# Patient Record
Sex: MALE | Race: WHITE | NOT HISPANIC OR LATINO | Employment: OTHER | URBAN - METROPOLITAN AREA
[De-identification: names, ages, dates, MRNs, and addresses within clinical notes are randomized per-mention and may not be internally consistent; named-entity substitution may affect disease eponyms.]

---

## 2017-01-12 ENCOUNTER — HOSPITAL ENCOUNTER (OUTPATIENT)
Dept: ONCOLOGY | Facility: CLINIC | Age: 77
Discharge: HOME | End: 2017-01-12
Attending: INTERNAL MEDICINE | Admitting: INTERNAL MEDICINE

## 2017-01-26 ENCOUNTER — HOSPITAL ENCOUNTER (OUTPATIENT)
Dept: ONCOLOGY | Facility: CLINIC | Age: 77
Discharge: HOME | End: 2017-01-26
Attending: INTERNAL MEDICINE | Admitting: INTERNAL MEDICINE

## 2017-02-27 ENCOUNTER — HOSPITAL ENCOUNTER (OUTPATIENT)
Dept: ONCOLOGY | Facility: CLINIC | Age: 77
Discharge: HOME | End: 2017-02-27
Attending: INTERNAL MEDICINE | Admitting: INTERNAL MEDICINE

## 2017-03-10 ENCOUNTER — HOSPITAL ENCOUNTER (OUTPATIENT)
Dept: CARDIOLOGY | Facility: HOSPITAL | Age: 77
Discharge: HOME | End: 2017-03-10
Attending: INTERNAL MEDICINE | Admitting: INTERNAL MEDICINE

## 2017-03-20 ENCOUNTER — HOSPITAL ENCOUNTER (OUTPATIENT)
Dept: LAB | Facility: HOSPITAL | Age: 77
Setting detail: SPECIMEN
Discharge: HOME | End: 2017-03-20
Attending: INTERNAL MEDICINE | Admitting: INTERNAL MEDICINE

## 2017-03-20 LAB
ALBUMIN SERPL-MCNC: 3.6 G/DL (ref 3.5–4.8)
ALBUMIN/GLOB SERPL: 1.6 {RATIO} (ref 1–1.7)
ALP SERPL-CCNC: 33 IU/L (ref 32–91)
ALT SERPL-CCNC: 32 IU/L (ref 17–63)
ANION GAP SERPL CALC-SCNC: 15.2 MMOL/L (ref 10–20)
AST SERPL-CCNC: 32 IU/L (ref 15–41)
BILIRUB SERPL-MCNC: 0.4 MG/DL (ref 0.3–1.2)
BUN SERPL-MCNC: 17 MG/DL (ref 8–20)
BUN/CREAT SERPL: 13.1 (ref 6.2–20.3)
CALCIUM SERPL-MCNC: 9.6 MG/DL (ref 8.9–10.3)
CHLORIDE SERPL-SCNC: 103 MMOL/L (ref 101–111)
CHOLEST SERPL-MCNC: 135 MG/DL
CHOLEST/HDLC SERPL: 4.6 {RATIO}
CONV CO2: 26 MMOL/L (ref 22–32)
CONV LDL CHOLESTEROL DIRECT: 67 MG/DL (ref 0–100)
CONV MICROALBUM.,U,RANDOM: <2 MG/L
CONV TOTAL PROTEIN: 5.8 G/DL (ref 6.1–7.9)
CREAT 24H UR-MCNC: 36 MG/DL
CREAT UR-MCNC: 1.3 MG/DL (ref 0.7–1.2)
GLOBULIN UR ELPH-MCNC: 2.2 G/DL (ref 2.5–3.8)
GLUCOSE SERPL-MCNC: 247 MG/DL (ref 65–99)
HDLC SERPL-MCNC: 29 MG/DL
LDLC/HDLC SERPL: 2.3 {RATIO}
LIPID INTERPRETATION: ABNORMAL
MICROALBUMIN/CREAT UR: <5.6 UG/MG
POTASSIUM SERPL-SCNC: 4.2 MMOL/L (ref 3.6–5.1)
SODIUM SERPL-SCNC: 140 MMOL/L (ref 136–144)
T4 FREE SERPL-MCNC: 1 NG/DL (ref 0.58–1.64)
TRIGL SERPL-MCNC: 293 MG/DL
TSH SERPL-ACNC: 3.11 UIU/ML (ref 0.34–5.6)
VLDLC SERPL CALC-MCNC: 38.8 MG/DL

## 2017-03-27 ENCOUNTER — HOSPITAL ENCOUNTER (OUTPATIENT)
Dept: ONCOLOGY | Facility: CLINIC | Age: 77
Discharge: HOME | End: 2017-03-27
Attending: INTERNAL MEDICINE | Admitting: INTERNAL MEDICINE

## 2017-04-24 ENCOUNTER — HOSPITAL ENCOUNTER (OUTPATIENT)
Dept: ONCOLOGY | Facility: CLINIC | Age: 77
Discharge: HOME | End: 2017-04-24
Attending: INTERNAL MEDICINE | Admitting: INTERNAL MEDICINE

## 2017-05-24 ENCOUNTER — HOSPITAL ENCOUNTER (OUTPATIENT)
Dept: ONCOLOGY | Facility: CLINIC | Age: 77
Discharge: HOME | End: 2017-05-24
Attending: INTERNAL MEDICINE | Admitting: INTERNAL MEDICINE

## 2017-05-31 ENCOUNTER — HOSPITAL ENCOUNTER (OUTPATIENT)
Dept: ONCOLOGY | Facility: CLINIC | Age: 77
Discharge: HOME | End: 2017-05-31
Attending: INTERNAL MEDICINE | Admitting: INTERNAL MEDICINE

## 2017-06-07 ENCOUNTER — HOSPITAL ENCOUNTER (OUTPATIENT)
Dept: ONCOLOGY | Facility: CLINIC | Age: 77
Discharge: HOME | End: 2017-06-07
Attending: INTERNAL MEDICINE | Admitting: INTERNAL MEDICINE

## 2017-06-15 ENCOUNTER — HOSPITAL ENCOUNTER (OUTPATIENT)
Dept: ONCOLOGY | Facility: CLINIC | Age: 77
Discharge: HOME | End: 2017-06-15
Attending: INTERNAL MEDICINE | Admitting: INTERNAL MEDICINE

## 2017-06-15 LAB
FERRITIN SERPL-MCNC: 72 NG/ML (ref 24–336)
IRON SATN MFR SERPL: 23 % (ref 20–50)
IRON SERPL-MCNC: 90 UG/DL (ref 45–182)
TIBC SERPL-MCNC: 390 UG/DL (ref 228–428)

## 2017-07-13 ENCOUNTER — HOSPITAL ENCOUNTER (OUTPATIENT)
Dept: ONCOLOGY | Facility: CLINIC | Age: 77
Discharge: HOME | End: 2017-07-13
Attending: INTERNAL MEDICINE | Admitting: INTERNAL MEDICINE

## 2017-07-19 ENCOUNTER — HOSPITAL ENCOUNTER (OUTPATIENT)
Dept: FAMILY MEDICINE CLINIC | Facility: CLINIC | Age: 77
Setting detail: SPECIMEN
Discharge: HOME | End: 2017-07-19
Attending: FAMILY MEDICINE | Admitting: FAMILY MEDICINE

## 2017-07-19 LAB
ANION GAP SERPL CALC-SCNC: 11.6 MMOL/L (ref 10–20)
BUN SERPL-MCNC: 14 MG/DL (ref 8–20)
BUN/CREAT SERPL: 10.8 (ref 6.2–20.3)
CALCIUM SERPL-MCNC: 9.8 MG/DL (ref 8.9–10.3)
CHLORIDE SERPL-SCNC: 103 MMOL/L (ref 101–111)
CONV CO2: 28 MMOL/L (ref 22–32)
CREAT UR-MCNC: 1.3 MG/DL (ref 0.7–1.2)
GLUCOSE SERPL-MCNC: 210 MG/DL (ref 65–99)
POTASSIUM SERPL-SCNC: 4.6 MMOL/L (ref 3.6–5.1)
SODIUM SERPL-SCNC: 138 MMOL/L (ref 136–144)

## 2017-07-20 ENCOUNTER — HOSPITAL ENCOUNTER (OUTPATIENT)
Dept: ONCOLOGY | Facility: CLINIC | Age: 77
Discharge: HOME | End: 2017-07-20
Attending: INTERNAL MEDICINE | Admitting: INTERNAL MEDICINE

## 2017-07-28 ENCOUNTER — HOSPITAL ENCOUNTER (OUTPATIENT)
Dept: ONCOLOGY | Facility: CLINIC | Age: 77
Discharge: HOME | End: 2017-07-28
Attending: INTERNAL MEDICINE | Admitting: INTERNAL MEDICINE

## 2017-08-03 ENCOUNTER — HOSPITAL ENCOUNTER (OUTPATIENT)
Dept: ONCOLOGY | Facility: CLINIC | Age: 77
Discharge: HOME | End: 2017-08-03
Attending: INTERNAL MEDICINE | Admitting: INTERNAL MEDICINE

## 2017-08-10 ENCOUNTER — HOSPITAL ENCOUNTER (OUTPATIENT)
Dept: ONCOLOGY | Facility: CLINIC | Age: 77
Discharge: HOME | End: 2017-08-10
Attending: INTERNAL MEDICINE | Admitting: INTERNAL MEDICINE

## 2017-08-29 ENCOUNTER — CLINICAL SUPPORT (OUTPATIENT)
Dept: ONCOLOGY | Facility: HOSPITAL | Age: 77
End: 2017-08-29

## 2017-08-29 ENCOUNTER — HOSPITAL ENCOUNTER (OUTPATIENT)
Dept: ONCOLOGY | Facility: HOSPITAL | Age: 77
Discharge: HOME | End: 2017-08-29
Attending: INTERNAL MEDICINE | Admitting: INTERNAL MEDICINE

## 2017-08-29 ENCOUNTER — HOSPITAL ENCOUNTER (OUTPATIENT)
Dept: ONCOLOGY | Facility: CLINIC | Age: 77
Setting detail: INFUSION SERIES
Discharge: HOME | End: 2017-08-29
Attending: INTERNAL MEDICINE | Admitting: INTERNAL MEDICINE

## 2017-08-29 NOTE — PROGRESS NOTES
PATIENTS ONCOLOGY RECORD LOCATED IN [facility]      Subjective     Name:  DEBI SHAIKH     Date:  2017  Address:  [address]  [place] [address]  Home: [phone]  :  1940 AGE:  77 y.o.        RECORDS OBTAINED:  Patients Oncology Record is located in [facility]

## 2017-09-06 ENCOUNTER — HOSPITAL ENCOUNTER (OUTPATIENT)
Dept: ONCOLOGY | Facility: CLINIC | Age: 77
Setting detail: INFUSION SERIES
Discharge: HOME | End: 2017-09-06
Attending: INTERNAL MEDICINE | Admitting: INTERNAL MEDICINE

## 2017-09-06 ENCOUNTER — CLINICAL SUPPORT (OUTPATIENT)
Dept: ONCOLOGY | Facility: HOSPITAL | Age: 77
End: 2017-09-06

## 2017-09-06 ENCOUNTER — HOSPITAL ENCOUNTER (OUTPATIENT)
Dept: ONCOLOGY | Facility: HOSPITAL | Age: 77
Discharge: HOME | End: 2017-09-06
Attending: INTERNAL MEDICINE | Admitting: INTERNAL MEDICINE

## 2017-09-06 NOTE — PROGRESS NOTES
PATIENTS ONCOLOGY RECORD LOCATED IN [facility]      Subjective     Name:  DEBI SHAIKH     Date:  2017  Address:  [address]  [place] [address]  Home: [phone]  :  1940 AGE:  77 y.o.        RECORDS OBTAINED:  Patients Oncology Record is located in [facility]

## 2017-09-25 ENCOUNTER — HOSPITAL ENCOUNTER (OUTPATIENT)
Dept: LAB | Facility: HOSPITAL | Age: 77
Setting detail: SPECIMEN
Discharge: HOME | End: 2017-09-25
Attending: INTERNAL MEDICINE | Admitting: INTERNAL MEDICINE

## 2017-10-05 ENCOUNTER — CLINICAL SUPPORT (OUTPATIENT)
Dept: ONCOLOGY | Facility: HOSPITAL | Age: 77
End: 2017-10-05

## 2017-10-05 ENCOUNTER — HOSPITAL ENCOUNTER (OUTPATIENT)
Dept: ONCOLOGY | Facility: HOSPITAL | Age: 77
Discharge: HOME | End: 2017-10-05
Attending: INTERNAL MEDICINE | Admitting: INTERNAL MEDICINE

## 2017-10-05 ENCOUNTER — HOSPITAL ENCOUNTER (OUTPATIENT)
Dept: ONCOLOGY | Facility: CLINIC | Age: 77
Setting detail: INFUSION SERIES
Discharge: HOME | End: 2017-10-05
Attending: INTERNAL MEDICINE | Admitting: INTERNAL MEDICINE

## 2017-10-05 NOTE — PROGRESS NOTES
PATIENTS ONCOLOGY RECORD LOCATED IN [facility]      Subjective     Name:  DEBI SHAIKH     Date:  10/05/2017  Address:  [address] [place] [address]  Home: [phone]  :  1940 AGE:  77 y.o.        RECORDS OBTAINED:  Patients Oncology Record is located in [facility]

## 2017-11-02 ENCOUNTER — CLINICAL SUPPORT (OUTPATIENT)
Dept: ONCOLOGY | Facility: HOSPITAL | Age: 77
End: 2017-11-02

## 2017-11-02 ENCOUNTER — HOSPITAL ENCOUNTER (OUTPATIENT)
Dept: ONCOLOGY | Facility: HOSPITAL | Age: 77
Discharge: HOME | End: 2017-11-02
Attending: INTERNAL MEDICINE | Admitting: INTERNAL MEDICINE

## 2017-11-02 ENCOUNTER — HOSPITAL ENCOUNTER (OUTPATIENT)
Dept: ONCOLOGY | Facility: CLINIC | Age: 77
Setting detail: INFUSION SERIES
Discharge: HOME | End: 2017-11-02
Attending: INTERNAL MEDICINE | Admitting: INTERNAL MEDICINE

## 2017-11-02 NOTE — PROGRESS NOTES
PATIENTS ONCOLOGY RECORD LOCATED IN [facility]      Subjective     Name:  DEBI MONKOGAST     Date:  2017  Address:  [facility] [place] [address]  Home: [phone]  :  1940 AGE:  77 y.o.        RECORDS OBTAINED:  Patients Oncology Record is located in [facility]

## 2017-12-14 ENCOUNTER — HOSPITAL ENCOUNTER (OUTPATIENT)
Dept: ONCOLOGY | Facility: CLINIC | Age: 77
Setting detail: INFUSION SERIES
Discharge: HOME | End: 2017-12-14
Attending: INTERNAL MEDICINE | Admitting: INTERNAL MEDICINE

## 2017-12-14 ENCOUNTER — HOSPITAL ENCOUNTER (OUTPATIENT)
Dept: ONCOLOGY | Facility: HOSPITAL | Age: 77
Discharge: HOME | End: 2017-12-14
Attending: INTERNAL MEDICINE | Admitting: INTERNAL MEDICINE

## 2018-01-11 ENCOUNTER — HOSPITAL ENCOUNTER (OUTPATIENT)
Dept: ONCOLOGY | Facility: CLINIC | Age: 78
Setting detail: INFUSION SERIES
Discharge: HOME | End: 2018-01-11
Attending: INTERNAL MEDICINE | Admitting: INTERNAL MEDICINE

## 2018-01-11 ENCOUNTER — CLINICAL SUPPORT (OUTPATIENT)
Dept: ONCOLOGY | Facility: HOSPITAL | Age: 78
End: 2018-01-11

## 2018-01-11 NOTE — PROGRESS NOTES
PATIENTS ONCOLOGY RECORD LOCATED IN [facility]      Subjective     Name:  DEBI CHAU NEEL     Date:  2018  Address:  [facility] [address]  [place] [address]  Home: [phone]  :  1940 AGE:  77 y.o.        RECORDS OBTAINED:  Patients Oncology Record is located in [facility]

## 2018-01-18 ENCOUNTER — CLINICAL SUPPORT (OUTPATIENT)
Dept: ONCOLOGY | Facility: HOSPITAL | Age: 78
End: 2018-01-18

## 2018-01-18 ENCOUNTER — HOSPITAL ENCOUNTER (OUTPATIENT)
Dept: ONCOLOGY | Facility: CLINIC | Age: 78
Setting detail: INFUSION SERIES
Discharge: HOME | End: 2018-01-18
Attending: INTERNAL MEDICINE | Admitting: INTERNAL MEDICINE

## 2018-01-18 NOTE — PROGRESS NOTES
PATIENTS ONCOLOGY RECORD LOCATED IN [facility]      Subjective     Name:  DEBI CHAU NEEL     Date:  2018  Address:  [facility] [address]  [place] [address]  Home: [phone]  :  1940 AGE:  77 y.o.        RECORDS OBTAINED:  Patients Oncology Record is located in [facility]

## 2018-01-25 ENCOUNTER — CLINICAL SUPPORT (OUTPATIENT)
Dept: ONCOLOGY | Facility: HOSPITAL | Age: 78
End: 2018-01-25

## 2018-01-25 ENCOUNTER — HOSPITAL ENCOUNTER (OUTPATIENT)
Dept: ONCOLOGY | Facility: CLINIC | Age: 78
Setting detail: INFUSION SERIES
Discharge: HOME | End: 2018-01-25
Attending: INTERNAL MEDICINE | Admitting: INTERNAL MEDICINE

## 2018-01-25 NOTE — PROGRESS NOTES
PATIENTS ONCOLOGY RECORD LOCATED IN [facility]      Subjective     Name:  DEBI CHAU NEEL     Date:  2018  Address:  [facility] [place] [address]  Home: [phone]  :  1940 AGE:  77 y.o.        RECORDS OBTAINED:  Patients Oncology Record is located in [facility]

## 2018-02-23 ENCOUNTER — HOSPITAL ENCOUNTER (OUTPATIENT)
Dept: ONCOLOGY | Facility: CLINIC | Age: 78
Setting detail: INFUSION SERIES
Discharge: HOME | End: 2018-02-23
Attending: INTERNAL MEDICINE | Admitting: INTERNAL MEDICINE

## 2018-02-23 ENCOUNTER — CLINICAL SUPPORT (OUTPATIENT)
Dept: ONCOLOGY | Facility: HOSPITAL | Age: 78
End: 2018-02-23

## 2018-02-23 NOTE — PROGRESS NOTES
PATIENTS ONCOLOGY RECORD LOCATED IN [facility]      Subjective     Name:  DEBI CHAU NEEL     Date:  2018  Address:  [facility] [place] [address]  Home: [phone]  :  1940 AGE:  77 y.o.        RECORDS OBTAINED:  Patients Oncology Record is located in [facility]

## 2018-03-23 ENCOUNTER — CLINICAL SUPPORT (OUTPATIENT)
Dept: ONCOLOGY | Facility: HOSPITAL | Age: 78
End: 2018-03-23

## 2018-03-23 ENCOUNTER — HOSPITAL ENCOUNTER (OUTPATIENT)
Dept: ONCOLOGY | Facility: CLINIC | Age: 78
Setting detail: INFUSION SERIES
Discharge: HOME | End: 2018-03-23
Attending: INTERNAL MEDICINE | Admitting: INTERNAL MEDICINE

## 2018-03-23 NOTE — PROGRESS NOTES
PATIENTS ONCOLOGY RECORD LOCATED IN [facility]      Subjective     Name:  DEBI CHAU NEEL     Date:  2018  Address:  [facility] [place] [address]  Home: [phone]  :  1940 AGE:  77 y.o.        RECORDS OBTAINED:  Patients Oncology Record is located in [facility]

## 2018-03-26 ENCOUNTER — HOSPITAL ENCOUNTER (OUTPATIENT)
Dept: LAB | Facility: HOSPITAL | Age: 78
Setting detail: SPECIMEN
Discharge: HOME | End: 2018-03-26
Attending: INTERNAL MEDICINE | Admitting: INTERNAL MEDICINE

## 2018-04-04 ENCOUNTER — HOSPITAL ENCOUNTER (OUTPATIENT)
Dept: FAMILY MEDICINE CLINIC | Facility: CLINIC | Age: 78
Setting detail: SPECIMEN
Discharge: HOME | End: 2018-04-04
Attending: FAMILY MEDICINE | Admitting: FAMILY MEDICINE

## 2018-04-04 LAB
ALBUMIN SERPL-MCNC: 4.1 G/DL (ref 3.5–4.8)
ALBUMIN/GLOB SERPL: 1.8 {RATIO} (ref 1–1.7)
ALP SERPL-CCNC: 36 IU/L (ref 32–91)
ALT SERPL-CCNC: 28 IU/L (ref 17–63)
ANION GAP SERPL CALC-SCNC: 14.3 MMOL/L (ref 10–20)
AST SERPL-CCNC: 36 IU/L (ref 15–41)
BASOPHILS # BLD AUTO: 0 10*3/UL (ref 0–0.2)
BASOPHILS NFR BLD AUTO: 1 % (ref 0–2)
BILIRUB SERPL-MCNC: 0.6 MG/DL (ref 0.3–1.2)
BUN SERPL-MCNC: 26 MG/DL (ref 8–20)
BUN/CREAT SERPL: 20 (ref 6.2–20.3)
CALCIUM SERPL-MCNC: 9.6 MG/DL (ref 8.9–10.3)
CHLORIDE SERPL-SCNC: 101 MMOL/L (ref 101–111)
CONV CO2: 23 MMOL/L (ref 22–32)
CONV TOTAL PROTEIN: 6.4 G/DL (ref 6.1–7.9)
CREAT UR-MCNC: 1.3 MG/DL (ref 0.7–1.2)
DIFFERENTIAL METHOD BLD: (no result)
EOSINOPHIL # BLD AUTO: 0.3 10*3/UL (ref 0–0.3)
EOSINOPHIL # BLD AUTO: 5 % (ref 0–3)
ERYTHROCYTE [DISTWIDTH] IN BLOOD BY AUTOMATED COUNT: 14.1 % (ref 11.5–14.5)
GLOBULIN UR ELPH-MCNC: 2.3 G/DL (ref 2.5–3.8)
GLUCOSE SERPL-MCNC: 378 MG/DL (ref 65–99)
HCT VFR BLD AUTO: 38.2 % (ref 40–54)
HGB BLD-MCNC: 12.8 G/DL (ref 14–18)
LYMPHOCYTES # BLD AUTO: 2 10*3/UL (ref 0.8–4.8)
LYMPHOCYTES NFR BLD AUTO: 33 % (ref 18–42)
MCH RBC QN AUTO: 31.3 PG (ref 26–32)
MCHC RBC AUTO-ENTMCNC: 33.5 G/DL (ref 32–36)
MCV RBC AUTO: 93.5 FL (ref 80–94)
MONOCYTES # BLD AUTO: 0.5 10*3/UL (ref 0.1–1.3)
MONOCYTES NFR BLD AUTO: 8 % (ref 2–11)
NEUTROPHILS # BLD AUTO: 3.3 10*3/UL (ref 2.3–8.6)
NEUTROPHILS NFR BLD AUTO: 53 % (ref 50–75)
NRBC BLD AUTO-RTO: 0 /100{WBCS}
NRBC/RBC NFR BLD MANUAL: 0 10*3/UL
PLATELET # BLD AUTO: 197 10*3/UL (ref 150–450)
PMV BLD AUTO: 9.9 FL (ref 7.4–10.4)
POTASSIUM SERPL-SCNC: 4.3 MMOL/L (ref 3.6–5.1)
RBC # BLD AUTO: 4.09 10*6/UL (ref 4.6–6)
SODIUM SERPL-SCNC: 134 MMOL/L (ref 136–144)
WBC # BLD AUTO: 6.1 10*3/UL (ref 4.5–11.5)

## 2018-04-25 ENCOUNTER — HOSPITAL ENCOUNTER (OUTPATIENT)
Dept: ONCOLOGY | Facility: CLINIC | Age: 78
Setting detail: INFUSION SERIES
Discharge: HOME | End: 2018-04-25
Attending: INTERNAL MEDICINE | Admitting: INTERNAL MEDICINE

## 2018-04-25 ENCOUNTER — CLINICAL SUPPORT (OUTPATIENT)
Dept: ONCOLOGY | Facility: HOSPITAL | Age: 78
End: 2018-04-25

## 2018-04-25 NOTE — PROGRESS NOTES
PATIENTS ONCOLOGY RECORD LOCATED IN [facility]      Subjective     Name:  DEBI CHAU NEEL     Date:  2018  Address[facility]  [facility] [place] [address]  Home: [phone]  :  1940 AGE:  77 y.o.        RECORDS OBTAINED:  Patients Oncology Record is located in [facility]

## 2018-04-27 ENCOUNTER — HOSPITAL ENCOUNTER (OUTPATIENT)
Dept: CARDIOLOGY | Facility: HOSPITAL | Age: 78
Discharge: HOME | End: 2018-04-27
Attending: INTERNAL MEDICINE | Admitting: INTERNAL MEDICINE

## 2018-05-25 ENCOUNTER — CLINICAL SUPPORT (OUTPATIENT)
Dept: ONCOLOGY | Facility: HOSPITAL | Age: 78
End: 2018-05-25

## 2018-05-25 ENCOUNTER — HOSPITAL ENCOUNTER (OUTPATIENT)
Dept: ONCOLOGY | Facility: CLINIC | Age: 78
Setting detail: INFUSION SERIES
Discharge: HOME | End: 2018-05-25
Attending: INTERNAL MEDICINE | Admitting: INTERNAL MEDICINE

## 2018-05-25 NOTE — PROGRESS NOTES
PATIENTS ONCOLOGY RECORD LOCATED IN [facility]      Subjective     Name:  DEBI CHAU NEEL     Date:  2018  Address:  [facility] [place] [address]  Home: [phone]  :  1940 AGE:  77 y.o.        RECORDS OBTAINED:  Patients Oncology Record is located in [facility]

## 2018-06-14 ENCOUNTER — HOSPITAL ENCOUNTER (OUTPATIENT)
Dept: ONCOLOGY | Facility: CLINIC | Age: 78
Setting detail: INFUSION SERIES
Discharge: HOME | End: 2018-06-14
Attending: INTERNAL MEDICINE | Admitting: INTERNAL MEDICINE

## 2018-06-14 ENCOUNTER — CLINICAL SUPPORT (OUTPATIENT)
Dept: ONCOLOGY | Facility: HOSPITAL | Age: 78
End: 2018-06-14

## 2018-06-14 NOTE — PROGRESS NOTES
PATIENTS ONCOLOGY RECORD LOCATED IN [facility]      Subjective     Name:  DEBI CHAU NEEL     Date:  2018  Address[facility]  [facility] [place] [address]  Home: [phone]  :  1940 AGE:  77 y.o.        RECORDS OBTAINED:  Patients Oncology Record is located in [facility]

## 2018-07-12 ENCOUNTER — HOSPITAL ENCOUNTER (OUTPATIENT)
Dept: ONCOLOGY | Facility: CLINIC | Age: 78
Setting detail: INFUSION SERIES
Discharge: HOME | End: 2018-07-12
Attending: INTERNAL MEDICINE | Admitting: INTERNAL MEDICINE

## 2018-07-12 ENCOUNTER — CLINICAL SUPPORT (OUTPATIENT)
Dept: ONCOLOGY | Facility: HOSPITAL | Age: 78
End: 2018-07-12

## 2018-07-12 NOTE — PROGRESS NOTES
PATIENTS ONCOLOGY RECORD LOCATED IN [facility]      Subjective     Name:  DEBI CHAU NEEL     Date:  2018  Address:  [facility] [address]  [place] [address]  Home: [phone]  :  1940 AGE:  77 y.o.        RECORDS OBTAINED:  Patients Oncology Record is located in [facility]

## 2018-07-20 ENCOUNTER — HOSPITAL ENCOUNTER (OUTPATIENT)
Dept: ONCOLOGY | Facility: CLINIC | Age: 78
Setting detail: INFUSION SERIES
Discharge: HOME | End: 2018-07-20
Attending: INTERNAL MEDICINE | Admitting: INTERNAL MEDICINE

## 2018-07-20 ENCOUNTER — CLINICAL SUPPORT (OUTPATIENT)
Dept: ONCOLOGY | Facility: HOSPITAL | Age: 78
End: 2018-07-20

## 2018-07-20 NOTE — PROGRESS NOTES
PATIENTS ONCOLOGY RECORD LOCATED IN [facility]      Subjective     Name:  DEBI CHAU NEEL     Date:  2018  Address:  [facility] [address]  [place] [address]  Home: [phone]  :  1940 AGE:  77 y.o.        RECORDS OBTAINED:  Patients Oncology Record is located in [facility]

## 2018-08-21 ENCOUNTER — HOSPITAL ENCOUNTER (OUTPATIENT)
Dept: ONCOLOGY | Facility: CLINIC | Age: 78
Setting detail: INFUSION SERIES
Discharge: HOME | End: 2018-08-21
Attending: INTERNAL MEDICINE | Admitting: INTERNAL MEDICINE

## 2018-08-21 ENCOUNTER — CLINICAL SUPPORT (OUTPATIENT)
Dept: ONCOLOGY | Facility: HOSPITAL | Age: 78
End: 2018-08-21

## 2018-08-21 NOTE — PROGRESS NOTES
PATIENTS ONCOLOGY RECORD LOCATED IN [facility]      Subjective     Name:  DEBI CHAU NEEL     Date:  2018  Address:  [facility] [place] [address]  Home: [phone]  :  1940 AGE:  78 y.o.        RECORDS OBTAINED:  Patients Oncology Record is located in [facility]

## 2018-09-24 ENCOUNTER — CLINICAL SUPPORT (OUTPATIENT)
Dept: ONCOLOGY | Facility: HOSPITAL | Age: 78
End: 2018-09-24

## 2018-09-24 ENCOUNTER — HOSPITAL ENCOUNTER (OUTPATIENT)
Dept: ONCOLOGY | Facility: CLINIC | Age: 78
Setting detail: INFUSION SERIES
Discharge: HOME | End: 2018-09-24
Attending: INTERNAL MEDICINE | Admitting: INTERNAL MEDICINE

## 2018-09-24 NOTE — PROGRESS NOTES
PATIENTS ONCOLOGY RECORD LOCATED IN [facility]      Subjective     Name:  DEBI CHAU NEEL     Date:  2018  Address:  [facility] [place] [address]  Home: [phone]  :  1940 AGE:  78 y.o.        RECORDS OBTAINED:  Patients Oncology Record is located in [facility]

## 2018-09-25 ENCOUNTER — HOSPITAL ENCOUNTER (OUTPATIENT)
Dept: LAB | Facility: HOSPITAL | Age: 78
Setting detail: SPECIMEN
Discharge: HOME | End: 2018-09-25
Attending: INTERNAL MEDICINE | Admitting: INTERNAL MEDICINE

## 2018-10-24 ENCOUNTER — HOSPITAL ENCOUNTER (OUTPATIENT)
Dept: ONCOLOGY | Facility: CLINIC | Age: 78
Setting detail: INFUSION SERIES
Discharge: HOME | End: 2018-10-24
Attending: INTERNAL MEDICINE | Admitting: INTERNAL MEDICINE

## 2018-10-24 ENCOUNTER — CLINICAL SUPPORT (OUTPATIENT)
Dept: ONCOLOGY | Facility: HOSPITAL | Age: 78
End: 2018-10-24

## 2018-10-24 NOTE — PROGRESS NOTES
PATIENTS ONCOLOGY RECORD LOCATED IN [facility]      Subjective     Name:  DEBI CHAU NEEL     Date:  10/24/2018  Address:  [facility] [address] [place] [address]  Home: [phone]  :  1940 AGE:  78 y.o.        RECORDS OBTAINED:  Patients Oncology Record is located in [facility]

## 2018-11-26 ENCOUNTER — HOSPITAL ENCOUNTER (OUTPATIENT)
Dept: ONCOLOGY | Facility: CLINIC | Age: 78
Setting detail: INFUSION SERIES
Discharge: HOME | End: 2018-11-26
Attending: INTERNAL MEDICINE | Admitting: INTERNAL MEDICINE

## 2018-11-26 ENCOUNTER — CLINICAL SUPPORT (OUTPATIENT)
Dept: ONCOLOGY | Facility: HOSPITAL | Age: 78
End: 2018-11-26

## 2018-11-26 NOTE — PROGRESS NOTES
PATIENTS ONCOLOGY RECORD LOCATED IN [employer]      Subjective     Name:  DEBI MONKOGAST     Date:  2018  Address:  [facility] [place] [address]  Home: [unfilled]  :  1940 AGE:  78 y.o.        RECORDS OBTAINED:  Patients Oncology Record is located in [facility]

## 2018-12-13 ENCOUNTER — HOSPITAL ENCOUNTER (OUTPATIENT)
Dept: ONCOLOGY | Facility: CLINIC | Age: 78
Setting detail: INFUSION SERIES
Discharge: HOME | End: 2018-12-13
Attending: INTERNAL MEDICINE | Admitting: INTERNAL MEDICINE

## 2018-12-13 ENCOUNTER — HOSPITAL ENCOUNTER (OUTPATIENT)
Dept: ONCOLOGY | Facility: HOSPITAL | Age: 78
Discharge: HOME | End: 2018-12-13
Attending: INTERNAL MEDICINE | Admitting: INTERNAL MEDICINE

## 2018-12-13 ENCOUNTER — CLINICAL SUPPORT (OUTPATIENT)
Dept: ONCOLOGY | Facility: HOSPITAL | Age: 78
End: 2018-12-13

## 2018-12-13 LAB
FERRITIN SERPL-MCNC: 60 NG/ML (ref 24–336)
IRON SATN MFR SERPL: 27 % (ref 20–50)
IRON SERPL-MCNC: 114 UG/DL (ref 45–182)
TIBC SERPL-MCNC: 420 UG/DL (ref 228–428)

## 2018-12-13 NOTE — PROGRESS NOTES
PATIENTS ONCOLOGY RECORD LOCATED IN [employer]      Subjective     Name:  DEBI MONKOGAST     Date:  2018  Address:  [facility] [place] [address]  Home: [unfilled]  :  1940 AGE:  78 y.o.        RECORDS OBTAINED:  Patients Oncology Record is located in [facility]

## 2019-01-10 ENCOUNTER — HOSPITAL ENCOUNTER (OUTPATIENT)
Dept: ONCOLOGY | Facility: CLINIC | Age: 79
Setting detail: INFUSION SERIES
Discharge: HOME | End: 2019-01-10
Attending: INTERNAL MEDICINE | Admitting: INTERNAL MEDICINE

## 2019-01-10 ENCOUNTER — CLINICAL SUPPORT (OUTPATIENT)
Dept: ONCOLOGY | Facility: HOSPITAL | Age: 79
End: 2019-01-10

## 2019-01-10 NOTE — PROGRESS NOTES
PATIENTS ONCOLOGY RECORD LOCATED IN [employer]      Subjective     Name:  DEBI SHAIKH     Date:  01/10/2019  Address:  [facility] [place] [address]  Home: [unfilled]  :  1940 AGE:  78 y.o.        RECORDS OBTAINED:  Patients Oncology Record is located in [facility]

## 2019-02-07 ENCOUNTER — HOSPITAL ENCOUNTER (OUTPATIENT)
Dept: ONCOLOGY | Facility: CLINIC | Age: 79
Setting detail: INFUSION SERIES
Discharge: HOME | End: 2019-02-07
Attending: INTERNAL MEDICINE | Admitting: INTERNAL MEDICINE

## 2019-02-07 ENCOUNTER — CLINICAL SUPPORT (OUTPATIENT)
Dept: ONCOLOGY | Facility: HOSPITAL | Age: 79
End: 2019-02-07

## 2019-02-07 NOTE — PROGRESS NOTES
PATIENTS ONCOLOGY RECORD LOCATED IN [facility]      Subjective     Name:  DEBI SHAIKH     Date:  2019  Address:  [address] [place] [address] [place] [address]  Home: [unfilled]  :  1940 AGE:  78 y.o.        RECORDS OBTAINED:  Patients Oncology Record is located in [facility]

## 2019-03-07 ENCOUNTER — CLINICAL SUPPORT (OUTPATIENT)
Dept: ONCOLOGY | Facility: HOSPITAL | Age: 79
End: 2019-03-07

## 2019-03-07 ENCOUNTER — HOSPITAL ENCOUNTER (OUTPATIENT)
Dept: ONCOLOGY | Facility: CLINIC | Age: 79
Setting detail: INFUSION SERIES
Discharge: HOME | End: 2019-03-07
Attending: INTERNAL MEDICINE | Admitting: INTERNAL MEDICINE

## 2019-03-07 NOTE — PROGRESS NOTES
PATIENTS ONCOLOGY RECORD LOCATED IN [employer]      Subjective     Name:  DEBI SHAIKH     Date:  2019  Address:  [address] [place] [address] [place] [address]  Home: [unfilled]  :  1940 AGE:  78 y.o.        RECORDS OBTAINED:  Patients Oncology Record is located in [facility]

## 2019-03-26 ENCOUNTER — HOSPITAL ENCOUNTER (OUTPATIENT)
Dept: LAB | Facility: HOSPITAL | Age: 79
Setting detail: SPECIMEN
Discharge: HOME | End: 2019-03-26
Attending: INTERNAL MEDICINE | Admitting: INTERNAL MEDICINE

## 2019-03-28 ENCOUNTER — HOSPITAL ENCOUNTER (OUTPATIENT)
Dept: GENERAL RADIOLOGY | Facility: HOSPITAL | Age: 79
Discharge: HOME | End: 2019-03-28
Attending: FAMILY MEDICINE | Admitting: FAMILY MEDICINE

## 2019-03-28 ENCOUNTER — HOSPITAL ENCOUNTER (OUTPATIENT)
Dept: LAB | Facility: HOSPITAL | Age: 79
Setting detail: SPECIMEN
Discharge: HOME | End: 2019-03-28
Attending: INTERNAL MEDICINE | Admitting: INTERNAL MEDICINE

## 2019-03-28 LAB
ALBUMIN SERPL-MCNC: 4 G/DL (ref 3.5–4.8)
ALBUMIN/GLOB SERPL: 1.7 {RATIO} (ref 1–1.7)
ALP SERPL-CCNC: 41 IU/L (ref 32–91)
ALT SERPL-CCNC: 30 IU/L (ref 17–63)
ANION GAP SERPL CALC-SCNC: 15.2 MMOL/L (ref 10–20)
AST SERPL-CCNC: 32 IU/L (ref 15–41)
BILIRUB SERPL-MCNC: 0.7 MG/DL (ref 0.3–1.2)
BUN SERPL-MCNC: 20 MG/DL (ref 8–20)
BUN/CREAT SERPL: 16.7 (ref 6.2–20.3)
CALCIUM SERPL-MCNC: 9.2 MG/DL (ref 8.9–10.3)
CHLORIDE SERPL-SCNC: 101 MMOL/L (ref 101–111)
CHOLEST SERPL-MCNC: 136 MG/DL
CHOLEST/HDLC SERPL: 4.7 {RATIO}
CONV CO2: 22 MMOL/L (ref 22–32)
CONV LDL CHOLESTEROL DIRECT: 73 MG/DL (ref 0–100)
CONV MICROALBUM.,U,RANDOM: 22 MG/L
CONV TOTAL PROTEIN: 6.4 G/DL (ref 6.1–7.9)
CREAT 24H UR-MCNC: 67.8 MG/DL
CREAT UR-MCNC: 1.2 MG/DL (ref 0.7–1.2)
GLOBULIN UR ELPH-MCNC: 2.4 G/DL (ref 2.5–3.8)
GLUCOSE SERPL-MCNC: 206 MG/DL (ref 65–99)
HDLC SERPL-MCNC: 29 MG/DL
LDLC/HDLC SERPL: 2.5 {RATIO}
LIPID INTERPRETATION: ABNORMAL
MICROALBUMIN/CREAT UR: 32.4 UG/MG
POTASSIUM SERPL-SCNC: 4.2 MMOL/L (ref 3.6–5.1)
SODIUM SERPL-SCNC: 134 MMOL/L (ref 136–144)
TRIGL SERPL-MCNC: 249 MG/DL
VLDLC SERPL CALC-MCNC: 33.7 MG/DL

## 2019-04-04 ENCOUNTER — CLINICAL SUPPORT (OUTPATIENT)
Dept: ONCOLOGY | Facility: HOSPITAL | Age: 79
End: 2019-04-04

## 2019-04-04 ENCOUNTER — HOSPITAL ENCOUNTER (OUTPATIENT)
Dept: ONCOLOGY | Facility: CLINIC | Age: 79
Setting detail: INFUSION SERIES
Discharge: HOME | End: 2019-04-04
Attending: INTERNAL MEDICINE | Admitting: INTERNAL MEDICINE

## 2019-04-04 NOTE — PROGRESS NOTES
PATIENTS ONCOLOGY RECORD LOCATED IN [employer]      Subjective     Name:  DEBI SHAIKH     Date:  2019  Address:  [address] [place] [address] [facility] [place] [address]  Home: [unfilled]  :  1940 AGE:  78 y.o.        RECORDS OBTAINED:  Patients Oncology Record is located in [facility]

## 2019-05-06 ENCOUNTER — CLINICAL SUPPORT (OUTPATIENT)
Dept: ONCOLOGY | Facility: HOSPITAL | Age: 79
End: 2019-05-06

## 2019-05-06 ENCOUNTER — HOSPITAL ENCOUNTER (OUTPATIENT)
Dept: ONCOLOGY | Facility: CLINIC | Age: 79
Setting detail: INFUSION SERIES
Discharge: HOME | End: 2019-05-06
Attending: INTERNAL MEDICINE | Admitting: INTERNAL MEDICINE

## 2019-05-06 NOTE — PROGRESS NOTES
PATIENTS ONCOLOGY RECORD LOCATED IN [facility]      Subjective     Name:  DEBI MONKOGAST     Date:  2019  Address:  [address] [place] [place] [address] [place] [address]  Home: [unfilled]  :  1940 AGE:  78 y.o.        RECORDS OBTAINED:  Patients Oncology Record is located in [facility]

## 2019-06-05 ENCOUNTER — HOSPITAL ENCOUNTER (OUTPATIENT)
Dept: LAB | Facility: HOSPITAL | Age: 79
Discharge: HOME | End: 2019-06-05
Attending: INTERNAL MEDICINE | Admitting: INTERNAL MEDICINE

## 2019-06-05 LAB
ANION GAP SERPL CALC-SCNC: 16.3 MMOL/L (ref 10–20)
BASOPHILS # BLD AUTO: 0.1 10*3/UL (ref 0–0.2)
BASOPHILS NFR BLD AUTO: 1 % (ref 0–2)
BUN SERPL-MCNC: 31 MG/DL (ref 8–20)
BUN/CREAT SERPL: 22.1 (ref 6.2–20.3)
CALCIUM SERPL-MCNC: 9 MG/DL (ref 8.9–10.3)
CHLORIDE SERPL-SCNC: 101 MMOL/L (ref 101–111)
CONV CO2: 27 MMOL/L (ref 22–32)
CREAT UR-MCNC: 1.4 MG/DL (ref 0.7–1.2)
DIFFERENTIAL METHOD BLD: (no result)
EOSINOPHIL # BLD AUTO: 0.2 10*3/UL (ref 0–0.3)
EOSINOPHIL # BLD AUTO: 4 % (ref 0–3)
ERYTHROCYTE [DISTWIDTH] IN BLOOD BY AUTOMATED COUNT: 14 % (ref 11.5–14.5)
GLUCOSE SERPL-MCNC: 215 MG/DL (ref 65–99)
HCT VFR BLD AUTO: 39.9 % (ref 40–54)
HGB BLD-MCNC: 13.4 G/DL (ref 14–18)
LYMPHOCYTES # BLD AUTO: 2.4 10*3/UL (ref 0.8–4.8)
LYMPHOCYTES NFR BLD AUTO: 33 % (ref 18–42)
MCH RBC QN AUTO: 31.4 PG (ref 26–32)
MCHC RBC AUTO-ENTMCNC: 33.6 G/DL (ref 32–36)
MCV RBC AUTO: 93.4 FL (ref 80–94)
MONOCYTES # BLD AUTO: 0.6 10*3/UL (ref 0.1–1.3)
MONOCYTES NFR BLD AUTO: 8 % (ref 2–11)
NEUTROPHILS # BLD AUTO: 3.9 10*3/UL (ref 2.3–8.6)
NEUTROPHILS NFR BLD AUTO: 54 % (ref 50–75)
NRBC BLD AUTO-RTO: 0 /100{WBCS}
NRBC/RBC NFR BLD MANUAL: 0 10*3/UL
PLATELET # BLD AUTO: 189 10*3/UL (ref 150–450)
PMV BLD AUTO: 9.6 FL (ref 7.4–10.4)
POTASSIUM SERPL-SCNC: 4.3 MMOL/L (ref 3.6–5.1)
RBC # BLD AUTO: 4.28 10*6/UL (ref 4.6–6)
SODIUM SERPL-SCNC: 140 MMOL/L (ref 136–144)
WBC # BLD AUTO: 7.1 10*3/UL (ref 4.5–11.5)

## 2019-06-11 ENCOUNTER — HOSPITAL ENCOUNTER (OUTPATIENT)
Dept: FAMILY MEDICINE CLINIC | Facility: CLINIC | Age: 79
Setting detail: SPECIMEN
Discharge: HOME | End: 2019-06-11
Attending: FAMILY MEDICINE | Admitting: FAMILY MEDICINE

## 2019-06-13 ENCOUNTER — HOSPITAL ENCOUNTER (OUTPATIENT)
Dept: ONCOLOGY | Facility: CLINIC | Age: 79
Setting detail: INFUSION SERIES
Discharge: HOME | End: 2019-06-13
Attending: INTERNAL MEDICINE | Admitting: INTERNAL MEDICINE

## 2019-06-17 ENCOUNTER — TRANSCRIBE ORDERS (OUTPATIENT)
Dept: ADMINISTRATIVE | Facility: HOSPITAL | Age: 79
End: 2019-06-17

## 2019-06-17 DIAGNOSIS — I73.9 CLAUDICATION (HCC): Primary | ICD-10-CM

## 2019-06-20 ENCOUNTER — HOSPITAL ENCOUNTER (OUTPATIENT)
Dept: CARDIOLOGY | Facility: HOSPITAL | Age: 79
Discharge: HOME | End: 2019-06-20
Admitting: FAMILY MEDICINE

## 2019-06-20 DIAGNOSIS — I73.9 CLAUDICATION (HCC): ICD-10-CM

## 2019-06-20 LAB
BH CV LOWER ARTERIAL LEFT ABI RATIO: 1.36
BH CV LOWER ARTERIAL LEFT DORSALIS PEDIS SYS MAX: 155 MMHG
BH CV LOWER ARTERIAL LEFT GREAT TOE SYS MAX: 151 MMHG
BH CV LOWER ARTERIAL LEFT POST TIBIAL SYS MAX: 192 MMHG
BH CV LOWER ARTERIAL LEFT TBI RATIO: 1.07
BH CV LOWER ARTERIAL RIGHT ABI RATIO: 1.27
BH CV LOWER ARTERIAL RIGHT DORSALIS PEDIS SYS MAX: 161 MMHG
BH CV LOWER ARTERIAL RIGHT GREAT TOE SYS MAX: 254 MMHG
BH CV LOWER ARTERIAL RIGHT POST TIBIAL SYS MAX: 179 MMHG
BH CV LOWER ARTERIAL RIGHT TBI RATIO: 254
UPPER ARTERIAL LEFT ARM BRACHIAL SYS MAX: 134 MMHG
UPPER ARTERIAL RIGHT ARM BRACHIAL SYS MAX: 141 MMHG

## 2019-06-20 PROCEDURE — 93922 UPR/L XTREMITY ART 2 LEVELS: CPT

## 2019-06-24 RX ORDER — WARFARIN SODIUM 3 MG/1
3 TABLET ORAL DAILY
Qty: 90 TABLET | Refills: 2 | Status: SHIPPED | OUTPATIENT
Start: 2019-06-24 | End: 2019-11-23 | Stop reason: HOSPADM

## 2019-06-24 RX ORDER — WARFARIN SODIUM 3 MG/1
3 TABLET ORAL DAILY
Refills: 2 | COMMUNITY
Start: 2019-03-24 | End: 2019-06-24 | Stop reason: SDUPTHER

## 2019-06-24 NOTE — TELEPHONE ENCOUNTER
Patient requests refill on Warfarin 3 mg.  Medication reviewed in medication list and pend.  Routed to Skylar Melvin RN.  mann Perrya

## 2019-06-26 ENCOUNTER — TELEPHONE (OUTPATIENT)
Dept: FAMILY MEDICINE CLINIC | Facility: CLINIC | Age: 79
End: 2019-06-26

## 2019-06-26 ENCOUNTER — OFFICE VISIT (OUTPATIENT)
Dept: CARDIOLOGY | Facility: CLINIC | Age: 79
End: 2019-06-26

## 2019-06-26 VITALS
SYSTOLIC BLOOD PRESSURE: 118 MMHG | BODY MASS INDEX: 46.65 KG/M2 | HEIGHT: 69 IN | DIASTOLIC BLOOD PRESSURE: 76 MMHG | HEART RATE: 72 BPM | WEIGHT: 315 LBS

## 2019-06-26 DIAGNOSIS — I25.119 CORONARY ARTERY DISEASE WITH ANGINA PECTORIS, UNSPECIFIED VESSEL OR LESION TYPE, UNSPECIFIED WHETHER NATIVE OR TRANSPLANTED HEART (HCC): Primary | ICD-10-CM

## 2019-06-26 DIAGNOSIS — E78.2 MIXED HYPERLIPIDEMIA: ICD-10-CM

## 2019-06-26 DIAGNOSIS — I10 ESSENTIAL HYPERTENSION: ICD-10-CM

## 2019-06-26 DIAGNOSIS — R60.0 EDEMA OF BOTH LEGS: ICD-10-CM

## 2019-06-26 PROCEDURE — 99214 OFFICE O/P EST MOD 30 MIN: CPT | Performed by: INTERNAL MEDICINE

## 2019-06-26 PROCEDURE — 93000 ELECTROCARDIOGRAM COMPLETE: CPT | Performed by: INTERNAL MEDICINE

## 2019-06-26 RX ORDER — ERGOCALCIFEROL 1.25 MG/1
1 CAPSULE ORAL
COMMUNITY
Start: 2018-04-02 | End: 2019-07-01 | Stop reason: SDUPTHER

## 2019-06-26 RX ORDER — AMLODIPINE BESYLATE AND ATORVASTATIN CALCIUM 5; 20 MG/1; MG/1
1 TABLET, FILM COATED ORAL DAILY
COMMUNITY
Start: 2018-03-07 | End: 2019-12-17 | Stop reason: SDUPTHER

## 2019-06-26 RX ORDER — PEN NEEDLE, DIABETIC 29 G X1/2"
NEEDLE, DISPOSABLE MISCELLANEOUS
COMMUNITY
Start: 2019-05-18 | End: 2019-08-29 | Stop reason: SDUPTHER

## 2019-06-26 RX ORDER — CLOPIDOGREL BISULFATE 75 MG/1
TABLET ORAL DAILY
COMMUNITY
Start: 2017-07-14 | End: 2019-07-22 | Stop reason: SDUPTHER

## 2019-06-26 RX ORDER — IRBESARTAN 300 MG/1
300 TABLET ORAL DAILY
COMMUNITY
Start: 2019-01-15 | End: 2020-01-24

## 2019-06-26 RX ORDER — HYDROCHLOROTHIAZIDE 25 MG/1
25 TABLET ORAL DAILY
COMMUNITY
Start: 2019-05-06 | End: 2020-01-31

## 2019-06-26 RX ORDER — WARFARIN SODIUM 5 MG/1
TABLET ORAL DAILY
COMMUNITY
Start: 2019-06-25 | End: 2019-09-23 | Stop reason: SDUPTHER

## 2019-06-26 RX ORDER — BUMETANIDE 2 MG/1
2 TABLET ORAL DAILY
COMMUNITY
Start: 2019-05-15 | End: 2020-04-22

## 2019-06-26 RX ORDER — FENOFIBRATE 54 MG/1
TABLET ORAL EVERY 24 HOURS
COMMUNITY
Start: 2017-10-05 | End: 2019-06-29 | Stop reason: SDUPTHER

## 2019-06-26 RX ORDER — BLOOD SUGAR DIAGNOSTIC
STRIP MISCELLANEOUS
Refills: 5 | COMMUNITY
Start: 2019-04-17 | End: 2019-07-16 | Stop reason: SDUPTHER

## 2019-06-26 RX ORDER — PREGABALIN 100 MG/1
100 CAPSULE ORAL 2 TIMES DAILY
COMMUNITY
Start: 2012-02-22 | End: 2020-06-01

## 2019-06-26 RX ORDER — ATENOLOL 50 MG/1
50 TABLET ORAL DAILY
COMMUNITY
Start: 2017-08-21 | End: 2020-02-10

## 2019-06-26 RX ORDER — UBIDECARENONE 75 MG
1 CAPSULE ORAL DAILY
COMMUNITY
Start: 2015-04-15

## 2019-06-26 RX ORDER — BLOOD SUGAR DIAGNOSTIC
STRIP MISCELLANEOUS
COMMUNITY
Start: 2017-06-20 | End: 2019-11-22

## 2019-06-26 RX ORDER — LEVOTHYROXINE SODIUM 175 UG/1
TABLET ORAL EVERY 24 HOURS
COMMUNITY
Start: 2018-08-21 | End: 2019-08-18 | Stop reason: SDUPTHER

## 2019-06-26 RX ORDER — METFORMIN HYDROCHLORIDE 500 MG/1
2000 TABLET, EXTENDED RELEASE ORAL EVERY EVENING
COMMUNITY
Start: 2018-01-15 | End: 2020-01-06

## 2019-06-26 RX ORDER — POTASSIUM CHLORIDE 1500 MG/1
20 TABLET, FILM COATED, EXTENDED RELEASE ORAL DAILY
COMMUNITY
Start: 2019-05-15 | End: 2020-04-22

## 2019-06-26 NOTE — PROGRESS NOTES
Date of Office Visit: 2019  Encounter Provider: Edwar Ackerman MD  Place of Service: [facility] CARDIOLOGY [place]  Patient Name: Dalton Dallas  :1940  Gabino Sibley Jr., MD    Chief Complaint   Patient presents with   • Hypertension     6 week follow up     History of Present Illness    I'm pleased to see Mr. Dallas in my office today as a followup     As you know, patient is 78 years old white gentleman whose past medical history is significant for hypertension, hyperlipidemia, coronary artery disease, coronary artery stenting, diabetes mellitus, DVT who came for followup.     In , patient developed symptom of shortness of breath and subsequent stress test was abnormal.  Cardiac catheterization showed LAD was free of disease, RCA had 50% stenosis, and LCX has 80% stenosis.  Patient underwent LCX/om stenting.  In , repeat cardiac catheterization showed 80-90% stenosis of LCX/OM1 patient underwent successful stenting.  LAD had 25% stenosis.  LVEF was 60%.  In 2018, echocardiogram showed EF of 60-65%.    On previous visit, patient was noted to have bilateral leg edema and increased weight gain.  Patient was consuming significant amount of water and salt.  Patient denies any chest pain or tightness or heaviness.  Patient has stable baseline shortness of breath.  Patient still has leg edema but it has improved.  No orthopnea, PND, syncope or presyncope.    EKG showed sinus rhythm.  Nonspecific ST changes.    At this stage, I will continue Bumex 2 mg daily.  Patient is advised to monitor his weight daily.  Patient still have bilateral leg edema.  I would recommend to decrease his fluid as well as salt intake more.  I would like to see his weight in the range of 305 pounds.  I would recommend monitoring of electrolytes and creatinine periodically in every 3 to 4 months.  Patient is advised to follow-up with PCP for blood work.  I intend to see the patient in 6  "months        Past Medical History:   Diagnosis Date   • ACE-inhibitor cough 06/2005   • Coronary artery disease    • Diabetes mellitus, type 2 (CMS/HCC) 1995   • ED (erectile dysfunction)    • Hx of blood clots 2014    Blood clot left leg    • Hyperlipidemia 08/2000   • Hypertension    • Hypogonadism, male 1998   • Hypothyroidism 06/2001   • Obesity    • Peripheral neuropathy    • Pulmonary embolism (CMS/HCC) 02/2014   • Rectal fistula    • Vitamin D deficiency          Past Surgical History:   Procedure Laterality Date   • CARDIAC CATHETERIZATION  2008    PTCA: 2008; PTCA: 4/21/2015 LCX stent    • CATARACT EXTRACTION  01/11/2016 1-4-16 & 1-11-16    • INGUINAL HERNIA REPAIR Left    • UMBILICAL HERNIA REPAIR             Current Outpatient Medications:   •  ACCU-CHEK GONZALES PLUS test strip, TEST BLOOD SUGAR BEFORE MEALS AND AT BEDTIME FOUR TIMES A DAY, Disp: , Rfl: 5  •  amLODIPine-atorvastatin (CADUET) 5-20 MG per tablet, Daily., Disp: , Rfl:   •  aspirin (ASPIRIN LOW DOSE) 81 MG tablet, Daily., Disp: , Rfl:   •  atenolol (TENORMIN) 50 MG tablet, Daily., Disp: , Rfl:   •  BD INSULIN SYRINGE U/F 31G X 5/16\" 0.5 ML misc, , Disp: , Rfl:   •  bumetanide (BUMEX) 2 MG tablet, Daily., Disp: , Rfl:   •  clopidogrel (PLAVIX) 75 MG tablet, Daily., Disp: , Rfl:   •  ergocalciferol (ERGOCALCIFEROL) 35217 units capsule, 1 capsule Every 7 (Seven) Days., Disp: , Rfl:   •  fenofibrate (TRICOR) 54 MG tablet, Daily., Disp: , Rfl:   •  glucose blood (ACCU-CHEK GONZALES PLUS) test strip, ACCU-CHEK GONZALES PLUS STRP, Disp: , Rfl:   •  hydrochlorothiazide (HYDRODIURIL) 25 MG tablet, Daily., Disp: , Rfl:   •  Insulin Pen Needle (B-D ULTRAFINE III SHORT PEN) 31G X 8 MM misc, BD PEN NEEDLE SHORT U/F 31G X 8 MM, Disp: , Rfl:   •  insulin regular (HUMULIN R) 500 UNIT/ML CONCENTRATED injection, HUMULIN R U-500 (CONCENTRATED) 500 UNIT/ML SOLN, Disp: , Rfl:   •  Insulin Syringe-Needle U-100 (BD INSULIN SYRINGE ULTRAFINE) 31G X 5/16\" 0.5 ML misc, BD " "INSULIN SYRINGE ULTRAFINE 31G X 5/16\" 0.5 ML, Disp: , Rfl:   •  irbesartan (AVAPRO) 300 MG tablet, Daily., Disp: , Rfl:   •  IRON PO, IRON CR-TABS, Disp: , Rfl:   •  levothyroxine (SYNTHROID, LEVOTHROID) 175 MCG tablet, Daily., Disp: , Rfl:   •  metFORMIN ER (GLUCOPHAGE-XR) 500 MG 24 hr tablet, 4 (Four) Times a Day., Disp: , Rfl:   •  Omega 3 340 MG capsule delayed-release, OMEGA 3 340 MG CPDR, Disp: , Rfl:   •  potassium chloride ER (K-TAB) 20 MEQ tablet controlled-release ER tablet, Daily., Disp: , Rfl:   •  Pramlintide Acetate (SYMLINPEN 120) 2700 MCG/2.7ML solution pen-injector, SYMLINPEN 120 2700 MCG/2.7ML SOPN, Disp: , Rfl:   •  pregabalin (LYRICA) 100 MG capsule, 2 (Two) Times a Day., Disp: , Rfl:   •  vitamin B-12 (CYANOCOBALAMIN) 100 MCG tablet, Daily., Disp: , Rfl:   •  warfarin (COUMADIN) 3 MG tablet, Take 1 tablet by mouth Daily., Disp: 90 tablet, Rfl: 2  •  warfarin (COUMADIN) 5 MG tablet, Daily., Disp: , Rfl:   •  Warfarin Sodium (COUMADIN PO), 3 mg Daily., Disp: , Rfl:       Social History     Socioeconomic History   • Marital status:      Spouse name: Not on file   • Number of children: Not on file   • Years of education: Not on file   • Highest education level: Not on file   Tobacco Use   • Smoking status: Former Smoker   Substance and Sexual Activity   • Alcohol use: No     Frequency: Never   • Drug use: No         Review of Systems   Constitution: Negative for chills and fever.   HENT: Negative for ear discharge and nosebleeds.    Eyes: Negative for discharge and redness.   Cardiovascular: Negative for chest pain, orthopnea, palpitations, paroxysmal nocturnal dyspnea and syncope.   Respiratory: Positive for shortness of breath. Negative for cough and wheezing.    Endocrine: Negative for heat intolerance.   Skin: Negative for rash.   Musculoskeletal: Negative for arthritis and myalgias.   Gastrointestinal: Negative for abdominal pain, melena, nausea and vomiting.   Genitourinary: Negative " "for dysuria and hematuria.   Neurological: Negative for dizziness, light-headedness, numbness and tremors.   Psychiatric/Behavioral: Negative for depression. The patient is not nervous/anxious.        Procedures      ECG 12 Lead  Date/Time: 6/26/2019 12:00 PM  Performed by: Edwar Ackerman MD  Authorized by: Edwar Ackerman MD   Rhythm: sinus rhythm  Rate: normal  Conduction: conduction normal  QRS axis: normal  Other findings: non-specific ST-T wave changes    Clinical impression: normal ECG            ECG 12 Lead    (Results Pending)   ECG 12 Lead    (Results Pending)           Objective:    /76   Pulse 72   Ht 175.3 cm (69\")   Wt (!) 143 kg (315 lb)   BMI 46.52 kg/m²         Physical Exam   Constitutional: He is oriented to person, place, and time. He appears well-developed and well-nourished.   HENT:   Head: Normocephalic and atraumatic.   Eyes: No scleral icterus.   Neck: No thyromegaly present.   Cardiovascular: Normal rate, regular rhythm and normal heart sounds. Exam reveals no gallop and no friction rub.   No murmur heard.  Pulmonary/Chest: Effort normal and breath sounds normal. No respiratory distress. He has no wheezes. He has no rales.   Abdominal: There is no tenderness.   Musculoskeletal: He exhibits edema.   Lymphadenopathy:     He has no cervical adenopathy.   Neurological: He is alert and oriented to person, place, and time.   Skin: No rash noted. No erythema.   Psychiatric: He has a normal mood and affect.           Assessment:       Diagnosis Plan   1. Edema of both legs     2. Coronary artery disease with angina pectoris, unspecified vessel or lesion type, unspecified whether native or transplanted heart (CMS/[facility])     3. Essential hypertension     4. Mixed hyperlipidemia              Plan:       I would continue Bumex 2 mg daily.  Salt and water restrictions are discussed with the patient.  If patient continues to have bilateral leg edema.  I would add metolazone 2.5 mg Monday Wednesday " Friday.  Patient is advised to weigh himself daily.  I intend to see the patient in 6 months.  Electrolytes and creatinine monitoring is recommended every 3 to 4 months.

## 2019-07-01 RX ORDER — ERGOCALCIFEROL 1.25 MG/1
50000 CAPSULE ORAL WEEKLY
Qty: 12 CAPSULE | Refills: 3 | Status: SHIPPED | OUTPATIENT
Start: 2019-07-01 | End: 2020-06-01

## 2019-07-01 RX ORDER — FENOFIBRATE 54 MG/1
TABLET ORAL
Qty: 90 TABLET | Refills: 1 | Status: SHIPPED | OUTPATIENT
Start: 2019-07-01 | End: 2019-11-22

## 2019-07-15 ENCOUNTER — HOSPITAL ENCOUNTER (OUTPATIENT)
Dept: ONCOLOGY | Facility: HOSPITAL | Age: 79
Discharge: HOME | End: 2019-07-15
Admitting: INTERNAL MEDICINE

## 2019-07-15 ENCOUNTER — LAB (OUTPATIENT)
Dept: LAB | Facility: HOSPITAL | Age: 79
End: 2019-07-15

## 2019-07-15 VITALS
WEIGHT: 315 LBS | TEMPERATURE: 97.7 F | RESPIRATION RATE: 18 BRPM | HEART RATE: 78 BPM | BODY MASS INDEX: 46.65 KG/M2 | HEIGHT: 69 IN | DIASTOLIC BLOOD PRESSURE: 78 MMHG | SYSTOLIC BLOOD PRESSURE: 150 MMHG

## 2019-07-15 DIAGNOSIS — I26.99 OTHER PULMONARY EMBOLISM WITHOUT ACUTE COR PULMONALE, UNSPECIFIED CHRONICITY (HCC): Primary | ICD-10-CM

## 2019-07-15 DIAGNOSIS — E78.2 MIXED HYPERLIPIDEMIA: Primary | ICD-10-CM

## 2019-07-15 DIAGNOSIS — Z79.01 LONG TERM CURRENT USE OF ANTICOAGULANT: ICD-10-CM

## 2019-07-15 LAB
INR PPP: 2.2
PROTHROMBIN TIME: 27.5 SECONDS (ref 11–15)

## 2019-07-15 PROCEDURE — 85610 PROTHROMBIN TIME: CPT

## 2019-07-16 RX ORDER — BLOOD SUGAR DIAGNOSTIC
STRIP MISCELLANEOUS
Qty: 400 EACH | Refills: 5 | Status: SHIPPED | OUTPATIENT
Start: 2019-07-16 | End: 2019-11-22

## 2019-07-22 ENCOUNTER — TELEPHONE (OUTPATIENT)
Dept: CARDIOLOGY | Facility: CLINIC | Age: 79
End: 2019-07-22

## 2019-07-22 RX ORDER — CLOPIDOGREL BISULFATE 75 MG/1
75 TABLET ORAL DAILY
Qty: 90 TABLET | Refills: 3 | Status: SHIPPED | OUTPATIENT
Start: 2019-07-22 | End: 2019-11-23 | Stop reason: HOSPADM

## 2019-07-29 ENCOUNTER — LAB (OUTPATIENT)
Dept: LAB | Facility: HOSPITAL | Age: 79
End: 2019-07-29

## 2019-07-29 ENCOUNTER — OFFICE VISIT (OUTPATIENT)
Dept: FAMILY MEDICINE CLINIC | Facility: CLINIC | Age: 79
End: 2019-07-29

## 2019-07-29 VITALS
HEIGHT: 69 IN | RESPIRATION RATE: 14 BRPM | SYSTOLIC BLOOD PRESSURE: 154 MMHG | WEIGHT: 311.2 LBS | BODY MASS INDEX: 46.09 KG/M2 | OXYGEN SATURATION: 95 % | TEMPERATURE: 98.1 F | HEART RATE: 67 BPM | DIASTOLIC BLOOD PRESSURE: 73 MMHG

## 2019-07-29 DIAGNOSIS — I25.119 CORONARY ARTERY DISEASE WITH ANGINA PECTORIS, UNSPECIFIED VESSEL OR LESION TYPE, UNSPECIFIED WHETHER NATIVE OR TRANSPLANTED HEART (HCC): ICD-10-CM

## 2019-07-29 DIAGNOSIS — I10 ESSENTIAL HYPERTENSION: Primary | ICD-10-CM

## 2019-07-29 DIAGNOSIS — E66.01 MORBIDLY OBESE (HCC): ICD-10-CM

## 2019-07-29 DIAGNOSIS — E11.40 TYPE 2 DIABETES MELLITUS WITH DIABETIC NEUROPATHY, WITHOUT LONG-TERM CURRENT USE OF INSULIN (HCC): ICD-10-CM

## 2019-07-29 DIAGNOSIS — I82.513 CHRONIC DEEP VEIN THROMBOSIS (DVT) OF FEMORAL VEIN OF BOTH LOWER EXTREMITIES (HCC): ICD-10-CM

## 2019-07-29 DIAGNOSIS — I73.9 PERIPHERAL VASCULAR DISEASE (HCC): ICD-10-CM

## 2019-07-29 DIAGNOSIS — G47.30 SLEEP APNEA, UNSPECIFIED TYPE: ICD-10-CM

## 2019-07-29 DIAGNOSIS — E78.2 MIXED HYPERLIPIDEMIA: ICD-10-CM

## 2019-07-29 DIAGNOSIS — E03.9 ACQUIRED HYPOTHYROIDISM: ICD-10-CM

## 2019-07-29 PROBLEM — M54.9 BACK PAIN: Status: ACTIVE | Noted: 2018-04-04

## 2019-07-29 LAB
ANION GAP SERPL CALCULATED.3IONS-SCNC: 15.4 MMOL/L (ref 5–15)
BUN BLD-MCNC: 27 MG/DL (ref 8–20)
BUN/CREAT SERPL: 19.3 (ref 6.2–20.3)
CALCIUM SPEC-SCNC: 10 MG/DL (ref 8.9–10.3)
CHLORIDE SERPL-SCNC: 100 MMOL/L (ref 101–111)
CO2 SERPL-SCNC: 27 MMOL/L (ref 22–32)
CREAT BLD-MCNC: 1.4 MG/DL (ref 0.7–1.2)
GFR SERPL CREATININE-BSD FRML MDRD: 49 ML/MIN/1.73
GLUCOSE BLD-MCNC: 87 MG/DL (ref 65–99)
HBA1C MFR BLD: 6.7 % (ref 3.5–5.6)
POTASSIUM BLD-SCNC: 4.4 MMOL/L (ref 3.6–5.1)
SODIUM BLD-SCNC: 138 MMOL/L (ref 136–144)

## 2019-07-29 PROCEDURE — 99214 OFFICE O/P EST MOD 30 MIN: CPT | Performed by: FAMILY MEDICINE

## 2019-07-29 PROCEDURE — 83036 HEMOGLOBIN GLYCOSYLATED A1C: CPT

## 2019-07-29 PROCEDURE — 36415 COLL VENOUS BLD VENIPUNCTURE: CPT

## 2019-07-29 PROCEDURE — 80048 BASIC METABOLIC PNL TOTAL CA: CPT

## 2019-07-29 NOTE — PATIENT INSTRUCTIONS
Continuen your current medciations and treatment.    Have the follow up labs done and call for results.    Follow up in the office in 3 months.

## 2019-07-29 NOTE — PROGRESS NOTES
"Subjective   Dalton Dallas . is a 79 y.o. male.     Chief Complaint   Patient presents with   • Hyperlipidemia     6 MTH F/U   • Hypertension       HPI  Chief complaint: Hypertension hyperlipidemia coronary disease progress disease type 2 diabetes mellitus hyper thyroidism    The patient is a 79-year-old white male comes in for follow-up and maintenance of his current problems which include     #1 hypertension-stable-patient is on Avapro 3 mg daily atenolol 50 mg daily medical thiazide 25 mg daily Bumex 2 mg twice a day potassium and amlodipine 5 mg daily.  He denies any lightheadedness or chest pain.    #2 hyperlipidemia-stable-patient is on fenofibrate and Lipitor 40 mg daily.  No myalgias arthralgias    #3 type 2 diabetes mellitus-stable-patient on sliding scale insulin and metformin 2000 mg a day.  He denied polyphagia polyuria but denied low blood sugars.  Patient is also on Lyrica for diabetic neuropathy.    #4 hypothyroidism-stable-patient on thyroid replacement therapy.  He denies heat or cold intolerance.  No tremor weight gain weight loss.    #5 DVT-stable-patient on Coumadin.    #6 sleep apnea-stable-patient currently on CPAP.    #7 peripheral vascular disease-stable-patient is currently on Pletal inhibitors.  He states his  is improved.  Patient had ABIs which did not reveal vascular disease per      The following portions of the patient's history were reviewed and updated as appropriate: allergies, current medications, past family history, past medical history, past social history, past surgical history and problem list.    Review of Systems    Objective     /73 (BP Location: Right arm, Patient Position: Sitting, Cuff Size: Adult)   Pulse 67   Temp 98.1 °F (36.7 °C) (Oral)   Resp 14   Ht 175.3 cm (69.02\")   Wt (!) 141 kg (311 lb 3.2 oz)   SpO2 95%   BMI 45.93 kg/m²     Physical Exam   Constitutional: He is oriented to person, place, and time. He appears well-developed and " well-nourished.   HENT:   Head: Normocephalic and atraumatic.   Eyes: Conjunctivae and EOM are normal. Pupils are equal, round, and reactive to light.   Neck: Normal range of motion. Neck supple.   Cardiovascular: Normal rate, normal heart sounds and intact distal pulses. An irregularly irregular rhythm present.   Pulmonary/Chest: Effort normal and breath sounds normal.   Abdominal: Soft. Bowel sounds are normal.   Musculoskeletal: Normal range of motion.   Neurological: He is alert and oriented to person, place, and time.   Skin: Skin is warm and dry.   Psychiatric: He has a normal mood and affect. His behavior is normal.   Nursing note and vitals reviewed.        Assessment/Plan   Dalton was seen today for hyperlipidemia and hypertension.    Diagnoses and all orders for this visit:    Essential hypertension    Mixed hyperlipidemia    Coronary artery disease with angina pectoris, unspecified vessel or lesion type, unspecified whether native or transplanted heart (CMS/HCC)    Peripheral vascular disease (CMS/HCC)    Sleep apnea, unspecified type    Type 2 diabetes mellitus with diabetic neuropathy, without long-term current use of insulin (CMS/[facility])  -     Hemoglobin A1c; Future  -     Basic Metabolic Panel; Future    Chronic deep vein thrombosis (DVT) of femoral vein of both lower extremities (CMS/HCC)    Morbidly obese (CMS/HCC)      Patient Instructions   Continuen your current medciations and treatment.    Have the follow up labs done and call for results.    Follow up in the office in 3 months.      Gabino Sibley Jr., MD    07/29/19

## 2019-08-05 ENCOUNTER — TELEPHONE (OUTPATIENT)
Dept: FAMILY MEDICINE CLINIC | Facility: CLINIC | Age: 79
End: 2019-08-05

## 2019-08-14 DIAGNOSIS — I82.513 CHRONIC DEEP VEIN THROMBOSIS (DVT) OF FEMORAL VEIN OF BOTH LOWER EXTREMITIES (HCC): Primary | ICD-10-CM

## 2019-08-16 ENCOUNTER — TELEPHONE (OUTPATIENT)
Dept: ONCOLOGY | Facility: CLINIC | Age: 79
End: 2019-08-16

## 2019-08-16 ENCOUNTER — HOSPITAL ENCOUNTER (EMERGENCY)
Facility: HOSPITAL | Age: 79
Discharge: HOME | End: 2019-08-16
Admitting: EMERGENCY MEDICINE

## 2019-08-16 ENCOUNTER — APPOINTMENT (OUTPATIENT)
Dept: GENERAL RADIOLOGY | Facility: HOSPITAL | Age: 79
End: 2019-08-16

## 2019-08-16 VITALS
TEMPERATURE: 98.8 F | HEIGHT: 70 IN | SYSTOLIC BLOOD PRESSURE: 114 MMHG | BODY MASS INDEX: 45.1 KG/M2 | HEART RATE: 87 BPM | OXYGEN SATURATION: 96 % | DIASTOLIC BLOOD PRESSURE: 64 MMHG | WEIGHT: 315 LBS | RESPIRATION RATE: 16 BRPM

## 2019-08-16 DIAGNOSIS — S30.851A: Primary | ICD-10-CM

## 2019-08-16 PROCEDURE — 99282 EMERGENCY DEPT VISIT SF MDM: CPT

## 2019-08-16 PROCEDURE — 74018 RADEX ABDOMEN 1 VIEW: CPT

## 2019-08-16 RX ORDER — CEPHALEXIN 500 MG/1
500 CAPSULE ORAL 3 TIMES DAILY
Qty: 21 CAPSULE | Refills: 0 | Status: SHIPPED | OUTPATIENT
Start: 2019-08-16 | End: 2019-08-23

## 2019-08-16 NOTE — TELEPHONE ENCOUNTER
Received surgery clearance request from [facility] for an upcoming colonoscopy pt is due to have. Carli Miller NP filled out the clearance form and form was faxed back to Mindy at [facility].

## 2019-08-16 NOTE — ED NOTES
"Pt reports injecting self with insulin needle and needle \"didn't come back out\" from abdomen.     Lainey Stevenson RN  08/16/19 1901    "

## 2019-08-17 NOTE — ED PROVIDER NOTES
Subjective   Patient is a 79-year-old white male with history of hypertension and insulin-dependent diabetes presents today with complaints of possible insulin needle stuck in abdomen.  Patient states he gave himself an injection with his insulin pen tonight.  He states he uses disposable needles on the end of his pen.  He states when he remove the pin from his skin he noticed that the needle was not there.  States he cannot see or feel the needle in his abdomen.  He denies any pain.  Denies any bleeding or drainage.            Review of Systems   Constitutional: Negative for chills and fever.   Skin:        Possible insulin needle stuck in abdominal wall       Past Medical History:   Diagnosis Date   • ACE-inhibitor cough 06/2005   • Coronary artery disease    • Diabetes mellitus, type 2 (CMS/[facility]) 1995   • ED (erectile dysfunction)    • Hx of blood clots 2014    Blood clot left leg    • Hyperlipidemia 08/2000   • Hypertension    • Hypogonadism, male 1998   • Hypothyroidism 06/2001   • Obesity    • Peripheral neuropathy    • Pulmonary embolism (CMS/[facility]) 02/2014   • Rectal fistula    • Vitamin D deficiency        No Known Allergies    Past Surgical History:   Procedure Laterality Date   • CARDIAC CATHETERIZATION  2008    PTCA: 2008; PTCA: 4/21/2015 LCX stent    • CATARACT EXTRACTION  01/11/2016 1-4-16 & 1-11-16    • INGUINAL HERNIA REPAIR Left    • UMBILICAL HERNIA REPAIR         Family History   Problem Relation Age of Onset   • Heart disease Mother    • Leukemia Father        Social History     Socioeconomic History   • Marital status:      Spouse name: Not on file   • Number of children: Not on file   • Years of education: Not on file   • Highest education level: Not on file   Tobacco Use   • Smoking status: Former Smoker   • Smokeless tobacco: Never Used   Substance and Sexual Activity   • Alcohol use: No     Frequency: Never   • Drug use: No           Objective   Physical Exam   Constitutional: He  appears well-developed.   Vital signs and triage nurse note reviewed.  Constitutional: Awake, alert; well-developed and well-nourished. No acute distress is noted.  Cardiovascular: Regular rate and rhythm  Pulmonary: Respiratory effort regular nonlabored  Abdomen: Soft, nontender, nondistended with normoactive bowel sounds; no rebound or guarding.  Puncture wound noted to the left lower abdomen without active bleeding.  There is no visible foreign body.  No palpable foreign body.  There is no surrounding erythema.  No drainage.  No cellulitic change noted.  Musculoskeletal: Independent range of motion of all extremities with no palpable tenderness or edema.  Neuro: Alert oriented x3, speech is clear and appropriate, GCS 15.    Skin: Flesh tone, warm, dry, intact; no erythematous or petechial rash or lesion.        Procedures           ED Course      Labs Reviewed - No data to display  Xr Abdomen 1 View    Result Date: 8/16/2019  12 mm linear metallic foreign body in the anterior subcutaneous tissues of the lower abdomen consistent with needle.  Electronically Signed By-DR. William Holley MD On:8/16/2019 8:08 PM This report was finalized on [id number] by DR. William Holley MD.    Medications - No data to display              MDM  Number of Diagnoses or Management Options  Superficial foreign body of abdominal wall without major open wound and without infection, initial encounter:      Amount and/or Complexity of Data Reviewed  Tests in the radiology section of CPT®: reviewed and ordered  Independent visualization of images, tracings, or specimens: yes    Risk of Complications, Morbidity, and/or Mortality  General comments: Comorbidities: Insulin-dependent diabetes, hypertension  Differentials: Foreign body;this list is not all inclusive and does not constitute the entirety of considered causes  Discussion with provider: Oumar    Patient had lateral view of the abdomen obtained.  Needle was visible on  film.    Diagnosis and treatment plan discussed with patient.  Patient agreeable to plan.   I discussed findings with patient who voices understanding of discharge instructions, signs and symptoms requiring return to ED; discharged improved and in stable condition with follow up for re-evaluation.  Prescription for Keflex.      Patient Progress  Patient progress: stable        Final diagnoses:   Superficial foreign body of abdominal wall without major open wound and without infection, initial encounter            Michelle Cedeno APRN  08/16/19 2025

## 2019-08-17 NOTE — DISCHARGE INSTRUCTIONS
Take antibiotics as prescribed.  Follow-up with general surgery.  Call Monday for appointment.  Return for new or worsening symptoms.

## 2019-08-19 ENCOUNTER — PATIENT OUTREACH (OUTPATIENT)
Dept: CASE MANAGEMENT | Facility: OTHER | Age: 79
End: 2019-08-19

## 2019-08-19 ENCOUNTER — EPISODE CHANGES (OUTPATIENT)
Dept: CASE MANAGEMENT | Facility: OTHER | Age: 79
End: 2019-08-19

## 2019-08-19 RX ORDER — LEVOTHYROXINE SODIUM 175 UG/1
TABLET ORAL
Qty: 90 TABLET | Refills: 4 | Status: SHIPPED | OUTPATIENT
Start: 2019-08-19 | End: 2019-11-22

## 2019-08-19 NOTE — OUTREACH NOTE
Patient Outreach Note    Pt seen at [facility] ED on 8/16/19 for insulin needle stuck in the abdomen. RN-CC outreach call made to pt. Pt reports he is taking Keflex antibiotic that was prescribed in the ED. RN-CC advised pt to take the entire prescription. Reviewed signs and symptoms of infection with pt. Pt does not report any redness, swelling, drainage, or fever. RN-CC asked pt about scheduling follow up appointment with surgeon MD Breen. Pt states he is planning on calling their office after he eats lunch. Informed pt he is due for AWV. Pt advised to call PCP's office to change upcoming follow up appointment to AWV or schedule one for a different date. RN-CC informed pt about [facility] care coordination services and offered to provide their contact number, pt declined. No questions, concerns, or needs per pt at this time. No future outreach scheduled.     Royer Olivarez RN    8/19/2019, 11:53 AM

## 2019-08-20 ENCOUNTER — LAB (OUTPATIENT)
Dept: LAB | Facility: HOSPITAL | Age: 79
End: 2019-08-20

## 2019-08-20 ENCOUNTER — TELEPHONE (OUTPATIENT)
Dept: ONCOLOGY | Facility: HOSPITAL | Age: 79
End: 2019-08-20

## 2019-08-20 ENCOUNTER — HOSPITAL ENCOUNTER (OUTPATIENT)
Dept: ONCOLOGY | Facility: HOSPITAL | Age: 79
Discharge: HOME | End: 2019-08-20
Admitting: NURSE PRACTITIONER

## 2019-08-20 VITALS — WEIGHT: 315 LBS | RESPIRATION RATE: 18 BRPM | BODY MASS INDEX: 45.1 KG/M2 | TEMPERATURE: 98.1 F | HEIGHT: 70 IN

## 2019-08-20 DIAGNOSIS — I82.513 CHRONIC DEEP VEIN THROMBOSIS (DVT) OF FEMORAL VEIN OF BOTH LOWER EXTREMITIES (HCC): ICD-10-CM

## 2019-08-20 DIAGNOSIS — I82.513 CHRONIC DEEP VEIN THROMBOSIS (DVT) OF FEMORAL VEIN OF BOTH LOWER EXTREMITIES (HCC): Primary | ICD-10-CM

## 2019-08-20 LAB — INR PPP: 2.3

## 2019-08-20 PROCEDURE — 85610 PROTHROMBIN TIME: CPT

## 2019-08-26 RX ORDER — PRAMLINTIDE ACETATE 1000 UG/ML
INJECTION SUBCUTANEOUS
Qty: 32.4 ML | Refills: 4 | Status: SHIPPED | OUTPATIENT
Start: 2019-08-26 | End: 2019-11-22

## 2019-08-29 RX ORDER — PEN NEEDLE, DIABETIC 29 G X1/2"
NEEDLE, DISPOSABLE MISCELLANEOUS
Qty: 100 EACH | Refills: 1 | Status: SHIPPED | OUTPATIENT
Start: 2019-08-29 | End: 2019-11-22

## 2019-09-17 ENCOUNTER — LAB (OUTPATIENT)
Dept: LAB | Facility: HOSPITAL | Age: 79
End: 2019-09-17

## 2019-09-17 ENCOUNTER — HOSPITAL ENCOUNTER (OUTPATIENT)
Dept: ONCOLOGY | Facility: HOSPITAL | Age: 79
Discharge: HOME | End: 2019-09-17
Admitting: NURSE PRACTITIONER

## 2019-09-17 VITALS
HEIGHT: 70 IN | WEIGHT: 315 LBS | DIASTOLIC BLOOD PRESSURE: 71 MMHG | SYSTOLIC BLOOD PRESSURE: 158 MMHG | HEART RATE: 72 BPM | RESPIRATION RATE: 18 BRPM | BODY MASS INDEX: 45.1 KG/M2 | TEMPERATURE: 97.5 F

## 2019-09-17 DIAGNOSIS — I82.513 CHRONIC DEEP VEIN THROMBOSIS (DVT) OF FEMORAL VEIN OF BOTH LOWER EXTREMITIES (HCC): ICD-10-CM

## 2019-09-17 DIAGNOSIS — I82.513 CHRONIC DEEP VEIN THROMBOSIS (DVT) OF FEMORAL VEIN OF BOTH LOWER EXTREMITIES (HCC): Primary | ICD-10-CM

## 2019-09-17 LAB — INR PPP: 2.5

## 2019-09-17 PROCEDURE — 85610 PROTHROMBIN TIME: CPT

## 2019-09-19 DIAGNOSIS — I10 ESSENTIAL HYPERTENSION: Primary | ICD-10-CM

## 2019-09-19 DIAGNOSIS — E03.9 ACQUIRED HYPOTHYROIDISM: ICD-10-CM

## 2019-09-19 DIAGNOSIS — E11.40 TYPE 2 DIABETES MELLITUS WITH DIABETIC NEUROPATHY, WITHOUT LONG-TERM CURRENT USE OF INSULIN (HCC): ICD-10-CM

## 2019-09-21 RX ORDER — WARFARIN SODIUM 5 MG/1
TABLET ORAL
Qty: 90 TABLET | Refills: 4 | Status: CANCELLED | OUTPATIENT
Start: 2019-09-21

## 2019-09-23 DIAGNOSIS — I82.513 CHRONIC DEEP VEIN THROMBOSIS (DVT) OF FEMORAL VEIN OF BOTH LOWER EXTREMITIES (HCC): Primary | ICD-10-CM

## 2019-09-23 DIAGNOSIS — I26.99 OTHER PULMONARY EMBOLISM WITHOUT ACUTE COR PULMONALE, UNSPECIFIED CHRONICITY (HCC): ICD-10-CM

## 2019-09-23 RX ORDER — WARFARIN SODIUM 5 MG/1
5 TABLET ORAL DAILY
Qty: 90 TABLET | Refills: 1 | Status: SHIPPED | OUTPATIENT
Start: 2019-09-23 | End: 2019-11-23 | Stop reason: HOSPADM

## 2019-09-23 NOTE — TELEPHONE ENCOUNTER
Patient requesting refill on Warfarin 5 mg.  Patient with PE and DVT.  Saw Dr. Gaffney on 6-13-19 and is scheduled to see him on 12-12-19. molly Perry

## 2019-09-25 ENCOUNTER — LAB (OUTPATIENT)
Dept: LAB | Facility: HOSPITAL | Age: 79
End: 2019-09-25

## 2019-09-25 DIAGNOSIS — E03.9 ACQUIRED HYPOTHYROIDISM: ICD-10-CM

## 2019-09-25 DIAGNOSIS — E11.40 TYPE 2 DIABETES MELLITUS WITH DIABETIC NEUROPATHY, WITHOUT LONG-TERM CURRENT USE OF INSULIN (HCC): ICD-10-CM

## 2019-09-25 DIAGNOSIS — I10 ESSENTIAL HYPERTENSION: ICD-10-CM

## 2019-09-25 LAB
ALBUMIN SERPL-MCNC: 3.8 G/DL (ref 3.5–4.8)
ALBUMIN/GLOB SERPL: 1.5 G/DL (ref 1–1.7)
ALP SERPL-CCNC: 34 U/L (ref 32–91)
ALT SERPL W P-5'-P-CCNC: 27 U/L (ref 17–63)
ANION GAP SERPL CALCULATED.3IONS-SCNC: 15 MMOL/L (ref 5–15)
AST SERPL-CCNC: 31 U/L (ref 15–41)
BILIRUB SERPL-MCNC: 0.6 MG/DL (ref 0.3–1.2)
BUN BLD-MCNC: 23 MG/DL (ref 8–20)
BUN/CREAT SERPL: 16.4 (ref 6.2–20.3)
CALCIUM SPEC-SCNC: 9.2 MG/DL (ref 8.9–10.3)
CHLORIDE SERPL-SCNC: 103 MMOL/L (ref 101–111)
CO2 SERPL-SCNC: 26 MMOL/L (ref 22–32)
CREAT BLD-MCNC: 1.4 MG/DL (ref 0.7–1.2)
GFR SERPL CREATININE-BSD FRML MDRD: 49 ML/MIN/1.73
GLOBULIN UR ELPH-MCNC: 2.5 GM/DL (ref 2.5–3.8)
GLUCOSE BLD-MCNC: 190 MG/DL (ref 65–99)
HBA1C MFR BLD: 6.8 % (ref 3.5–5.6)
POTASSIUM BLD-SCNC: 4 MMOL/L (ref 3.6–5.1)
PROT SERPL-MCNC: 6.3 G/DL (ref 6.1–7.9)
SODIUM BLD-SCNC: 140 MMOL/L (ref 136–144)
T4 FREE SERPL-MCNC: 1.13 NG/DL (ref 0.58–1.64)
TSH SERPL DL<=0.05 MIU/L-ACNC: 2.74 UIU/ML (ref 0.34–5.6)

## 2019-09-25 PROCEDURE — 83036 HEMOGLOBIN GLYCOSYLATED A1C: CPT

## 2019-09-25 PROCEDURE — 84443 ASSAY THYROID STIM HORMONE: CPT

## 2019-09-25 PROCEDURE — 80053 COMPREHEN METABOLIC PANEL: CPT

## 2019-09-25 PROCEDURE — 84439 ASSAY OF FREE THYROXINE: CPT

## 2019-09-25 PROCEDURE — 36415 COLL VENOUS BLD VENIPUNCTURE: CPT

## 2019-10-02 ENCOUNTER — OFFICE VISIT (OUTPATIENT)
Dept: ENDOCRINOLOGY | Facility: CLINIC | Age: 79
End: 2019-10-02

## 2019-10-02 VITALS
HEIGHT: 70 IN | DIASTOLIC BLOOD PRESSURE: 68 MMHG | OXYGEN SATURATION: 97 % | HEART RATE: 67 BPM | BODY MASS INDEX: 44.95 KG/M2 | WEIGHT: 314 LBS | SYSTOLIC BLOOD PRESSURE: 124 MMHG

## 2019-10-02 DIAGNOSIS — E55.9 VITAMIN D DEFICIENCY: ICD-10-CM

## 2019-10-02 DIAGNOSIS — E78.2 MIXED HYPERLIPIDEMIA: ICD-10-CM

## 2019-10-02 DIAGNOSIS — E11.40 TYPE 2 DIABETES MELLITUS WITH DIABETIC NEUROPATHY, WITHOUT LONG-TERM CURRENT USE OF INSULIN (HCC): Primary | ICD-10-CM

## 2019-10-02 LAB — GLUCOSE BLDC GLUCOMTR-MCNC: 249 MG/DL (ref 70–130)

## 2019-10-02 PROCEDURE — 99213 OFFICE O/P EST LOW 20 MIN: CPT | Performed by: INTERNAL MEDICINE

## 2019-10-02 PROCEDURE — 82962 GLUCOSE BLOOD TEST: CPT | Performed by: INTERNAL MEDICINE

## 2019-10-02 NOTE — PATIENT INSTRUCTIONS
Keep up the good work!  Continue exercise.  Continue meds.  Call if blood sugars are running under 100 or over 200.  F/u in 6 months, with fasting labs prior.

## 2019-10-07 NOTE — PROGRESS NOTES
[facility]        Patient Care Team:  Gabino Sibley Jr., MD as PCP - General (Family Medicine)    Chief Complaint:    Chief Complaint   Patient presents with   • Diabetes     type2         Subjective   Here for diabetes f/u  Blood sugars in the mid 100's  Exercise program: not much    Interval History:     Patient Complaints: none  Patient Denies:  hypoglycemia  History taken from: patient    Review of Systems:   Review of Systems   Eyes: Negative for blurred vision.   Gastrointestinal: Negative for nausea.   Endocrine: Negative for polyuria.   Neurological: Negative for headache.     Gained 2 lb since last visit    Objective     Vital Signs      Vitals:    10/02/19 1102   BP: 124/68   Pulse: 67   SpO2: 97%         Physical Exam:     General Appearance:    Alert, cooperative, in no acute distress. Obese   Head:    Normocephalic, without obvious abnormality, atraumatic   Eyes:            Lids and lashes normal, conjunctivae and sclerae normal, no   icterus, no pallor, corneas clear, PERRLA   Throat:   No oral lesions,  oral mucosa moist   Neck:   Acanthosis nigricans,  no thyromegaly, no   carotid bruit   Lungs:     Decreased breath sounds    Heart:    Regular rhythm and normal rate   Chest Wall:    No abnormalities observed   Abdomen:     Normal bowel sounds, soft, obese                 Extremities:   Moves all extremities well, trace edema, plantar calluses               Pulses:   Pulses palpable and equal bilaterally   Skin:   Stasis dermatitis   Neurologic:  DTR absent, able to feel the 10g monofilament in heels only          Results Review:    I have reviewed the patient's new clinical results, labs & imaging.    Medication Review:   Prior to Admission medications    Medication Sig Start Date End Date Taking? Authorizing Provider   ACCU-CHEK GONZALES PLUS test strip Use to check blood sugar before meals and at bedtime, four times daily dx:e11.65 7/16/19  Yes Virginia Shaver MD  "  amLODIPine-atorvastatin (CADUET) 5-20 MG per tablet Daily. 3/7/18  Yes Lee Ruggiero MD   aspirin (ASPIRIN LOW DOSE) 81 MG tablet Daily. 2/22/12  Yes Lee Ruggiero MD   atenolol (TENORMIN) 50 MG tablet Daily. 8/21/17  Yes Lee Ruggiero MD   BD INSULIN SYRINGE U/F 31G X 5/16\" 0.5 ML misc USE 1 SYRINGE TWICE A DAY 8/29/19  Yes Virginia Shaver MD   bumetanide (BUMEX) 2 MG tablet Daily. 5/15/19  Yes Lee Ruggiero MD   clopidogrel (PLAVIX) 75 MG tablet Take 1 tablet by mouth Daily. 7/22/19  Yes Edwar Ackerman MD   ergocalciferol (ERGOCALCIFEROL) 73325 units capsule Take 1 capsule by mouth 1 (One) Time Per Week. 7/1/19  Yes Virginia Shaver MD   fenofibrate (TRICOR) 54 MG tablet TAKE 1 TABLET DAILY 7/1/19  Yes Edwar Ackerman MD   glucose blood (ACCU-CHEK GONZALES PLUS) test strip ACCU-CHEK GONZALES PLUS STRP 10/25/17  Yes Lee Ruggiero MD   hydrochlorothiazide (HYDRODIURIL) 25 MG tablet Daily. 5/6/19  Yes Lee Ruggiero MD   Insulin Pen Needle (B-D ULTRAFINE III SHORT PEN) 31G X 8 MM misc BD PEN NEEDLE SHORT U/F 31G X 8 MM 9/8/17  Yes Lee Ruggiero MD   insulin regular (HUMULIN R) 500 UNIT/ML CONCENTRATED injection inject 24 units at breakfast and 35 units at supper 4/5/18  Yes Lee Ruggiero MD   Insulin Syringe-Needle U-100 (BD INSULIN SYRINGE ULTRAFINE) 31G X 5/16\" 0.5 ML misc BD INSULIN SYRINGE ULTRAFINE 31G X 5/16\" 0.5 ML 6/20/17  Yes Lee Ruggiero MD   irbesartan (AVAPRO) 300 MG tablet Daily. 1/15/19  Yes Lee Ruggiero MD   IRON PO take one twice daily 4/15/15  Yes Lee Ruggiero MD   levothyroxine (SYNTHROID, LEVOTHROID) 175 MCG tablet TAKE 1 TABLET DAILY 8/19/19  Yes Virginia Shaver MD   metFORMIN ER (GLUCOPHAGE-XR) 500 MG 24 hr tablet 4 (Four) Times a Day. Take four pills at supper 1/15/18  Yes Provider, MD Lee   Omega 3 340 MG capsule delayed-release take one twice daily 2/22/12  Yes ProviderLee MD "   potassium chloride ER (K-TAB) 20 MEQ tablet controlled-release ER tablet Daily. 5/15/19  Yes Lee Ruggiero MD   pregabalin (LYRICA) 100 MG capsule 2 (Two) Times a Day. 2/22/12  Yes Lee Ruggiero MD   SYMLINPEN 120 2700 MCG/2.7ML solution pen-injector INJECT 120 MCG UNDER THE SKIN THREE TIMES A DAY BEFORE MEALS 8/26/19  Yes Virginia Shaver MD   vitamin B-12 (CYANOCOBALAMIN) 100 MCG tablet Daily. 4/15/15  Yes Lee Ruggiero MD   warfarin (COUMADIN) 3 MG tablet Take 1 tablet by mouth Daily. 6/24/19  Yes Hadley Gaffney MD   warfarin (COUMADIN) 5 MG tablet Take 1 tablet by mouth Daily. 9/23/19  Yes Carli Miller APRN   Warfarin Sodium (COUMADIN PO) 3 mg Daily. 5/15/19  Yes Lee Ruggiero MD       Lab Results (most recent)     Procedure Component Value Units Date/Time    POC Glucose Fingerstick [[id number]]  (Abnormal) Collected:  10/02/19 1101    Specimen:  Blood Updated:  10/02/19 1102     Glucose 249 mg/dL      Comment: ate@9am this morning, 2 hours ago, insulin@9am this morning               Lab Results   Component Value Date    HGBA1C 6.8 (H) 09/25/2019    HGBA1C 6.7 (H) 07/29/2019      Lab Results   Component Value Date    GLUCOSE 190 (H) 09/25/2019    BUN 23 (H) 09/25/2019    CREATININE 1.40 (H) 09/25/2019    EGFRIFNONA 49 (L) 09/25/2019    BCR 16.4 09/25/2019    K 4.0 09/25/2019    CO2 26.0 09/25/2019    CALCIUM 9.2 09/25/2019    ALBUMIN 3.80 09/25/2019    LABIL2 1.7 03/28/2019    AST 31 09/25/2019    ALT 27 09/25/2019    CHOL 136 03/28/2019    LDL 73 03/28/2019    HDL 29 (L) 03/28/2019    TRIG 249 (H) 03/28/2019     Lab Results   Component Value Date    TSH 2.740 09/25/2019    FREET4 1.13 09/25/2019       Assessment/Plan     Dalton was seen today for diabetes.    Diagnoses and all orders for this visit:    Type 2 diabetes mellitus with diabetic neuropathy, without long-term current use of insulin (CMS/[facility])  -     POC Glucose Fingerstick  -     Hemoglobin A1c; Future  -      Microalbumin / Creatinine Urine Ratio - Urine, Clean Catch; Future    Mixed hyperlipidemia  -     Comprehensive Metabolic Panel; Future  -     Lipid Panel; Future    Vitamin D deficiency  -     Vitamin D 25 Hydroxy; Future        Continue exercise.  Continue meds.  Call if blood sugars are running under 100 or over 200.        Virginia Shaver MD  10/06/19  9:25 PM

## 2019-10-17 ENCOUNTER — HOSPITAL ENCOUNTER (OUTPATIENT)
Dept: ONCOLOGY | Facility: HOSPITAL | Age: 79
Discharge: HOME | End: 2019-10-17
Admitting: NURSE PRACTITIONER

## 2019-10-17 ENCOUNTER — LAB (OUTPATIENT)
Dept: LAB | Facility: HOSPITAL | Age: 79
End: 2019-10-17

## 2019-10-17 VITALS
WEIGHT: 313 LBS | RESPIRATION RATE: 18 BRPM | HEIGHT: 70 IN | HEART RATE: 78 BPM | TEMPERATURE: 97.7 F | SYSTOLIC BLOOD PRESSURE: 154 MMHG | DIASTOLIC BLOOD PRESSURE: 60 MMHG | BODY MASS INDEX: 44.81 KG/M2

## 2019-10-17 DIAGNOSIS — I82.513 CHRONIC DEEP VEIN THROMBOSIS (DVT) OF FEMORAL VEIN OF BOTH LOWER EXTREMITIES (HCC): Primary | ICD-10-CM

## 2019-10-17 DIAGNOSIS — I82.513 CHRONIC DEEP VEIN THROMBOSIS (DVT) OF FEMORAL VEIN OF BOTH LOWER EXTREMITIES (HCC): ICD-10-CM

## 2019-10-17 LAB
INR PPP: 2.5
INR PPP: 2.5 (ref 2–3)

## 2019-10-17 PROCEDURE — 85610 PROTHROMBIN TIME: CPT

## 2019-10-17 PROCEDURE — 36416 COLLJ CAPILLARY BLOOD SPEC: CPT

## 2019-10-29 ENCOUNTER — OFFICE VISIT (OUTPATIENT)
Dept: FAMILY MEDICINE CLINIC | Facility: CLINIC | Age: 79
End: 2019-10-29

## 2019-10-29 VITALS
DIASTOLIC BLOOD PRESSURE: 60 MMHG | TEMPERATURE: 97.8 F | WEIGHT: 312.2 LBS | HEIGHT: 70 IN | BODY MASS INDEX: 44.69 KG/M2 | OXYGEN SATURATION: 97 % | SYSTOLIC BLOOD PRESSURE: 135 MMHG | HEART RATE: 64 BPM | RESPIRATION RATE: 18 BRPM

## 2019-10-29 DIAGNOSIS — Z00.00 ENCOUNTER FOR MEDICARE ANNUAL WELLNESS EXAM: Primary | ICD-10-CM

## 2019-10-29 PROCEDURE — 96160 PT-FOCUSED HLTH RISK ASSMT: CPT | Performed by: FAMILY MEDICINE

## 2019-10-29 PROCEDURE — G0439 PPPS, SUBSEQ VISIT: HCPCS | Performed by: FAMILY MEDICINE

## 2019-10-29 NOTE — PATIENT INSTRUCTIONS
Continue your current medications and treatment.    Follow up in the office in 3 months.    Laboratory testing at that time.

## 2019-10-29 NOTE — PROGRESS NOTES
The ABCs of the Annual Wellness Visit  Subsequent Medicare Wellness Visit    Chief Complaint   Patient presents with   • Medicare Wellness-subsequent       Subjective   History of Present Illness:  Dalton Dallas Sr. is a 79 y.o. male who presents for a Subsequent Medicare Wellness Visit.    HEALTH RISK ASSESSMENT    Recent Hospitalizations:  No hospitalization(s) within the last year.    Current Medical Providers:  Patient Care Team:  Gabino Sibley Jr., MD as PCP - General (Family Medicine)    Smoking Status:  Social History     Tobacco Use   Smoking Status Former Smoker   Smokeless Tobacco Never Used       Alcohol Consumption:  Social History     Substance and Sexual Activity   Alcohol Use No   • Frequency: Never       Depression Screen:   PHQ-2/PHQ-9 Depression Screening 10/29/2019   Little interest or pleasure in doing things 0   Feeling down, depressed, or hopeless 0   Trouble falling or staying asleep, or sleeping too much 0   Feeling tired or having little energy 0   Poor appetite or overeating 0   Feeling bad about yourself - or that you are a failure or have let yourself or your family down 0   Trouble concentrating on things, such as reading the newspaper or watching television 0   Moving or speaking so slowly that other people could have noticed. Or the opposite - being so fidgety or restless that you have been moving around a lot more than usual 0   Thoughts that you would be better off dead, or of hurting yourself in some way 0   Total Score 0       Fall Risk Screen:  STEADI Fall Risk Assessment has not been completed.    Health Habits and Functional and Cognitive Screening:  Functional & Cognitive Status 10/29/2019   Do you have difficulty preparing food and eating? No   Do you have difficulty bathing yourself, getting dressed or grooming yourself? No   Do you have difficulty using the toilet? No   Do you have difficulty moving around from place to place? No   Do you have trouble with steps or  getting out of a bed or a chair? No   Current Diet Well Balanced Diet   Dental Exam Up to date   Eye Exam Up to date   Exercise (times per week) 3 times per week   Current Exercise Activities Include Walking   Do you need help using the phone?  No   Are you deaf or do you have serious difficulty hearing?  No   Do you need help with transportation? No   Do you need help shopping? No   Do you need help preparing meals?  No   Do you need help with housework?  No   Do you need help with laundry? No   Do you need help taking your medications? No   Do you need help managing money? No   Do you ever drive or ride in a car without wearing a seat belt? No   Have you felt unusual stress, anger or loneliness in the last month? No   Who do you live with? Spouse   If you need help, do you have trouble finding someone available to you? No   Have you been bothered in the last four weeks by sexual problems? No   Do you have difficulty concentrating, remembering or making decisions? No         Does the patient have evidence of cognitive impairment? No    Asprin use counseling:Taking ASA appropriately as indicated    Age-appropriate Screening Schedule:  Refer to the list below for future screening recommendations based on patient's age, sex and/or medical conditions. Orders for these recommended tests are listed in the plan section. The patient has been provided with a written plan.    Health Maintenance   Topic Date Due   • TDAP/TD VACCINES (1 - Tdap) 07/26/1961   • ZOSTER VACCINE (1 of 2) 07/26/1992   • PNEUMOCOCCAL VACCINES (65+ LOW/MEDIUM RISK) (2 of 2 - PCV13) 01/01/2017   • INFLUENZA VACCINE  08/01/2019   • DIABETIC EYE EXAM  01/01/2020   • HEMOGLOBIN A1C  03/25/2020   • LIPID PANEL  03/28/2020   • URINE MICROALBUMIN  03/28/2020   • COLONOSCOPY  10/27/2021          The following portions of the patient's history were reviewed and updated as appropriate: allergies, current medications, past family history, past medical history,  "past social history, past surgical history and problem list.    Outpatient Medications Prior to Visit   Medication Sig Dispense Refill   • ACCU-CHEK GONZALES PLUS test strip Use to check blood sugar before meals and at bedtime, four times daily dx:e11.65 400 each 5   • amLODIPine-atorvastatin (CADUET) 5-20 MG per tablet Daily.     • aspirin (ASPIRIN LOW DOSE) 81 MG tablet Daily.     • atenolol (TENORMIN) 50 MG tablet Daily.     • BD INSULIN SYRINGE U/F 31G X 5/16\" 0.5 ML misc USE 1 SYRINGE TWICE A  each 1   • bumetanide (BUMEX) 2 MG tablet Daily.     • clopidogrel (PLAVIX) 75 MG tablet Take 1 tablet by mouth Daily. 90 tablet 3   • ergocalciferol (ERGOCALCIFEROL) 46264 units capsule Take 1 capsule by mouth 1 (One) Time Per Week. 12 capsule 3   • fenofibrate (TRICOR) 54 MG tablet TAKE 1 TABLET DAILY 90 tablet 1   • glucose blood (ACCU-CHEK GONZALES PLUS) test strip ACCU-CHEK GONZALES PLUS STRP     • hydrochlorothiazide (HYDRODIURIL) 25 MG tablet Daily.     • Insulin Pen Needle (B-D ULTRAFINE III SHORT PEN) 31G X 8 MM misc BD PEN NEEDLE SHORT U/F 31G X 8 MM     • insulin regular (HUMULIN R) 500 UNIT/ML CONCENTRATED injection inject 24 units at breakfast and 35 units at supper     • Insulin Syringe-Needle U-100 (BD INSULIN SYRINGE ULTRAFINE) 31G X 5/16\" 0.5 ML misc BD INSULIN SYRINGE ULTRAFINE 31G X 5/16\" 0.5 ML     • irbesartan (AVAPRO) 300 MG tablet Daily.     • IRON PO take one twice daily     • levothyroxine (SYNTHROID, LEVOTHROID) 175 MCG tablet TAKE 1 TABLET DAILY 90 tablet 4   • metFORMIN ER (GLUCOPHAGE-XR) 500 MG 24 hr tablet 4 (Four) Times a Day. Take four pills at supper     • Omega 3 340 MG capsule delayed-release take one twice daily     • potassium chloride ER (K-TAB) 20 MEQ tablet controlled-release ER tablet Daily.     • pregabalin (LYRICA) 100 MG capsule 2 (Two) Times a Day.     • SYMLINPEN 120 2700 MCG/2.7ML solution pen-injector INJECT 120 MCG UNDER THE SKIN THREE TIMES A DAY BEFORE MEALS 32.4 mL 4   • " "vitamin B-12 (CYANOCOBALAMIN) 100 MCG tablet Daily.     • warfarin (COUMADIN) 3 MG tablet Take 1 tablet by mouth Daily. 90 tablet 2   • warfarin (COUMADIN) 5 MG tablet Take 1 tablet by mouth Daily. 90 tablet 1   • Warfarin Sodium (COUMADIN PO) 3 mg Daily.       No facility-administered medications prior to visit.        Patient Active Problem List   Diagnosis   • Edema of both legs   • Coronary artery disease with angina pectoris (CMS/[facility])   • Essential hypertension   • Mixed hyperlipidemia   • Back pain   • Deep vein thrombosis (DVT) (CMS/[facility])   • Eczema   • Hypothyroidism   • Body mass index (BMI) of 45.0-49.9 in adult (CMS/[facility])   • Obesity   • Sleep apnea   • Type 2 diabetes mellitus with diabetic neuropathy (CMS/[facility])   • Vitamin D deficiency   • Peripheral vascular disease (CMS/[facility])       Advanced Care Planning:  Patient has an advance directive - a copy has not been provided. Have asked the patient to send this to us to add to record    Review of Systems    Compared to one year ago, the patient feels his physical health is the same.  Compared to one year ago, the patient feels his mental health is the same.    Reviewed chart for potential of high risk medication in the elderly: yes  Reviewed chart for potential of harmful drug interactions in the elderly:yes    Objective         Vitals:    10/29/19 1028   BP: 135/60   BP Location: Right arm   Patient Position: Sitting   Cuff Size: Large Adult   Pulse: 64   Resp: 18   Temp: 97.8 °F (36.6 °C)   TempSrc: Oral   SpO2: 97%   Weight: (!) 142 kg (312 lb 3.2 oz)   Height: 177.8 cm (70\")       Body mass index is 44.8 kg/m².  Discussed the patient's BMI with him. The BMI is above average; BMI management plan is completed.    Physical Exam   Constitutional: He is oriented to person, place, and time.   Obese   HENT:   Head: Normocephalic and atraumatic.   Eyes: EOM are normal. Pupils are equal, round, and reactive to light.   Neck: Neck supple.   Cardiovascular: Normal " rate, regular rhythm, normal heart sounds and intact distal pulses.   Pulmonary/Chest: Effort normal and breath sounds normal.   Abdominal: Soft. Bowel sounds are normal.   Musculoskeletal: Normal range of motion.   Neurological: He is oriented to person, place, and time.   Skin: Skin is warm and dry.   Psychiatric: He has a normal mood and affect. His behavior is normal. Judgment and thought content normal.   Nursing note and vitals reviewed.      Lab Results   Component Value Date    HGBA1C 6.8 (H) 09/25/2019        Assessment/Plan   Medicare Risks and Personalized Health Plan  CMS Preventative Services Quick Reference  Immunizations Discussed/Encouraged (specific immunizations; Td, Influenza, Pneumococcal 23, Prevnar and Shingrix )  Obesity/Overweight     The above risks/problems have been discussed with the patient.  Pertinent information has been shared with the patient in the After Visit Summary.  Follow up plans and orders are seen below in the Assessment/Plan Section.    There are no diagnoses linked to this encounter.  Follow Up:  Return in about 3 months (around 1/29/2020) for Recheck.     An After Visit Summary and PPPS were given to the patient.

## 2019-11-11 ENCOUNTER — OFFICE (AMBULATORY)
Dept: URBAN - METROPOLITAN AREA PATHOLOGY 4 | Facility: PATHOLOGY | Age: 79
End: 2019-11-11
Payer: COMMERCIAL

## 2019-11-11 ENCOUNTER — ON CAMPUS - OUTPATIENT (AMBULATORY)
Dept: URBAN - METROPOLITAN AREA HOSPITAL 2 | Facility: HOSPITAL | Age: 79
End: 2019-11-11
Payer: MEDICARE

## 2019-11-11 VITALS
SYSTOLIC BLOOD PRESSURE: 141 MMHG | SYSTOLIC BLOOD PRESSURE: 157 MMHG | OXYGEN SATURATION: 95 % | HEART RATE: 78 BPM | DIASTOLIC BLOOD PRESSURE: 65 MMHG | HEART RATE: 79 BPM | OXYGEN SATURATION: 94 % | SYSTOLIC BLOOD PRESSURE: 98 MMHG | SYSTOLIC BLOOD PRESSURE: 83 MMHG | SYSTOLIC BLOOD PRESSURE: 118 MMHG | RESPIRATION RATE: 18 BRPM | RESPIRATION RATE: 16 BRPM | TEMPERATURE: 97 F | HEART RATE: 81 BPM | HEIGHT: 69 IN | DIASTOLIC BLOOD PRESSURE: 68 MMHG | HEART RATE: 75 BPM | HEART RATE: 84 BPM | DIASTOLIC BLOOD PRESSURE: 67 MMHG | DIASTOLIC BLOOD PRESSURE: 50 MMHG | OXYGEN SATURATION: 96 % | SYSTOLIC BLOOD PRESSURE: 111 MMHG | HEART RATE: 67 BPM | DIASTOLIC BLOOD PRESSURE: 87 MMHG | SYSTOLIC BLOOD PRESSURE: 87 MMHG | DIASTOLIC BLOOD PRESSURE: 34 MMHG | DIASTOLIC BLOOD PRESSURE: 42 MMHG | DIASTOLIC BLOOD PRESSURE: 89 MMHG | WEIGHT: 306 LBS | SYSTOLIC BLOOD PRESSURE: 107 MMHG | HEART RATE: 71 BPM | OXYGEN SATURATION: 97 % | OXYGEN SATURATION: 98 %

## 2019-11-11 DIAGNOSIS — Z86.010 PERSONAL HISTORY OF COLONIC POLYPS: ICD-10-CM

## 2019-11-11 DIAGNOSIS — D12.0 BENIGN NEOPLASM OF CECUM: ICD-10-CM

## 2019-11-11 DIAGNOSIS — D12.3 BENIGN NEOPLASM OF TRANSVERSE COLON: ICD-10-CM

## 2019-11-11 DIAGNOSIS — K57.30 DIVERTICULOSIS OF LARGE INTESTINE WITHOUT PERFORATION OR ABS: ICD-10-CM

## 2019-11-11 PROCEDURE — 88305 TISSUE EXAM BY PATHOLOGIST: CPT | Mod: 33 | Performed by: INTERNAL MEDICINE

## 2019-11-11 PROCEDURE — 45385 COLONOSCOPY W/LESION REMOVAL: CPT | Mod: PT | Performed by: INTERNAL MEDICINE

## 2019-11-18 ENCOUNTER — HOSPITAL ENCOUNTER (OUTPATIENT)
Dept: ONCOLOGY | Facility: HOSPITAL | Age: 79
Discharge: HOME | End: 2019-11-18
Admitting: NURSE PRACTITIONER

## 2019-11-18 ENCOUNTER — LAB (OUTPATIENT)
Dept: LAB | Facility: HOSPITAL | Age: 79
End: 2019-11-18

## 2019-11-18 VITALS
WEIGHT: 310 LBS | TEMPERATURE: 97.7 F | HEIGHT: 70 IN | RESPIRATION RATE: 18 BRPM | DIASTOLIC BLOOD PRESSURE: 68 MMHG | HEART RATE: 91 BPM | SYSTOLIC BLOOD PRESSURE: 160 MMHG | BODY MASS INDEX: 44.38 KG/M2

## 2019-11-18 DIAGNOSIS — I82.513 CHRONIC DEEP VEIN THROMBOSIS (DVT) OF FEMORAL VEIN OF BOTH LOWER EXTREMITIES (HCC): Primary | ICD-10-CM

## 2019-11-18 DIAGNOSIS — I82.513 CHRONIC DEEP VEIN THROMBOSIS (DVT) OF FEMORAL VEIN OF BOTH LOWER EXTREMITIES (HCC): ICD-10-CM

## 2019-11-18 LAB
INR PPP: 2.1
INR PPP: 2.1 (ref 2–3)

## 2019-11-18 PROCEDURE — 85610 PROTHROMBIN TIME: CPT

## 2019-11-18 PROCEDURE — 36416 COLLJ CAPILLARY BLOOD SPEC: CPT

## 2019-11-22 ENCOUNTER — HOSPITAL ENCOUNTER (OUTPATIENT)
Facility: HOSPITAL | Age: 79
Discharge: HOME | End: 2019-11-23
Attending: EMERGENCY MEDICINE | Admitting: FAMILY MEDICINE

## 2019-11-22 ENCOUNTER — ON CAMPUS - OUTPATIENT (AMBULATORY)
Dept: URBAN - METROPOLITAN AREA HOSPITAL 85 | Facility: HOSPITAL | Age: 79
End: 2019-11-22

## 2019-11-22 DIAGNOSIS — R55 SYNCOPE AND COLLAPSE: ICD-10-CM

## 2019-11-22 DIAGNOSIS — K62.5 HEMORRHAGE OF ANUS AND RECTUM: ICD-10-CM

## 2019-11-22 DIAGNOSIS — K92.2 GASTROINTESTINAL HEMORRHAGE, UNSPECIFIED GASTROINTESTINAL HEMORRHAGE TYPE: ICD-10-CM

## 2019-11-22 DIAGNOSIS — K92.2 GASTROINTESTINAL HEMORRHAGE, UNSPECIFIED: ICD-10-CM

## 2019-11-22 DIAGNOSIS — E11.40 TYPE 2 DIABETES MELLITUS WITH DIABETIC NEUROPATHY, UNSPECIFI: ICD-10-CM

## 2019-11-22 DIAGNOSIS — K62.5 RECTAL BLEEDING: Primary | ICD-10-CM

## 2019-11-22 DIAGNOSIS — I82.513 CHRONIC DEEP VEIN THROMBOSIS (DVT) OF FEMORAL VEIN OF BOTH LOWER EXTREMITIES (HCC): ICD-10-CM

## 2019-11-22 DIAGNOSIS — I50.32 DIASTOLIC DYSFUNCTION WITH CHRONIC HEART FAILURE (HCC): ICD-10-CM

## 2019-11-22 PROBLEM — I27.20 PULMONARY HYPERTENSION: Status: ACTIVE | Noted: 2019-11-04

## 2019-11-22 PROBLEM — I27.82 CHRONIC PULMONARY EMBOLISM WITHOUT ACUTE COR PULMONALE: Status: ACTIVE | Noted: 2019-11-04

## 2019-11-22 LAB
ABO GROUP BLD: NORMAL
ALBUMIN SERPL-MCNC: 3.6 G/DL (ref 3.5–5.2)
ALBUMIN/GLOB SERPL: 1.7 G/DL
ALP SERPL-CCNC: 34 U/L (ref 39–117)
ALT SERPL W P-5'-P-CCNC: 20 U/L (ref 1–41)
ANION GAP SERPL CALCULATED.3IONS-SCNC: 16 MMOL/L (ref 5–15)
APTT PPP: 31.5 SECONDS (ref 24–31)
AST SERPL-CCNC: 18 U/L (ref 1–40)
BASOPHILS # BLD AUTO: 0.1 10*3/MM3 (ref 0–0.2)
BASOPHILS NFR BLD AUTO: 0.8 % (ref 0–1.5)
BILIRUB SERPL-MCNC: 0.3 MG/DL (ref 0.2–1.2)
BLD GP AB SCN SERPL QL: NEGATIVE
BUN BLD-MCNC: 37 MG/DL (ref 8–23)
BUN/CREAT SERPL: 23.3 (ref 7–25)
CALCIUM SPEC-SCNC: 8.4 MG/DL (ref 8.6–10.5)
CHLORIDE SERPL-SCNC: 98 MMOL/L (ref 98–107)
CO2 SERPL-SCNC: 22 MMOL/L (ref 22–29)
CREAT BLD-MCNC: 1.59 MG/DL (ref 0.76–1.27)
DEPRECATED RDW RBC AUTO: 44.6 FL (ref 37–54)
EOSINOPHIL # BLD AUTO: 0.1 10*3/MM3 (ref 0–0.4)
EOSINOPHIL NFR BLD AUTO: 1.9 % (ref 0.3–6.2)
ERYTHROCYTE [DISTWIDTH] IN BLOOD BY AUTOMATED COUNT: 13.6 % (ref 12.3–15.4)
GFR SERPL CREATININE-BSD FRML MDRD: 42 ML/MIN/1.73
GLOBULIN UR ELPH-MCNC: 2.1 GM/DL
GLUCOSE BLD-MCNC: 356 MG/DL (ref 65–99)
GLUCOSE BLDC GLUCOMTR-MCNC: 104 MG/DL (ref 70–105)
GLUCOSE BLDC GLUCOMTR-MCNC: 167 MG/DL (ref 70–105)
HCT VFR BLD AUTO: 27.4 % (ref 37.5–51)
HGB BLD-MCNC: 9.3 G/DL (ref 13–17.7)
INR PPP: 2.86 (ref 2–3)
LYMPHOCYTES # BLD AUTO: 2 10*3/MM3 (ref 0.7–3.1)
LYMPHOCYTES NFR BLD AUTO: 28.6 % (ref 19.6–45.3)
MCH RBC QN AUTO: 31.5 PG (ref 26.6–33)
MCHC RBC AUTO-ENTMCNC: 34 G/DL (ref 31.5–35.7)
MCV RBC AUTO: 92.8 FL (ref 79–97)
MONOCYTES # BLD AUTO: 0.4 10*3/MM3 (ref 0.1–0.9)
MONOCYTES NFR BLD AUTO: 6.3 % (ref 5–12)
NEUTROPHILS # BLD AUTO: 4.3 10*3/MM3 (ref 1.7–7)
NEUTROPHILS NFR BLD AUTO: 62.4 % (ref 42.7–76)
NRBC BLD AUTO-RTO: 0.1 /100 WBC (ref 0–0.2)
PLATELET # BLD AUTO: 195 10*3/MM3 (ref 140–450)
PMV BLD AUTO: 9.7 FL (ref 6–12)
POTASSIUM BLD-SCNC: 4.3 MMOL/L (ref 3.5–5.2)
PROT SERPL-MCNC: 5.7 G/DL (ref 6–8.5)
PROTHROMBIN TIME: 26.9 SECONDS (ref 19.4–28.5)
RBC # BLD AUTO: 2.95 10*6/MM3 (ref 4.14–5.8)
RH BLD: POSITIVE
SODIUM BLD-SCNC: 136 MMOL/L (ref 136–145)
T&S EXPIRATION DATE: NORMAL
TROPONIN T SERPL-MCNC: <0.01 NG/ML (ref 0–0.03)
WBC NRBC COR # BLD: 7 10*3/MM3 (ref 3.4–10.8)

## 2019-11-22 PROCEDURE — 86900 BLOOD TYPING SEROLOGIC ABO: CPT | Performed by: EMERGENCY MEDICINE

## 2019-11-22 PROCEDURE — 85730 THROMBOPLASTIN TIME PARTIAL: CPT | Performed by: EMERGENCY MEDICINE

## 2019-11-22 PROCEDURE — G0378 HOSPITAL OBSERVATION PER HR: HCPCS

## 2019-11-22 PROCEDURE — 86901 BLOOD TYPING SEROLOGIC RH(D): CPT

## 2019-11-22 PROCEDURE — 93005 ELECTROCARDIOGRAM TRACING: CPT | Performed by: EMERGENCY MEDICINE

## 2019-11-22 PROCEDURE — 99219 PR INITIAL OBSERVATION CARE/DAY 50 MINUTES: CPT | Performed by: FAMILY MEDICINE

## 2019-11-22 PROCEDURE — 86850 RBC ANTIBODY SCREEN: CPT | Performed by: EMERGENCY MEDICINE

## 2019-11-22 PROCEDURE — 86901 BLOOD TYPING SEROLOGIC RH(D): CPT | Performed by: EMERGENCY MEDICINE

## 2019-11-22 PROCEDURE — 99284 EMERGENCY DEPT VISIT MOD MDM: CPT

## 2019-11-22 PROCEDURE — 93005 ELECTROCARDIOGRAM TRACING: CPT

## 2019-11-22 PROCEDURE — 96374 THER/PROPH/DIAG INJ IV PUSH: CPT

## 2019-11-22 PROCEDURE — 85025 COMPLETE CBC W/AUTO DIFF WBC: CPT | Performed by: EMERGENCY MEDICINE

## 2019-11-22 PROCEDURE — 85610 PROTHROMBIN TIME: CPT | Performed by: EMERGENCY MEDICINE

## 2019-11-22 PROCEDURE — 99214 OFFICE O/P EST MOD 30 MIN: CPT | Performed by: NURSE PRACTITIONER

## 2019-11-22 PROCEDURE — 84484 ASSAY OF TROPONIN QUANT: CPT | Performed by: EMERGENCY MEDICINE

## 2019-11-22 PROCEDURE — 86900 BLOOD TYPING SEROLOGIC ABO: CPT

## 2019-11-22 PROCEDURE — 80053 COMPREHEN METABOLIC PANEL: CPT | Performed by: EMERGENCY MEDICINE

## 2019-11-22 PROCEDURE — 82962 GLUCOSE BLOOD TEST: CPT

## 2019-11-22 RX ORDER — CHLORAL HYDRATE 500 MG
1000 CAPSULE ORAL 2 TIMES DAILY
COMMUNITY

## 2019-11-22 RX ORDER — LEVOTHYROXINE SODIUM 175 UG/1
175 TABLET ORAL DAILY
COMMUNITY
End: 2020-11-11

## 2019-11-22 RX ORDER — PREGABALIN 100 MG/1
100 CAPSULE ORAL 2 TIMES DAILY
Status: DISCONTINUED | OUTPATIENT
Start: 2019-11-22 | End: 2019-11-23 | Stop reason: HOSPADM

## 2019-11-22 RX ORDER — PHYTONADIONE 10 MG/ML
5 INJECTION, EMULSION INTRAMUSCULAR; INTRAVENOUS; SUBCUTANEOUS ONCE
Status: DISCONTINUED | OUTPATIENT
Start: 2019-11-22 | End: 2019-11-22

## 2019-11-22 RX ORDER — PHYTONADIONE 2 MG/ML
5 INJECTION, EMULSION INTRAMUSCULAR; INTRAVENOUS; SUBCUTANEOUS ONCE
Status: COMPLETED | OUTPATIENT
Start: 2019-11-22 | End: 2019-11-22

## 2019-11-22 RX ORDER — SODIUM CHLORIDE 0.9 % (FLUSH) 0.9 %
3 SYRINGE (ML) INJECTION EVERY 12 HOURS SCHEDULED
Status: DISCONTINUED | OUTPATIENT
Start: 2019-11-22 | End: 2019-11-23

## 2019-11-22 RX ORDER — ACETAMINOPHEN 160 MG/5ML
650 SOLUTION ORAL EVERY 4 HOURS PRN
Status: DISCONTINUED | OUTPATIENT
Start: 2019-11-22 | End: 2019-11-23 | Stop reason: HOSPADM

## 2019-11-22 RX ORDER — ACETAMINOPHEN 325 MG/1
650 TABLET ORAL EVERY 4 HOURS PRN
Status: DISCONTINUED | OUTPATIENT
Start: 2019-11-22 | End: 2019-11-23 | Stop reason: HOSPADM

## 2019-11-22 RX ORDER — CALCIUM CARBONATE 200(500)MG
2 TABLET,CHEWABLE ORAL 2 TIMES DAILY PRN
Status: DISCONTINUED | OUTPATIENT
Start: 2019-11-22 | End: 2019-11-23 | Stop reason: HOSPADM

## 2019-11-22 RX ORDER — ATENOLOL 50 MG/1
50 TABLET ORAL DAILY
Status: DISCONTINUED | OUTPATIENT
Start: 2019-11-23 | End: 2019-11-23 | Stop reason: HOSPADM

## 2019-11-22 RX ORDER — FAMOTIDINE 20 MG/1
40 TABLET, FILM COATED ORAL DAILY
Status: DISCONTINUED | OUTPATIENT
Start: 2019-11-23 | End: 2019-11-23 | Stop reason: HOSPADM

## 2019-11-22 RX ORDER — PEG-3350, SODIUM SULFATE, SODIUM CHLORIDE, POTASSIUM CHLORIDE, SODIUM ASCORBATE AND ASCORBIC ACID 7.5-2.691G
1000 KIT ORAL EVERY 12 HOURS
Status: COMPLETED | OUTPATIENT
Start: 2019-11-22 | End: 2019-11-23

## 2019-11-22 RX ORDER — SODIUM CHLORIDE 0.9 % (FLUSH) 0.9 %
10 SYRINGE (ML) INJECTION AS NEEDED
Status: DISCONTINUED | OUTPATIENT
Start: 2019-11-22 | End: 2019-11-23 | Stop reason: HOSPADM

## 2019-11-22 RX ORDER — SODIUM CHLORIDE 0.9 % (FLUSH) 0.9 %
10 SYRINGE (ML) INJECTION EVERY 12 HOURS SCHEDULED
Status: DISCONTINUED | OUTPATIENT
Start: 2019-11-22 | End: 2019-11-23 | Stop reason: HOSPADM

## 2019-11-22 RX ORDER — BUMETANIDE 1 MG/1
2 TABLET ORAL DAILY
Status: DISCONTINUED | OUTPATIENT
Start: 2019-11-23 | End: 2019-11-23 | Stop reason: HOSPADM

## 2019-11-22 RX ORDER — ACETAMINOPHEN 650 MG/1
650 SUPPOSITORY RECTAL EVERY 4 HOURS PRN
Status: DISCONTINUED | OUTPATIENT
Start: 2019-11-22 | End: 2019-11-23 | Stop reason: HOSPADM

## 2019-11-22 RX ORDER — ONDANSETRON 2 MG/ML
4 INJECTION INTRAMUSCULAR; INTRAVENOUS EVERY 6 HOURS PRN
Status: DISCONTINUED | OUTPATIENT
Start: 2019-11-22 | End: 2019-11-23

## 2019-11-22 RX ORDER — FENOFIBRATE 54 MG/1
54 TABLET ORAL DAILY
COMMUNITY
End: 2019-12-30

## 2019-11-22 RX ORDER — FERROUS SULFATE 325(65) MG
325 TABLET ORAL 2 TIMES DAILY
COMMUNITY

## 2019-11-22 RX ORDER — PANTOPRAZOLE SODIUM 40 MG/10ML
40 INJECTION, POWDER, LYOPHILIZED, FOR SOLUTION INTRAVENOUS ONCE
Status: COMPLETED | OUTPATIENT
Start: 2019-11-22 | End: 2019-11-22

## 2019-11-22 RX ORDER — HYDROCHLOROTHIAZIDE 25 MG/1
25 TABLET ORAL DAILY
Status: DISCONTINUED | OUTPATIENT
Start: 2019-11-23 | End: 2019-11-23 | Stop reason: HOSPADM

## 2019-11-22 RX ORDER — LOSARTAN POTASSIUM 50 MG/1
100 TABLET ORAL
Status: DISCONTINUED | OUTPATIENT
Start: 2019-11-23 | End: 2019-11-23 | Stop reason: HOSPADM

## 2019-11-22 RX ORDER — SODIUM CHLORIDE 0.9 % (FLUSH) 0.9 %
10 SYRINGE (ML) INJECTION AS NEEDED
Status: DISCONTINUED | OUTPATIENT
Start: 2019-11-22 | End: 2019-11-23

## 2019-11-22 RX ORDER — CHOLECALCIFEROL (VITAMIN D3) 125 MCG
5 CAPSULE ORAL NIGHTLY PRN
Status: DISCONTINUED | OUTPATIENT
Start: 2019-11-22 | End: 2019-11-23 | Stop reason: HOSPADM

## 2019-11-22 RX ORDER — LEVOTHYROXINE SODIUM 175 UG/1
175 TABLET ORAL DAILY
Status: DISCONTINUED | OUTPATIENT
Start: 2019-11-23 | End: 2019-11-23 | Stop reason: HOSPADM

## 2019-11-22 RX ORDER — POTASSIUM CHLORIDE 20 MEQ/1
20 TABLET, EXTENDED RELEASE ORAL DAILY
Status: DISCONTINUED | OUTPATIENT
Start: 2019-11-23 | End: 2019-11-23 | Stop reason: HOSPADM

## 2019-11-22 RX ADMIN — POLYETHYLENE GLYCOL 3350, SODIUM SULFATE, SODIUM CHLORIDE, POTASSIUM CHLORIDE, ASCORBIC ACID, SODIUM ASCORBATE 1000 ML: KIT at 20:26

## 2019-11-22 RX ADMIN — PREGABALIN 100 MG: 100 CAPSULE ORAL at 22:03

## 2019-11-22 RX ADMIN — Medication 10 ML: at 22:03

## 2019-11-22 RX ADMIN — INSULIN HUMAN 35 UNITS: 500 INJECTION, SOLUTION SUBCUTANEOUS at 22:03

## 2019-11-22 RX ADMIN — SODIUM CHLORIDE 1000 ML: 900 INJECTION, SOLUTION INTRAVENOUS at 16:01

## 2019-11-22 RX ADMIN — PHYTONADIONE 5 MG: 10 INJECTION, EMULSION INTRAMUSCULAR; INTRAVENOUS; SUBCUTANEOUS at 17:42

## 2019-11-22 RX ADMIN — Medication 3 ML: at 22:05

## 2019-11-22 RX ADMIN — PANTOPRAZOLE SODIUM 40 MG: 40 INJECTION, POWDER, FOR SOLUTION INTRAVENOUS at 16:52

## 2019-11-23 ENCOUNTER — ANESTHESIA EVENT (OUTPATIENT)
Dept: GASTROENTEROLOGY | Facility: HOSPITAL | Age: 79
End: 2019-11-23

## 2019-11-23 ENCOUNTER — ANESTHESIA (OUTPATIENT)
Dept: GASTROENTEROLOGY | Facility: HOSPITAL | Age: 79
End: 2019-11-23

## 2019-11-23 ENCOUNTER — ON CAMPUS - OUTPATIENT (AMBULATORY)
Dept: URBAN - METROPOLITAN AREA HOSPITAL 85 | Facility: HOSPITAL | Age: 79
End: 2019-11-23
Payer: MEDICARE

## 2019-11-23 VITALS
HEART RATE: 70 BPM | WEIGHT: 303.79 LBS | OXYGEN SATURATION: 98 % | HEIGHT: 70 IN | DIASTOLIC BLOOD PRESSURE: 55 MMHG | TEMPERATURE: 98.6 F | BODY MASS INDEX: 43.49 KG/M2 | RESPIRATION RATE: 15 BRPM | SYSTOLIC BLOOD PRESSURE: 105 MMHG

## 2019-11-23 DIAGNOSIS — K57.30 DIVERTICULOSIS OF LARGE INTESTINE WITHOUT PERFORATION OR ABS: ICD-10-CM

## 2019-11-23 DIAGNOSIS — K63.3 ULCER OF INTESTINE: ICD-10-CM

## 2019-11-23 DIAGNOSIS — K62.5 HEMORRHAGE OF ANUS AND RECTUM: ICD-10-CM

## 2019-11-23 LAB
ALBUMIN SERPL-MCNC: 3.7 G/DL (ref 3.5–5.2)
ALBUMIN/GLOB SERPL: 1.9 G/DL
ALP SERPL-CCNC: 34 U/L (ref 39–117)
ALT SERPL W P-5'-P-CCNC: 19 U/L (ref 1–41)
ANION GAP SERPL CALCULATED.3IONS-SCNC: 12 MMOL/L (ref 5–15)
AST SERPL-CCNC: 19 U/L (ref 1–40)
BASOPHILS # BLD AUTO: 0.1 10*3/MM3 (ref 0–0.2)
BASOPHILS NFR BLD AUTO: 0.7 % (ref 0–1.5)
BILIRUB SERPL-MCNC: 0.2 MG/DL (ref 0.2–1.2)
BUN BLD-MCNC: 30 MG/DL (ref 8–23)
BUN/CREAT SERPL: 23.3 (ref 7–25)
CALCIUM SPEC-SCNC: 8.6 MG/DL (ref 8.6–10.5)
CHLORIDE SERPL-SCNC: 103 MMOL/L (ref 98–107)
CO2 SERPL-SCNC: 27 MMOL/L (ref 22–29)
CREAT BLD-MCNC: 1.29 MG/DL (ref 0.76–1.27)
DEPRECATED RDW RBC AUTO: 45.1 FL (ref 37–54)
EOSINOPHIL # BLD AUTO: 0.2 10*3/MM3 (ref 0–0.4)
EOSINOPHIL NFR BLD AUTO: 2.6 % (ref 0.3–6.2)
ERYTHROCYTE [DISTWIDTH] IN BLOOD BY AUTOMATED COUNT: 13.8 % (ref 12.3–15.4)
GFR SERPL CREATININE-BSD FRML MDRD: 54 ML/MIN/1.73
GLOBULIN UR ELPH-MCNC: 2 GM/DL
GLUCOSE BLD-MCNC: 108 MG/DL (ref 65–99)
GLUCOSE BLDC GLUCOMTR-MCNC: 190 MG/DL (ref 70–105)
GLUCOSE BLDC GLUCOMTR-MCNC: 207 MG/DL (ref 70–105)
GLUCOSE BLDC GLUCOMTR-MCNC: 303 MG/DL (ref 70–105)
HCT VFR BLD AUTO: 25.7 % (ref 37.5–51)
HGB BLD-MCNC: 8.9 G/DL (ref 13–17.7)
INR PPP: 2.55 (ref 2–3)
LYMPHOCYTES # BLD AUTO: 3 10*3/MM3 (ref 0.7–3.1)
LYMPHOCYTES NFR BLD AUTO: 38.2 % (ref 19.6–45.3)
MCH RBC QN AUTO: 32.1 PG (ref 26.6–33)
MCHC RBC AUTO-ENTMCNC: 34.6 G/DL (ref 31.5–35.7)
MCV RBC AUTO: 92.5 FL (ref 79–97)
MONOCYTES # BLD AUTO: 0.5 10*3/MM3 (ref 0.1–0.9)
MONOCYTES NFR BLD AUTO: 6.3 % (ref 5–12)
NEUTROPHILS # BLD AUTO: 4.1 10*3/MM3 (ref 1.7–7)
NEUTROPHILS NFR BLD AUTO: 52.2 % (ref 42.7–76)
NRBC BLD AUTO-RTO: 0.1 /100 WBC (ref 0–0.2)
PLATELET # BLD AUTO: 175 10*3/MM3 (ref 140–450)
PMV BLD AUTO: 9.4 FL (ref 6–12)
POTASSIUM BLD-SCNC: 3.9 MMOL/L (ref 3.5–5.2)
PROT SERPL-MCNC: 5.7 G/DL (ref 6–8.5)
PROTHROMBIN TIME: 24.1 SECONDS (ref 19.4–28.5)
RBC # BLD AUTO: 2.78 10*6/MM3 (ref 4.14–5.8)
SODIUM BLD-SCNC: 142 MMOL/L (ref 136–145)
WBC NRBC COR # BLD: 7.8 10*3/MM3 (ref 3.4–10.8)

## 2019-11-23 PROCEDURE — 99214 OFFICE O/P EST MOD 30 MIN: CPT | Performed by: INTERNAL MEDICINE

## 2019-11-23 PROCEDURE — 63710000001 INSULIN REGULAR HUMAN (CONC) 500 UNIT/ML SOLUTION PEN-INJECTOR 3 ML PEN: Performed by: FAMILY MEDICINE

## 2019-11-23 PROCEDURE — 80053 COMPREHEN METABOLIC PANEL: CPT | Performed by: NURSE PRACTITIONER

## 2019-11-23 PROCEDURE — A9270 NON-COVERED ITEM OR SERVICE: HCPCS | Performed by: FAMILY MEDICINE

## 2019-11-23 PROCEDURE — A9270 NON-COVERED ITEM OR SERVICE: HCPCS | Performed by: INTERNAL MEDICINE

## 2019-11-23 PROCEDURE — 63710000001 BUMETANIDE 1 MG TABLET: Performed by: FAMILY MEDICINE

## 2019-11-23 PROCEDURE — 63710000001 ATENOLOL 50 MG TABLET: Performed by: FAMILY MEDICINE

## 2019-11-23 PROCEDURE — 99217 PR OBSERVATION CARE DISCHARGE MANAGEMENT: CPT | Performed by: FAMILY MEDICINE

## 2019-11-23 PROCEDURE — 85025 COMPLETE CBC W/AUTO DIFF WBC: CPT | Performed by: NURSE PRACTITIONER

## 2019-11-23 PROCEDURE — 25010000002 PROPOFOL 200 MG/20ML EMULSION: Performed by: ANESTHESIOLOGY

## 2019-11-23 PROCEDURE — 63710000001 HYDROCHLOROTHIAZIDE 25 MG TABLET: Performed by: FAMILY MEDICINE

## 2019-11-23 PROCEDURE — 85610 PROTHROMBIN TIME: CPT | Performed by: NURSE PRACTITIONER

## 2019-11-23 PROCEDURE — G0378 HOSPITAL OBSERVATION PER HR: HCPCS

## 2019-11-23 PROCEDURE — 45378 DIAGNOSTIC COLONOSCOPY: CPT | Performed by: INTERNAL MEDICINE

## 2019-11-23 PROCEDURE — 82962 GLUCOSE BLOOD TEST: CPT

## 2019-11-23 PROCEDURE — 63710000001 SIMETHICONE 40 MG/0.6ML SUSPENSION 30 ML BOTTLE: Performed by: INTERNAL MEDICINE

## 2019-11-23 PROCEDURE — 63710000001 LOSARTAN 50 MG TABLET: Performed by: FAMILY MEDICINE

## 2019-11-23 PROCEDURE — 63710000001 POTASSIUM CHLORIDE 20 MEQ TABLET CONTROLLED-RELEASE: Performed by: FAMILY MEDICINE

## 2019-11-23 PROCEDURE — 63710000001 PREGABALIN 100 MG CAPSULE: Performed by: FAMILY MEDICINE

## 2019-11-23 DEVICE — DEV CLIP HEMO DURACLIP REPOSTNG COLONOSCOPE 16MM 235CM: Type: IMPLANTABLE DEVICE | Status: FUNCTIONAL

## 2019-11-23 RX ORDER — ONDANSETRON 4 MG/1
4 TABLET, FILM COATED ORAL EVERY 6 HOURS PRN
Status: DISCONTINUED | OUTPATIENT
Start: 2019-11-23 | End: 2019-11-23 | Stop reason: HOSPADM

## 2019-11-23 RX ORDER — ONDANSETRON 2 MG/ML
4 INJECTION INTRAMUSCULAR; INTRAVENOUS EVERY 6 HOURS PRN
Status: DISCONTINUED | OUTPATIENT
Start: 2019-11-23 | End: 2019-11-23 | Stop reason: HOSPADM

## 2019-11-23 RX ORDER — PROPOFOL 10 MG/ML
INJECTION, EMULSION INTRAVENOUS AS NEEDED
Status: DISCONTINUED | OUTPATIENT
Start: 2019-11-23 | End: 2019-11-23 | Stop reason: SURG

## 2019-11-23 RX ORDER — SODIUM CHLORIDE 9 MG/ML
INJECTION, SOLUTION INTRAVENOUS CONTINUOUS PRN
Status: DISCONTINUED | OUTPATIENT
Start: 2019-11-23 | End: 2019-11-23 | Stop reason: SURG

## 2019-11-23 RX ADMIN — PROPOFOL 20 MG: 10 INJECTION, EMULSION INTRAVENOUS at 12:02

## 2019-11-23 RX ADMIN — POTASSIUM CHLORIDE 20 MEQ: 1500 TABLET, EXTENDED RELEASE ORAL at 13:58

## 2019-11-23 RX ADMIN — INSULIN HUMAN 25 UNITS: 500 INJECTION, SOLUTION SUBCUTANEOUS at 14:06

## 2019-11-23 RX ADMIN — Medication 10 ML: at 09:52

## 2019-11-23 RX ADMIN — PROPOFOL 50 MG: 10 INJECTION, EMULSION INTRAVENOUS at 12:15

## 2019-11-23 RX ADMIN — BUMETANIDE 2 MG: 1 TABLET ORAL at 13:59

## 2019-11-23 RX ADMIN — SODIUM CHLORIDE: 0.9 INJECTION, SOLUTION INTRAVENOUS at 11:58

## 2019-11-23 RX ADMIN — ATENOLOL 50 MG: 50 TABLET ORAL at 14:03

## 2019-11-23 RX ADMIN — PROPOFOL 50 MG: 10 INJECTION, EMULSION INTRAVENOUS at 12:05

## 2019-11-23 RX ADMIN — HYDROCHLOROTHIAZIDE 25 MG: 25 TABLET ORAL at 14:17

## 2019-11-23 RX ADMIN — PREGABALIN 100 MG: 100 CAPSULE ORAL at 13:58

## 2019-11-23 RX ADMIN — PROPOFOL 50 MG: 10 INJECTION, EMULSION INTRAVENOUS at 12:09

## 2019-11-23 RX ADMIN — LOSARTAN POTASSIUM 100 MG: 50 TABLET, FILM COATED ORAL at 14:18

## 2019-11-23 RX ADMIN — PROPOFOL 50 MG: 10 INJECTION, EMULSION INTRAVENOUS at 11:59

## 2019-11-23 RX ADMIN — INSULIN HUMAN 35 UNITS: 500 INJECTION, SOLUTION SUBCUTANEOUS at 17:47

## 2019-11-23 RX ADMIN — POLYETHYLENE GLYCOL 3350, SODIUM SULFATE, SODIUM CHLORIDE, POTASSIUM CHLORIDE, ASCORBIC ACID, SODIUM ASCORBATE 1000 ML: KIT at 02:43

## 2019-11-23 NOTE — ANESTHESIA PREPROCEDURE EVALUATION
Anesthesia Evaluation     NPO Solid Status: > 8 hours  NPO Liquid Status: > 8 hours           Airway   Mallampati: II  TM distance: >3 FB  Possible difficult intubation  Dental - normal exam     Pulmonary    (+) pulmonary embolism, home oxygen, sleep apnea on CPAP,   Cardiovascular   Exercise tolerance: good (4-7 METS)    (+) hypertension, CAD, cardiac stents PVD, DVT, hyperlipidemia,       Neuro/Psych  (+) numbness,     GI/Hepatic/Renal/Endo    (+) morbid obesity, GI bleeding active bleeding, diabetes mellitus type 2,     Musculoskeletal     (+) back pain,   Abdominal    Substance History      OB/GYN          Other                      Anesthesia Plan    ASA 3 - emergent     MAC     intravenous induction     Anesthetic plan, all risks, benefits, and alternatives have been provided, discussed and informed consent has been obtained with: patient.

## 2019-11-23 NOTE — ANESTHESIA POSTPROCEDURE EVALUATION
Patient: Dalton Dallas Sr.    Procedure Summary     Date:  11/23/19 Room / Location:  [facility] ENDOSCOPY 1 / [facility] ENDOSCOPY    Anesthesia Start:  1158 Anesthesia Stop:      Procedure:  COLONOSCOPY WITH ENDOSCOPIC CLIPPING X1 OF POST POLYPECTOMY BLEED SITE (N/A ) Diagnosis:       Rectal bleeding      (Rectal bleeding [K62.5])    Surgeon:  Jhonny Orellana MD Provider:  Brad Aguirre MD    Anesthesia Type:  MAC ASA Status:  3 - Emergent          Anesthesia Type: MAC  Last vitals  BP   161/63 (11/23/19 1510)   Temp   98.2 °F (36.8 °C) (11/23/19 1510)   Pulse   77 (11/23/19 1510)   Resp   16 (11/23/19 1510)     SpO2   96 % (11/23/19 1510)     Post Anesthesia Care and Evaluation    Patient location during evaluation: PACU  Patient participation: complete - patient participated  Level of consciousness: awake  Pain scale: See nurse's notes for pain score.  Pain management: adequate  Airway patency: patent  Anesthetic complications: No anesthetic complications  PONV Status: none  Cardiovascular status: acceptable  Respiratory status: acceptable  Hydration status: acceptable    Comments: Patient seen and examined postoperatively; vital signs stable; SpO2 greater than or equal to 90%; cardiopulmonary status stable; nausea/vomiting adequately controlled; pain adequately controlled; no apparent anesthesia complications; patient discharged from anesthesia care when discharge criteria were met

## 2019-11-24 ENCOUNTER — READMISSION MANAGEMENT (OUTPATIENT)
Dept: CALL CENTER | Facility: HOSPITAL | Age: 79
End: 2019-11-24

## 2019-11-24 NOTE — OUTREACH NOTE
Prep Survey      Responses   Facility patient discharged from?  [facility]   Is patient eligible?  Yes   Discharge diagnosis  Rectal bleeding  COLONOSCOPY WITH ENDOSCOPIC CLIPPING X1 OF POST POLYPECTOMY BLEED SITE   Does the patient have one of the following disease processes/diagnoses(primary or secondary)?  Other   Does the patient have Home health ordered?  No   Is there a DME ordered?  No   Prep survey completed?  Yes          Denise Scott RN

## 2019-11-25 ENCOUNTER — READMISSION MANAGEMENT (OUTPATIENT)
Dept: CALL CENTER | Facility: HOSPITAL | Age: 79
End: 2019-11-25

## 2019-11-26 NOTE — OUTREACH NOTE
Medical Week 1 Survey      Responses   Facility patient discharged from?  [facility]   Does the patient have one of the following disease processes/diagnoses(primary or secondary)?  Other   Is there a successful TCM telephone encounter documented?  No   Week 1 attempt successful?  Yes   Call start time  1910   Call end time  1912   Discharge diagnosis  Rectal bleeding  COLONOSCOPY WITH ENDOSCOPIC CLIPPING X1 OF POST POLYPECTOMY BLEED SITE   Does the patient have all medications ordered at discharge?  N/A   Is the patient taking all medications as directed (includes completed medication regime)?  Yes   Does the patient have a primary care provider?   Yes   Does the patient have an appointment with their PCP within 7 days of discharge?  Greater than 7 days   What is preventing the patient from scheduling follow up appointments within 7 days of discharge?  Earlier appointment not available   Has the patient kept scheduled appointments due by today?  N/A   Comments  Patient needing f/u with Dr. Orellana with [facility]  Will call in am to make an appt   Has home health visited the patient within 72 hours of discharge?  N/A   Did the patient receive a copy of their discharge instructions?  Yes   Nursing interventions  Reviewed instructions with patient   What is the patient's perception of their health status since discharge?  Improving   Is the patient/caregiver able to teach back signs and symptoms related to disease process for when to call PCP?  Yes   Is the patient/caregiver able to teach back signs and symptoms related to disease process for when to call 911?  Yes   Is the patient/caregiver able to teach back the hierarchy of who to call/visit for symptoms/problems? PCP, Specialist, Home health nurse, Urgent Care, ED, 911  Yes   Additional teach back comments  Patient states he is doing fine.   Week 1 call completed?  Yes   Graduated  Yes   Did the patient feel the follow up calls were helpful during their recovery period?   Yes   Was the number of calls appropriate?  Yes   Graduated/Revoked comments  Will call [facility] for an appt with Dr. Reyna Dahl LPN     Appt made at [facility] for Jan 6 @ 9:45.  Pt notified of appt.

## 2019-12-10 ENCOUNTER — OFFICE VISIT (OUTPATIENT)
Dept: FAMILY MEDICINE CLINIC | Facility: CLINIC | Age: 79
End: 2019-12-10

## 2019-12-10 VITALS
RESPIRATION RATE: 18 BRPM | BODY MASS INDEX: 43.29 KG/M2 | OXYGEN SATURATION: 96 % | SYSTOLIC BLOOD PRESSURE: 149 MMHG | HEART RATE: 84 BPM | TEMPERATURE: 98.1 F | HEIGHT: 70 IN | WEIGHT: 302.4 LBS | DIASTOLIC BLOOD PRESSURE: 74 MMHG

## 2019-12-10 DIAGNOSIS — K92.2 GASTROINTESTINAL HEMORRHAGE, UNSPECIFIED GASTROINTESTINAL HEMORRHAGE TYPE: Primary | ICD-10-CM

## 2019-12-10 PROBLEM — J01.00 ACUTE NON-RECURRENT MAXILLARY SINUSITIS: Status: ACTIVE | Noted: 2019-12-10

## 2019-12-10 PROBLEM — R55 SYNCOPE: Status: RESOLVED | Noted: 2019-11-22 | Resolved: 2019-12-10

## 2019-12-10 PROBLEM — R60.0 EDEMA OF BOTH LEGS: Status: RESOLVED | Noted: 2019-06-26 | Resolved: 2019-12-10

## 2019-12-10 PROCEDURE — 99213 OFFICE O/P EST LOW 20 MIN: CPT | Performed by: FAMILY MEDICINE

## 2019-12-10 RX ORDER — AMOXICILLIN 875 MG/1
875 TABLET, COATED ORAL 2 TIMES DAILY
Qty: 20 TABLET | Refills: 0 | Status: SHIPPED | OUTPATIENT
Start: 2019-12-10 | End: 2019-12-10

## 2019-12-10 RX ORDER — CLOPIDOGREL BISULFATE 75 MG/1
75 TABLET ORAL DAILY
COMMUNITY
End: 2020-09-11

## 2019-12-10 RX ORDER — AMOXICILLIN 875 MG/1
875 TABLET, COATED ORAL 2 TIMES DAILY
Qty: 20 TABLET | Refills: 0 | Status: SHIPPED | OUTPATIENT
Start: 2019-12-10 | End: 2020-01-08

## 2019-12-10 NOTE — PATIENT INSTRUCTIONS
Continue your current medications and treatment.    Take the anti-biotics as prescribed.    Follow up in the office in 3 months.    Laboratory testing at that time.

## 2019-12-10 NOTE — PROGRESS NOTES
Subjective   Dalton Dallas Sr. is a 79 y.o. male.     Chief Complaint   Patient presents with   • Rectal Bleeding     HOSPITAL F/U       HPI  Plan: Gastrointestinal bleeding, sinusitis    The patient is a 79-year-old white male comes in for follow-up and maintenance of his current problems which includes    1.  Gastrointestinal bleeding-stable-patient was hospitalized recently because of gastrointestinal bleeding.  Patient had a colonoscopy with polypectomy.  Patient started bleeding several days after the colonoscopy.  Patient required hospitalizations and blood transfusion.  The patient had been on Plavix aspirin and Coumadin.  All of these anticoagulants were stopped.  The patient states he has had no further bleeding.  He states that he is back on an anticoagulant but is not sure which one.  He thinks it may be Plavix.  He has a follow-up with his hematologist.  The patient has been placed on Coumadin preventatively by previous cardiologist that he no longer sees.    2.  Sinuisitis-new-patient states that he has had upper respiratory infection with sinus congestion and drainage and pressure in the maxillary sinus area for the past 1 week.  He states is not getting better.  Denied fever chills.  He does complain of some purulent sinus secretions.    His other problems include stable coronary artery disease hypertension hyperlipidemia peripheral vascular disease past history of a DVT and pulmonary embolism diastolic dysfunction of the heart pulmonary hypertension sleep apnea obesity hypothyroidism and type 2 diabetes mellitus.        The following portions of the patient's history were reviewed and updated as appropriate: allergies, current medications, past family history, past medical history, past social history, past surgical history and problem list.    Review of Systems    Objective     /74 (BP Location: Left arm, Patient Position: Sitting, Cuff Size: Large Adult)   Pulse 84   Temp 98.1 °F (36.7  "°C) (Oral)   Resp 18   Ht 177.8 cm (70\")   Wt (!) 137 kg (302 lb 6.4 oz)   SpO2 96%   BMI 43.39 kg/m²     Physical Exam   Constitutional: He is oriented to person, place, and time.   HENT:   Head: Normocephalic and atraumatic.   Eyes: Pupils are equal, round, and reactive to light. Conjunctivae and EOM are normal.   Neck: Normal range of motion. Neck supple.   Cardiovascular: Normal rate, regular rhythm, normal heart sounds and intact distal pulses.   Pulmonary/Chest: Effort normal and breath sounds normal.   Abdominal: Soft. Bowel sounds are normal.   Musculoskeletal: Normal range of motion.   Neurological: He is alert and oriented to person, place, and time.   Skin: Skin is warm and dry.   Psychiatric: He has a normal mood and affect. His behavior is normal.   Nursing note and vitals reviewed.        Assessment/Plan   Dalton was seen today for rectal bleeding.    Diagnoses and all orders for this visit:    Gastrointestinal hemorrhage, unspecified gastrointestinal hemorrhage type    Other orders  -     Discontinue: amoxicillin (AMOXIL) 875 MG tablet; Take 1 tablet by mouth 2 (Two) Times a Day.  -     amoxicillin (AMOXIL) 875 MG tablet; Take 1 tablet by mouth 2 (Two) Times a Day.      Patient Instructions   Continue your current medications and treatment.    Take the anti-biotics as prescribed.    Follow up in the office in 3 months.    Laboratory testing at that time.        Gabino Sibley Jr., MD    12/10/19  "

## 2019-12-12 ENCOUNTER — LAB (OUTPATIENT)
Dept: LAB | Facility: HOSPITAL | Age: 79
End: 2019-12-12

## 2019-12-12 ENCOUNTER — OFFICE VISIT (OUTPATIENT)
Dept: ONCOLOGY | Facility: CLINIC | Age: 79
End: 2019-12-12

## 2019-12-12 ENCOUNTER — HOSPITAL ENCOUNTER (OUTPATIENT)
Dept: ONCOLOGY | Facility: HOSPITAL | Age: 79
Discharge: HOME | End: 2019-12-12
Admitting: NURSE PRACTITIONER

## 2019-12-12 VITALS
TEMPERATURE: 98.1 F | SYSTOLIC BLOOD PRESSURE: 165 MMHG | RESPIRATION RATE: 20 BRPM | HEIGHT: 70 IN | BODY MASS INDEX: 43.12 KG/M2 | DIASTOLIC BLOOD PRESSURE: 81 MMHG | HEART RATE: 78 BPM | WEIGHT: 301.2 LBS

## 2019-12-12 VITALS
HEART RATE: 78 BPM | DIASTOLIC BLOOD PRESSURE: 81 MMHG | RESPIRATION RATE: 20 BRPM | TEMPERATURE: 98.1 F | BODY MASS INDEX: 43.14 KG/M2 | SYSTOLIC BLOOD PRESSURE: 165 MMHG | WEIGHT: 301.3 LBS | HEIGHT: 70 IN

## 2019-12-12 DIAGNOSIS — K62.5 RECTAL BLEEDING: ICD-10-CM

## 2019-12-12 DIAGNOSIS — D64.9 ANEMIA, UNSPECIFIED TYPE: ICD-10-CM

## 2019-12-12 DIAGNOSIS — I82.513 CHRONIC DEEP VEIN THROMBOSIS (DVT) OF FEMORAL VEIN OF BOTH LOWER EXTREMITIES (HCC): ICD-10-CM

## 2019-12-12 DIAGNOSIS — I27.82 OTHER CHRONIC PULMONARY EMBOLISM WITHOUT ACUTE COR PULMONALE (HCC): Primary | ICD-10-CM

## 2019-12-12 DIAGNOSIS — K92.2 GASTROINTESTINAL HEMORRHAGE, UNSPECIFIED GASTROINTESTINAL HEMORRHAGE TYPE: ICD-10-CM

## 2019-12-12 LAB
BASOPHILS # BLD AUTO: 0.02 10*3/MM3 (ref 0–0.2)
BASOPHILS NFR BLD AUTO: 0.4 % (ref 0–1.5)
DEPRECATED RDW RBC AUTO: 56 FL (ref 37–54)
EOSINOPHIL # BLD AUTO: 0.28 10*3/MM3 (ref 0–0.4)
EOSINOPHIL NFR BLD AUTO: 5.7 % (ref 0.3–6.2)
ERYTHROCYTE [DISTWIDTH] IN BLOOD BY AUTOMATED COUNT: 16 % (ref 12.3–15.4)
FERRITIN SERPL-MCNC: 85.29 NG/ML (ref 30–400)
FOLATE SERPL-MCNC: 13.8 NG/ML (ref 4.78–24.2)
HCT VFR BLD AUTO: 34.4 % (ref 37.5–51)
HGB BLD-MCNC: 11.2 G/DL (ref 13–17.7)
INR PPP: 1.9
IRON 24H UR-MRATE: 115 MCG/DL (ref 59–158)
IRON SATN MFR SERPL: 24 % (ref 20–50)
LYMPHOCYTES # BLD AUTO: 1.79 10*3/MM3 (ref 0.7–3.1)
LYMPHOCYTES NFR BLD AUTO: 36.2 % (ref 19.6–45.3)
MCH RBC QN AUTO: 31.9 PG (ref 26.6–33)
MCHC RBC AUTO-ENTMCNC: 32.6 G/DL (ref 31.5–35.7)
MCV RBC AUTO: 98 FL (ref 79–97)
MONOCYTES # BLD AUTO: 0.35 10*3/MM3 (ref 0.1–0.9)
MONOCYTES NFR BLD AUTO: 7.1 % (ref 5–12)
NEUTROPHILS # BLD AUTO: 2.5 10*3/MM3 (ref 1.7–7)
NEUTROPHILS NFR BLD AUTO: 50.6 % (ref 42.7–76)
PLATELET # BLD AUTO: 182 10*3/MM3 (ref 140–450)
PMV BLD AUTO: 10.2 FL (ref 6–12)
RBC # BLD AUTO: 3.51 10*6/MM3 (ref 4.14–5.8)
TIBC SERPL-MCNC: 471 MCG/DL (ref 298–536)
TRANSFERRIN SERPL-MCNC: 316 MG/DL (ref 200–360)
VIT B12 BLD-MCNC: >2000 PG/ML (ref 211–946)
WBC NRBC COR # BLD: 4.94 10*3/MM3 (ref 3.4–10.8)

## 2019-12-12 PROCEDURE — 82607 VITAMIN B-12: CPT | Performed by: NURSE PRACTITIONER

## 2019-12-12 PROCEDURE — 85025 COMPLETE CBC W/AUTO DIFF WBC: CPT | Performed by: INTERNAL MEDICINE

## 2019-12-12 PROCEDURE — 36415 COLL VENOUS BLD VENIPUNCTURE: CPT | Performed by: INTERNAL MEDICINE

## 2019-12-12 PROCEDURE — 83540 ASSAY OF IRON: CPT | Performed by: NURSE PRACTITIONER

## 2019-12-12 PROCEDURE — 99215 OFFICE O/P EST HI 40 MIN: CPT | Performed by: INTERNAL MEDICINE

## 2019-12-12 PROCEDURE — 85610 PROTHROMBIN TIME: CPT

## 2019-12-12 PROCEDURE — 82746 ASSAY OF FOLIC ACID SERUM: CPT | Performed by: NURSE PRACTITIONER

## 2019-12-12 PROCEDURE — 82728 ASSAY OF FERRITIN: CPT | Performed by: NURSE PRACTITIONER

## 2019-12-12 PROCEDURE — 84466 ASSAY OF TRANSFERRIN: CPT | Performed by: NURSE PRACTITIONER

## 2019-12-12 NOTE — PROGRESS NOTES
HEMATOLOGY ONCOLOGY FOLLOW UP        Patient name: Dalton Dallas Sr.  : 1940  MRN: [id number]  Primary Care Physician: Gabino Sibley Jr., MD  Referring Physician: Gabino Sibley Jr., *  Reason For Consult:     No chief complaint on file.  History of PE and DVT  Anticoagulation management  Anemia    History of Present Illness:  Mr. Dallas is a 78-year-old gentleman, remote smoker, with a   history of diabetes, coronary artery disease and hypertension who presented to [facility] on 14 with symptoms of shortness of breath and chest discomfort.  CT scan of the   chest on 14 showed bilateral pulmonary emboli with large clot burden.  There were filling   defects predominantly in the right upper lobe, and lower and middle lobe pulmonary arterial trees   along with emboli in the left upper lobe and left lower lobe arterial tree as well.  Mediastinal,   hilar and subcarinal lymphadenopathy was also noted.  The largest lymph node in the subcarinal   location measured 3.9 x 1.9 cm.  The patient was started on anticoagulation.  Bilateral lower   extremity venous Doppler on 14 showed a filling defect within the left gastroc veins.  The   patient was put on anticoagulation.  Thrombolysis was also under consideration considering the   extensive clot burden, but the patient did not require it.       Patient denied any prior history of thromboembolism before this episode.  His brother may have had   a leg DVT.    · 14 - Creatinine 1.3, BUN 20.  · 14 - CBC:  WBC 14.4, hemoglobin 14.9, platelet count 241,000.   · 14 - Factor V Leiden negative.  Factor VIII level 323 (H).  Protein C activity 112.6% (N).    Protein S activity 11.3 (L).  Activated protein C resistance 2.1 (L).  Antithrombin III 83% (N).    D-dimer 2.15.    · 14 - Serum homocysteine 8 (N).  Anticardiolipin antibody negative.  Phosphatidylserine   antibody negative.  Beta-2  glycoprotein antibody negative.   · 2/19/14 - Prothrombin gene mutation negative.    · 2/20/14 - FLAKO-2 mutation test negative.    · 3/10/14 - Protein S activity 93% (N).  Homocysteine level 11 (N).    · 4/11/14 - Factor VIII level 260 (H).   · 4/11/14 - CT scan of the chest without contrast:  Complete resolution of air space opacities in   the lower lobes.  Mediastinal lymphadenopathy is overall very similar to the prior study.  It is   nonspecific.  Three vessel coronary artery atherosclerotic calcification.  Questionable lobular   contour of the liver, question cirrhosis.  AP window lymph node measures about 1.1 cm,   paratracheal lymph node measures about 1.7 cm and subcarinal lymph node measures about 1.2 cm.  · 5/2/14 - INR 4.1.   · 7/30/14 - WBC 5.7, hemoglobin 12.1, platelet count 228,000, MCV 94.0.  INR 3.0.   · 10/17/14 - CT scan of the chest:  Chronic lung changes are stable.  Several borderline to mildly   enlarged mediastinal lymph nodes are also unchanged from six months ago.  They are likely benign   reactive lymph nodes.    · 10/29/14 - Vitamin B12 185, ferritin 57, iron saturation 19%, TIBC 473 (H), serum iron 88,   creatinine 1.3, BUN 18, folate 17.7.  · 11/4/14 - Patient underwent upper GI endoscopy and colonoscopy:  Several nonbleeding diverticula   were seen in the sigmoid colon.  Diverticulosis appeared moderate severity.  Four sessile polyps   of benign appearance were found in the cecum and descending colon.  Polypectomies were performed.    Surgical pathology:  Sessile serrated polyps.  A tubular adenoma is noted in the descending   colon.    · 11/25/14 - INR 4.2.   · 11/25/14 - WBC 5.7, hemoglobin 11.4, platelet count 210,000.    · 2/24/15 - WBC 7.7, hemoglobin 12.9, platelet count 245,000, MCV 93,000.  · 2/24/15 - INR 2.6.  Patient’s Coumadin dose is 9 mg.     · 3/12/15 - Creatinine 1.3.    · 4/21/15 - Patient admitted with chest pain and had cardiac catheterization.  He had a stent    placed.    · 5/8/15 - CT scan of the chest without contrast:  Stable appearance of mediastinal   lymphadenopathy over the past two studies going back to April 2014.  Stable pulmonary fibrotic   changes and scarring.    · 6/25/15 - WBC 5.3, hemoglobin 12.6, platelet count 187,000.     · 12/17/15 - WBC 5.1, hemoglobin 13.6, platelet count 208,000, MCV 93.1.  Iron saturation 21%,   TIBC 415, serum iron 85.    · 12/17/15 - Chest x-ray:  No acute process.     · 4/19/16 - INR 2.1.    · 5/17/16 - INR 2.2.  · 6/16/16 - WBC 5.5, hemoglobin 12.7, MCV 90.5, platelet count 205,000.  INR 2.3 (patient on   Coumadin 7.5 mg).    · 12/15/16 - WBC 5.0, hemoglobin 13.0, platelet count 174,000.  INR 1.9 (Coumadin dose 7.5).   · 6/15/17 - WBC 5.5, hemoglobin 12.5, platelet count 168,000, MCV 94.4.  INR 2.6 (Coumadin dose 8   mg).   · 11/2/17 - INR 2.    · 12/14/17 - WBC 5.04, hemoglobin 12.5, platelet count 167,000, MCV 94.    · 6/14/18 - WBC 4.9, hemoglobin 12.5, platelet count 172,000, MCV 93.5.    · 12/13/18 - INR 2.4.  WBC 4.9, hemoglobin 12.8, platelet count 180,000.    11/23/2019 INR is 2.55  11/11/2019: Colonoscopy: Cecum polypectomy shows tubular adenoma, transverse colon polypectomy shows tubular adenoma.   Patient readmitted after colonoscopy to hospital due to GI bleeding from polyp biopsy..  11/22/2019 hemoglobin 9.3 due to GI bleeding  11/23/2019 cecal ulcer with visible vessel and stigmata of recent bleeding which was clipped  Moderate left-sided diverticulosis  12/12/2019 INR 1.9  12/12/2019 WBC 4.9, hemoglobin 11.2, MCV 98.0, platelets 182        Subjective:12/12/19  Here for a follow up. He had a colonoscopy and a polyp removed with Dr. Bruner. He continues taking coumadin, his INR was 1.9 today, he was on 8 mg and it was increased to 8.5. He is also taking ASA and plavix. He has had cardiac stents and follows with Dr. Ackerman.  Patient had a recent episode of gastrointestinal bleeding due to colonoscopy and biopsy.  He  is taken off aspirin.  He continues on Plavix and Coumadin.      The following portions of the patient's history were reviewed and updated as appropriate: allergies, current medications, past family history, past medical history, past social history, past surgical history and problem list.         Past Medical History:   Diagnosis Date   • ACE-inhibitor cough 06/2005   • Coronary artery disease    • Diabetes mellitus, type 2 (CMS/HCC) 1995   • ED (erectile dysfunction)    • Hx of blood clots 2014    Blood clot left leg    • Hyperlipidemia 08/2000   • Hypertension    • Hypogonadism, male 1998   • Hypothyroidism 06/2001   • Obesity    • Peripheral neuropathy    • Pulmonary embolism (CMS/HCC) 02/2014   • Rectal fistula    • Vitamin D deficiency        Past Surgical History:   Procedure Laterality Date   • CARDIAC CATHETERIZATION  2008    PTCA: 2008; PTCA: 4/21/2015 LCX stent    • CATARACT EXTRACTION  01/11/2016 1-4-16 & 1-11-16    • COLONOSCOPY N/A 11/23/2019    Procedure: COLONOSCOPY WITH ENDOSCOPIC CLIPPING X1 OF POST POLYPECTOMY BLEED SITE;  Surgeon: Jhonny Orellana MD;  Location: [facility] ENDOSCOPY;  Service: Gastroenterology   • INGUINAL HERNIA REPAIR Left    • UMBILICAL HERNIA REPAIR           Current Outpatient Medications:   •  amLODIPine-atorvastatin (CADUET) 5-20 MG per tablet, Take 1 tablet by mouth Daily., Disp: , Rfl:   •  amoxicillin (AMOXIL) 875 MG tablet, Take 1 tablet by mouth 2 (Two) Times a Day., Disp: 20 tablet, Rfl: 0  •  atenolol (TENORMIN) 50 MG tablet, Take 50 mg by mouth Daily., Disp: , Rfl:   •  bumetanide (BUMEX) 2 MG tablet, Take 2 mg by mouth Daily., Disp: , Rfl:   •  clopidogrel (PLAVIX) 75 MG tablet, Take 75 mg by mouth Daily., Disp: , Rfl:   •  ergocalciferol (ERGOCALCIFEROL) 91346 units capsule, Take 1 capsule by mouth 1 (One) Time Per Week., Disp: 12 capsule, Rfl: 3  •  fenofibrate (TRICOR) 54 MG tablet, Take 54 mg by mouth Daily., Disp: , Rfl:   •  ferrous sulfate 325 (65 FE) MG  tablet, Take 325 mg by mouth 2 (Two) Times a Day., Disp: , Rfl:   •  hydrochlorothiazide (HYDRODIURIL) 25 MG tablet, Take 25 mg by mouth Daily., Disp: , Rfl:   •  Insulin Regular Human, Conc, (HumuLIN R) 500 UNIT/ML solution pen-injector CONCENTRATED injection, Inject 24 Units under the skin into the appropriate area as directed Every Morning., Disp: , Rfl:   •  Insulin Regular Human, Conc, (HumuLIN R) 500 UNIT/ML solution pen-injector CONCENTRATED injection, Inject 35 Units under the skin into the appropriate area as directed Every Evening., Disp: , Rfl:   •  irbesartan (AVAPRO) 300 MG tablet, Take 300 mg by mouth Daily., Disp: , Rfl:   •  levothyroxine (SYNTHROID, LEVOTHROID) 175 MCG tablet, Take 175 mcg by mouth Daily., Disp: , Rfl:   •  metFORMIN ER (GLUCOPHAGE-XR) 500 MG 24 hr tablet, Take 2,000 mg by mouth Every Evening., Disp: , Rfl:   •  Omega-3 Fatty Acids (FISH OIL) 1000 MG capsule capsule, Take 1,000 mg by mouth 2 (Two) Times a Day., Disp: , Rfl:   •  potassium chloride ER (K-TAB) 20 MEQ tablet controlled-release ER tablet, Take 20 mEq by mouth Daily., Disp: , Rfl:   •  Pramlintide Acetate 2700 MCG/2.7ML solution pen-injector, Inject 120 mcg under the skin into the appropriate area as directed 3 (Three) Times a Day., Disp: , Rfl:   •  pregabalin (LYRICA) 100 MG capsule, Take 100 mg by mouth 2 (Two) Times a Day., Disp: , Rfl:   •  vitamin B-12 (CYANOCOBALAMIN) 100 MCG tablet, Take 100 mcg by mouth Daily., Disp: , Rfl:     No Known Allergies    Family History   Problem Relation Age of Onset   • Heart disease Mother    • Leukemia Father        Cancer-related family history is not on file.    Social History     Tobacco Use   • Smoking status: Former Smoker   • Smokeless tobacco: Never Used   Substance Use Topics   • Alcohol use: No     Frequency: Never   • Drug use: No         I have reviewed the history of present illness, past medical history, family history, social history, lab results, all notes and other  "records since the patient was last seen on  9/23/2019.    ROS:   Review of Systems   Constitutional: Negative for chills and fever.   HENT: Negative for ear pain, mouth sores, nosebleeds and sore throat.    Eyes: Negative for photophobia and visual disturbance.   Respiratory: Negative for wheezing and stridor.    Cardiovascular: Negative for chest pain and palpitations.   Gastrointestinal: Negative for abdominal pain, diarrhea, nausea and vomiting.   Endocrine: Negative for cold intolerance and heat intolerance.   Genitourinary: Negative for dysuria and hematuria.   Musculoskeletal: Negative for joint swelling and neck stiffness.   Skin: Negative for color change and rash.   Neurological: Negative for seizures and syncope.   Hematological: Negative for adenopathy.        No obvious bleeding   Psychiatric/Behavioral: Negative for agitation, confusion and hallucinations.       Objective:  Vital signs:  Vitals:    12/12/19 1052   BP: 165/81   Pulse: 78   Resp: 20   Temp: 98.1 °F (36.7 °C)   Weight: (!) 137 kg (301 lb 4.8 oz)   Height: 177.8 cm (70\")   PainSc: 0-No pain     ECOG  (1) Restricted in physically strenuous activity, ambulatory and able to do work of light nature    Physical Exam:   Physical Exam   Constitutional: He is oriented to person, place, and time. He appears well-developed and well-nourished. No distress.   Obese male   HENT:   Head: Normocephalic and atraumatic.   Eyes: Conjunctivae and EOM are normal. Right eye exhibits no discharge. Left eye exhibits no discharge. No scleral icterus.   Neck: Normal range of motion. Neck supple. No thyromegaly present.   Cardiovascular: Normal rate, regular rhythm and normal heart sounds. Exam reveals no gallop and no friction rub.   Pulmonary/Chest: Effort normal. No stridor. No respiratory distress. He has no wheezes.   Abdominal: Soft. Bowel sounds are normal. He exhibits no mass. There is no tenderness. There is no rebound and no guarding.   Musculoskeletal: " Normal range of motion. He exhibits edema. He exhibits no tenderness.   Lymphadenopathy:     He has no cervical adenopathy.   Neurological: He is alert and oriented to person, place, and time. He exhibits normal muscle tone.   Skin: Skin is warm. No rash noted. He is not diaphoretic. No erythema.   Psychiatric: He has a normal mood and affect. His behavior is normal.   Nursing note and vitals reviewed.            Lab Results - Last 18 Months   Lab Units 12/12/19  1057 11/23/19  0247 11/22/19  1237   WBC 10*3/mm3 4.94 7.80 7.00   HEMOGLOBIN g/dL 11.2* 8.9* 9.3*   HEMATOCRIT % 34.4* 25.7* 27.4*   PLATELETS 10*3/mm3 182 175 195   MCV fL 98.0* 92.5 92.8     Lab Results - Last 18 Months   Lab Units 11/23/19  0247 11/22/19  1237 09/25/19  0850   SODIUM mmol/L 142 136 140   POTASSIUM mmol/L 3.9 4.3 4.0   CHLORIDE mmol/L 103 98 103   CO2 mmol/L 27.0 22.0 26.0   BUN mg/dL 30* 37* 23*   CREATININE mg/dL 1.29* 1.59* 1.40*   CALCIUM mg/dL 8.6 8.4* 9.2   BILIRUBIN mg/dL 0.2 0.3 0.6   ALK PHOS U/L 34* 34* 34   ALT (SGPT) U/L 19 20 27   AST (SGOT) U/L 19 18 31   GLUCOSE mg/dL 108* 356* 190*       Lab Results   Component Value Date    GLUCOSE 108 (H) 11/23/2019    BUN 30 (H) 11/23/2019    CREATININE 1.29 (H) 11/23/2019    EGFRIFNONA 54 (L) 11/23/2019    BCR 23.3 11/23/2019    K 3.9 11/23/2019    CO2 27.0 11/23/2019    CALCIUM 8.6 11/23/2019    ALBUMIN 3.70 11/23/2019    LABIL2 1.7 03/28/2019    AST 19 11/23/2019    ALT 19 11/23/2019       Lab Results - Last 18 Months   Lab Units 12/12/19  1008 11/23/19  0247 11/22/19  1237   INR  1.90 2.55 2.86   APTT seconds  --   --  31.5*       Lab Results   Component Value Date    IRON 114 12/13/2018    TIBC 420 12/13/2018    FERRITIN 60 12/13/2018       No results found for: FOLATE    No results found for: OCCULTBLD    No results found for: RETICCTPCT    No results found for: LUFBCJXJ87  No results found for: SPEP, UPEP  No results found for: LDH, URICACID  No results found for: WATSON, RF,  SEDRATE  No results found for: FIBRINOGEN, HAPTOGLOBIN  Lab Results   Component Value Date    PTT 31.5 (H) 11/22/2019    INR 1.90 12/12/2019     No results found for:   No results found for: CEA  No components found for: CA-19-9  No results found for: PSA      Assessment/Plan     Assessment:  1. History of bilateral pulmonary emboli with high clot burden and DVT:  His thrombosis was   unprovoked.  Hypercoagulability testing revealed elevated factor VIII level on two occasions.  He   is on anticoagulation with Coumadin.  He also has a questionable positive family history of DVT.    He has been recommended indefinite lifetime anticoagulation.  His INRs have been very stable.  2. History of mediastinal lymphadenopathy:  His recent CT scan shows stable lymphadenopathy.    These are very likely benign lymph nodes.  His last chest x-ray was unremarkable.    3. Mild CKD:  He has slight anemia of chronic kidney disease.   4. History of iron deficiency:  He had a colonoscopy and that showed polyps and moderately severe   diverticulosis.  Pathology showed a tubular adenoma in the descending colon.  He is on iron   supplements p.o. BID. History of iron deficiency.  5. Vitamin B12 deficiency:  He is on p.o. B12 supplements.  6. Dizziness/light-headedness:  Possibly could be from orthostatic hypertension.  I asked the   patient to check his blood pressures in the morning at the time he wakes up and when he sits up.    He will see Dr. Sibley for this problem.    7.  Recent GI bleeding.  Due to polypectomy from colonoscopy.     PLAN:         1.  Continue same dose.  He is on 8 mg.    Repeat INR in 1 week.  Will need to continue anticoagulation indefinitely since patient has history of large unprovoked PE and DVT.  2. Check ferritin and iron panel today.    Will also check B12 and folate    Advised patient to discuss with cardiologist if he can come off Plavix as it has been more than a year since he had his stents placed.  3.  Follow-up in six months.           Other chronic pulmonary embolism without acute cor pulmonale (CMS/HCC)  - CBC Auto Differential    Anemia, unspecified type  - Iron Profile  - Ferritin  - Iron Profile  - Ferritin  - Vitamin B12  - Folate  - CBC Auto Differential    Orders Placed This Encounter   Procedures   • CBC Auto Differential     collect LABS TODAY     Scheduling Instructions:      collect LABS TODAY   • Iron Profile     Labs today  today     Scheduling Instructions:      Labs today   • Ferritin     today     Scheduling Instructions:      Please draw these labs prior to the next visit.   • Iron Profile     today     Scheduling Instructions:      Please draw these labs prior to the next visit.   • Ferritin     today     Scheduling Instructions:      Please draw these labs prior to the next visit.   • Vitamin B12     today   • Folate     today   • CBC Auto Differential     AT THE NEXT VISIT (ON ARRIVAL)     Standing Status:   Future     Scheduling Instructions:      AT THE NEXT VISIT (ON ARRIVAL)                   Thank you very much for providing the opportunity to participate in this patient’s care. Please do not hesitate to call if there are any other questions.    I have reviewed all relevant labs results, outside reports, imaging, notes, vital signs, medications, and plan with the patient today.    Portions of the note have been scribed by medical assistant/Nurse.  I have reviewed and made changes accordingly.

## 2019-12-16 ENCOUNTER — TELEPHONE (OUTPATIENT)
Dept: CARDIOLOGY | Facility: CLINIC | Age: 79
End: 2019-12-16

## 2019-12-17 RX ORDER — AMLODIPINE BESYLATE AND ATORVASTATIN CALCIUM 5; 20 MG/1; MG/1
1 TABLET, FILM COATED ORAL DAILY
Qty: 90 TABLET | Refills: 2 | Status: SHIPPED | OUTPATIENT
Start: 2019-12-17 | End: 2020-09-14 | Stop reason: SDUPTHER

## 2019-12-19 ENCOUNTER — LAB (OUTPATIENT)
Dept: LAB | Facility: HOSPITAL | Age: 79
End: 2019-12-19

## 2019-12-19 ENCOUNTER — HOSPITAL ENCOUNTER (OUTPATIENT)
Dept: ONCOLOGY | Facility: HOSPITAL | Age: 79
Discharge: HOME | End: 2019-12-19
Admitting: NURSE PRACTITIONER

## 2019-12-19 VITALS
RESPIRATION RATE: 18 BRPM | TEMPERATURE: 97.8 F | HEART RATE: 73 BPM | SYSTOLIC BLOOD PRESSURE: 161 MMHG | WEIGHT: 304 LBS | DIASTOLIC BLOOD PRESSURE: 61 MMHG | HEIGHT: 70 IN | BODY MASS INDEX: 43.52 KG/M2

## 2019-12-19 DIAGNOSIS — I82.513 CHRONIC DEEP VEIN THROMBOSIS (DVT) OF FEMORAL VEIN OF BOTH LOWER EXTREMITIES (HCC): ICD-10-CM

## 2019-12-19 DIAGNOSIS — I82.513 CHRONIC DEEP VEIN THROMBOSIS (DVT) OF FEMORAL VEIN OF BOTH LOWER EXTREMITIES (HCC): Primary | ICD-10-CM

## 2019-12-19 LAB — INR PPP: 2.3

## 2019-12-19 PROCEDURE — 85610 PROTHROMBIN TIME: CPT

## 2019-12-27 ENCOUNTER — LAB (OUTPATIENT)
Dept: LAB | Facility: HOSPITAL | Age: 79
End: 2019-12-27

## 2019-12-27 ENCOUNTER — TELEPHONE (OUTPATIENT)
Dept: ENDOCRINOLOGY | Facility: CLINIC | Age: 79
End: 2019-12-27

## 2019-12-27 ENCOUNTER — HOSPITAL ENCOUNTER (OUTPATIENT)
Dept: ONCOLOGY | Facility: HOSPITAL | Age: 79
Discharge: HOME | End: 2019-12-27
Admitting: NURSE PRACTITIONER

## 2019-12-27 VITALS
SYSTOLIC BLOOD PRESSURE: 157 MMHG | BODY MASS INDEX: 43.52 KG/M2 | TEMPERATURE: 98.1 F | HEART RATE: 75 BPM | DIASTOLIC BLOOD PRESSURE: 87 MMHG | RESPIRATION RATE: 18 BRPM | WEIGHT: 304 LBS | HEIGHT: 70 IN

## 2019-12-27 DIAGNOSIS — I82.513 CHRONIC DEEP VEIN THROMBOSIS (DVT) OF FEMORAL VEIN OF BOTH LOWER EXTREMITIES (HCC): ICD-10-CM

## 2019-12-27 DIAGNOSIS — I82.513 CHRONIC DEEP VEIN THROMBOSIS (DVT) OF FEMORAL VEIN OF BOTH LOWER EXTREMITIES (HCC): Primary | ICD-10-CM

## 2019-12-27 LAB — INR PPP: 2

## 2019-12-27 PROCEDURE — 85610 PROTHROMBIN TIME: CPT

## 2019-12-27 NOTE — TELEPHONE ENCOUNTER
TTC patient to r/s April appointment as dr springer will not be in the office that week, male answered the phone who said they will relay the message to have patient contact office to r/s

## 2019-12-30 RX ORDER — FENOFIBRATE 54 MG/1
TABLET ORAL
Qty: 90 TABLET | Refills: 4 | Status: SHIPPED | OUTPATIENT
Start: 2019-12-30 | End: 2021-03-25

## 2020-01-02 RX ORDER — WARFARIN SODIUM 5 MG/1
TABLET ORAL TAKE AS DIRECTED
COMMUNITY
Start: 2019-12-24 | End: 2020-03-26 | Stop reason: SDUPTHER

## 2020-01-02 RX ORDER — WARFARIN SODIUM 3 MG/1
TABLET ORAL TAKE AS DIRECTED
COMMUNITY
Start: 2019-12-24 | End: 2020-03-23

## 2020-01-06 ENCOUNTER — OFFICE (AMBULATORY)
Dept: URBAN - METROPOLITAN AREA CLINIC 64 | Facility: CLINIC | Age: 80
End: 2020-01-06

## 2020-01-06 VITALS
SYSTOLIC BLOOD PRESSURE: 116 MMHG | WEIGHT: 306 LBS | HEIGHT: 69 IN | HEART RATE: 74 BPM | DIASTOLIC BLOOD PRESSURE: 40 MMHG

## 2020-01-06 DIAGNOSIS — I10 ESSENTIAL (PRIMARY) HYPERTENSION: ICD-10-CM

## 2020-01-06 DIAGNOSIS — K62.5 HEMORRHAGE OF ANUS AND RECTUM: ICD-10-CM

## 2020-01-06 DIAGNOSIS — Z79.01 LONG TERM (CURRENT) USE OF ANTICOAGULANTS: ICD-10-CM

## 2020-01-06 DIAGNOSIS — E10.9 TYPE 1 DIABETES MELLITUS WITHOUT COMPLICATIONS: ICD-10-CM

## 2020-01-06 PROCEDURE — 99214 OFFICE O/P EST MOD 30 MIN: CPT | Performed by: NURSE PRACTITIONER

## 2020-01-06 RX ORDER — METFORMIN HYDROCHLORIDE 500 MG/1
TABLET, EXTENDED RELEASE ORAL
Qty: 360 TABLET | Refills: 4 | Status: SHIPPED | OUTPATIENT
Start: 2020-01-06 | End: 2021-03-31

## 2020-01-08 ENCOUNTER — OFFICE VISIT (OUTPATIENT)
Dept: CARDIOLOGY | Facility: CLINIC | Age: 80
End: 2020-01-08

## 2020-01-08 VITALS
BODY MASS INDEX: 45.32 KG/M2 | HEART RATE: 72 BPM | DIASTOLIC BLOOD PRESSURE: 74 MMHG | WEIGHT: 306 LBS | SYSTOLIC BLOOD PRESSURE: 152 MMHG | HEIGHT: 69 IN

## 2020-01-08 DIAGNOSIS — I50.32 DIASTOLIC DYSFUNCTION WITH CHRONIC HEART FAILURE (HCC): ICD-10-CM

## 2020-01-08 DIAGNOSIS — I27.20 PULMONARY HYPERTENSION (HCC): ICD-10-CM

## 2020-01-08 DIAGNOSIS — I25.119 CORONARY ARTERY DISEASE INVOLVING NATIVE HEART WITH ANGINA PECTORIS, UNSPECIFIED VESSEL OR LESION TYPE (HCC): Primary | ICD-10-CM

## 2020-01-08 DIAGNOSIS — E78.2 MIXED HYPERLIPIDEMIA: ICD-10-CM

## 2020-01-08 DIAGNOSIS — I10 ESSENTIAL HYPERTENSION: ICD-10-CM

## 2020-01-08 PROCEDURE — 99214 OFFICE O/P EST MOD 30 MIN: CPT | Performed by: INTERNAL MEDICINE

## 2020-01-08 NOTE — PROGRESS NOTES
Date of Office Visit: 2020  Encounter Provider: Dr. Edwar Ackerman  Place of Service: [facility] CARDIOLOGY [place]  Patient Name: Dalton Dallas Sr.  :1940  Gabino Sibley Jr., MD    Chief Complaint   Patient presents with   • Coronary Artery Disease     6 month follow up   • Congestive Heart Failure   • Hypertension   • Hyperlipidemia     History of Present Illness    I'm pleased to see Mr. Dallas in my office today as a followup     As you know, patient is 79 years old white gentleman whose past medical history is significant for hypertension, hyperlipidemia, coronary artery disease, coronary artery stenting, diabetes mellitus, DVT who came for followup.     In , patient developed symptom of shortness of breath and subsequent stress test was abnormal.  Cardiac catheterization showed LAD was free of disease, RCA had 50% stenosis, and LCX has 80% stenosis.  Patient underwent LCX/om stenting.  In , repeat cardiac catheterization showed 80-90% stenosis of LCX/OM1 patient underwent successful stenting.  LAD had 25% stenosis.  LVEF was 60%.  In 2018, echocardiogram showed EF of 60-65%.    Since the previous visit, patient is doing fairly well from cardiovascular standpoint.  On previous visit patient was found to be in volume overload and I increase Bumex to 2 mg daily.  Patient also try to decrease his fluid intake.  Since then, his leg edema has improved.  Shortness of breath is much better.  Patient denies any chest pain or tightness or heaviness.  Patient is not very active.  Patient denies orthopnea or PND.  No palpitation.    I am pleased with the patient progress.  His volume status is improved.  Continue current treatment.  Patient is advised to join gym and do aerobic exercises.  Weight loss is emphasized.    Past Medical History:   Diagnosis Date   • ACE-inhibitor cough 2005   • Coronary artery disease    • Diabetes mellitus, type 2 (CMS/[facility])    • ED (erectile  dysfunction)    • Hx of blood clots 2014    Blood clot left leg    • Hyperlipidemia 08/2000   • Hypertension    • Hypogonadism, male 1998   • Hypothyroidism 06/2001   • Obesity    • Peripheral neuropathy    • Pulmonary embolism (CMS/HCC) 02/2014   • Rectal fistula    • Vitamin D deficiency          Past Surgical History:   Procedure Laterality Date   • CARDIAC CATHETERIZATION  2008    PTCA: 2008; PTCA: 4/21/2015 LCX stent    • CATARACT EXTRACTION  01/11/2016 1-4-16 & 1-11-16    • COLONOSCOPY N/A 11/23/2019    Procedure: COLONOSCOPY WITH ENDOSCOPIC CLIPPING X1 OF POST POLYPECTOMY BLEED SITE;  Surgeon: Jhonny Orellana MD;  Location: [facility] ENDOSCOPY;  Service: Gastroenterology   • CORONARY STENT PLACEMENT     • INGUINAL HERNIA REPAIR Left    • UMBILICAL HERNIA REPAIR             Current Outpatient Medications:   •  amLODIPine-atorvastatin (CADUET) 5-20 MG per tablet, Take 1 tablet by mouth Daily., Disp: 90 tablet, Rfl: 2  •  atenolol (TENORMIN) 50 MG tablet, Take 50 mg by mouth Daily., Disp: , Rfl:   •  bumetanide (BUMEX) 2 MG tablet, Take 2 mg by mouth Daily., Disp: , Rfl:   •  clopidogrel (PLAVIX) 75 MG tablet, Take 75 mg by mouth Daily., Disp: , Rfl:   •  ergocalciferol (ERGOCALCIFEROL) 52159 units capsule, Take 1 capsule by mouth 1 (One) Time Per Week., Disp: 12 capsule, Rfl: 3  •  fenofibrate (TRICOR) 54 MG tablet, TAKE 1 TABLET DAILY, Disp: 90 tablet, Rfl: 4  •  ferrous sulfate 325 (65 FE) MG tablet, Take 325 mg by mouth 2 (Two) Times a Day., Disp: , Rfl:   •  hydrochlorothiazide (HYDRODIURIL) 25 MG tablet, Take 25 mg by mouth Daily., Disp: , Rfl:   •  Insulin Regular Human, Conc, (HumuLIN R) 500 UNIT/ML solution pen-injector CONCENTRATED injection, Inject 35 Units under the skin into the appropriate area as directed Every Evening., Disp: , Rfl:   •  irbesartan (AVAPRO) 300 MG tablet, Take 300 mg by mouth Daily., Disp: , Rfl:   •  levothyroxine (SYNTHROID, LEVOTHROID) 175 MCG tablet, Take 175 mcg by mouth  Daily., Disp: , Rfl:   •  metFORMIN ER (GLUCOPHAGE-XR) 500 MG 24 hr tablet, TAKE 4 TABLETS AT SUPPER, Disp: 360 tablet, Rfl: 4  •  Omega-3 Fatty Acids (FISH OIL) 1000 MG capsule capsule, Take 1,000 mg by mouth 2 (Two) Times a Day., Disp: , Rfl:   •  potassium chloride ER (K-TAB) 20 MEQ tablet controlled-release ER tablet, Take 20 mEq by mouth Daily., Disp: , Rfl:   •  Pramlintide Acetate 2700 MCG/2.7ML solution pen-injector, Inject 120 mcg under the skin into the appropriate area as directed 3 (Three) Times a Day., Disp: , Rfl:   •  pregabalin (LYRICA) 100 MG capsule, Take 100 mg by mouth 2 (Two) Times a Day., Disp: , Rfl:   •  vitamin B-12 (CYANOCOBALAMIN) 100 MCG tablet, Take 100 mcg by mouth Daily., Disp: , Rfl:   •  warfarin (COUMADIN) 3 MG tablet, Take As Directed., Disp: , Rfl:   •  warfarin (COUMADIN) 5 MG tablet, Take As Directed., Disp: , Rfl:       Social History     Socioeconomic History   • Marital status:      Spouse name: Not on file   • Number of children: Not on file   • Years of education: Not on file   • Highest education level: Not on file   Tobacco Use   • Smoking status: Former Smoker   • Smokeless tobacco: Never Used   Substance and Sexual Activity   • Alcohol use: No     Frequency: Never   • Drug use: No   • Sexual activity: Defer         Review of Systems   Constitution: Negative for chills and fever.   HENT: Negative for ear discharge and nosebleeds.    Eyes: Negative for discharge and redness.   Cardiovascular: Positive for leg swelling. Negative for chest pain, orthopnea, palpitations, paroxysmal nocturnal dyspnea and syncope.   Respiratory: Positive for shortness of breath. Negative for cough and wheezing.    Endocrine: Negative for heat intolerance.   Skin: Negative for rash.   Musculoskeletal: Positive for arthritis and joint pain. Negative for myalgias.   Gastrointestinal: Negative for abdominal pain, melena, nausea and vomiting.   Genitourinary: Negative for dysuria and  "hematuria.   Neurological: Negative for dizziness, light-headedness, numbness and tremors.   Psychiatric/Behavioral: Negative for depression. The patient is not nervous/anxious.        Procedures    Procedures    No orders to display           Objective:    /74   Pulse 72   Ht 175.3 cm (69\")   Wt (!) 139 kg (306 lb)   BMI 45.19 kg/m²         Physical Exam   Constitutional: He is oriented to person, place, and time. He appears well-developed and well-nourished.   HENT:   Head: Normocephalic and atraumatic.   Eyes: No scleral icterus.   Neck: No thyromegaly present.   Cardiovascular: Normal rate, regular rhythm and normal heart sounds. Exam reveals no gallop and no friction rub.   No murmur heard.  Pulmonary/Chest: Effort normal and breath sounds normal. No respiratory distress. He has no wheezes. He has no rales.   Abdominal: There is no tenderness.   Musculoskeletal: He exhibits no edema.   Trace leg edema noted   Lymphadenopathy:     He has no cervical adenopathy.   Neurological: He is alert and oriented to person, place, and time.   Skin: No rash noted. No erythema.   Psychiatric: He has a normal mood and affect.           Assessment:       Diagnosis Plan   1. Coronary artery disease involving native heart with angina pectoris, unspecified vessel or lesion type (CMS/HCC)     2. Essential hypertension     3. Mixed hyperlipidemia     4. Diastolic dysfunction with chronic heart failure (CMS/HCC)     5. Pulmonary hypertension (CMS/HCC)              Plan:       At this stage, I would recommend to proceed with current treatment.  Patient is advised to have BMP and CBC through PCP office.  Patient follows with different providers including endocrinologist and hematologist which regularly does blood work.  Creatinine should be monitored.  His last creatinine was 1.2 in November 2019.  "

## 2020-01-24 RX ORDER — IRBESARTAN 300 MG/1
TABLET ORAL
Qty: 90 TABLET | Refills: 2 | Status: SHIPPED | OUTPATIENT
Start: 2020-01-24 | End: 2020-10-20

## 2020-01-27 ENCOUNTER — TELEPHONE (OUTPATIENT)
Dept: ONCOLOGY | Facility: CLINIC | Age: 80
End: 2020-01-27

## 2020-01-28 ENCOUNTER — HOSPITAL ENCOUNTER (OUTPATIENT)
Dept: ONCOLOGY | Facility: HOSPITAL | Age: 80
Discharge: HOME | End: 2020-01-28
Admitting: NURSE PRACTITIONER

## 2020-01-28 ENCOUNTER — LAB (OUTPATIENT)
Dept: LAB | Facility: HOSPITAL | Age: 80
End: 2020-01-28

## 2020-01-28 VITALS
DIASTOLIC BLOOD PRESSURE: 69 MMHG | RESPIRATION RATE: 18 BRPM | HEIGHT: 69 IN | HEART RATE: 78 BPM | TEMPERATURE: 97.7 F | BODY MASS INDEX: 44.58 KG/M2 | SYSTOLIC BLOOD PRESSURE: 167 MMHG | WEIGHT: 301 LBS

## 2020-01-28 DIAGNOSIS — Z79.01 LONG TERM (CURRENT) USE OF ANTICOAGULANTS: Primary | ICD-10-CM

## 2020-01-28 DIAGNOSIS — I82.513 CHRONIC DEEP VEIN THROMBOSIS (DVT) OF FEMORAL VEIN OF BOTH LOWER EXTREMITIES (HCC): ICD-10-CM

## 2020-01-28 LAB — INR PPP: 2

## 2020-01-28 PROCEDURE — 85610 PROTHROMBIN TIME: CPT

## 2020-01-31 RX ORDER — HYDROCHLOROTHIAZIDE 25 MG/1
TABLET ORAL
Qty: 90 TABLET | Refills: 4 | Status: SHIPPED | OUTPATIENT
Start: 2020-01-31 | End: 2021-04-26

## 2020-02-10 RX ORDER — ATENOLOL 50 MG/1
TABLET ORAL
Qty: 90 TABLET | Refills: 4 | Status: SHIPPED | OUTPATIENT
Start: 2020-02-10 | End: 2021-05-05

## 2020-02-25 ENCOUNTER — LAB (OUTPATIENT)
Dept: LAB | Facility: HOSPITAL | Age: 80
End: 2020-02-25

## 2020-02-25 ENCOUNTER — APPOINTMENT (OUTPATIENT)
Dept: ONCOLOGY | Facility: HOSPITAL | Age: 80
End: 2020-02-25

## 2020-02-25 ENCOUNTER — HOSPITAL ENCOUNTER (OUTPATIENT)
Dept: ONCOLOGY | Facility: HOSPITAL | Age: 80
Discharge: HOME | End: 2020-02-25
Admitting: NURSE PRACTITIONER

## 2020-02-25 ENCOUNTER — APPOINTMENT (OUTPATIENT)
Dept: LAB | Facility: HOSPITAL | Age: 80
End: 2020-02-25

## 2020-02-25 VITALS
WEIGHT: 303 LBS | HEIGHT: 69 IN | SYSTOLIC BLOOD PRESSURE: 168 MMHG | RESPIRATION RATE: 18 BRPM | HEART RATE: 74 BPM | BODY MASS INDEX: 44.88 KG/M2 | DIASTOLIC BLOOD PRESSURE: 70 MMHG | TEMPERATURE: 97.6 F

## 2020-02-25 DIAGNOSIS — Z79.01 LONG TERM (CURRENT) USE OF ANTICOAGULANTS: Primary | ICD-10-CM

## 2020-02-25 DIAGNOSIS — I82.513 CHRONIC DEEP VEIN THROMBOSIS (DVT) OF FEMORAL VEIN OF BOTH LOWER EXTREMITIES (HCC): ICD-10-CM

## 2020-02-25 LAB — INR PPP: 2.4

## 2020-02-25 PROCEDURE — 85610 PROTHROMBIN TIME: CPT

## 2020-02-25 PROCEDURE — 36416 COLLJ CAPILLARY BLOOD SPEC: CPT

## 2020-03-11 DIAGNOSIS — E11.40 TYPE 2 DIABETES MELLITUS WITH DIABETIC NEUROPATHY, WITHOUT LONG-TERM CURRENT USE OF INSULIN (HCC): Primary | ICD-10-CM

## 2020-03-12 DIAGNOSIS — E11.40 TYPE 2 DIABETES MELLITUS WITH DIABETIC NEUROPATHY, WITHOUT LONG-TERM CURRENT USE OF INSULIN (HCC): Primary | ICD-10-CM

## 2020-03-12 RX ORDER — PEN NEEDLE, DIABETIC 31 GX5/16"
NEEDLE, DISPOSABLE MISCELLANEOUS
Qty: 300 EACH | Refills: 3 | Status: SHIPPED | OUTPATIENT
Start: 2020-03-12 | End: 2020-04-03

## 2020-03-14 DIAGNOSIS — E11.40 TYPE 2 DIABETES MELLITUS WITH DIABETIC NEUROPATHY, WITHOUT LONG-TERM CURRENT USE OF INSULIN (HCC): Primary | ICD-10-CM

## 2020-03-16 RX ORDER — INSULIN HUMAN 500 [IU]/ML
INJECTION, SOLUTION SUBCUTANEOUS
Qty: 120 ML | Refills: 1 | Status: SHIPPED | OUTPATIENT
Start: 2020-03-16 | End: 2020-05-06 | Stop reason: HOSPADM

## 2020-03-23 RX ORDER — WARFARIN SODIUM 3 MG/1
TABLET ORAL
Qty: 90 TABLET | Refills: 3 | Status: SHIPPED | OUTPATIENT
Start: 2020-03-23 | End: 2021-03-29 | Stop reason: SDUPTHER

## 2020-03-24 ENCOUNTER — HOSPITAL ENCOUNTER (OUTPATIENT)
Dept: ONCOLOGY | Facility: HOSPITAL | Age: 80
Discharge: HOME | End: 2020-03-24
Admitting: NURSE PRACTITIONER

## 2020-03-24 ENCOUNTER — LAB (OUTPATIENT)
Dept: LAB | Facility: HOSPITAL | Age: 80
End: 2020-03-24

## 2020-03-24 VITALS
WEIGHT: 305 LBS | HEART RATE: 72 BPM | DIASTOLIC BLOOD PRESSURE: 66 MMHG | TEMPERATURE: 97.5 F | RESPIRATION RATE: 18 BRPM | HEIGHT: 69 IN | SYSTOLIC BLOOD PRESSURE: 167 MMHG | BODY MASS INDEX: 45.18 KG/M2

## 2020-03-24 DIAGNOSIS — I82.513 CHRONIC DEEP VEIN THROMBOSIS (DVT) OF FEMORAL VEIN OF BOTH LOWER EXTREMITIES (HCC): ICD-10-CM

## 2020-03-24 DIAGNOSIS — Z79.01 LONG TERM (CURRENT) USE OF ANTICOAGULANTS: Primary | ICD-10-CM

## 2020-03-24 LAB — INR PPP: 2.3

## 2020-03-24 PROCEDURE — 85610 PROTHROMBIN TIME: CPT

## 2020-03-24 PROCEDURE — 36416 COLLJ CAPILLARY BLOOD SPEC: CPT

## 2020-03-26 RX ORDER — WARFARIN SODIUM 5 MG/1
5 TABLET ORAL TAKE AS DIRECTED
Qty: 90 TABLET | Refills: 3 | Status: SHIPPED | OUTPATIENT
Start: 2020-03-26 | End: 2020-03-30 | Stop reason: SDUPTHER

## 2020-03-30 ENCOUNTER — TELEPHONE (OUTPATIENT)
Dept: ONCOLOGY | Facility: CLINIC | Age: 80
End: 2020-03-30

## 2020-03-30 DIAGNOSIS — Z79.01 LONG TERM (CURRENT) USE OF ANTICOAGULANTS: Primary | ICD-10-CM

## 2020-03-30 RX ORDER — WARFARIN SODIUM 5 MG/1
5 TABLET ORAL TAKE AS DIRECTED
Qty: 90 TABLET | Refills: 3 | Status: SHIPPED | OUTPATIENT
Start: 2020-03-30 | End: 2021-06-28

## 2020-03-30 NOTE — TELEPHONE ENCOUNTER
Patient needs refill on Warfarin 5 mg.  He got the 3 mg, but not the 5 mg.    He has enough for this week.    Pharmacy in [facility] is correct. (mail order)    Patient call back- [phone]

## 2020-04-03 RX ORDER — BLOOD SUGAR DIAGNOSTIC
STRIP MISCELLANEOUS
COMMUNITY
Start: 2020-01-13 | End: 2020-10-05

## 2020-04-03 RX ORDER — BLOOD SUGAR DIAGNOSTIC
STRIP MISCELLANEOUS
Qty: 200 EACH | Refills: 3 | Status: SHIPPED | OUTPATIENT
Start: 2020-04-03 | End: 2021-05-03

## 2020-04-21 ENCOUNTER — HOSPITAL ENCOUNTER (OUTPATIENT)
Dept: ONCOLOGY | Facility: HOSPITAL | Age: 80
Discharge: HOME | End: 2020-04-21
Admitting: NURSE PRACTITIONER

## 2020-04-21 ENCOUNTER — LAB (OUTPATIENT)
Dept: LAB | Facility: HOSPITAL | Age: 80
End: 2020-04-21

## 2020-04-21 VITALS
HEIGHT: 69 IN | BODY MASS INDEX: 45.47 KG/M2 | TEMPERATURE: 98 F | DIASTOLIC BLOOD PRESSURE: 61 MMHG | WEIGHT: 307 LBS | HEART RATE: 82 BPM | SYSTOLIC BLOOD PRESSURE: 177 MMHG | RESPIRATION RATE: 18 BRPM

## 2020-04-21 DIAGNOSIS — Z79.01 LONG TERM (CURRENT) USE OF ANTICOAGULANTS: Primary | ICD-10-CM

## 2020-04-21 DIAGNOSIS — I82.513 CHRONIC DEEP VEIN THROMBOSIS (DVT) OF FEMORAL VEIN OF BOTH LOWER EXTREMITIES (HCC): ICD-10-CM

## 2020-04-21 LAB — INR PPP: 2.4

## 2020-04-21 PROCEDURE — 36416 COLLJ CAPILLARY BLOOD SPEC: CPT

## 2020-04-21 PROCEDURE — 85610 PROTHROMBIN TIME: CPT

## 2020-04-22 ENCOUNTER — OFFICE VISIT (OUTPATIENT)
Dept: FAMILY MEDICINE CLINIC | Facility: CLINIC | Age: 80
End: 2020-04-22

## 2020-04-22 VITALS — HEART RATE: 74 BPM | SYSTOLIC BLOOD PRESSURE: 147 MMHG | DIASTOLIC BLOOD PRESSURE: 67 MMHG

## 2020-04-22 DIAGNOSIS — M54.16 LUMBAR RADICULOPATHY: Primary | ICD-10-CM

## 2020-04-22 PROBLEM — M54.9 BACK PAIN: Status: RESOLVED | Noted: 2018-04-04 | Resolved: 2020-04-22

## 2020-04-22 PROBLEM — K62.5 RECTAL BLEEDING: Status: RESOLVED | Noted: 2019-11-22 | Resolved: 2020-04-22

## 2020-04-22 PROBLEM — J01.00 ACUTE NON-RECURRENT MAXILLARY SINUSITIS: Status: RESOLVED | Noted: 2019-12-10 | Resolved: 2020-04-22

## 2020-04-22 PROBLEM — K92.2 GASTROINTESTINAL HEMORRHAGE: Status: RESOLVED | Noted: 2019-11-22 | Resolved: 2020-04-22

## 2020-04-22 PROCEDURE — 99442 PR PHYS/QHP TELEPHONE EVALUATION 11-20 MIN: CPT | Performed by: FAMILY MEDICINE

## 2020-04-22 RX ORDER — GABAPENTIN 100 MG/1
100 CAPSULE ORAL 3 TIMES DAILY
Qty: 42 CAPSULE | Refills: 1 | Status: SHIPPED | OUTPATIENT
Start: 2020-04-22 | End: 2020-05-06 | Stop reason: SDUPTHER

## 2020-04-22 NOTE — PROGRESS NOTES
Subjective   Dalton Dallas Sr. is a 79 y.o. male.     Chief Complaint   Patient presents with   • Back Pain     right side at waist for four weeks   • Leg Pain     right leg       HPI  Chief complaint: Right flank and right lower extremity pain    The patient is a 79-year-old white male who was seen by telephone visit today.  Patient gave permission to be seen by telephone visit.    Patient states that the past 3 or 4 weeks he has had pain in the right back  at the level of his belt line.  He states this radiates down into the right lower extremity.  Patient states that he has numbness and tingling in the right lower extremity.  Patient states that he has intermittent weakness in the right lower extremity but not all the time.  He denied fever chills.  He denied injury.  He denied bowel or bladder problems.  Patient states that the pain can come and go at random.  He can develop more while he is at rest sitting in his chair.      The following portions of the patient's history were reviewed and updated as appropriate: allergies, current medications, past family history, past medical history, past social history, past surgical history and problem list.    Review of Systems   Constitutional: Negative for chills and fever.   HENT: Negative for congestion, sinus pressure and swollen glands.    Eyes: Negative for blurred vision and pain.   Respiratory: Negative for cough and shortness of breath.    Cardiovascular: Negative for chest pain and leg swelling.   Gastrointestinal: Negative for abdominal pain, nausea and indigestion.   Endocrine: Negative for cold intolerance, heat intolerance, polydipsia, polyphagia and polyuria.   Musculoskeletal: Positive for arthralgias and back pain.   Skin: Negative for dry skin, rash and bruise.   Neurological: Negative for dizziness, syncope and headache.   Psychiatric/Behavioral: Negative for dysphoric mood and stress.       Objective     /67   Pulse 74     Physical  Exam      Assessment/Plan   Dalton was seen today for back pain and leg pain.    Diagnoses and all orders for this visit:    Lumbar radiculopathy  -     XR Spine Lumbar 4+ View; Future  -     CBC & Differential; Future  -     Comprehensive Metabolic Panel; Future  -     Sedimentation Rate; Future  -     Urinalysis With / Culture If Indicated -; Future    Other orders  -     gabapentin (NEURONTIN) 100 MG capsule; Take 1 capsule by mouth 3 (Three) Times a Day.      Patient Instructions   Continue your current medications and treatment.    Have the xrays and the labs done and call for results.    Take the Gabapentin as prescribed.    Follow up in the office in 2 weeks.      Gabino Sibley Jr., MD    04/22/20

## 2020-04-22 NOTE — PATIENT INSTRUCTIONS
Continue your current medications and treatment.    Have the xrays and the labs done and call for results.    Take the Gabapentin as prescribed.    Follow up in the office in 2 weeks.

## 2020-04-23 ENCOUNTER — HOSPITAL ENCOUNTER (OUTPATIENT)
Dept: GENERAL RADIOLOGY | Facility: HOSPITAL | Age: 80
Discharge: HOME | End: 2020-04-23
Admitting: FAMILY MEDICINE

## 2020-04-23 ENCOUNTER — LAB (OUTPATIENT)
Dept: LAB | Facility: HOSPITAL | Age: 80
End: 2020-04-23

## 2020-04-23 DIAGNOSIS — M54.16 LUMBAR RADICULOPATHY: ICD-10-CM

## 2020-04-23 LAB
ALBUMIN SERPL-MCNC: 4.2 G/DL (ref 3.5–5.2)
ALBUMIN/GLOB SERPL: 1.6 G/DL
ALP SERPL-CCNC: 41 U/L (ref 39–117)
ALT SERPL W P-5'-P-CCNC: 22 U/L (ref 1–41)
ANION GAP SERPL CALCULATED.3IONS-SCNC: 15.2 MMOL/L (ref 5–15)
AST SERPL-CCNC: 19 U/L (ref 1–40)
BASOPHILS # BLD AUTO: 0.06 10*3/MM3 (ref 0–0.2)
BASOPHILS NFR BLD AUTO: 0.8 % (ref 0–1.5)
BILIRUB SERPL-MCNC: 0.4 MG/DL (ref 0.2–1.2)
BILIRUB UR QL STRIP: NEGATIVE
BUN BLD-MCNC: 19 MG/DL (ref 8–23)
BUN/CREAT SERPL: 16.4 (ref 7–25)
CALCIUM SPEC-SCNC: 9.7 MG/DL (ref 8.6–10.5)
CHLORIDE SERPL-SCNC: 101 MMOL/L (ref 98–107)
CLARITY UR: CLEAR
CO2 SERPL-SCNC: 23.8 MMOL/L (ref 22–29)
COLOR UR: YELLOW
CREAT BLD-MCNC: 1.16 MG/DL (ref 0.76–1.27)
DEPRECATED RDW RBC AUTO: 47.2 FL (ref 37–54)
EOSINOPHIL # BLD AUTO: 0.23 10*3/MM3 (ref 0–0.4)
EOSINOPHIL NFR BLD AUTO: 3.2 % (ref 0.3–6.2)
ERYTHROCYTE [DISTWIDTH] IN BLOOD BY AUTOMATED COUNT: 13.9 % (ref 12.3–15.4)
ERYTHROCYTE [SEDIMENTATION RATE] IN BLOOD: 12 MM/HR (ref 0–20)
GFR SERPL CREATININE-BSD FRML MDRD: 61 ML/MIN/1.73
GLOBULIN UR ELPH-MCNC: 2.6 GM/DL
GLUCOSE BLD-MCNC: 175 MG/DL (ref 65–99)
GLUCOSE UR STRIP-MCNC: ABNORMAL MG/DL
HCT VFR BLD AUTO: 39.5 % (ref 37.5–51)
HGB BLD-MCNC: 13.2 G/DL (ref 13–17.7)
HGB UR QL STRIP.AUTO: NEGATIVE
IMM GRANULOCYTES # BLD AUTO: 0.02 10*3/MM3 (ref 0–0.05)
IMM GRANULOCYTES NFR BLD AUTO: 0.3 % (ref 0–0.5)
KETONES UR QL STRIP: NEGATIVE
LEUKOCYTE ESTERASE UR QL STRIP.AUTO: NEGATIVE
LYMPHOCYTES # BLD AUTO: 2.26 10*3/MM3 (ref 0.7–3.1)
LYMPHOCYTES NFR BLD AUTO: 31.2 % (ref 19.6–45.3)
MCH RBC QN AUTO: 30.8 PG (ref 26.6–33)
MCHC RBC AUTO-ENTMCNC: 33.4 G/DL (ref 31.5–35.7)
MCV RBC AUTO: 92.1 FL (ref 79–97)
MONOCYTES # BLD AUTO: 0.52 10*3/MM3 (ref 0.1–0.9)
MONOCYTES NFR BLD AUTO: 7.2 % (ref 5–12)
NEUTROPHILS # BLD AUTO: 4.16 10*3/MM3 (ref 1.7–7)
NEUTROPHILS NFR BLD AUTO: 57.3 % (ref 42.7–76)
NITRITE UR QL STRIP: NEGATIVE
NRBC BLD AUTO-RTO: 0 /100 WBC (ref 0–0.2)
PH UR STRIP.AUTO: 7 [PH] (ref 5–8)
PLATELET # BLD AUTO: 206 10*3/MM3 (ref 140–450)
PMV BLD AUTO: 11.5 FL (ref 6–12)
POTASSIUM BLD-SCNC: 4.1 MMOL/L (ref 3.5–5.2)
PROT SERPL-MCNC: 6.8 G/DL (ref 6–8.5)
PROT UR QL STRIP: ABNORMAL
RBC # BLD AUTO: 4.29 10*6/MM3 (ref 4.14–5.8)
SODIUM BLD-SCNC: 140 MMOL/L (ref 136–145)
SP GR UR STRIP: 1.02 (ref 1–1.03)
UROBILINOGEN UR QL STRIP: ABNORMAL
WBC NRBC COR # BLD: 7.25 10*3/MM3 (ref 3.4–10.8)

## 2020-04-23 PROCEDURE — 36415 COLL VENOUS BLD VENIPUNCTURE: CPT

## 2020-04-23 PROCEDURE — 85025 COMPLETE CBC W/AUTO DIFF WBC: CPT

## 2020-04-23 PROCEDURE — 85652 RBC SED RATE AUTOMATED: CPT

## 2020-04-23 PROCEDURE — 72110 X-RAY EXAM L-2 SPINE 4/>VWS: CPT

## 2020-04-23 PROCEDURE — 81003 URINALYSIS AUTO W/O SCOPE: CPT

## 2020-04-23 PROCEDURE — 80053 COMPREHEN METABOLIC PANEL: CPT

## 2020-04-27 ENCOUNTER — TELEPHONE (OUTPATIENT)
Dept: FAMILY MEDICINE CLINIC | Facility: CLINIC | Age: 80
End: 2020-04-27

## 2020-04-28 ENCOUNTER — TELEPHONE (OUTPATIENT)
Dept: FAMILY MEDICINE CLINIC | Facility: CLINIC | Age: 80
End: 2020-04-28

## 2020-04-28 NOTE — TELEPHONE ENCOUNTER
I called and left a message that his tests show arthritis and the labs are okay aside from an elevated blood sugar.  The next test is an MRI.  He is to call back to let me know how he is doing.

## 2020-04-29 NOTE — TELEPHONE ENCOUNTER
I spoke with the patient's son - I thought I was speaking to mr Dallas - regarding the xrays and labs...  I recommended PT or an MRI as a next step...  He will call back...

## 2020-05-04 RX ORDER — SYRINGE-NEEDLE,INSULIN,0.5 ML 28GX1/2"
SYRINGE, EMPTY DISPOSABLE MISCELLANEOUS
Qty: 200 EACH | Refills: 3 | Status: SHIPPED | OUTPATIENT
Start: 2020-05-04 | End: 2021-07-02

## 2020-05-06 ENCOUNTER — OFFICE VISIT (OUTPATIENT)
Dept: FAMILY MEDICINE CLINIC | Facility: CLINIC | Age: 80
End: 2020-05-06

## 2020-05-06 ENCOUNTER — LAB (OUTPATIENT)
Dept: LAB | Facility: HOSPITAL | Age: 80
End: 2020-05-06

## 2020-05-06 DIAGNOSIS — E78.2 MIXED HYPERLIPIDEMIA: ICD-10-CM

## 2020-05-06 DIAGNOSIS — E11.40 TYPE 2 DIABETES MELLITUS WITH DIABETIC NEUROPATHY, WITHOUT LONG-TERM CURRENT USE OF INSULIN (HCC): ICD-10-CM

## 2020-05-06 DIAGNOSIS — M54.16 LUMBAR RADICULOPATHY: Primary | ICD-10-CM

## 2020-05-06 DIAGNOSIS — E55.9 VITAMIN D DEFICIENCY: ICD-10-CM

## 2020-05-06 LAB
25(OH)D3 SERPL-MCNC: 31.9 NG/ML (ref 30–100)
ALBUMIN SERPL-MCNC: 4.3 G/DL (ref 3.5–5.2)
ALBUMIN UR-MCNC: <1.2 MG/DL
ALBUMIN/GLOB SERPL: 1.8 G/DL
ALP SERPL-CCNC: 39 U/L (ref 39–117)
ALT SERPL W P-5'-P-CCNC: 21 U/L (ref 1–41)
ANION GAP SERPL CALCULATED.3IONS-SCNC: 16.1 MMOL/L (ref 5–15)
AST SERPL-CCNC: 21 U/L (ref 1–40)
BILIRUB SERPL-MCNC: 0.5 MG/DL (ref 0.2–1.2)
BUN BLD-MCNC: 20 MG/DL (ref 8–23)
BUN/CREAT SERPL: 16.1 (ref 7–25)
CALCIUM SPEC-SCNC: 9.7 MG/DL (ref 8.6–10.5)
CHLORIDE SERPL-SCNC: 97 MMOL/L (ref 98–107)
CHOLEST SERPL-MCNC: 143 MG/DL (ref 0–200)
CO2 SERPL-SCNC: 22.9 MMOL/L (ref 22–29)
CREAT BLD-MCNC: 1.24 MG/DL (ref 0.76–1.27)
CREAT UR-MCNC: 29 MG/DL
GFR SERPL CREATININE-BSD FRML MDRD: 57 ML/MIN/1.73
GLOBULIN UR ELPH-MCNC: 2.4 GM/DL
GLUCOSE BLD-MCNC: 330 MG/DL (ref 65–99)
HBA1C MFR BLD: 8.9 % (ref 3.5–5.6)
HDLC SERPL-MCNC: 34 MG/DL (ref 40–60)
LDLC SERPL CALC-MCNC: 59 MG/DL (ref 0–100)
LDLC/HDLC SERPL: 1.72 {RATIO}
MICROALBUMIN/CREAT UR: NORMAL MG/G{CREAT}
POTASSIUM BLD-SCNC: 4.4 MMOL/L (ref 3.5–5.2)
PROT SERPL-MCNC: 6.7 G/DL (ref 6–8.5)
SODIUM BLD-SCNC: 136 MMOL/L (ref 136–145)
TRIGL SERPL-MCNC: 252 MG/DL (ref 0–150)
VLDLC SERPL-MCNC: 50.4 MG/DL (ref 5–40)

## 2020-05-06 PROCEDURE — 83036 HEMOGLOBIN GLYCOSYLATED A1C: CPT

## 2020-05-06 PROCEDURE — 80053 COMPREHEN METABOLIC PANEL: CPT

## 2020-05-06 PROCEDURE — 36415 COLL VENOUS BLD VENIPUNCTURE: CPT

## 2020-05-06 PROCEDURE — 82306 VITAMIN D 25 HYDROXY: CPT

## 2020-05-06 PROCEDURE — 82043 UR ALBUMIN QUANTITATIVE: CPT

## 2020-05-06 PROCEDURE — 99442 PR PHYS/QHP TELEPHONE EVALUATION 11-20 MIN: CPT | Performed by: FAMILY MEDICINE

## 2020-05-06 PROCEDURE — 82570 ASSAY OF URINE CREATININE: CPT

## 2020-05-06 PROCEDURE — 80061 LIPID PANEL: CPT

## 2020-05-06 RX ORDER — GABAPENTIN 100 MG/1
100 CAPSULE ORAL 3 TIMES DAILY
Qty: 270 CAPSULE | Refills: 1 | Status: SHIPPED | OUTPATIENT
Start: 2020-05-06 | End: 2020-05-26 | Stop reason: SDUPTHER

## 2020-05-06 NOTE — PATIENT INSTRUCTIONS
Continue your current medications.    Try weaning off of the Lyrica.    Follow up in the office at your next appointment.

## 2020-05-06 NOTE — PROGRESS NOTES
Subjective   Dalton Dallas Zoie is a 79 y.o. male.     Chief Complaint   Patient presents with   • Leg Pain   • Knee Pain       HPI chief complaint: Lumbar radiculopathy    The patient is a 79-year-old white male was seen by telephone visit for follow-up and maintenance of his current problems.  The patient gave permission to be seen by telephone visit.  His current problems include    1.  Lumbar radiculopathy-improved-patient was seen by me by telephone visit a week or 2 weeks ago because of lumbar radiculopathy.  The patient was given gabapentin.  Patient states that the back pain in the lower extremity pain is significantly improved.  He also takes Lyrica.  He wonders whether he should take both.  X-rays revealed arthritis in his lower back.  There was possible spinal stenosis and possible neural foraminal stenosis.  Okay so as long as things are going okay I think we can sit tight and        The following portions of the patient's history were reviewed and updated as appropriate: allergies, current medications, past family history, past medical history, past social history, past surgical history and problem list.    Review of Systems   Constitutional: Negative for chills and fever.   HENT: Negative for congestion, sinus pressure and swollen glands.    Eyes: Negative for blurred vision and pain.   Respiratory: Negative for cough and shortness of breath.    Cardiovascular: Negative for chest pain and leg swelling.   Gastrointestinal: Negative for abdominal pain, nausea and indigestion.   Endocrine: Negative for cold intolerance, heat intolerance, polydipsia, polyphagia and polyuria.   Musculoskeletal: Positive for arthralgias and back pain.   Skin: Negative for dry skin, rash and bruise.   Neurological: Negative for dizziness, syncope and headache.   Psychiatric/Behavioral: Negative for dysphoric mood and stress.       Objective     There were no vitals taken for this visit.    Physical  Exam      Assessment/Plan   Dalton was seen today for leg pain and knee pain.    Diagnoses and all orders for this visit:    Lumbar radiculopathy    Other orders  -     gabapentin (NEURONTIN) 100 MG capsule; Take 1 capsule by mouth 3 (Three) Times a Day.      Patient Instructions   Continue your current medications.    Try weaning off of the Lyrica.    Follow up in the office at your next appointment.      Gabino Sibley Jr., MD    05/06/20

## 2020-05-12 ENCOUNTER — OFFICE VISIT (OUTPATIENT)
Dept: ENDOCRINOLOGY | Facility: CLINIC | Age: 80
End: 2020-05-12

## 2020-05-12 VITALS
HEIGHT: 69 IN | SYSTOLIC BLOOD PRESSURE: 140 MMHG | DIASTOLIC BLOOD PRESSURE: 70 MMHG | WEIGHT: 293 LBS | BODY MASS INDEX: 43.4 KG/M2 | HEART RATE: 66 BPM | OXYGEN SATURATION: 98 %

## 2020-05-12 DIAGNOSIS — E55.9 VITAMIN D DEFICIENCY: ICD-10-CM

## 2020-05-12 DIAGNOSIS — E03.9 HYPOTHYROIDISM, UNSPECIFIED TYPE: ICD-10-CM

## 2020-05-12 DIAGNOSIS — E11.40 TYPE 2 DIABETES MELLITUS WITH DIABETIC NEUROPATHY, WITHOUT LONG-TERM CURRENT USE OF INSULIN (HCC): Primary | ICD-10-CM

## 2020-05-12 DIAGNOSIS — E78.2 MIXED HYPERLIPIDEMIA: ICD-10-CM

## 2020-05-12 LAB — GLUCOSE BLDC GLUCOMTR-MCNC: 283 MG/DL (ref 70–130)

## 2020-05-12 PROCEDURE — 82962 GLUCOSE BLOOD TEST: CPT | Performed by: INTERNAL MEDICINE

## 2020-05-12 PROCEDURE — 99214 OFFICE O/P EST MOD 30 MIN: CPT | Performed by: INTERNAL MEDICINE

## 2020-05-12 NOTE — PROGRESS NOTES
[facility]        Patient Care Team:  Gabino Sibley Jr., MD as PCP - General (Family Medicine)    Chief Complaint:    Chief Complaint   Patient presents with   • Diabetes     Type 2          Subjective   Here for diabetes f/u  Blood sugars: higher in am  Exercise program: not much    Interval History:     Patient Complaints: was out of insulin x few days recently due to problems with refill  Patient Denies:  hypoglycemia  History taken from: patient    Review of Systems:   Review of Systems   Eyes: Negative for blurred vision.   Gastrointestinal: Negative for nausea.   Endocrine: Negative for polyuria.   Neurological: Negative for headache.     Lost 21 lb since last visit    Objective     Vital Signs  Heart Rate:  [66] 66  BP: (140)/(70) 140/70    Physical Exam:     General Appearance:    Alert, cooperative, in no acute distress. Obese   Head:    Normocephalic, without obvious abnormality, atraumatic   Eyes:            Lids and lashes normal, conjunctivae and sclerae normal, no   icterus, no pallor, corneas clear, PERRLA   Throat:   No oral lesions,  oral mucosa moist   Neck:   No adenopathy, supple,  no thyromegaly, no   carotid bruit   Lungs:     Decreased breath sounds    Heart:    Regular rhythm and normal rate   Chest Wall:    No abnormalities observed   Abdomen:     Normal bowel sounds, soft                 Extremities:   Plantar calluses, trace edema               Pulses:   Pulses palpable and equal bilaterally   Skin:   Dry. Stasis dermatitis   Neurologic:  DTR absent in ankles, absent vibratory sense in toes, unable to feel the 10g monofilament ( same as 1y ago )            Results Review:    I have reviewed the patient's new clinical results, labs & imaging.    Medication Review:   Prior to Admission medications    Medication Sig Start Date End Date Taking? Authorizing Provider   ACCU-CHEK GONZALES PLUS test strip USE TO CHECK BLOOD SUGAR BEFORE MEALS AND AT BEDTIME 1/13/20  Yes  "Lee Ruggiero MD   amLODIPine-atorvastatin (CADUET) 5-20 MG per tablet Take 1 tablet by mouth Daily. 12/17/19  Yes Edwar Ackerman MD   atenolol (TENORMIN) 50 MG tablet TAKE 1 TABLET DAILY 2/10/20  Yes Virginia Shaver MD   clopidogrel (PLAVIX) 75 MG tablet Take 75 mg by mouth Daily.   Yes Lee Ruggiero MD   ergocalciferol (ERGOCALCIFEROL) 10583 units capsule Take 1 capsule by mouth 1 (One) Time Per Week. 7/1/19  Yes Virginia Shaver MD   fenofibrate (TRICOR) 54 MG tablet TAKE 1 TABLET DAILY 12/30/19  Yes Edwar Ackerman MD   ferrous sulfate 325 (65 FE) MG tablet Take 325 mg by mouth 2 (Two) Times a Day.   Yes Lee Ruggiero MD   gabapentin (NEURONTIN) 100 MG capsule Take 1 capsule by mouth 3 (Three) Times a Day. 5/6/20  Yes Gabino Sibley Jr., MD   hydroCHLOROthiazide (HYDRODIURIL) 25 MG tablet TAKE 1 TABLET DAILY 1/31/20  Yes Edwar Ackerman MD   Insulin Pen Needle (BD ULTRA-FINE PEN NEEDLES) 29G X 12.7MM misc Use with insulin injections three times daily 3/12/20  Yes Virginia Shaver MD   insulin regular (HUMULIN R) 500 UNIT/ML CONCENTRATED injection Inject 24 units in the AM and 35 units in the PM 5/6/20  Yes Virginia Shaver MD   Insulin Syringe 30G X 1/2\" 0.5 ML misc Use to inject insulin 2 times daily DX E11.65 5/4/20  Yes Virginia Shaver MD   Insulin Syringe-Needle U-100 (BD Insulin Syringe Ultrafine) 31G X 5/16\" 0.5 ML misc Use 1 new syringe with each dose of insulin twice a day. 200 4/3/20  Yes Virginia Shaver MD   irbesartan (AVAPRO) 300 MG tablet TAKE 1 TABLET DAILY 1/24/20  Yes Virginia Shaver MD   levothyroxine (SYNTHROID, LEVOTHROID) 175 MCG tablet Take 175 mcg by mouth Daily.   Yes Lee Ruggiero MD   metFORMIN ER (GLUCOPHAGE-XR) 500 MG 24 hr tablet TAKE 4 TABLETS AT SUPPER 1/6/20  Yes Virginia Shaver MD   Omega-3 Fatty Acids (FISH OIL) 1000 MG capsule capsule Take 1,000 mg by mouth 2 (Two) Times a Day.   Yes Provider, MD Lee   Pramlintide " Acetate 2700 MCG/2.7ML solution pen-injector Inject 120 mcg under the skin into the appropriate area as directed 3 (Three) Times a Day.   Yes Lee Ruggiero MD   pregabalin (LYRICA) 100 MG capsule Take 100 mg by mouth 2 (Two) Times a Day. 2/22/12  Yes Lee Ruggiero MD   vitamin B-12 (CYANOCOBALAMIN) 100 MCG tablet Take 100 mcg by mouth Daily. 4/15/15  Yes Lee Ruggiero MD   warfarin (COUMADIN) 3 MG tablet TAKE 1 TABLET DAILY 3/23/20  Yes Saskia Roberts APRN   warfarin (COUMADIN) 5 MG tablet Take 1 tablet by mouth Take As Directed. 3/30/20  Yes Saskia Roberts APRN   Empagliflozin (Jardiance) 10 MG tablet Take 10 mg by mouth Daily. 5/12/20   Virginia Shaver MD       Lab Results (most recent)     Procedure Component Value Units Date/Time    POC Glucose Fingerstick [[id number]]  (Abnormal) Collected:  05/12/20 1442    Specimen:  Blood Updated:  05/12/20 1442     Glucose 283 mg/dL             Lab Results   Component Value Date    HGBA1C 8.9 (H) 05/06/2020    HGBA1C 6.8 (H) 09/25/2019    HGBA1C 6.7 (H) 07/29/2019      Lab Results   Component Value Date    GLUCOSE 330 (H) 05/06/2020    BUN 20 05/06/2020    CREATININE 1.24 05/06/2020    EGFRIFNONA 57 (L) 05/06/2020    BCR 16.1 05/06/2020    K 4.4 05/06/2020    CO2 22.9 05/06/2020    CALCIUM 9.7 05/06/2020    ALBUMIN 4.30 05/06/2020    LABIL2 1.7 03/28/2019    AST 21 05/06/2020    ALT 21 05/06/2020    CHOL 143 05/06/2020    LDL 59 05/06/2020    HDL 34 (L) 05/06/2020    TRIG 252 (H) 05/06/2020     Lab Results   Component Value Date    TSH 2.740 09/25/2019    FREET4 1.13 09/25/2019    YETZ48OS 31.9 05/06/2020       Assessment/Plan     Dalton was seen today for diabetes.    Diagnoses and all orders for this visit:    Type 2 diabetes mellitus with diabetic neuropathy, without long-term current use of insulin (CMS/[facility])  -     POC Glucose Fingerstick  -     Empagliflozin (Jardiance) 10 MG tablet; Take 10 mg by mouth Daily.  -     Hemoglobin A1c;  Future    Hypothyroidism, unspecified type  -     T4, Free; Future  -     TSH; Future    Vitamin D deficiency    Mixed hyperlipidemia  -     Comprehensive Metabolic Panel; Future    Worse    See podiatrist as scheduled.  Start Jardiance 10 mg one tab before breakfast. 3 weeks of samples given.  Drink plenty of water.  Increase U 500 insulin to 38 units before supper.  Call if blood sugars are running under 100 or over 200.  Continue other meds.          Virginia Shaver MD  05/12/20  15:58

## 2020-05-12 NOTE — PATIENT INSTRUCTIONS
See podiatrist as scheduled.  Start Jardiance 10 mg one tab before breakfast.  Drink plenty of water.  Increase U 500 insulin to 38 units before supper.  Call if blood sugars are running under 100 or over 200.  Continue other meds.  F/u in 6 months, with labs prior.

## 2020-05-20 ENCOUNTER — HOSPITAL ENCOUNTER (OUTPATIENT)
Dept: ONCOLOGY | Facility: HOSPITAL | Age: 80
Discharge: HOME | End: 2020-05-20
Admitting: NURSE PRACTITIONER

## 2020-05-20 ENCOUNTER — LAB (OUTPATIENT)
Dept: LAB | Facility: HOSPITAL | Age: 80
End: 2020-05-20

## 2020-05-20 VITALS
DIASTOLIC BLOOD PRESSURE: 61 MMHG | SYSTOLIC BLOOD PRESSURE: 151 MMHG | HEIGHT: 69 IN | WEIGHT: 290 LBS | RESPIRATION RATE: 18 BRPM | BODY MASS INDEX: 42.95 KG/M2 | HEART RATE: 70 BPM | TEMPERATURE: 97.1 F

## 2020-05-20 DIAGNOSIS — Z79.01 LONG TERM (CURRENT) USE OF ANTICOAGULANTS: Primary | ICD-10-CM

## 2020-05-20 DIAGNOSIS — I82.513 CHRONIC DEEP VEIN THROMBOSIS (DVT) OF FEMORAL VEIN OF BOTH LOWER EXTREMITIES (HCC): ICD-10-CM

## 2020-05-20 LAB — INR PPP: 2

## 2020-05-20 PROCEDURE — 85610 PROTHROMBIN TIME: CPT

## 2020-05-20 PROCEDURE — 36416 COLLJ CAPILLARY BLOOD SPEC: CPT

## 2020-05-22 DIAGNOSIS — E11.40 TYPE 2 DIABETES MELLITUS WITH DIABETIC NEUROPATHY, WITHOUT LONG-TERM CURRENT USE OF INSULIN (HCC): ICD-10-CM

## 2020-05-26 RX ORDER — GABAPENTIN 100 MG/1
100 CAPSULE ORAL 3 TIMES DAILY
Qty: 42 CAPSULE | Refills: 0 | Status: SHIPPED | OUTPATIENT
Start: 2020-05-26 | End: 2020-09-01 | Stop reason: SDUPTHER

## 2020-05-26 RX ORDER — GABAPENTIN 100 MG/1
CAPSULE ORAL
Qty: 270 CAPSULE | Refills: 1 | Status: SHIPPED | OUTPATIENT
Start: 2020-05-26 | End: 2020-06-01

## 2020-06-01 ENCOUNTER — OFFICE VISIT (OUTPATIENT)
Dept: FAMILY MEDICINE CLINIC | Facility: CLINIC | Age: 80
End: 2020-06-01

## 2020-06-01 VITALS
HEART RATE: 68 BPM | OXYGEN SATURATION: 96 % | DIASTOLIC BLOOD PRESSURE: 73 MMHG | HEIGHT: 69 IN | RESPIRATION RATE: 18 BRPM | BODY MASS INDEX: 43.22 KG/M2 | WEIGHT: 291.8 LBS | SYSTOLIC BLOOD PRESSURE: 151 MMHG | TEMPERATURE: 97.6 F

## 2020-06-01 DIAGNOSIS — E11.40 TYPE 2 DIABETES MELLITUS WITH DIABETIC NEUROPATHY, WITHOUT LONG-TERM CURRENT USE OF INSULIN (HCC): ICD-10-CM

## 2020-06-01 DIAGNOSIS — D68.59 THROMBOPHILIA (HCC): ICD-10-CM

## 2020-06-01 DIAGNOSIS — I10 ESSENTIAL HYPERTENSION: Primary | ICD-10-CM

## 2020-06-01 DIAGNOSIS — E03.9 HYPOTHYROIDISM, UNSPECIFIED TYPE: ICD-10-CM

## 2020-06-01 DIAGNOSIS — I25.119 CORONARY ARTERY DISEASE INVOLVING NATIVE HEART WITH ANGINA PECTORIS, UNSPECIFIED VESSEL OR LESION TYPE (HCC): ICD-10-CM

## 2020-06-01 DIAGNOSIS — I27.20 PULMONARY HYPERTENSION (HCC): ICD-10-CM

## 2020-06-01 DIAGNOSIS — E78.2 MIXED HYPERLIPIDEMIA: ICD-10-CM

## 2020-06-01 DIAGNOSIS — I73.9 PERIPHERAL VASCULAR DISEASE (HCC): ICD-10-CM

## 2020-06-01 PROCEDURE — 99214 OFFICE O/P EST MOD 30 MIN: CPT | Performed by: FAMILY MEDICINE

## 2020-06-01 RX ORDER — ERGOCALCIFEROL 1.25 MG/1
CAPSULE ORAL
Qty: 12 CAPSULE | Refills: 3 | Status: SHIPPED | OUTPATIENT
Start: 2020-06-01 | End: 2021-05-04

## 2020-06-01 NOTE — PROGRESS NOTES
Subjective   Dalton Dallas Sr. is a 79 y.o. male.     Chief Complaint   Patient presents with   • Diabetes     3 month followup   • Hyperlipidemia   • Hypertension       HPI chief complaint: Hypertension hyperlipidemia type 2 diabetes mellitus thrombophilia coronary artery disease    Patient is a 79-year-old white male comes in for follow-up and maintenance of his current problems which include    1.  Hypertension-stable-patient on hydrochlorothiazide 25 mg daily amlodipine 5 mg daily atenolol 50 mg daily and Avapro 300 mg daily.  He denied headache lightheadedness dizziness or chest pain.    2.  Hyperlipidemia-stable-patient on TriCor 54 mg daily and Lipitor 20 mg daily.  He denies myalgias and arthralgias.  Denied nausea or anorexia.    3.  Type 2 diabetes mellitus-deteriorated-the patient is on Jardiance 10 mg once a day long-acting insulin short acting insulin and metformin.  He denied polydipsia polyphagia or polyuria.  Denied low blood sugars.  He was a started on Jardiance recently when his A1c was found to be elevated at 8.9.    4.  Coronary artery disease with heart failure with normal ejection fraction-stable-patient is on beta-blocker ARB and Plavix.  He denies chest pain shortness breath orthopnea or PND.    5.  Low back pain with radiculopathy-improved-patient is on gabapentin.  He states that this is helped with his radicular pain.    6.  Hypothyroidism-stable-patient is on Synthroid 0.175 mg daily.  He denied heat or cold intolerance tremor or weight gain or weight loss.    7.  Thrombophilia/DVT/PE-stable-patient on Coumadin.  He is asymptomatic.          The following portions of the patient's history were reviewed and updated as appropriate: allergies, current medications, past family history, past medical history, past social history, past surgical history and problem list.    Review of Systems    Objective     /73 (BP Location: Left arm, Patient Position: Sitting, Cuff Size: Large  "Adult)   Pulse 68   Temp 97.6 °F (36.4 °C) (Temporal)   Resp 18   Ht 175.3 cm (69.02\")   Wt 132 kg (291 lb 12.8 oz)   SpO2 96%   BMI 43.07 kg/m²     Physical Exam   Constitutional: He is oriented to person, place, and time.   obese   HENT:   Head: Normocephalic and atraumatic.   Eyes: Pupils are equal, round, and reactive to light. Conjunctivae and EOM are normal.   Neck: Normal range of motion. Neck supple.   Cardiovascular: Normal rate, regular rhythm, normal heart sounds and intact distal pulses.   Pulmonary/Chest: Effort normal and breath sounds normal.   Abdominal: Soft. Bowel sounds are normal.   Musculoskeletal: Normal range of motion.   Neurological: He is alert and oriented to person, place, and time.   Skin: Skin is warm and dry.   Psychiatric: He has a normal mood and affect. His behavior is normal.   Nursing note and vitals reviewed.        Assessment/Plan   Dalton was seen today for diabetes, hyperlipidemia and hypertension.    Diagnoses and all orders for this visit:    Essential hypertension    Mixed hyperlipidemia    Coronary artery disease involving native heart with angina pectoris, unspecified vessel or lesion type (CMS/HCC)    Peripheral vascular disease (CMS/HCC)    Pulmonary hypertension (CMS/HCC)    Type 2 diabetes mellitus with diabetic neuropathy, without long-term current use of insulin (CMS/HCC)    Hypothyroidism, unspecified type    Thrombophilia (CMS/HCC)      Patient Instructions   Continue your current medications and treatment.    Follow up in the office in 3 months.    Laboratory testing at that time.      Gabino Sibley Jr., MD    06/01/20  "

## 2020-06-11 ENCOUNTER — LAB (OUTPATIENT)
Dept: LAB | Facility: HOSPITAL | Age: 80
End: 2020-06-11

## 2020-06-11 ENCOUNTER — OFFICE VISIT (OUTPATIENT)
Dept: ONCOLOGY | Facility: CLINIC | Age: 80
End: 2020-06-11

## 2020-06-11 ENCOUNTER — HOSPITAL ENCOUNTER (OUTPATIENT)
Dept: ONCOLOGY | Facility: HOSPITAL | Age: 80
Discharge: HOME | End: 2020-06-11
Admitting: NURSE PRACTITIONER

## 2020-06-11 VITALS
HEART RATE: 72 BPM | TEMPERATURE: 97.8 F | RESPIRATION RATE: 18 BRPM | DIASTOLIC BLOOD PRESSURE: 65 MMHG | WEIGHT: 289.6 LBS | HEIGHT: 69 IN | BODY MASS INDEX: 42.89 KG/M2 | SYSTOLIC BLOOD PRESSURE: 122 MMHG

## 2020-06-11 DIAGNOSIS — D64.9 ANEMIA, UNSPECIFIED TYPE: ICD-10-CM

## 2020-06-11 DIAGNOSIS — Z79.01 LONG TERM (CURRENT) USE OF ANTICOAGULANTS: Primary | ICD-10-CM

## 2020-06-11 DIAGNOSIS — I82.513 CHRONIC DEEP VEIN THROMBOSIS (DVT) OF FEMORAL VEIN OF BOTH LOWER EXTREMITIES (HCC): ICD-10-CM

## 2020-06-11 LAB
BASOPHILS # BLD AUTO: 0.03 10*3/MM3 (ref 0–0.2)
BASOPHILS NFR BLD AUTO: 0.5 % (ref 0–1.5)
DEPRECATED RDW RBC AUTO: 46.1 FL (ref 37–54)
EOSINOPHIL # BLD AUTO: 0.2 10*3/MM3 (ref 0–0.4)
EOSINOPHIL NFR BLD AUTO: 3.1 % (ref 0.3–6.2)
ERYTHROCYTE [DISTWIDTH] IN BLOOD BY AUTOMATED COUNT: 14.1 % (ref 12.3–15.4)
HCT VFR BLD AUTO: 39.4 % (ref 37.5–51)
HGB BLD-MCNC: 13.5 G/DL (ref 13–17.7)
INR PPP: 2.1
LYMPHOCYTES # BLD AUTO: 2 10*3/MM3 (ref 0.7–3.1)
LYMPHOCYTES NFR BLD AUTO: 31.4 % (ref 19.6–45.3)
MCH RBC QN AUTO: 31.5 PG (ref 26.6–33)
MCHC RBC AUTO-ENTMCNC: 34.3 G/DL (ref 31.5–35.7)
MCV RBC AUTO: 91.8 FL (ref 79–97)
MONOCYTES # BLD AUTO: 0.4 10*3/MM3 (ref 0.1–0.9)
MONOCYTES NFR BLD AUTO: 6.3 % (ref 5–12)
NEUTROPHILS # BLD AUTO: 3.74 10*3/MM3 (ref 1.7–7)
NEUTROPHILS NFR BLD AUTO: 58.7 % (ref 42.7–76)
PLATELET # BLD AUTO: 204 10*3/MM3 (ref 140–450)
PMV BLD AUTO: 11.1 FL (ref 6–12)
RBC # BLD AUTO: 4.29 10*6/MM3 (ref 4.14–5.8)
WBC NRBC COR # BLD: 6.37 10*3/MM3 (ref 3.4–10.8)

## 2020-06-11 PROCEDURE — 85025 COMPLETE CBC W/AUTO DIFF WBC: CPT

## 2020-06-11 PROCEDURE — 85610 PROTHROMBIN TIME: CPT

## 2020-06-11 PROCEDURE — 99214 OFFICE O/P EST MOD 30 MIN: CPT | Performed by: INTERNAL MEDICINE

## 2020-06-11 NOTE — PROGRESS NOTES
HEMATOLOGY ONCOLOGY FOLLOW UP        Patient name: Dalton Dallas Sr.  : 1940  MRN: [id number]  Primary Care Physician: Gabino Sibley Jr., MD  Referring Physician: Gabino Sibley Jr., *  Reason For Consult:     Chief Complaint   Patient presents with   • Follow-up     Other chronic pulmonary embolism without acute cor pulmonale    History of PE and DVT  Anticoagulation management  Anemia    History of Present Illness:  Mr. Dallas is a 78-year-old gentleman, remote smoker, with a   history of diabetes, coronary artery disease and hypertension who presented to [facility] on 14 with symptoms of shortness of breath and chest discomfort.  CT scan of the   chest on 14 showed bilateral pulmonary emboli with large clot burden.  There were filling   defects predominantly in the right upper lobe, and lower and middle lobe pulmonary arterial trees   along with emboli in the left upper lobe and left lower lobe arterial tree as well.  Mediastinal,   hilar and subcarinal lymphadenopathy was also noted.  The largest lymph node in the subcarinal   location measured 3.9 x 1.9 cm.  The patient was started on anticoagulation.  Bilateral lower   extremity venous Doppler on 14 showed a filling defect within the left gastroc veins.  The   patient was put on anticoagulation.  Thrombolysis was also under consideration considering the   extensive clot burden, but the patient did not require it.       Patient denied any prior history of thromboembolism before this episode.  His brother may have had   a leg DVT.    · 14 - Creatinine 1.3, BUN 20.  · 14 - CBC:  WBC 14.4, hemoglobin 14.9, platelet count 241,000.   · 14 - Factor V Leiden negative.  Factor VIII level 323 (H).  Protein C activity 112.6% (N).    Protein S activity 11.3 (L).  Activated protein C resistance 2.1 (L).  Antithrombin III 83% (N).    D-dimer 2.15.    · 14 - Serum homocysteine 8 (N).   Anticardiolipin antibody negative.  Phosphatidylserine   antibody negative.  Beta-2 glycoprotein antibody negative.   · 2/19/14 - Prothrombin gene mutation negative.    · 2/20/14 - FLAKO-2 mutation test negative.    · 3/10/14 - Protein S activity 93% (N).  Homocysteine level 11 (N).    · 4/11/14 - Factor VIII level 260 (H).   · 4/11/14 - CT scan of the chest without contrast:  Complete resolution of air space opacities in   the lower lobes.  Mediastinal lymphadenopathy is overall very similar to the prior study.  It is   nonspecific.  Three vessel coronary artery atherosclerotic calcification.  Questionable lobular   contour of the liver, question cirrhosis.  AP window lymph node measures about 1.1 cm,   paratracheal lymph node measures about 1.7 cm and subcarinal lymph node measures about 1.2 cm.  · 5/2/14 - INR 4.1.   · 7/30/14 - WBC 5.7, hemoglobin 12.1, platelet count 228,000, MCV 94.0.  INR 3.0.   · 10/17/14 - CT scan of the chest:  Chronic lung changes are stable.  Several borderline to mildly   enlarged mediastinal lymph nodes are also unchanged from six months ago.  They are likely benign   reactive lymph nodes.    · 10/29/14 - Vitamin B12 185, ferritin 57, iron saturation 19%, TIBC 473 (H), serum iron 88,   creatinine 1.3, BUN 18, folate 17.7.  · 11/4/14 - Patient underwent upper GI endoscopy and colonoscopy:  Several nonbleeding diverticula   were seen in the sigmoid colon.  Diverticulosis appeared moderate severity.  Four sessile polyps   of benign appearance were found in the cecum and descending colon.  Polypectomies were performed.    Surgical pathology:  Sessile serrated polyps.  A tubular adenoma is noted in the descending   colon.    · 11/25/14 - INR 4.2.   · 11/25/14 - WBC 5.7, hemoglobin 11.4, platelet count 210,000.    · 2/24/15 - WBC 7.7, hemoglobin 12.9, platelet count 245,000, MCV 93,000.  · 2/24/15 - INR 2.6.  Patient’s Coumadin dose is 9 mg.     · 3/12/15 - Creatinine 1.3.    · 4/21/15 -  Patient admitted with chest pain and had cardiac catheterization.  He had a stent   placed.    · 5/8/15 - CT scan of the chest without contrast:  Stable appearance of mediastinal   lymphadenopathy over the past two studies going back to April 2014.  Stable pulmonary fibrotic   changes and scarring.    · 6/25/15 - WBC 5.3, hemoglobin 12.6, platelet count 187,000.     · 12/17/15 - WBC 5.1, hemoglobin 13.6, platelet count 208,000, MCV 93.1.  Iron saturation 21%,   TIBC 415, serum iron 85.    · 12/17/15 - Chest x-ray:  No acute process.     · 4/19/16 - INR 2.1.    · 5/17/16 - INR 2.2.  · 6/16/16 - WBC 5.5, hemoglobin 12.7, MCV 90.5, platelet count 205,000.  INR 2.3 (patient on   Coumadin 7.5 mg).    · 12/15/16 - WBC 5.0, hemoglobin 13.0, platelet count 174,000.  INR 1.9 (Coumadin dose 7.5).   · 6/15/17 - WBC 5.5, hemoglobin 12.5, platelet count 168,000, MCV 94.4.  INR 2.6 (Coumadin dose 8   mg).   · 11/2/17 - INR 2.    · 12/14/17 - WBC 5.04, hemoglobin 12.5, platelet count 167,000, MCV 94.    · 6/14/18 - WBC 4.9, hemoglobin 12.5, platelet count 172,000, MCV 93.5.    · 12/13/18 - INR 2.4.  WBC 4.9, hemoglobin 12.8, platelet count 180,000.    11/23/2019 INR is 2.55  11/11/2019: Colonoscopy: Cecum polypectomy shows tubular adenoma, transverse colon polypectomy shows tubular adenoma.   Patient readmitted after colonoscopy to hospital due to GI bleeding from polyp biopsy..  11/22/2019 hemoglobin 9.3 due to GI bleeding  11/23/2019 cecal ulcer with visible vessel and stigmata of recent bleeding which was clipped  Moderate left-sided diverticulosis  12/12/2019 INR 1.9  12/12/2019 WBC 4.9, hemoglobin 11.2, MCV 98.0, platelets 182, ferritin 85.2, iron 115, iron saturation 24%, TIBC 471, B12 greater than 2000, folate 13.8,  4/23/2020: ESR 12  5/6/2020: 25 hydroxy vitamin D 31.9  6/11/2020: INR 2.1, WBC 6.3, hemoglobin 13.5, platelets 204,          Subjective:  Here for a follow up of DVT. He continues taking coumadin and gets his  INR's checked regularly.  Also takes iron supplement but does not report any constipation issues.  Also takes Plavix.  Does not report any bleeding per rectum.  He follows up with Dr. Ackerman for cardiology.  He follows up with Dr. Shaver for diabetic management. His hemoglobin A1c has been increased in the past few months.  He does not have any appointment with her anytime soon.  He continues to wear compression stockings. He has some BL LE swelling, but states the swelling has reduced since last visit.    The following portions of the patient's history were reviewed and updated as appropriate: allergies, current medications, past family history, past medical history, past social history, past surgical history and problem list.         Past Medical History:   Diagnosis Date   • ACE-inhibitor cough 06/2005   • Coronary artery disease    • Diabetes mellitus, type 2 (CMS/Formerly [facility]) 1995   • ED (erectile dysfunction)    • Hx of blood clots 2014    Blood clot left leg    • Hyperlipidemia 08/2000   • Hypertension    • Hypogonadism, male 1998   • Hypothyroidism 06/2001   • Obesity    • Peripheral neuropathy    • Pulmonary embolism (CMS/Formerly [facility]) 02/2014   • Rectal fistula    • Vitamin D deficiency        Past Surgical History:   Procedure Laterality Date   • CARDIAC CATHETERIZATION  2008    PTCA: 2008; PTCA: 4/21/2015 LCX stent    • CATARACT EXTRACTION  01/11/2016 1-4-16 & 1-11-16    • COLONOSCOPY N/A 11/23/2019    Procedure: COLONOSCOPY WITH ENDOSCOPIC CLIPPING X1 OF POST POLYPECTOMY BLEED SITE;  Surgeon: Jhonny Orellana MD;  Location: [facility] ENDOSCOPY;  Service: Gastroenterology   • CORONARY STENT PLACEMENT     • INGUINAL HERNIA REPAIR Left    • UMBILICAL HERNIA REPAIR           Current Outpatient Medications:   •  ACCU-CHEK GONZALES PLUS test strip, USE TO CHECK BLOOD SUGAR BEFORE MEALS AND AT BEDTIME, Disp: , Rfl:   •  amLODIPine-atorvastatin (CADUET) 5-20 MG per tablet, Take 1 tablet by mouth Daily., Disp: 90 tablet, Rfl:  "2  •  atenolol (TENORMIN) 50 MG tablet, TAKE 1 TABLET DAILY, Disp: 90 tablet, Rfl: 4  •  clopidogrel (PLAVIX) 75 MG tablet, Take 75 mg by mouth Daily., Disp: , Rfl:   •  Empagliflozin (Jardiance) 10 MG tablet, Take 10 mg by mouth Daily., Disp: 90 tablet, Rfl: 3  •  fenofibrate (TRICOR) 54 MG tablet, TAKE 1 TABLET DAILY, Disp: 90 tablet, Rfl: 4  •  ferrous sulfate 325 (65 FE) MG tablet, Take 325 mg by mouth 2 (Two) Times a Day., Disp: , Rfl:   •  gabapentin (NEURONTIN) 100 MG capsule, Take 1 capsule by mouth 3 (Three) Times a Day., Disp: 42 capsule, Rfl: 0  •  hydroCHLOROthiazide (HYDRODIURIL) 25 MG tablet, TAKE 1 TABLET DAILY, Disp: 90 tablet, Rfl: 4  •  Insulin Pen Needle (BD ULTRA-FINE PEN NEEDLES) 29G X 12.7MM misc, Use with insulin injections three times daily, Disp: 300 each, Rfl: 3  •  insulin regular (HUMULIN R) 500 UNIT/ML CONCENTRATED injection, Inject 24 units in the AM and 38 units in the PM, Disp: 10 mL, Rfl: 0  •  Insulin Syringe 30G X 1/2\" 0.5 ML misc, Use to inject insulin 2 times daily DX E11.65, Disp: 200 each, Rfl: 3  •  Insulin Syringe-Needle U-100 (BD Insulin Syringe Ultrafine) 31G X 5/16\" 0.5 ML misc, Use 1 new syringe with each dose of insulin twice a day. 200, Disp: 200 each, Rfl: 3  •  irbesartan (AVAPRO) 300 MG tablet, TAKE 1 TABLET DAILY, Disp: 90 tablet, Rfl: 2  •  levothyroxine (SYNTHROID, LEVOTHROID) 175 MCG tablet, Take 175 mcg by mouth Daily., Disp: , Rfl:   •  metFORMIN ER (GLUCOPHAGE-XR) 500 MG 24 hr tablet, TAKE 4 TABLETS AT SUPPER, Disp: 360 tablet, Rfl: 4  •  Omega-3 Fatty Acids (FISH OIL) 1000 MG capsule capsule, Take 1,000 mg by mouth 2 (Two) Times a Day., Disp: , Rfl:   •  Pramlintide Acetate 2700 MCG/2.7ML solution pen-injector, Inject 120 mcg under the skin into the appropriate area as directed 3 (Three) Times a Day., Disp: , Rfl:   •  vitamin B-12 (CYANOCOBALAMIN) 100 MCG tablet, Take 100 mcg by mouth Daily., Disp: , Rfl:   •  vitamin D (ERGOCALCIFEROL) 1.25 MG (43230 UT) " "capsule capsule, TAKE 1 CAPSULE ONCE A WEEK, Disp: 12 capsule, Rfl: 3  •  warfarin (COUMADIN) 3 MG tablet, TAKE 1 TABLET DAILY, Disp: 90 tablet, Rfl: 3  •  warfarin (COUMADIN) 5 MG tablet, Take 1 tablet by mouth Take As Directed., Disp: 90 tablet, Rfl: 3    No Known Allergies    Family History   Problem Relation Age of Onset   • Heart disease Mother    • Leukemia Father        Cancer-related family history is not on file.    Social History     Tobacco Use   • Smoking status: Former Smoker   • Smokeless tobacco: Never Used   Substance Use Topics   • Alcohol use: No     Frequency: Never   • Drug use: No         I have reviewed the history of present illness, past medical history, family history, social history, lab results, all notes and other records since the patient was last seen on  9/23/2019.    ROS:   Review of Systems   Constitutional: Negative for chills and fever.   HENT: Negative for ear pain, mouth sores, nosebleeds and sore throat.    Eyes: Negative for photophobia and visual disturbance.   Respiratory: Negative for wheezing and stridor.    Cardiovascular: Negative for chest pain and palpitations.   Gastrointestinal: Negative for abdominal pain, diarrhea, nausea and vomiting.   Endocrine: Negative for cold intolerance and heat intolerance.   Genitourinary: Negative for dysuria and hematuria.   Musculoskeletal: Positive for arthralgias. Negative for joint swelling and neck stiffness.   Skin: Negative for color change and rash.   Neurological: Negative for seizures and syncope.   Hematological: Negative for adenopathy.        No obvious bleeding   Psychiatric/Behavioral: Negative for agitation, confusion and hallucinations.       Objective:  Vital signs:  Vitals:    06/11/20 1120   BP: 122/65   Pulse: 72   Resp: 18   Temp: 97.8 °F (36.6 °C)   Weight: 131 kg (289 lb 9.6 oz)   Height: 175.3 cm (69\")   PainSc: 0-No pain     ECOG  (1) Restricted in physically strenuous activity, ambulatory and able to do work of " light nature    Physical Exam:   Physical Exam   Constitutional: He is oriented to person, place, and time. He appears well-developed and well-nourished. No distress.   Obese male   HENT:   Head: Normocephalic and atraumatic.   Eyes: Conjunctivae and EOM are normal. Right eye exhibits no discharge. Left eye exhibits no discharge. No scleral icterus.   Neck: Normal range of motion. Neck supple. No thyromegaly present.   Cardiovascular: Normal rate, regular rhythm and normal heart sounds. Exam reveals no gallop and no friction rub.   Pulmonary/Chest: Effort normal. No stridor. No respiratory distress. He has no wheezes.   Abdominal: Soft. Bowel sounds are normal. He exhibits no mass. There is no tenderness. There is no rebound and no guarding.   Musculoskeletal: Normal range of motion. He exhibits edema. He exhibits no tenderness.   Lymphadenopathy:     He has no cervical adenopathy.   Neurological: He is alert and oriented to person, place, and time. He exhibits normal muscle tone.   Skin: Skin is warm. No rash noted. He is not diaphoretic. No erythema.   Psychiatric: He has a normal mood and affect. His behavior is normal.   Nursing note and vitals reviewed.            Lab Results - Last 18 Months   Lab Units 06/11/20  1057 04/23/20  1446 12/12/19  1057   WBC 10*3/mm3 6.37 7.25 4.94   HEMOGLOBIN g/dL 13.5 13.2 11.2*   HEMATOCRIT % 39.4 39.5 34.4*   PLATELETS 10*3/mm3 204 206 182   MCV fL 91.8 92.1 98.0*     Lab Results - Last 18 Months   Lab Units 05/06/20  0905 04/23/20  1446 11/23/19  0247   SODIUM mmol/L 136 140 142   POTASSIUM mmol/L 4.4 4.1 3.9   CHLORIDE mmol/L 97* 101 103   CO2 mmol/L 22.9 23.8 27.0   BUN mg/dL 20 19 30*   CREATININE mg/dL 1.24 1.16 1.29*   CALCIUM mg/dL 9.7 9.7 8.6   BILIRUBIN mg/dL 0.5 0.4 0.2   ALK PHOS U/L 39 41 34*   ALT (SGPT) U/L 21 22 19   AST (SGOT) U/L 21 19 19   GLUCOSE mg/dL 330* 175* 108*       Lab Results   Component Value Date    GLUCOSE 330 (H) 05/06/2020    BUN 20 05/06/2020     CREATININE 1.24 05/06/2020    EGFRIFNONA 57 (L) 05/06/2020    BCR 16.1 05/06/2020    K 4.4 05/06/2020    CO2 22.9 05/06/2020    CALCIUM 9.7 05/06/2020    ALBUMIN 4.30 05/06/2020    LABIL2 1.7 03/28/2019    AST 21 05/06/2020    ALT 21 05/06/2020       Lab Results - Last 18 Months   Lab Units 06/11/20  1103 05/20/20  1056 04/21/20  1047  11/22/19  1237   INR  2.10 2.00 2.40   < > 2.86   APTT seconds  --   --   --   --  31.5*    < > = values in this interval not displayed.       Lab Results   Component Value Date    IRON 115 12/12/2019    TIBC 471 12/12/2019    FERRITIN 85.29 12/12/2019       Lab Results   Component Value Date    FOLATE 13.80 12/12/2019       No results found for: OCCULTBLD    No results found for: RETICCTPCT    Lab Results   Component Value Date    [id number] >2,000 (H) 12/12/2019     No results found for: SPEP, UPEP  No results found for: LDH, URICACID  Lab Results   Component Value Date    SEDRATE 12 04/23/2020     No results found for: FIBRINOGEN, HAPTOGLOBIN  Lab Results   Component Value Date    PTT 31.5 (H) 11/22/2019    INR 2.10 06/11/2020     No results found for:   No results found for: CEA  No components found for: CA-19-9  No results found for: PSA      Assessment/Plan     Assessment:  1. History of bilateral pulmonary emboli with high clot burden and DVT:  His thrombosis was   unprovoked.  Hypercoagulability testing revealed elevated factor VIII level on two occasions.  He   is on anticoagulation with Coumadin.  He also has a questionable positive family history of DVT.    He has been recommended indefinite lifetime anticoagulation.  His INRs have been very stable.  2. History of mediastinal lymphadenopathy:  His recent CT scan shows stable lymphadenopathy.    These are very likely benign lymph nodes.  His last chest x-ray was unremarkable.    3. Mild CKD:  He has slight anemia of chronic kidney disease.   4. History of iron deficiency:  He had a colonoscopy and that showed polyps  and moderately severe   diverticulosis.  Pathology showed a tubular adenoma in the descending colon.  He is on iron   supplements p.o. BID. History of iron deficiency.  Monitoring iron levels.  5. Vitamin B12 deficiency:  He is on p.o. B12 supplements.  6. Dizziness/light-headedness:  Possibly could be from orthostatic hypertension. .    He will see Dr. Sibley for this problem.    7.  History of  GI bleeding.  Due to polypectomy from colonoscopy.     PLAN:         1.  Continue same dose Coumadin.  INR stable.   Repeat INR next month.  Will need to continue anticoagulation indefinitely since patient has history of large unprovoked PE and DVT.  2. Check ferritin and iron panel 6 months.   Continue oral iron supplement.    3. Advised patient to discuss with cardiologist if he can come off Plavix as it has been more than a year since he had his stents placed.  4..  Hemoglobin A1c is high.  Advised patient to make an appointment with the endocrinology ASAP.  .  5.  Follow-up in 6 months          There are no diagnoses linked to this encounter.  No orders of the defined types were placed in this encounter.                  Thank you very much for providing the opportunity to participate in this patient’s care. Please do not hesitate to call if there are any other questions.    I have reviewed all relevant labs results, outside reports, imaging, notes, vital signs, medications, and plan with the patient today.    Portions of the note have been scribed by medical assistant/Nurse.  I have reviewed and made changes accordingly.        Electronically signed by Hadley Gaffney MD, 06/11/20, 11:22 AM.

## 2020-07-07 PROBLEM — I25.10 CORONARY ARTERY DISEASE INVOLVING NATIVE CORONARY ARTERY OF NATIVE HEART WITHOUT ANGINA PECTORIS: Status: ACTIVE | Noted: 2019-06-26

## 2020-07-08 ENCOUNTER — OFFICE VISIT (OUTPATIENT)
Dept: CARDIOLOGY | Facility: CLINIC | Age: 80
End: 2020-07-08

## 2020-07-08 VITALS
WEIGHT: 297 LBS | SYSTOLIC BLOOD PRESSURE: 122 MMHG | HEART RATE: 71 BPM | HEIGHT: 69 IN | DIASTOLIC BLOOD PRESSURE: 68 MMHG | BODY MASS INDEX: 43.99 KG/M2

## 2020-07-08 DIAGNOSIS — I10 ESSENTIAL HYPERTENSION: ICD-10-CM

## 2020-07-08 DIAGNOSIS — I25.10 CORONARY ARTERY DISEASE INVOLVING NATIVE CORONARY ARTERY OF NATIVE HEART WITHOUT ANGINA PECTORIS: Primary | ICD-10-CM

## 2020-07-08 DIAGNOSIS — E78.2 MIXED HYPERLIPIDEMIA: ICD-10-CM

## 2020-07-08 DIAGNOSIS — I50.32 DIASTOLIC DYSFUNCTION WITH CHRONIC HEART FAILURE (HCC): ICD-10-CM

## 2020-07-08 DIAGNOSIS — R06.02 SHORTNESS OF BREATH: ICD-10-CM

## 2020-07-08 DIAGNOSIS — I27.20 PULMONARY HYPERTENSION (HCC): ICD-10-CM

## 2020-07-08 PROCEDURE — 93000 ELECTROCARDIOGRAM COMPLETE: CPT | Performed by: INTERNAL MEDICINE

## 2020-07-08 PROCEDURE — 99214 OFFICE O/P EST MOD 30 MIN: CPT | Performed by: INTERNAL MEDICINE

## 2020-07-08 NOTE — PROGRESS NOTES
Date of Office Visit: 2020  Encounter Provider: Dr. Edwar Ackerman  Place of Service: [facility] CARDIOLOGY [place]  Patient Name: Dalton Dallas Sr.  :1940  Gabino Sibley Jr., MD    Chief Complaint   Patient presents with   • Coronary Artery Disease     6 month follow up   • Congestive Heart Failure   • Hypertension   • Hyperlipidemia     History of Present Illness    I'm pleased to see Mr. Dallas in my office today as a followup     As you know, patient is 79 years old white gentleman whose past medical history is significant for hypertension, hyperlipidemia, coronary artery disease, coronary artery stenting, diabetes mellitus, DVT who came for followup.     In , patient developed symptom of shortness of breath and subsequent stress test was abnormal.  Cardiac catheterization showed LAD was free of disease, RCA had 50% stenosis, and LCX has 80% stenosis.  Patient underwent LCX/om stenting.  In , repeat cardiac catheterization showed 80-90% stenosis of LCX/OM1 patient underwent successful stenting.  LAD had 25% stenosis.  LVEF was 60%.  In 2018, echocardiogram showed EF of 60-65%.    Since the previous visit, patient overall doing fairly well.  He does complain of shortness of breath on exertion.  Patient denies any chest pain.  No orthopnea or PND no syncope or presyncope.  Patient has trace leg edema.  No palpitation or recurrence of arrhythmia.    EKG showed normal sinus rhythm.  No ST changes suggestive of ischemia.    At this stage, I would recommend that patient can proceed with stress test.  Patient has not had any ischemic evaluation from last 5 years.  Patient is not very active.  At this stage, I would recommend to proceed with stress test with Lexiscan Cardiolite protocol.  I am pleased with his blood pressure control.  Monitor the blood pressure at home.      Past Medical History:   Diagnosis Date   • ACE-inhibitor cough 2005   • Coronary artery disease    •  Diabetes mellitus, type 2 (CMS/Formerly [facility]) 1995   • ED (erectile dysfunction)    • Hx of blood clots 2014    Blood clot left leg    • Hyperlipidemia 08/2000   • Hypertension    • Hypogonadism, male 1998   • Hypothyroidism 06/2001   • Obesity    • Peripheral neuropathy    • Pulmonary embolism (CMS/Formerly [facility]) 02/2014   • Rectal fistula    • Vitamin D deficiency          Past Surgical History:   Procedure Laterality Date   • CARDIAC CATHETERIZATION  2008    PTCA: 2008; PTCA: 4/21/2015 LCX stent    • CATARACT EXTRACTION  01/11/2016 1-4-16 & 1-11-16    • COLONOSCOPY N/A 11/23/2019    Procedure: COLONOSCOPY WITH ENDOSCOPIC CLIPPING X1 OF POST POLYPECTOMY BLEED SITE;  Surgeon: Jhonny Orellana MD;  Location: [facility] ENDOSCOPY;  Service: Gastroenterology   • CORONARY STENT PLACEMENT     • INGUINAL HERNIA REPAIR Left    • UMBILICAL HERNIA REPAIR             Current Outpatient Medications:   •  ACCU-CHEK GONZALES PLUS test strip, USE TO CHECK BLOOD SUGAR BEFORE MEALS AND AT BEDTIME, Disp: , Rfl:   •  amLODIPine-atorvastatin (CADUET) 5-20 MG per tablet, Take 1 tablet by mouth Daily., Disp: 90 tablet, Rfl: 2  •  atenolol (TENORMIN) 50 MG tablet, TAKE 1 TABLET DAILY, Disp: 90 tablet, Rfl: 4  •  clopidogrel (PLAVIX) 75 MG tablet, Take 75 mg by mouth Daily., Disp: , Rfl:   •  Empagliflozin (Jardiance) 10 MG tablet, Take 10 mg by mouth Daily., Disp: 90 tablet, Rfl: 3  •  fenofibrate (TRICOR) 54 MG tablet, TAKE 1 TABLET DAILY, Disp: 90 tablet, Rfl: 4  •  ferrous sulfate 325 (65 FE) MG tablet, Take 325 mg by mouth 2 (Two) Times a Day., Disp: , Rfl:   •  gabapentin (NEURONTIN) 100 MG capsule, Take 1 capsule by mouth 3 (Three) Times a Day., Disp: 42 capsule, Rfl: 0  •  hydroCHLOROthiazide (HYDRODIURIL) 25 MG tablet, TAKE 1 TABLET DAILY, Disp: 90 tablet, Rfl: 4  •  Insulin Pen Needle (BD ULTRA-FINE PEN NEEDLES) 29G X 12.7MM misc, Use with insulin injections three times daily, Disp: 300 each, Rfl: 3  •  insulin regular (HUMULIN R) 500 UNIT/ML  "CONCENTRATED injection, Inject 24 units in the AM and 38 units in the PM, Disp: 10 mL, Rfl: 0  •  Insulin Syringe 30G X 1/2\" 0.5 ML misc, Use to inject insulin 2 times daily DX E11.65, Disp: 200 each, Rfl: 3  •  Insulin Syringe-Needle U-100 (BD Insulin Syringe Ultrafine) 31G X 5/16\" 0.5 ML misc, Use 1 new syringe with each dose of insulin twice a day. 200, Disp: 200 each, Rfl: 3  •  irbesartan (AVAPRO) 300 MG tablet, TAKE 1 TABLET DAILY, Disp: 90 tablet, Rfl: 2  •  levothyroxine (SYNTHROID, LEVOTHROID) 175 MCG tablet, Take 175 mcg by mouth Daily., Disp: , Rfl:   •  metFORMIN ER (GLUCOPHAGE-XR) 500 MG 24 hr tablet, TAKE 4 TABLETS AT SUPPER, Disp: 360 tablet, Rfl: 4  •  Omega-3 Fatty Acids (FISH OIL) 1000 MG capsule capsule, Take 1,000 mg by mouth 2 (Two) Times a Day., Disp: , Rfl:   •  Pramlintide Acetate 2700 MCG/2.7ML solution pen-injector, Inject 120 mcg under the skin into the appropriate area as directed 3 (Three) Times a Day., Disp: , Rfl:   •  vitamin B-12 (CYANOCOBALAMIN) 100 MCG tablet, Take 100 mcg by mouth Daily., Disp: , Rfl:   •  vitamin D (ERGOCALCIFEROL) 1.25 MG (79519 UT) capsule capsule, TAKE 1 CAPSULE ONCE A WEEK, Disp: 12 capsule, Rfl: 3  •  warfarin (COUMADIN) 3 MG tablet, TAKE 1 TABLET DAILY, Disp: 90 tablet, Rfl: 3  •  warfarin (COUMADIN) 5 MG tablet, Take 1 tablet by mouth Take As Directed., Disp: 90 tablet, Rfl: 3      Social History     Socioeconomic History   • Marital status:      Spouse name: Not on file   • Number of children: Not on file   • Years of education: Not on file   • Highest education level: Not on file   Tobacco Use   • Smoking status: Former Smoker   • Smokeless tobacco: Never Used   Substance and Sexual Activity   • Alcohol use: No     Frequency: Never   • Drug use: No   • Sexual activity: Defer         Review of Systems   Constitution: Negative for chills and fever.   HENT: Negative for ear discharge and nosebleeds.    Eyes: Negative for discharge and redness. " "  Cardiovascular: Negative for chest pain, orthopnea, palpitations, paroxysmal nocturnal dyspnea and syncope.   Respiratory: Positive for shortness of breath. Negative for cough and wheezing.    Endocrine: Negative for heat intolerance.   Skin: Negative for rash.   Musculoskeletal: Positive for arthritis and joint swelling. Negative for myalgias.   Gastrointestinal: Negative for abdominal pain, melena, nausea and vomiting.   Genitourinary: Negative for dysuria and hematuria.   Neurological: Negative for dizziness, light-headedness, numbness and tremors.   Psychiatric/Behavioral: Negative for depression. The patient is not nervous/anxious.        Procedures      ECG 12 Lead  Date/Time: 7/8/2020 10:19 AM  Performed by: Edwar Ackerman MD  Authorized by: Edwar Ackerman MD   Comparison: not compared with previous ECG   Rhythm: sinus rhythm    Clinical impression: normal ECG            ECG 12 Lead    (Results Pending)           Objective:    /68   Pulse 71   Ht 175.3 cm (69.02\")   Wt 135 kg (297 lb)   BMI 43.84 kg/m²         Physical Exam   Constitutional: He is oriented to person, place, and time. He appears well-developed and well-nourished.   HENT:   Head: Normocephalic and atraumatic.   Eyes: No scleral icterus.   Neck: No thyromegaly present.   Cardiovascular: Normal rate, regular rhythm and normal heart sounds. Exam reveals no gallop and no friction rub.   No murmur heard.  Pulmonary/Chest: Effort normal and breath sounds normal. No respiratory distress. He has no wheezes. He has no rales.   Abdominal: There is no tenderness.   Musculoskeletal: He exhibits no edema.   Lymphadenopathy:     He has no cervical adenopathy.   Neurological: He is alert and oriented to person, place, and time.   Skin: No rash noted. No erythema.   Psychiatric: He has a normal mood and affect.           Assessment:       Diagnosis Plan   1. Coronary artery disease involving native coronary artery of native heart without angina " pectoris  ECG 12 Lead    Stress Test With Myocardial Perfusion One Day   2. Essential hypertension  ECG 12 Lead    Stress Test With Myocardial Perfusion One Day   3. Mixed hyperlipidemia  ECG 12 Lead    Stress Test With Myocardial Perfusion One Day   4. Diastolic dysfunction with chronic heart failure (CMS/HCC)  ECG 12 Lead    Stress Test With Myocardial Perfusion One Day   5. Pulmonary hypertension (CMS/HCC)  ECG 12 Lead    Stress Test With Myocardial Perfusion One Day   6. Shortness of breath  Stress Test With Myocardial Perfusion One Day            Plan:       I would proceed with stress test with Cardiolite imaging with Lexiscan protocol to assess the patency of previous stent.  Blood pressure monitoring is recommended.  Salt and water restriction recommended.  I will see the patient in 6 months

## 2020-07-09 ENCOUNTER — LAB (OUTPATIENT)
Dept: LAB | Facility: HOSPITAL | Age: 80
End: 2020-07-09

## 2020-07-09 ENCOUNTER — HOSPITAL ENCOUNTER (OUTPATIENT)
Dept: ONCOLOGY | Facility: HOSPITAL | Age: 80
Setting detail: INFUSION SERIES
Discharge: HOME | End: 2020-07-09

## 2020-07-09 VITALS — WEIGHT: 300 LBS | BODY MASS INDEX: 44.28 KG/M2

## 2020-07-09 DIAGNOSIS — Z79.01 LONG TERM (CURRENT) USE OF ANTICOAGULANTS: Primary | ICD-10-CM

## 2020-07-09 DIAGNOSIS — I82.513 CHRONIC DEEP VEIN THROMBOSIS (DVT) OF FEMORAL VEIN OF BOTH LOWER EXTREMITIES (HCC): ICD-10-CM

## 2020-07-09 LAB — INR PPP: 2

## 2020-07-09 PROCEDURE — 85610 PROTHROMBIN TIME: CPT

## 2020-07-09 PROCEDURE — 36416 COLLJ CAPILLARY BLOOD SPEC: CPT

## 2020-07-27 ENCOUNTER — HOSPITAL ENCOUNTER (OUTPATIENT)
Dept: NUCLEAR MEDICINE | Facility: HOSPITAL | Age: 80
Discharge: HOME | End: 2020-07-27

## 2020-07-27 DIAGNOSIS — I10 ESSENTIAL HYPERTENSION: ICD-10-CM

## 2020-07-27 DIAGNOSIS — I50.32 DIASTOLIC DYSFUNCTION WITH CHRONIC HEART FAILURE (HCC): ICD-10-CM

## 2020-07-27 DIAGNOSIS — I25.10 CORONARY ARTERY DISEASE INVOLVING NATIVE CORONARY ARTERY OF NATIVE HEART WITHOUT ANGINA PECTORIS: ICD-10-CM

## 2020-07-27 DIAGNOSIS — I27.20 PULMONARY HYPERTENSION (HCC): ICD-10-CM

## 2020-07-27 DIAGNOSIS — E78.2 MIXED HYPERLIPIDEMIA: ICD-10-CM

## 2020-07-27 DIAGNOSIS — R06.02 SHORTNESS OF BREATH: ICD-10-CM

## 2020-07-27 PROCEDURE — 0 TECHNETIUM SESTAMIBI: Performed by: INTERNAL MEDICINE

## 2020-07-27 PROCEDURE — 93017 CV STRESS TEST TRACING ONLY: CPT

## 2020-07-27 PROCEDURE — A9500 TC99M SESTAMIBI: HCPCS | Performed by: INTERNAL MEDICINE

## 2020-07-27 PROCEDURE — 25010000002 REGADENOSON 0.4 MG/5ML SOLUTION: Performed by: INTERNAL MEDICINE

## 2020-07-27 PROCEDURE — 78452 HT MUSCLE IMAGE SPECT MULT: CPT

## 2020-07-27 RX ADMIN — TECHNETIUM TC 99M SESTAMIBI 1 DOSE: 1 INJECTION INTRAVENOUS at 08:50

## 2020-07-27 RX ADMIN — TECHNETIUM TC 99M SESTAMIBI 1 DOSE: 1 INJECTION INTRAVENOUS at 10:00

## 2020-07-27 RX ADMIN — REGADENOSON 0.4 MG: 0.08 INJECTION, SOLUTION INTRAVENOUS at 10:00

## 2020-07-28 LAB
BH CV NUCLEAR PRIOR STUDY: 3
BH CV STRESS BP STAGE 1: NORMAL
BH CV STRESS BP STAGE 2: NORMAL
BH CV STRESS COMMENTS STAGE 1: NORMAL
BH CV STRESS COMMENTS STAGE 2: NORMAL
BH CV STRESS DOSE REGADENOSON STAGE 1: 0.4
BH CV STRESS DURATION MIN STAGE 1: 0
BH CV STRESS DURATION MIN STAGE 2: 4
BH CV STRESS DURATION SEC STAGE 1: 10
BH CV STRESS DURATION SEC STAGE 2: 0
BH CV STRESS HR STAGE 1: 70
BH CV STRESS HR STAGE 2: 75
BH CV STRESS PROTOCOL 1: NORMAL
BH CV STRESS RECOVERY BP: NORMAL MMHG
BH CV STRESS RECOVERY HR: 70 BPM
BH CV STRESS STAGE 1: 1
BH CV STRESS STAGE 2: 2
LV EF NUC BP: 63 %
MAXIMAL PREDICTED HEART RATE: 142 BPM
PERCENT MAX PREDICTED HR: 52.82 %
STRESS BASELINE BP: NORMAL MMHG
STRESS BASELINE HR: 70 BPM
STRESS PERCENT HR: 62 %
STRESS POST PEAK BP: NORMAL MMHG
STRESS POST PEAK HR: 75 BPM
STRESS TARGET HR: 121 BPM

## 2020-07-28 PROCEDURE — 93018 CV STRESS TEST I&R ONLY: CPT | Performed by: NURSE PRACTITIONER

## 2020-07-28 PROCEDURE — 78452 HT MUSCLE IMAGE SPECT MULT: CPT | Performed by: INTERNAL MEDICINE

## 2020-08-06 ENCOUNTER — LAB (OUTPATIENT)
Dept: LAB | Facility: HOSPITAL | Age: 80
End: 2020-08-06

## 2020-08-06 ENCOUNTER — HOSPITAL ENCOUNTER (OUTPATIENT)
Dept: ONCOLOGY | Facility: HOSPITAL | Age: 80
Setting detail: INFUSION SERIES
Discharge: HOME | End: 2020-08-06

## 2020-08-06 ENCOUNTER — OFFICE VISIT (OUTPATIENT)
Dept: CARDIOLOGY | Facility: CLINIC | Age: 80
End: 2020-08-06

## 2020-08-06 VITALS
BODY MASS INDEX: 45.77 KG/M2 | WEIGHT: 302 LBS | HEART RATE: 87 BPM | DIASTOLIC BLOOD PRESSURE: 64 MMHG | OXYGEN SATURATION: 98 % | SYSTOLIC BLOOD PRESSURE: 132 MMHG | HEIGHT: 68 IN

## 2020-08-06 DIAGNOSIS — I82.513 CHRONIC DEEP VEIN THROMBOSIS (DVT) OF FEMORAL VEIN OF BOTH LOWER EXTREMITIES (HCC): ICD-10-CM

## 2020-08-06 DIAGNOSIS — E78.2 MIXED HYPERLIPIDEMIA: ICD-10-CM

## 2020-08-06 DIAGNOSIS — Z79.01 LONG TERM (CURRENT) USE OF ANTICOAGULANTS: Primary | ICD-10-CM

## 2020-08-06 DIAGNOSIS — I10 ESSENTIAL HYPERTENSION: ICD-10-CM

## 2020-08-06 DIAGNOSIS — I25.10 CORONARY ARTERY DISEASE INVOLVING NATIVE CORONARY ARTERY OF NATIVE HEART WITHOUT ANGINA PECTORIS: Primary | ICD-10-CM

## 2020-08-06 DIAGNOSIS — I50.32 DIASTOLIC DYSFUNCTION WITH CHRONIC HEART FAILURE (HCC): ICD-10-CM

## 2020-08-06 LAB — INR PPP: 2.5

## 2020-08-06 PROCEDURE — 36416 COLLJ CAPILLARY BLOOD SPEC: CPT

## 2020-08-06 PROCEDURE — 99214 OFFICE O/P EST MOD 30 MIN: CPT | Performed by: INTERNAL MEDICINE

## 2020-08-06 PROCEDURE — 85610 PROTHROMBIN TIME: CPT

## 2020-08-06 NOTE — PROGRESS NOTES
Date of Office Visit: 2020  Encounter Provider: Dr. Edwar Ackerman  Place of Service: [facility] CARDIOLOGY [place]  Patient Name: Dalton Dallas Sr.  :1940  Gabino Sibley Jr., MD    Chief Complaint   Patient presents with   • Coronary Artery Disease     follow up stress test      History of Present Illness    I'm pleased to see Mr. Dallas in my office today as a followup     As you know, patient is 80 years old white gentleman whose past medical history is significant for hypertension, hyperlipidemia, coronary artery disease, coronary artery stenting, diabetes mellitus, DVT who came for followup.     In , patient developed symptom of shortness of breath and subsequent stress test was abnormal.  Cardiac catheterization showed LAD was free of disease, RCA had 50% stenosis, and LCX has 80% stenosis.  Patient underwent LCX/om stenting.  In , repeat cardiac catheterization showed 80-90% stenosis of LCX/OM1 patient underwent successful stenting.  LAD had 25% stenosis.  LVEF was 60%.  In 2018, echocardiogram showed EF of 60-65%.    In 2020, patient underwent stress test which showed no myocardial ischemia.  Small fixed inferior defect was noted.    Since the previous visit, patient came today to discuss stress test.  Patient denies any symptom of chest pain.  Patient complain of shortness of breath.  Patient has trace leg edema.  No orthopnea PND no syncope or presyncope.    At this stage, I will continue current treatment.  His blood pressure is very well controlled.  Risk factor modification discussed.  Weight loss and exercise emphasized.    Past Medical History:   Diagnosis Date   • ACE-inhibitor cough 2005   • Coronary artery disease    • Diabetes mellitus, type 2 (CMS/[facility])    • ED (erectile dysfunction)    • Hx of blood clots     Blood clot left leg    • Hyperlipidemia 2000   • Hypertension    • Hypogonadism, male    • Hypothyroidism 2001   • Obesity   "  • Peripheral neuropathy    • Pulmonary embolism (CMS/HCC) 02/2014   • Rectal fistula    • Vitamin D deficiency          Past Surgical History:   Procedure Laterality Date   • CARDIAC CATHETERIZATION  2008    PTCA: 2008; PTCA: 4/21/2015 LCX stent    • CARDIOVASCULAR STRESS TEST  2020   • CATARACT EXTRACTION  01/11/2016 1-4-16 & 1-11-16    • COLONOSCOPY N/A 11/23/2019    Procedure: COLONOSCOPY WITH ENDOSCOPIC CLIPPING X1 OF POST POLYPECTOMY BLEED SITE;  Surgeon: Jhonny Orellana MD;  Location: [facility] ENDOSCOPY;  Service: Gastroenterology   • CORONARY STENT PLACEMENT     • INGUINAL HERNIA REPAIR Left    • UMBILICAL HERNIA REPAIR             Current Outpatient Medications:   •  ACCU-CHEK GONZALES PLUS test strip, USE TO CHECK BLOOD SUGAR BEFORE MEALS AND AT BEDTIME, Disp: , Rfl:   •  amLODIPine-atorvastatin (CADUET) 5-20 MG per tablet, Take 1 tablet by mouth Daily., Disp: 90 tablet, Rfl: 2  •  atenolol (TENORMIN) 50 MG tablet, TAKE 1 TABLET DAILY, Disp: 90 tablet, Rfl: 4  •  clopidogrel (PLAVIX) 75 MG tablet, Take 75 mg by mouth Daily., Disp: , Rfl:   •  Empagliflozin (Jardiance) 10 MG tablet, Take 10 mg by mouth Daily., Disp: 90 tablet, Rfl: 3  •  fenofibrate (TRICOR) 54 MG tablet, TAKE 1 TABLET DAILY, Disp: 90 tablet, Rfl: 4  •  ferrous sulfate 325 (65 FE) MG tablet, Take 325 mg by mouth 2 (Two) Times a Day., Disp: , Rfl:   •  gabapentin (NEURONTIN) 100 MG capsule, Take 1 capsule by mouth 3 (Three) Times a Day., Disp: 42 capsule, Rfl: 0  •  hydroCHLOROthiazide (HYDRODIURIL) 25 MG tablet, TAKE 1 TABLET DAILY, Disp: 90 tablet, Rfl: 4  •  Insulin Pen Needle (BD ULTRA-FINE PEN NEEDLES) 29G X 12.7MM misc, Use with insulin injections three times daily, Disp: 300 each, Rfl: 3  •  insulin regular (HUMULIN R) 500 UNIT/ML CONCENTRATED injection, Inject 24 units in the AM and 38 units in the PM, Disp: 10 mL, Rfl: 0  •  Insulin Syringe 30G X 1/2\" 0.5 ML misc, Use to inject insulin 2 times daily DX E11.65, Disp: 200 each, Rfl: " "3  •  Insulin Syringe-Needle U-100 (BD Insulin Syringe Ultrafine) 31G X 5/16\" 0.5 ML misc, Use 1 new syringe with each dose of insulin twice a day. 200, Disp: 200 each, Rfl: 3  •  irbesartan (AVAPRO) 300 MG tablet, TAKE 1 TABLET DAILY, Disp: 90 tablet, Rfl: 2  •  levothyroxine (SYNTHROID, LEVOTHROID) 175 MCG tablet, Take 175 mcg by mouth Daily., Disp: , Rfl:   •  metFORMIN ER (GLUCOPHAGE-XR) 500 MG 24 hr tablet, TAKE 4 TABLETS AT SUPPER, Disp: 360 tablet, Rfl: 4  •  Omega-3 Fatty Acids (FISH OIL) 1000 MG capsule capsule, Take 1,000 mg by mouth 2 (Two) Times a Day., Disp: , Rfl:   •  Pramlintide Acetate 2700 MCG/2.7ML solution pen-injector, Inject 120 mcg under the skin into the appropriate area as directed 3 (Three) Times a Day., Disp: , Rfl:   •  vitamin B-12 (CYANOCOBALAMIN) 100 MCG tablet, Take 100 mcg by mouth Daily., Disp: , Rfl:   •  vitamin D (ERGOCALCIFEROL) 1.25 MG (25962 UT) capsule capsule, TAKE 1 CAPSULE ONCE A WEEK, Disp: 12 capsule, Rfl: 3  •  warfarin (COUMADIN) 3 MG tablet, TAKE 1 TABLET DAILY, Disp: 90 tablet, Rfl: 3  •  warfarin (COUMADIN) 5 MG tablet, Take 1 tablet by mouth Take As Directed., Disp: 90 tablet, Rfl: 3      Social History     Socioeconomic History   • Marital status:      Spouse name: Not on file   • Number of children: Not on file   • Years of education: Not on file   • Highest education level: Not on file   Tobacco Use   • Smoking status: Former Smoker   • Smokeless tobacco: Never Used   Substance and Sexual Activity   • Alcohol use: No     Frequency: Never   • Drug use: No   • Sexual activity: Defer         Review of Systems   Constitution: Negative for chills and fever.   HENT: Negative for ear discharge and nosebleeds.    Eyes: Negative for discharge and redness.   Cardiovascular: Negative for chest pain, orthopnea, palpitations, paroxysmal nocturnal dyspnea and syncope.   Respiratory: Positive for shortness of breath. Negative for cough and wheezing.    Endocrine: " "Negative for heat intolerance.   Skin: Negative for rash.   Musculoskeletal: Positive for arthritis and joint pain. Negative for myalgias.   Gastrointestinal: Negative for abdominal pain, melena, nausea and vomiting.   Genitourinary: Negative for dysuria and hematuria.   Neurological: Negative for dizziness, light-headedness, numbness and tremors.   Psychiatric/Behavioral: Negative for depression. The patient is not nervous/anxious.        Procedures    Procedures    No orders to display           Objective:    /64   Pulse 87   Ht 173.5 cm (68.31\")   Wt (!) 137 kg (302 lb)   SpO2 98%   BMI 45.51 kg/m²         Physical Exam   Constitutional: He is oriented to person, place, and time. He appears well-developed and well-nourished.   HENT:   Head: Normocephalic and atraumatic.   Eyes: Right eye exhibits no discharge. No scleral icterus.   Neck: No thyromegaly present.   Cardiovascular: Normal rate, regular rhythm and normal heart sounds. Exam reveals no gallop and no friction rub.   No murmur heard.  Pulmonary/Chest: Effort normal and breath sounds normal. No respiratory distress. He has no wheezes. He has no rales.   Abdominal: There is no tenderness.   Musculoskeletal: He exhibits no edema.   Lymphadenopathy:     He has no cervical adenopathy.   Neurological: He is alert and oriented to person, place, and time.   Skin: No rash noted. No erythema.   Psychiatric: He has a normal mood and affect.           Assessment:       Diagnosis Plan   1. Coronary artery disease involving native coronary artery of native heart without angina pectoris     2. Essential hypertension     3. Mixed hyperlipidemia     4. Diastolic dysfunction with chronic heart failure (CMS/HCC)              Plan:       At this stage, patient is doing very well.  He had stress test and it was negative for ischemia.  I will see the patient in 9 months.  Weight loss and exercise are emphasized.  "

## 2020-09-01 ENCOUNTER — LAB (OUTPATIENT)
Dept: LAB | Facility: HOSPITAL | Age: 80
End: 2020-09-01

## 2020-09-01 ENCOUNTER — OFFICE VISIT (OUTPATIENT)
Dept: FAMILY MEDICINE CLINIC | Facility: CLINIC | Age: 80
End: 2020-09-01

## 2020-09-01 ENCOUNTER — HOSPITAL ENCOUNTER (OUTPATIENT)
Dept: ONCOLOGY | Facility: HOSPITAL | Age: 80
Setting detail: INFUSION SERIES
Discharge: HOME | End: 2020-09-01

## 2020-09-01 VITALS
DIASTOLIC BLOOD PRESSURE: 73 MMHG | TEMPERATURE: 97.1 F | OXYGEN SATURATION: 97 % | HEIGHT: 68 IN | WEIGHT: 302.6 LBS | HEART RATE: 68 BPM | RESPIRATION RATE: 20 BRPM | SYSTOLIC BLOOD PRESSURE: 135 MMHG | BODY MASS INDEX: 45.86 KG/M2

## 2020-09-01 DIAGNOSIS — E78.2 MIXED HYPERLIPIDEMIA: ICD-10-CM

## 2020-09-01 DIAGNOSIS — I73.9 PERIPHERAL VASCULAR DISEASE (HCC): ICD-10-CM

## 2020-09-01 DIAGNOSIS — Z79.01 LONG TERM (CURRENT) USE OF ANTICOAGULANTS: Primary | ICD-10-CM

## 2020-09-01 DIAGNOSIS — E03.9 HYPOTHYROIDISM, UNSPECIFIED TYPE: ICD-10-CM

## 2020-09-01 DIAGNOSIS — I50.32 DIASTOLIC DYSFUNCTION WITH CHRONIC HEART FAILURE (HCC): ICD-10-CM

## 2020-09-01 DIAGNOSIS — I27.20 PULMONARY HYPERTENSION (HCC): ICD-10-CM

## 2020-09-01 DIAGNOSIS — I27.82 OTHER CHRONIC PULMONARY EMBOLISM WITHOUT ACUTE COR PULMONALE (HCC): ICD-10-CM

## 2020-09-01 DIAGNOSIS — E66.01 MORBIDLY OBESE (HCC): ICD-10-CM

## 2020-09-01 DIAGNOSIS — I82.513 CHRONIC DEEP VEIN THROMBOSIS (DVT) OF FEMORAL VEIN OF BOTH LOWER EXTREMITIES (HCC): Primary | ICD-10-CM

## 2020-09-01 DIAGNOSIS — G47.30 SLEEP APNEA, UNSPECIFIED TYPE: ICD-10-CM

## 2020-09-01 DIAGNOSIS — E11.40 TYPE 2 DIABETES MELLITUS WITH DIABETIC NEUROPATHY, WITHOUT LONG-TERM CURRENT USE OF INSULIN (HCC): ICD-10-CM

## 2020-09-01 DIAGNOSIS — I10 ESSENTIAL HYPERTENSION: Primary | ICD-10-CM

## 2020-09-01 LAB — INR PPP: 2.2

## 2020-09-01 PROCEDURE — 99214 OFFICE O/P EST MOD 30 MIN: CPT | Performed by: FAMILY MEDICINE

## 2020-09-01 PROCEDURE — 36416 COLLJ CAPILLARY BLOOD SPEC: CPT

## 2020-09-01 PROCEDURE — 85610 PROTHROMBIN TIME: CPT

## 2020-09-01 RX ORDER — GABAPENTIN 100 MG/1
100 CAPSULE ORAL 3 TIMES DAILY
Qty: 270 CAPSULE | Refills: 1 | Status: SHIPPED | OUTPATIENT
Start: 2020-09-01 | End: 2021-04-06

## 2020-09-01 NOTE — PROGRESS NOTES
Subjective   Dalton Dallas Sr. is a 80 y.o. male.     Chief Complaint   Patient presents with   • Hypertension     3 MONTH FOLLOW UP   • Hyperlipidemia   • Diabetes       HPI  Chief complaint: Type 2 diabetes mellitus thrombophilia arthritis hyperlipidemia hypertension    The patient is a 80-year-old white male comes in for follow-up and maintenance of his current problems which includes    1.  Hypertension-stable-patient current on hydrochlorothiazide 25 mg daily Avapro 300 mg daily atenolol 50 mg daily potassium amlodipine 5 mg daily.  He denied headache lightheadedness dizziness or chest pain.    2.  Hyperlipidemia-stable-patient on Lipitor 20 mg daily and fenofibrate 54 mg daily.  Denies myalgias and arthralgias.  Denies nausea or anorexia.    3.  Arthritis-deteriorated-patient has history of prior arthritis involving his lower back and his knees.  He states that his knee arthritis progressed to the point that he is having a lot of pain in his thinking about having a knee replacement.  He does take gabapentin for radicular type pain.    4.  Thrombophilia-stable-patient is currently on Coumadin.  He denied hemoptysis or chest pain.  He does have chronic swelling in the lower extremities.    5.  Type 2 diabetes mellitus-stable-the patient is currently on metformin 2000 mg a day Jardiance 10 mg daily and sliding scale insulin and Pramlin 120 mg 3 times daily.  Denies polydipsia polyphagia or polyuria.  Denied low blood sugars.    6.  Heart failure with normal ejection fraction-stable-patient on a beta-blocker and diuretic.  Denies chest pain shortness breath orthopnea or PND.    7.  Pulmonary hypertension-stable        The following portions of the patient's history were reviewed and updated as appropriate: allergies, current medications, past family history, past medical history, past social history, past surgical history and problem list.    Review of Systems    Objective     /73 (BP Location: Left  "arm, Patient Position: Sitting, Cuff Size: Large Adult)   Pulse 68   Temp 97.1 °F (36.2 °C) (Infrared)   Resp 20   Ht 172.7 cm (68\")   Wt (!) 137 kg (302 lb 9.6 oz)   SpO2 97%   BMI 46.01 kg/m²     Physical Exam   Constitutional: He is oriented to person, place, and time.   obese   HENT:   Head: Normocephalic and atraumatic.   Eyes: Pupils are equal, round, and reactive to light. Conjunctivae and EOM are normal.   Neck: Normal range of motion. Neck supple.   Cardiovascular: Normal rate, regular rhythm, normal heart sounds and intact distal pulses.   Pulmonary/Chest: Effort normal and breath sounds normal.   Abdominal: Soft. Bowel sounds are normal.   Musculoskeletal: Normal range of motion.   Crepitance with flexion and extension of the knee with medical laxity   Neurological: He is alert and oriented to person, place, and time.   Skin: Skin is warm and dry.   Psychiatric: He has a normal mood and affect. His behavior is normal.   Nursing note and vitals reviewed.        Assessment/Plan   Dalton was seen today for hypertension, hyperlipidemia and diabetes.    Diagnoses and all orders for this visit:    Essential hypertension    Morbidly obese (CMS/HCC)    Other chronic pulmonary embolism without acute cor pulmonale (CMS/HCC)    Mixed hyperlipidemia    Peripheral vascular disease (CMS/HCC)    Diastolic dysfunction with chronic heart failure (CMS/HCC)    Pulmonary hypertension (CMS/HCC)    Sleep apnea, unspecified type    Hypothyroidism, unspecified type    Type 2 diabetes mellitus with diabetic neuropathy, without long-term current use of insulin (CMS/HCC)    Other orders  -     gabapentin (NEURONTIN) 100 MG capsule; Take 1 capsule by mouth 3 (Three) Times a Day.      Patient Instructions   Continue your current medications and treatment.    Follow up with the orthopedic surgeons regarding the knee replacement.    Follow up in the office in 3 months.    Laboratory testing at that time.      Gabino Sibley, " MD [facility]    09/01/20

## 2020-09-01 NOTE — PATIENT INSTRUCTIONS
Continue your current medications and treatment.    Follow up with the orthopedic surgeons regarding the knee replacement.    Follow up in the office in 3 months.    Laboratory testing at that time.

## 2020-09-11 RX ORDER — CLOPIDOGREL BISULFATE 75 MG/1
TABLET ORAL
Qty: 90 TABLET | Refills: 3 | Status: SHIPPED | OUTPATIENT
Start: 2020-09-11 | End: 2021-09-07

## 2020-09-14 ENCOUNTER — TELEPHONE (OUTPATIENT)
Dept: CARDIOLOGY | Facility: CLINIC | Age: 80
End: 2020-09-14

## 2020-09-14 RX ORDER — AMLODIPINE BESYLATE AND ATORVASTATIN CALCIUM 5; 20 MG/1; MG/1
1 TABLET, FILM COATED ORAL DAILY
Qty: 90 TABLET | Refills: 2 | Status: SHIPPED | OUTPATIENT
Start: 2020-09-14 | End: 2021-06-09

## 2020-09-28 ENCOUNTER — HOSPITAL ENCOUNTER (EMERGENCY)
Facility: HOSPITAL | Age: 80
Discharge: HOME | End: 2020-09-28
Attending: EMERGENCY MEDICINE | Admitting: EMERGENCY MEDICINE

## 2020-09-28 ENCOUNTER — APPOINTMENT (OUTPATIENT)
Dept: CARDIOLOGY | Facility: HOSPITAL | Age: 80
End: 2020-09-28

## 2020-09-28 VITALS
RESPIRATION RATE: 16 BRPM | OXYGEN SATURATION: 97 % | HEIGHT: 70 IN | BODY MASS INDEX: 43.37 KG/M2 | WEIGHT: 302.91 LBS | DIASTOLIC BLOOD PRESSURE: 58 MMHG | SYSTOLIC BLOOD PRESSURE: 142 MMHG | TEMPERATURE: 98.1 F | HEART RATE: 67 BPM

## 2020-09-28 DIAGNOSIS — M79.605 LEFT LEG PAIN: Primary | ICD-10-CM

## 2020-09-28 LAB
BH CV LOWER VASCULAR LEFT COMMON FEMORAL AUGMENT: NORMAL
BH CV LOWER VASCULAR LEFT COMMON FEMORAL COMPETENT: NORMAL
BH CV LOWER VASCULAR LEFT COMMON FEMORAL COMPRESS: NORMAL
BH CV LOWER VASCULAR LEFT COMMON FEMORAL PHASIC: NORMAL
BH CV LOWER VASCULAR LEFT COMMON FEMORAL SPONT: NORMAL
BH CV LOWER VASCULAR LEFT DISTAL FEMORAL COMPRESS: NORMAL
BH CV LOWER VASCULAR LEFT GASTRONEMIUS COMPRESS: NORMAL
BH CV LOWER VASCULAR LEFT GREATER SAPH AK COMPRESS: NORMAL
BH CV LOWER VASCULAR LEFT GREATER SAPH BK COMPRESS: NORMAL
BH CV LOWER VASCULAR LEFT LESSER SAPH COMPRESS: NORMAL
BH CV LOWER VASCULAR LEFT MID FEMORAL AUGMENT: NORMAL
BH CV LOWER VASCULAR LEFT MID FEMORAL COMPETENT: NORMAL
BH CV LOWER VASCULAR LEFT MID FEMORAL COMPRESS: NORMAL
BH CV LOWER VASCULAR LEFT MID FEMORAL PHASIC: NORMAL
BH CV LOWER VASCULAR LEFT MID FEMORAL SPONT: NORMAL
BH CV LOWER VASCULAR LEFT PERONEAL COMPRESS: NORMAL
BH CV LOWER VASCULAR LEFT POPLITEAL AUGMENT: NORMAL
BH CV LOWER VASCULAR LEFT POPLITEAL COMPETENT: NORMAL
BH CV LOWER VASCULAR LEFT POPLITEAL COMPRESS: NORMAL
BH CV LOWER VASCULAR LEFT POPLITEAL PHASIC: NORMAL
BH CV LOWER VASCULAR LEFT POPLITEAL SPONT: NORMAL
BH CV LOWER VASCULAR LEFT POSTERIOR TIBIAL COMPRESS: NORMAL
BH CV LOWER VASCULAR LEFT PROXIMAL FEMORAL COMPRESS: NORMAL
BH CV LOWER VASCULAR LEFT SAPHENOFEMORAL JUNCTION COMPRESS: NORMAL
BH CV LOWER VASCULAR RIGHT COMMON FEMORAL AUGMENT: NORMAL
BH CV LOWER VASCULAR RIGHT COMMON FEMORAL COMPETENT: NORMAL
BH CV LOWER VASCULAR RIGHT COMMON FEMORAL COMPRESS: NORMAL
BH CV LOWER VASCULAR RIGHT COMMON FEMORAL PHASIC: NORMAL
BH CV LOWER VASCULAR RIGHT COMMON FEMORAL SPONT: NORMAL
INR PPP: 2.16 (ref 2–3)
PROTHROMBIN TIME: 22.9 SECONDS (ref 19.4–28.5)

## 2020-09-28 PROCEDURE — 99283 EMERGENCY DEPT VISIT LOW MDM: CPT

## 2020-09-28 PROCEDURE — 93971 EXTREMITY STUDY: CPT

## 2020-09-28 PROCEDURE — 85610 PROTHROMBIN TIME: CPT | Performed by: EMERGENCY MEDICINE

## 2020-09-29 ENCOUNTER — HOSPITAL ENCOUNTER (OUTPATIENT)
Dept: ONCOLOGY | Facility: HOSPITAL | Age: 80
Setting detail: INFUSION SERIES
Discharge: HOME | End: 2020-09-29

## 2020-09-29 ENCOUNTER — LAB (OUTPATIENT)
Dept: LAB | Facility: HOSPITAL | Age: 80
End: 2020-09-29

## 2020-09-29 DIAGNOSIS — I82.513 CHRONIC DEEP VEIN THROMBOSIS (DVT) OF FEMORAL VEIN OF BOTH LOWER EXTREMITIES (HCC): Primary | ICD-10-CM

## 2020-09-29 DIAGNOSIS — Z79.01 LONG TERM (CURRENT) USE OF ANTICOAGULANTS: Primary | ICD-10-CM

## 2020-09-29 LAB — INR PPP: 2.3

## 2020-09-29 PROCEDURE — 85610 PROTHROMBIN TIME: CPT

## 2020-10-03 DIAGNOSIS — E11.40 TYPE 2 DIABETES MELLITUS WITH DIABETIC NEUROPATHY, WITHOUT LONG-TERM CURRENT USE OF INSULIN (HCC): Primary | ICD-10-CM

## 2020-10-05 RX ORDER — BLOOD SUGAR DIAGNOSTIC
STRIP MISCELLANEOUS
Qty: 400 EACH | Refills: 2 | Status: SHIPPED | OUTPATIENT
Start: 2020-10-05 | End: 2021-07-01

## 2020-10-20 RX ORDER — IRBESARTAN 300 MG/1
TABLET ORAL
Qty: 90 TABLET | Refills: 3 | Status: SHIPPED | OUTPATIENT
Start: 2020-10-20 | End: 2021-10-15

## 2020-10-22 RX ORDER — INFLUENZA A VIRUS A/MICHIGAN/45/2015 X-275 (H1N1) ANTIGEN (FORMALDEHYDE INACTIVATED), INFLUENZA A VIRUS A/SINGAPORE/INFIMH-16-0019/2016 IVR-186 (H3N2) ANTIGEN (FORMALDEHYDE INACTIVATED), INFLUENZA B VIRUS B/PHUKET/3073/2013 ANTIGEN (FORMALDEHYDE INACTIVATED), AND INFLUENZA B VIRUS B/MARYLAND/15/2016 BX-69A ANTIGEN (FORMALDEHYDE INACTIVATED) 60; 60; 60; 60 UG/.7ML; UG/.7ML; UG/.7ML; UG/.7ML
INJECTION, SUSPENSION INTRAMUSCULAR
COMMUNITY
Start: 2020-10-02 | End: 2021-05-10

## 2020-10-28 ENCOUNTER — HOSPITAL ENCOUNTER (OUTPATIENT)
Dept: ONCOLOGY | Facility: HOSPITAL | Age: 80
Setting detail: INFUSION SERIES
Discharge: HOME | End: 2020-10-28

## 2020-10-28 ENCOUNTER — LAB (OUTPATIENT)
Dept: LAB | Facility: HOSPITAL | Age: 80
End: 2020-10-28

## 2020-10-28 DIAGNOSIS — Z79.01 LONG TERM (CURRENT) USE OF ANTICOAGULANTS: Primary | ICD-10-CM

## 2020-10-28 DIAGNOSIS — E03.9 HYPOTHYROIDISM, UNSPECIFIED TYPE: ICD-10-CM

## 2020-10-28 DIAGNOSIS — E11.40 TYPE 2 DIABETES MELLITUS WITH DIABETIC NEUROPATHY, WITHOUT LONG-TERM CURRENT USE OF INSULIN (HCC): ICD-10-CM

## 2020-10-28 DIAGNOSIS — I82.513 CHRONIC DEEP VEIN THROMBOSIS (DVT) OF FEMORAL VEIN OF BOTH LOWER EXTREMITIES (HCC): Primary | ICD-10-CM

## 2020-10-28 DIAGNOSIS — E78.2 MIXED HYPERLIPIDEMIA: ICD-10-CM

## 2020-10-28 LAB
ALBUMIN SERPL-MCNC: 4.5 G/DL (ref 3.5–5.2)
ALBUMIN/GLOB SERPL: 2 G/DL
ALP SERPL-CCNC: 36 U/L (ref 39–117)
ALT SERPL W P-5'-P-CCNC: 26 U/L (ref 1–41)
ANION GAP SERPL CALCULATED.3IONS-SCNC: 10.5 MMOL/L (ref 5–15)
AST SERPL-CCNC: 24 U/L (ref 1–40)
BILIRUB SERPL-MCNC: 0.3 MG/DL (ref 0–1.2)
BUN SERPL-MCNC: 17 MG/DL (ref 8–23)
BUN/CREAT SERPL: 16.7 (ref 7–25)
CALCIUM SPEC-SCNC: 9.7 MG/DL (ref 8.6–10.5)
CHLORIDE SERPL-SCNC: 104 MMOL/L (ref 98–107)
CO2 SERPL-SCNC: 24.5 MMOL/L (ref 22–29)
CREAT SERPL-MCNC: 1.02 MG/DL (ref 0.76–1.27)
GFR SERPL CREATININE-BSD FRML MDRD: 71 ML/MIN/1.73
GLOBULIN UR ELPH-MCNC: 2.3 GM/DL
GLUCOSE SERPL-MCNC: 158 MG/DL (ref 65–99)
HBA1C MFR BLD: 7 % (ref 3.5–5.6)
INR PPP: 2.5
POTASSIUM SERPL-SCNC: 5.1 MMOL/L (ref 3.5–5.2)
PROT SERPL-MCNC: 6.8 G/DL (ref 6–8.5)
SODIUM SERPL-SCNC: 139 MMOL/L (ref 136–145)
T4 FREE SERPL-MCNC: 1.56 NG/DL (ref 0.93–1.7)
TSH SERPL DL<=0.05 MIU/L-ACNC: 4.05 UIU/ML (ref 0.27–4.2)

## 2020-10-28 PROCEDURE — 85610 PROTHROMBIN TIME: CPT

## 2020-10-28 PROCEDURE — 84439 ASSAY OF FREE THYROXINE: CPT

## 2020-10-28 PROCEDURE — 36415 COLL VENOUS BLD VENIPUNCTURE: CPT

## 2020-10-28 PROCEDURE — 83036 HEMOGLOBIN GLYCOSYLATED A1C: CPT

## 2020-10-28 PROCEDURE — 84443 ASSAY THYROID STIM HORMONE: CPT

## 2020-10-28 PROCEDURE — 36416 COLLJ CAPILLARY BLOOD SPEC: CPT

## 2020-10-28 PROCEDURE — 80053 COMPREHEN METABOLIC PANEL: CPT

## 2020-11-04 ENCOUNTER — OFFICE VISIT (OUTPATIENT)
Dept: ENDOCRINOLOGY | Facility: CLINIC | Age: 80
End: 2020-11-04

## 2020-11-04 VITALS
HEART RATE: 67 BPM | TEMPERATURE: 97.3 F | DIASTOLIC BLOOD PRESSURE: 62 MMHG | HEIGHT: 70 IN | OXYGEN SATURATION: 99 % | SYSTOLIC BLOOD PRESSURE: 128 MMHG | WEIGHT: 298.8 LBS | BODY MASS INDEX: 42.78 KG/M2

## 2020-11-04 DIAGNOSIS — Z79.4 TYPE 2 DIABETES MELLITUS WITH DIABETIC NEUROPATHY, WITH LONG-TERM CURRENT USE OF INSULIN (HCC): Primary | ICD-10-CM

## 2020-11-04 DIAGNOSIS — E55.9 VITAMIN D DEFICIENCY: ICD-10-CM

## 2020-11-04 DIAGNOSIS — E11.40 TYPE 2 DIABETES MELLITUS WITH DIABETIC NEUROPATHY, WITH LONG-TERM CURRENT USE OF INSULIN (HCC): Primary | ICD-10-CM

## 2020-11-04 DIAGNOSIS — E03.9 HYPOTHYROIDISM, UNSPECIFIED TYPE: ICD-10-CM

## 2020-11-04 DIAGNOSIS — E78.2 MIXED HYPERLIPIDEMIA: ICD-10-CM

## 2020-11-04 LAB — GLUCOSE BLDC GLUCOMTR-MCNC: 118 MG/DL (ref 70–105)

## 2020-11-04 PROCEDURE — 82962 GLUCOSE BLOOD TEST: CPT | Performed by: INTERNAL MEDICINE

## 2020-11-04 PROCEDURE — 99214 OFFICE O/P EST MOD 30 MIN: CPT | Performed by: INTERNAL MEDICINE

## 2020-11-04 NOTE — PROGRESS NOTES
[facility]        Patient Care Team:  Gabino Sibley Jr., MD as PCP - General (Family Medicine)    Chief Complaint:    Chief Complaint   Patient presents with   • Diabetes     type 2, bs 118, ate@ 8:30a 2.5 hrs ago, insulin @ 8:30a 2.5hrs ago         Subjective   Here for diabetes f/u  Blood sugars: in the mid 100's  Exercise program: yard work  Taking vit D & fish oil    Interval History:     Patient Complaints: none  Patient Denies:  hypoglycemia  History taken from: patient    Review of Systems:   Review of Systems   Eyes: Negative for blurred vision.   Gastrointestinal: Negative for nausea.   Endocrine: Negative for polyuria.   Neurological: Negative for headache.     Gained 5 lb since last visit    Objective     Vital Signs  Temp:  [97.3 °F (36.3 °C)] 97.3 °F (36.3 °C)  Heart Rate:  [67] 67  BP: (128)/(62) 128/62    Physical Exam:     General Appearance:    Alert, cooperative, in no acute distress. Obese   Head:    Normocephalic, without obvious abnormality, atraumatic   Eyes:            Lids and lashes normal, conjunctivae and sclerae normal, no   icterus, no pallor, corneas clear, PERRLA   Throat:   No oral lesions,  oral mucosa moist   Neck:   No adenopathy, supple,  no thyromegaly, no   carotid bruit   Lungs:     Decreased breath sounds    Heart:    Regular rhythm and normal rate   Chest Wall:    No abnormalities observed   Abdomen:     Normal bowel sounds, soft                 Extremities:   Plantar calluses, hammer toes, trace edema               Pulses:   Pulses palpable and equal bilaterally   Skin:   Dry. Stasis dermatitis   Neurologic:  DTR absent in ankles, absent vibratory sense in dorie, unable to feel the 10g monofilament ( same as 1y ago )            Results Review:    I have reviewed the patient's new clinical results, labs & imaging.    Medication Review:   Prior to Admission medications    Medication Sig Start Date End Date Taking? Authorizing Provider   Kyle Bonilla  "Plus test strip USE TO CHECK BLOOD SUGAR BEFORE MEALS AND AT BEDTIME 10/5/20  Yes Virginia Shaver MD   amLODIPine-atorvastatin (CADUET) 5-20 MG per tablet Take 1 tablet by mouth Daily. 9/14/20  Yes Edwar Ackerman MD   atenolol (TENORMIN) 50 MG tablet TAKE 1 TABLET DAILY 2/10/20  Yes Virginia Shaver MD   clopidogrel (PLAVIX) 75 MG tablet TAKE 1 TABLET DAILY 9/11/20  Yes Edwar Ackerman MD   Empagliflozin (Jardiance) 10 MG tablet Take 10 mg by mouth Daily. 5/22/20  Yes Virginia Shaver MD   fenofibrate (TRICOR) 54 MG tablet TAKE 1 TABLET DAILY 12/30/19  Yes Edwar Ackerman MD   ferrous sulfate 325 (65 FE) MG tablet Take 325 mg by mouth 2 (Two) Times a Day.   Yes Lee Ruggiero MD   gabapentin (NEURONTIN) 100 MG capsule Take 1 capsule by mouth 3 (Three) Times a Day. 9/1/20  Yes Gabino Sibley Jr., MD   hydroCHLOROthiazide (HYDRODIURIL) 25 MG tablet TAKE 1 TABLET DAILY 1/31/20  Yes Edwar Ackerman MD   Insulin Pen Needle (BD ULTRA-FINE PEN NEEDLES) 29G X 12.7MM misc Use with insulin injections three times daily 3/12/20  Yes Virginia Shaver MD   insulin regular (HUMULIN R) 500 UNIT/ML CONCENTRATED injection Inject 24 units in the AM and 38 units in the PM 5/12/20  Yes Virginia Shaver MD   Insulin Syringe 30G X 1/2\" 0.5 ML misc Use to inject insulin 2 times daily DX E11.65 5/4/20  Yes Virginia Shaver MD   Insulin Syringe-Needle U-100 (BD Insulin Syringe Ultrafine) 31G X 5/16\" 0.5 ML misc Use 1 new syringe with each dose of insulin twice a day. 200 4/3/20  Yes Virginia Shaver MD   irbesartan (AVAPRO) 300 MG tablet TAKE 1 TABLET DAILY 10/20/20  Yes Virginia Shaver MD   levothyroxine (SYNTHROID, LEVOTHROID) 175 MCG tablet Take 175 mcg by mouth Daily.   Yes ProviderLee MD   metFORMIN ER (GLUCOPHAGE-XR) 500 MG 24 hr tablet TAKE 4 TABLETS AT SUPPER 1/6/20  Yes Virginia Shaver MD   Omega-3 Fatty Acids (FISH OIL) 1000 MG capsule capsule Take 1,000 mg by mouth 2 (Two) Times a Day.   Yes " Lee Ruggiero MD   Pramlintide Acetate 2700 MCG/2.7ML solution pen-injector Inject 120 mcg under the skin into the appropriate area as directed 3 (Three) Times a Day. 8/27/20  Yes Virginia Shaver MD   vitamin B-12 (CYANOCOBALAMIN) 100 MCG tablet Take 100 mcg by mouth Daily. 4/15/15  Yes Lee Ruggiero MD   vitamin D (ERGOCALCIFEROL) 1.25 MG (03194 UT) capsule capsule TAKE 1 CAPSULE ONCE A WEEK 6/1/20  Yes Virginia Shaver MD   warfarin (COUMADIN) 3 MG tablet TAKE 1 TABLET DAILY 3/23/20  Yes Saskia Roberts APRN   warfarin (COUMADIN) 5 MG tablet Take 1 tablet by mouth Take As Directed. 3/30/20  Yes Saskia Roberts APRN   Fluzone High-Dose Quadrivalent 0.7 ML suspension prefilled syringe ADM 0.7ML IM UTD 10/2/20   Lee Ruggiero MD       Lab Results (most recent)     Procedure Component Value Units Date/Time    POC Glucose [[id number]]  (Abnormal) Collected: 11/04/20 1052    Specimen: Blood Updated: 11/04/20 1103     Glucose 118 mg/dL      Comment: Serial Number: [id number]:  [id number]           Lab Results   Component Value Date    HGBA1C 7.0 (H) 10/28/2020    HGBA1C 8.9 (H) 05/06/2020    HGBA1C 6.8 (H) 09/25/2019      Lab Results   Component Value Date    GLUCOSE 158 (H) 10/28/2020    BUN 17 10/28/2020    CREATININE 1.02 10/28/2020    EGFRIFNONA 71 10/28/2020    BCR 16.7 10/28/2020    K 5.1 10/28/2020    CO2 24.5 10/28/2020    CALCIUM 9.7 10/28/2020    ALBUMIN 4.50 10/28/2020    LABIL2 1.7 03/28/2019    AST 24 10/28/2020    ALT 26 10/28/2020    CHOL 143 05/06/2020    LDL 59 05/06/2020    HDL 34 (L) 05/06/2020    TRIG 252 (H) 05/06/2020     Lab Results   Component Value Date    TSH 4.050 10/28/2020    FREET4 1.56 10/28/2020    DRGF74IG 31.9 05/06/2020       Assessment/Plan     Diagnoses and all orders for this visit:    1. Type 2 diabetes mellitus with diabetic neuropathy, with long-term current use of insulin (CMS/[facility]) (Primary)  -     Hemoglobin A1c; Future  -     Microalbumin  / Creatinine Urine Ratio - Urine, Clean Catch; Future    2. Vitamin D deficiency  -     Vitamin D 25 Hydroxy; Future    3. Mixed hyperlipidemia  -     Comprehensive Metabolic Panel; Future  -     Lipid Panel; Future    4. Hypothyroidism, unspecified type  -     TSH; Future  -     T4, Free; Future    Other orders  -     POC Glucose    Glucose control improved. Thyroid stable. Will check lipid & vit D status next visit.    Increase exercise as able.  Continue diabetes & chol meds & vit D supplements.  Call if blood sugars are running under 100 or over 200.  Diabetes shoes form completed & signed.      Virginia Shaver MD  11/04/20  17:26 EST

## 2020-11-04 NOTE — PATIENT INSTRUCTIONS
Keep up the good work!  Increase exercise as able.  Continue diabetes & chol meds & vit D supplements.  Call if blood sugars are running under 100 or over 200.  F/u in 6 months, with fasting labs prior.

## 2020-11-11 RX ORDER — LEVOTHYROXINE SODIUM 175 UG/1
TABLET ORAL
Qty: 90 TABLET | Refills: 3 | Status: SHIPPED | OUTPATIENT
Start: 2020-11-11 | End: 2021-11-09

## 2020-11-23 ENCOUNTER — OFFICE VISIT (OUTPATIENT)
Dept: FAMILY MEDICINE CLINIC | Facility: CLINIC | Age: 80
End: 2020-11-23

## 2020-11-23 ENCOUNTER — TELEPHONE (OUTPATIENT)
Dept: FAMILY MEDICINE CLINIC | Facility: CLINIC | Age: 80
End: 2020-11-23

## 2020-11-23 VITALS
BODY MASS INDEX: 43.26 KG/M2 | SYSTOLIC BLOOD PRESSURE: 136 MMHG | HEART RATE: 80 BPM | RESPIRATION RATE: 20 BRPM | OXYGEN SATURATION: 96 % | WEIGHT: 302.2 LBS | DIASTOLIC BLOOD PRESSURE: 72 MMHG | HEIGHT: 70 IN | TEMPERATURE: 97.1 F

## 2020-11-23 DIAGNOSIS — L72.3 INFECTED SEBACEOUS CYST: Primary | ICD-10-CM

## 2020-11-23 DIAGNOSIS — L08.9 INFECTED SEBACEOUS CYST: Primary | ICD-10-CM

## 2020-11-23 PROCEDURE — 99213 OFFICE O/P EST LOW 20 MIN: CPT | Performed by: FAMILY MEDICINE

## 2020-11-23 NOTE — PROGRESS NOTES
"Subjective   Dalton Dallas Sr. is a 80 y.o. male.     Chief Complaint   Patient presents with   • Cyst     on back has been bleeding       HPI  Chief complaint: Infected cyst    The patient is a 80-year-old white male states that he has had a cyst on his left upper back for the past several years.  He states in the past several days has become sore and is draining material.  He denied fever or chills.        The following portions of the patient's history were reviewed and updated as appropriate: allergies, current medications, past family history, past medical history, past social history, past surgical history and problem list.    Review of Systems    Objective     /72 (BP Location: Left arm, Patient Position: Sitting, Cuff Size: Large Adult)   Pulse 80   Temp 97.1 °F (36.2 °C) (Infrared)   Resp 20   Ht 177.8 cm (70\")   Wt (!) 137 kg (302 lb 3.2 oz)   SpO2 96%   BMI 43.36 kg/m²     Physical Exam  Constitutional:       Appearance: He is obese.   Cardiovascular:      Pulses: Normal pulses.   Pulmonary:      Effort: Pulmonary effort is normal.      Breath sounds: Normal breath sounds.   Abdominal:      General: Abdomen is flat. Bowel sounds are normal.      Palpations: Abdomen is soft.   Skin:     Comments: The patient has a moderate sized infected sebaceous cyst in the left upper back.   Neurological:      Mental Status: He is alert.           Assessment/Plan   Diagnoses and all orders for this visit:    1. Infected sebaceous cyst (Primary)  -     Incision & Drainage      There are no Patient Instructions on file for this visit.    Gabino Sibley Jr., MD    11/23/20  "

## 2020-11-23 NOTE — PROGRESS NOTES
Procedure   Incision & Drainage    Date/Time: 11/23/2020 4:13 PM  Performed by: Gabino Sibley Jr., MD  Authorized by: Gabino Sibley Jr., MD   Type: abscess  Body area: trunk  Anesthesia: local infiltration    Anesthesia:  Local anesthetic: 1% lidocaine.  Anesthetic total: 1 mL    Sedation:  Patient sedated: no    Scalpel size: 11  Needle gauge: 25.  Incision type: single straight  Complexity: simple  Drainage: purulent  Drainage amount: moderate  Wound treatment: drain placed  Packing material: 1/4 in iodoform gauze  Patient tolerance: patient tolerated the procedure well with no immediate complications  Comments: Local anesthesia patient underwent incision and drainage of infected sebaceous cyst on his left upper back.  He tolerated this well.  Moderate amount of purulent sebaceous material was removed from the cyst.  The wound was then packed with quarter inch iodoform gauze.  The wound was then dressed with this 4 x 4.  Patient was discharged home he was advised to have family members remove one inch of the packing a day and then keep the wound covered.    He is to follow-up in the office in 2 weeks.

## 2020-11-23 NOTE — PATIENT INSTRUCTIONS
Continue current medications.    Keep the wound covered.    Remove 1 inch of the iodoform gauze a day and cut it off.    Follow-up in the office in 2 weeks or sooner if needed.

## 2020-11-23 NOTE — TELEPHONE ENCOUNTER
Dorothea Francisco,  I spoke with the patient.  He is to be seen today to have the cyst drained.  He was told by me to come at 3:30 pm.  I am letting you know so that the appropriate screening questions can be completed.  Thanks, [facility]

## 2020-11-25 ENCOUNTER — LAB (OUTPATIENT)
Dept: LAB | Facility: HOSPITAL | Age: 80
End: 2020-11-25

## 2020-11-25 ENCOUNTER — HOSPITAL ENCOUNTER (OUTPATIENT)
Dept: ONCOLOGY | Facility: HOSPITAL | Age: 80
Setting detail: INFUSION SERIES
Discharge: HOME | End: 2020-11-25

## 2020-11-25 DIAGNOSIS — Z79.01 LONG TERM (CURRENT) USE OF ANTICOAGULANTS: Primary | ICD-10-CM

## 2020-11-25 DIAGNOSIS — I82.513 CHRONIC DEEP VEIN THROMBOSIS (DVT) OF FEMORAL VEIN OF BOTH LOWER EXTREMITIES (HCC): Primary | ICD-10-CM

## 2020-11-25 LAB — INR PPP: 2.8

## 2020-11-25 PROCEDURE — 85610 PROTHROMBIN TIME: CPT

## 2020-11-25 PROCEDURE — 36416 COLLJ CAPILLARY BLOOD SPEC: CPT

## 2020-12-10 DIAGNOSIS — D64.9 ANEMIA, UNSPECIFIED TYPE: Primary | ICD-10-CM

## 2020-12-11 ENCOUNTER — OFFICE VISIT (OUTPATIENT)
Dept: FAMILY MEDICINE CLINIC | Facility: CLINIC | Age: 80
End: 2020-12-11

## 2020-12-11 VITALS
SYSTOLIC BLOOD PRESSURE: 135 MMHG | TEMPERATURE: 96.9 F | DIASTOLIC BLOOD PRESSURE: 74 MMHG | RESPIRATION RATE: 16 BRPM | HEIGHT: 70 IN | OXYGEN SATURATION: 95 % | WEIGHT: 296 LBS | HEART RATE: 75 BPM | BODY MASS INDEX: 42.37 KG/M2

## 2020-12-11 DIAGNOSIS — L08.9 INFECTED SEBACEOUS CYST: Primary | ICD-10-CM

## 2020-12-11 DIAGNOSIS — L72.3 INFECTED SEBACEOUS CYST: Primary | ICD-10-CM

## 2020-12-11 PROCEDURE — 99212 OFFICE O/P EST SF 10 MIN: CPT | Performed by: NURSE PRACTITIONER

## 2020-12-11 NOTE — PROGRESS NOTES
"Subjective   Dalton Dallas . is a 80 y.o. male.     Chief Complaint   Patient presents with   • Cyst     Follow up        HPI  Follow up today for evalaution of a sebacous cyst that was I and D' last visit.   He has been removing the dressing daily and cleaning this. He has had no fever no other concerns.      The following portions of the patient's history were reviewed and updated as appropriate: allergies, current medications, past family history, past medical history, past social history, past surgical history and problem list.      Current Outpatient Medications:   •  Accu-Chek Irene Plus test strip, USE TO CHECK BLOOD SUGAR BEFORE MEALS AND AT BEDTIME, Disp: 400 each, Rfl: 2  •  amLODIPine-atorvastatin (CADUET) 5-20 MG per tablet, Take 1 tablet by mouth Daily., Disp: 90 tablet, Rfl: 2  •  atenolol (TENORMIN) 50 MG tablet, TAKE 1 TABLET DAILY, Disp: 90 tablet, Rfl: 4  •  clopidogrel (PLAVIX) 75 MG tablet, TAKE 1 TABLET DAILY, Disp: 90 tablet, Rfl: 3  •  Empagliflozin (Jardiance) 10 MG tablet, Take 10 mg by mouth Daily., Disp: 90 tablet, Rfl: 3  •  fenofibrate (TRICOR) 54 MG tablet, TAKE 1 TABLET DAILY, Disp: 90 tablet, Rfl: 4  •  ferrous sulfate 325 (65 FE) MG tablet, Take 325 mg by mouth 2 (Two) Times a Day., Disp: , Rfl:   •  gabapentin (NEURONTIN) 100 MG capsule, Take 1 capsule by mouth 3 (Three) Times a Day., Disp: 270 capsule, Rfl: 1  •  hydroCHLOROthiazide (HYDRODIURIL) 25 MG tablet, TAKE 1 TABLET DAILY, Disp: 90 tablet, Rfl: 4  •  Insulin Pen Needle (BD ULTRA-FINE PEN NEEDLES) 29G X 12.7MM misc, Use with insulin injections three times daily, Disp: 300 each, Rfl: 3  •  insulin regular (HUMULIN R) 500 UNIT/ML CONCENTRATED injection, Inject 24 units in the AM and 38 units in the PM, Disp: 10 mL, Rfl: 0  •  Insulin Syringe 30G X 1/2\" 0.5 ML misc, Use to inject insulin 2 times daily DX E11.65, Disp: 200 each, Rfl: 3  •  Insulin Syringe-Needle U-100 (BD Insulin Syringe Ultrafine) 31G X 5/16\" 0.5 ML " misc, Use 1 new syringe with each dose of insulin twice a day. 200, Disp: 200 each, Rfl: 3  •  irbesartan (AVAPRO) 300 MG tablet, TAKE 1 TABLET DAILY, Disp: 90 tablet, Rfl: 3  •  levothyroxine (SYNTHROID, LEVOTHROID) 175 MCG tablet, TAKE 1 TABLET DAILY, Disp: 90 tablet, Rfl: 3  •  metFORMIN ER (GLUCOPHAGE-XR) 500 MG 24 hr tablet, TAKE 4 TABLETS AT SUPPER, Disp: 360 tablet, Rfl: 4  •  Omega-3 Fatty Acids (FISH OIL) 1000 MG capsule capsule, Take 1,000 mg by mouth 2 (Two) Times a Day., Disp: , Rfl:   •  Pramlintide Acetate 2700 MCG/2.7ML solution pen-injector, Inject 120 mcg under the skin into the appropriate area as directed 3 (Three) Times a Day., Disp: 32.4 mL, Rfl: 4  •  vitamin B-12 (CYANOCOBALAMIN) 100 MCG tablet, Take 100 mcg by mouth Daily., Disp: , Rfl:   •  vitamin D (ERGOCALCIFEROL) 1.25 MG (62280 UT) capsule capsule, TAKE 1 CAPSULE ONCE A WEEK, Disp: 12 capsule, Rfl: 3  •  warfarin (COUMADIN) 3 MG tablet, TAKE 1 TABLET DAILY, Disp: 90 tablet, Rfl: 3  •  warfarin (COUMADIN) 5 MG tablet, Take 1 tablet by mouth Take As Directed., Disp: 90 tablet, Rfl: 3  •  Fluzone High-Dose Quadrivalent 0.7 ML suspension prefilled syringe, ADM 0.7ML IM UTD, Disp: , Rfl:     Recent Results (from the past 4032 hour(s))   Protime-INR, Fingerstick    Collection Time: 07/09/20 10:30 AM    Specimen: Capillary Blood   Result Value Ref Range    INR 2.00    Stress Test With Myocardial Perfusion One Day    Collection Time: 07/27/20 10:35 AM   Result Value Ref Range    BH CV STRESS PROTOCOL 1 Pharmacologic     Stage 1 1     HR Stage 1 70     BP Stage 1 118/60     Duration Min Stage 1 0     Duration Sec Stage 1 10     Stress Dose Regadenoson Stage 1 0.4     Stress Comments Stage 1 10 sec bolus injection     Stage 2 2     HR Stage 2 75     BP Stage 2 110/60     Duration Min Stage 2 4     Duration Sec Stage 2 0     Stress Comments Stage 2 recovery     Baseline HR 70 bpm    Baseline /60 mmHg    Peak HR 75 bpm    Percent Max Pred HR  52.82 %    Percent Target HR 62 %    Peak /60 mmHg    Recovery HR 70 bpm    Recovery /60 mmHg    Target HR (85%) 121 bpm    Max. Pred. HR (100%) 142 bpm    Nuclear Prior Study 3     Nuc Stress EF 63 %   Protime-INR, Fingerstick    Collection Time: 08/06/20 10:24 AM    Specimen: Capillary Blood   Result Value Ref Range    INR 2.50    Protime-INR, Fingerstick    Collection Time: 09/01/20  1:12 PM    Specimen: Capillary Blood   Result Value Ref Range    INR 2.20    Protime-INR    Collection Time: 09/28/20 12:29 PM    Specimen: Blood   Result Value Ref Range    Protime 22.9 19.4 - 28.5 Seconds    INR 2.16 2.00 - 3.00   Duplex Venous Lower Extremity - Left    Collection Time: 09/28/20  1:28 PM   Result Value Ref Range    Right Common Femoral Spont Y     Right Common Femoral Phasic Y     Right Common Femoral Augment Y     Right Common Femoral Competent Y     Right Common Femoral Compress C     Left Common Femoral Spont Y     Left Common Femoral Phasic Y     Left Common Femoral Augment Y     Left Common Femoral Competent Y     Left Common Femoral Compress C     Left Saphenofemoral Junction Compress C     Left Proximal Femoral Compress C     Left Mid Femoral Spont Y     Left Mid Femoral Phasic Y     Left Mid Femoral Augment Y     Left Mid Femoral Competent Y     Left Mid Femoral Compress C     Left Distal Femoral Compress C     Left Popliteal Spont Y     Left Popliteal Phasic Y     Left Popliteal Augment Y     Left Popliteal Competent Y     Left Popliteal Compress C     Left Posterior Tibial Compress C     Left Peroneal Compress C     Left GastronemiusSoleal Compress C     Left Greater Saph AK Compress C     Left Greater Saph BK Compress C     Left Lesser Saph Compress C    Protime-INR, Fingerstick    Collection Time: 09/29/20 10:54 AM    Specimen: Capillary Blood   Result Value Ref Range    INR 2.30    Hemoglobin A1c    Collection Time: 10/28/20  9:07 AM    Specimen: Blood   Result Value Ref Range    Hemoglobin  "A1C 7.0 (H) 3.5 - 5.6 %   Comprehensive Metabolic Panel    Collection Time: 10/28/20  9:07 AM    Specimen: Blood   Result Value Ref Range    Glucose 158 (H) 65 - 99 mg/dL    BUN 17 8 - 23 mg/dL    Creatinine 1.02 0.76 - 1.27 mg/dL    Sodium 139 136 - 145 mmol/L    Potassium 5.1 3.5 - 5.2 mmol/L    Chloride 104 98 - 107 mmol/L    CO2 24.5 22.0 - 29.0 mmol/L    Calcium 9.7 8.6 - 10.5 mg/dL    Total Protein 6.8 6.0 - 8.5 g/dL    Albumin 4.50 3.50 - 5.20 g/dL    ALT (SGPT) 26 1 - 41 U/L    AST (SGOT) 24 1 - 40 U/L    Alkaline Phosphatase 36 (L) 39 - 117 U/L    Total Bilirubin 0.3 0.0 - 1.2 mg/dL    eGFR Non African Amer 71 >60 mL/min/1.73    Globulin 2.3 gm/dL    A/G Ratio 2.0 g/dL    BUN/Creatinine Ratio 16.7 7.0 - 25.0    Anion Gap 10.5 5.0 - 15.0 mmol/L   T4, Free    Collection Time: 10/28/20  9:07 AM    Specimen: Blood   Result Value Ref Range    Free T4 1.56 0.93 - 1.70 ng/dL   TSH    Collection Time: 10/28/20  9:07 AM    Specimen: Blood   Result Value Ref Range    TSH 4.050 0.270 - 4.200 uIU/mL   Protime-INR, Fingerstick    Collection Time: 10/28/20  9:32 AM    Specimen: Capillary Blood   Result Value Ref Range    INR 2.50    POC Glucose    Collection Time: 11/04/20 10:52 AM    Specimen: Blood   Result Value Ref Range    Glucose 118 (H) 70 - 105 mg/dL   Protime-INR, Fingerstick    Collection Time: 11/25/20 10:11 AM    Specimen: Capillary Blood   Result Value Ref Range    INR 2.80          Review of Systems   Skin:        Healing lesion to back.        Objective     /74 (BP Location: Left arm, Patient Position: Sitting, Cuff Size: Adult)   Pulse 75   Temp 96.9 °F (36.1 °C) (Temporal)   Resp 16   Ht 177.8 cm (70\")   Wt 134 kg (296 lb)   SpO2 95%   BMI 42.47 kg/m²     Physical Exam  HENT:      Head: Normocephalic.   Skin:         Neurological:      General: No focal deficit present.      Mental Status: He is alert.   Psychiatric:         Mood and Affect: Mood normal.         Thought Content: Thought " content normal.           Assessment/Plan   Diagnoses and all orders for this visit:    1. Infected sebaceous cyst (Primary)  Comments:  healing well.       Patient Instructions   Keep clean and dry look at it daily in couple days wont need dressing. Get seen if symptoms worsen      Joan Johns, APRN    12/11/20

## 2020-12-11 NOTE — PATIENT INSTRUCTIONS
Keep clean and dry look at it daily in couple days wont need dressing. Get seen if symptoms worsen

## 2020-12-16 ENCOUNTER — TELEPHONE (OUTPATIENT)
Dept: ONCOLOGY | Facility: CLINIC | Age: 80
End: 2020-12-16

## 2020-12-16 NOTE — TELEPHONE ENCOUNTER
LM ASKING PT TO CALL BACK TO R/S MD APPT. ALSO TOLD HIM THAT WE CAN STILL CHECK HIS LABS IF HE PREFERS AND MOVE ONLY THE MD

## 2020-12-17 ENCOUNTER — APPOINTMENT (OUTPATIENT)
Dept: LAB | Facility: HOSPITAL | Age: 80
End: 2020-12-17

## 2020-12-17 ENCOUNTER — TELEPHONE (OUTPATIENT)
Dept: ONCOLOGY | Facility: CLINIC | Age: 80
End: 2020-12-17

## 2020-12-17 NOTE — TELEPHONE ENCOUNTER
PT RETURNED MY CALL AND CONFIRMED A NEW APPT FOR LAB AND MD DAWSON HE DECLINED TO COME IN SOONER TO HAVE HIS LABS CHECKED

## 2020-12-28 NOTE — PROGRESS NOTES
HEMATOLOGY ONCOLOGY FOLLOW UP        Patient name: Dalton Dallas Sr.  : 1940  MRN: [id number]  Primary Care Physician: Gabino Sibley Jr., MD  Referring Physician: Gabino Sibley Jr., *  Reason For Consult:     Chief Complaint   Patient presents with   • Follow-up     Other chronic pulmonary embolism without acute cor pulmonale (CMS/HCC)   History of PE and DVT  Anticoagulation management  Anemia    History of Present Illness:  Mr. Dallas is a 80-year-old gentleman, remote smoker, with a   history of diabetes, coronary artery disease and hypertension who presented to [facility] on 14 with symptoms of shortness of breath and chest discomfort.  CT scan of the   chest on 14 showed bilateral pulmonary emboli with large clot burden.  There were filling   defects predominantly in the right upper lobe, and lower and middle lobe pulmonary arterial trees   along with emboli in the left upper lobe and left lower lobe arterial tree as well.  Mediastinal,   hilar and subcarinal lymphadenopathy was also noted.  The largest lymph node in the subcarinal   location measured 3.9 x 1.9 cm.  The patient was started on anticoagulation.  Bilateral lower   extremity venous Doppler on 14 showed a filling defect within the left gastroc veins.  The   patient was put on anticoagulation.  Thrombolysis was also under consideration considering the   extensive clot burden, but the patient did not require it.       Patient denied any prior history of thromboembolism before this episode.  His brother may have had   a leg DVT.    · 14 - Creatinine 1.3, BUN 20.  · 14 - CBC:  WBC 14.4, hemoglobin 14.9, platelet count 241,000.   · 14 - Factor V Leiden negative.  Factor VIII level 323 (H).  Protein C activity 112.6% (N).    Protein S activity 11.3 (L).  Activated protein C resistance 2.1 (L).  Antithrombin III 83% (N).    D-dimer 2.15.    · 14 - Serum homocysteine  8 (N).  Anticardiolipin antibody negative.  Phosphatidylserine   antibody negative.  Beta-2 glycoprotein antibody negative.   · 2/19/14 - Prothrombin gene mutation negative.    · 2/20/14 - FLAKO-2 mutation test negative.    · 3/10/14 - Protein S activity 93% (N).  Homocysteine level 11 (N).    · 4/11/14 - Factor VIII level 260 (H).   · 4/11/14 - CT scan of the chest without contrast:  Complete resolution of air space opacities in   the lower lobes.  Mediastinal lymphadenopathy is overall very similar to the prior study.  It is   nonspecific.  Three vessel coronary artery atherosclerotic calcification.  Questionable lobular   contour of the liver, question cirrhosis.  AP window lymph node measures about 1.1 cm,   paratracheal lymph node measures about 1.7 cm and subcarinal lymph node measures about 1.2 cm.  · 5/2/14 - INR 4.1.   · 7/30/14 - WBC 5.7, hemoglobin 12.1, platelet count 228,000, MCV 94.0.  INR 3.0.   · 10/17/14 - CT scan of the chest:  Chronic lung changes are stable.  Several borderline to mildly   enlarged mediastinal lymph nodes are also unchanged from six months ago.  They are likely benign   reactive lymph nodes.    · 10/29/14 - Vitamin B12 185, ferritin 57, iron saturation 19%, TIBC 473 (H), serum iron 88,   creatinine 1.3, BUN 18, folate 17.7.  · 11/4/14 - Patient underwent upper GI endoscopy and colonoscopy:  Several nonbleeding diverticula   were seen in the sigmoid colon.  Diverticulosis appeared moderate severity.  Four sessile polyps   of benign appearance were found in the cecum and descending colon.  Polypectomies were performed.    Surgical pathology:  Sessile serrated polyps.  A tubular adenoma is noted in the descending   colon.    · 11/25/14 - INR 4.2.   · 11/25/14 - WBC 5.7, hemoglobin 11.4, platelet count 210,000.    · 2/24/15 - WBC 7.7, hemoglobin 12.9, platelet count 245,000, MCV 93,000.  · 2/24/15 - INR 2.6.  Patient’s Coumadin dose is 9 mg.     · 3/12/15 - Creatinine 1.3.    · 4/21/15  - Patient admitted with chest pain and had cardiac catheterization.  He had a stent   placed.    · 5/8/15 - CT scan of the chest without contrast:  Stable appearance of mediastinal   lymphadenopathy over the past two studies going back to April 2014.  Stable pulmonary fibrotic   changes and scarring.    · 6/25/15 - WBC 5.3, hemoglobin 12.6, platelet count 187,000.     · 12/17/15 - WBC 5.1, hemoglobin 13.6, platelet count 208,000, MCV 93.1.  Iron saturation 21%,   TIBC 415, serum iron 85.    · 12/17/15 - Chest x-ray:  No acute process.     · 4/19/16 - INR 2.1.    · 5/17/16 - INR 2.2.  · 6/16/16 - WBC 5.5, hemoglobin 12.7, MCV 90.5, platelet count 205,000.  INR 2.3 (patient on   Coumadin 7.5 mg).    · 12/15/16 - WBC 5.0, hemoglobin 13.0, platelet count 174,000.  INR 1.9 (Coumadin dose 7.5).   · 6/15/17 - WBC 5.5, hemoglobin 12.5, platelet count 168,000, MCV 94.4.  INR 2.6 (Coumadin dose 8   mg).   · 11/2/17 - INR 2.    · 12/14/17 - WBC 5.04, hemoglobin 12.5, platelet count 167,000, MCV 94.    · 6/14/18 - WBC 4.9, hemoglobin 12.5, platelet count 172,000, MCV 93.5.    · 12/13/18 - INR 2.4.  WBC 4.9, hemoglobin 12.8, platelet count 180,000.    11/23/2019 INR is 2.55  11/11/2019: Colonoscopy: Cecum polypectomy shows tubular adenoma, transverse colon polypectomy shows tubular adenoma.   Patient readmitted after colonoscopy to hospital due to GI bleeding from polyp biopsy..  11/22/2019 hemoglobin 9.3 due to GI bleeding  11/23/2019 cecal ulcer with visible vessel and stigmata of recent bleeding which was clipped  Moderate left-sided diverticulosis  12/12/2019 INR 1.9  12/12/2019 WBC 4.9, hemoglobin 11.2, MCV 98.0, platelets 182, ferritin 85.2, iron 115, iron saturation 24%, TIBC 471, B12 greater than 2000, folate 13.8,  4/23/2020: ESR 12  5/6/2020: 25 hydroxy vitamin D 31.9  6/11/2020: INR 2.1, WBC 6.3, hemoglobin 13.5, platelets 204,   9/28/2020: Lower extremity venous Doppler:: Normal  11/25/2020: INR 2.8  12/31/2020: WBC  6.4, hemoglobin 13.7, platelets 212, INR 1.2,          Subjective:  Here for a follow up of DVT. He continues taking coumadin and gets his INR's checked regularly.  For some unclear reason INR had dropped today.  Patient states he ate liver last night.  Also takes iron supplement but does not report any constipation issues. He follows up with Dr. Shaver for diabetic management.   He continues to wear compression stockings. He has some BL LE swelling, but states the swelling has reduced since last visit.    The following portions of the patient's history were reviewed and updated as appropriate: allergies, current medications, past family history, past medical history, past social history, past surgical history and problem list.         Past Medical History:   Diagnosis Date   • ACE-inhibitor cough 06/2005   • Coronary artery disease    • Diabetes mellitus, type 2 (CMS/HCC) 1995   • ED (erectile dysfunction)    • Hx of blood clots 2014    Blood clot left leg    • Hyperlipidemia 08/2000   • Hypertension    • Hypogonadism, male 1998   • Hypothyroidism 06/2001   • Obesity    • Peripheral neuropathy    • Pulmonary embolism (CMS/HCC) 02/2014   • Rectal fistula    • Vitamin D deficiency        Past Surgical History:   Procedure Laterality Date   • CARDIAC CATHETERIZATION  2008    PTCA: 2008; PTCA: 4/21/2015 LCX stent    • CARDIOVASCULAR STRESS TEST  2020   • CATARACT EXTRACTION  01/11/2016 1-4-16 & 1-11-16    • COLONOSCOPY N/A 11/23/2019    Procedure: COLONOSCOPY WITH ENDOSCOPIC CLIPPING X1 OF POST POLYPECTOMY BLEED SITE;  Surgeon: Jhonny Orellana MD;  Location: [facility] END[facility];  Service: Gastroenterology   • CORONARY STENT PLACEMENT     • INGUINAL HERNIA REPAIR Left    • UMBILICAL HERNIA REPAIR           Current Outpatient Medications:   •  Accu-Chek Irene Plus test strip, USE TO CHECK BLOOD SUGAR BEFORE MEALS AND AT BEDTIME, Disp: 400 each, Rfl: 2  •  amLODIPine-atorvastatin (CADUET) 5-20 MG per tablet, Take 1  "tablet by mouth Daily., Disp: 90 tablet, Rfl: 2  •  atenolol (TENORMIN) 50 MG tablet, TAKE 1 TABLET DAILY, Disp: 90 tablet, Rfl: 4  •  clopidogrel (PLAVIX) 75 MG tablet, TAKE 1 TABLET DAILY, Disp: 90 tablet, Rfl: 3  •  Empagliflozin (Jardiance) 10 MG tablet, Take 10 mg by mouth Daily., Disp: 90 tablet, Rfl: 3  •  fenofibrate (TRICOR) 54 MG tablet, TAKE 1 TABLET DAILY, Disp: 90 tablet, Rfl: 4  •  ferrous sulfate 325 (65 FE) MG tablet, Take 325 mg by mouth 2 (Two) Times a Day., Disp: , Rfl:   •  Fluzone High-Dose Quadrivalent 0.7 ML suspension prefilled syringe, ADM 0.7ML IM UTD, Disp: , Rfl:   •  gabapentin (NEURONTIN) 100 MG capsule, Take 1 capsule by mouth 3 (Three) Times a Day., Disp: 270 capsule, Rfl: 1  •  hydroCHLOROthiazide (HYDRODIURIL) 25 MG tablet, TAKE 1 TABLET DAILY, Disp: 90 tablet, Rfl: 4  •  Insulin Pen Needle (BD ULTRA-FINE PEN NEEDLES) 29G X 12.7MM misc, Use with insulin injections three times daily, Disp: 300 each, Rfl: 3  •  insulin regular (HUMULIN R) 500 UNIT/ML CONCENTRATED injection, Inject 24 units in the AM and 38 units in the PM, Disp: 10 mL, Rfl: 0  •  Insulin Syringe 30G X 1/2\" 0.5 ML misc, Use to inject insulin 2 times daily DX E11.65, Disp: 200 each, Rfl: 3  •  Insulin Syringe-Needle U-100 (BD Insulin Syringe Ultrafine) 31G X 5/16\" 0.5 ML misc, Use 1 new syringe with each dose of insulin twice a day. 200, Disp: 200 each, Rfl: 3  •  irbesartan (AVAPRO) 300 MG tablet, TAKE 1 TABLET DAILY, Disp: 90 tablet, Rfl: 3  •  levothyroxine (SYNTHROID, LEVOTHROID) 175 MCG tablet, TAKE 1 TABLET DAILY, Disp: 90 tablet, Rfl: 3  •  metFORMIN ER (GLUCOPHAGE-XR) 500 MG 24 hr tablet, TAKE 4 TABLETS AT SUPPER, Disp: 360 tablet, Rfl: 4  •  Omega-3 Fatty Acids (FISH OIL) 1000 MG capsule capsule, Take 1,000 mg by mouth 2 (Two) Times a Day., Disp: , Rfl:   •  Pramlintide Acetate 2700 MCG/2.7ML solution pen-injector, Inject 120 mcg under the skin into the appropriate area as directed 3 (Three) Times a Day., Disp: " 32.4 mL, Rfl: 4  •  vitamin B-12 (CYANOCOBALAMIN) 100 MCG tablet, Take 100 mcg by mouth Daily., Disp: , Rfl:   •  vitamin D (ERGOCALCIFEROL) 1.25 MG (50784 UT) capsule capsule, TAKE 1 CAPSULE ONCE A WEEK, Disp: 12 capsule, Rfl: 3  •  warfarin (COUMADIN) 3 MG tablet, TAKE 1 TABLET DAILY, Disp: 90 tablet, Rfl: 3  •  warfarin (COUMADIN) 5 MG tablet, Take 1 tablet by mouth Take As Directed., Disp: 90 tablet, Rfl: 3    No Known Allergies    Family History   Problem Relation Age of Onset   • Heart disease Mother    • Leukemia Father        Cancer-related family history is not on file.    Social History     Tobacco Use   • Smoking status: Former Smoker   • Smokeless tobacco: Never Used   • Tobacco comment: quit 1977   Substance Use Topics   • Alcohol use: No     Frequency: Never   • Drug use: No         I have reviewed the history of present illness, past medical history, family history, social history, lab results, all notes and other records since the patient was last seen on  9/23/2019.    ROS:   Review of Systems   Constitutional: Negative for chills and fever.   HENT: Negative for ear pain, mouth sores, nosebleeds and sore throat.    Eyes: Negative for photophobia and visual disturbance.   Respiratory: Positive for stridor. Negative for wheezing.    Cardiovascular: Negative for chest pain and palpitations.   Gastrointestinal: Negative for abdominal pain, diarrhea, nausea and vomiting.   Endocrine: Negative for cold intolerance and heat intolerance.   Genitourinary: Negative for dysuria and hematuria.   Musculoskeletal: Positive for arthralgias. Negative for joint swelling and neck stiffness.   Skin: Negative for color change and rash.   Neurological: Negative for seizures and syncope.   Hematological: Negative for adenopathy.        No obvious bleeding   Psychiatric/Behavioral: Negative for agitation, confusion and hallucinations.       Objective:  Vital signs:  Vitals:    12/31/20 1212   BP: 137/62   Pulse: 70  "  Resp: 18   Temp: 97.9 °F (36.6 °C)   Weight: 136 kg (299 lb 3.2 oz)   Height: 177.8 cm (70\")   PainSc:   2     ECOG  (1) Restricted in physically strenuous activity, ambulatory and able to do work of light nature    Physical Exam:   Physical Exam   Constitutional: He is oriented to person, place, and time. He appears well-developed. No distress.   Obese male   HENT:   Head: Normocephalic and atraumatic.   Eyes: Conjunctivae are normal. Right eye exhibits no discharge. Left eye exhibits no discharge. No scleral icterus.   Neck: Normal range of motion. Neck supple. No thyromegaly present.   Cardiovascular: Normal rate, regular rhythm and normal heart sounds. Exam reveals no gallop and no friction rub.   Pulmonary/Chest: Effort normal. No stridor. No respiratory distress. He has no wheezes.   Abdominal: Soft. Normal appearance and bowel sounds are normal. He exhibits no mass. There is no abdominal tenderness. There is no rebound and no guarding.   Musculoskeletal: Normal range of motion. No swelling, tenderness or signs of injury.   Lymphadenopathy:     He has no cervical adenopathy.   Neurological: He is alert and oriented to person, place, and time. He exhibits normal muscle tone.   Skin: Skin is warm. No rash noted. He is not diaphoretic. No erythema.   Psychiatric: His behavior is normal. Mood, judgment and thought content normal.   Nursing note and vitals reviewed.            Lab Results - Last 18 Months   Lab Units 12/31/20  1118 06/11/20  1057 04/23/20  1446   WBC 10*3/mm3 6.49 6.37 7.25   HEMOGLOBIN g/dL 13.7 13.5 13.2   HEMATOCRIT % 41.4 39.4 39.5   PLATELETS 10*3/mm3 212 204 206   MCV fL 93.7 91.8 92.1     Lab Results - Last 18 Months   Lab Units 10/28/20  0907 05/06/20  0905 04/23/20  1446   SODIUM mmol/L 139 136 140   POTASSIUM mmol/L 5.1 4.4 4.1   CHLORIDE mmol/L 104 97* 101   CO2 mmol/L 24.5 22.9 23.8   BUN mg/dL 17 20 19   CREATININE mg/dL 1.02 1.24 1.16   CALCIUM mg/dL 9.7 9.7 9.7   BILIRUBIN mg/dL " 0.3 0.5 0.4   ALK PHOS U/L 36* 39 41   ALT (SGPT) U/L 26 21 22   AST (SGOT) U/L 24 21 19   GLUCOSE mg/dL 158* 330* 175*       Lab Results   Component Value Date    GLUCOSE 158 (H) 10/28/2020    BUN 17 10/28/2020    CREATININE 1.02 10/28/2020    EGFRIFNONA 71 10/28/2020    BCR 16.7 10/28/2020    K 5.1 10/28/2020    CO2 24.5 10/28/2020    CALCIUM 9.7 10/28/2020    ALBUMIN 4.50 10/28/2020    LABIL2 1.7 03/28/2019    AST 24 10/28/2020    ALT 26 10/28/2020       Lab Results - Last 18 Months   Lab Units 12/31/20  1118 11/25/20  1011 10/28/20  0932  11/22/19  1237   INR  1.20 2.80 2.50   < > 2.86   APTT seconds  --   --   --   --  31.5*    < > = values in this interval not displayed.       Lab Results   Component Value Date    IRON 115 12/12/2019    TIBC 471 12/12/2019    FERRITIN 85.29 12/12/2019       Lab Results   Component Value Date    FOLATE 13.80 12/12/2019       No results found for: OCCULTBLD    No results found for: RETICCTPCT    Lab Results   Component Value Date    VRVHFLCJ85 >2,000 (H) 12/12/2019     No results found for: SPEP, UPEP  No results found for: LDH, URICACID  Lab Results   Component Value Date    SEDRATE 12 04/23/2020     No results found for: FIBRINOGEN, HAPTOGLOBIN  Lab Results   Component Value Date    PTT 31.5 (H) 11/22/2019    INR 1.20 12/31/2020     No results found for:   No results found for: CEA  No components found for: CA-19-9  No results found for: PSA      Assessment/Plan     Assessment:  1. History of bilateral pulmonary emboli with high clot burden and DVT:  His thrombosis was   unprovoked.  Hypercoagulability testing revealed elevated factor VIII level on two occasions.  He   is on anticoagulation with Coumadin.  He also has a questionable positive family history of DVT.    He has been recommended indefinite lifetime anticoagulation.  His INRs have been very stable.  2. History of mediastinal lymphadenopathy:  His recent CT scan shows stable lymphadenopathy.    These are very  likely benign lymph nodes.  His last chest x-ray was unremarkable.    3. Mild CKD:  He has slight anemia of chronic kidney disease.   4. History of iron deficiency:  He had a colonoscopy and that showed polyps and moderately severe   diverticulosis.  Pathology showed a tubular adenoma in the descending colon.  He is on iron   supplements p.o. BID. History of iron deficiency.  Monitoring iron levels.  5. Vitamin B12 deficiency:  He is on p.o. B12 supplements.  6. Dizziness/light-headedness:  Possibly could be from orthostatic hypertension. .    He will see Dr. Sibley for this problem.    7.  History of  GI bleeding.  Due to polypectomy from colonoscopy.     PLAN:         1.  Continue same dose Coumadin.  But advised to take extra 3 mg tonight.  Repeat INR in about 2 to 3 weeks.  Will need to continue anticoagulation indefinitely since patient has history of large unprovoked PE and DVT.  2.   Continue oral iron supplement.    3. Advised patient to discuss with cardiologist if he can come off Plavix as it has been more than a year since he had his stents placed.   .  4.  Follow-up in 6 months          There are no diagnoses linked to this encounter.  No orders of the defined types were placed in this encounter.                  Thank you very much for providing the opportunity to participate in this patient’s care. Please do not hesitate to call if there are any other questions.    I have reviewed and confirmed the accuracy of the patient's history: Chief complaint, HPI, ROS, Subjective and Past Family Social History as entered by the MA/LPN/RN. Hadley Gaffney MD 12/31/20         Electronically signed by Hadley Gaffney MD, 12/31/20, 12:29 PM EST.

## 2020-12-31 ENCOUNTER — TELEPHONE (OUTPATIENT)
Dept: ONCOLOGY | Facility: HOSPITAL | Age: 80
End: 2020-12-31

## 2020-12-31 ENCOUNTER — ANTICOAGULATION VISIT (OUTPATIENT)
Dept: ONCOLOGY | Facility: HOSPITAL | Age: 80
End: 2020-12-31

## 2020-12-31 ENCOUNTER — OFFICE VISIT (OUTPATIENT)
Dept: ONCOLOGY | Facility: CLINIC | Age: 80
End: 2020-12-31

## 2020-12-31 ENCOUNTER — LAB (OUTPATIENT)
Dept: LAB | Facility: HOSPITAL | Age: 80
End: 2020-12-31

## 2020-12-31 VITALS
HEIGHT: 70 IN | SYSTOLIC BLOOD PRESSURE: 137 MMHG | WEIGHT: 299.2 LBS | DIASTOLIC BLOOD PRESSURE: 62 MMHG | HEART RATE: 70 BPM | TEMPERATURE: 97.9 F | BODY MASS INDEX: 42.83 KG/M2 | RESPIRATION RATE: 18 BRPM

## 2020-12-31 DIAGNOSIS — D64.9 ANEMIA, UNSPECIFIED TYPE: ICD-10-CM

## 2020-12-31 DIAGNOSIS — I82.513 CHRONIC DEEP VEIN THROMBOSIS (DVT) OF FEMORAL VEIN OF BOTH LOWER EXTREMITIES (HCC): ICD-10-CM

## 2020-12-31 DIAGNOSIS — Z79.01 LONG TERM (CURRENT) USE OF ANTICOAGULANTS: ICD-10-CM

## 2020-12-31 DIAGNOSIS — Z79.01 LONG TERM (CURRENT) USE OF ANTICOAGULANTS: Primary | ICD-10-CM

## 2020-12-31 LAB
BASOPHILS # BLD AUTO: 0.05 10*3/MM3 (ref 0–0.2)
BASOPHILS NFR BLD AUTO: 0.8 % (ref 0–1.5)
DEPRECATED RDW RBC AUTO: 44.6 FL (ref 37–54)
EOSINOPHIL # BLD AUTO: 0.22 10*3/MM3 (ref 0–0.4)
EOSINOPHIL NFR BLD AUTO: 3.4 % (ref 0.3–6.2)
ERYTHROCYTE [DISTWIDTH] IN BLOOD BY AUTOMATED COUNT: 13.5 % (ref 12.3–15.4)
FERRITIN SERPL-MCNC: 100.8 NG/ML (ref 30–400)
HCT VFR BLD AUTO: 41.4 % (ref 37.5–51)
HGB BLD-MCNC: 13.7 G/DL (ref 13–17.7)
INR PPP: 1.2
IRON 24H UR-MRATE: 113 MCG/DL (ref 59–158)
IRON SATN MFR SERPL: 28 % (ref 20–50)
LYMPHOCYTES # BLD AUTO: 2.19 10*3/MM3 (ref 0.7–3.1)
LYMPHOCYTES NFR BLD AUTO: 33.7 % (ref 19.6–45.3)
MCH RBC QN AUTO: 31 PG (ref 26.6–33)
MCHC RBC AUTO-ENTMCNC: 33.1 G/DL (ref 31.5–35.7)
MCV RBC AUTO: 93.7 FL (ref 79–97)
MONOCYTES # BLD AUTO: 0.47 10*3/MM3 (ref 0.1–0.9)
MONOCYTES NFR BLD AUTO: 7.2 % (ref 5–12)
NEUTROPHILS NFR BLD AUTO: 3.56 10*3/MM3 (ref 1.7–7)
NEUTROPHILS NFR BLD AUTO: 54.9 % (ref 42.7–76)
PLATELET # BLD AUTO: 212 10*3/MM3 (ref 140–450)
PMV BLD AUTO: 10.5 FL (ref 6–12)
RBC # BLD AUTO: 4.42 10*6/MM3 (ref 4.14–5.8)
TIBC SERPL-MCNC: 407 MCG/DL (ref 298–536)
TRANSFERRIN SERPL-MCNC: 273 MG/DL (ref 200–360)
WBC # BLD AUTO: 6.49 10*3/MM3 (ref 3.4–10.8)

## 2020-12-31 PROCEDURE — 82728 ASSAY OF FERRITIN: CPT

## 2020-12-31 PROCEDURE — 83540 ASSAY OF IRON: CPT

## 2020-12-31 PROCEDURE — 85610 PROTHROMBIN TIME: CPT

## 2020-12-31 PROCEDURE — 99214 OFFICE O/P EST MOD 30 MIN: CPT | Performed by: INTERNAL MEDICINE

## 2020-12-31 PROCEDURE — 36415 COLL VENOUS BLD VENIPUNCTURE: CPT

## 2020-12-31 PROCEDURE — 84466 ASSAY OF TRANSFERRIN: CPT

## 2020-12-31 PROCEDURE — 85025 COMPLETE CBC W/AUTO DIFF WBC: CPT

## 2020-12-31 NOTE — TELEPHONE ENCOUNTER
I attempted to reach pt to get INR appointment with no answer, voicemail was left for pt to return call to office.

## 2021-01-13 ENCOUNTER — OFFICE VISIT (OUTPATIENT)
Dept: CARDIOLOGY | Facility: CLINIC | Age: 81
End: 2021-01-13

## 2021-01-13 VITALS
HEIGHT: 70 IN | SYSTOLIC BLOOD PRESSURE: 130 MMHG | WEIGHT: 304 LBS | BODY MASS INDEX: 43.52 KG/M2 | DIASTOLIC BLOOD PRESSURE: 72 MMHG | HEART RATE: 72 BPM

## 2021-01-13 DIAGNOSIS — I25.10 CORONARY ARTERY DISEASE INVOLVING NATIVE CORONARY ARTERY OF NATIVE HEART WITHOUT ANGINA PECTORIS: Primary | ICD-10-CM

## 2021-01-13 DIAGNOSIS — I10 ESSENTIAL HYPERTENSION: ICD-10-CM

## 2021-01-13 DIAGNOSIS — I50.32 DIASTOLIC DYSFUNCTION WITH CHRONIC HEART FAILURE (HCC): ICD-10-CM

## 2021-01-13 DIAGNOSIS — E11.40 TYPE 2 DIABETES MELLITUS WITH DIABETIC NEUROPATHY, WITH LONG-TERM CURRENT USE OF INSULIN (HCC): ICD-10-CM

## 2021-01-13 DIAGNOSIS — Z79.4 TYPE 2 DIABETES MELLITUS WITH DIABETIC NEUROPATHY, WITH LONG-TERM CURRENT USE OF INSULIN (HCC): ICD-10-CM

## 2021-01-13 DIAGNOSIS — E78.2 MIXED HYPERLIPIDEMIA: ICD-10-CM

## 2021-01-13 PROCEDURE — 99214 OFFICE O/P EST MOD 30 MIN: CPT | Performed by: INTERNAL MEDICINE

## 2021-01-13 PROCEDURE — 93000 ELECTROCARDIOGRAM COMPLETE: CPT | Performed by: INTERNAL MEDICINE

## 2021-01-13 NOTE — PROGRESS NOTES
Date of Office Visit: 2021  Encounter Provider: Dr. Edwar Ackerman  Place of Service: [facility] CARDIOLOGY [place]  Patient Name: Dalton Dallas Sr.  :1940  Gabino Sibley Jr., MD    Chief Complaint   Patient presents with   • Coronary Artery Disease     6 month follow up   • Hypertension   • Hyperlipidemia   • Diabetes   • Congestive Heart Failure     History of Present Illness    I'm pleased to see Mr. Dallas in my office today as a followup     As you know, patient is 80 years old white gentleman whose past medical history is significant for hypertension, hyperlipidemia, coronary artery disease, coronary artery stenting, diabetes mellitus, DVT who came for followup.     In , patient developed symptom of shortness of breath and subsequent stress test was abnormal.  Cardiac catheterization showed LAD was free of disease, RCA had 50% stenosis, and LCX has 80% stenosis.  Patient underwent LCX/om stenting.  In , repeat cardiac catheterization showed 80-90% stenosis of LCX/OM1 patient underwent successful stenting.  LAD had 25% stenosis.  LVEF was 60%.  In 2018, echocardiogram showed EF of 60-65%. In 2020, patient underwent stress test which showed no myocardial ischemia.  Small fixed inferior defect was noted.    Since the previous visit, patient is overall doing fairly well.  Patient did not get any COVID-19 infection and maintain social distancing.  Patient denies any symptom of recurrence of atrial fibrillation or palpitation.  Patient denies any chest pain or tightness or heaviness.  No orthopnea or PND trace leg edema noted.  No syncope or presyncope.    EKG showed sinus rhythm.  Poor progression of R wave noted.  No change from previous EKG.    Overall I am very pleased with the patient condition and progress.  His blood pressure is very well controlled.  Patient does not report any sign of angina or coronary insufficiency.  Weight loss and exercise  recommended.          Past Medical History:   Diagnosis Date   • ACE-inhibitor cough 06/2005   • Coronary artery disease    • Diabetes mellitus, type 2 (CMS/HCC) 1995   • ED (erectile dysfunction)    • Hx of blood clots 2014    Blood clot left leg    • Hyperlipidemia 08/2000   • Hypertension    • Hypogonadism, male 1998   • Hypothyroidism 06/2001   • Obesity    • Peripheral neuropathy    • Pulmonary embolism (CMS/HCC) 02/2014   • Rectal fistula    • Vitamin D deficiency          Past Surgical History:   Procedure Laterality Date   • CARDIAC CATHETERIZATION  2008    PTCA: 2008; PTCA: 4/21/2015 LCX stent    • CARDIOVASCULAR STRESS TEST  2020   • CATARACT EXTRACTION  01/11/2016 1-4-16 & 1-11-16    • COLONOSCOPY N/A 11/23/2019    Procedure: COLONOSCOPY WITH ENDOSCOPIC CLIPPING X1 OF POST POLYPECTOMY BLEED SITE;  Surgeon: Jhonny Orellana MD;  Location: [facility] ENDOSCOPY;  Service: Gastroenterology   • CORONARY STENT PLACEMENT     • INGUINAL HERNIA REPAIR Left    • UMBILICAL HERNIA REPAIR             Current Outpatient Medications:   •  Accu-Chek Irene Plus test strip, USE TO CHECK BLOOD SUGAR BEFORE MEALS AND AT BEDTIME, Disp: 400 each, Rfl: 2  •  amLODIPine-atorvastatin (CADUET) 5-20 MG per tablet, Take 1 tablet by mouth Daily., Disp: 90 tablet, Rfl: 2  •  atenolol (TENORMIN) 50 MG tablet, TAKE 1 TABLET DAILY, Disp: 90 tablet, Rfl: 4  •  clopidogrel (PLAVIX) 75 MG tablet, TAKE 1 TABLET DAILY, Disp: 90 tablet, Rfl: 3  •  Empagliflozin (Jardiance) 10 MG tablet, Take 10 mg by mouth Daily., Disp: 90 tablet, Rfl: 3  •  fenofibrate (TRICOR) 54 MG tablet, TAKE 1 TABLET DAILY, Disp: 90 tablet, Rfl: 4  •  ferrous sulfate 325 (65 FE) MG tablet, Take 325 mg by mouth 2 (Two) Times a Day., Disp: , Rfl:   •  Fluzone High-Dose Quadrivalent 0.7 ML suspension prefilled syringe, ADM 0.7ML IM UTD, Disp: , Rfl:   •  gabapentin (NEURONTIN) 100 MG capsule, Take 1 capsule by mouth 3 (Three) Times a Day., Disp: 270 capsule, Rfl: 1  •   "hydroCHLOROthiazide (HYDRODIURIL) 25 MG tablet, TAKE 1 TABLET DAILY, Disp: 90 tablet, Rfl: 4  •  Insulin Pen Needle (BD ULTRA-FINE PEN NEEDLES) 29G X 12.7MM misc, Use with insulin injections three times daily, Disp: 300 each, Rfl: 3  •  insulin regular (HUMULIN R) 500 UNIT/ML CONCENTRATED injection, Inject 24 units in the AM and 38 units in the PM, Disp: 10 mL, Rfl: 0  •  Insulin Syringe 30G X 1/2\" 0.5 ML misc, Use to inject insulin 2 times daily DX E11.65, Disp: 200 each, Rfl: 3  •  Insulin Syringe-Needle U-100 (BD Insulin Syringe Ultrafine) 31G X 5/16\" 0.5 ML misc, Use 1 new syringe with each dose of insulin twice a day. 200, Disp: 200 each, Rfl: 3  •  irbesartan (AVAPRO) 300 MG tablet, TAKE 1 TABLET DAILY, Disp: 90 tablet, Rfl: 3  •  levothyroxine (SYNTHROID, LEVOTHROID) 175 MCG tablet, TAKE 1 TABLET DAILY, Disp: 90 tablet, Rfl: 3  •  metFORMIN ER (GLUCOPHAGE-XR) 500 MG 24 hr tablet, TAKE 4 TABLETS AT SUPPER, Disp: 360 tablet, Rfl: 4  •  Omega-3 Fatty Acids (FISH OIL) 1000 MG capsule capsule, Take 1,000 mg by mouth 2 (Two) Times a Day., Disp: , Rfl:   •  Pramlintide Acetate 2700 MCG/2.7ML solution pen-injector, Inject 120 mcg under the skin into the appropriate area as directed 3 (Three) Times a Day., Disp: 32.4 mL, Rfl: 4  •  vitamin B-12 (CYANOCOBALAMIN) 100 MCG tablet, Take 100 mcg by mouth Daily., Disp: , Rfl:   •  vitamin D (ERGOCALCIFEROL) 1.25 MG (08400 UT) capsule capsule, TAKE 1 CAPSULE ONCE A WEEK, Disp: 12 capsule, Rfl: 3  •  warfarin (COUMADIN) 3 MG tablet, TAKE 1 TABLET DAILY, Disp: 90 tablet, Rfl: 3  •  warfarin (COUMADIN) 5 MG tablet, Take 1 tablet by mouth Take As Directed., Disp: 90 tablet, Rfl: 3      Social History     Socioeconomic History   • Marital status:      Spouse name: Not on file   • Number of children: Not on file   • Years of education: Not on file   • Highest education level: Not on file   Tobacco Use   • Smoking status: Former Smoker   • Smokeless tobacco: Never Used   • " "Tobacco comment: quit 1977   Substance and Sexual Activity   • Alcohol use: No     Frequency: Never   • Drug use: No   • Sexual activity: Defer         Review of Systems   Constitution: Negative for chills and fever.   HENT: Negative for ear discharge and nosebleeds.    Eyes: Negative for discharge and redness.   Cardiovascular: Positive for leg swelling. Negative for chest pain, orthopnea, palpitations, paroxysmal nocturnal dyspnea and syncope.   Respiratory: Positive for shortness of breath. Negative for cough and wheezing.    Endocrine: Negative for heat intolerance.   Skin: Negative for rash.   Musculoskeletal: Positive for arthritis and joint pain. Negative for myalgias.   Gastrointestinal: Negative for abdominal pain, melena, nausea and vomiting.   Genitourinary: Negative for dysuria and hematuria.   Neurological: Negative for dizziness, light-headedness, numbness and tremors.   Psychiatric/Behavioral: Negative for depression. The patient is not nervous/anxious.        Procedures      ECG 12 Lead    Date/Time: 1/13/2021 10:33 AM  Performed by: Edwar Ackerman MD  Authorized by: Edwar Ackerman MD   Comparison: compared with previous ECG   Similar to previous ECG  Rhythm: sinus rhythm  Q waves: V1 and V2      Clinical impression: normal ECG            ECG 12 Lead    (Results Pending)           Objective:    /72   Pulse 72   Ht 177.8 cm (70\")   Wt (!) 138 kg (304 lb)   BMI 43.62 kg/m²         Constitutional:       Appearance: Well-developed.   Eyes:      General: No scleral icterus.        Right eye: No discharge.   HENT:      Head: Normocephalic and atraumatic.   Neck:      Thyroid: No thyromegaly.      Lymphadenopathy: No cervical adenopathy.   Pulmonary:      Effort: Pulmonary effort is normal. No respiratory distress.      Breath sounds: Normal breath sounds. No wheezing. No rales.   Cardiovascular:      Normal rate. Regular rhythm.      No gallop.   Edema:     Peripheral edema present.  Abdominal:     "  Tenderness: There is no abdominal tenderness.   Skin:     Findings: No erythema or rash.   Neurological:      Mental Status: Alert and oriented to person, place, and time.             Assessment:       Diagnosis Plan   1. Coronary artery disease involving native coronary artery of native heart without angina pectoris  ECG 12 Lead   2. Essential hypertension  ECG 12 Lead   3. Mixed hyperlipidemia  ECG 12 Lead   4. Type 2 diabetes mellitus with diabetic neuropathy, with long-term current use of insulin (CMS/[facility])  ECG 12 Lead   5. Diastolic dysfunction with chronic heart failure (CMS/[facility])  ECG 12 Lead            Plan:       Assessment and plan/MDM:    1.  CAD:    Patient has history of CAD and coronary artery stenting but clinically he is doing well.  He does not report any symptom of angina pectoris.  Recent stress test showed no ischemia myocardial infarction in July 2020    2.  Shortness of breath:    Patient complain of shortness of breath but it appears to be due to lack of stamina and deconditioning.  I will continue current treatment.  No further work-up needed.    3.  Hypertension:    His blood pressure is very well controlled.  Continue current therapy.    4.  Leg edema:    His leg edema has improved.  He still have trace leg edema.  Salt and water restriction discussed.  Continue diuretics.    5.  Hyperlipidemia:    His recent blood work showed total cholesterol of 143 with LDL of 59.  I would continue current therapy.    6.  Diabetes mellitus:    Patient is on Metformin and insulin.  Further recommendation as per PCP

## 2021-01-14 ENCOUNTER — HOSPITAL ENCOUNTER (OUTPATIENT)
Dept: ONCOLOGY | Facility: HOSPITAL | Age: 81
Setting detail: INFUSION SERIES
Discharge: HOME | End: 2021-01-14

## 2021-01-14 ENCOUNTER — LAB (OUTPATIENT)
Dept: LAB | Facility: HOSPITAL | Age: 81
End: 2021-01-14

## 2021-01-14 DIAGNOSIS — Z79.01 LONG TERM (CURRENT) USE OF ANTICOAGULANTS: ICD-10-CM

## 2021-01-14 DIAGNOSIS — Z79.01 LONG TERM (CURRENT) USE OF ANTICOAGULANTS: Primary | ICD-10-CM

## 2021-01-14 LAB — INR PPP: 1.7

## 2021-01-14 PROCEDURE — 85610 PROTHROMBIN TIME: CPT

## 2021-01-14 PROCEDURE — 36416 COLLJ CAPILLARY BLOOD SPEC: CPT

## 2021-01-14 RX ORDER — WARFARIN SODIUM 1 MG/1
TABLET ORAL
Qty: 30 TABLET | Refills: 1 | Status: SHIPPED | OUTPATIENT
Start: 2021-01-14 | End: 2021-03-03 | Stop reason: SDUPTHER

## 2021-01-26 ENCOUNTER — TELEPHONE (OUTPATIENT)
Dept: ONCOLOGY | Facility: HOSPITAL | Age: 81
End: 2021-01-26

## 2021-01-26 ENCOUNTER — TELEPHONE (OUTPATIENT)
Dept: ONCOLOGY | Facility: CLINIC | Age: 81
End: 2021-01-26

## 2021-01-26 NOTE — TELEPHONE ENCOUNTER
I called pt to get him scheduled for an INR visit. Appointment scheduled an pt verbalized date/time.

## 2021-01-26 NOTE — TELEPHONE ENCOUNTER
Caller: Dalton    Relationship to patient: Pt    Best call back number: [phone]     Type of visit: INR    Requested date: Next available appt, prefers 10am-12pm     If rescheduling, when is the original appointment: 01/21

## 2021-01-26 NOTE — TELEPHONE ENCOUNTER
Caller: kahlil    Relationship to patient: self    Best call back number: [phone]    Pt would like to resched his missed lab and rn review on 1/21/21. Pt prefers asap. Unable to resched hospital appts.

## 2021-01-27 ENCOUNTER — HOSPITAL ENCOUNTER (OUTPATIENT)
Dept: ONCOLOGY | Facility: HOSPITAL | Age: 81
Setting detail: INFUSION SERIES
Discharge: HOME | End: 2021-01-27

## 2021-01-27 ENCOUNTER — LAB (OUTPATIENT)
Dept: LAB | Facility: HOSPITAL | Age: 81
End: 2021-01-27

## 2021-01-27 ENCOUNTER — OFFICE VISIT (OUTPATIENT)
Dept: FAMILY MEDICINE CLINIC | Facility: CLINIC | Age: 81
End: 2021-01-27

## 2021-01-27 VITALS
RESPIRATION RATE: 18 BRPM | SYSTOLIC BLOOD PRESSURE: 144 MMHG | BODY MASS INDEX: 40.52 KG/M2 | TEMPERATURE: 96.9 F | OXYGEN SATURATION: 97 % | HEART RATE: 70 BPM | WEIGHT: 283 LBS | DIASTOLIC BLOOD PRESSURE: 77 MMHG | HEIGHT: 70 IN

## 2021-01-27 DIAGNOSIS — I73.9 PERIPHERAL VASCULAR DISEASE (HCC): ICD-10-CM

## 2021-01-27 DIAGNOSIS — D68.59 THROMBOPHILIA (HCC): Chronic | ICD-10-CM

## 2021-01-27 DIAGNOSIS — E11.40 TYPE 2 DIABETES MELLITUS WITH DIABETIC NEUROPATHY, WITH LONG-TERM CURRENT USE OF INSULIN (HCC): Chronic | ICD-10-CM

## 2021-01-27 DIAGNOSIS — Z79.01 LONG TERM (CURRENT) USE OF ANTICOAGULANTS: ICD-10-CM

## 2021-01-27 DIAGNOSIS — Z00.00 ENCOUNTER FOR ANNUAL WELLNESS EXAM IN MEDICARE PATIENT: ICD-10-CM

## 2021-01-27 DIAGNOSIS — E03.9 HYPOTHYROIDISM, UNSPECIFIED TYPE: Chronic | ICD-10-CM

## 2021-01-27 DIAGNOSIS — Z79.4 TYPE 2 DIABETES MELLITUS WITH DIABETIC NEUROPATHY, WITH LONG-TERM CURRENT USE OF INSULIN (HCC): Chronic | ICD-10-CM

## 2021-01-27 DIAGNOSIS — I50.32 DIASTOLIC DYSFUNCTION WITH CHRONIC HEART FAILURE (HCC): Chronic | ICD-10-CM

## 2021-01-27 DIAGNOSIS — Z79.01 LONG TERM (CURRENT) USE OF ANTICOAGULANTS: Primary | ICD-10-CM

## 2021-01-27 DIAGNOSIS — I10 ESSENTIAL HYPERTENSION: Primary | Chronic | ICD-10-CM

## 2021-01-27 DIAGNOSIS — I25.10 CORONARY ARTERY DISEASE INVOLVING NATIVE CORONARY ARTERY OF NATIVE HEART WITHOUT ANGINA PECTORIS: Chronic | ICD-10-CM

## 2021-01-27 DIAGNOSIS — E78.2 MIXED HYPERLIPIDEMIA: Chronic | ICD-10-CM

## 2021-01-27 PROBLEM — L72.3 INFECTED SEBACEOUS CYST: Status: RESOLVED | Noted: 2020-11-23 | Resolved: 2021-01-27

## 2021-01-27 PROBLEM — I27.20 PULMONARY HYPERTENSION: Chronic | Status: ACTIVE | Noted: 2019-11-04

## 2021-01-27 PROBLEM — I27.82 CHRONIC PULMONARY EMBOLISM WITHOUT ACUTE COR PULMONALE: Chronic | Status: ACTIVE | Noted: 2019-11-04

## 2021-01-27 PROBLEM — L08.9 INFECTED SEBACEOUS CYST: Status: RESOLVED | Noted: 2020-11-23 | Resolved: 2021-01-27

## 2021-01-27 LAB — INR PPP: 2.9

## 2021-01-27 PROCEDURE — 1170F FXNL STATUS ASSESSED: CPT | Performed by: FAMILY MEDICINE

## 2021-01-27 PROCEDURE — 36416 COLLJ CAPILLARY BLOOD SPEC: CPT

## 2021-01-27 PROCEDURE — G0439 PPPS, SUBSEQ VISIT: HCPCS | Performed by: FAMILY MEDICINE

## 2021-01-27 PROCEDURE — 85610 PROTHROMBIN TIME: CPT

## 2021-01-27 PROCEDURE — 96160 PT-FOCUSED HLTH RISK ASSMT: CPT | Performed by: FAMILY MEDICINE

## 2021-01-27 PROCEDURE — 99214 OFFICE O/P EST MOD 30 MIN: CPT | Performed by: FAMILY MEDICINE

## 2021-01-27 PROCEDURE — 1160F RVW MEDS BY RX/DR IN RCRD: CPT | Performed by: FAMILY MEDICINE

## 2021-01-27 NOTE — PROGRESS NOTES
Subjective   Dalton Dallas Sr. is a 80 y.o. male.     Chief Complaint   Patient presents with   • Medicare Wellness-subsequent   • Diabetes     3 month followup   • Hypothyroidism       HPI chief complaint: Hypertension hyperlipidemia coronary artery disease peripheral vascular disease thrombophilia type 2 diabetes mellitus hypothyroidism    The patient is a 80-year-old white male comes in for follow-up and maintenance of his current problems which include    1.  Hypertension-stable-patient is currently on amlodipine 5 mg daily atenolol 50 mg daily irbesartan 300 mg daily and hydrochlorothiazide 25 mg daily.  He denied headache lightheadedness dizziness or chest pain.    2.  Hyperlipidemia-stable-patient on omega-3 fatty acids 2000 mg twice a day Lipitor 20 mg daily fenofibrate 54 mg daily.  He denies myalgias and arthralgias.  Denies nausea or anorexia.    3.  Coronary artery disease/heart failure with normal ejection fraction-stable-patient on Plavix 75 mg once a day and aspirin 81 mg daily.  He also takes atenolol 50 mg daily.  Denies chest pain shortness of breath orthopnea or PND.    4.  Peripheral vascular disease-stable-patient on aspirin and Plavix.  He denied weakness or numbness on one side of the body or the other.  9 problems processing information or speech disorder.    5. type 2 diabetes mellitus-stable-patient currently takes Jardiance 10 mg daily Metformin 2000 mg a day pramlintide 120 mcg 3 times a day Humulin R 24 units in the morning and 38 units in the evening.  He denied polydipsia polyphagia or polyuria.  Denied low blood sugars.  His recent A1c was at goal.    6.  Thrombophilia/history of PE-stable-patient on Coumadin.  He gets regular INRs.    7.  Hypothyroidism-stable-patient on thyroid replacement therapy.  Patient is currently on Synthroid 0.175 mg daily.  He denied heat or cold intolerance tremor weight gain or weight loss.        The following portions of the patient's history were  "reviewed and updated as appropriate: allergies, current medications, past family history, past medical history, past social history, past surgical history and problem list.    Review of Systems    Objective     /77 (BP Location: Left arm, Patient Position: Sitting, Cuff Size: Large Adult)   Pulse 70   Temp 96.9 °F (36.1 °C) (Infrared)   Resp 18   Ht 177.8 cm (70\")   Wt 128 kg (283 lb)   SpO2 97%   BMI 40.61 kg/m²     Physical Exam  Vitals signs and nursing note reviewed.   Constitutional:       Appearance: He is well-developed. He is obese.   HENT:      Head: Normocephalic and atraumatic.      Nose: Nose normal.      Mouth/Throat:      Mouth: Mucous membranes are moist.      Pharynx: Oropharynx is clear.   Eyes:      Extraocular Movements: Extraocular movements intact.      Conjunctiva/sclera: Conjunctivae normal.      Pupils: Pupils are equal, round, and reactive to light.   Neck:      Musculoskeletal: Normal range of motion and neck supple.   Cardiovascular:      Rate and Rhythm: Normal rate and regular rhythm.      Pulses: Normal pulses.      Heart sounds: Normal heart sounds.   Pulmonary:      Effort: Pulmonary effort is normal.      Breath sounds: Normal breath sounds.   Abdominal:      General: Bowel sounds are normal.      Palpations: Abdomen is soft.   Musculoskeletal: Normal range of motion.   Skin:     General: Skin is warm and dry.   Neurological:      Mental Status: He is alert and oriented to person, place, and time.   Psychiatric:         Behavior: Behavior normal.         Thought Content: Thought content normal.         Judgment: Judgment normal.       TSH (10/28/2020 09:07)  T4, Free (10/28/2020 09:07)  Comprehensive Metabolic Panel (10/28/2020 09:07)  Hemoglobin A1c (10/28/2020 09:07)  Protime-INR, Fingerstick (09/29/2020 10:54)  Lipid Panel (05/06/2020 09:05)  Comprehensive Metabolic Panel (05/06/2020 09:05)      Assessment/Plan   Diagnoses and all orders for this visit:    1. " Essential hypertension (Primary)    2. Encounter for annual wellness exam in Medicare patient    3. Mixed hyperlipidemia    4. Coronary artery disease involving native coronary artery of native heart without angina pectoris    5. Diastolic dysfunction with chronic heart failure (CMS/[facility])    6. Peripheral vascular disease (CMS/[facility])    7. Thrombophilia (CMS/[facility])    8. Type 2 diabetes mellitus with diabetic neuropathy, with long-term current use of insulin (CMS/[facility])    9. Hypothyroidism, unspecified type      Patient Instructions   Continue your current mediciaons and treatment.    Follow up in the offcie in 6 months.    Laboratory testing at that time.          Gabino Sibley Jr., MD    01/27/21

## 2021-01-27 NOTE — PATIENT INSTRUCTIONS
Continue your current mediciaons and treatment.    Follow up in the offcie in 6 months.    Laboratory testing at that time.

## 2021-01-29 DIAGNOSIS — E11.40 TYPE 2 DIABETES MELLITUS WITH DIABETIC NEUROPATHY, WITHOUT LONG-TERM CURRENT USE OF INSULIN (HCC): ICD-10-CM

## 2021-01-29 RX ORDER — INSULIN HUMAN 500 [IU]/ML
INJECTION, SOLUTION SUBCUTANEOUS
Qty: 60 ML | Refills: 3 | Status: SHIPPED | OUTPATIENT
Start: 2021-01-29 | End: 2021-07-02

## 2021-02-03 ENCOUNTER — HOSPITAL ENCOUNTER (OUTPATIENT)
Dept: ONCOLOGY | Facility: HOSPITAL | Age: 81
Setting detail: INFUSION SERIES
Discharge: HOME | End: 2021-02-03

## 2021-02-03 ENCOUNTER — LAB (OUTPATIENT)
Dept: LAB | Facility: HOSPITAL | Age: 81
End: 2021-02-03

## 2021-02-03 DIAGNOSIS — Z79.01 LONG TERM (CURRENT) USE OF ANTICOAGULANTS: ICD-10-CM

## 2021-02-03 DIAGNOSIS — Z79.01 LONG TERM (CURRENT) USE OF ANTICOAGULANTS: Primary | ICD-10-CM

## 2021-02-03 LAB — INR PPP: 3

## 2021-02-03 PROCEDURE — 85610 PROTHROMBIN TIME: CPT

## 2021-02-03 PROCEDURE — 36416 COLLJ CAPILLARY BLOOD SPEC: CPT

## 2021-03-03 ENCOUNTER — LAB (OUTPATIENT)
Dept: LAB | Facility: HOSPITAL | Age: 81
End: 2021-03-03

## 2021-03-03 ENCOUNTER — HOSPITAL ENCOUNTER (OUTPATIENT)
Dept: ONCOLOGY | Facility: HOSPITAL | Age: 81
Setting detail: INFUSION SERIES
Discharge: HOME | End: 2021-03-03

## 2021-03-03 DIAGNOSIS — Z79.01 LONG TERM (CURRENT) USE OF ANTICOAGULANTS: ICD-10-CM

## 2021-03-03 DIAGNOSIS — Z79.01 LONG TERM (CURRENT) USE OF ANTICOAGULANTS: Primary | ICD-10-CM

## 2021-03-03 LAB — INR PPP: 3

## 2021-03-03 PROCEDURE — 85610 PROTHROMBIN TIME: CPT

## 2021-03-03 PROCEDURE — 36416 COLLJ CAPILLARY BLOOD SPEC: CPT

## 2021-03-03 RX ORDER — WARFARIN SODIUM 1 MG/1
TABLET ORAL
Qty: 90 TABLET | Refills: 3 | Status: SHIPPED | OUTPATIENT
Start: 2021-03-03 | End: 2021-07-02

## 2021-03-03 NOTE — PROGRESS NOTES
Pt here today for INR check, 3.0. Pt to continue taking warfarin 9mg daily. Pt requests refill [facility] He'll return for INR check on 3/31/21.

## 2021-03-25 RX ORDER — FENOFIBRATE 54 MG/1
TABLET ORAL
Qty: 90 TABLET | Refills: 3 | Status: SHIPPED | OUTPATIENT
Start: 2021-03-25 | End: 2022-03-02

## 2021-03-29 RX ORDER — WARFARIN SODIUM 3 MG/1
3 TABLET ORAL DAILY
Qty: 90 TABLET | Refills: 3 | Status: ON HOLD | OUTPATIENT
Start: 2021-03-29 | End: 2021-07-02

## 2021-03-29 NOTE — TELEPHONE ENCOUNTER
Caller:DEBI    Relationship: PATIENT    Best call back number:[phone]     Medication needed:   Requested Prescriptions     Pending Prescriptions Disp Refills   • warfarin (COUMADIN) 3 MG tablet 90 tablet 3     Sig: Take 1 tablet by mouth Daily.       When do you need the refill by: NEXT WEEK Thursday 04/08    Does the patient have less than a 3 day supply:  [] Yes  [x] No    What is the patient's preferred pharmacy:    [facility] - [address] [phone] [facility] [phone]

## 2021-03-31 ENCOUNTER — HOSPITAL ENCOUNTER (OUTPATIENT)
Dept: ONCOLOGY | Facility: HOSPITAL | Age: 81
Setting detail: INFUSION SERIES
Discharge: HOME | End: 2021-03-31

## 2021-03-31 ENCOUNTER — LAB (OUTPATIENT)
Dept: LAB | Facility: HOSPITAL | Age: 81
End: 2021-03-31

## 2021-03-31 DIAGNOSIS — Z79.01 LONG TERM (CURRENT) USE OF ANTICOAGULANTS: ICD-10-CM

## 2021-03-31 DIAGNOSIS — Z79.01 LONG TERM (CURRENT) USE OF ANTICOAGULANTS: Primary | ICD-10-CM

## 2021-03-31 LAB — INR PPP: 3.2

## 2021-03-31 PROCEDURE — 36416 COLLJ CAPILLARY BLOOD SPEC: CPT

## 2021-03-31 PROCEDURE — 85610 PROTHROMBIN TIME: CPT

## 2021-03-31 RX ORDER — METFORMIN HYDROCHLORIDE 500 MG/1
TABLET, EXTENDED RELEASE ORAL
Qty: 360 TABLET | Refills: 3 | Status: SHIPPED | OUTPATIENT
Start: 2021-03-31 | End: 2021-07-02

## 2021-03-31 NOTE — PROGRESS NOTES
Pt here today for INR check, 3.20. Pt to hold warfarin this evening and take 8 mg daily starting tomorrow, 4/1/21. Pt to return to center in 1 week for INR check. Pt verbalized understanding.

## 2021-04-06 RX ORDER — GABAPENTIN 100 MG/1
CAPSULE ORAL
Qty: 270 CAPSULE | Refills: 0 | Status: ON HOLD | OUTPATIENT
Start: 2021-04-06 | End: 2021-07-02

## 2021-04-07 ENCOUNTER — LAB (OUTPATIENT)
Dept: LAB | Facility: HOSPITAL | Age: 81
End: 2021-04-07

## 2021-04-07 ENCOUNTER — HOSPITAL ENCOUNTER (OUTPATIENT)
Dept: ONCOLOGY | Facility: HOSPITAL | Age: 81
Setting detail: INFUSION SERIES
Discharge: HOME | End: 2021-04-07

## 2021-04-07 DIAGNOSIS — Z79.01 LONG TERM (CURRENT) USE OF ANTICOAGULANTS: Primary | ICD-10-CM

## 2021-04-07 DIAGNOSIS — Z79.01 LONG TERM (CURRENT) USE OF ANTICOAGULANTS: ICD-10-CM

## 2021-04-07 LAB — INR PPP: 2.9

## 2021-04-07 PROCEDURE — 36416 COLLJ CAPILLARY BLOOD SPEC: CPT

## 2021-04-07 PROCEDURE — 85610 PROTHROMBIN TIME: CPT

## 2021-04-14 ENCOUNTER — LAB (OUTPATIENT)
Dept: LAB | Facility: HOSPITAL | Age: 81
End: 2021-04-14

## 2021-04-14 ENCOUNTER — HOSPITAL ENCOUNTER (OUTPATIENT)
Dept: ONCOLOGY | Facility: HOSPITAL | Age: 81
Setting detail: INFUSION SERIES
Discharge: HOME | End: 2021-04-14

## 2021-04-14 DIAGNOSIS — Z79.01 LONG TERM (CURRENT) USE OF ANTICOAGULANTS: ICD-10-CM

## 2021-04-14 DIAGNOSIS — Z79.01 LONG TERM (CURRENT) USE OF ANTICOAGULANTS: Primary | ICD-10-CM

## 2021-04-14 LAB — INR PPP: 2.4

## 2021-04-14 PROCEDURE — 36416 COLLJ CAPILLARY BLOOD SPEC: CPT

## 2021-04-14 PROCEDURE — 85610 PROTHROMBIN TIME: CPT

## 2021-04-14 NOTE — PROGRESS NOTES
INR 2.4 today. Pt to continue Coumadin 8mg daily and recheck in 1 month. Pt scheduled for 5/12/21 at 11:30

## 2021-04-24 DIAGNOSIS — E11.40 TYPE 2 DIABETES MELLITUS WITH DIABETIC NEUROPATHY, WITHOUT LONG-TERM CURRENT USE OF INSULIN (HCC): ICD-10-CM

## 2021-04-26 RX ORDER — HYDROCHLOROTHIAZIDE 25 MG/1
TABLET ORAL
Qty: 90 TABLET | Refills: 3 | Status: SHIPPED | OUTPATIENT
Start: 2021-04-26 | End: 2022-04-25

## 2021-04-26 RX ORDER — EMPAGLIFLOZIN 10 MG/1
TABLET, FILM COATED ORAL
Qty: 90 TABLET | Refills: 3 | Status: SHIPPED | OUTPATIENT
Start: 2021-04-26 | End: 2021-05-10

## 2021-05-02 DIAGNOSIS — Z79.4 TYPE 2 DIABETES MELLITUS WITH DIABETIC NEUROPATHY, WITH LONG-TERM CURRENT USE OF INSULIN (HCC): Primary | ICD-10-CM

## 2021-05-02 DIAGNOSIS — E11.40 TYPE 2 DIABETES MELLITUS WITH DIABETIC NEUROPATHY, WITH LONG-TERM CURRENT USE OF INSULIN (HCC): Primary | ICD-10-CM

## 2021-05-03 ENCOUNTER — LAB (OUTPATIENT)
Dept: LAB | Facility: HOSPITAL | Age: 81
End: 2021-05-03

## 2021-05-03 DIAGNOSIS — E78.2 MIXED HYPERLIPIDEMIA: ICD-10-CM

## 2021-05-03 DIAGNOSIS — E11.40 TYPE 2 DIABETES MELLITUS WITH DIABETIC NEUROPATHY, WITH LONG-TERM CURRENT USE OF INSULIN (HCC): ICD-10-CM

## 2021-05-03 DIAGNOSIS — E03.9 HYPOTHYROIDISM, UNSPECIFIED TYPE: ICD-10-CM

## 2021-05-03 DIAGNOSIS — Z79.4 TYPE 2 DIABETES MELLITUS WITH DIABETIC NEUROPATHY, WITH LONG-TERM CURRENT USE OF INSULIN (HCC): ICD-10-CM

## 2021-05-03 DIAGNOSIS — E55.9 VITAMIN D DEFICIENCY: ICD-10-CM

## 2021-05-03 LAB
25(OH)D3 SERPL-MCNC: 66.5 NG/ML
ALBUMIN SERPL-MCNC: 4.2 G/DL (ref 3.5–5.2)
ALBUMIN UR-MCNC: <1.2 MG/DL
ALBUMIN/GLOB SERPL: 2 G/DL
ALP SERPL-CCNC: 38 U/L (ref 39–117)
ALT SERPL W P-5'-P-CCNC: 23 U/L (ref 1–41)
ANION GAP SERPL CALCULATED.3IONS-SCNC: 13.8 MMOL/L (ref 5–15)
AST SERPL-CCNC: 18 U/L (ref 1–40)
BILIRUB SERPL-MCNC: 0.3 MG/DL (ref 0–1.2)
BUN SERPL-MCNC: 26 MG/DL (ref 8–23)
BUN/CREAT SERPL: 21 (ref 7–25)
CALCIUM SPEC-SCNC: 9.7 MG/DL (ref 8.6–10.5)
CHLORIDE SERPL-SCNC: 99 MMOL/L (ref 98–107)
CHOLEST SERPL-MCNC: 150 MG/DL (ref 0–200)
CO2 SERPL-SCNC: 25.2 MMOL/L (ref 22–29)
CREAT SERPL-MCNC: 1.24 MG/DL (ref 0.76–1.27)
CREAT UR-MCNC: 38.9 MG/DL
GFR SERPL CREATININE-BSD FRML MDRD: 56 ML/MIN/1.73
GLOBULIN UR ELPH-MCNC: 2.1 GM/DL
GLUCOSE SERPL-MCNC: 259 MG/DL (ref 65–99)
HBA1C MFR BLD: 7.4 % (ref 3.5–5.6)
HDLC SERPL-MCNC: 35 MG/DL (ref 40–60)
LDLC SERPL CALC-MCNC: 81 MG/DL (ref 0–100)
LDLC/HDLC SERPL: 2.14 {RATIO}
MICROALBUMIN/CREAT UR: NORMAL MG/G{CREAT}
POTASSIUM SERPL-SCNC: 4.8 MMOL/L (ref 3.5–5.2)
PROT SERPL-MCNC: 6.3 G/DL (ref 6–8.5)
SODIUM SERPL-SCNC: 138 MMOL/L (ref 136–145)
T4 FREE SERPL-MCNC: 1.31 NG/DL (ref 0.93–1.7)
TRIGL SERPL-MCNC: 201 MG/DL (ref 0–150)
TSH SERPL DL<=0.05 MIU/L-ACNC: 2.8 UIU/ML (ref 0.27–4.2)
VLDLC SERPL-MCNC: 34 MG/DL (ref 5–40)

## 2021-05-03 PROCEDURE — 84443 ASSAY THYROID STIM HORMONE: CPT

## 2021-05-03 PROCEDURE — 36415 COLL VENOUS BLD VENIPUNCTURE: CPT

## 2021-05-03 PROCEDURE — 82306 VITAMIN D 25 HYDROXY: CPT

## 2021-05-03 PROCEDURE — 83036 HEMOGLOBIN GLYCOSYLATED A1C: CPT

## 2021-05-03 PROCEDURE — 84439 ASSAY OF FREE THYROXINE: CPT

## 2021-05-03 PROCEDURE — 80053 COMPREHEN METABOLIC PANEL: CPT

## 2021-05-03 PROCEDURE — 80061 LIPID PANEL: CPT

## 2021-05-03 PROCEDURE — 82570 ASSAY OF URINE CREATININE: CPT

## 2021-05-03 PROCEDURE — 82043 UR ALBUMIN QUANTITATIVE: CPT

## 2021-05-03 RX ORDER — PEN NEEDLE, DIABETIC 29 G X1/2"
NEEDLE, DISPOSABLE MISCELLANEOUS
Qty: 200 EACH | Refills: 3 | Status: SHIPPED | OUTPATIENT
Start: 2021-05-03 | End: 2021-07-01

## 2021-05-04 RX ORDER — ERGOCALCIFEROL 1.25 MG/1
CAPSULE ORAL
Qty: 12 CAPSULE | Refills: 3 | Status: SHIPPED | OUTPATIENT
Start: 2021-05-04 | End: 2022-04-05

## 2021-05-05 RX ORDER — ATENOLOL 50 MG/1
TABLET ORAL
Qty: 90 TABLET | Refills: 3 | Status: SHIPPED | OUTPATIENT
Start: 2021-05-05 | End: 2022-05-02

## 2021-05-10 ENCOUNTER — OFFICE VISIT (OUTPATIENT)
Dept: ENDOCRINOLOGY | Facility: CLINIC | Age: 81
End: 2021-05-10

## 2021-05-10 VITALS
OXYGEN SATURATION: 96 % | WEIGHT: 301 LBS | BODY MASS INDEX: 43.09 KG/M2 | HEART RATE: 76 BPM | HEIGHT: 70 IN | DIASTOLIC BLOOD PRESSURE: 60 MMHG | SYSTOLIC BLOOD PRESSURE: 118 MMHG | TEMPERATURE: 96.9 F

## 2021-05-10 DIAGNOSIS — E03.9 HYPOTHYROIDISM, UNSPECIFIED TYPE: Chronic | ICD-10-CM

## 2021-05-10 DIAGNOSIS — E78.2 MIXED HYPERLIPIDEMIA: Chronic | ICD-10-CM

## 2021-05-10 DIAGNOSIS — E55.9 VITAMIN D DEFICIENCY: ICD-10-CM

## 2021-05-10 DIAGNOSIS — Z79.4 TYPE 2 DIABETES MELLITUS WITH DIABETIC NEUROPATHY, WITH LONG-TERM CURRENT USE OF INSULIN (HCC): Primary | Chronic | ICD-10-CM

## 2021-05-10 DIAGNOSIS — E11.40 TYPE 2 DIABETES MELLITUS WITH DIABETIC NEUROPATHY, WITH LONG-TERM CURRENT USE OF INSULIN (HCC): Primary | Chronic | ICD-10-CM

## 2021-05-10 LAB — GLUCOSE BLDC GLUCOMTR-MCNC: 294 MG/DL (ref 70–105)

## 2021-05-10 PROCEDURE — 82962 GLUCOSE BLOOD TEST: CPT | Performed by: INTERNAL MEDICINE

## 2021-05-10 PROCEDURE — 99214 OFFICE O/P EST MOD 30 MIN: CPT | Performed by: INTERNAL MEDICINE

## 2021-05-10 RX ORDER — EMPAGLIFLOZIN 25 MG/1
25 TABLET, FILM COATED ORAL DAILY
Qty: 90 TABLET | Refills: 3 | Status: SHIPPED | OUTPATIENT
Start: 2021-05-10 | End: 2021-05-19 | Stop reason: SDUPTHER

## 2021-05-12 ENCOUNTER — HOSPITAL ENCOUNTER (OUTPATIENT)
Dept: ONCOLOGY | Facility: HOSPITAL | Age: 81
Setting detail: INFUSION SERIES
Discharge: HOME | End: 2021-05-12

## 2021-05-12 ENCOUNTER — LAB (OUTPATIENT)
Dept: LAB | Facility: HOSPITAL | Age: 81
End: 2021-05-12

## 2021-05-12 DIAGNOSIS — Z79.01 LONG TERM (CURRENT) USE OF ANTICOAGULANTS: ICD-10-CM

## 2021-05-12 DIAGNOSIS — Z79.01 LONG TERM (CURRENT) USE OF ANTICOAGULANTS: Primary | ICD-10-CM

## 2021-05-12 LAB — INR PPP: 2.7

## 2021-05-12 PROCEDURE — 36416 COLLJ CAPILLARY BLOOD SPEC: CPT

## 2021-05-12 PROCEDURE — 85610 PROTHROMBIN TIME: CPT

## 2021-05-19 DIAGNOSIS — E11.40 TYPE 2 DIABETES MELLITUS WITH DIABETIC NEUROPATHY, WITH LONG-TERM CURRENT USE OF INSULIN (HCC): Chronic | ICD-10-CM

## 2021-05-19 DIAGNOSIS — Z79.4 TYPE 2 DIABETES MELLITUS WITH DIABETIC NEUROPATHY, WITH LONG-TERM CURRENT USE OF INSULIN (HCC): Chronic | ICD-10-CM

## 2021-05-19 RX ORDER — EMPAGLIFLOZIN 25 MG/1
25 TABLET, FILM COATED ORAL DAILY
Qty: 90 TABLET | Refills: 3 | Status: SHIPPED | OUTPATIENT
Start: 2021-05-19 | End: 2022-04-28

## 2021-05-21 NOTE — PROGRESS NOTES
[facility]        Patient Care Team:  Gabino Sibley Jr., MD as PCP - General (Family Medicine)    Chief Complaint:    Chief Complaint   Patient presents with   • Diabetes     Type 2, , ketones negative         Subjective   Here for diabetes f/u  Blood sugars: did not bring records  Exercise program: yard work  Taking vit D    Interval History:     Patient Complaints: none  Patient Denies:  hypoglycemia  History taken from: patient    Review of Systems:   Review of Systems   Eyes: Negative for blurred vision.   Gastrointestinal: Negative for nausea.   Endocrine: Negative for polyuria.   Neurological: Negative for headache.     Gained 3 lb since last visit    Objective     Vital Signs      Vitals:    05/10/21 1134   BP: 118/60   Pulse: 76   Temp: 96.9 °F (36.1 °C)   SpO2: 96%         Physical Exam:     General Appearance:    Alert, cooperative, in no acute distress. Obese   Head:    Normocephalic, without obvious abnormality, atraumatic   Eyes:            Lids and lashes normal, conjunctivae and sclerae normal, no   icterus, no pallor, corneas clear, PERRLA   Throat:   No oral lesions,  oral mucosa moist   Neck:   No adenopathy, supple,  no thyromegaly, no   carotid bruit   Lungs:     Decreased breath sounds    Heart:    Regular rhythm and normal rate   Chest Wall:    No abnormalities observed   Abdomen:     Normal bowel sounds, soft                 Extremities:   Hammer toes, plantar calluses, no edema               Pulses:   Pulses palpable and equal bilaterally   Skin:   Stasis dermatitis. 3rd R toe nail ecchymosis   Neurologic:  DTR absent, able to feel the 10g monofilament          Results Review:    I have reviewed the patient's new clinical results, labs & imaging.    Medication Review:   Prior to Admission medications    Medication Sig Start Date End Date Taking? Authorizing Provider   Accu-Chek Irene Plus test strip USE TO CHECK BLOOD SUGAR BEFORE MEALS AND AT BEDTIME 10/5/20   "Yes Virginia Shaver MD   amLODIPine-atorvastatin (CADUET) 5-20 MG per tablet Take 1 tablet by mouth Daily. 9/14/20  Yes Edwar Ackerman MD   atenolol (TENORMIN) 50 MG tablet TAKE 1 TABLET DAILY 5/5/21  Yes Virginia Shaver MD   BD Insulin Syringe U/F 31G X 5/16\" 0.5 ML misc USE 1 NEW SYRINGE WITH EACH DOSE OF INSULIN TWICE A DAY 5/3/21  Yes Virginia Shaver MD   clopidogrel (PLAVIX) 75 MG tablet TAKE 1 TABLET DAILY 9/11/20  Yes Edwar Ackerman MD   fenofibrate (TRICOR) 54 MG tablet TAKE 1 TABLET DAILY 3/25/21  Yes Edwar Ackerman MD   ferrous sulfate 325 (65 FE) MG tablet Take 325 mg by mouth 2 (Two) Times a Day.   Yes Lee Ruggiero MD   gabapentin (NEURONTIN) 100 MG capsule TAKE 1 CAPSULE THREE TIMES A DAY 4/6/21  Yes Gabino Sibley Jr., MD   hydroCHLOROthiazide (HYDRODIURIL) 25 MG tablet TAKE 1 TABLET DAILY 4/26/21  Yes Edwar Ackerman MD   Insulin Pen Needle (BD ULTRA-FINE PEN NEEDLES) 29G X 12.7MM misc Use with insulin injections three times daily 3/12/20  Yes Virginia Shaver MD   insulin regular (HUMULIN R) 500 UNIT/ML CONCENTRATED injection DRAW TO 24 UNIT REI ON U-100 SYRINGE AND INJECT IN THE MORNING AND 35 UNIT REI IN THE EVENING 1/29/21  Yes Virginia Shaver MD   Insulin Syringe 30G X 1/2\" 0.5 ML misc Use to inject insulin 2 times daily DX E11.65 5/4/20  Yes Virginia Shaver MD   irbesartan (AVAPRO) 300 MG tablet TAKE 1 TABLET DAILY 10/20/20  Yes Virginia Shaver MD   levothyroxine (SYNTHROID, LEVOTHROID) 175 MCG tablet TAKE 1 TABLET DAILY 11/11/20  Yes Virginia Shaver MD   metFORMIN ER (GLUCOPHAGE-XR) 500 MG 24 hr tablet TAKE 4 TABLETS AT SUPPER 3/31/21  Yes Virginia Shaver MD   Omega-3 Fatty Acids (FISH OIL) 1000 MG capsule capsule Take 1,000 mg by mouth 2 (Two) Times a Day.   Yes Provider, MD Lee   Pramlintide Acetate 2700 MCG/2.7ML solution pen-injector Inject 120 mcg under the skin into the appropriate area as directed 3 (Three) Times a Day. 8/27/20  Yes Chhaya, " MD Virignia   vitamin B-12 (CYANOCOBALAMIN) 100 MCG tablet Take 100 mcg by mouth Daily. 4/15/15  Yes Lee Ruggiero MD   vitamin D (ERGOCALCIFEROL) 1.25 MG (99240 UT) capsule capsule TAKE 1 CAPSULE ONCE A WEEK 5/4/21  Yes Virginia Shaver MD   warfarin (COUMADIN) 1 MG tablet Take 1 tablet by mouth at 1700 or as directed 3/3/21  Yes Hadley Gaffney MD   warfarin (COUMADIN) 3 MG tablet Take 1 tablet by mouth Daily. 3/29/21  Yes Hadley Gaffney MD   warfarin (COUMADIN) 5 MG tablet Take 1 tablet by mouth Take As Directed. 3/30/20  Yes Saskia Roberts APRN   Empagliflozin (Jardiance) 25 MG tablet Take 25 mg by mouth Daily. 5/19/21   Virginia Shaver MD       Lab Results (most recent)     Procedure Component Value Units Date/Time    POC Glucose [[id number]]  (Abnormal) Collected: 05/10/21 1139    Specimen: Blood Updated: 05/10/21 1236     Glucose 294 mg/dL      Comment: Serial Number: [id number]:  [id number]           Lab Results   Component Value Date    HGBA1C 7.4 (H) 05/03/2021    HGBA1C 7.0 (H) 10/28/2020    HGBA1C 8.9 (H) 05/06/2020      Lab Results   Component Value Date    GLUCOSE 259 (H) 05/03/2021    BUN 26 (H) 05/03/2021    CREATININE 1.24 05/03/2021    EGFRIFNONA 56 (L) 05/03/2021    BCR 21.0 05/03/2021    K 4.8 05/03/2021    CO2 25.2 05/03/2021    CALCIUM 9.7 05/03/2021    ALBUMIN 4.20 05/03/2021    LABIL2 1.7 03/28/2019    AST 18 05/03/2021    ALT 23 05/03/2021    CHOL 150 05/03/2021    LDL 81 05/03/2021    HDL 35 (L) 05/03/2021    TRIG 201 (H) 05/03/2021     Lab Results   Component Value Date    TSH 2.800 05/03/2021    FREET4 1.31 05/03/2021    ZAVE71UD 66.5 05/03/2021     Microalb/cr ratio <1    Assessment/Plan     Diagnoses and all orders for this visit:    1. Type 2 diabetes mellitus with diabetic neuropathy, with long-term current use of insulin (CMS/[facility]) (Primary)  -     Discontinue: Empagliflozin (Jardiance) 25 MG tablet; Take 25 mg by mouth Daily.  Dispense: 90 tablet; Refill: 3  -      Hemoglobin A1c; Future    2. Hypothyroidism, unspecified type    3. Mixed hyperlipidemia  -     Comprehensive Metabolic Panel; Future    4. Vitamin D deficiency    Other orders  -     POC Glucose    Glucose control worse. Lipids, thyroid & vit D stable.    Continue exercise.  Continue other diabetes, chol & thyroid meds & vit D supplements.  Increase Jardiance to 2 tabs daily until finished.  Then take 25 mg one daily.  Call if blood sugars are running under 100 or over 200.        Virginia Shaver MD  05/20/21  22:29 EDT

## 2021-06-09 ENCOUNTER — HOSPITAL ENCOUNTER (OUTPATIENT)
Dept: ONCOLOGY | Facility: HOSPITAL | Age: 81
Setting detail: INFUSION SERIES
Discharge: HOME | End: 2021-06-09

## 2021-06-09 ENCOUNTER — LAB (OUTPATIENT)
Dept: LAB | Facility: HOSPITAL | Age: 81
End: 2021-06-09

## 2021-06-09 DIAGNOSIS — Z79.01 LONG TERM (CURRENT) USE OF ANTICOAGULANTS: ICD-10-CM

## 2021-06-09 DIAGNOSIS — Z79.01 LONG TERM (CURRENT) USE OF ANTICOAGULANTS: Primary | ICD-10-CM

## 2021-06-09 LAB — INR PPP: 2.5 (ref 0.8–1.2)

## 2021-06-09 PROCEDURE — 85610 PROTHROMBIN TIME: CPT

## 2021-06-09 PROCEDURE — 36416 COLLJ CAPILLARY BLOOD SPEC: CPT

## 2021-06-09 RX ORDER — AMLODIPINE BESYLATE AND ATORVASTATIN CALCIUM 5; 20 MG/1; MG/1
TABLET, FILM COATED ORAL
Qty: 90 TABLET | Refills: 3 | Status: SHIPPED | OUTPATIENT
Start: 2021-06-09 | End: 2021-07-04 | Stop reason: HOSPADM

## 2021-06-09 NOTE — PROGRESS NOTES
Continue 8 mg of Coumadin daily and recheck in one month.  Pt is scheduled to see Dr Gaffney on 6/24.  Will check INR at that appt.  PT v/u

## 2021-06-22 NOTE — PROGRESS NOTES
HEMATOLOGY ONCOLOGY FOLLOW UP        Patient name: Dalton Dallas Sr.  : 1940  MRN: [id number]  Primary Care Physician: Gabino Sibley Jr., MD  Referring Physician: Gabino Sibley Jr., *  Reason For Consult:     Chief Complaint   Patient presents with   • Appointment     Long term use of anticoagulants   • Follow-up   History of PE and DVT  Anticoagulation management  Anemia    History of Present Illness:  Mr. Dallas is a 80-year-old gentleman, remote smoker, with a   history of diabetes, coronary artery disease and hypertension who presented to [facility] on 14 with symptoms of shortness of breath and chest discomfort.  CT scan of the   chest on 14 showed bilateral pulmonary emboli with large clot burden.  There were filling   defects predominantly in the right upper lobe, and lower and middle lobe pulmonary arterial trees   along with emboli in the left upper lobe and left lower lobe arterial tree as well.  Mediastinal,   hilar and subcarinal lymphadenopathy was also noted.  The largest lymph node in the subcarinal   location measured 3.9 x 1.9 cm.  The patient was started on anticoagulation.  Bilateral lower   extremity venous Doppler on 14 showed a filling defect within the left gastroc veins.  The   patient was put on anticoagulation.  Thrombolysis was also under consideration considering the   extensive clot burden, but the patient did not require it.       Patient denied any prior history of thromboembolism before this episode.  His brother may have had   a leg DVT.    · 14 - Creatinine 1.3, BUN 20.  · 14 - CBC:  WBC 14.4, hemoglobin 14.9, platelet count 241,000.   · 14 - Factor V Leiden negative.  Factor VIII level 323 (H).  Protein C activity 112.6% (N).    Protein S activity 11.3 (L).  Activated protein C resistance 2.1 (L).  Antithrombin III 83% (N).    D-dimer 2.15.    · 14 - Serum homocysteine 8 (N).  Anticardiolipin  antibody negative.  Phosphatidylserine   antibody negative.  Beta-2 glycoprotein antibody negative.   · 2/19/14 - Prothrombin gene mutation negative.    · 2/20/14 - FLAKO-2 mutation test negative.    · 3/10/14 - Protein S activity 93% (N).  Homocysteine level 11 (N).    · 4/11/14 - Factor VIII level 260 (H).   · 4/11/14 - CT scan of the chest without contrast:  Complete resolution of air space opacities in   the lower lobes.  Mediastinal lymphadenopathy is overall very similar to the prior study.  It is   nonspecific.  Three vessel coronary artery atherosclerotic calcification.  Questionable lobular   contour of the liver, question cirrhosis.  AP window lymph node measures about 1.1 cm,   paratracheal lymph node measures about 1.7 cm and subcarinal lymph node measures about 1.2 cm.  · 5/2/14 - INR 4.1.   · 7/30/14 - WBC 5.7, hemoglobin 12.1, platelet count 228,000, MCV 94.0.  INR 3.0.   · 10/17/14 - CT scan of the chest:  Chronic lung changes are stable.  Several borderline to mildly   enlarged mediastinal lymph nodes are also unchanged from six months ago.  They are likely benign   reactive lymph nodes.    · 10/29/14 - Vitamin B12 185, ferritin 57, iron saturation 19%, TIBC 473 (H), serum iron 88,   creatinine 1.3, BUN 18, folate 17.7.  · 11/4/14 - Patient underwent upper GI endoscopy and colonoscopy:  Several nonbleeding diverticula   were seen in the sigmoid colon.  Diverticulosis appeared moderate severity.  Four sessile polyps   of benign appearance were found in the cecum and descending colon.  Polypectomies were performed.    Surgical pathology:  Sessile serrated polyps.  A tubular adenoma is noted in the descending   colon.    · 11/25/14 - INR 4.2.   · 11/25/14 - WBC 5.7, hemoglobin 11.4, platelet count 210,000.    · 2/24/15 - WBC 7.7, hemoglobin 12.9, platelet count 245,000, MCV 93,000.  · 2/24/15 - INR 2.6.  Patient’s Coumadin dose is 9 mg.     · 3/12/15 - Creatinine 1.3.    · 4/21/15 - Patient admitted with  chest pain and had cardiac catheterization.  He had a stent   placed.    · 5/8/15 - CT scan of the chest without contrast:  Stable appearance of mediastinal   lymphadenopathy over the past two studies going back to April 2014.  Stable pulmonary fibrotic   changes and scarring.    · 6/25/15 - WBC 5.3, hemoglobin 12.6, platelet count 187,000.     · 12/17/15 - WBC 5.1, hemoglobin 13.6, platelet count 208,000, MCV 93.1.  Iron saturation 21%,   TIBC 415, serum iron 85.    · 12/17/15 - Chest x-ray:  No acute process.     · 4/19/16 - INR 2.1.    · 5/17/16 - INR 2.2.  · 6/16/16 - WBC 5.5, hemoglobin 12.7, MCV 90.5, platelet count 205,000.  INR 2.3 (patient on   Coumadin 7.5 mg).    · 12/15/16 - WBC 5.0, hemoglobin 13.0, platelet count 174,000.  INR 1.9 (Coumadin dose 7.5).   · 6/15/17 - WBC 5.5, hemoglobin 12.5, platelet count 168,000, MCV 94.4.  INR 2.6 (Coumadin dose 8   mg).   · 11/2/17 - INR 2.    · 12/14/17 - WBC 5.04, hemoglobin 12.5, platelet count 167,000, MCV 94.    · 6/14/18 - WBC 4.9, hemoglobin 12.5, platelet count 172,000, MCV 93.5.    · 12/13/18 - INR 2.4.  WBC 4.9, hemoglobin 12.8, platelet count 180,000.    11/23/2019 INR is 2.55  11/11/2019: Colonoscopy: Cecum polypectomy shows tubular adenoma, transverse colon polypectomy shows tubular adenoma.   Patient readmitted after colonoscopy to hospital due to GI bleeding from polyp biopsy..  11/22/2019 hemoglobin 9.3 due to GI bleeding  11/23/2019 cecal ulcer with visible vessel and stigmata of recent bleeding which was clipped  Moderate left-sided diverticulosis  12/12/2019 INR 1.9  12/12/2019 WBC 4.9, hemoglobin 11.2, MCV 98.0, platelets 182, ferritin 85.2, iron 115, iron saturation 24%, TIBC 471, B12 greater than 2000, folate 13.8,  4/23/2020: ESR 12  5/6/2020: 25 hydroxy vitamin D 31.9  6/11/2020: INR 2.1, WBC 6.3, hemoglobin 13.5, platelets 204,  ·  9/28/2020: Lower extremity venous Doppler:: Normal  · 11/25/2020: INR 2.8  · 12/31/2020: WBC 6.4, hemoglobin  13.7, platelets 212, INR 1.2,  · 5/3/2021: 25 hydroxy vitamin D 66.5, TSH 2.8, creatinine 1.24,  · 5/12/2021: INR 2.7,  · 6/9/2021: INR 2.5,          Subjective:      Here for a follow up of DVT.  Continues to take iron.  Continues to take clopidogrel.   He follows up with Dr. Shaver for diabetic management.   He continues to wear compression stockings. He has some BL LE swelling, but states the swelling has reduced since last visit.    The following portions of the patient's history were reviewed and updated as appropriate: allergies, current medications, past family history, past medical history, past social history, past surgical history and problem list.         Past Medical History:   Diagnosis Date   • ACE-inhibitor cough 06/2005   • Coronary artery disease    • Diabetes mellitus, type 2 (CMS/HCC) 1995   • ED (erectile dysfunction)    • Hx of blood clots 2014    Blood clot left leg    • Hyperlipidemia 08/2000   • Hypertension    • Hypogonadism, male 1998   • Hypothyroidism 06/2001   • Obesity    • Peripheral neuropathy    • Pulmonary embolism (CMS/HCC) 02/2014   • Rectal fistula    • Vitamin D deficiency        Past Surgical History:   Procedure Laterality Date   • CARDIAC CATHETERIZATION  2008    PTCA: 2008; PTCA: 4/21/2015 LCX stent    • CARDIOVASCULAR STRESS TEST  2020   • CATARACT EXTRACTION  01/11/2016 1-4-16 & 1-11-16    • COLONOSCOPY N/A 11/23/2019    Procedure: COLONOSCOPY WITH ENDOSCOPIC CLIPPING X1 OF POST POLYPECTOMY BLEED SITE;  Surgeon: Jhonny Orellana MD;  Location: [facility] ENDOSCOPY;  Service: Gastroenterology   • CORONARY STENT PLACEMENT     • INGUINAL HERNIA REPAIR Left    • UMBILICAL HERNIA REPAIR           Current Outpatient Medications:   •  Accu-Chek Irene Plus test strip, USE TO CHECK BLOOD SUGAR BEFORE MEALS AND AT BEDTIME, Disp: 400 each, Rfl: 2  •  amLODIPine-atorvastatin (CADUET) 5-20 MG per tablet, TAKE 1 TABLET DAILY, Disp: 90 tablet, Rfl: 3  •  atenolol (TENORMIN) 50 MG  "tablet, TAKE 1 TABLET DAILY, Disp: 90 tablet, Rfl: 3  •  BD Insulin Syringe U/F 31G X 5/16\" 0.5 ML misc, USE 1 NEW SYRINGE WITH EACH DOSE OF INSULIN TWICE A DAY, Disp: 200 each, Rfl: 3  •  clopidogrel (PLAVIX) 75 MG tablet, TAKE 1 TABLET DAILY, Disp: 90 tablet, Rfl: 3  •  Empagliflozin (Jardiance) 25 MG tablet, Take 25 mg by mouth Daily., Disp: 90 tablet, Rfl: 3  •  fenofibrate (TRICOR) 54 MG tablet, TAKE 1 TABLET DAILY, Disp: 90 tablet, Rfl: 3  •  ferrous sulfate 325 (65 FE) MG tablet, Take 325 mg by mouth 2 (Two) Times a Day., Disp: , Rfl:   •  gabapentin (NEURONTIN) 100 MG capsule, TAKE 1 CAPSULE THREE TIMES A DAY, Disp: 270 capsule, Rfl: 0  •  hydroCHLOROthiazide (HYDRODIURIL) 25 MG tablet, TAKE 1 TABLET DAILY, Disp: 90 tablet, Rfl: 3  •  Insulin Pen Needle (BD ULTRA-FINE PEN NEEDLES) 29G X 12.7MM misc, Use with insulin injections three times daily, Disp: 300 each, Rfl: 3  •  insulin regular (HUMULIN R) 500 UNIT/ML CONCENTRATED injection, DRAW TO 24 UNIT REI ON U-100 SYRINGE AND INJECT IN THE MORNING AND 35 UNIT REI IN THE EVENING, Disp: 60 mL, Rfl: 3  •  Insulin Syringe 30G X 1/2\" 0.5 ML misc, Use to inject insulin 2 times daily DX E11.65, Disp: 200 each, Rfl: 3  •  irbesartan (AVAPRO) 300 MG tablet, TAKE 1 TABLET DAILY, Disp: 90 tablet, Rfl: 3  •  levothyroxine (SYNTHROID, LEVOTHROID) 175 MCG tablet, TAKE 1 TABLET DAILY, Disp: 90 tablet, Rfl: 3  •  metFORMIN ER (GLUCOPHAGE-XR) 500 MG 24 hr tablet, TAKE 4 TABLETS AT SUPPER, Disp: 360 tablet, Rfl: 3  •  Omega-3 Fatty Acids (FISH OIL) 1000 MG capsule capsule, Take 1,000 mg by mouth 2 (Two) Times a Day., Disp: , Rfl:   •  Pramlintide Acetate 2700 MCG/2.7ML solution pen-injector, Inject 120 mcg under the skin into the appropriate area as directed 3 (Three) Times a Day., Disp: 32.4 mL, Rfl: 4  •  vitamin B-12 (CYANOCOBALAMIN) 100 MCG tablet, Take 100 mcg by mouth Daily., Disp: , Rfl:   •  vitamin D (ERGOCALCIFEROL) 1.25 MG (36482 UT) capsule capsule, TAKE 1 " "CAPSULE ONCE A WEEK, Disp: 12 capsule, Rfl: 3  •  warfarin (COUMADIN) 1 MG tablet, Take 1 tablet by mouth at 1700 or as directed, Disp: 90 tablet, Rfl: 3  •  warfarin (COUMADIN) 3 MG tablet, Take 1 tablet by mouth Daily., Disp: 90 tablet, Rfl: 3  •  warfarin (COUMADIN) 5 MG tablet, Take 1 tablet by mouth Take As Directed., Disp: 90 tablet, Rfl: 3    No Known Allergies    Family History   Problem Relation Age of Onset   • Heart disease Mother    • Leukemia Father        Cancer-related family history is not on file.    Social History     Tobacco Use   • Smoking status: Former Smoker   • Smokeless tobacco: Never Used   • Tobacco comment: quit 1977   Vaping Use   • Vaping Use: Never used   Substance Use Topics   • Alcohol use: No   • Drug use: No         I have reviewed the history of present illness, past medical history, family history, social history, lab results, all notes and other records since the patient was last seen on  9/23/2019.    ROS:       Objective:  Vital signs:  Vitals:    06/24/21 1117   BP: 134/68   Pulse: 68   Resp: 20   Temp: 97.5 °F (36.4 °C)   Weight: (!) 139 kg (306 lb)   Height: 177.8 cm (70\")   PainSc: 0-No pain     ECOG  (1) Restricted in physically strenuous activity, ambulatory and able to do work of light nature    Physical Exam:   Physical Exam   Constitutional: He is oriented to person, place, and time. He appears well-developed. No distress.   Obese male   HENT:   Head: Normocephalic and atraumatic.   Eyes: Conjunctivae are normal. Right eye exhibits no discharge. Left eye exhibits no discharge. No scleral icterus.   Neck: No thyromegaly present.   Cardiovascular: Normal rate, regular rhythm and normal heart sounds. Exam reveals no gallop and no friction rub.   Pulmonary/Chest: Effort normal. No stridor. No respiratory distress. He has no wheezes.   Abdominal: Soft. Normal appearance and bowel sounds are normal. He exhibits no mass. There is no abdominal tenderness. There is no rebound " and no guarding.   Musculoskeletal: Normal range of motion. No swelling, tenderness or signs of injury.   Lymphadenopathy:     He has no cervical adenopathy.   Neurological: He is alert and oriented to person, place, and time. He exhibits normal muscle tone.   Skin: Skin is warm. No rash noted. He is not diaphoretic. No erythema.   Psychiatric: His behavior is normal. Mood, judgment and thought content normal.   Nursing note and vitals reviewed.       I have reexamined the patient and the results are consistent with the previously documented exam. Hadley Gaffney MD         Lab Results - Last 18 Months   Lab Units 12/31/20  1118 06/11/20  1057 04/23/20  1446   WBC 10*3/mm3 6.49 6.37 7.25   HEMOGLOBIN g/dL 13.7 13.5 13.2   HEMATOCRIT % 41.4 39.4 39.5   PLATELETS 10*3/mm3 212 204 206   MCV fL 93.7 91.8 92.1     Lab Results - Last 18 Months   Lab Units 05/03/21  0928 10/28/20  0907 05/06/20  0905   SODIUM mmol/L 138 139 136   POTASSIUM mmol/L 4.8 5.1 4.4   CHLORIDE mmol/L 99 104 97*   CO2 mmol/L 25.2 24.5 22.9   BUN mg/dL 26* 17 20   CREATININE mg/dL 1.24 1.02 1.24   CALCIUM mg/dL 9.7 9.7 9.7   BILIRUBIN mg/dL 0.3 0.3 0.5   ALK PHOS U/L 38* 36* 39   ALT (SGPT) U/L 23 26 21   AST (SGOT) U/L 18 24 21   GLUCOSE mg/dL 259* 158* 330*       Lab Results   Component Value Date    GLUCOSE 259 (H) 05/03/2021    BUN 26 (H) 05/03/2021    CREATININE 1.24 05/03/2021    EGFRIFNONA 56 (L) 05/03/2021    BCR 21.0 05/03/2021    K 4.8 05/03/2021    CO2 25.2 05/03/2021    CALCIUM 9.7 05/03/2021    ALBUMIN 4.20 05/03/2021    LABIL2 1.7 03/28/2019    AST 18 05/03/2021    ALT 23 05/03/2021       Lab Results - Last 18 Months   Lab Units 06/09/21  1019 05/12/21  1109 04/14/21  0942   INR  2.50* 2.70 2.40       Lab Results   Component Value Date    IRON 113 12/31/2020    TIBC 407 12/31/2020    FERRITIN 100.80 12/31/2020       Lab Results   Component Value Date    FOLATE 13.80 12/12/2019       No results found for: OCCULTBLD    No results found for:  RETICCTPCT    Lab Results   Component Value Date    [id number] >2,000 (H) 12/12/2019     No results found for: SPEP, UPEP  No results found for: LDH, URICACID  Lab Results   Component Value Date    SEDRATE 12 04/23/2020     No results found for: FIBRINOGEN, HAPTOGLOBIN  Lab Results   Component Value Date    PTT 31.5 (H) 11/22/2019    INR 2.50 (H) 06/09/2021     No results found for:   No results found for: CEA  No components found for: CA-19-9  No results found for: PSA      Assessment/Plan     Assessment:  1. History of bilateral pulmonary emboli with high clot burden and DVT:  His thrombosis was   unprovoked.  Hypercoagulability testing revealed elevated factor VIII level on two occasions.  He   is on anticoagulation with Coumadin.  He also has a questionable positive family history of DVT.    He has been recommended indefinite lifetime anticoagulation.  His INRs have been very stable.  2. History of mediastinal lymphadenopathy:  His recent CT scan shows stable lymphadenopathy.    These are very likely benign lymph nodes.  His last chest x-ray was unremarkable.    3. Mild CKD:  He has slight anemia of chronic kidney disease.   4. History of iron deficiency:  He had a colonoscopy and that showed polyps and moderately severe   diverticulosis.  Pathology showed a tubular adenoma in the descending colon.  He is on iron   supplements p.o. BID. History of iron deficiency.  Monitoring iron levels.  5. Vitamin B12 deficiency:  He is on p.o. B12 supplements.  6. Dizziness/light-headedness:  Possibly could be from orthostatic hypertension. .    He will see Dr. Sibley for this problem.    7.  History of  GI bleeding.  Due to polypectomy from colonoscopy.     PLAN:         1.  Continue same dose Coumadin.  INR in 1 month will need to continue anticoagulation indefinitely since patient has history of large unprovoked PE and DVT.  2.   Continue oral iron supplement.   Check iron levels today  3.  We will check with his  cardiologist if he can stop Plavix and use baby aspirin  .  4.  Follow-up in 6 months          Mixed hyperlipidemia  - CBC & Differential    Orders Placed This Encounter   Procedures   • CBC & Differential     Standing Status:   Future                   Thank you very much for providing the opportunity to participate in this patient’s care. Please do not hesitate to call if there are any other questions.    I have reviewed and confirmed the accuracy of the patient's history: Chief complaint, HPI, ROS, Subjective and Past Family Social History as entered by the MA/LPN/RN. Hadley Gaffney MD 06/24/21         Electronically signed by Hadley Gaffney MD, 06/24/21, 11:55 AM EDT.

## 2021-06-24 ENCOUNTER — LAB (OUTPATIENT)
Dept: LAB | Facility: HOSPITAL | Age: 81
End: 2021-06-24

## 2021-06-24 ENCOUNTER — OFFICE VISIT (OUTPATIENT)
Dept: ONCOLOGY | Facility: CLINIC | Age: 81
End: 2021-06-24

## 2021-06-24 VITALS
HEART RATE: 68 BPM | BODY MASS INDEX: 43.81 KG/M2 | WEIGHT: 306 LBS | RESPIRATION RATE: 20 BRPM | HEIGHT: 70 IN | DIASTOLIC BLOOD PRESSURE: 68 MMHG | TEMPERATURE: 97.5 F | SYSTOLIC BLOOD PRESSURE: 134 MMHG

## 2021-06-24 DIAGNOSIS — E78.2 MIXED HYPERLIPIDEMIA: ICD-10-CM

## 2021-06-24 DIAGNOSIS — I82.409 DEEP VEIN THROMBOSIS (DVT) OF LOWER EXTREMITY, UNSPECIFIED CHRONICITY, UNSPECIFIED LATERALITY, UNSPECIFIED VEIN (HCC): ICD-10-CM

## 2021-06-24 DIAGNOSIS — R79.89 OTHER SPECIFIED ABNORMAL FINDINGS OF BLOOD CHEMISTRY: ICD-10-CM

## 2021-06-24 DIAGNOSIS — E78.2 MIXED HYPERLIPIDEMIA: Primary | ICD-10-CM

## 2021-06-24 LAB
BASOPHILS # BLD AUTO: 0.04 10*3/MM3 (ref 0–0.2)
BASOPHILS NFR BLD AUTO: 0.6 % (ref 0–1.5)
DEPRECATED RDW RBC AUTO: 45.9 FL (ref 37–54)
EOSINOPHIL # BLD AUTO: 0.29 10*3/MM3 (ref 0–0.4)
EOSINOPHIL NFR BLD AUTO: 4.1 % (ref 0.3–6.2)
ERYTHROCYTE [DISTWIDTH] IN BLOOD BY AUTOMATED COUNT: 13.6 % (ref 12.3–15.4)
FERRITIN SERPL-MCNC: 117 NG/ML (ref 30–400)
HCT VFR BLD AUTO: 41.1 % (ref 37.5–51)
HGB BLD-MCNC: 13.3 G/DL (ref 13–17.7)
INR PPP: 2.4 (ref 0.8–1.2)
IRON 24H UR-MRATE: 108 MCG/DL (ref 59–158)
IRON SATN MFR SERPL: 27 % (ref 20–50)
LYMPHOCYTES # BLD AUTO: 2.07 10*3/MM3 (ref 0.7–3.1)
LYMPHOCYTES NFR BLD AUTO: 29.5 % (ref 19.6–45.3)
MCH RBC QN AUTO: 31.1 PG (ref 26.6–33)
MCHC RBC AUTO-ENTMCNC: 32.4 G/DL (ref 31.5–35.7)
MCV RBC AUTO: 96 FL (ref 79–97)
MONOCYTES # BLD AUTO: 0.61 10*3/MM3 (ref 0.1–0.9)
MONOCYTES NFR BLD AUTO: 8.7 % (ref 5–12)
NEUTROPHILS NFR BLD AUTO: 4.01 10*3/MM3 (ref 1.7–7)
NEUTROPHILS NFR BLD AUTO: 57.1 % (ref 42.7–76)
PLATELET # BLD AUTO: 189 10*3/MM3 (ref 140–450)
PMV BLD AUTO: 10.7 FL (ref 6–12)
RBC # BLD AUTO: 4.28 10*6/MM3 (ref 4.14–5.8)
TIBC SERPL-MCNC: 401 MCG/DL (ref 298–536)
TRANSFERRIN SERPL-MCNC: 269 MG/DL (ref 200–360)
WBC # BLD AUTO: 7.02 10*3/MM3 (ref 3.4–10.8)

## 2021-06-24 PROCEDURE — 99214 OFFICE O/P EST MOD 30 MIN: CPT | Performed by: INTERNAL MEDICINE

## 2021-06-24 PROCEDURE — 85610 PROTHROMBIN TIME: CPT

## 2021-06-24 PROCEDURE — 85025 COMPLETE CBC W/AUTO DIFF WBC: CPT

## 2021-06-24 PROCEDURE — 82728 ASSAY OF FERRITIN: CPT

## 2021-06-24 PROCEDURE — 84466 ASSAY OF TRANSFERRIN: CPT

## 2021-06-24 PROCEDURE — 83540 ASSAY OF IRON: CPT

## 2021-06-24 PROCEDURE — 36415 COLL VENOUS BLD VENIPUNCTURE: CPT

## 2021-06-27 DIAGNOSIS — Z79.01 LONG TERM (CURRENT) USE OF ANTICOAGULANTS: ICD-10-CM

## 2021-06-28 RX ORDER — WARFARIN SODIUM 5 MG/1
TABLET ORAL
Qty: 90 TABLET | Refills: 3 | Status: SHIPPED | OUTPATIENT
Start: 2021-06-28 | End: 2022-03-28 | Stop reason: SDUPTHER

## 2021-06-29 ENCOUNTER — TELEPHONE (OUTPATIENT)
Dept: ONCOLOGY | Facility: CLINIC | Age: 81
End: 2021-06-29

## 2021-06-29 DIAGNOSIS — Z79.01 LONG TERM (CURRENT) USE OF ANTICOAGULANTS: ICD-10-CM

## 2021-06-29 RX ORDER — WARFARIN SODIUM 5 MG/1
5 TABLET ORAL TAKE AS DIRECTED
Qty: 90 TABLET | Refills: 3 | Status: CANCELLED | OUTPATIENT
Start: 2021-06-29

## 2021-06-29 NOTE — TELEPHONE ENCOUNTER
"Caller: LOKI    Relationship: Self    Best call back number: [phone]    What medications are you currently taking:   Current Outpatient Medications on File Prior to Visit   Medication Sig Dispense Refill   • Accu-Chek Irene Plus test strip USE TO CHECK BLOOD SUGAR BEFORE MEALS AND AT BEDTIME 400 each 2   • amLODIPine-atorvastatin (CADUET) 5-20 MG per tablet TAKE 1 TABLET DAILY 90 tablet 3   • atenolol (TENORMIN) 50 MG tablet TAKE 1 TABLET DAILY 90 tablet 3   • BD Insulin Syringe U/F 31G X 5/16\" 0.5 ML misc USE 1 NEW SYRINGE WITH EACH DOSE OF INSULIN TWICE A  each 3   • clopidogrel (PLAVIX) 75 MG tablet TAKE 1 TABLET DAILY 90 tablet 3   • Empagliflozin (Jardiance) 25 MG tablet Take 25 mg by mouth Daily. 90 tablet 3   • fenofibrate (TRICOR) 54 MG tablet TAKE 1 TABLET DAILY 90 tablet 3   • ferrous sulfate 325 (65 FE) MG tablet Take 325 mg by mouth 2 (Two) Times a Day.     • gabapentin (NEURONTIN) 100 MG capsule TAKE 1 CAPSULE THREE TIMES A  capsule 0   • hydroCHLOROthiazide (HYDRODIURIL) 25 MG tablet TAKE 1 TABLET DAILY 90 tablet 3   • Insulin Pen Needle (BD ULTRA-FINE PEN NEEDLES) 29G X 12.7MM misc Use with insulin injections three times daily 300 each 3   • insulin regular (HUMULIN R) 500 UNIT/ML CONCENTRATED injection DRAW TO 24 UNIT REI ON U-100 SYRINGE AND INJECT IN THE MORNING AND 35 UNIT REI IN THE EVENING 60 mL 3   • Insulin Syringe 30G X 1/2\" 0.5 ML misc Use to inject insulin 2 times daily DX E11.65 200 each 3   • irbesartan (AVAPRO) 300 MG tablet TAKE 1 TABLET DAILY 90 tablet 3   • levothyroxine (SYNTHROID, LEVOTHROID) 175 MCG tablet TAKE 1 TABLET DAILY 90 tablet 3   • metFORMIN ER (GLUCOPHAGE-XR) 500 MG 24 hr tablet TAKE 4 TABLETS AT SUPPER 360 tablet 3   • Omega-3 Fatty Acids (FISH OIL) 1000 MG capsule capsule Take 1,000 mg by mouth 2 (Two) Times a Day.     • Pramlintide Acetate 2700 MCG/2.7ML solution pen-injector Inject 120 mcg under the skin into the appropriate area as directed 3 " (Three) Times a Day. 32.4 mL 4   • vitamin B-12 (CYANOCOBALAMIN) 100 MCG tablet Take 100 mcg by mouth Daily.     • vitamin D (ERGOCALCIFEROL) 1.25 MG (14697 UT) capsule capsule TAKE 1 CAPSULE ONCE A WEEK 12 capsule 3   • warfarin (COUMADIN) 1 MG tablet Take 1 tablet by mouth at 1700 or as directed 90 tablet 3   • warfarin (COUMADIN) 3 MG tablet Take 1 tablet by mouth Daily. 90 tablet 3   • warfarin (COUMADIN) 5 MG tablet TAKE 1 TABLET AS DIRECTED 90 tablet 3     No current facility-administered medications on file prior to visit.      Which medication are you concerned about: WARFARIN 5 MG    Who prescribed you this medication: DR. SWEENEY    What are your concerns:  PATIENT WANTED TO VERIFY THAT THIS PRESP. WAS ORDERED YESTERDAY BY [facility] AS HE HAS HAD PROBLEMS IN THE PAST.

## 2021-06-29 NOTE — TELEPHONE ENCOUNTER
Pt request a refill on the warfarin 5mg [facility].  Refill routed to the provider for signature. Attempted to notify the patient however the line was busy.

## 2021-07-01 ENCOUNTER — APPOINTMENT (OUTPATIENT)
Dept: GENERAL RADIOLOGY | Facility: HOSPITAL | Age: 81
End: 2021-07-01

## 2021-07-01 ENCOUNTER — HOSPITAL ENCOUNTER (INPATIENT)
Facility: HOSPITAL | Age: 81
LOS: 3 days | Discharge: HOME | End: 2021-07-04
Attending: EMERGENCY MEDICINE | Admitting: INTERNAL MEDICINE

## 2021-07-01 DIAGNOSIS — I20.0 UNSTABLE ANGINA (HCC): Primary | ICD-10-CM

## 2021-07-01 LAB
ANION GAP SERPL CALCULATED.3IONS-SCNC: 13 MMOL/L (ref 5–15)
BASOPHILS # BLD AUTO: 0.1 10*3/MM3 (ref 0–0.2)
BASOPHILS NFR BLD AUTO: 0.8 % (ref 0–1.5)
BUN SERPL-MCNC: 20 MG/DL (ref 8–23)
BUN/CREAT SERPL: 15.5 (ref 7–25)
CALCIUM SPEC-SCNC: 9.9 MG/DL (ref 8.6–10.5)
CHLORIDE SERPL-SCNC: 102 MMOL/L (ref 98–107)
CO2 SERPL-SCNC: 25 MMOL/L (ref 22–29)
CREAT SERPL-MCNC: 1.29 MG/DL (ref 0.76–1.27)
DEPRECATED RDW RBC AUTO: 45.9 FL (ref 37–54)
EOSINOPHIL # BLD AUTO: 0.3 10*3/MM3 (ref 0–0.4)
EOSINOPHIL NFR BLD AUTO: 4.6 % (ref 0.3–6.2)
ERYTHROCYTE [DISTWIDTH] IN BLOOD BY AUTOMATED COUNT: 14.1 % (ref 12.3–15.4)
GFR SERPL CREATININE-BSD FRML MDRD: 54 ML/MIN/1.73
GLUCOSE SERPL-MCNC: 194 MG/DL (ref 65–99)
HCT VFR BLD AUTO: 43.8 % (ref 37.5–51)
HGB BLD-MCNC: 14.8 G/DL (ref 13–17.7)
HOLD SPECIMEN: NORMAL
INR PPP: 2.18 (ref 2–3)
LYMPHOCYTES # BLD AUTO: 2 10*3/MM3 (ref 0.7–3.1)
LYMPHOCYTES NFR BLD AUTO: 31.4 % (ref 19.6–45.3)
MCH RBC QN AUTO: 31.3 PG (ref 26.6–33)
MCHC RBC AUTO-ENTMCNC: 33.9 G/DL (ref 31.5–35.7)
MCV RBC AUTO: 92.3 FL (ref 79–97)
MONOCYTES # BLD AUTO: 0.5 10*3/MM3 (ref 0.1–0.9)
MONOCYTES NFR BLD AUTO: 8.2 % (ref 5–12)
NEUTROPHILS NFR BLD AUTO: 3.5 10*3/MM3 (ref 1.7–7)
NEUTROPHILS NFR BLD AUTO: 55 % (ref 42.7–76)
NRBC BLD AUTO-RTO: 0.2 /100 WBC (ref 0–0.2)
PLATELET # BLD AUTO: 201 10*3/MM3 (ref 140–450)
PMV BLD AUTO: 8.6 FL (ref 6–12)
POTASSIUM SERPL-SCNC: 4.4 MMOL/L (ref 3.5–5.2)
PROTHROMBIN TIME: 22.8 SECONDS (ref 19.4–28.5)
RBC # BLD AUTO: 4.74 10*6/MM3 (ref 4.14–5.8)
SODIUM SERPL-SCNC: 140 MMOL/L (ref 136–145)
TROPONIN T SERPL-MCNC: <0.01 NG/ML (ref 0–0.03)
WBC # BLD AUTO: 6.4 10*3/MM3 (ref 3.4–10.8)

## 2021-07-01 PROCEDURE — 85025 COMPLETE CBC W/AUTO DIFF WBC: CPT | Performed by: EMERGENCY MEDICINE

## 2021-07-01 PROCEDURE — 93005 ELECTROCARDIOGRAM TRACING: CPT | Performed by: EMERGENCY MEDICINE

## 2021-07-01 PROCEDURE — 80048 BASIC METABOLIC PNL TOTAL CA: CPT | Performed by: EMERGENCY MEDICINE

## 2021-07-01 PROCEDURE — 87635 SARS-COV-2 COVID-19 AMP PRB: CPT | Performed by: EMERGENCY MEDICINE

## 2021-07-01 PROCEDURE — 84484 ASSAY OF TROPONIN QUANT: CPT | Performed by: EMERGENCY MEDICINE

## 2021-07-01 PROCEDURE — 71045 X-RAY EXAM CHEST 1 VIEW: CPT

## 2021-07-01 PROCEDURE — 85610 PROTHROMBIN TIME: CPT | Performed by: EMERGENCY MEDICINE

## 2021-07-01 PROCEDURE — 96365 THER/PROPH/DIAG IV INF INIT: CPT

## 2021-07-01 PROCEDURE — 96375 TX/PRO/DX INJ NEW DRUG ADDON: CPT

## 2021-07-01 PROCEDURE — 99284 EMERGENCY DEPT VISIT MOD MDM: CPT

## 2021-07-01 PROCEDURE — 96366 THER/PROPH/DIAG IV INF ADDON: CPT

## 2021-07-01 PROCEDURE — 25010000002 MORPHINE PER 10 MG: Performed by: EMERGENCY MEDICINE

## 2021-07-01 RX ORDER — MORPHINE SULFATE 4 MG/ML
2 INJECTION, SOLUTION INTRAMUSCULAR; INTRAVENOUS ONCE
Status: COMPLETED | OUTPATIENT
Start: 2021-07-01 | End: 2021-07-01

## 2021-07-01 RX ORDER — SODIUM CHLORIDE 0.9 % (FLUSH) 0.9 %
10 SYRINGE (ML) INJECTION AS NEEDED
Status: DISCONTINUED | OUTPATIENT
Start: 2021-07-01 | End: 2021-07-04 | Stop reason: HOSPADM

## 2021-07-01 RX ORDER — NITROGLYCERIN 20 MG/100ML
10-50 INJECTION INTRAVENOUS
Status: DISCONTINUED | OUTPATIENT
Start: 2021-07-01 | End: 2021-07-04 | Stop reason: HOSPADM

## 2021-07-01 RX ADMIN — MORPHINE SULFATE 2 MG: 4 INJECTION INTRAVENOUS at 22:13

## 2021-07-01 RX ADMIN — NITROGLYCERIN 10 MCG/MIN: 20 INJECTION INTRAVENOUS at 22:13

## 2021-07-02 ENCOUNTER — TELEPHONE (OUTPATIENT)
Dept: ONCOLOGY | Facility: CLINIC | Age: 81
End: 2021-07-02

## 2021-07-02 DIAGNOSIS — E11.40 TYPE 2 DIABETES MELLITUS WITH DIABETIC NEUROPATHY, WITHOUT LONG-TERM CURRENT USE OF INSULIN (HCC): ICD-10-CM

## 2021-07-02 LAB
B PARAPERT DNA SPEC QL NAA+PROBE: NOT DETECTED
B PERT DNA SPEC QL NAA+PROBE: NOT DETECTED
C PNEUM DNA NPH QL NAA+NON-PROBE: NOT DETECTED
CK SERPL-CCNC: 173 U/L (ref 20–200)
FLUAV SUBTYP SPEC NAA+PROBE: NOT DETECTED
FLUBV RNA ISLT QL NAA+PROBE: NOT DETECTED
GLUCOSE BLDC GLUCOMTR-MCNC: 138 MG/DL (ref 70–105)
GLUCOSE BLDC GLUCOMTR-MCNC: 202 MG/DL (ref 70–105)
GLUCOSE BLDC GLUCOMTR-MCNC: 229 MG/DL (ref 70–105)
GLUCOSE BLDC GLUCOMTR-MCNC: 256 MG/DL (ref 70–105)
HADV DNA SPEC NAA+PROBE: NOT DETECTED
HCOV 229E RNA SPEC QL NAA+PROBE: NOT DETECTED
HCOV HKU1 RNA SPEC QL NAA+PROBE: NOT DETECTED
HCOV NL63 RNA SPEC QL NAA+PROBE: NOT DETECTED
HCOV OC43 RNA SPEC QL NAA+PROBE: NOT DETECTED
HMPV RNA NPH QL NAA+NON-PROBE: NOT DETECTED
HPIV1 RNA SPEC QL NAA+PROBE: NOT DETECTED
HPIV2 RNA SPEC QL NAA+PROBE: NOT DETECTED
HPIV3 RNA NPH QL NAA+PROBE: NOT DETECTED
HPIV4 P GENE NPH QL NAA+PROBE: NOT DETECTED
INR PPP: 2.24 (ref 2–3)
M PNEUMO IGG SER IA-ACNC: NOT DETECTED
MAGNESIUM SERPL-MCNC: 1.7 MG/DL (ref 1.6–2.4)
MRSA DNA SPEC QL NAA+PROBE: NORMAL
PROTHROMBIN TIME: 23.4 SECONDS (ref 19.4–28.5)
QT INTERVAL: 353 MS
RHINOVIRUS RNA SPEC NAA+PROBE: NOT DETECTED
RSV RNA NPH QL NAA+NON-PROBE: NOT DETECTED
SARS-COV-2 RNA PNL SPEC NAA+PROBE: NOT DETECTED
TROPONIN T SERPL-MCNC: 0.17 NG/ML (ref 0–0.03)
TROPONIN T SERPL-MCNC: 0.19 NG/ML (ref 0–0.03)

## 2021-07-02 PROCEDURE — 87633 RESP VIRUS 12-25 TARGETS: CPT | Performed by: INTERNAL MEDICINE

## 2021-07-02 PROCEDURE — 63710000001 INSULIN REGULAR HUMAN (CONC) 500 UNIT/ML SOLUTION PEN-INJECTOR 3 ML PEN: Performed by: INTERNAL MEDICINE

## 2021-07-02 PROCEDURE — 25010000003 PHYTONADIONE 10 MG/ML SOLUTION: Performed by: NURSE PRACTITIONER

## 2021-07-02 PROCEDURE — 84484 ASSAY OF TROPONIN QUANT: CPT | Performed by: INTERNAL MEDICINE

## 2021-07-02 PROCEDURE — 99220 PR INITIAL OBSERVATION CARE/DAY 70 MINUTES: CPT | Performed by: INTERNAL MEDICINE

## 2021-07-02 PROCEDURE — 63710000001 INSULIN LISPRO (HUMAN) PER 5 UNITS: Performed by: INTERNAL MEDICINE

## 2021-07-02 PROCEDURE — 82962 GLUCOSE BLOOD TEST: CPT

## 2021-07-02 PROCEDURE — 99214 OFFICE O/P EST MOD 30 MIN: CPT | Performed by: INTERNAL MEDICINE

## 2021-07-02 PROCEDURE — 87641 MR-STAPH DNA AMP PROBE: CPT | Performed by: INTERNAL MEDICINE

## 2021-07-02 PROCEDURE — 82550 ASSAY OF CK (CPK): CPT | Performed by: INTERNAL MEDICINE

## 2021-07-02 PROCEDURE — G0378 HOSPITAL OBSERVATION PER HR: HCPCS

## 2021-07-02 PROCEDURE — 85610 PROTHROMBIN TIME: CPT | Performed by: INTERNAL MEDICINE

## 2021-07-02 PROCEDURE — 83735 ASSAY OF MAGNESIUM: CPT | Performed by: INTERNAL MEDICINE

## 2021-07-02 RX ORDER — DEXTROSE MONOHYDRATE 25 G/50ML
25 INJECTION, SOLUTION INTRAVENOUS
Status: DISCONTINUED | OUTPATIENT
Start: 2021-07-02 | End: 2021-07-04 | Stop reason: HOSPADM

## 2021-07-02 RX ORDER — HYDRALAZINE HYDROCHLORIDE 10 MG/1
10 TABLET, FILM COATED ORAL EVERY 6 HOURS SCHEDULED
Status: DISCONTINUED | OUTPATIENT
Start: 2021-07-02 | End: 2021-07-04

## 2021-07-02 RX ORDER — WARFARIN SODIUM 3 MG/1
3 TABLET ORAL DAILY
Status: DISCONTINUED | OUTPATIENT
Start: 2021-07-02 | End: 2021-07-02

## 2021-07-02 RX ORDER — INSULIN GLARGINE 100 [IU]/ML
20 INJECTION, SOLUTION SUBCUTANEOUS NIGHTLY
Status: DISCONTINUED | OUTPATIENT
Start: 2021-07-02 | End: 2021-07-02

## 2021-07-02 RX ORDER — ATENOLOL 50 MG/1
50 TABLET ORAL DAILY
Status: DISCONTINUED | OUTPATIENT
Start: 2021-07-02 | End: 2021-07-04 | Stop reason: HOSPADM

## 2021-07-02 RX ORDER — FENOFIBRATE 48 MG/1
48 TABLET, COATED ORAL DAILY
Refills: 3 | Status: DISCONTINUED | OUTPATIENT
Start: 2021-07-02 | End: 2021-07-04 | Stop reason: HOSPADM

## 2021-07-02 RX ORDER — CLOPIDOGREL BISULFATE 75 MG/1
75 TABLET ORAL DAILY
Status: DISCONTINUED | OUTPATIENT
Start: 2021-07-02 | End: 2021-07-04 | Stop reason: HOSPADM

## 2021-07-02 RX ORDER — PHYTONADIONE 2 MG/ML
5 INJECTION, EMULSION INTRAMUSCULAR; INTRAVENOUS; SUBCUTANEOUS ONCE
Status: COMPLETED | OUTPATIENT
Start: 2021-07-02 | End: 2021-07-02

## 2021-07-02 RX ORDER — BLOOD SUGAR DIAGNOSTIC
STRIP MISCELLANEOUS
Qty: 400 EACH | Refills: 3 | Status: SHIPPED | OUTPATIENT
Start: 2021-07-02 | End: 2021-07-15 | Stop reason: CLARIF

## 2021-07-02 RX ORDER — WARFARIN SODIUM 5 MG/1
5 TABLET ORAL DAILY
Status: DISCONTINUED | OUTPATIENT
Start: 2021-07-02 | End: 2021-07-02

## 2021-07-02 RX ORDER — SODIUM CHLORIDE 0.9 % (FLUSH) 0.9 %
10 SYRINGE (ML) INJECTION EVERY 12 HOURS SCHEDULED
Status: DISCONTINUED | OUTPATIENT
Start: 2021-07-02 | End: 2021-07-04 | Stop reason: HOSPADM

## 2021-07-02 RX ORDER — LABETALOL HYDROCHLORIDE 5 MG/ML
10 INJECTION, SOLUTION INTRAVENOUS EVERY 4 HOURS PRN
Status: DISCONTINUED | OUTPATIENT
Start: 2021-07-02 | End: 2021-07-04 | Stop reason: HOSPADM

## 2021-07-02 RX ORDER — FERROUS SULFATE TAB EC 324 MG (65 MG FE EQUIVALENT) 324 (65 FE) MG
324 TABLET DELAYED RESPONSE ORAL 2 TIMES DAILY WITH MEALS
Status: DISCONTINUED | OUTPATIENT
Start: 2021-07-02 | End: 2021-07-04 | Stop reason: HOSPADM

## 2021-07-02 RX ORDER — ACETAMINOPHEN 325 MG/1
650 TABLET ORAL EVERY 4 HOURS PRN
Status: DISCONTINUED | OUTPATIENT
Start: 2021-07-02 | End: 2021-07-04 | Stop reason: HOSPADM

## 2021-07-02 RX ORDER — NICOTINE POLACRILEX 4 MG
15 LOZENGE BUCCAL
Status: DISCONTINUED | OUTPATIENT
Start: 2021-07-02 | End: 2021-07-04 | Stop reason: HOSPADM

## 2021-07-02 RX ORDER — GABAPENTIN 100 MG/1
100 CAPSULE ORAL 3 TIMES DAILY
Status: DISCONTINUED | OUTPATIENT
Start: 2021-07-02 | End: 2021-07-02 | Stop reason: SDUPTHER

## 2021-07-02 RX ORDER — ATORVASTATIN CALCIUM 40 MG/1
80 TABLET, FILM COATED ORAL NIGHTLY
Status: DISCONTINUED | OUTPATIENT
Start: 2021-07-02 | End: 2021-07-04 | Stop reason: HOSPADM

## 2021-07-02 RX ORDER — SODIUM CHLORIDE 0.9 % (FLUSH) 0.9 %
10 SYRINGE (ML) INJECTION AS NEEDED
Status: DISCONTINUED | OUTPATIENT
Start: 2021-07-02 | End: 2021-07-04 | Stop reason: HOSPADM

## 2021-07-02 RX ORDER — METFORMIN HYDROCHLORIDE 500 MG/1
2000 TABLET, EXTENDED RELEASE ORAL
Status: ON HOLD | COMMUNITY
End: 2021-07-04 | Stop reason: SDUPTHER

## 2021-07-02 RX ORDER — WARFARIN SODIUM 3 MG/1
TABLET ORAL
Qty: 90 TABLET | Refills: 3 | Status: SHIPPED | OUTPATIENT
Start: 2021-07-02 | End: 2022-05-31 | Stop reason: SDUPTHER

## 2021-07-02 RX ORDER — LOSARTAN POTASSIUM 50 MG/1
100 TABLET ORAL
Status: DISCONTINUED | OUTPATIENT
Start: 2021-07-02 | End: 2021-07-02

## 2021-07-02 RX ORDER — ONDANSETRON 2 MG/ML
4 INJECTION INTRAMUSCULAR; INTRAVENOUS EVERY 6 HOURS PRN
Status: DISCONTINUED | OUTPATIENT
Start: 2021-07-02 | End: 2021-07-04 | Stop reason: HOSPADM

## 2021-07-02 RX ORDER — ATORVASTATIN CALCIUM 20 MG/1
20 TABLET, FILM COATED ORAL NIGHTLY
Status: DISCONTINUED | OUTPATIENT
Start: 2021-07-02 | End: 2021-07-02

## 2021-07-02 RX ORDER — AMLODIPINE BESYLATE 5 MG/1
5 TABLET ORAL
Status: DISCONTINUED | OUTPATIENT
Start: 2021-07-02 | End: 2021-07-04 | Stop reason: HOSPADM

## 2021-07-02 RX ORDER — INSULIN LISPRO 100 [IU]/ML
0-24 INJECTION, SOLUTION INTRAVENOUS; SUBCUTANEOUS
Status: DISCONTINUED | OUTPATIENT
Start: 2021-07-02 | End: 2021-07-04 | Stop reason: HOSPADM

## 2021-07-02 RX ORDER — GABAPENTIN 100 MG/1
CAPSULE ORAL
Qty: 270 CAPSULE | Refills: 3 | Status: SHIPPED | OUTPATIENT
Start: 2021-07-02 | End: 2022-08-05

## 2021-07-02 RX ORDER — INSULIN LISPRO 100 [IU]/ML
0-24 INJECTION, SOLUTION INTRAVENOUS; SUBCUTANEOUS AS NEEDED
Status: DISCONTINUED | OUTPATIENT
Start: 2021-07-02 | End: 2021-07-04 | Stop reason: HOSPADM

## 2021-07-02 RX ORDER — GABAPENTIN 100 MG/1
100 CAPSULE ORAL 3 TIMES DAILY
Status: DISCONTINUED | OUTPATIENT
Start: 2021-07-02 | End: 2021-07-04 | Stop reason: HOSPADM

## 2021-07-02 RX ADMIN — LEVOTHYROXINE SODIUM 175 MCG: 0.03 TABLET ORAL at 06:30

## 2021-07-02 RX ADMIN — AMLODIPINE BESYLATE 5 MG: 5 TABLET ORAL at 08:06

## 2021-07-02 RX ADMIN — Medication 10 ML: at 08:06

## 2021-07-02 RX ADMIN — HYDRALAZINE HYDROCHLORIDE 10 MG: 10 TABLET, FILM COATED ORAL at 18:05

## 2021-07-02 RX ADMIN — ATORVASTATIN CALCIUM 80 MG: 40 TABLET, FILM COATED ORAL at 20:22

## 2021-07-02 RX ADMIN — LOSARTAN POTASSIUM 100 MG: 50 TABLET, FILM COATED ORAL at 08:06

## 2021-07-02 RX ADMIN — GABAPENTIN 100 MG: 100 CAPSULE ORAL at 18:05

## 2021-07-02 RX ADMIN — INSULIN HUMAN 35 UNITS: 500 INJECTION, SOLUTION SUBCUTANEOUS at 18:06

## 2021-07-02 RX ADMIN — ATENOLOL 50 MG: 50 TABLET ORAL at 08:06

## 2021-07-02 RX ADMIN — ATORVASTATIN CALCIUM 20 MG: 20 TABLET, FILM COATED ORAL at 03:15

## 2021-07-02 RX ADMIN — FENOFIBRATE 48 MG: 48 TABLET ORAL at 08:06

## 2021-07-02 RX ADMIN — HYDRALAZINE HYDROCHLORIDE 10 MG: 10 TABLET, FILM COATED ORAL at 23:05

## 2021-07-02 RX ADMIN — INSULIN LISPRO 8 UNITS: 100 INJECTION, SOLUTION INTRAVENOUS; SUBCUTANEOUS at 18:06

## 2021-07-02 RX ADMIN — HYDRALAZINE HYDROCHLORIDE 10 MG: 10 TABLET, FILM COATED ORAL at 11:46

## 2021-07-02 RX ADMIN — INSULIN LISPRO 8 UNITS: 100 INJECTION, SOLUTION INTRAVENOUS; SUBCUTANEOUS at 11:46

## 2021-07-02 RX ADMIN — CLOPIDOGREL BISULFATE 75 MG: 75 TABLET ORAL at 08:06

## 2021-07-02 RX ADMIN — FERROUS SULFATE TAB EC 324 MG (65 MG FE EQUIVALENT) 324 MG: 324 (65 FE) TABLET DELAYED RESPONSE at 18:06

## 2021-07-02 RX ADMIN — FERROUS SULFATE TAB EC 324 MG (65 MG FE EQUIVALENT) 324 MG: 324 (65 FE) TABLET DELAYED RESPONSE at 08:06

## 2021-07-02 RX ADMIN — GABAPENTIN 100 MG: 100 CAPSULE ORAL at 08:06

## 2021-07-02 RX ADMIN — GABAPENTIN 100 MG: 100 CAPSULE ORAL at 20:22

## 2021-07-02 RX ADMIN — PHYTONADIONE 5 MG: 10 INJECTION, EMULSION INTRAMUSCULAR; INTRAVENOUS; SUBCUTANEOUS at 11:46

## 2021-07-02 RX ADMIN — CYANOCOBALAMIN TAB 250 MCG 125 MCG: 250 TAB at 08:06

## 2021-07-02 RX ADMIN — EMPAGLIFLOZIN 25 MG: 25 TABLET, FILM COATED ORAL at 11:46

## 2021-07-02 NOTE — ED PROVIDER NOTES
Subjective   Patient is a 80-year-old male complaint chest pain for the past several hours.  He describes it as a heaviness without radiation.  Pain is moderate and constant.  Nuys cough fever shortness of breath vomiting or other associated complaints          Review of Systems  Negative for headache ears no cough fever shortness of breath abdominal pain vomit diarrhea dysuria case weight loss or other complaint.  Complete a systems was obtained and is otherwise negative  Past Medical History:   Diagnosis Date   • ACE-inhibitor cough 06/2005   • Coronary artery disease    • Diabetes mellitus, type 2 (CMS/HCC) 1995   • ED (erectile dysfunction)    • Hx of blood clots 2014    Blood clot left leg    • Hyperlipidemia 08/2000   • Hypertension    • Hypogonadism, male 1998   • Hypothyroidism 06/2001   • Obesity    • Peripheral neuropathy    • Pulmonary embolism (CMS/HCC) 02/2014   • Rectal fistula    • Vitamin D deficiency        No Known Allergies    Past Surgical History:   Procedure Laterality Date   • CARDIAC CATHETERIZATION  2008    PTCA: 2008; PTCA: 4/21/2015 LCX stent    • CARDIOVASCULAR STRESS TEST  2020   • CATARACT EXTRACTION  01/11/2016 1-4-16 & 1-11-16    • COLONOSCOPY N/A 11/23/2019    Procedure: COLONOSCOPY WITH ENDOSCOPIC CLIPPING X1 OF POST POLYPECTOMY BLEED SITE;  Surgeon: Jhonny Orellana MD;  Location: [facility] ENDOSCOPY;  Service: Gastroenterology   • CORONARY STENT PLACEMENT     • INGUINAL HERNIA REPAIR Left    • UMBILICAL HERNIA REPAIR         Family History   Problem Relation Age of Onset   • Heart disease Mother    • Leukemia Father        Social History     Socioeconomic History   • Marital status:      Spouse name: Not on file   • Number of children: Not on file   • Years of education: Not on file   • Highest education level: Not on file   Tobacco Use   • Smoking status: Former Smoker   • Smokeless tobacco: Never Used   • Tobacco comment: quit 1977   Vaping Use   • Vaping Use: Never used    Substance and Sexual Activity   • Alcohol use: No   • Drug use: No   • Sexual activity: Defer           Objective   Physical Exam  HEENT exam shows TMs to be clear.  Oropharynx comers but sclerae nonicteric.  Neck has no adenopathy JVD or bruits.  Lungs are clear.  Heart is regular rhythm without murmur gallop.  Chest is nontender.  Abdomen soft nontender.  Extremity exam shows no cyanosis or edema.  Procedures  My EKG interpretation shows normal sinus rhythm with no acute ST change         ED Course            Results for orders placed or performed during the hospital encounter of 07/01/21   Basic Metabolic Panel    Specimen: Blood   Result Value Ref Range    Glucose 194 (H) 65 - 99 mg/dL    BUN 20 8 - 23 mg/dL    Creatinine 1.29 (H) 0.76 - 1.27 mg/dL    Sodium 140 136 - 145 mmol/L    Potassium 4.4 3.5 - 5.2 mmol/L    Chloride 102 98 - 107 mmol/L    CO2 25.0 22.0 - 29.0 mmol/L    Calcium 9.9 8.6 - 10.5 mg/dL    eGFR Non African Amer 54 (L) >60 mL/min/1.73    BUN/Creatinine Ratio 15.5 7.0 - 25.0    Anion Gap 13.0 5.0 - 15.0 mmol/L   Troponin    Specimen: Blood   Result Value Ref Range    Troponin T <0.010 0.000 - 0.030 ng/mL   Protime-INR    Specimen: Blood   Result Value Ref Range    Protime 22.8 19.4 - 28.5 Seconds    INR 2.18 2.00 - 3.00   CBC Auto Differential    Specimen: Blood   Result Value Ref Range    WBC 6.40 3.40 - 10.80 10*3/mm3    RBC 4.74 4.14 - 5.80 10*6/mm3    Hemoglobin 14.8 13.0 - 17.7 g/dL    Hematocrit 43.8 37.5 - 51.0 %    MCV 92.3 79.0 - 97.0 fL    MCH 31.3 26.6 - 33.0 pg    MCHC 33.9 31.5 - 35.7 g/dL    RDW 14.1 12.3 - 15.4 %    RDW-SD 45.9 37.0 - 54.0 fl    MPV 8.6 6.0 - 12.0 fL    Platelets 201 140 - 450 10*3/mm3    Neutrophil % 55.0 42.7 - 76.0 %    Lymphocyte % 31.4 19.6 - 45.3 %    Monocyte % 8.2 5.0 - 12.0 %    Eosinophil % 4.6 0.3 - 6.2 %    Basophil % 0.8 0.0 - 1.5 %    Neutrophils, Absolute 3.50 1.70 - 7.00 10*3/mm3    Lymphocytes, Absolute 2.00 0.70 - 3.10 10*3/mm3    Monocytes,  Absolute 0.50 0.10 - 0.90 10*3/mm3    Eosinophils, Absolute 0.30 0.00 - 0.40 10*3/mm3    Basophils, Absolute 0.10 0.00 - 0.20 10*3/mm3    nRBC 0.2 0.0 - 0.2 /100 WBC   ECG 12 Lead   Result Value Ref Range    QT Interval 353 ms   Gold Top - SST   Result Value Ref Range    Extra Tube Hold for add-ons.      XR Chest 1 View    Result Date: 7/1/2021  Indistinct pulmonary vasculature with basilar predominant groundglass opacities and interstitial thickening in both lungs, suggesting mild pulmonary vascular congestion/pulmonary edema. Atypical pneumonia or chronic lung disease could have a similar appearance.  Electronically Signed By-Cortez Hunt MD On:7/1/2021 10:30 PM This report was finalized on [id number] by  Cortez Hunt MD.                                      MDM  Number of Diagnoses or Management Options  Diagnosis management comments: Patient is finds consistent with unstable angina.  There is no evidence of acute coronary syndrome based on EKG and troponin.  Metabolic panel is at baseline.  There is no evidence of infectious process.  Patient will be admitted for further pain control and cardiac monitoring.  I did speak the on-call hospitalist       Amount and/or Complexity of Data Reviewed  Clinical lab tests: reviewed  Tests in the radiology section of CPT®: reviewed  Tests in the medicine section of CPT®: reviewed    Risk of Complications, Morbidity, and/or Mortality  Presenting problems: high  Diagnostic procedures: high  Management options: high    Patient Progress  Patient progress: stable      Final diagnoses:   Unstable angina (CMS/HCC)       ED Disposition  ED Disposition     ED Disposition Condition Comment    Decision to Admit            No follow-up provider specified.       Medication List      No changes were made to your prescriptions during this visit.          Orlando Clement MD  07/01/21 1347

## 2021-07-02 NOTE — TELEPHONE ENCOUNTER
Caller: PT    Relationship: SELF    Best call back number: [phone] BUT PT IS IN HOSP BUT IS OK TO LEAVE A VM MSG     Medication needed:   warfarin (COUMADIN) 3 MG tablet [05011] (Order [id number])        When do you need the refill by: ASAP    What additional details did the patient provide when requesting the medication: STATES OUT OF MEDICATION     Does the patient have less than a 3 day supply:  [x] Yes  [] No    What is the patient's preferred pharmacy:      Pharmacy    [facility] - [place], [place] - [address] [place] [address] - [phone]  - [phone]   [address] [place] [address]   Phone:  [phone]  Fax:  [phone]     PT WOULD ALSO LIKE A 2 WEEK SUPPLY SENT TO [facility] WHILE HE WAITS FOR [facility] TO DELIVER NORMAL AMT/DOSE\    Pharmacy    [id number] - [facility] [place] - [address] [facility] AT [facility] - [phone]  - [phone]   [address] [facility] [place] [address]   Phone:  [phone]  Fax:  [phone]

## 2021-07-02 NOTE — PLAN OF CARE
Goal Outcome Evaluation:  Plan of Care Reviewed With: patient, daughter        Progress: improving  Outcome Summary: Patient is currently on 2L of oxygen and no continuous drips at this time (nitro weaned off). Plans for Dr. Ackerman to see patient this evening and plans for a heart cath at some point. Daughter updated at bedside. Will continue to monitor progress.

## 2021-07-02 NOTE — PROGRESS NOTES
Patient seen earlier this morning by '  Follow-up on notes and labs reviewed with Don.  INR is 2.2 today  We will hold warfarin for possible intervention  Cardiology consult has already been placed  Hold losartan for possible cardiac catheterization and need for contrast study.  As needed hydralazine/labetalol added  Monitor INR and renal function daily  Chest x-ray suggestive of pulmonary congestion  May need intermittent diuresis

## 2021-07-02 NOTE — NURSING NOTE
Patient left floor with transport to go to [facility]. All patient chart taken to [place] with pathology.

## 2021-07-02 NOTE — ED NOTES
Patient reports midsternal CP since 1830 tonight. Was at dinner table eating when the pain started. Describes it as sharp and nonradiating. Denies N/V or SOA, some dyspnea when it first started but has subsided since ED arrival.     Chuyita Rodriguez, RN  07/01/21 3984

## 2021-07-02 NOTE — SIGNIFICANT NOTE
07/02/21 1152   Transfer of Nursing Care   Nurse Report Given To DENVER Castaneda   Transfer of Care Comment No new skin issues at this time- safety reviewed.

## 2021-07-02 NOTE — H&P
[facility] Medicine Services      Patient Name: Dalton Dallas Sr.  : 1940  MRN: [id number]  Primary Care Physician: Gabino Sibley Jr., MD  Date of admission: 2021    Patient Care Team:  Gabino Sibley Jr., MD as PCP - General (Family Medicine)          Subjective   History Present Illness     Chief Complaint:   Chief Complaint   Patient presents with   • Chest Pain     CP since  while sitting at dinner table and not letting up. [facility]       History of Present Illness  80-year-old  male with history of CAD-s/p 2 stents on Plavix, DVT/PE on warfarin, HTN, HLP, IDDM with neuropathy, hypothyroidism, and morbid obesity, presented to the ED complaining of chest pain for the past several hours.  He describes it as a heaviness without radiation, substernal, nonradiating, moderate and constant.  Denies having shortness of breath, palpitation, sweating, nausea, or any other complaint.    Emergency department course:  Afebrile, hemodynamically stable, no acute distress.  Physical examination.  ED physician was unremarkable.  First troponin level was normal.  Labs were significant for glucose 194, creatinine 1.29, GFR 54.  INR was therapeutic at 2.6.  EKG showed sinus rhythm with no ischemic changes.  Chest x-ray was suspicious for mild pulmonary vascular congestion/pulmonary edema.  Patient was given 2 mg IV morphine sulfate and his chest pain was relieved after starting nitroglycerin drip.    ROS  Please refer to HPI. All other systems were reviewed and were negative.     Personal History     Past Medical History:   Past Medical History:   Diagnosis Date   • ACE-inhibitor cough 2005   • Coronary artery disease    • Diabetes mellitus, type 2 (CMS/HCC)    • ED (erectile dysfunction)    • Hx of blood clots     Blood clot left leg    • Hyperlipidemia 2000   • Hypertension    • Hypogonadism, male    • Hypothyroidism 2001   • Obesity    •  Peripheral neuropathy    • Pulmonary embolism (CMS/HCC) 02/2014   • Rectal fistula    • Vitamin D deficiency        Surgical History:      Past Surgical History:   Procedure Laterality Date   • CARDIAC CATHETERIZATION  2008    PTCA: 2008; PTCA: 4/21/2015 LCX stent    • CARDIOVASCULAR STRESS TEST  2020   • CATARACT EXTRACTION  01/11/2016 1-4-16 & 1-11-16    • COLONOSCOPY N/A 11/23/2019    Procedure: COLONOSCOPY WITH ENDOSCOPIC CLIPPING X1 OF POST POLYPECTOMY BLEED SITE;  Surgeon: Jhonny Orellana MD;  Location: [facility] ENDOSCOPY;  Service: Gastroenterology   • CORONARY STENT PLACEMENT     • INGUINAL HERNIA REPAIR Left    • UMBILICAL HERNIA REPAIR             Family History: family history includes Heart disease in his mother; Leukemia in his father. Otherwise pertinent FHx was reviewed and unremarkable.     Social History:  reports that he has quit smoking. He has never used smokeless tobacco. He reports that he does not drink alcohol and does not use drugs.      Medications:  Prior to Admission medications    Medication Sig Start Date End Date Taking? Authorizing Provider   amLODIPine-atorvastatin (CADUET) 5-20 MG per tablet TAKE 1 TABLET DAILY 6/9/21  Yes Edwar Ackerman MD   atenolol (TENORMIN) 50 MG tablet TAKE 1 TABLET DAILY 5/5/21  Yes Virginia Shaver MD   clopidogrel (PLAVIX) 75 MG tablet TAKE 1 TABLET DAILY 9/11/20  Yes Edwar Ackerman MD   Empagliflozin (Jardiance) 25 MG tablet Take 25 mg by mouth Daily. 5/19/21  Yes Virginia Shaver MD   gabapentin (NEURONTIN) 100 MG capsule TAKE 1 CAPSULE THREE TIMES A DAY 4/6/21  Yes Gabino Sibley Jr., MD   hydroCHLOROthiazide (HYDRODIURIL) 25 MG tablet TAKE 1 TABLET DAILY 4/26/21  Yes Edwar Ackerman MD   Insulin Regular Human, Conc, (HumuLIN R) 500 UNIT/ML solution pen-injector CONCENTRATED injection Inject 24 Units under the skin into the appropriate area as directed Every Morning.   Yes Provider, MD Lee   Insulin Regular Human, Conc, (HumuLIN R) 500  "UNIT/ML solution pen-injector CONCENTRATED injection Inject 35 Units under the skin into the appropriate area as directed Daily With Dinner.   Yes Lee Ruggiero MD   irbesartan (AVAPRO) 300 MG tablet TAKE 1 TABLET DAILY 10/20/20  Yes Virginia Shaver MD   levothyroxine (SYNTHROID, LEVOTHROID) 175 MCG tablet TAKE 1 TABLET DAILY 11/11/20  Yes Virginia Shaver MD   metFORMIN ER (GLUCOPHAGE-XR) 500 MG 24 hr tablet Take 2,000 mg by mouth Daily With Dinner.   Yes Lee Ruggiero MD   Omega-3 Fatty Acids (FISH OIL) 1000 MG capsule capsule Take 1,000 mg by mouth 2 (Two) Times a Day.   Yes Lee Ruggiero MD   Pramlintide Acetate 2700 MCG/2.7ML solution pen-injector Inject 120 mcg under the skin into the appropriate area as directed 3 (Three) Times a Day. 8/27/20  Yes Virginia Shaver MD   vitamin B-12 (CYANOCOBALAMIN) 100 MCG tablet Take 100 mcg by mouth Daily. 4/15/15  Yes Lee Ruggiero MD   vitamin D (ERGOCALCIFEROL) 1.25 MG (14117 UT) capsule capsule TAKE 1 CAPSULE ONCE A WEEK 5/4/21  Yes Virginia Shaver MD   warfarin (COUMADIN) 3 MG tablet Take 1 tablet by mouth Daily. 3/29/21  Yes Hadley Gaffney MD   warfarin (COUMADIN) 5 MG tablet TAKE 1 TABLET AS DIRECTED 6/28/21  Yes Hadley Gaffney MD   fenofibrate (TRICOR) 54 MG tablet TAKE 1 TABLET DAILY 3/25/21   Edwar Ackerman MD   ferrous sulfate 325 (65 FE) MG tablet Take 325 mg by mouth 2 (Two) Times a Day.    Lee Ruggiero MD   Insulin Pen Needle (BD ULTRA-FINE PEN NEEDLES) 29G X 12.7MM misc Use with insulin injections three times daily 3/12/20 7/2/21  Virginia Shaver MD   insulin regular (HUMULIN R) 500 UNIT/ML CONCENTRATED injection DRAW TO 24 UNIT REI ON U-100 SYRINGE AND INJECT IN THE MORNING AND 35 UNIT REI IN THE EVENING 1/29/21 7/2/21  Virginia Shaver MD   Insulin Syringe 30G X 1/2\" 0.5 ML misc Use to inject insulin 2 times daily DX E11.65 5/4/20 7/2/21  Virginia Shaver MD   metFORMIN ER (GLUCOPHAGE-XR) 500 MG 24 hr " tablet TAKE 4 TABLETS AT SUPPER  Patient taking differently: 2,000 mg. Do not give at time of or within 48 hours of iodinated intravenous contrast. Confirm renal function is normal before continuing. 3/31/21 7/2/21  Virginia Shaver MD   warfarin (COUMADIN) 1 MG tablet Take 1 tablet by mouth at 1700 or as directed 3/3/21 7/2/21  Hadley Gaffney MD       Allergies:  No Known Allergies    Objective   Objective     Vital Signs  Temp:  [97.9 °F (36.6 °C)-98.8 °F (37.1 °C)] 97.9 °F (36.6 °C)  Heart Rate:  [] 86  Resp:  [18-19] 18  BP: (137-186)/(56-84) 140/56  SpO2:  [91 %-98 %] 93 %  on  Flow (L/min):  [1] 1;   Device (Oxygen Therapy): nasal cannula  Body mass index is 43.91 kg/m².    Physical Exam  General: Morbidly obese, alert and oriented x3, no acute distress.   Eyes:  Show anicteric sclerae, moist conjunctivae with no lig lag; PERRLA.  HENT:  Normocephalic, atraumatic, moist oral mucosa.  Neck: Short and thick, no bruit, no JVP, no thyroid or lymph node enlargement, trachea central,   Lungs:  Good air entry. Clear to auscultation.   Heart: RRR, no murmur or rub.   Abdomen:  Soft, not tender, not distended, no organomegaly, bowel sounds positive.   Extremities: 1+ bilateral pitting leg edema, no calf tenderness, normal range of movement, pedal pulses intact.   Skin: No rash, lesions, or ulcers.  Normal texture and turgor.  Neurology:  Grossly intact.   Psychiatric exam: Pleasant, cooperative, appropriate mood and affect, intact judgment and insight.    Results Review:  I have personally reviewed most recent cardiac tracings, lab results and radiology images and interpretations and agree with findings, most notably: As below.    Results from last 7 days   Lab Units 07/01/21  2218   WBC 10*3/mm3 6.40   HEMOGLOBIN g/dL 14.8   HEMATOCRIT % 43.8   PLATELETS 10*3/mm3 201   INR  2.18     Results from last 7 days   Lab Units 07/01/21  2218   SODIUM mmol/L 140   POTASSIUM mmol/L 4.4   CHLORIDE mmol/L 102   CO2 mmol/L  25.0   BUN mg/dL 20   CREATININE mg/dL 1.29*   GLUCOSE mg/dL 194*   CALCIUM mg/dL 9.9   TROPONIN T ng/mL <0.010     Estimated Creatinine Clearance: 66.4 mL/min (A) (by C-G formula based on SCr of 1.29 mg/dL (H)).  Brief Urine Lab Results  (Last result in the past 365 days)      Color   Clarity   Blood   Leuk Est   Nitrite   Protein   CREAT   Urine HCG        05/03/21 0928             38.9             Microbiology Results (last 10 days)     Procedure Component Value - Date/Time    COVID PRE-OP / PRE-PROCEDURE SCREENING ORDER (NO ISOLATION) - Swab, Nasopharynx [[id number]]  (Normal) Collected: 07/01/21 2353    Lab Status: Final result Specimen: Swab from Nasopharynx Updated: 07/02/21 0033    Narrative:      The following orders were created for panel order COVID PRE-OP / PRE-PROCEDURE SCREENING ORDER (NO ISOLATION) - Swab, Nasopharynx.  Procedure                               Abnormality         Status                     ---------                               -----------         ------                     COVID-19,CEPHEID,COR/CLARY...[[id number]]  Normal              Final result                 Please view results for these tests on the individual orders.    COVID-19,CEPHEID,COR/CLARY/PAD/NAMRATA IN-HOUSE(OR EMERGENT/ADD-ON),NP SWAB IN TRANSPORT MEDIA 3-4 HR TAT, RT-PCR - Swab, Nasopharynx [[id number]]  (Normal) Collected: 07/01/21 2353    Lab Status: Final result Specimen: Swab from Nasopharynx Updated: 07/02/21 0033     COVID19 Not Detected    Narrative:      Fact sheet for providers: https://www.fda.gov/media/693848/download     Fact sheet for patients: https://www.fda.gov/media/184316/download  Fact sheet for providers: https://www.fda.gov/media/931568/download    Fact sheet for patients: https://www.fda.gov/media/404045/download    Test performed by PCR.          ECG/EMG Results (most recent)     Procedure Component Value Units Date/Time    ECG 12 Lead [[id number]] Collected: 07/01/21 2158     Updated: 07/01/21 2200      QT Interval 353 ms     Narrative:      HEART RATE= 96  bpm  RR Interval= 628  ms  AR Interval= 231  ms  P Horizontal Axis= 41  deg  P Front Axis= 46  deg  QRSD Interval= 100  ms  QT Interval= 353  ms  QRS Axis= 25  deg  T Wave Axis= 40  deg  - ABNORMAL ECG -  Sinus rhythm  Prolonged AR interval  Low voltage, precordial leads  When compared with ECG of 22-Nov-2019 12:25:21,  No significant change  Electronically Signed By:   Date and Time of Study: 2021-07-01 21:58:55          Results for orders placed during the hospital encounter of 09/28/20    Duplex Venous Lower Extremity - Left    Interpretation Summary  · Normal left lower extremity venous duplex scan.          XR Chest 1 View    Result Date: 7/1/2021  Indistinct pulmonary vasculature with basilar predominant groundglass opacities and interstitial thickening in both lungs, suggesting mild pulmonary vascular congestion/pulmonary edema. Atypical pneumonia or chronic lung disease could have a similar appearance.  Electronically Signed By-Cortez Hunt MD On:7/1/2021 10:30 PM This report was finalized on [id number] by  Cortez Hunt MD.        Estimated Creatinine Clearance: 66.4 mL/min (A) (by C-G formula based on SCr of 1.29 mg/dL (H)).    Assessment/Plan   Assessment/Plan       Active Hospital Problems    Diagnosis  POA   • Unstable angina (CMS/HCC) [I20.0]  Yes      Resolved Hospital Problems   No resolved problems to display.     Assessment:  1.  Chest pain-likely unstable angina.    2.  CAD-s/p 2 coronary stents.  -On Plavix.    3.  Hx of DVT/PE.  -On warfarin.    4.  Slightly elevated creatinine level-likely 2/2 diuretic use.    5.  Hypertension-controlled on several antihypertensive medications.    6.  Hyperlipidemia-on statin.    7.  IDDM with peripheral neuropathy.    8.  Hypothyroidism-on levothyroxine.    9.  Morbid obesity.    Plan:  -Observation with telemetry.  -Trend troponin level.  Lexiscan stress test.  -Continue nitroglycerin drip.  -Hold  diuretic.  Continue patient's other home medication.  -Monitor renal function.    Addendum:  Second troponin just came back elevated at 0.170.  Current diagnosis would be non-STEMI.  We will cancel stress test and consult cardiology.        VTE Prophylaxis -   Mechanical Order History:     None      Pharmalogical Order History:      Ordered     Dose Route Frequency Stop    07/02/21 0203  warfarin (COUMADIN) tablet 3 mg     Question:  Target INR  Answer:  2 - 3    3 mg PO Daily --    07/02/21 0203  warfarin (COUMADIN) tablet 5 mg     Question:  Target INR  Answer:  2 - 3    5 mg PO Take As Directed --                CODE STATUS:    Code Status and Medical Interventions:   Ordered at: 07/02/21 0204     Code Status:    CPR     Medical Interventions (Level of Support Prior to Arrest):    Full       This patient has been examined wearing appropriate Personal Protective Equipment and discussed with hospital infection control department. 07/02/21      I discussed the patient's findings and my recommendations with patient.      Signature:Electronically signed by Kavitha Fountain MD, 07/02/21, 2:10 AM EDT.      [facility] Hospitalist Team

## 2021-07-02 NOTE — TELEPHONE ENCOUNTER
Returned call. Spoke with pt's family member as he is currently hospitalized. Upon pt's chart review, [facility] received refill request for warfarin 3 mg earlier today. Asked him to have pt call back with any other issues and he v/u.

## 2021-07-02 NOTE — CONSULTS
CARDIOLOGY CONSULT NOTE      Referring Provider: Dr. Herman    Reason for Consultation: Chest pain    Attending: Royce Herman *    Chief complaint    Chest pain    Subjective .     History of present illness:  Dalton Dallas Sr. is a 80 y.o. male who presents with complaints of chest pain.    Patient has a past medical history significant for coronary artery disease, hypertension, dyslipidemia, diabetes and previous DVT.    In 2008 the patient developed symptoms of shortness of breath and had a stress test that was abnormal.  He went on to have a cardiac catheterization which showed 50% stenosis in the RCA and 80% stenosis in the left circumflex.  He underwent PCI to the left circumflex.    In 2015 repeat cardiac catheterization showed 80 to 90% stenosis of the left circumflex and OM1 he underwent successful stenting.  LAD at that time had 25% stenosis.  EF was 60%.    Patient's last noninvasive ischemic evaluation was in July 2020 he had a Lexiscan Myoview that showed no reversible ischemia.  There was a small fixed inferior lateral defect.    Patient reports yesterday evening he was sitting at his kitchen table and had onset of a heavy pain in his anterior chest wall.  It did not radiate.  It was not associated with shortness of breath, diaphoresis, near syncope or had any radiation.    The patient said the pain persisted until he came to the hospital and was started on IV nitroglycerin and it ceased.  He has had no recurrent chest pain overnight.    His initial troponin was less than 0.01.  Subsequent troponin was 0.17    Patient is on warfarin for history of DVT and had his warfarin last night.  His INR on admission was 2.24.      .     Review of Systems   Constitutional: Negative for decreased appetite and diaphoresis.   HENT: Negative for congestion, hearing loss and nosebleeds.    Cardiovascular: Positive for chest pain. Negative for claudication, dyspnea on exertion, irregular heartbeat, leg  swelling, near-syncope, orthopnea, palpitations, paroxysmal nocturnal dyspnea and syncope.   Respiratory: Negative for cough, shortness of breath and sleep disturbances due to breathing.    Endocrine: Negative for polyuria.   Hematologic/Lymphatic: Does not bruise/bleed easily.   Skin: Negative for itching and rash.   Musculoskeletal: Negative for back pain, muscle weakness and myalgias.   Gastrointestinal: Negative for abdominal pain, change in bowel habit and nausea.   Genitourinary: Negative for dysuria, flank pain, frequency and hesitancy.   Neurological: Negative for dizziness, tremors and weakness.   Psychiatric/Behavioral: Negative for altered mental status. The patient does not have insomnia.        History  Past Medical History:   Diagnosis Date   • ACE-inhibitor cough 06/2005   • Coronary artery disease    • Diabetes mellitus, type 2 (CMS/HCC) 1995   • ED (erectile dysfunction)    • Hx of blood clots 2014    Blood clot left leg    • Hyperlipidemia 08/2000   • Hypertension    • Hypogonadism, male 1998   • Hypothyroidism 06/2001   • Obesity    • Peripheral neuropathy    • Pulmonary embolism (CMS/HCC) 02/2014   • Rectal fistula    • Vitamin D deficiency        Past Surgical History:   Procedure Laterality Date   • CARDIAC CATHETERIZATION  2008    PTCA: 2008; PTCA: 4/21/2015 LCX stent    • CARDIOVASCULAR STRESS TEST  2020   • CATARACT EXTRACTION  01/11/2016 1-4-16 & 1-11-16    • COLONOSCOPY N/A 11/23/2019    Procedure: COLONOSCOPY WITH ENDOSCOPIC CLIPPING X1 OF POST POLYPECTOMY BLEED SITE;  Surgeon: Jhonny Orellana MD;  Location: [facility] ENDOSCOPY;  Service: Gastroenterology   • CORONARY STENT PLACEMENT     • INGUINAL HERNIA REPAIR Left    • UMBILICAL HERNIA REPAIR         Family History   Problem Relation Age of Onset   • Heart disease Mother    • Leukemia Father        Social History     Tobacco Use   • Smoking status: Former Smoker   • Smokeless tobacco: Never Used   • Tobacco comment: quit 1977   Vaping  Use   • Vaping Use: Never used   Substance Use Topics   • Alcohol use: No   • Drug use: No        Medications Prior to Admission   Medication Sig Dispense Refill Last Dose   • amLODIPine-atorvastatin (CADUET) 5-20 MG per tablet TAKE 1 TABLET DAILY 90 tablet 3 7/1/2021 at Unknown time   • atenolol (TENORMIN) 50 MG tablet TAKE 1 TABLET DAILY 90 tablet 3 7/1/2021 at Unknown time   • clopidogrel (PLAVIX) 75 MG tablet TAKE 1 TABLET DAILY 90 tablet 3 7/1/2021 at Unknown time   • Empagliflozin (Jardiance) 25 MG tablet Take 25 mg by mouth Daily. 90 tablet 3 7/1/2021 at Unknown time   • hydroCHLOROthiazide (HYDRODIURIL) 25 MG tablet TAKE 1 TABLET DAILY 90 tablet 3 7/1/2021 at Unknown time   • Insulin Regular Human, Conc, (HumuLIN R) 500 UNIT/ML solution pen-injector CONCENTRATED injection Inject 24 Units under the skin into the appropriate area as directed Every Morning.      • Insulin Regular Human, Conc, (HumuLIN R) 500 UNIT/ML solution pen-injector CONCENTRATED injection Inject 35 Units under the skin into the appropriate area as directed Daily With Dinner.      • irbesartan (AVAPRO) 300 MG tablet TAKE 1 TABLET DAILY 90 tablet 3 7/1/2021 at Unknown time   • levothyroxine (SYNTHROID, LEVOTHROID) 175 MCG tablet TAKE 1 TABLET DAILY 90 tablet 3 7/1/2021 at Unknown time   • metFORMIN ER (GLUCOPHAGE-XR) 500 MG 24 hr tablet Take 2,000 mg by mouth Daily With Dinner.      • Omega-3 Fatty Acids (FISH OIL) 1000 MG capsule capsule Take 1,000 mg by mouth 2 (Two) Times a Day.   7/1/2021 at Unknown time   • Pramlintide Acetate 2700 MCG/2.7ML solution pen-injector Inject 120 mcg under the skin into the appropriate area as directed 3 (Three) Times a Day. 32.4 mL 4 7/1/2021 at Unknown time   • vitamin B-12 (CYANOCOBALAMIN) 100 MCG tablet Take 100 mcg by mouth Daily.   7/1/2021 at Unknown time   • vitamin D (ERGOCALCIFEROL) 1.25 MG (96027 UT) capsule capsule TAKE 1 CAPSULE ONCE A WEEK 12 capsule 3 Past Week at Unknown time   • warfarin  "(COUMADIN) 5 MG tablet TAKE 1 TABLET AS DIRECTED 90 tablet 3 7/1/2021 at Unknown time   • fenofibrate (TRICOR) 54 MG tablet TAKE 1 TABLET DAILY 90 tablet 3    • ferrous sulfate 325 (65 FE) MG tablet Take 325 mg by mouth 2 (Two) Times a Day.            Patient has no known allergies.    Scheduled Meds:amLODIPine, 5 mg, Oral, Q24H  atenolol, 50 mg, Oral, Daily  atorvastatin, 80 mg, Oral, Nightly  clopidogrel, 75 mg, Oral, Daily  empagliflozin, 25 mg, Oral, Daily  fenofibrate, 48 mg, Oral, Daily  ferrous sulfate, 324 mg, Oral, BID With Meals  gabapentin, 100 mg, Oral, TID  hydrALAZINE, 10 mg, Oral, Q6H  insulin lispro, 0-24 Units, Subcutaneous, TID AC  Insulin Regular Human (Conc), 35 Units, Subcutaneous, Daily With Dinner  Insulin Regular Human (Conc), 25 Units, Subcutaneous, Daily  levothyroxine, 175 mcg, Oral, Q AM  sodium chloride, 10 mL, Intravenous, Q12H  vitamin B-12, 125 mcg, Oral, Daily      Continuous Infusions:nitroglycerin, 10-50 mcg/min, Last Rate: Stopped (07/02/21 1025)      PRN Meds:.•  acetaminophen  •  dextrose  •  dextrose  •  glucagon (human recombinant)  •  insulin lispro **AND** insulin lispro  •  labetalol  •  ondansetron  •  [COMPLETED] Insert peripheral IV **AND** sodium chloride  •  sodium chloride    Objective     VITAL SIGNS  Vitals:    07/02/21 1000 07/02/21 1003 07/02/21 1100 07/02/21 1600   BP:  126/58     Pulse: 57 55     Resp:       Temp:   97.5 °F (36.4 °C) 97.7 °F (36.5 °C)   TempSrc:   Oral Oral   SpO2: 93%      Weight:       Height:           Flowsheet Rows      First Filed Value   Admission Height  177.8 cm (70\") Documented at 07/01/2021 2151   Admission Weight  (!) 139 kg (306 lb 10.6 oz) Documented at 07/01/2021 2151          Body mass index is 43.91 kg/m².     TELEMETRY: Sinus rhythm    Physical Exam:  Vitals reviewed.   Constitutional:       General: Not in acute distress.     Appearance: Normal appearance. Well-developed and not in distress.   Eyes:      Pupils: Pupils are " equal, round, and reactive to light.   HENT:      Head: Normocephalic and atraumatic.   Neck:      Vascular: No JVD.   Pulmonary:      Effort: Pulmonary effort is normal.      Breath sounds: Normal breath sounds.   Cardiovascular:      Normal rate. Regular rhythm.   Pulses:     Intact distal pulses.   Edema:     Peripheral edema absent.   Abdominal:      General: There is no distension.      Palpations: Abdomen is soft.      Tenderness: There is no abdominal tenderness.   Musculoskeletal: Normal range of motion.      Cervical back: Normal range of motion and neck supple. Skin:     General: Skin is warm and dry.   Neurological:      Mental Status: Alert, oriented to person, place, and time and oriented to person, place and time.          Results Review:   I reviewed the patient's new clinical results.    CBC    Results from last 7 days   Lab Units 07/01/21  2218   WBC 10*3/mm3 6.40   HEMOGLOBIN g/dL 14.8   PLATELETS 10*3/mm3 201     BMP   Results from last 7 days   Lab Units 07/02/21  0928 07/01/21  2218   SODIUM mmol/L  --  140   POTASSIUM mmol/L  --  4.4   CHLORIDE mmol/L  --  102   CO2 mmol/L  --  25.0   BUN mg/dL  --  20   CREATININE mg/dL  --  1.29*   GLUCOSE mg/dL  --  194*   MAGNESIUM mg/dL 1.7  --      Cr Clearance Estimated Creatinine Clearance: 66.4 mL/min (A) (by C-G formula based on SCr of 1.29 mg/dL (H)).  Coag   Results from last 7 days   Lab Units 07/02/21  0351 07/01/21 2218   INR  2.24 2.18     HbA1C   Lab Results   Component Value Date    HGBA1C 7.4 (H) 05/03/2021    HGBA1C 7.0 (H) 10/28/2020    HGBA1C 8.9 (H) 05/06/2020     Blood Glucose   Glucose   Date/Time Value Ref Range Status   07/02/2021 1623 202 (H) 70 - 105 mg/dL Final     Comment:     Serial Number: [id number]:  [id number]   07/02/2021 1136 229 (H) 70 - 105 mg/dL Final     Comment:     Serial Number: [id number]:  [id number]   07/02/2021 0742 138 (H) 70 - 105 mg/dL Final     Comment:     Serial Number: [id number]:   [id number]     Infection     CMP   Results from last 7 days   Lab Units 07/01/21  2218   SODIUM mmol/L 140   POTASSIUM mmol/L 4.4   CHLORIDE mmol/L 102   CO2 mmol/L 25.0   BUN mg/dL 20   CREATININE mg/dL 1.29*   GLUCOSE mg/dL 194*     ABG      UA      DIANA  No results found for: POCMETH, POCAMPHET, POCBARBITUR, POCBENZO, POCCOCAINE, POCOPIATES, POCOXYCODO, POCPHENCYC, POCPROPOXY, POCTHC, POCTRICYC  Lysis Labs   Results from last 7 days   Lab Units 07/02/21  0351 07/01/21  2218   INR  2.24 2.18   HEMOGLOBIN g/dL  --  14.8   PLATELETS 10*3/mm3  --  201   CREATININE mg/dL  --  1.29*     Radiology(recent) XR Chest 1 View    Result Date: 7/1/2021  Indistinct pulmonary vasculature with basilar predominant groundglass opacities and interstitial thickening in both lungs, suggesting mild pulmonary vascular congestion/pulmonary edema. Atypical pneumonia or chronic lung disease could have a similar appearance.  Electronically Signed By-Cortez Hunt MD On:7/1/2021 10:30 PM This report was finalized on [id number] by  Cortez Hunt MD.      Results from last 7 days   Lab Units 07/02/21  0928   CK TOTAL U/L 173   TROPONIN T ng/mL 0.188*       Imaging Results (Last 24 Hours)     ** No results found for the last 24 hours. **          EKG          I personally viewed and interpreted the patient's EKG/Telemetry data:    ECHOCARDIOGRAM:      STRESS MYOVIEW:    CARDIAC CATHETERIZATION:    OTHER:         Assessment/Plan       Unstable angina (CMS/[facility])      Assessment:    Acute coronary syndrome/non-ST segment elevation MI  Coronary artery disease with previous PCI to the left circumflex/OM1  Hypertension  Diabetes  Dyslipidemia  Obesity  History of DVT for which she is on anticoagulation with warfarin.  INR 2.4    Plan:  Hold anticoagulation  Continue IV nitrates  Recommend cardiac catheterization for definitive assessment of coronary anatomy and follow-up on previous PCI  We will give 1 dose of vitamin K and recheck INR in the  morning  Anticipate cardiac catheterization when INR is corrected  Continue with dual antiplatelet therapy  Check lipids  Additional recommendations per Dr. Ackerman    Patient is seen and examined and findings are verified.  Patient had unstable angina.    Hemodynamics are stable    Normal S1 and S2.  Chest is clear to auscultation.  Abdomen is soft.  No significant leg edema    Initial troponins are negative.  I would recommend to proceed with cardiac catheterization.  However his INR is 2.2.  I would recommend to proceed with vitamin K.  Check PT/INR in the morning.  Once INR is 1.5 or less I would proceed with cardiac catheterization.        I discussed the patients findings and my recommendations with patient and RN    Edwar Ackerman MD  07/02/21  19:03 EDT

## 2021-07-02 NOTE — CASE MANAGEMENT/SOCIAL WORK
Discharge Planning Assessment   Shawn     Patient Name: Dalton Dallas Sr.  MRN: [id number]  Today's Date: 7/2/2021    Admit Date: 7/1/2021    Discharge Needs Assessment     Row Name 07/02/21 1306       Living Environment    Lives With  alone    Current Living Arrangements  home/apartment/condo    Quality of Family Relationships  supportive    Able to Return to Prior Arrangements  yes       Resource/Environmental Concerns    Resource/Environmental Concerns  none    Transportation Concerns  car, none       Transition Planning    Patient/Family Anticipates Transition to  home    Transportation Anticipated  car, drives self       Discharge Needs Assessment    Readmission Within the Last 30 Days  no previous admission in last 30 days    Equipment Currently Used at Home  cane, straight;glucometer;bipap        Discharge Plan     Row Name 07/02/21 1307       Plan    Plan  D/C Plan : Anticipate home .    Plan Comments  Pt admitted for C/P with aTrop. of  .170 and is on a [place] gtt        Continued Care and Services - Admitted Since 7/1/2021    Coordination has not been started for this encounter.         Demographic Summary     Row Name 07/02/21 1306       General Information    Admission Type  observation    Arrived From  emergency department    Preferred Language  English     Used During This Interaction  no        Functional Status     Row Name 07/02/21 1306       Functional Status    Usual Activity Tolerance  good    Current Activity Tolerance  moderate       Mental Status    General Appearance WDL  WDL       Mental Status Summary    Recent Changes in Mental Status/Cognitive Functioning  no changes            Ellyn Davis RN

## 2021-07-03 LAB
ALBUMIN SERPL-MCNC: 4 G/DL (ref 3.5–5.2)
ALBUMIN/GLOB SERPL: 1.7 G/DL
ALP SERPL-CCNC: 40 U/L (ref 39–117)
ALT SERPL W P-5'-P-CCNC: 25 U/L (ref 1–41)
ANION GAP SERPL CALCULATED.3IONS-SCNC: 14 MMOL/L (ref 5–15)
AST SERPL-CCNC: 30 U/L (ref 1–40)
BASOPHILS # BLD AUTO: 0.1 10*3/MM3 (ref 0–0.2)
BASOPHILS NFR BLD AUTO: 1 % (ref 0–1.5)
BILIRUB SERPL-MCNC: 0.4 MG/DL (ref 0–1.2)
BUN SERPL-MCNC: 19 MG/DL (ref 8–23)
BUN/CREAT SERPL: 15.8 (ref 7–25)
CALCIUM SPEC-SCNC: 9.3 MG/DL (ref 8.6–10.5)
CHLORIDE SERPL-SCNC: 100 MMOL/L (ref 98–107)
CO2 SERPL-SCNC: 25 MMOL/L (ref 22–29)
CREAT SERPL-MCNC: 1.2 MG/DL (ref 0.76–1.27)
DEPRECATED RDW RBC AUTO: 44.6 FL (ref 37–54)
EOSINOPHIL # BLD AUTO: 0.3 10*3/MM3 (ref 0–0.4)
EOSINOPHIL NFR BLD AUTO: 5.5 % (ref 0.3–6.2)
ERYTHROCYTE [DISTWIDTH] IN BLOOD BY AUTOMATED COUNT: 13.8 % (ref 12.3–15.4)
GFR SERPL CREATININE-BSD FRML MDRD: 59 ML/MIN/1.73
GLOBULIN UR ELPH-MCNC: 2.3 GM/DL
GLUCOSE BLDC GLUCOMTR-MCNC: 137 MG/DL (ref 70–105)
GLUCOSE BLDC GLUCOMTR-MCNC: 189 MG/DL (ref 70–105)
GLUCOSE BLDC GLUCOMTR-MCNC: 192 MG/DL (ref 70–105)
GLUCOSE BLDC GLUCOMTR-MCNC: 262 MG/DL (ref 70–105)
GLUCOSE SERPL-MCNC: 187 MG/DL (ref 65–99)
HCT VFR BLD AUTO: 40 % (ref 37.5–51)
HGB BLD-MCNC: 13.8 G/DL (ref 13–17.7)
INR PPP: 1.41 (ref 2–3)
INR PPP: 1.53 (ref 2–3)
LYMPHOCYTES # BLD AUTO: 1.8 10*3/MM3 (ref 0.7–3.1)
LYMPHOCYTES NFR BLD AUTO: 31 % (ref 19.6–45.3)
MAGNESIUM SERPL-MCNC: 1.8 MG/DL (ref 1.6–2.4)
MCH RBC QN AUTO: 31.7 PG (ref 26.6–33)
MCHC RBC AUTO-ENTMCNC: 34.5 G/DL (ref 31.5–35.7)
MCV RBC AUTO: 91.8 FL (ref 79–97)
MONOCYTES # BLD AUTO: 0.4 10*3/MM3 (ref 0.1–0.9)
MONOCYTES NFR BLD AUTO: 7.8 % (ref 5–12)
NEUTROPHILS NFR BLD AUTO: 3.1 10*3/MM3 (ref 1.7–7)
NEUTROPHILS NFR BLD AUTO: 54.7 % (ref 42.7–76)
NRBC BLD AUTO-RTO: 0.2 /100 WBC (ref 0–0.2)
PLATELET # BLD AUTO: 172 10*3/MM3 (ref 140–450)
PMV BLD AUTO: 9 FL (ref 6–12)
POTASSIUM SERPL-SCNC: 4.5 MMOL/L (ref 3.5–5.2)
PROT SERPL-MCNC: 6.3 G/DL (ref 6–8.5)
PROTHROMBIN TIME: 15.3 SECONDS (ref 19.4–28.5)
PROTHROMBIN TIME: 16.5 SECONDS (ref 19.4–28.5)
RBC # BLD AUTO: 4.36 10*6/MM3 (ref 4.14–5.8)
SODIUM SERPL-SCNC: 139 MMOL/L (ref 136–145)
WBC # BLD AUTO: 5.7 10*3/MM3 (ref 3.4–10.8)

## 2021-07-03 PROCEDURE — 63710000001 INSULIN LISPRO (HUMAN) PER 5 UNITS: Performed by: INTERNAL MEDICINE

## 2021-07-03 PROCEDURE — C1760 CLOSURE DEV, VASC: HCPCS | Performed by: INTERNAL MEDICINE

## 2021-07-03 PROCEDURE — C1769 GUIDE WIRE: HCPCS | Performed by: INTERNAL MEDICINE

## 2021-07-03 PROCEDURE — 82962 GLUCOSE BLOOD TEST: CPT

## 2021-07-03 PROCEDURE — 85610 PROTHROMBIN TIME: CPT | Performed by: INTERNAL MEDICINE

## 2021-07-03 PROCEDURE — 93458 L HRT ARTERY/VENTRICLE ANGIO: CPT | Performed by: INTERNAL MEDICINE

## 2021-07-03 PROCEDURE — G0378 HOSPITAL OBSERVATION PER HR: HCPCS

## 2021-07-03 PROCEDURE — 25010000003 PHYTONADIONE 10 MG/ML SOLUTION: Performed by: INTERNAL MEDICINE

## 2021-07-03 PROCEDURE — B2151ZZ FLUOROSCOPY OF LEFT HEART USING LOW OSMOLAR CONTRAST: ICD-10-PCS | Performed by: INTERNAL MEDICINE

## 2021-07-03 PROCEDURE — 85025 COMPLETE CBC W/AUTO DIFF WBC: CPT | Performed by: INTERNAL MEDICINE

## 2021-07-03 PROCEDURE — B2111ZZ FLUOROSCOPY OF MULTIPLE CORONARY ARTERIES USING LOW OSMOLAR CONTRAST: ICD-10-PCS | Performed by: INTERNAL MEDICINE

## 2021-07-03 PROCEDURE — 80053 COMPREHEN METABOLIC PANEL: CPT | Performed by: INTERNAL MEDICINE

## 2021-07-03 PROCEDURE — 4A023N7 MEASUREMENT OF CARDIAC SAMPLING AND PRESSURE, LEFT HEART, PERCUTANEOUS APPROACH: ICD-10-PCS | Performed by: INTERNAL MEDICINE

## 2021-07-03 PROCEDURE — 99225 PR SBSQ OBSERVATION CARE/DAY 25 MINUTES: CPT | Performed by: INTERNAL MEDICINE

## 2021-07-03 PROCEDURE — 99152 MOD SED SAME PHYS/QHP 5/>YRS: CPT | Performed by: INTERNAL MEDICINE

## 2021-07-03 PROCEDURE — 0 IOPAMIDOL PER 1 ML: Performed by: INTERNAL MEDICINE

## 2021-07-03 PROCEDURE — 99213 OFFICE O/P EST LOW 20 MIN: CPT | Performed by: INTERNAL MEDICINE

## 2021-07-03 PROCEDURE — 83735 ASSAY OF MAGNESIUM: CPT | Performed by: INTERNAL MEDICINE

## 2021-07-03 PROCEDURE — 25010000002 FENTANYL CITRATE (PF) 100 MCG/2ML SOLUTION: Performed by: INTERNAL MEDICINE

## 2021-07-03 PROCEDURE — 25010000002 MIDAZOLAM PER 1 MG: Performed by: INTERNAL MEDICINE

## 2021-07-03 PROCEDURE — C1894 INTRO/SHEATH, NON-LASER: HCPCS | Performed by: INTERNAL MEDICINE

## 2021-07-03 RX ORDER — PHYTONADIONE 2 MG/ML
2.5 INJECTION, EMULSION INTRAMUSCULAR; INTRAVENOUS; SUBCUTANEOUS ONCE
Status: COMPLETED | OUTPATIENT
Start: 2021-07-03 | End: 2021-07-03

## 2021-07-03 RX ORDER — ISOSORBIDE MONONITRATE 30 MG/1
30 TABLET, EXTENDED RELEASE ORAL
Status: DISCONTINUED | OUTPATIENT
Start: 2021-07-03 | End: 2021-07-04 | Stop reason: HOSPADM

## 2021-07-03 RX ORDER — LIDOCAINE HYDROCHLORIDE 20 MG/ML
INJECTION, SOLUTION INFILTRATION; PERINEURAL AS NEEDED
Status: DISCONTINUED | OUTPATIENT
Start: 2021-07-03 | End: 2021-07-03 | Stop reason: HOSPADM

## 2021-07-03 RX ORDER — MIDAZOLAM HYDROCHLORIDE 1 MG/ML
INJECTION INTRAMUSCULAR; INTRAVENOUS AS NEEDED
Status: DISCONTINUED | OUTPATIENT
Start: 2021-07-03 | End: 2021-07-03 | Stop reason: HOSPADM

## 2021-07-03 RX ORDER — FENTANYL CITRATE 50 UG/ML
INJECTION, SOLUTION INTRAMUSCULAR; INTRAVENOUS AS NEEDED
Status: DISCONTINUED | OUTPATIENT
Start: 2021-07-03 | End: 2021-07-03 | Stop reason: HOSPADM

## 2021-07-03 RX ORDER — SODIUM CHLORIDE 9 MG/ML
250 INJECTION, SOLUTION INTRAVENOUS ONCE AS NEEDED
Status: DISCONTINUED | OUTPATIENT
Start: 2021-07-03 | End: 2021-07-04 | Stop reason: HOSPADM

## 2021-07-03 RX ADMIN — INSULIN LISPRO 4 UNITS: 100 INJECTION, SOLUTION INTRAVENOUS; SUBCUTANEOUS at 08:53

## 2021-07-03 RX ADMIN — HYDRALAZINE HYDROCHLORIDE 10 MG: 10 TABLET, FILM COATED ORAL at 06:03

## 2021-07-03 RX ADMIN — FERROUS SULFATE TAB EC 324 MG (65 MG FE EQUIVALENT) 324 MG: 324 (65 FE) TABLET DELAYED RESPONSE at 08:58

## 2021-07-03 RX ADMIN — GABAPENTIN 100 MG: 100 CAPSULE ORAL at 08:58

## 2021-07-03 RX ADMIN — FERROUS SULFATE TAB EC 324 MG (65 MG FE EQUIVALENT) 324 MG: 324 (65 FE) TABLET DELAYED RESPONSE at 18:01

## 2021-07-03 RX ADMIN — CYANOCOBALAMIN TAB 250 MCG 125 MCG: 250 TAB at 11:45

## 2021-07-03 RX ADMIN — GABAPENTIN 100 MG: 100 CAPSULE ORAL at 18:00

## 2021-07-03 RX ADMIN — Medication 10 ML: at 08:58

## 2021-07-03 RX ADMIN — AMLODIPINE BESYLATE 5 MG: 5 TABLET ORAL at 08:58

## 2021-07-03 RX ADMIN — LEVOTHYROXINE SODIUM 175 MCG: 0.03 TABLET ORAL at 06:03

## 2021-07-03 RX ADMIN — PHYTONADIONE 2.5 MG: 10 INJECTION, EMULSION INTRAMUSCULAR; INTRAVENOUS; SUBCUTANEOUS at 08:52

## 2021-07-03 RX ADMIN — EMPAGLIFLOZIN 25 MG: 25 TABLET, FILM COATED ORAL at 08:58

## 2021-07-03 RX ADMIN — CLOPIDOGREL BISULFATE 75 MG: 75 TABLET ORAL at 08:58

## 2021-07-03 RX ADMIN — INSULIN LISPRO 4 UNITS: 100 INJECTION, SOLUTION INTRAVENOUS; SUBCUTANEOUS at 11:45

## 2021-07-03 RX ADMIN — GABAPENTIN 100 MG: 100 CAPSULE ORAL at 22:00

## 2021-07-03 RX ADMIN — HYDRALAZINE HYDROCHLORIDE 10 MG: 10 TABLET, FILM COATED ORAL at 18:01

## 2021-07-03 RX ADMIN — ISOSORBIDE MONONITRATE 30 MG: 30 TABLET, EXTENDED RELEASE ORAL at 18:01

## 2021-07-03 RX ADMIN — ATORVASTATIN CALCIUM 80 MG: 40 TABLET, FILM COATED ORAL at 22:00

## 2021-07-03 RX ADMIN — ATENOLOL 50 MG: 50 TABLET ORAL at 08:58

## 2021-07-03 RX ADMIN — WARFARIN 8 MG: 5 TABLET ORAL at 18:01

## 2021-07-03 RX ADMIN — INSULIN HUMAN 35 UNITS: 500 INJECTION, SOLUTION SUBCUTANEOUS at 18:01

## 2021-07-03 RX ADMIN — FENOFIBRATE 48 MG: 48 TABLET ORAL at 08:58

## 2021-07-03 RX ADMIN — HYDRALAZINE HYDROCHLORIDE 10 MG: 10 TABLET, FILM COATED ORAL at 11:45

## 2021-07-03 RX ADMIN — HYDRALAZINE HYDROCHLORIDE 10 MG: 10 TABLET, FILM COATED ORAL at 22:00

## 2021-07-03 NOTE — PLAN OF CARE
Goal Outcome Evaluation:              Outcome Summary: Patient remains a HNU over flow patient. He had a heart cath with no interventions and tolerated well. VSS. Will continue to monitor.

## 2021-07-03 NOTE — PROGRESS NOTES
"Pharmacy dosing service  Anticoagulant  Warfarin     Subjective:    Dalton Dallas Sr. is a 80 y.o.male being continued on warfarin for history of PE and DVT.    INR Goal: 2 - 3  Home medication?: Yes, warfarin 8 mg PO every day at 1800  Bridge Therapy Present?:  No  Interacting Medications Evaluation (New/Present/Discontinued): n/a  Additional Contributing Factors: s/p cath lab 7/3      Assessment/Plan:    Ok to resume warfarin today per cardiology. INR was therapeutic on admission, so will resume 8 mg daily. May take a little longer than normal to get back to therapeutic range as pt received 5 mg vitamin K PO on 7/2 and 2.5 mg PO today so he could go to cath lab.     Continue to monitor and adjust based on INR.         Date 7/2 7/3          INR 2.24 1.41          Dose Vit K 5mg 8 mg              Objective:  [Ht: 177.8 cm (70\"); Wt: (!) 139 kg (306 lb); BMI: Body mass index is 43.91 kg/m².]    Lab Results   Component Value Date    ALBUMIN 4.00 07/03/2021     Lab Results   Component Value Date    INR 1.41 (L) 07/03/2021    INR 1.53 (L) 07/03/2021    INR 2.24 07/02/2021    PROTIME 15.3 (L) 07/03/2021    PROTIME 16.5 (L) 07/03/2021    PROTIME 23.4 07/02/2021     Lab Results   Component Value Date    HGB 13.8 07/03/2021    HGB 14.8 07/01/2021    HGB 13.3 06/24/2021     Lab Results   Component Value Date    HCT 40.0 07/03/2021    HCT 43.8 07/01/2021    HCT 41.1 06/24/2021       Tracey Harrison, PharmD  07/03/21 16:34 EDT     "

## 2021-07-03 NOTE — PROGRESS NOTES
[facility] Medicine Services Daily Progress Note      Hospitalist Team  LOS 1 days      Patient Care Team:  Gabino Sibley Jr., MD as PCP - General (Family Medicine)    Patient Location: 2301/1      Subjective   Subjective     Chief Complaint / Subjective  Chief Complaint   Patient presents with   • Chest Pain     CP since 2045 while sitting at dinner table and not letting up. [facility]         Brief Synopsis of Hospital Course/HPI    80-year-old  male with history of CAD-s/p 2 stents on Plavix, DVT/PE on warfarin, HTN, HLP, IDDM with neuropathy, hypothyroidism, and morbid obesity, presented to the ED complaining of chest pain for the past several hours.  He describes it as a heaviness without radiation, substernal, nonradiating, moderate and constant.  Denies having shortness of breath, palpitation, sweating, nausea, or any other complaint.     Emergency department course:  Afebrile, hemodynamically stable, no acute distress.  Physical examination.  ED physician was unremarkable.  First troponin level was normal.  Labs were significant for glucose 194, creatinine 1.29, GFR 54.  INR was therapeutic at 2.6.  EKG showed sinus rhythm with no ischemic changes.  Chest x-ray was suspicious for mild pulmonary vascular congestion/pulmonary edema.  Patient was given 2 mg IV morphine sulfate and his chest pain was relieved after starting nitroglycerin drip.         Date::      7/3  Patient denies any chest pain or shortness of breath.  Nitro drip has been discontinued and patient going for heart cath today since his INR has improved      Review of Systems   All other systems reviewed and are negative.        Objective   Objective      Vital Signs  Temp:  [97.7 °F (36.5 °C)-97.9 °F (36.6 °C)] 97.9 °F (36.6 °C)  Heart Rate:  [53-82] 68  BP: (108-173)/(46-77) 140/63  Oxygen Therapy  SpO2: 96 %  Pulse Oximetry Type: Continuous  Device (Oxygen Therapy): nasal cannula  Flow (L/min):  "2  Flowsheet Rows      First Filed Value   Admission Height  177.8 cm (70\") Documented at 07/01/2021 2151   Admission Weight  (!) 139 kg (306 lb 10.6 oz) Documented at 07/01/2021 2151        Intake & Output (last 3 days)       06/30 0701 - 07/01 0700 07/01 0701 - 07/02 0700 07/02 0701 - 07/03 0700 07/03 0701 - 07/04 0700    P.O.   240     Total Intake(mL/kg)   240 (1.7)     Net   +240             Urine Unmeasured Occurrence   5 x         Lines, Drains & Airways    Active LDAs     Name:   Placement date:   Placement time:   Site:   Days:    Peripheral IV 07/01/21 2210 Posterior;Right Forearm   07/01/21 2210    Forearm   1                  Physical Exam:    Physical Exam  Vitals and nursing note reviewed.   Constitutional:       General: He is not in acute distress.     Appearance: Normal appearance. He is well-developed. He is not ill-appearing, toxic-appearing or diaphoretic.   HENT:      Head: Normocephalic and atraumatic.      Right Ear: Ear canal and external ear normal.      Left Ear: Ear canal and external ear normal.      Nose: Nose normal. No congestion or rhinorrhea.      Mouth/Throat:      Mouth: Mucous membranes are moist.      Pharynx: No oropharyngeal exudate.   Eyes:      General: No scleral icterus.        Right eye: No discharge.         Left eye: No discharge.      Extraocular Movements: Extraocular movements intact.      Conjunctiva/sclera: Conjunctivae normal.      Pupils: Pupils are equal, round, and reactive to light.   Neck:      Thyroid: No thyromegaly.      Vascular: No carotid bruit or JVD.      Trachea: No tracheal deviation.   Cardiovascular:      Rate and Rhythm: Normal rate and regular rhythm.      Pulses: Normal pulses.      Heart sounds: Normal heart sounds. No murmur heard.   No friction rub. No gallop.    Pulmonary:      Effort: Pulmonary effort is normal. No respiratory distress.      Breath sounds: Normal breath sounds. No stridor. No wheezing, rhonchi or rales.   Chest:      " Chest wall: No tenderness.   Abdominal:      General: Bowel sounds are normal. There is no distension.      Palpations: Abdomen is soft. There is no mass.      Tenderness: There is no abdominal tenderness. There is no guarding or rebound.      Hernia: No hernia is present.   Musculoskeletal:         General: No swelling, tenderness, deformity or signs of injury. Normal range of motion.      Cervical back: Normal range of motion and neck supple. No rigidity. No muscular tenderness.      Right lower leg: No edema.      Left lower leg: No edema.   Lymphadenopathy:      Cervical: No cervical adenopathy.   Skin:     General: Skin is warm and dry.      Coloration: Skin is not jaundiced or pale.      Findings: No bruising, erythema or rash.   Neurological:      General: No focal deficit present.      Mental Status: He is alert and oriented to person, place, and time. Mental status is at baseline.      Cranial Nerves: No cranial nerve deficit.      Sensory: No sensory deficit.      Motor: No weakness or abnormal muscle tone.      Coordination: Coordination normal.   Psychiatric:         Mood and Affect: Mood normal.         Behavior: Behavior normal.         Thought Content: Thought content normal.         Judgment: Judgment normal.               Procedures:    Procedure(s):  Cardiac Catheterization/Vascular Study          Results Review:     I reviewed the patient's new clinical results.      Lab Results (last 24 hours)     Procedure Component Value Units Date/Time    Protime-INR [[id number]]  (Abnormal) Collected: 07/03/21 1052    Specimen: Blood Updated: 07/03/21 1108     Protime 15.3 Seconds      INR 1.41    POC Glucose Once [[id number]]  (Abnormal) Collected: 07/03/21 1105    Specimen: Blood Updated: 07/03/21 1106     Glucose 189 mg/dL      Comment: Serial Number: [id number]:  [id number]       POC Glucose Once [[id number]]  (Abnormal) Collected: 07/03/21 0758    Specimen: Blood Updated: 07/03/21 0759     Glucose 192  mg/dL      Comment: Serial Number: [id number]:  [id number]       Comprehensive Metabolic Panel [[id number]]  (Abnormal) Collected: 07/03/21 0417    Specimen: Blood Updated: 07/03/21 0623     Glucose 187 mg/dL      BUN 19 mg/dL      Creatinine 1.20 mg/dL      Sodium 139 mmol/L      Potassium 4.5 mmol/L      Comment: Slight hemolysis detected by analyzer. Results may be affected.        Chloride 100 mmol/L      CO2 25.0 mmol/L      Calcium 9.3 mg/dL      Total Protein 6.3 g/dL      Albumin 4.00 g/dL      ALT (SGPT) 25 U/L      AST (SGOT) 30 U/L      Comment: Slight hemolysis detected by analyzer. Results may be affected.        Alkaline Phosphatase 40 U/L      Total Bilirubin 0.4 mg/dL      eGFR Non African Amer 59 mL/min/1.73      Globulin 2.3 gm/dL      A/G Ratio 1.7 g/dL      BUN/Creatinine Ratio 15.8     Anion Gap 14.0 mmol/L     Narrative:      GFR Normal >60  Chronic Kidney Disease <60  Kidney Failure <15      Magnesium [[id number]]  (Normal) Collected: 07/03/21 0417    Specimen: Blood Updated: 07/03/21 0610     Magnesium 1.8 mg/dL     Protime-INR [[id number]]  (Abnormal) Collected: 07/03/21 0417    Specimen: Blood Updated: 07/03/21 0558     Protime 16.5 Seconds      INR 1.53    CBC & Differential [[id number]]  (Normal) Collected: 07/03/21 0417    Specimen: Blood Updated: 07/03/21 0549    Narrative:      The following orders were created for panel order CBC & Differential.  Procedure                               Abnormality         Status                     ---------                               -----------         ------                     CBC Auto Differential[[id number]]        Normal              Final result                 Please view results for these tests on the individual orders.    CBC Auto Differential [[id number]]  (Normal) Collected: 07/03/21 0417    Specimen: Blood Updated: 07/03/21 0549     WBC 5.70 10*3/mm3      RBC 4.36 10*6/mm3      Hemoglobin 13.8 g/dL      Hematocrit 40.0 %      MCV 91.8  fL      MCH 31.7 pg      MCHC 34.5 g/dL      RDW 13.8 %      RDW-SD 44.6 fl      MPV 9.0 fL      Platelets 172 10*3/mm3      Neutrophil % 54.7 %      Lymphocyte % 31.0 %      Monocyte % 7.8 %      Eosinophil % 5.5 %      Basophil % 1.0 %      Neutrophils, Absolute 3.10 10*3/mm3      Lymphocytes, Absolute 1.80 10*3/mm3      Monocytes, Absolute 0.40 10*3/mm3      Eosinophils, Absolute 0.30 10*3/mm3      Basophils, Absolute 0.10 10*3/mm3      nRBC 0.2 /100 WBC     POC Glucose Once [[id number]]  (Abnormal) Collected: 07/02/21 1926    Specimen: Blood Updated: 07/02/21 1926     Glucose 256 mg/dL      Comment: Serial Number: [id number]:  [id number]           No results found for: HGBA1C  Results from last 7 days   Lab Units 07/03/21  1052 07/03/21  0417 07/02/21  0351   INR  1.41* 1.53* 2.24           No results found for: LIPASE  Lab Results   Component Value Date    CHOL 150 05/03/2021    TRIG 201 (H) 05/03/2021    HDL 35 (L) 05/03/2021    LDL 81 05/03/2021       No results found for: INTRAOP, PREDX, FINALDX, COMDX    Microbiology Results (last 10 days)     Procedure Component Value - Date/Time    Respiratory Panel, PCR (WITHOUT COVID) - Swab, Nasopharynx [[id number]]  (Normal) Collected: 07/02/21 1146    Lab Status: Final result Specimen: Swab from Nasopharynx Updated: 07/02/21 1251     ADENOVIRUS, PCR Not Detected     Coronavirus 229E Not Detected     Coronavirus HKU1 Not Detected     Coronavirus NL63 Not Detected     Coronavirus OC43 Not Detected     Human Metapneumovirus Not Detected     Human Rhinovirus/Enterovirus Not Detected     Influenza B PCR Not Detected     Parainfluenza Virus 1 Not Detected     Parainfluenza Virus 2 Not Detected     Parainfluenza Virus 3 Not Detected     Parainfluenza Virus 4 Not Detected     Bordetella pertussis pcr Not Detected     Chlamydophila pneumoniae PCR Not Detected     Mycoplasma pneumo by PCR Not Detected     Influenza A PCR Not Detected     RSV, PCR Not Detected      Bordetella parapertussis PCR Not Detected    Narrative:      The coronavirus on the RVP is NOT COVID-19 and is NOT indicative of infection with COVID-19.    In the setting of a positive respiratory panel with a viral infection PLUS a negative procalcitonin without other underlying concern for bacterial infection, consider observing off antibiotics or discontinuation of antibiotics and continue supportive care. If the respiratory panel is positive for atypical bacterial infection (Bordetella pertussis, Chlamydophila pneumoniae, or Mycoplasma pneumoniae), consider antibiotic de-escalation to target atypical bacterial infection.    MRSA Screen, PCR (Inpatient) - Swab, Nares [[id number]]  (Normal) Collected: 07/02/21 0315    Lab Status: Final result Specimen: Swab from Nares Updated: 07/02/21 0430     MRSA PCR No MRSA Detected    COVID PRE-OP / PRE-PROCEDURE SCREENING ORDER (NO ISOLATION) - Swab, Nasopharynx [[id number]]  (Normal) Collected: 07/01/21 2353    Lab Status: Final result Specimen: Swab from Nasopharynx Updated: 07/02/21 0033    Narrative:      The following orders were created for panel order COVID PRE-OP / PRE-PROCEDURE SCREENING ORDER (NO ISOLATION) - Swab, Nasopharynx.  Procedure                               Abnormality         Status                     ---------                               -----------         ------                     COVID-19,CEPHEID,COR/CLARY...[[id number]]  Normal              Final result                 Please view results for these tests on the individual orders.    COVID-19,CEPHEID,COR/CLARY/PAD/NAMRATA IN-HOUSE(OR EMERGENT/ADD-ON),NP SWAB IN TRANSPORT MEDIA 3-4 HR TAT, RT-PCR - Swab, Nasopharynx [[id number]]  (Normal) Collected: 07/01/21 2353    Lab Status: Final result Specimen: Swab from Nasopharynx Updated: 07/02/21 0033     COVID19 Not Detected    Narrative:      Fact sheet for providers: https://www.fda.gov/media/473579/download     Fact sheet for patients:  https://www.fda.gov/media/863472/download  Fact sheet for providers: https://www.fda.gov/media/492828/download    Fact sheet for patients: https://www.fda.gov/media/951964/download    Test performed by PCR.          ECG/EMG Results (most recent)     Procedure Component Value Units Date/Time    ECG 12 Lead [[id number]] Collected: 07/01/21 2158     Updated: 07/02/21 1202     QT Interval 353 ms     Narrative:      HEART RATE= 96  bpm  RR Interval= 628  ms  WV Interval= 231  ms  P Horizontal Axis= 41  deg  P Front Axis= 46  deg  QRSD Interval= 100  ms  QT Interval= 353  ms  QRS Axis= 25  deg  T Wave Axis= 40  deg  - ABNORMAL ECG -  Sinus rhythm  Prolonged WV interval  Low voltage, precordial leads  When compared with ECG of 22-Nov-2019 12:25:21,  No significant change  Electronically Signed By: Orlando Clement (CLARY) 02-Jul-2021 12:02:05  Date and Time of Study: 2021-07-01 21:58:55          Results for orders placed during the hospital encounter of 09/28/20    Duplex Venous Lower Extremity - Left    Interpretation Summary  · Normal left lower extremity venous duplex scan.          XR Chest 1 View    Result Date: 7/1/2021  Indistinct pulmonary vasculature with basilar predominant groundglass opacities and interstitial thickening in both lungs, suggesting mild pulmonary vascular congestion/pulmonary edema. Atypical pneumonia or chronic lung disease could have a similar appearance.  Electronically Signed By-Cortez Hunt MD On:7/1/2021 10:30 PM This report was finalized on [id number] by  Cortez Hunt MD.          Xrays, labs reviewed personally by physician.    Medication Review:   I have reviewed the patient's current medication list      Scheduled Meds  amLODIPine, 5 mg, Oral, Q24H  atenolol, 50 mg, Oral, Daily  atorvastatin, 80 mg, Oral, Nightly  clopidogrel, 75 mg, Oral, Daily  empagliflozin, 25 mg, Oral, Daily  fenofibrate, 48 mg, Oral, Daily  ferrous sulfate, 324 mg, Oral, BID With Meals  gabapentin, 100 mg, Oral,  TID  hydrALAZINE, 10 mg, Oral, Q6H  insulin lispro, 0-24 Units, Subcutaneous, TID AC  Insulin Regular Human (Conc), 35 Units, Subcutaneous, Daily With Dinner  Insulin Regular Human (Conc), 25 Units, Subcutaneous, Daily  levothyroxine, 175 mcg, Oral, Q AM  sodium chloride, 10 mL, Intravenous, Q12H  vitamin B-12, 125 mcg, Oral, Daily        Meds Infusions  nitroglycerin, 10-50 mcg/min, Last Rate: Stopped (07/02/21 1025)        Meds PRN  •  acetaminophen  •  dextrose  •  dextrose  •  glucagon (human recombinant)  •  insulin lispro **AND** insulin lispro  •  labetalol  •  ondansetron  •  [COMPLETED] Insert peripheral IV **AND** sodium chloride  •  sodium chloride        Assessment/Plan   Assessment/Plan     Active Hospital Problems    Diagnosis  POA   • Unstable angina (CMS/HCC) [I20.0]  Yes      Resolved Hospital Problems   No resolved problems to display.       MEDICAL DECISION MAKING COMPLEXITY BY PROBLEM:       NSTEMI   On beta-blocker  Anticoagulation held for heart cath  On Plavix   Nitro drip discontinued  CAD with history of coronary stents x2  Plavix and beta-blocker  On Lipitor s  Heart cath today        History of DVT/PE on warfarin  -Coumadin on hold for heart cath  Resume when okay with cardiology  Therapeutic INR reversed for heart cath    Chronic kidney disease stage II  Monitor renal function closely    Hypertension on atenolol    Dyslipidemia on statin    DM2  Continue correction insulin    On Neurontin for diabetic neuropathy    Hypothyroidism on Synthroid    Class III obesity and BMI 43.9        VTE Prophylaxis -   Mechanical Order History:     None      Pharmalogical Order History:      Ordered     Dose Route Frequency Stop    07/02/21 0203  warfarin (COUMADIN) tablet 3 mg  Status:  Discontinued     Question:  Target INR  Answer:  2 - 3    3 mg PO Daily 07/02/21 0905 07/02/21 0203  warfarin (COUMADIN) tablet 5 mg  Status:  Discontinued     Question:  Target INR  Answer:  2 - 3    5 mg PO Daily  07/02/21 0905                  Code Status -   Code Status and Medical Interventions:   Ordered at: 07/02/21 0204     Code Status:    CPR     Medical Interventions (Level of Support Prior to Arrest):    Full       This patient has been examined wearing appropriate Personal Protective Equipment and discussed with hospital infection control department. 07/03/21        Discharge Planning  Home        Electronically signed by Royce Herman MD, 07/03/21, 12:52 EDT.  [facility] Hospitalist Team

## 2021-07-03 NOTE — PROGRESS NOTES
CARDIOLOGY FOLLOW-UP PROGRESS NOTE      Reason for follow-up:    Chest Pain  CAD     Attending: Royce Herman *      Subjective .     Denies recurrent CP overnight.   NTG has been discontinued     Review of Systems   Constitutional: Positive for malaise/fatigue. Negative for decreased appetite and diaphoresis.   HENT: Negative for congestion, hearing loss and nosebleeds.    Cardiovascular: Positive for chest pain and dyspnea on exertion. Negative for claudication, irregular heartbeat, leg swelling, near-syncope, orthopnea, palpitations, paroxysmal nocturnal dyspnea and syncope.   Respiratory: Negative for cough, shortness of breath and sleep disturbances due to breathing.    Endocrine: Negative for polyuria.   Hematologic/Lymphatic: Does not bruise/bleed easily.   Skin: Negative for itching and rash.   Musculoskeletal: Positive for arthritis and back pain. Negative for muscle weakness and myalgias.   Gastrointestinal: Negative for abdominal pain, change in bowel habit and nausea.   Genitourinary: Negative for dysuria, flank pain, frequency and hesitancy.   Neurological: Negative for dizziness, tremors and weakness.   Psychiatric/Behavioral: Negative for altered mental status. The patient does not have insomnia.        Allergies: Patient has no known allergies.    Scheduled Meds:amLODIPine, 5 mg, Oral, Q24H  atenolol, 50 mg, Oral, Daily  atorvastatin, 80 mg, Oral, Nightly  clopidogrel, 75 mg, Oral, Daily  empagliflozin, 25 mg, Oral, Daily  fenofibrate, 48 mg, Oral, Daily  ferrous sulfate, 324 mg, Oral, BID With Meals  gabapentin, 100 mg, Oral, TID  hydrALAZINE, 10 mg, Oral, Q6H  insulin lispro, 0-24 Units, Subcutaneous, TID AC  Insulin Regular Human (Conc), 35 Units, Subcutaneous, Daily With Dinner  Insulin Regular Human (Conc), 25 Units, Subcutaneous, Daily  levothyroxine, 175 mcg, Oral, Q AM  sodium chloride, 10 mL, Intravenous, Q12H  vitamin B-12, 125 mcg, Oral, Daily        Continuous  Infusions:nitroglycerin, 10-50 mcg/min, Last Rate: Stopped (07/02/21 1025)        PRN Meds:.•  acetaminophen  •  dextrose  •  dextrose  •  glucagon (human recombinant)  •  insulin lispro **AND** insulin lispro  •  labetalol  •  ondansetron  •  [COMPLETED] Insert peripheral IV **AND** sodium chloride  •  sodium chloride    Objective     VITAL SIGNS  Patient Vitals for the past 24 hrs:   BP Temp Temp src Pulse SpO2   07/03/21 0800 -- 97.9 °F (36.6 °C) Oral -- --   07/03/21 0455 -- -- -- 68 --   07/03/21 0450 -- -- -- 65 --   07/03/21 0445 -- -- -- 61 --   07/03/21 0440 -- -- -- 61 --   07/03/21 0435 -- -- -- 60 --   07/03/21 0430 -- -- -- 59 --   07/03/21 0425 -- -- -- 61 --   07/03/21 0420 -- -- -- 61 --   07/03/21 0415 -- -- -- 60 --   07/03/21 0410 -- -- -- 59 --   07/03/21 0405 140/63 -- -- 59 --   07/03/21 0400 -- -- -- 57 --   07/03/21 0355 -- -- -- 58 --   07/03/21 0350 -- -- -- 58 --   07/03/21 0345 -- -- -- 58 --   07/03/21 0340 -- -- -- 58 --   07/03/21 0335 -- -- -- 61 --   07/03/21 0330 -- -- -- 58 --   07/03/21 0325 -- -- -- 61 --   07/03/21 0320 -- -- -- 60 --   07/03/21 0315 -- -- -- 61 --   07/03/21 0310 -- -- -- 62 --   07/03/21 0305 173/72 -- -- 62 --   07/03/21 0300 -- -- -- 59 --   07/03/21 0255 -- -- -- 59 --   07/03/21 0250 -- -- -- 60 --   07/03/21 0245 -- -- -- 60 --   07/03/21 0240 -- -- -- 67 --   07/03/21 0235 -- -- -- 80 --   07/03/21 0230 -- -- -- 73 --   07/03/21 0225 -- -- -- 82 --   07/03/21 0220 -- -- -- 60 96 %   07/03/21 0215 -- -- -- 59 95 %   07/03/21 0210 -- -- -- 60 94 %   07/03/21 0205 144/55 -- -- 61 97 %   07/03/21 0200 -- -- -- 57 95 %   07/03/21 0155 -- -- -- 55 94 %   07/03/21 0150 -- -- -- 55 94 %   07/03/21 0145 -- -- -- 53 94 %   07/03/21 0140 -- -- -- 53 94 %   07/03/21 0135 -- -- -- 54 94 %   07/03/21 0130 -- -- -- 54 95 %   07/03/21 0125 -- -- -- 54 94 %   07/03/21 0120 -- -- -- 56 93 %   07/03/21 0115 -- -- -- 57 93 %   07/03/21 0110 -- -- -- 55 93 %   07/03/21 0105  136/55 -- -- 55 92 %   07/03/21 0100 -- -- -- 55 94 %   07/03/21 0055 -- -- -- 55 95 %   07/03/21 0050 -- -- -- 53 94 %   07/03/21 0045 -- -- -- 54 95 %   07/03/21 0040 -- -- -- 53 95 %   07/03/21 0035 -- -- -- 54 95 %   07/03/21 0030 -- -- -- 54 95 %   07/03/21 0025 -- -- -- 54 95 %   07/03/21 0020 -- -- -- 56 98 %   07/03/21 0015 -- -- -- 57 99 %   07/03/21 0010 -- -- -- 56 94 %   07/03/21 0005 128/54 -- -- 55 94 %   07/03/21 0000 -- -- -- 55 94 %   07/02/21 2355 -- -- -- 56 93 %   07/02/21 2350 -- -- -- 55 94 %   07/02/21 2345 -- -- -- 56 95 %   07/02/21 2340 -- -- -- 55 96 %   07/02/21 2335 -- -- -- 58 95 %   07/02/21 2330 -- -- -- 57 94 %   07/02/21 2325 -- -- -- 57 96 %   07/02/21 2320 -- -- -- 58 95 %   07/02/21 2315 -- -- -- 60 95 %   07/02/21 2310 -- -- -- 58 93 %   07/02/21 2305 136/55 -- -- 58 95 %   07/02/21 2300 -- -- -- 61 95 %   07/02/21 2255 -- -- -- 62 92 %   07/02/21 2250 -- -- -- 61 92 %   07/02/21 2245 138/57 -- -- 61 92 %   07/02/21 2240 -- -- -- 62 92 %   07/02/21 2235 -- -- -- 64 92 %   07/02/21 2230 -- -- -- 62 92 %   07/02/21 2225 -- -- -- 62 94 %   07/02/21 2220 -- -- -- 64 94 %   07/02/21 2215 -- -- -- 61 97 %   07/02/21 2210 -- -- -- 66 96 %   07/02/21 2205 -- -- -- 71 --   07/02/21 2200 -- -- -- 77 --   07/02/21 2155 -- -- -- 70 91 %   07/02/21 2150 -- -- -- 62 91 %   07/02/21 2145 -- -- -- 62 91 %   07/02/21 2140 -- -- -- 63 93 %   07/02/21 2135 -- -- -- 60 95 %   07/02/21 2130 -- -- -- 63 91 %   07/02/21 2125 -- -- -- 61 91 %   07/02/21 2120 -- -- -- 62 91 %   07/02/21 2115 -- -- -- 60 91 %   07/02/21 2110 -- -- -- 63 91 %   07/02/21 2105 144/77 -- -- 61 91 %   07/02/21 2100 -- -- -- 63 92 %   07/02/21 2055 -- -- -- 64 92 %   07/02/21 2050 -- -- -- 63 94 %   07/02/21 2045 -- -- -- 65 93 %   07/02/21 2040 -- -- -- 75 (!) 88 %   07/02/21 2035 -- -- -- 63 92 %   07/02/21 2030 -- -- -- 64 92 %   07/02/21 2025 -- -- -- 63 93 %   07/02/21 2020 -- -- -- 63 91 %   07/02/21 2015 -- -- -- 64 93  "%   07/02/21 2010 -- -- -- 64 94 %   07/02/21 2005 133/57 -- -- 67 91 %   07/02/21 2000 -- -- -- 64 91 %   07/02/21 1955 -- -- -- 65 93 %   07/02/21 1950 -- -- -- 71 91 %   07/02/21 1945 -- -- -- 68 90 %   07/1942 -- 97.7 °F (36.5 °C) Oral -- --   07/02/21 1940 -- -- -- 67 91 %   07/02/21 1935 -- -- -- 66 90 %   07/02/21 1930 -- -- -- 68 90 %   07/02/21 1925 -- -- -- 66 92 %   07/02/21 1920 -- -- -- 66 (!) 89 %   07/02/21 1915 -- -- -- 67 (!) 89 %   07/02/21 1910 -- -- -- 66 91 %   07/02/21 1905 155/57 -- -- 67 92 %   07/02/21 1900 -- -- -- 63 92 %   07/02/21 1805 157/59 -- -- 62 94 %   07/02/21 1705 154/65 -- -- 62 --   07/02/21 1605 143/63 -- -- 60 --   07/02/21 1600 -- 97.7 °F (36.5 °C) Oral -- --   07/02/21 1522 -- -- -- 60 --   07/02/21 1520 108/48 -- -- 62 92 %   07/02/21 1500 -- -- -- 59 (!) 76 %   07/02/21 1405 151/65 -- -- 59 94 %   07/02/21 1305 127/46 -- -- 61 93 %        Flowsheet Rows      First Filed Value   Admission Height  177.8 cm (70\") Documented at 07/01/2021 2151   Admission Weight  (!) 139 kg (306 lb 10.6 oz) Documented at 07/01/2021 2151          Body mass index is 43.91 kg/m².    No intake or output data in the 24 hours ending 07/03/21 1233     TELEMETRY:     SR    Physical Exam:  Vitals reviewed.   Constitutional:       General: Not in acute distress.     Appearance: Normal and healthy appearance. Well-developed and not in distress.   Eyes:      Pupils: Pupils are equal, round, and reactive to light.   HENT:      Head: Normocephalic and atraumatic.   Neck:      Vascular: No JVD.   Pulmonary:      Effort: Pulmonary effort is normal.      Breath sounds: Normal breath sounds.   Cardiovascular:      Normal rate. Regular rhythm.   Pulses:     Intact distal pulses.   Edema:     Peripheral edema absent.   Abdominal:      General: There is no distension.      Palpations: Abdomen is soft.      Tenderness: There is no abdominal tenderness.   Musculoskeletal: Normal range of motion.      Cervical " back: Normal range of motion and neck supple. Skin:     General: Skin is warm and dry.   Neurological:      Mental Status: Alert, oriented to person, place, and time and oriented to person, place and time.            Results Review:   I reviewed the patient's new clinical results.    CBC    Results from last 7 days   Lab Units 07/03/21  0417 07/01/21 2218   WBC 10*3/mm3 5.70 6.40   HEMOGLOBIN g/dL 13.8 14.8   PLATELETS 10*3/mm3 172 201     BMP   Results from last 7 days   Lab Units 07/03/21  0417 07/02/21  0928 07/01/21  2218   SODIUM mmol/L 139  --  140   POTASSIUM mmol/L 4.5  --  4.4   CHLORIDE mmol/L 100  --  102   CO2 mmol/L 25.0  --  25.0   BUN mg/dL 19  --  20   CREATININE mg/dL 1.20  --  1.29*   GLUCOSE mg/dL 187*  --  194*   MAGNESIUM mg/dL 1.8 1.7  --      Cr Clearance Estimated Creatinine Clearance: 71.3 mL/min (by C-G formula based on SCr of 1.2 mg/dL).  Coag   Results from last 7 days   Lab Units 07/03/21  1052 07/03/21  0417 07/02/21  0351 07/01/21  2218   INR  1.41* 1.53* 2.24 2.18     HbA1C   Lab Results   Component Value Date    HGBA1C 7.4 (H) 05/03/2021    HGBA1C 7.0 (H) 10/28/2020    HGBA1C 8.9 (H) 05/06/2020     Blood Glucose   Glucose   Date/Time Value Ref Range Status   07/03/2021 1105 189 (H) 70 - 105 mg/dL Final     Comment:     Serial Number: [id number]:  [id number]   07/03/2021 0758 192 (H) 70 - 105 mg/dL Final     Comment:     Serial Number: [id number]:  [id number]   07/02/2021 1926 256 (H) 70 - 105 mg/dL Final     Comment:     Serial Number: [id number]:  [id number]   07/02/2021 1623 202 (H) 70 - 105 mg/dL Final     Comment:     Serial Number: [id number]:  [id number]   07/02/2021 1136 229 (H) 70 - 105 mg/dL Final     Comment:     Serial Number: [id number]:  [id number]   07/02/2021 0742 138 (H) 70 - 105 mg/dL Final     Comment:     Serial Number: [id number]:  [id number]     Infection     CMP   Results from last 7 days   Lab Units 07/03/21  0412  07/01/21  2218   SODIUM mmol/L 139 140   POTASSIUM mmol/L 4.5 4.4   CHLORIDE mmol/L 100 102   CO2 mmol/L 25.0 25.0   BUN mg/dL 19 20   CREATININE mg/dL 1.20 1.29*   GLUCOSE mg/dL 187* 194*   ALBUMIN g/dL 4.00  --    BILIRUBIN mg/dL 0.4  --    ALK PHOS U/L 40  --    AST (SGOT) U/L 30  --    ALT (SGPT) U/L 25  --      ABG      UA      DIANA  No results found for: POCMETH, POCAMPHET, POCBARBITUR, POCBENZO, POCCOCAINE, POCOPIATES, POCOXYCODO, POCPHENCYC, POCPROPOXY, POCTHC, POCTRICYC  Lysis Labs   Results from last 7 days   Lab Units 07/03/21  1052 07/03/21  0417 07/02/21  0351 07/01/21  2218   INR  1.41* 1.53* 2.24 2.18   HEMOGLOBIN g/dL  --  13.8  --  14.8   PLATELETS 10*3/mm3  --  172  --  201   CREATININE mg/dL  --  1.20  --  1.29*     Radiology(recent) XR Chest 1 View    Result Date: 7/1/2021  Indistinct pulmonary vasculature with basilar predominant groundglass opacities and interstitial thickening in both lungs, suggesting mild pulmonary vascular congestion/pulmonary edema. Atypical pneumonia or chronic lung disease could have a similar appearance.  Electronically Signed By-Cortez Hunt MD On:7/1/2021 10:30 PM This report was finalized on [id number] by  Cortez Hunt MD.      Imaging Results (Last 24 Hours)     ** No results found for the last 24 hours. 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  INR 2.4    PLAN:    INR 1.53 this am  No recurrent chest  pain  Repeat dose of Vit K this am and will proceed with cardiac catheterization later today  Continue DAPT          Additional recommendations per Dr. Ackerman    Patient is seen and examined and findings are verified.  Patient is chest pain-free.    Hemodynamics are stable    Normal S1 and S2.  No pericardial rub or murmur.    INR is 1.53.  I would recommend to repeat INR in 2 to 3 hours.  Vitamin K would be given.  I will proceed with cardiac catheterization today.    I discussed the patients findings and my recommendations with patient and RN      Edwar Ackerman MD  07/03/21  12:33 EDT

## 2021-07-04 ENCOUNTER — READMISSION MANAGEMENT (OUTPATIENT)
Dept: CALL CENTER | Facility: HOSPITAL | Age: 81
End: 2021-07-04

## 2021-07-04 VITALS
SYSTOLIC BLOOD PRESSURE: 130 MMHG | WEIGHT: 306 LBS | DIASTOLIC BLOOD PRESSURE: 56 MMHG | OXYGEN SATURATION: 94 % | TEMPERATURE: 97.8 F | RESPIRATION RATE: 18 BRPM | HEIGHT: 70 IN | HEART RATE: 68 BPM | BODY MASS INDEX: 43.81 KG/M2

## 2021-07-04 LAB
ANION GAP SERPL CALCULATED.3IONS-SCNC: 11 MMOL/L (ref 5–15)
BUN SERPL-MCNC: 22 MG/DL (ref 8–23)
BUN/CREAT SERPL: 16.4 (ref 7–25)
CALCIUM SPEC-SCNC: 8.9 MG/DL (ref 8.6–10.5)
CHLORIDE SERPL-SCNC: 99 MMOL/L (ref 98–107)
CHOLEST SERPL-MCNC: 145 MG/DL (ref 0–200)
CO2 SERPL-SCNC: 28 MMOL/L (ref 22–29)
CREAT SERPL-MCNC: 1.34 MG/DL (ref 0.76–1.27)
DEPRECATED RDW RBC AUTO: 44.6 FL (ref 37–54)
ERYTHROCYTE [DISTWIDTH] IN BLOOD BY AUTOMATED COUNT: 13.8 % (ref 12.3–15.4)
GFR SERPL CREATININE-BSD FRML MDRD: 52 ML/MIN/1.73
GLUCOSE BLDC GLUCOMTR-MCNC: 264 MG/DL (ref 70–105)
GLUCOSE SERPL-MCNC: 198 MG/DL (ref 65–99)
HCT VFR BLD AUTO: 40.1 % (ref 37.5–51)
HDLC SERPL-MCNC: 29 MG/DL (ref 40–60)
HGB BLD-MCNC: 13.8 G/DL (ref 13–17.7)
INR PPP: 1.19 (ref 2–3)
LDLC SERPL CALC-MCNC: 72 MG/DL (ref 0–100)
LDLC/HDLC SERPL: 2.14 {RATIO}
MAGNESIUM SERPL-MCNC: 1.9 MG/DL (ref 1.6–2.4)
MCH RBC QN AUTO: 31.7 PG (ref 26.6–33)
MCHC RBC AUTO-ENTMCNC: 34.4 G/DL (ref 31.5–35.7)
MCV RBC AUTO: 92.3 FL (ref 79–97)
PLATELET # BLD AUTO: 185 10*3/MM3 (ref 140–450)
PMV BLD AUTO: 9 FL (ref 6–12)
POTASSIUM SERPL-SCNC: 4.6 MMOL/L (ref 3.5–5.2)
PROTHROMBIN TIME: 13 SECONDS (ref 19.4–28.5)
RBC # BLD AUTO: 4.34 10*6/MM3 (ref 4.14–5.8)
SODIUM SERPL-SCNC: 138 MMOL/L (ref 136–145)
TRIGL SERPL-MCNC: 270 MG/DL (ref 0–150)
VLDLC SERPL-MCNC: 44 MG/DL (ref 5–40)
WBC # BLD AUTO: 5.5 10*3/MM3 (ref 3.4–10.8)

## 2021-07-04 PROCEDURE — 80061 LIPID PANEL: CPT | Performed by: INTERNAL MEDICINE

## 2021-07-04 PROCEDURE — G0378 HOSPITAL OBSERVATION PER HR: HCPCS

## 2021-07-04 PROCEDURE — 63710000001 INSULIN LISPRO (HUMAN) PER 5 UNITS: Performed by: INTERNAL MEDICINE

## 2021-07-04 PROCEDURE — 85027 COMPLETE CBC AUTOMATED: CPT | Performed by: INTERNAL MEDICINE

## 2021-07-04 PROCEDURE — 82962 GLUCOSE BLOOD TEST: CPT

## 2021-07-04 PROCEDURE — 80048 BASIC METABOLIC PNL TOTAL CA: CPT | Performed by: INTERNAL MEDICINE

## 2021-07-04 PROCEDURE — 99213 OFFICE O/P EST LOW 20 MIN: CPT | Performed by: NURSE PRACTITIONER

## 2021-07-04 PROCEDURE — 83735 ASSAY OF MAGNESIUM: CPT | Performed by: INTERNAL MEDICINE

## 2021-07-04 PROCEDURE — 85610 PROTHROMBIN TIME: CPT | Performed by: INTERNAL MEDICINE

## 2021-07-04 PROCEDURE — 99217 PR OBSERVATION CARE DISCHARGE MANAGEMENT: CPT | Performed by: INTERNAL MEDICINE

## 2021-07-04 RX ORDER — ATORVASTATIN CALCIUM 80 MG/1
80 TABLET, FILM COATED ORAL NIGHTLY
Qty: 30 TABLET | Refills: 0 | Status: SHIPPED | OUTPATIENT
Start: 2021-07-04 | End: 2021-07-20 | Stop reason: SDUPTHER

## 2021-07-04 RX ORDER — AMLODIPINE BESYLATE 5 MG/1
5 TABLET ORAL
Qty: 30 TABLET | Refills: 0 | Status: SHIPPED | OUTPATIENT
Start: 2021-07-04 | End: 2021-07-20 | Stop reason: SDUPTHER

## 2021-07-04 RX ORDER — METFORMIN HYDROCHLORIDE 500 MG/1
2000 TABLET, EXTENDED RELEASE ORAL
Start: 2021-07-06 | End: 2022-03-07

## 2021-07-04 RX ORDER — ISOSORBIDE MONONITRATE 30 MG/1
30 TABLET, EXTENDED RELEASE ORAL
Qty: 30 TABLET | Refills: 0 | Status: SHIPPED | OUTPATIENT
Start: 2021-07-04 | End: 2021-07-20 | Stop reason: SDUPTHER

## 2021-07-04 RX ORDER — LOSARTAN POTASSIUM 50 MG/1
100 TABLET ORAL
Status: DISCONTINUED | OUTPATIENT
Start: 2021-07-04 | End: 2021-07-04 | Stop reason: HOSPADM

## 2021-07-04 RX ADMIN — HYDRALAZINE HYDROCHLORIDE 10 MG: 10 TABLET, FILM COATED ORAL at 05:27

## 2021-07-04 RX ADMIN — LEVOTHYROXINE SODIUM 175 MCG: 0.03 TABLET ORAL at 05:27

## 2021-07-04 RX ADMIN — INSULIN LISPRO 12 UNITS: 100 INJECTION, SOLUTION INTRAVENOUS; SUBCUTANEOUS at 08:37

## 2021-07-04 RX ADMIN — ATENOLOL 50 MG: 50 TABLET ORAL at 08:36

## 2021-07-04 RX ADMIN — CLOPIDOGREL BISULFATE 75 MG: 75 TABLET ORAL at 08:36

## 2021-07-04 RX ADMIN — GABAPENTIN 100 MG: 100 CAPSULE ORAL at 08:36

## 2021-07-04 RX ADMIN — EMPAGLIFLOZIN 25 MG: 25 TABLET, FILM COATED ORAL at 08:36

## 2021-07-04 RX ADMIN — FERROUS SULFATE TAB EC 324 MG (65 MG FE EQUIVALENT) 324 MG: 324 (65 FE) TABLET DELAYED RESPONSE at 08:36

## 2021-07-04 RX ADMIN — CYANOCOBALAMIN TAB 250 MCG 125 MCG: 250 TAB at 08:36

## 2021-07-04 RX ADMIN — ISOSORBIDE MONONITRATE 30 MG: 30 TABLET, EXTENDED RELEASE ORAL at 08:36

## 2021-07-04 RX ADMIN — AMLODIPINE BESYLATE 5 MG: 5 TABLET ORAL at 08:36

## 2021-07-04 NOTE — NURSING NOTE
"Spoke with   regarding \"Evauation LVS Complete\" on the DC paperwork. He stated,\" Patients EF is fine no other test are needed. He's okay to send home.\"  "

## 2021-07-04 NOTE — PLAN OF CARE
Goal Outcome Evaluation:  Plan of Care Reviewed With: patient, daughter        Goals met, patient has DC orders.

## 2021-07-04 NOTE — PROGRESS NOTES
[facility] Medicine Services Daily Progress Note      Hospitalist Team  LOS 1 days      Patient Care Team:  Gabino Sibley Jr., MD as PCP - General (Family Medicine)    Patient Location: 2301/1      Subjective   Subjective     Chief Complaint / Subjective  Chief Complaint   Patient presents with   • Chest Pain     CP since 2045 while sitting at dinner table and not letting up. [facility]         Brief Synopsis of Hospital Course/HPI    80-year-old  male with history of CAD-s/p 2 stents on Plavix, DVT/PE on warfarin, HTN, HLP, IDDM with neuropathy, hypothyroidism, and morbid obesity, presented to the ED complaining of chest pain for the past several hours.  He describes it as a heaviness without radiation, substernal, nonradiating, moderate and constant.  Denies having shortness of breath, palpitation, sweating, nausea, or any other complaint.     Emergency department course:  Afebrile, hemodynamically stable, no acute distress.  Physical examination.  ED physician was unremarkable.  First troponin level was normal.  Labs were significant for glucose 194, creatinine 1.29, GFR 54.  INR was therapeutic at 2.6.  EKG showed sinus rhythm with no ischemic changes.  Chest x-ray was suspicious for mild pulmonary vascular congestion/pulmonary edema.  Patient was given 2 mg IV morphine sulfate and his chest pain was relieved after starting nitroglycerin drip.         Date::      7/3  Patient denies any chest pain or shortness of breath.  Nitro drip has been discontinued and patient going for heart cath today since his INR has improved    7/4  Patient had heart cath.  Report reviewed.  No intervention was done at that time.  Imdur was added    Review of Systems   All other systems reviewed and are negative.        Objective   Objective      Vital Signs  Temp:  [97.5 °F (36.4 °C)-98 °F (36.7 °C)] 98 °F (36.7 °C)  Heart Rate:  [54-74] 57  BP: (112-175)/(40-77) 129/41  Oxygen  "Therapy  SpO2: 96 %  Pulse Oximetry Type: Continuous  Device (Oxygen Therapy): nasal cannula  Flow (L/min): 2  Flowsheet Rows      First Filed Value   Admission Height  177.8 cm (70\") Documented at 07/01/2021 2151   Admission Weight  (!) 139 kg (306 lb 10.6 oz) Documented at 07/01/2021 2151        Intake & Output (last 3 days)       07/01 0701 - 07/02 0700 07/02 0701 - 07/03 0700 07/03 0701 - 07/04 0700 07/04 0701 - 07/05 0700    P.O.  240 240     Total Intake(mL/kg)  240 (1.7) 240 (1.7)     Net  +240 +240             Urine Unmeasured Occurrence  5 x          Lines, Drains & Airways    Active LDAs     Name:   Placement date:   Placement time:   Site:   Days:    Peripheral IV 07/01/21 2210 Posterior;Right Forearm   07/01/21 2210    Forearm   1                  Physical Exam:    Physical Exam  Vitals and nursing note reviewed.   Constitutional:       General: He is not in acute distress.     Appearance: Normal appearance. He is well-developed. He is not ill-appearing, toxic-appearing or diaphoretic.   HENT:      Head: Normocephalic and atraumatic.      Right Ear: Ear canal and external ear normal.      Left Ear: Ear canal and external ear normal.      Nose: Nose normal. No congestion or rhinorrhea.      Mouth/Throat:      Mouth: Mucous membranes are moist.      Pharynx: No oropharyngeal exudate.   Eyes:      General: No scleral icterus.        Right eye: No discharge.         Left eye: No discharge.      Extraocular Movements: Extraocular movements intact.      Conjunctiva/sclera: Conjunctivae normal.      Pupils: Pupils are equal, round, and reactive to light.   Neck:      Thyroid: No thyromegaly.      Vascular: No carotid bruit or JVD.      Trachea: No tracheal deviation.   Cardiovascular:      Rate and Rhythm: Normal rate and regular rhythm.      Pulses: Normal pulses.      Heart sounds: Normal heart sounds. No murmur heard.   No friction rub. No gallop.    Pulmonary:      Effort: Pulmonary effort is normal. No " respiratory distress.      Breath sounds: Normal breath sounds. No stridor. No wheezing, rhonchi or rales.   Chest:      Chest wall: No tenderness.   Abdominal:      General: Bowel sounds are normal. There is no distension.      Palpations: Abdomen is soft. There is no mass.      Tenderness: There is no abdominal tenderness. There is no guarding or rebound.      Hernia: No hernia is present.   Musculoskeletal:         General: No swelling, tenderness, deformity or signs of injury. Normal range of motion.      Cervical back: Normal range of motion and neck supple. No rigidity. No muscular tenderness.      Right lower leg: No edema.      Left lower leg: No edema.   Lymphadenopathy:      Cervical: No cervical adenopathy.   Skin:     General: Skin is warm and dry.      Coloration: Skin is not jaundiced or pale.      Findings: No bruising, erythema or rash.   Neurological:      General: No focal deficit present.      Mental Status: He is alert and oriented to person, place, and time. Mental status is at baseline.      Cranial Nerves: No cranial nerve deficit.      Sensory: No sensory deficit.      Motor: No weakness or abnormal muscle tone.      Coordination: Coordination normal.   Psychiatric:         Mood and Affect: Mood normal.         Behavior: Behavior normal.         Thought Content: Thought content normal.         Judgment: Judgment normal.              Wounds (last 24 hours)      LDA Wound     Row Name 07/04/21 0430 07/04/21 0030 07/03/21 2045       Wound 07/03/21 1500 Right anterior greater trochanter    Wound - Properties Group Placement Date: 07/03/21  -AL Placement Time: 1500  -AL Present on Hospital Admission: N  -AL Side: Right  -AL Orientation: anterior  -AL Location: greater trochanter  -AL Stage, Pressure Injury : other (see comments)  -AL, Heart cath puncture site  Additional Comments: Mynx used to close  -AL    Closure  -- Mynx  -KG  -- Mynx  -KG  -- Mynx  -KG    Periwound  dry;intact  -KG  dry;intact   -KG  dry;intact  -KG    Periwound Temperature  warm  -KG  warm  -KG  warm  -KG    Periwound Skin Turgor  soft  -KG  soft  -KG  soft  -KG    Retired Wound - Properties Group Date first assessed: 07/03/21  -AL Time first assessed: 1500  -AL Present on Hospital Admission: N  -AL Side: Right  -AL Location: greater trochanter  -AL Additional Comments: Mynx used to close  -AL    Row Name 07/03/21 1945 07/03/21 1745 07/03/21 1715       Wound 07/03/21 1500 Right anterior greater trochanter    Wound - Properties Group Placement Date: 07/03/21  -AL Placement Time: 1500  -AL Present on Hospital Admission: N  -AL Side: Right  -AL Orientation: anterior  -AL Location: greater trochanter  -AL Stage, Pressure Injury : other (see comments)  -AL, Heart cath puncture site  Additional Comments: Mynx used to close  -AL    Dressing Appearance  dry;intact;no drainage  -KG  --  --    Closure  -- Mynx  -KG  --  --    Periwound  dry;intact  -KG  dry;intact;warm  -AL  dry;intact;warm  -AL    Periwound Temperature  warm  -KG  warm  -AL  warm  -AL    Periwound Skin Turgor  soft  -KG  soft  -AL  soft  -AL    Retired Wound - Properties Group Date first assessed: 07/03/21  -AL Time first assessed: 1500  -AL Present on Hospital Admission: N  -AL Side: Right  -AL Location: greater trochanter  -AL Additional Comments: Mynx used to close  -AL    Row Name 07/03/21 1645 07/03/21 1635 07/03/21 1615       Wound 07/03/21 1500 Right anterior greater trochanter    Wound - Properties Group Placement Date: 07/03/21  -AL Placement Time: 1500  -AL Present on Hospital Admission: N  -AL Side: Right  -AL Orientation: anterior  -AL Location: greater trochanter  -AL Stage, Pressure Injury : other (see comments)  -AL, Heart cath puncture site  Additional Comments: Mynx used to close  -AL    Dressing Appearance  --  dry;intact;no drainage  -AL  --    Closure  --  -- Mynx  -AL  --    Periwound  dry;intact;warm  -AL  dry;intact  -AL  dry;intact;warm  -AL    Periwound  Temperature  warm  -AL  warm  -AL  warm  -AL    Periwound Skin Turgor  soft  -AL  soft  -AL  soft  -AL    Retired Wound - Properties Group Date first assessed: 07/03/21  -AL Time first assessed: 1500  -AL Present on Hospital Admission: N  -AL Side: Right  -AL Location: greater trochanter  -AL Additional Comments: Mynx used to close  -AL    Row Name 07/03/21 1545 07/03/21 1530 07/03/21 1515       Wound 07/03/21 1500 Right anterior greater trochanter    Wound - Properties Group Placement Date: 07/03/21  -AL Placement Time: 1500  -AL Present on Hospital Admission: N  -AL Side: Right  -AL Orientation: anterior  -AL Location: greater trochanter  -AL Stage, Pressure Injury : other (see comments)  -AL, Heart cath puncture site  Additional Comments: Mynx used to close  -AL    Periwound  dry;intact;warm  -AL  dry;intact;warm  -AL  dry;intact;warm  -AL    Periwound Temperature  warm  -AL  warm  -AL  warm  -AL    Periwound Skin Turgor  soft  -AL  soft  -AL  soft  -AL    Retired Wound - Properties Group Date first assessed: 07/03/21  -AL Time first assessed: 1500  -AL Present on Hospital Admission: N  -AL Side: Right  -AL Location: greater trochanter  -AL Additional Comments: Mynx used to close  -AL    Row Name 07/03/21 1500             Wound 07/03/21 1500 Right anterior greater trochanter    Wound - Properties Group Placement Date: 07/03/21  -AL Placement Time: 1500  -AL Present on Hospital Admission: N  -AL Side: Right  -AL Orientation: anterior  -AL Location: greater trochanter  -AL Stage, Pressure Injury : other (see comments)  -AL, Heart cath puncture site  Additional Comments: Mynx used to close  -AL    Dressing Appearance  dry;intact  -AL      Periwound  dry;intact;warm  -AL      Periwound Temperature  warm  -AL      Periwound Skin Turgor  soft  -AL      Retired Wound - Properties Group Date first assessed: 07/03/21  -AL Time first assessed: 1500  -AL Present on Hospital Admission: N  -AL Side: Right  -AL Location:  greater trochanter  -AL Additional Comments: Mynx used to close  -AL      User Key  (r) = Recorded By, (t) = Taken By, (c) = Cosigned By    Initials Name Provider Type    Chaya Glover, RN Registered Nurse    Julio Cesar Fishman RN Registered Nurse          Procedures:    Procedure(s):  Cardiac Catheterization/Vascular Study          Results Review:     I reviewed the patient's new clinical results.      Lab Results (last 24 hours)     Procedure Component Value Units Date/Time    Basic Metabolic Panel [[id number]]  (Abnormal) Collected: 07/04/21 0458    Specimen: Blood Updated: 07/04/21 0609     Glucose 198 mg/dL      BUN 22 mg/dL      Creatinine 1.34 mg/dL      Sodium 138 mmol/L      Potassium 4.6 mmol/L      Comment: Slight hemolysis detected by analyzer. Results may be affected.        Chloride 99 mmol/L      CO2 28.0 mmol/L      Calcium 8.9 mg/dL      eGFR Non African Amer 52 mL/min/1.73      BUN/Creatinine Ratio 16.4     Anion Gap 11.0 mmol/L     Narrative:      GFR Normal >60  Chronic Kidney Disease <60  Kidney Failure <15      Lipid Panel [[id number]]  (Abnormal) Collected: 07/04/21 0458    Specimen: Blood Updated: 07/04/21 0609     Total Cholesterol 145 mg/dL      Triglycerides 270 mg/dL      HDL Cholesterol 29 mg/dL      LDL Cholesterol  72 mg/dL      VLDL Cholesterol 44 mg/dL      LDL/HDL Ratio 2.14    Narrative:      Cholesterol Reference Ranges  (U.S. Department of Health and Human Services ATP III Classifications)    Desirable          <200 mg/dL  Borderline High    200-239 mg/dL  High Risk          >240 mg/dL      Triglyceride Reference Ranges  (U.S. Department of Health and Human Services ATP III Classifications)    Normal           <150 mg/dL  Borderline High  150-199 mg/dL  High             200-499 mg/dL  Very High        >500 mg/dL    HDL Reference Ranges  (U.S. Department of Health and Human Services ATP III Classifcations)    Low     <40 mg/dl (major risk factor for CHD)  High    >60 mg/dl  ('negative' risk factor for CHD)        LDL Reference Ranges  (U.S. Department of Health and Human Services ATP III Classifcations)    Optimal          <100 mg/dL  Near Optimal     100-129 mg/dL  Borderline High  130-159 mg/dL  High             160-189 mg/dL  Very High        >189 mg/dL    Magnesium [[id number]]  (Normal) Collected: 07/04/21 0458    Specimen: Blood Updated: 07/04/21 0609     Magnesium 1.9 mg/dL     Protime-INR [[id number]]  (Abnormal) Collected: 07/04/21 0458    Specimen: Blood Updated: 07/04/21 0556     Protime 13.0 Seconds      INR 1.19    CBC (No Diff) [[id number]]  (Normal) Collected: 07/04/21 0458    Specimen: Blood Updated: 07/04/21 0547     WBC 5.50 10*3/mm3      RBC 4.34 10*6/mm3      Hemoglobin 13.8 g/dL      Hematocrit 40.1 %      MCV 92.3 fL      MCH 31.7 pg      MCHC 34.4 g/dL      RDW 13.8 %      RDW-SD 44.6 fl      MPV 9.0 fL      Platelets 185 10*3/mm3     POC Glucose Once [[id number]]  (Abnormal) Collected: 07/03/21 2057    Specimen: Blood Updated: 07/03/21 2058     Glucose 262 mg/dL      Comment: Serial Number: [id number]:  [id number]       POC Glucose Once [[id number]]  (Abnormal) Collected: 07/03/21 1602    Specimen: Blood Updated: 07/03/21 1603     Glucose 137 mg/dL      Comment: Serial Number: [id number]:  [id number]           No results found for: HGBA1C  Results from last 7 days   Lab Units 07/04/21  0458 07/03/21  1052 07/03/21  0417   INR  1.19* 1.41* 1.53*           No results found for: LIPASE  Lab Results   Component Value Date    CHOL 145 07/04/2021    TRIG 270 (H) 07/04/2021    HDL 29 (L) 07/04/2021    LDL 72 07/04/2021       No results found for: INTRAOP, PREDX, FINALDX, COMDX    Microbiology Results (last 10 days)     Procedure Component Value - Date/Time    Respiratory Panel, PCR (WITHOUT COVID) - Swab, Nasopharynx [[id number]]  (Normal) Collected: 07/02/21 1146    Lab Status: Final result Specimen: Swab from Nasopharynx Updated: 07/02/21 1251     ADENOVIRUS,  PCR Not Detected     Coronavirus 229E Not Detected     Coronavirus HKU1 Not Detected     Coronavirus NL63 Not Detected     Coronavirus OC43 Not Detected     Human Metapneumovirus Not Detected     Human Rhinovirus/Enterovirus Not Detected     Influenza B PCR Not Detected     Parainfluenza Virus 1 Not Detected     Parainfluenza Virus 2 Not Detected     Parainfluenza Virus 3 Not Detected     Parainfluenza Virus 4 Not Detected     Bordetella pertussis pcr Not Detected     Chlamydophila pneumoniae PCR Not Detected     Mycoplasma pneumo by PCR Not Detected     Influenza A PCR Not Detected     RSV, PCR Not Detected     Bordetella parapertussis PCR Not Detected    Narrative:      The coronavirus on the RVP is NOT COVID-19 and is NOT indicative of infection with COVID-19.    In the setting of a positive respiratory panel with a viral infection PLUS a negative procalcitonin without other underlying concern for bacterial infection, consider observing off antibiotics or discontinuation of antibiotics and continue supportive care. If the respiratory panel is positive for atypical bacterial infection (Bordetella pertussis, Chlamydophila pneumoniae, or Mycoplasma pneumoniae), consider antibiotic de-escalation to target atypical bacterial infection.    MRSA Screen, PCR (Inpatient) - Swab, Nares [[id number]]  (Normal) Collected: 07/02/21 0315    Lab Status: Final result Specimen: Swab from Nares Updated: 07/02/21 0430     MRSA PCR No MRSA Detected    COVID PRE-OP / PRE-PROCEDURE SCREENING ORDER (NO ISOLATION) - Swab, Nasopharynx [[id number]]  (Normal) Collected: 07/01/21 2353    Lab Status: Final result Specimen: Swab from Nasopharynx Updated: 07/02/21 0033    Narrative:      The following orders were created for panel order COVID PRE-OP / PRE-PROCEDURE SCREENING ORDER (NO ISOLATION) - Swab, Nasopharynx.  Procedure                               Abnormality         Status                     ---------                                -----------         ------                     COVID-19,CEPHEID,COR/CLARY...[[id number]]  Normal              Final result                 Please view results for these tests on the individual orders.    COVID-19,CEPHEID,COR/CLARY/PAD/NAMRATA IN-HOUSE(OR EMERGENT/ADD-ON),NP SWAB IN TRANSPORT MEDIA 3-4 HR TAT, RT-PCR - Swab, Nasopharynx [[id number]]  (Normal) Collected: 07/01/21 2353    Lab Status: Final result Specimen: Swab from Nasopharynx Updated: 07/02/21 0033     COVID19 Not Detected    Narrative:      Fact sheet for providers: https://www.fda.gov/media/836172/download     Fact sheet for patients: https://www.fda.gov/media/712138/download  Fact sheet for providers: https://www.fda.gov/media/903323/download    Fact sheet for patients: https://www.fda.gov/media/837674/download    Test performed by PCR.          ECG/EMG Results (most recent)     Procedure Component Value Units Date/Time    ECG 12 Lead [[id number]] Collected: 07/01/21 2158     Updated: 07/02/21 1202     QT Interval 353 ms     Narrative:      HEART RATE= 96  bpm  RR Interval= 628  ms  AZ Interval= 231  ms  P Horizontal Axis= 41  deg  P Front Axis= 46  deg  QRSD Interval= 100  ms  QT Interval= 353  ms  QRS Axis= 25  deg  T Wave Axis= 40  deg  - ABNORMAL ECG -  Sinus rhythm  Prolonged AZ interval  Low voltage, precordial leads  When compared with ECG of 22-Nov-2019 12:25:21,  No significant change  Electronically Signed By: Orlando Clement (CLARY) 02-Jul-2021 12:02:05  Date and Time of Study: 2021-07-01 21:58:55          Results for orders placed during the hospital encounter of 09/28/20    Duplex Venous Lower Extremity - Left    Interpretation Summary  · Normal left lower extremity venous duplex scan.          XR Chest 1 View    Result Date: 7/1/2021  Indistinct pulmonary vasculature with basilar predominant groundglass opacities and interstitial thickening in both lungs, suggesting mild pulmonary vascular congestion/pulmonary edema. Atypical pneumonia or chronic  lung disease could have a similar appearance.  Electronically Signed By-Cortez Hunt MD On:7/1/2021 10:30 PM This report was finalized on [id number] by  Cortez Hunt MD.          Xrays, labs reviewed personally by physician.    Medication Review:   I have reviewed the patient's current medication list      Scheduled Meds  amLODIPine, 5 mg, Oral, Q24H  atenolol, 50 mg, Oral, Daily  atorvastatin, 80 mg, Oral, Nightly  clopidogrel, 75 mg, Oral, Daily  empagliflozin, 25 mg, Oral, Daily  fenofibrate, 48 mg, Oral, Daily  ferrous sulfate, 324 mg, Oral, BID With Meals  gabapentin, 100 mg, Oral, TID  hydrALAZINE, 10 mg, Oral, Q6H  insulin lispro, 0-24 Units, Subcutaneous, TID AC  Insulin Regular Human (Conc), 35 Units, Subcutaneous, Daily With Dinner  Insulin Regular Human (Conc), 25 Units, Subcutaneous, Daily  isosorbide mononitrate, 30 mg, Oral, Q24H  levothyroxine, 175 mcg, Oral, Q AM  sodium chloride, 10 mL, Intravenous, Q12H  vitamin B-12, 125 mcg, Oral, Daily  warfarin, 8 mg, Oral, Daily        Meds Infusions  nitroglycerin, 10-50 mcg/min, Last Rate: Stopped (07/02/21 1025)  Pharmacy to dose warfarin,         Meds PRN  •  acetaminophen  •  atropine  •  dextrose  •  dextrose  •  glucagon (human recombinant)  •  insulin lispro **AND** insulin lispro  •  labetalol  •  ondansetron  •  Pharmacy to dose warfarin  •  [COMPLETED] Insert peripheral IV **AND** sodium chloride  •  sodium chloride  •  sodium chloride        Assessment/Plan   Assessment/Plan     Active Hospital Problems    Diagnosis  POA   • Unstable angina (CMS/[facility]) [I20.0]  Yes      Resolved Hospital Problems   No resolved problems to display.       MEDICAL DECISION MAKING COMPLEXITY BY PROBLEM:       NSTEMI   On atenolol  Anticoagulation held for heart cath  On Plavix   Nitro drip discontinued  CAD with history of coronary stents x2  Plavix and beta-blocker and On Lipitor 80 mg  Heart cath 7/3:  SUMMARY:    Three-vessel coronary artery disease  Patent stent  in OM1 branch of LCx  70% stenosis of distal LCx  Preserved left ventricle function   RECOMMENDATIONS:    Most likely cause of elevated troponin is due to PLV branch of RCA which is subtotally occluded.  Patient has 70% stenosis of distal LCx which is a small caliber vessel.  I recommend medical treatment and if there is worsening of symptoms I would consider intervention Imdur would be started  LDL 72 total cholesterol 145 triglycerides 270    History of DVT/PE on warfarin  -Coumadin on hold for heart cath  Resume when okay with cardiology  Therapeutic INR reversed for heart cath    Chronic kidney disease stage II  Monitor renal function closely    Hypertension on atenolol  On irbesartan and HCTZ at home  On atenolol  On Caduet 5/20    Dyslipidemia on statin and fenofibrate    DM2  Insulin U5 100  25 with breakfast and 35 with dinner  On pramlintide at home.  Not available on formulary.  Continue Jardiance  Continue correction insulin  A1c 7.4 5/3/2021  On Metformin at home may start after 2 days of cardiac cath.      On Neurontin for diabetic neuropathy    Hypothyroidism on Synthroid  TSH appropriate in May 2021      Class III obesity and BMI 43.9        VTE Prophylaxis -   Mechanical Order History:     None      Pharmalogical Order History:      Ordered     Dose Route Frequency Stop    07/02/21 0203  warfarin (COUMADIN) tablet 3 mg  Status:  Discontinued     Question:  Target INR  Answer:  2 - 3    3 mg PO Daily 07/02/21 0905 07/02/21 0203  warfarin (COUMADIN) tablet 5 mg  Status:  Discontinued     Question:  Target INR  Answer:  2 - 3    5 mg PO Daily 07/02/21 0905                  Code Status -   Code Status and Medical Interventions:   Ordered at: 07/02/21 0204     Code Status:    CPR     Medical Interventions (Level of Support Prior to Arrest):    Full       This patient has been examined wearing appropriate Personal Protective Equipment and discussed with hospital infection control department.  07/04/21        Discharge Planning  Home        Electronically signed by Royce Herman MD, 07/04/21, 07:50 EDT.  [facility] Hospitalist Team

## 2021-07-04 NOTE — PROGRESS NOTES
"Pharmacy dosing service  Anticoagulant  Warfarin     Subjective:    Dalton Dallas Sr. is a 80 y.o.male being continued on warfarin for history of PE and DVT.    INR Goal: 2 - 3  Home medication?: Yes, warfarin 8 mg PO every day at 1800  Bridge Therapy Present?:  No  Interacting Medications Evaluation (New/Present/Discontinued): n/a  Additional Contributing Factors: s/p cath lab 7/3      Assessment/Plan:    Ok to resume warfarin 7/3 per cardiology. INR was therapeutic on admission, so resumed at 8 mg daily. Will continue home dose today. May take a little longer than normal to get back to therapeutic range as pt received 5 mg vitamin K PO on 7/2 and 2.5 mg PO today so he could go to cath lab.     Continue to monitor and adjust based on INR.         Date 7/2 7/3 7/4         INR 2.24 1.41 1.19         Dose Vit K 5mg 8 mg 8 mg             Objective:  [Ht: 177.8 cm (70\"); Wt: (!) 139 kg (306 lb); BMI: Body mass index is 43.91 kg/m².]    Lab Results   Component Value Date    ALBUMIN 4.00 07/03/2021     Lab Results   Component Value Date    INR 1.19 (L) 07/04/2021    INR 1.41 (L) 07/03/2021    INR 1.53 (L) 07/03/2021    PROTIME 13.0 (L) 07/04/2021    PROTIME 15.3 (L) 07/03/2021    PROTIME 16.5 (L) 07/03/2021     Lab Results   Component Value Date    HGB 13.8 07/04/2021    HGB 13.8 07/03/2021    HGB 14.8 07/01/2021     Lab Results   Component Value Date    HCT 40.1 07/04/2021    HCT 40.0 07/03/2021    HCT 43.8 07/01/2021       Karen Blanchard PharmD  07/04/21 08:26 EDT       "

## 2021-07-04 NOTE — OUTREACH NOTE
Prep Survey      Responses   [facility] patient discharged from?  [facility]   Is LACE score < 7 ?  No   Emergency Room discharge w/ pulse ox?  No   Eligibility  TCM   Hospital  [facility]   Date of Admission  07/01/21   Date of Discharge  07/04/21   Discharge Disposition  Home or Self Care   Discharge diagnosis  NSTEMI, heart cath, CAD - medical treatment recommended, Hx stents, CKD   Does the patient have one of the following disease processes/diagnoses(primary or secondary)?  Acute MI (STEMI,NSTEMI)   Does the patient have Home health ordered?  No   Is there a DME ordered?  No   Prep survey completed?  Yes          Yuko Benavides RN

## 2021-07-04 NOTE — PROGRESS NOTES
CARDIOLOGY FOLLOW-UP PROGRESS NOTE      Reason for follow-up:    Chest Pain  CAD     Attending: Royce Herman *      Subjective .     Denies recurrent CP overnight.   NTG has been discontinued     Review of Systems   Constitutional: Positive for malaise/fatigue. Negative for decreased appetite and diaphoresis.   HENT: Negative for congestion, hearing loss and nosebleeds.    Cardiovascular: Positive for chest pain and dyspnea on exertion. Negative for claudication, irregular heartbeat, leg swelling, near-syncope, orthopnea, palpitations, paroxysmal nocturnal dyspnea and syncope.   Respiratory: Negative for cough, shortness of breath and sleep disturbances due to breathing.    Endocrine: Negative for polyuria.   Hematologic/Lymphatic: Does not bruise/bleed easily.   Skin: Negative for itching and rash.   Musculoskeletal: Positive for arthritis and back pain. Negative for muscle weakness and myalgias.   Gastrointestinal: Negative for abdominal pain, change in bowel habit and nausea.   Genitourinary: Negative for dysuria, flank pain, frequency and hesitancy.   Neurological: Negative for dizziness, tremors and weakness.   Psychiatric/Behavioral: Negative for altered mental status. The patient does not have insomnia.        Allergies: Patient has no known allergies.    Scheduled Meds:amLODIPine, 5 mg, Oral, Q24H  atenolol, 50 mg, Oral, Daily  atorvastatin, 80 mg, Oral, Nightly  clopidogrel, 75 mg, Oral, Daily  empagliflozin, 25 mg, Oral, Daily  fenofibrate, 48 mg, Oral, Daily  ferrous sulfate, 324 mg, Oral, BID With Meals  gabapentin, 100 mg, Oral, TID  insulin lispro, 0-24 Units, Subcutaneous, TID AC  Insulin Regular Human (Conc), 35 Units, Subcutaneous, Daily With Dinner  Insulin Regular Human (Conc), 25 Units, Subcutaneous, Daily  isosorbide mononitrate, 30 mg, Oral, Q24H  levothyroxine, 175 mcg, Oral, Q AM  losartan, 100 mg, Oral, Q24H  sodium chloride, 10 mL, Intravenous, Q12H  vitamin B-12, 125 mcg, Oral,  Daily  warfarin, 8 mg, Oral, Daily        Continuous Infusions:nitroglycerin, 10-50 mcg/min, Last Rate: Stopped (07/02/21 1025)  Pharmacy to dose warfarin,         PRN Meds:.•  acetaminophen  •  atropine  •  dextrose  •  dextrose  •  glucagon (human recombinant)  •  insulin lispro **AND** insulin lispro  •  labetalol  •  ondansetron  •  Pharmacy to dose warfarin  •  [COMPLETED] Insert peripheral IV **AND** sodium chloride  •  sodium chloride  •  sodium chloride    Objective     VITAL SIGNS  Patient Vitals for the past 24 hrs:   BP Temp Temp src Pulse SpO2   07/04/21 0800 -- 97.8 °F (36.6 °C) -- -- --   07/04/21 0515 -- -- -- 57 96 %   07/04/21 0510 -- -- -- 59 97 %   07/04/21 0505 -- -- -- 55 96 %   07/04/21 0500 -- -- -- 57 94 %   07/04/21 0455 -- -- -- 58 94 %   07/04/21 0450 -- -- -- 64 94 %   07/04/21 0445 -- -- -- 61 --   07/04/21 0440 -- -- -- 59 95 %   07/04/21 0435 -- -- -- 59 92 %   07/04/21 0430 -- -- -- 61 94 %   07/04/21 0425 -- -- -- 58 91 %   07/04/21 0420 -- -- -- 59 92 %   07/04/21 0415 -- -- -- 58 91 %   07/04/21 0410 -- -- -- 59 --   07/04/21 0405 129/41 -- -- 56 91 %   07/04/21 0400 -- 98 °F (36.7 °C) Oral 59 91 %   07/04/21 0355 -- -- -- 57 92 %   07/04/21 0350 -- -- -- 58 92 %   07/04/21 0345 -- -- -- 58 93 %   07/04/21 0340 -- -- -- 58 94 %   07/04/21 0335 -- -- -- 60 --   07/04/21 0330 -- -- -- 57 97 %   07/04/21 0325 -- -- -- 56 94 %   07/04/21 0320 -- -- -- 59 96 %   07/04/21 0315 -- -- -- 57 94 %   07/04/21 0310 -- -- -- 57 95 %   07/04/21 0305 -- -- -- 59 95 %   07/04/21 0300 -- -- -- 57 96 %   07/04/21 0055 -- -- -- 57 95 %   07/04/21 0050 -- -- -- 54 95 %   07/04/21 0045 -- -- -- 56 96 %   07/04/21 0040 -- -- -- 55 96 %   07/04/21 0035 -- -- -- 58 95 %   07/04/21 0030 -- -- -- 60 93 %   07/04/21 0025 -- -- -- 59 94 %   07/04/21 0020 -- -- -- 60 96 %   07/04/21 0015 -- -- -- 61 95 %   07/04/21 0010 -- -- -- 60 94 %   07/04/21 0005 135/51 -- -- 60 94 %   07/04/21 0000 -- -- -- 60 93 %    07/03/21 2355 -- -- -- 61 95 %   07/03/21 2352 -- 97.7 °F (36.5 °C) Oral -- --   07/03/21 2350 -- -- -- 61 94 %   07/03/21 2345 -- -- -- 62 94 %   07/03/21 2340 -- -- -- 60 93 %   07/03/21 2335 -- -- -- 60 93 %   07/03/21 2330 -- -- -- 60 95 %   07/03/21 2325 -- -- -- 69 94 %   07/03/21 2320 -- -- -- 72 --   07/03/21 2315 -- -- -- 60 93 %   07/03/21 2310 -- -- -- 61 93 %   07/03/21 2305 -- -- -- 58 95 %   07/03/21 2300 -- -- -- 59 93 %   07/03/21 2255 -- -- -- 62 97 %   07/03/21 2250 -- -- -- 62 93 %   07/03/21 2245 -- -- -- 63 94 %   07/03/21 2240 -- -- -- 61 94 %   07/03/21 2235 -- -- -- 62 93 %   07/03/21 2230 -- -- -- 63 94 %   07/03/21 2225 -- -- -- 63 93 %   07/03/21 2220 -- -- -- 65 95 %   07/03/21 2215 -- -- -- 70 93 %   07/03/21 2210 -- -- -- 71 (!) 0 %   07/03/21 2205 150/62 -- -- 67 --   07/03/21 2200 139/64 -- -- 66 --   07/03/21 2155 -- -- -- 68 --   07/03/21 2150 -- -- -- 65 --   07/03/21 2145 -- -- -- 63 --   07/03/21 2140 -- -- -- 64 --   07/03/21 2135 -- -- -- 67 --   07/03/21 2130 -- -- -- 74 --   07/03/21 2125 -- -- -- 63 93 %   07/03/21 2120 -- -- -- 60 93 %   07/03/21 2115 -- -- -- 61 93 %   07/03/21 2110 -- -- -- 62 94 %   07/03/21 2105 -- -- -- 64 95 %   07/03/21 2100 -- -- -- 63 94 %   07/03/21 2055 -- -- -- 61 94 %   07/03/21 2050 -- -- -- 61 94 %   07/03/21 2045 -- -- -- 60 95 %   07/03/21 2040 -- -- -- 61 97 %   07/03/21 2035 -- -- -- 64 96 %   07/03/21 2030 -- -- -- 64 95 %   07/03/21 2025 -- -- -- 67 93 %   07/03/21 2020 -- -- -- 62 95 %   07/03/21 2015 -- -- -- 66 94 %   07/03/21 2010 -- -- -- 62 92 %   07/03/21 2005 112/40 97.9 °F (36.6 °C) Oral 60 93 %   07/03/21 2000 -- -- -- 61 92 %   07/03/21 1955 -- -- -- 62 94 %   07/03/21 1950 -- -- -- 66 93 %   07/03/21 1945 -- -- -- 68 94 %   07/03/21 1940 -- -- -- 68 95 %   07/03/21 1935 -- -- -- 58 93 %   07/03/21 1930 -- -- -- 64 95 %   07/03/21 1925 -- -- -- 58 94 %   07/03/21 1920 -- -- -- 60 --   07/03/21 1915 -- -- -- 61 --   07/03/21  "1910 -- -- -- 60 91 %   07/03/21 1905 -- -- -- 58 91 %   07/03/21 1900 -- -- -- 60 92 %   07/03/21 1805 150/62 -- -- 59 95 %   07/03/21 1605 146/69 -- -- 55 (!) 87 %   07/03/21 1600 -- 97.5 °F (36.4 °C) Oral -- --   07/03/21 1426 -- -- -- 56 94 %   07/03/21 1409 175/77 -- -- -- --   07/03/21 1205 148/63 -- -- 56 --   07/03/21 1200 -- 97.8 °F (36.6 °C) Oral -- --   07/03/21 1130 146/60 -- -- 58 95 %   07/03/21 1005 130/53 -- -- 62 93 %        Flowsheet Rows      First Filed Value   Admission Height  177.8 cm (70\") Documented at 07/01/2021 2151   Admission Weight  (!) 139 kg (306 lb 10.6 oz) Documented at 07/01/2021 2151          Body mass index is 43.91 kg/m².      Intake/Output Summary (Last 24 hours) at 7/4/2021 0944  Last data filed at 7/3/2021 1635  Gross per 24 hour   Intake 240 ml   Output --   Net 240 ml        TELEMETRY:     SR    Physical Exam:  Vitals reviewed.   Constitutional:       General: Not in acute distress.     Appearance: Normal and healthy appearance. Well-developed and not in distress.   Eyes:      Pupils: Pupils are equal, round, and reactive to light.   HENT:      Head: Normocephalic and atraumatic.   Neck:      Vascular: No JVD.   Pulmonary:      Effort: Pulmonary effort is normal.      Breath sounds: Normal breath sounds.   Cardiovascular:      Normal rate. Regular rhythm.      Comments: Right groin soft with no hematoma  Pulses:     Intact distal pulses.   Edema:     Peripheral edema absent.   Abdominal:      General: There is no distension.      Palpations: Abdomen is soft.      Tenderness: There is no abdominal tenderness.   Musculoskeletal: Normal range of motion.      Cervical back: Normal range of motion and neck supple. Skin:     General: Skin is warm and dry.   Neurological:      Mental Status: Alert, oriented to person, place, and time and oriented to person, place and time.            Results Review:   I reviewed the patient's new clinical results.    CBC    Results from last 7 " days   Lab Units 07/04/21  0458 07/03/21  0417 07/01/21  2218   WBC 10*3/mm3 5.50 5.70 6.40   HEMOGLOBIN g/dL 13.8 13.8 14.8   PLATELETS 10*3/mm3 185 172 201     BMP   Results from last 7 days   Lab Units 07/04/21  0458 07/03/21  0417 07/02/21  0928 07/01/21  2218   SODIUM mmol/L 138 139  --  140   POTASSIUM mmol/L 4.6 4.5  --  4.4   CHLORIDE mmol/L 99 100  --  102   CO2 mmol/L 28.0 25.0  --  25.0   BUN mg/dL 22 19  --  20   CREATININE mg/dL 1.34* 1.20  --  1.29*   GLUCOSE mg/dL 198* 187*  --  194*   MAGNESIUM mg/dL 1.9 1.8 1.7  --      Cr Clearance Estimated Creatinine Clearance: 63.9 mL/min (A) (by C-G formula based on SCr of 1.34 mg/dL (H)).  Coag   Results from last 7 days   Lab Units 07/04/21  0458 07/03/21  1052 07/03/21  0417 07/02/21  0351 07/01/21  2218   INR  1.19* 1.41* 1.53* 2.24 2.18     HbA1C   Lab Results   Component Value Date    HGBA1C 7.4 (H) 05/03/2021    HGBA1C 7.0 (H) 10/28/2020    HGBA1C 8.9 (H) 05/06/2020     Blood Glucose   Glucose   Date/Time Value Ref Range Status   07/04/2021 0819 264 (H) 70 - 105 mg/dL Final     Comment:     Serial Number: [id number]:  [id number]   07/03/2021 2057 262 (H) 70 - 105 mg/dL Final     Comment:     Serial Number: [id number]:  [id number]   07/03/2021 1602 137 (H) 70 - 105 mg/dL Final     Comment:     Serial Number: [id number]:  [id number]   07/03/2021 1105 189 (H) 70 - 105 mg/dL Final     Comment:     Serial Number: [id number]:  [id number]   07/03/2021 0758 192 (H) 70 - 105 mg/dL Final     Comment:     Serial Number: [id number]:  [id number]   07/02/2021 1926 256 (H) 70 - 105 mg/dL Final     Comment:     Serial Number: [id number]:  [id number]   07/02/2021 1623 202 (H) 70 - 105 mg/dL Final     Comment:     Serial Number: [id number]:  [id number]   07/02/2021 1136 229 (H) 70 - 105 mg/dL Final     Comment:     Serial Number: [id number]:  [id number]     Infection     CMP   Results from last 7 days   Lab Units  07/04/21  0458 07/03/21  0417 07/01/21  2218   SODIUM mmol/L 138 139 140   POTASSIUM mmol/L 4.6 4.5 4.4   CHLORIDE mmol/L 99 100 102   CO2 mmol/L 28.0 25.0 25.0   BUN mg/dL 22 19 20   CREATININE mg/dL 1.34* 1.20 1.29*   GLUCOSE mg/dL 198* 187* 194*   ALBUMIN g/dL  --  4.00  --    BILIRUBIN mg/dL  --  0.4  --    ALK PHOS U/L  --  40  --    AST (SGOT) U/L  --  30  --    ALT (SGPT) U/L  --  25  --      ABG      UA      DIANA  No results found for: POCMETH, POCAMPHET, POCBARBITUR, POCBENZO, POCCOCAINE, POCOPIATES, POCOXYCODO, POCPHENCYC, POCPROPOXY, POCTHC, POCTRICYC  Lysis Labs   Results from last 7 days   Lab Units 07/04/21  0458 07/03/21  1052 07/03/21  0417 07/02/21  0351 07/01/21  2218   INR  1.19* 1.41* 1.53* 2.24 2.18   HEMOGLOBIN g/dL 13.8  --  13.8  --  14.8   PLATELETS 10*3/mm3 185  --  172  --  201   CREATININE mg/dL 1.34*  --  1.20  --  1.29*     Radiology(recent) No radiology results for the last day    Imaging Results (Last 24 Hours)     ** No results found for the last 24 hours. **          Results from last 7 days   Lab Units 07/02/21  0928   CK TOTAL U/L 173   TROPONIN T ng/mL 0.188*       EKG              I personally viewed and interpreted the patient's EKG/Telemetry data:        ECHOCARDIOGRAM:           STRESS MYOVIEW:      CARDIAC CATHETERIZATION:    FINDINGS:     1. HEMODYNAMICS:       Aortic pressure: 128/52 mmHg     LVEDP: 10 mmHg     Gradient across aortic valve on pullback: No gradient across aortic valve        2. LEFT VENTRICULOGRAPHY: 55 to 60%        3. CORONARY ANGIOGRAPHY:            A: Left main coronary artery: Left main is normal but calcified no high-grade stenosis.            B: Left anterior descending artery: Diffusely disease in proximal and mid segment up to 25%.  In the distal segment it has 35% plaque.            C: Left circumflex coronary artery: OM1 branch of LCx has stent which is patent and distal LCx has 70% stenosis.  Mid segment has 30 to 40% stenosis.  Distal segment is  appears to be less than 2 mm caliber vessel.            D: Right coronary artery: RCA is dominant vessel.  It has 40% plaque in the proximal segment and PDA is free of's disease.  PLV branch is small caliber vessel less than 1 mm and it is diffusely diseased     SUMMARY:      Three-vessel coronary artery disease  Patent stent in OM1 branch of LCx  70% stenosis of distal LCx  Preserved left ventricle function     RECOMMENDATIONS:      Most likely cause of elevated troponin is due to PLV branch of RCA which is subtotally occluded.  Patient has 70% stenosis of distal LCx which is a small caliber vessel.  I recommend medical treatment and if there is worsening of symptoms I would consider intervention Imdur would be started    OTHER:         Assessment/Plan            Unstable angina (CMS/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  INR 2.4    PLAN:    INR 1.19 this am  Will resume warfarin  No recurrent chest pain  See cath report  Medical Rx recommended  OK for d/c home from cards standpoint.                  I discussed the patients findings and my recommendations with patient and RN      Corine Mitchell, LUCAS  07/04/21  09:44 EDT

## 2021-07-04 NOTE — DISCHARGE SUMMARY
[facility] Medicine Services  DISCHARGE SUMMARY        Prepared For PCP:  Gabino Sibley Jr., MD    Patient Name: Dalton Dallas Sr.  : 1940  MRN: [id number]      Date of Admission:   2021    Date of Discharge:  2021    Length of stay:  LOS: 1 day     Hospital Course     Presenting Problem:   Unstable angina (CMS/HCC) [I20.0]      Active Hospital Problems    Diagnosis  POA   • Unstable angina (CMS/HCC) [I20.0]  Yes      Resolved Hospital Problems   No resolved problems to display.       NSTEMI   On atenolol  Anticoagulation held for heart cath  On Plavix   Nitro drip discontinued  CAD with history of coronary stents x2  Plavix and beta-blocker and On Lipitor 80 mg  Heart cath 7/3:  SUMMARY:    Three-vessel coronary artery disease  Patent stent in OM1 branch of LCx  70% stenosis of distal LCx  Preserved left ventricle function   RECOMMENDATIONS:    Most likely cause of elevated troponin is due to PLV branch of RCA which is subtotally occluded.  Patient has 70% stenosis of distal LCx which is a small caliber vessel.  I recommend medical treatment and if there is worsening of symptoms I would consider intervention Imdur would be started  LDL 72 total cholesterol 145 triglycerides 270     History of DVT/PE on warfarin  -Coumadin on hold for heart cath  Resume when okay with cardiology  Therapeutic INR reversed for heart cath     Chronic kidney disease stage II  Monitor renal function closely     Hypertension on atenolol  On irbesartan and HCTZ at home  On atenolol  On Caduet      Dyslipidemia on statin and fenofibrate     DM2  Insulin U5 100  24 with breakfast and 35 with dinner  On pramlintide at home.  Not available on formulary.  Continue Jardiance  Continue correction insulin  A1c 7.4 5/3/2021  On Metformin at home may start after 2 days of cardiac cath.        On Neurontin for diabetic neuropathy     Hypothyroidism on Synthroid  TSH appropriate in May  2021        Class III obesity and BMI 43.9       Hospital Course:  Dalton Dallas Sr. is a 80 y.o. male *    80-year-old  male with history of CAD-s/p 2 stents on Plavix, DVT/PE on warfarin, HTN, HLP, IDDM with neuropathy, hypothyroidism, and morbid obesity, presented to the ED complaining of chest pain for the past several hours.  He describes it as a heaviness without radiation, substernal, nonradiating, moderate and constant.  Denies having shortness of breath, palpitation, sweating, nausea, or any other complaint.     Emergency department course:  Afebrile, hemodynamically stable, no acute distress.  Physical examination.  ED physician was unremarkable.  First troponin level was normal.  Labs were significant for glucose 194, creatinine 1.29, GFR 54.  INR was therapeutic at 2.6.  EKG showed sinus rhythm with no ischemic changes.  Chest x-ray was suspicious for mild pulmonary vascular congestion/pulmonary edema.  Patient was given 2 mg IV morphine sulfate and his chest pain was relieved after starting nitroglycerin drip.           Date::       7/3  Patient denies any chest pain or shortness of breath.  Nitro drip has been discontinued and patient going for heart cath today since his INR has improved     7/4  Patient had heart cath.  Report reviewed.  No intervention was done at that time.  Imdur was added  Restart Coumadin  Cleared for discharge by cardiology  Asymptomatic today          Recommendation for Outpatient Providers:     Healthy heart diabetic diet  Follow-up with PCP next week  Follow-up with cardiology 2 to 4 weeks  Patient started on Imdur by cardiology  Discussed changing medications from Caduet to separate amlodipine and increased dose of atorvastatin.  Patient aware of the plan          Reasons For Change In Medications and Indications for New Medications:        Day of Discharge     HPI:       Vital Signs:   Temp:  [97.5 °F (36.4 °C)-98 °F (36.7 °C)] 97.8 °F (36.6 °C)  Heart Rate:   [54-74] 57  BP: (112-175)/(40-77) 129/41     Physical Exam:  Physical Exam  Refer to progress note today  Pertinent  and/or Most Recent Results     Results from last 7 days   Lab Units 07/04/21  0458 07/03/21 0417 07/01/21  2218   WBC 10*3/mm3 5.50 5.70 6.40   HEMOGLOBIN g/dL 13.8 13.8 14.8   HEMATOCRIT % 40.1 40.0 43.8   PLATELETS 10*3/mm3 185 172 201   SODIUM mmol/L 138 139 140   POTASSIUM mmol/L 4.6 4.5 4.4   CHLORIDE mmol/L 99 100 102   CO2 mmol/L 28.0 25.0 25.0   BUN mg/dL 22 19 20   CREATININE mg/dL 1.34* 1.20 1.29*   GLUCOSE mg/dL 198* 187* 194*   CALCIUM mg/dL 8.9 9.3 9.9     Results from last 7 days   Lab Units 07/04/21  0458 07/03/21  1052 07/03/21  0417 07/02/21 0351 07/01/21 2218   BILIRUBIN mg/dL  --   --  0.4  --   --    ALK PHOS U/L  --   --  40  --   --    ALT (SGPT) U/L  --   --  25  --   --    AST (SGOT) U/L  --   --  30  --   --    PROTIME Seconds 13.0* 15.3* 16.5* 23.4 22.8   INR  1.19* 1.41* 1.53* 2.24 2.18     Results from last 7 days   Lab Units 07/04/21  0458   CHOLESTEROL mg/dL 145   TRIGLYCERIDES mg/dL 270*   HDL CHOL mg/dL 29*     Results from last 7 days   Lab Units 07/02/21  0928 07/02/21 0351 07/01/21 2218   TROPONIN T ng/mL 0.188* 0.170* <0.010       Brief Urine Lab Results  (Last result in the past 365 days)      Color   Clarity   Blood   Leuk Est   Nitrite   Protein   CREAT   Urine HCG        05/03/21 0928             38.9             Microbiology Results Abnormal     Procedure Component Value - Date/Time    Respiratory Panel, PCR (WITHOUT COVID) - Swab, Nasopharynx [[id number]]  (Normal) Collected: 07/02/21 1146    Lab Status: Final result Specimen: Swab from Nasopharynx Updated: 07/02/21 1251     ADENOVIRUS, PCR Not Detected     Coronavirus 229E Not Detected     Coronavirus HKU1 Not Detected     Coronavirus NL63 Not Detected     Coronavirus OC43 Not Detected     Human Metapneumovirus Not Detected     Human Rhinovirus/Enterovirus Not Detected     Influenza B PCR Not Detected      Parainfluenza Virus 1 Not Detected     Parainfluenza Virus 2 Not Detected     Parainfluenza Virus 3 Not Detected     Parainfluenza Virus 4 Not Detected     Bordetella pertussis pcr Not Detected     Chlamydophila pneumoniae PCR Not Detected     Mycoplasma pneumo by PCR Not Detected     Influenza A PCR Not Detected     RSV, PCR Not Detected     Bordetella parapertussis PCR Not Detected    Narrative:      The coronavirus on the RVP is NOT COVID-19 and is NOT indicative of infection with COVID-19.    In the setting of a positive respiratory panel with a viral infection PLUS a negative procalcitonin without other underlying concern for bacterial infection, consider observing off antibiotics or discontinuation of antibiotics and continue supportive care. If the respiratory panel is positive for atypical bacterial infection (Bordetella pertussis, Chlamydophila pneumoniae, or Mycoplasma pneumoniae), consider antibiotic de-escalation to target atypical bacterial infection.    MRSA Screen, PCR (Inpatient) - Swab, Nares [[id number]]  (Normal) Collected: 07/02/21 0315    Lab Status: Final result Specimen: Swab from Nares Updated: 07/02/21 0430     MRSA PCR No MRSA Detected    COVID PRE-OP / PRE-PROCEDURE SCREENING ORDER (NO ISOLATION) - Swab, Nasopharynx [[id number]]  (Normal) Collected: 07/01/21 2353    Lab Status: Final result Specimen: Swab from Nasopharynx Updated: 07/02/21 0033    Narrative:      The following orders were created for panel order COVID PRE-OP / PRE-PROCEDURE SCREENING ORDER (NO ISOLATION) - Swab, Nasopharynx.  Procedure                               Abnormality         Status                     ---------                               -----------         ------                     COVID-19,CEPHEID,COR/CLARY...[[id number]]  Normal              Final result                 Please view results for these tests on the individual orders.    COVID-19,CEPHEID,COR/CLARY/PAD/NAMRATA IN-HOUSE(OR EMERGENT/ADD-ON),NP SWAB IN  TRANSPORT MEDIA 3-4 HR TAT, RT-PCR - Swab, Nasopharynx [[id number]]  (Normal) Collected: 07/01/21 3723    Lab Status: Final result Specimen: Swab from Nasopharynx Updated: 07/02/21 0033     COVID19 Not Detected    Narrative:      Fact sheet for providers: https://www.fda.gov/media/923581/download     Fact sheet for patients: https://www.fda.gov/media/551152/download  Fact sheet for providers: https://www.fda.gov/media/048564/download    Fact sheet for patients: https://www.fda.gov/media/050558/download    Test performed by PCR.          XR Chest 1 View    Result Date: 7/1/2021  Impression: Indistinct pulmonary vasculature with basilar predominant groundglass opacities and interstitial thickening in both lungs, suggesting mild pulmonary vascular congestion/pulmonary edema. Atypical pneumonia or chronic lung disease could have a similar appearance.  Electronically Signed By-Cortez Hunt MD On:7/1/2021 10:30 PM This report was finalized on [id number] by  Cortez Hunt MD.      Results for orders placed during the hospital encounter of 09/28/20    Duplex Venous Lower Extremity - Left    Interpretation Summary  · Normal left lower extremity venous duplex scan.      Results for orders placed during the hospital encounter of 09/28/20    Duplex Venous Lower Extremity - Left    Interpretation Summary  · Normal left lower extremity venous duplex scan.                  Test Results Pending at Discharge        Procedures Performed  Procedure(s):  Cardiac Catheterization/Vascular Study         Consults:   Consults     Date and Time Order Name Status Description    7/2/2021  9:05 AM Inpatient Cardiology Consult Completed     7/1/2021 11:23 PM Hospitalist (on-call MD unless specified) Completed             Discharge Details        Discharge Medications      New Medications      Instructions Start Date   amLODIPine 5 MG tablet  Commonly known as: NORVASC   5 mg, Oral, Every 24 Hours Scheduled      atorvastatin 80 MG  tablet  Commonly known as: LIPITOR   80 mg, Oral, Nightly      isosorbide mononitrate 30 MG 24 hr tablet  Commonly known as: IMDUR   30 mg, Oral, Every 24 Hours Scheduled         Changes to Medications      Instructions Start Date   metFORMIN  MG 24 hr tablet  Commonly known as: GLUCOPHAGE-XR  What changed: These instructions start on July 6, 2021. If you are unsure what to do until then, ask your doctor or other care provider.   2,000 mg, Oral, Daily With Dinner   Start Date: July 6, 2021        Continue These Medications      Instructions Start Date   hydroCHLOROthiazide 25 MG tablet  Commonly known as: HYDRODIURIL   TAKE 1 TABLET DAILY         Stop These Medications    amLODIPine-atorvastatin 5-20 MG per tablet  Commonly known as: CADUET        ASK your doctor about these medications      Instructions Start Date   Accu-Chek Irene Plus test strip  Generic drug: glucose blood  Ask about: Which instructions should I use?   USE TO CHECK BLOOD SUGAR BEFORE MEALS AND AT BEDTIME      atenolol 50 MG tablet  Commonly known as: TENORMIN   TAKE 1 TABLET DAILY      clopidogrel 75 MG tablet  Commonly known as: PLAVIX   TAKE 1 TABLET DAILY      fenofibrate 54 MG tablet  Commonly known as: TRICOR   TAKE 1 TABLET DAILY      ferrous sulfate 325 (65 FE) MG tablet   325 mg, Oral, 2 Times Daily      fish oil 1000 MG capsule capsule   1,000 mg, Oral, 2 Times Daily      gabapentin 100 MG capsule  Commonly known as: NEURONTIN  Ask about: Which instructions should I use?   TAKE 1 CAPSULE THREE TIMES A DAY      Insulin Regular Human (Conc) 500 UNIT/ML solution pen-injector CONCENTRATED injection  Commonly known as: HumuLIN R  Ask about: Which instructions should I use?   24 Units, Subcutaneous, Every Morning      Insulin Regular Human (Conc) 500 UNIT/ML solution pen-injector CONCENTRATED injection  Commonly known as: HumuLIN R  Ask about: Which instructions should I use?   35 Units, Subcutaneous, Daily With Dinner      irbesartan  300 MG tablet  Commonly known as: AVAPRO   TAKE 1 TABLET DAILY      Jardiance 25 MG tablet tablet  Generic drug: empagliflozin   25 mg, Oral, Daily      levothyroxine 175 MCG tablet  Commonly known as: SYNTHROID, LEVOTHROID   TAKE 1 TABLET DAILY      Pramlintide Acetate 2700 MCG/2.7ML solution pen-injector   120 mcg, Subcutaneous, 3 Times Daily      vitamin B-12 100 MCG tablet  Commonly known as: CYANOCOBALAMIN   100 mcg, Oral, Daily      vitamin D 1.25 MG (26152 UT) capsule capsule  Commonly known as: ERGOCALCIFEROL   TAKE 1 CAPSULE ONCE A WEEK      warfarin 5 MG tablet  Commonly known as: COUMADIN  Ask about: Which instructions should I use?   TAKE 1 TABLET AS DIRECTED      warfarin 3 MG tablet  Commonly known as: COUMADIN  Ask about: Which instructions should I use?   TAKE 1 TABLET DAILY             No Known Allergies      Discharge Disposition:  Home or Self Care    Diet:  Hospital:  Diet Order   Procedures   • Diet Cardiac; Healthy Heart         Discharge Activity:         CODE STATUS:    Code Status and Medical Interventions:   Ordered at: 07/02/21 0204     Code Status:    CPR     Medical Interventions (Level of Support Prior to Arrest):    Full         Follow-up Appointments  Future Appointments   Date Time Provider Department Center   7/27/2021  9:45 AM LAB [facility] ONC LAB NA [facility]   7/27/2021 10:00 AM RN REVIEW ROOM  [facility] CC NA LAG   7/27/2021 10:30 AM Gabino Sibley Jr., MD [facility]   8/24/2021 10:15 AM LAB  LAG ONC LAB NA BH [facility]   8/24/2021 10:30 AM RN REVIEW ROOM  [facility] LAG CC NA LAG   9/28/2021 10:15 AM LAB BH L[facility] ONC LAB NA BH L[facility]   9/28/2021 10:30 AM RN REVIEW ROOM  [facility] LAG CC NA LAG   10/13/2021 10:15 AM Edwar Ackerman MD [facility] CAR NA P [facility] NA   10/26/2021 10:15 AM LAB BH LAG ONC LAB NA BH LAG [facility]   10/26/2021 10:30 AM RN REVIEW ROOM  [facility] LAG CC NA LAG   11/2/2021  9:10 AM LAB  [facility] LAB DS  [facility] None   11/9/2021 12:00 PM  Virginia Shaver MD [facility] END NA [facility]   11/30/2021 10:15 AM LAB BH LAG ONC LAB NA BH LAG [facility]   11/30/2021 10:30 AM RN REVIEW ROOM  [facility] LAG CC NA LAG   12/20/2021 10:45 AM LAB MD BH LAG ONC LAB NA BH LAG [facility]   12/20/2021 10:45 AM Soraya Cortez MD [facility] ONC NA [facility]   12/27/2021 10:15 AM LAB BH LAG ONC LAB NA BH LAG [facility]   12/27/2021 10:30 AM RN REVIEW ROOM  [facility] LAG CC NA LAG   2/7/2022 10:00 AM Gabino Sibley Jr., MD [facility]             Condition on Discharge:      Stable      This patient has been examined wearing appropriate Personal Protective Equipment and discussed with hospital infection control department. 07/04/21      Electronically signed by Royce Herman MD, 07/04/21, 9:25 AM EDT.      Time: I spent 35 minutes on this discharge activity which included face-to-face encounter with the patient/reviewing the data in the system/coordination of the care with the nursing staff as well as consultants/documentation/entering orders.

## 2021-07-05 ENCOUNTER — TRANSITIONAL CARE MANAGEMENT TELEPHONE ENCOUNTER (OUTPATIENT)
Dept: CALL CENTER | Facility: HOSPITAL | Age: 81
End: 2021-07-05

## 2021-07-05 NOTE — OUTREACH NOTE
Call Center TCM Note      Responses   [facility] patient discharged from?  [facility]   Does the patient have one of the following disease processes/diagnoses(primary or secondary)?  Acute MI (STEMI,NSTEMI)   TCM attempt successful?  Yes [No one listed on verbal release.]   Call start time  1347   Call end time  1352   Discharge diagnosis  NSTEMI, heart cath, CAD - medical treatment recommended, Hx stents, CKD   Meds reviewed with patient/caregiver?  Yes   Is the patient having any side effects they believe may be caused by any medication additions or changes?  No   Does the patient have all prescriptions related to this admission filled (includes statins,anticoagulants,HTN meds,anti-arrhythmia meds)  Yes   Is the patient taking all medications as directed (includes completed medication regime)?  Yes   Comments regarding appointments  Pt will call Dr. Ackerman 's office for an appt.    Does the patient have a primary care provider?   Yes   Does the patient have an appointment with their PCP,cardiologist,or clinic within 7 days of discharge?  No   What is preventing the patient from scheduling follow up appointments within 7 days of discharge?  Earlier appointment not available   Nursing Interventions  Advised patient to make appointment [Route call to PCP office as no appts available to meet TCM guidelines. ]   Has the patient kept scheduled appointments due by today?  N/A   Psychosocial issues?  No   Did the patient receive a copy of their discharge instructions?  Yes   Nursing interventions  Reviewed instructions with patient   What is the patient's perception of their health status since discharge?  Improving   Nursing interventions  Nurse provided patient education   Is the patient/caregiver able to teach back signs and symptoms of when to call for help immediately:  Sudden chest discomfort, Sudden discomfort in arms, back, neck or jaw, Shortness of breath at any time, Sudden sweating or clammy skin, Nausea or  vomiting, Dizziness or lightheadedness, Irregular or rapid heart rate   Nursing interventions  Nurse provided patient education   Is the pateint /caregiver able to teach back the importance of cardiac rehab?  Yes   Nursing interventions  Provided education on importance of cardiac rehab   Is the patient/caregiver able to teach back lifestyle changes to help prevent MIs  Reducing stress, Managing diabetes, Maintaining a healthy weight, Heart healthy diet   Is the patient/caregiver able to teach back ways to prevent a second heart attack:  Take medications, Follow up with MD   If the patient is a current smoker, are they able to teach back resources for cessation?  Not a smoker   Is the patient/caregiver able to teach back the hierarchy of who to call/visit for symptoms/problems? PCP, Specialist, Home health nurse, Urgent Care, ED, 911  Yes   Additional teach back comments  Pt reports he is doing well.   TCM call completed?  Yes          Irasema Melvin RN    7/5/2021, 13:56 EDT

## 2021-07-06 ENCOUNTER — TELEPHONE (OUTPATIENT)
Dept: FAMILY MEDICINE CLINIC | Facility: CLINIC | Age: 81
End: 2021-07-06

## 2021-07-06 ENCOUNTER — TELEPHONE (OUTPATIENT)
Dept: ONCOLOGY | Facility: CLINIC | Age: 81
End: 2021-07-06

## 2021-07-06 NOTE — TELEPHONE ENCOUNTER
HUB TO READ: Called patient and left message in regards to hospital follow up appt. Scheduled the patient in the only open spot available this week, on Friday at 9:45AM. Stated in the message if this date/time does not work to please call back. Dr. Sibley's schedule is filling up quickly and may need warm transferred to the office to reschedule if the time I gave him does not work.

## 2021-07-06 NOTE — TELEPHONE ENCOUNTER
Caller: CESAR SAMANO    Relationship to patient: Emergency Contact    Best call back number: [phone]    Chief complaint: PT NEEDS TO SCHEDULE LAB    Type of visit: LAB TO CHECK INR    Requested date: 07/12    If rescheduling, when is the original appointment:     Additional notes:

## 2021-07-09 ENCOUNTER — OFFICE VISIT (OUTPATIENT)
Dept: FAMILY MEDICINE CLINIC | Facility: CLINIC | Age: 81
End: 2021-07-09

## 2021-07-09 VITALS
TEMPERATURE: 96.8 F | DIASTOLIC BLOOD PRESSURE: 68 MMHG | SYSTOLIC BLOOD PRESSURE: 134 MMHG | OXYGEN SATURATION: 96 % | HEIGHT: 70 IN | BODY MASS INDEX: 42.8 KG/M2 | RESPIRATION RATE: 18 BRPM | WEIGHT: 299 LBS | HEART RATE: 71 BPM

## 2021-07-09 DIAGNOSIS — E11.40 TYPE 2 DIABETES MELLITUS WITH DIABETIC NEUROPATHY, WITH LONG-TERM CURRENT USE OF INSULIN (HCC): Chronic | ICD-10-CM

## 2021-07-09 DIAGNOSIS — I21.4 MI, ACUTE, NON ST SEGMENT ELEVATION (HCC): ICD-10-CM

## 2021-07-09 DIAGNOSIS — D68.59 THROMBOPHILIA (HCC): Chronic | ICD-10-CM

## 2021-07-09 DIAGNOSIS — I25.10 CORONARY ARTERY DISEASE INVOLVING NATIVE CORONARY ARTERY OF NATIVE HEART WITHOUT ANGINA PECTORIS: Primary | ICD-10-CM

## 2021-07-09 DIAGNOSIS — I10 ESSENTIAL HYPERTENSION: Chronic | ICD-10-CM

## 2021-07-09 DIAGNOSIS — Z79.4 TYPE 2 DIABETES MELLITUS WITH DIABETIC NEUROPATHY, WITH LONG-TERM CURRENT USE OF INSULIN (HCC): Chronic | ICD-10-CM

## 2021-07-09 DIAGNOSIS — E03.9 HYPOTHYROIDISM, UNSPECIFIED TYPE: Chronic | ICD-10-CM

## 2021-07-09 DIAGNOSIS — E78.2 MIXED HYPERLIPIDEMIA: Chronic | ICD-10-CM

## 2021-07-09 PROCEDURE — 99214 OFFICE O/P EST MOD 30 MIN: CPT | Performed by: FAMILY MEDICINE

## 2021-07-09 NOTE — PROGRESS NOTES
Subjective   Dalton Dallas Sr. is a 80 y.o. male.     Chief Complaint   Patient presents with   • Chest Pain     hospital followup   • Transitional Care Management       HPI  Chief complaint: Coronary artery disease, hypertension    The patient is a 80-year-old white male who comes in for hospital follow-up.    Patient was recently hospitalized with an acute non-ST segment elevation MI.  Patient had evaluation of this which included a left heart catheterization.  Patient's coronary arteries were stable except for 70% stenosis of the OM1 branch off the distal left circumflex coronary artery.  This artery is not amenable to intervention.  Patient was treated with beta-blockers long-acting nitroglycerin and placed inhibitors.  Patient is now on Imdur 30 mg daily atenolol 50 mg daily Plavix 75 mg daily.  Patient denied chest pain shortness of breath orthopnea or PND.    Patient also has hypertension.  Patient blood pressure medications were changed.  His amlodipine/benazepril combination medication was stopped.  This was converted to amlodipine 5 mg once a day Avapro 300 mg daily atenolol 50 mg daily hydrochlorothiazide 25 mg daily.  He denied headache lightheadedness dizziness or chest pain.    Patient also has history of stable hyperlipidemia thrombophilia hypothyroidism type 2 diabetes mellitus pulmonary hypertension and sleep apnea.    His other medications include Coumadin 8 mg daily gabapentin 100 mg 3 times a day pramlintide 120 mcg 3 times a day Metformin 2000 mg daily Jardiance 25 mg daily short acting insulin 24 units in the morning and 35 units in the evening Lipitor 80 mg daily fenofibrate 54 mg daily Synthroid 0.175 mg daily vitamin D 50,000 units weekly omega-3 fatty acids at 1000 mg twice a day vitamin B12 100 micrograms daily and iron 325 mg twice a day.    The following portions of the patient's history were reviewed and updated as appropriate: allergies, current medications, past family history,  "past medical history, past social history, past surgical history and problem list.    Review of Systems    Objective     /68 (BP Location: Right arm, Patient Position: Sitting, Cuff Size: Large Adult)   Pulse 71   Temp 96.8 °F (36 °C) (Infrared)   Resp 18   Ht 177.8 cm (70\")   Wt 136 kg (299 lb)   SpO2 96%   BMI 42.90 kg/m²     Physical Exam  Vitals and nursing note reviewed.   Constitutional:       Appearance: He is well-developed. He is obese.   HENT:      Head: Normocephalic and atraumatic.      Nose: Nose normal.      Mouth/Throat:      Mouth: Mucous membranes are moist.      Pharynx: Oropharynx is clear.   Eyes:      Extraocular Movements: Extraocular movements intact.      Conjunctiva/sclera: Conjunctivae normal.      Pupils: Pupils are equal, round, and reactive to light.   Cardiovascular:      Rate and Rhythm: Normal rate and regular rhythm.      Heart sounds: Normal heart sounds.   Pulmonary:      Effort: Pulmonary effort is normal.      Breath sounds: Normal breath sounds.   Abdominal:      General: Bowel sounds are normal.      Palpations: Abdomen is soft.   Musculoskeletal:         General: Normal range of motion.      Cervical back: Neck supple.   Skin:     General: Skin is warm and dry.   Neurological:      Mental Status: He is alert and oriented to person, place, and time.   Psychiatric:         Behavior: Behavior normal.         Thought Content: Thought content normal.         Judgment: Judgment normal.         Lipid Panel (07/04/2021 04:58)  Basic Metabolic Panel (07/04/2021 04:58)  CBC (No Diff) (07/04/2021 04:58)  Magnesium (07/04/2021 04:58)  XR Chest 1 View (07/01/2021 22:02)  Cardiac Catheterization/Vascular Study (07/03/2021 14:39)  ECG 12 Lead (07/01/2021 21:58)    Assessment/Plan   Diagnoses and all orders for this visit:    1. Coronary artery disease involving native coronary artery of native heart without angina pectoris (Primary)    2. MI, acute, non ST segment elevation " (CMS/[facility])    3. Essential hypertension    4. Mixed hyperlipidemia    5. Thrombophilia (CMS/[facility])    6. Hypothyroidism, unspecified type    7. Type 2 diabetes mellitus with diabetic neuropathy, with long-term current use of insulin (CMS/[facility])      Patient Instructions   Continue your current medications and treatment.    Follow up in the office in 3 months.    Laboratory testing at that time.      Gabino Sibley Jr., MD    07/09/21

## 2021-07-12 ENCOUNTER — HOSPITAL ENCOUNTER (OUTPATIENT)
Dept: ONCOLOGY | Facility: HOSPITAL | Age: 81
Setting detail: INFUSION SERIES
Discharge: HOME | End: 2021-07-12

## 2021-07-12 ENCOUNTER — LAB (OUTPATIENT)
Dept: LAB | Facility: HOSPITAL | Age: 81
End: 2021-07-12

## 2021-07-12 DIAGNOSIS — Z79.01 LONG TERM (CURRENT) USE OF ANTICOAGULANTS: ICD-10-CM

## 2021-07-12 DIAGNOSIS — Z79.01 LONG TERM (CURRENT) USE OF ANTICOAGULANTS: Primary | ICD-10-CM

## 2021-07-12 LAB — INR PPP: 1.9 (ref 0.8–1.2)

## 2021-07-12 PROCEDURE — 36416 COLLJ CAPILLARY BLOOD SPEC: CPT

## 2021-07-12 PROCEDURE — 85610 PROTHROMBIN TIME: CPT

## 2021-07-13 ENCOUNTER — READMISSION MANAGEMENT (OUTPATIENT)
Dept: CALL CENTER | Facility: HOSPITAL | Age: 81
End: 2021-07-13

## 2021-07-13 NOTE — OUTREACH NOTE
AMI Week 2 Survey      Responses   [facility] patient discharged from?  [facility]   Does the patient have one of the following disease processes/diagnoses(primary or secondary)?  Acute MI (STEMI,NSTEMI)   Week 2 attempt successful?  Yes   Call start time  1004   Call end time  1007   Discharge diagnosis  NSTEMI, heart cath, CAD - medical treatment recommended, Hx stents, CKD   Is patient permission given to speak with other caregiver?  No   Meds reviewed with patient/caregiver?  Yes   Does the patient have all prescriptions related to this admission filled (includes statins,anticoagulants,HTN meds,anti-arrhythmia meds)  Yes   Is the patient taking all medications as directed (includes completed medication regime)?  Yes   Does the patient have a primary care provider?   Yes   Comments regarding PCP  Patient has had PCP follow up since discharg.    Has the patient kept scheduled appointments due by today?  Yes   Comments  Patient to see cardiology one month post discharge.    Has home health visited the patient within 72 hours of discharge?  N/A   Psychosocial issues?  No   Did the patient receive a copy of their discharge instructions?  Yes   Nursing interventions  Reviewed instructions with patient   What is the patient's perception of their health status since discharge?  Improving   Nursing interventions  Nurse provided patient education   Is the patient/caregiver able to teach back signs and symptoms of when to call for help immediately:  Sudden chest discomfort, Sudden discomfort in arms, back, neck or jaw, Shortness of breath at any time, Sudden sweating or clammy skin, Nausea or vomiting, Dizziness or lightheadedness, Irregular or rapid heart rate   Nursing interventions  Nurse provided patient education   Is the patient/caregiver able to teach back ways to prevent a second heart attack:  Take medications, Follow up with MD   If the patient is a current smoker, are they able to teach back resources for  cessation?  Not a smoker   Is the patient/caregiver able to teach back the hierarchy of who to call/visit for symptoms/problems? PCP, Specialist, Home health nurse, Urgent Care, ED, 911  Yes   Week 2 call completed?  Yes   Wrap up additional comments  Denies any questions or needs today.           Joleen Prasad RN

## 2021-07-15 RX ORDER — BLOOD SUGAR DIAGNOSTIC
STRIP MISCELLANEOUS
Qty: 100 EACH | Refills: 11 | Status: SHIPPED | OUTPATIENT
Start: 2021-07-15 | End: 2022-05-10 | Stop reason: SDUPTHER

## 2021-07-15 RX ORDER — LANCETS
EACH MISCELLANEOUS
Qty: 100 EACH | Refills: 11 | Status: SHIPPED | OUTPATIENT
Start: 2021-07-15 | End: 2022-05-11 | Stop reason: SDUPTHER

## 2021-07-15 RX ORDER — BLOOD-GLUCOSE METER
1 EACH MISCELLANEOUS DAILY
Qty: 1 KIT | Refills: 1 | Status: SHIPPED | OUTPATIENT
Start: 2021-07-15 | End: 2022-05-13 | Stop reason: CLARIF

## 2021-07-19 ENCOUNTER — LAB (OUTPATIENT)
Dept: LAB | Facility: HOSPITAL | Age: 81
End: 2021-07-19

## 2021-07-19 ENCOUNTER — HOSPITAL ENCOUNTER (OUTPATIENT)
Dept: ONCOLOGY | Facility: HOSPITAL | Age: 81
Setting detail: INFUSION SERIES
Discharge: HOME | End: 2021-07-19

## 2021-07-19 DIAGNOSIS — Z79.01 LONG TERM (CURRENT) USE OF ANTICOAGULANTS: ICD-10-CM

## 2021-07-19 DIAGNOSIS — Z79.01 LONG TERM (CURRENT) USE OF ANTICOAGULANTS: Primary | ICD-10-CM

## 2021-07-19 LAB — INR PPP: 2.6 (ref 0.8–1.2)

## 2021-07-19 PROCEDURE — 36416 COLLJ CAPILLARY BLOOD SPEC: CPT

## 2021-07-19 PROCEDURE — 85610 PROTHROMBIN TIME: CPT

## 2021-07-20 RX ORDER — AMLODIPINE BESYLATE 5 MG/1
5 TABLET ORAL
Qty: 90 TABLET | Refills: 2 | Status: SHIPPED | OUTPATIENT
Start: 2021-07-20 | End: 2022-04-11

## 2021-07-20 RX ORDER — ISOSORBIDE MONONITRATE 30 MG/1
30 TABLET, EXTENDED RELEASE ORAL
Qty: 90 TABLET | Refills: 2 | Status: SHIPPED | OUTPATIENT
Start: 2021-07-20 | End: 2022-04-11

## 2021-07-20 RX ORDER — ATORVASTATIN CALCIUM 80 MG/1
80 TABLET, FILM COATED ORAL NIGHTLY
Qty: 90 TABLET | Refills: 2 | Status: SHIPPED | OUTPATIENT
Start: 2021-07-20 | End: 2022-04-11

## 2021-07-20 NOTE — TELEPHONE ENCOUNTER
Needs a refill on Isosorbide 30 mg to [facility]  Nees a refill on Amolodopine 5 mg to [facility]  Needs a refill on Atorvastatin 80 mg to [facility].

## 2021-07-22 ENCOUNTER — READMISSION MANAGEMENT (OUTPATIENT)
Dept: CALL CENTER | Facility: HOSPITAL | Age: 81
End: 2021-07-22

## 2021-07-22 NOTE — OUTREACH NOTE
AMI Week 3 Survey      Responses   [facility] patient discharged from?  [facility]   Does the patient have one of the following disease processes/diagnoses(primary or secondary)?  Acute MI (STEMI,NSTEMI)   Week 3 attempt successful?  No   Unsuccessful attempts  Attempt 1          Mallory Olivarez LPN

## 2021-07-26 ENCOUNTER — HOSPITAL ENCOUNTER (OUTPATIENT)
Dept: ONCOLOGY | Facility: HOSPITAL | Age: 81
Setting detail: INFUSION SERIES
Discharge: HOME | End: 2021-07-26

## 2021-07-26 ENCOUNTER — LAB (OUTPATIENT)
Dept: LAB | Facility: HOSPITAL | Age: 81
End: 2021-07-26

## 2021-07-26 DIAGNOSIS — Z79.01 LONG TERM (CURRENT) USE OF ANTICOAGULANTS: Primary | ICD-10-CM

## 2021-07-26 DIAGNOSIS — Z79.01 LONG TERM (CURRENT) USE OF ANTICOAGULANTS: ICD-10-CM

## 2021-07-26 LAB — INR PPP: 2.3 (ref 0.8–1.2)

## 2021-07-26 PROCEDURE — 85610 PROTHROMBIN TIME: CPT

## 2021-07-26 PROCEDURE — 36416 COLLJ CAPILLARY BLOOD SPEC: CPT

## 2021-07-27 ENCOUNTER — APPOINTMENT (OUTPATIENT)
Dept: LAB | Facility: HOSPITAL | Age: 81
End: 2021-07-27

## 2021-07-27 ENCOUNTER — READMISSION MANAGEMENT (OUTPATIENT)
Dept: CALL CENTER | Facility: HOSPITAL | Age: 81
End: 2021-07-27

## 2021-07-27 ENCOUNTER — APPOINTMENT (OUTPATIENT)
Dept: ONCOLOGY | Facility: HOSPITAL | Age: 81
End: 2021-07-27

## 2021-07-27 NOTE — OUTREACH NOTE
AMI Week 3 Survey      Responses   [facility] patient discharged from?  [facility]   Does the patient have one of the following disease processes/diagnoses(primary or secondary)?  Acute MI (STEMI,NSTEMI)   Week 3 attempt successful?  Yes   Call start time  1211   Call end time  1213   Discharge diagnosis  NSTEMI, heart cath, CAD - medical treatment recommended, Hx stents, CKD   Meds reviewed with patient/caregiver?  Yes   Is the patient having any side effects they believe may be caused by any medication additions or changes?  No   Does the patient have all prescriptions related to this admission filled (includes statins,anticoagulants,HTN meds,anti-arrhythmia meds)  Yes   Is the patient taking all medications as directed (includes completed medication regime)?  Yes   Does the patient have a primary care provider?   Yes   Does the patient have an appointment with their PCP,cardiologist,or clinic within 7 days of discharge?  Yes   Has the patient kept scheduled appointments due by today?  Yes   Psychosocial issues?  No   Comments  [facility] site- right groin has healed well, bruising fading. Denies chest pain or SOA.    What is the patient's perception of their health status since discharge?  Improving   Is the patient/caregiver able to teach back signs and symptoms of when to call for help immediately:  Sudden chest discomfort, Sudden discomfort in arms, back, neck or jaw, Sudden sweating or clammy skin   Nursing interventions  Nurse provided patient education   Is the patient/caregiver able to teach back lifestyle changes to help prevent MIs  Regular exercise as approved by provider   Is the patient/caregiver able to teach back ways to prevent a second heart attack:  Take medications, Follow up with MD   If the patient is a current smoker, are they able to teach back resources for cessation?  Not a smoker   Week 3 call completed?  Yes          Mari Marc RN

## 2021-08-03 NOTE — PROGRESS NOTES
Date of Office Visit: 2021  Encounter Provider: Dr. Edwar Ackerman  Place of Service: [facility] CARDIOLOGY [place]  Patient Name: Dalton Dallas Sr.  :1940  Gabino Sibley Jr., MD    Chief Complaint   Patient presents with   • Hypertension     post heart cath    • Hyperlipidemia   • Coronary Artery Disease   • Congestive Heart Failure   • Diabetes     History of Present Illness    I'm pleased to see Mr. Dallas in my office today as a followup     As you know, patient is 81 years old white gentleman whose past medical history is significant for hypertension, hyperlipidemia, coronary artery disease, coronary artery stenting, diabetes mellitus, DVT who came for followup.     In , patient developed symptom of shortness of breath and subsequent stress test was abnormal.  Cardiac catheterization showed LAD was free of disease, RCA had 50% stenosis, and LCX has 80% stenosis.  Patient underwent LCX/om stenting.  In , repeat cardiac catheterization showed 80-90% stenosis of LCX/OM1 patient underwent successful stenting.  LAD had 25% stenosis.  LVEF was 60%.  In 2018, echocardiogram showed EF of 60-65%. In 2020, patient underwent stress test which showed no myocardial ischemia.  Small fixed inferior defect was noted.    In 2021, patient was admitted at [facility] with symptom of unstable angina.  Patient underwent cardiac catheterization and it showed patent stents.  Distal LCx had 70 to 80% stenosis but it was a small caliber vessel.  LVEF was 55 to 60%.    Patient complain of shortness of breath on exertion.  Patient denies any chest pain.  Groin has healed well.  No orthopnea PND no syncope or presyncope.  No significant leg edema.    Blood pressure is very well controlled.  Patient wants to switch his care on can take an outside rather than going to [facility] in [facility].  I would refer the patient to pulmonologist in [place] Dr. Vieira.  I  would also proceed with echocardiogram to assess the left ventricle function and RV systolic pressure as per request of pulmonologist.        Past Medical History:   Diagnosis Date   • ACE-inhibitor cough 06/2005   • Coronary artery disease    • Diabetes mellitus, type 2 (CMS/HCC) 1995   • ED (erectile dysfunction)    • Hx of blood clots 2014    Blood clot left leg    • Hyperlipidemia 08/2000   • Hypertension    • Hypogonadism, male 1998   • Hypothyroidism 06/2001   • Obesity    • Peripheral neuropathy    • Pulmonary embolism (CMS/HCC) 02/2014   • Rectal fistula    • Vitamin D deficiency          Past Surgical History:   Procedure Laterality Date   • CARDIAC CATHETERIZATION  2008    PTCA: 2008; PTCA: 4/21/2015 LCX stent    • CARDIAC CATHETERIZATION Left 7/3/2021    Procedure: Cardiac Catheterization/Vascular Study;  Surgeon: Edwar Ackerman MD;  Location: [facility] CATH INVASIVE LOCATION;  Service: Cardiovascular;  Laterality: Left;   • CARDIOVASCULAR STRESS TEST  2020   • CATARACT EXTRACTION  01/11/2016 1-4-16 & 1-11-16    • COLONOSCOPY N/A 11/23/2019    Procedure: COLONOSCOPY WITH ENDOSCOPIC CLIPPING X1 OF POST POLYPECTOMY BLEED SITE;  Surgeon: Jhonny Orellana MD;  Location: [facility] ENDOSCOPY;  Service: Gastroenterology   • CORONARY STENT PLACEMENT     • INGUINAL HERNIA REPAIR Left    • UMBILICAL HERNIA REPAIR             Current Outpatient Medications:   •  Accu-Chek Softclix Lancets lancets, Use to test blood sugars 3 times daily. DXE11.65, Disp: 100 each, Rfl: 11  •  amLODIPine (NORVASC) 5 MG tablet, Take 1 tablet by mouth Daily for 270 days., Disp: 90 tablet, Rfl: 2  •  atenolol (TENORMIN) 50 MG tablet, TAKE 1 TABLET DAILY, Disp: 90 tablet, Rfl: 3  •  atorvastatin (LIPITOR) 80 MG tablet, Take 1 tablet by mouth Every Night for 270 days., Disp: 90 tablet, Rfl: 2  •  Blood Glucose Monitoring Suppl (Accu-Chek Guide) w/Device kit, 1 each Daily., Disp: 1 kit, Rfl: 1  •  clopidogrel (PLAVIX) 75 MG tablet, TAKE 1 TABLET  DAILY, Disp: 90 tablet, Rfl: 3  •  Empagliflozin (Jardiance) 25 MG tablet, Take 25 mg by mouth Daily., Disp: 90 tablet, Rfl: 3  •  fenofibrate (TRICOR) 54 MG tablet, TAKE 1 TABLET DAILY, Disp: 90 tablet, Rfl: 3  •  ferrous sulfate 325 (65 FE) MG tablet, Take 325 mg by mouth 2 (Two) Times a Day., Disp: , Rfl:   •  gabapentin (NEURONTIN) 100 MG capsule, TAKE 1 CAPSULE THREE TIMES A DAY, Disp: 270 capsule, Rfl: 3  •  glucose blood (Accu-Chek Guide) test strip, Use to test blood sugars 3 times daily. DXE11.65, Disp: 100 each, Rfl: 11  •  hydroCHLOROthiazide (HYDRODIURIL) 25 MG tablet, TAKE 1 TABLET DAILY, Disp: 90 tablet, Rfl: 3  •  Insulin Regular Human, Conc, (HumuLIN R) 500 UNIT/ML solution pen-injector CONCENTRATED injection, Inject 24 Units under the skin into the appropriate area as directed Every Morning., Disp: , Rfl:   •  Insulin Regular Human, Conc, (HumuLIN R) 500 UNIT/ML solution pen-injector CONCENTRATED injection, Inject 35 Units under the skin into the appropriate area as directed Daily With Dinner., Disp: , Rfl:   •  irbesartan (AVAPRO) 300 MG tablet, TAKE 1 TABLET DAILY, Disp: 90 tablet, Rfl: 3  •  isosorbide mononitrate (IMDUR) 30 MG 24 hr tablet, Take 1 tablet by mouth Daily for 270 days., Disp: 90 tablet, Rfl: 2  •  levothyroxine (SYNTHROID, LEVOTHROID) 175 MCG tablet, TAKE 1 TABLET DAILY, Disp: 90 tablet, Rfl: 3  •  metFORMIN ER (GLUCOPHAGE-XR) 500 MG 24 hr tablet, Take 4 tablets by mouth Daily With Dinner., Disp: , Rfl:   •  Omega-3 Fatty Acids (FISH OIL) 1000 MG capsule capsule, Take 1,000 mg by mouth 2 (Two) Times a Day., Disp: , Rfl:   •  Pramlintide Acetate 2700 MCG/2.7ML solution pen-injector, Inject 120 mcg under the skin into the appropriate area as directed 3 (Three) Times a Day., Disp: 32.4 mL, Rfl: 4  •  vitamin B-12 (CYANOCOBALAMIN) 100 MCG tablet, Take 100 mcg by mouth Daily., Disp: , Rfl:   •  vitamin D (ERGOCALCIFEROL) 1.25 MG (94119 UT) capsule capsule, TAKE 1 CAPSULE ONCE A WEEK,  "Disp: 12 capsule, Rfl: 3  •  warfarin (COUMADIN) 3 MG tablet, TAKE 1 TABLET DAILY, Disp: 90 tablet, Rfl: 3  •  warfarin (COUMADIN) 5 MG tablet, TAKE 1 TABLET AS DIRECTED, Disp: 90 tablet, Rfl: 3      Social History     Socioeconomic History   • Marital status:      Spouse name: Not on file   • Number of children: Not on file   • Years of education: Not on file   • Highest education level: Not on file   Tobacco Use   • Smoking status: Former Smoker     Packs/day: 1.50     Years: 20.00     Pack years: 30.00     Types: Cigarettes     Quit date:      Years since quittin.6   • Smokeless tobacco: Never Used   • Tobacco comment: quit    Vaping Use   • Vaping Use: Never used   Substance and Sexual Activity   • Alcohol use: No   • Drug use: No   • Sexual activity: Defer         Review of Systems   Constitutional: Negative for chills and fever.   HENT: Negative for ear discharge and nosebleeds.    Eyes: Negative for discharge and redness.   Cardiovascular: Negative for chest pain, orthopnea, palpitations, paroxysmal nocturnal dyspnea and syncope.   Respiratory: Positive for shortness of breath. Negative for cough and wheezing.    Endocrine: Negative for heat intolerance.   Skin: Negative for rash.   Musculoskeletal: Positive for arthritis and joint pain. Negative for myalgias.   Gastrointestinal: Negative for abdominal pain, melena, nausea and vomiting.   Genitourinary: Negative for dysuria and hematuria.   Neurological: Negative for dizziness, light-headedness, numbness and tremors.   Psychiatric/Behavioral: Negative for depression. The patient is not nervous/anxious.        Procedures    Procedures    No orders to display           Objective:    /68   Pulse 72   Ht 177.8 cm (70\")   Wt (!) 137 kg (303 lb)   BMI 43.48 kg/m²         Constitutional:       Appearance: Well-developed.   Eyes:      General: No scleral icterus.        Right eye: No discharge.   HENT:      Head: Normocephalic and " atraumatic.   Neck:      Thyroid: No thyromegaly.      Lymphadenopathy: No cervical adenopathy.   Pulmonary:      Effort: Pulmonary effort is normal. No respiratory distress.      Breath sounds: Normal breath sounds. No wheezing. No rales.   Cardiovascular:      Normal rate. Regular rhythm.      No gallop.   Abdominal:      Tenderness: There is no abdominal tenderness.   Skin:     Findings: No erythema or rash.   Neurological:      Mental Status: Alert and oriented to person, place, and time.             Assessment:       Diagnosis Plan   1. Essential hypertension  Adult Transthoracic Echo Complete W/ Cont if Necessary Per Protocol   2. Mixed hyperlipidemia  Adult Transthoracic Echo Complete W/ Cont if Necessary Per Protocol   3. Type 2 diabetes mellitus with diabetic neuropathy, with long-term current use of insulin (CMS/HCC)  Adult Transthoracic Echo Complete W/ Cont if Necessary Per Protocol   4. Coronary artery disease involving native coronary artery of native heart without angina pectoris  Adult Transthoracic Echo Complete W/ Cont if Necessary Per Protocol   5. Diastolic dysfunction with chronic heart failure (CMS/HCC)  Adult Transthoracic Echo Complete W/ Cont if Necessary Per Protocol   6. Shortness of breath              Plan:       MDM:    1.  Angina pectoris:    Patient has not had any further episode of chest pain after cardiac catheterization.  Continue current treatment.    2.  CAD:    Patient had patent stent in LCx.  Patient has a distal LCx stenosis which is intermediate lesion.  I would recommend medical treatment.    3.  Hypertension:    Blood pressure is very well controlled    4.  Obstructive sleep apnea:    Patient follows with pulmonologist at [facility] in [facility].  He wants to switch his care.  I gave few names to him.  Patient would decide.  As per request of pulmonologist, I would proceed with echocardiogram to assess right ventricle systolic pressure    5.   Hyperlipidemia:    Continue Lipitor.  Recent blood work showed LDL of 72.  It is desirable

## 2021-08-04 ENCOUNTER — OFFICE VISIT (OUTPATIENT)
Dept: CARDIOLOGY | Facility: CLINIC | Age: 81
End: 2021-08-04

## 2021-08-04 VITALS
WEIGHT: 303 LBS | HEIGHT: 70 IN | SYSTOLIC BLOOD PRESSURE: 124 MMHG | HEART RATE: 72 BPM | BODY MASS INDEX: 43.38 KG/M2 | DIASTOLIC BLOOD PRESSURE: 68 MMHG

## 2021-08-04 DIAGNOSIS — E11.40 TYPE 2 DIABETES MELLITUS WITH DIABETIC NEUROPATHY, WITH LONG-TERM CURRENT USE OF INSULIN (HCC): Chronic | ICD-10-CM

## 2021-08-04 DIAGNOSIS — E78.2 MIXED HYPERLIPIDEMIA: Chronic | ICD-10-CM

## 2021-08-04 DIAGNOSIS — Z79.4 TYPE 2 DIABETES MELLITUS WITH DIABETIC NEUROPATHY, WITH LONG-TERM CURRENT USE OF INSULIN (HCC): Chronic | ICD-10-CM

## 2021-08-04 DIAGNOSIS — R06.02 SHORTNESS OF BREATH: ICD-10-CM

## 2021-08-04 DIAGNOSIS — I25.10 CORONARY ARTERY DISEASE INVOLVING NATIVE CORONARY ARTERY OF NATIVE HEART WITHOUT ANGINA PECTORIS: ICD-10-CM

## 2021-08-04 DIAGNOSIS — I50.32 DIASTOLIC DYSFUNCTION WITH CHRONIC HEART FAILURE (HCC): Chronic | ICD-10-CM

## 2021-08-04 DIAGNOSIS — I10 ESSENTIAL HYPERTENSION: Primary | Chronic | ICD-10-CM

## 2021-08-04 PROCEDURE — 99214 OFFICE O/P EST MOD 30 MIN: CPT | Performed by: INTERNAL MEDICINE

## 2021-08-05 ENCOUNTER — READMISSION MANAGEMENT (OUTPATIENT)
Dept: CALL CENTER | Facility: HOSPITAL | Age: 81
End: 2021-08-05

## 2021-08-05 NOTE — OUTREACH NOTE
AMI Week 4 Survey      Responses   [facility] patient discharged [facility]   Does the patient have one of the following disease processes/diagnoses(primary or secondary)?  Acute MI (STEMI,NSTEMI)   Week 4 attempt successful?  Yes   Call start time  1759   Call end time  1800   Discharge diagnosis  NSTEMI, heart cath, CAD - medical treatment recommended, Hx stents, CKD   Meds reviewed with patient/caregiver?  Yes   Is the patient having any side effects they believe may be caused by any medication additions or changes?  No   Is the patient taking all medications as directed (includes completed medication regime)?  Yes   Has the patient kept scheduled appointments due by today?  Yes   Psychosocial issues?  No   What is the patient's perception of their health status since discharge?  Improving   Nursing interventions  Nurse provided patient education   Is the patient/caregiver able to teach back signs and symptoms of when to call for help immediately:  Sudden chest discomfort, Sudden discomfort in arms, back, neck or jaw, Sudden sweating or clammy skin   Nursing interventions  Nurse provided patient education   Is the pateint /caregiver able to teach back the importance of cardiac rehab?  Yes   Nursing interventions  Provided education on importance of cardiac rehab   Is the patient/caregiver able to teach back lifestyle changes to help prevent MIs  Regular exercise as approved by provider, Reducing stress, Maintaining a healthy weight   Is the patient/caregiver able to teach back ways to prevent a second heart attack:  Take medications, Follow up with MD, Manage risk factors   If the patient is a current smoker, are they able to teach back resources for cessation?  Not a smoker   Is the patient/caregiver able to teach back the hierarchy of who to call/visit for symptoms/problems? PCP, Specialist, Home health nurse, Urgent Care, ED, 911  Yes   Additional teach back comments  Pt reports he is doing well.    Week 4 call completed?  Yes   Would the patient like one additional call?  No   Graduated  Yes   Is the patient interested in additional calls from an ambulatory ?  NOTE:  applies to high risk patients requiring additional follow-up.  No   Did the patient feel the follow up calls were helpful during their recovery period?  Yes   Was the number of calls appropriate?  Yes   Wrap up additional comments  Denies any questions or needs today.           rGayson Bingham RN

## 2021-08-12 ENCOUNTER — HOSPITAL ENCOUNTER (OUTPATIENT)
Dept: CARDIOLOGY | Facility: HOSPITAL | Age: 81
Discharge: HOME | End: 2021-08-12
Admitting: INTERNAL MEDICINE

## 2021-08-12 VITALS
DIASTOLIC BLOOD PRESSURE: 73 MMHG | BODY MASS INDEX: 43.38 KG/M2 | HEIGHT: 70 IN | WEIGHT: 303 LBS | SYSTOLIC BLOOD PRESSURE: 121 MMHG

## 2021-08-12 DIAGNOSIS — Z79.4 TYPE 2 DIABETES MELLITUS WITH DIABETIC NEUROPATHY, WITH LONG-TERM CURRENT USE OF INSULIN (HCC): ICD-10-CM

## 2021-08-12 DIAGNOSIS — I25.10 CORONARY ARTERY DISEASE INVOLVING NATIVE CORONARY ARTERY OF NATIVE HEART WITHOUT ANGINA PECTORIS: ICD-10-CM

## 2021-08-12 DIAGNOSIS — I10 ESSENTIAL HYPERTENSION: ICD-10-CM

## 2021-08-12 DIAGNOSIS — I50.32 DIASTOLIC DYSFUNCTION WITH CHRONIC HEART FAILURE (HCC): ICD-10-CM

## 2021-08-12 DIAGNOSIS — E11.40 TYPE 2 DIABETES MELLITUS WITH DIABETIC NEUROPATHY, WITH LONG-TERM CURRENT USE OF INSULIN (HCC): ICD-10-CM

## 2021-08-12 DIAGNOSIS — E78.2 MIXED HYPERLIPIDEMIA: ICD-10-CM

## 2021-08-12 PROCEDURE — 93306 TTE W/DOPPLER COMPLETE: CPT | Performed by: INTERNAL MEDICINE

## 2021-08-12 PROCEDURE — 25010000002 SULFUR HEXAFLUORIDE MICROSPH 60.7-25 MG RECONSTITUTED SUSPENSION: Performed by: INTERNAL MEDICINE

## 2021-08-12 PROCEDURE — 93306 TTE W/DOPPLER COMPLETE: CPT

## 2021-08-12 RX ADMIN — SULFUR HEXAFLUORIDE 2 ML: KIT at 11:28

## 2021-08-19 LAB
BH CV ECHO MEAS - ACS: 1.6 CM
BH CV ECHO MEAS - AO MAX PG (FULL): 1.8 MMHG
BH CV ECHO MEAS - AO MAX PG: 6.2 MMHG
BH CV ECHO MEAS - AO MEAN PG (FULL): 0.94 MMHG
BH CV ECHO MEAS - AO MEAN PG: 3.4 MMHG
BH CV ECHO MEAS - AO ROOT AREA (BSA CORRECTED): 1.4
BH CV ECHO MEAS - AO ROOT AREA: 9.8 CM^2
BH CV ECHO MEAS - AO ROOT DIAM: 3.5 CM
BH CV ECHO MEAS - AO V2 MAX: 124.6 CM/SEC
BH CV ECHO MEAS - AO V2 MEAN: 88.1 CM/SEC
BH CV ECHO MEAS - AO V2 VTI: 27.7 CM
BH CV ECHO MEAS - ASC AORTA: 3.6 CM
BH CV ECHO MEAS - AVA(I,A): 2.2 CM^2
BH CV ECHO MEAS - AVA(I,D): 2.2 CM^2
BH CV ECHO MEAS - AVA(V,A): 1.9 CM^2
BH CV ECHO MEAS - AVA(V,D): 1.9 CM^2
BH CV ECHO MEAS - BSA(HAYCOCK): 2.7 M^2
BH CV ECHO MEAS - BSA: 2.5 M^2
BH CV ECHO MEAS - BZI_BMI: 43.5 KILOGRAMS/M^2
BH CV ECHO MEAS - BZI_METRIC_HEIGHT: 177.8 CM
BH CV ECHO MEAS - BZI_METRIC_WEIGHT: 137.4 KG
BH CV ECHO MEAS - EDV(CUBED): 62.2 ML
BH CV ECHO MEAS - EDV(MOD-SP4): 120 ML
BH CV ECHO MEAS - EDV(TEICH): 68.4 ML
BH CV ECHO MEAS - EF(CUBED): 71.5 %
BH CV ECHO MEAS - EF(MOD-BP): 63 %
BH CV ECHO MEAS - EF(MOD-SP4): 63.3 %
BH CV ECHO MEAS - EF(TEICH): 63.8 %
BH CV ECHO MEAS - ESV(CUBED): 17.7 ML
BH CV ECHO MEAS - ESV(MOD-SP4): 44.1 ML
BH CV ECHO MEAS - ESV(TEICH): 24.8 ML
BH CV ECHO MEAS - FS: 34.2 %
BH CV ECHO MEAS - IVS/LVPW: 1.2
BH CV ECHO MEAS - IVSD: 1.7 CM
BH CV ECHO MEAS - LA DIMENSION(2D): 6 CM
BH CV ECHO MEAS - LV DIASTOLIC VOL/BSA (35-75): 48.2 ML/M^2
BH CV ECHO MEAS - LV MASS(C)D: 247.3 GRAMS
BH CV ECHO MEAS - LV MASS(C)DI: 99.3 GRAMS/M^2
BH CV ECHO MEAS - LV MAX PG: 4.4 MMHG
BH CV ECHO MEAS - LV MEAN PG: 2.5 MMHG
BH CV ECHO MEAS - LV SYSTOLIC VOL/BSA (12-30): 17.7 ML/M^2
BH CV ECHO MEAS - LV V1 MAX: 104.4 CM/SEC
BH CV ECHO MEAS - LV V1 MEAN: 75.8 CM/SEC
BH CV ECHO MEAS - LV V1 VTI: 26.2 CM
BH CV ECHO MEAS - LVIDD: 4 CM
BH CV ECHO MEAS - LVIDS: 2.6 CM
BH CV ECHO MEAS - LVOT AREA: 2.3 CM^2
BH CV ECHO MEAS - LVOT DIAM: 1.7 CM
BH CV ECHO MEAS - LVPWD: 1.4 CM
BH CV ECHO MEAS - MV A MAX VEL: 91.3 CM/SEC
BH CV ECHO MEAS - MV DEC SLOPE: 262.4 CM/SEC^2
BH CV ECHO MEAS - MV DEC TIME: 0.34 SEC
BH CV ECHO MEAS - MV E MAX VEL: 89.5 CM/SEC
BH CV ECHO MEAS - MV E/A: 0.98
BH CV ECHO MEAS - MV MAX PG: 4.6 MMHG
BH CV ECHO MEAS - MV MEAN PG: 1.7 MMHG
BH CV ECHO MEAS - MV V2 MAX: 107.4 CM/SEC
BH CV ECHO MEAS - MV V2 MEAN: 59.6 CM/SEC
BH CV ECHO MEAS - MV V2 VTI: 36.9 CM
BH CV ECHO MEAS - MVA(VTI): 1.6 CM^2
BH CV ECHO MEAS - PA ACC TIME: 0.09 SEC
BH CV ECHO MEAS - PA MAX PG (FULL): 1.6 MMHG
BH CV ECHO MEAS - PA MAX PG: 2.8 MMHG
BH CV ECHO MEAS - PA MEAN PG (FULL): 0.77 MMHG
BH CV ECHO MEAS - PA MEAN PG: 1.5 MMHG
BH CV ECHO MEAS - PA PR(ACCEL): 40.1 MMHG
BH CV ECHO MEAS - PA V2 MAX: 84.4 CM/SEC
BH CV ECHO MEAS - PA V2 MEAN: 56 CM/SEC
BH CV ECHO MEAS - PA V2 VTI: 20.5 CM
BH CV ECHO MEAS - RV MAX PG: 1.2 MMHG
BH CV ECHO MEAS - RV MEAN PG: 0.74 MMHG
BH CV ECHO MEAS - RV V1 MAX: 55.4 CM/SEC
BH CV ECHO MEAS - RV V1 MEAN: 40.3 CM/SEC
BH CV ECHO MEAS - RV V1 VTI: 15.6 CM
BH CV ECHO MEAS - SI(AO): 108.8 ML/M^2
BH CV ECHO MEAS - SI(CUBED): 17.8 ML/M^2
BH CV ECHO MEAS - SI(LVOT): 24.3 ML/M^2
BH CV ECHO MEAS - SI(MOD-SP4): 30.5 ML/M^2
BH CV ECHO MEAS - SI(TEICH): 17.5 ML/M^2
BH CV ECHO MEAS - SV(AO): 271 ML
BH CV ECHO MEAS - SV(CUBED): 44.5 ML
BH CV ECHO MEAS - SV(LVOT): 60.4 ML
BH CV ECHO MEAS - SV(MOD-SP4): 75.9 ML
BH CV ECHO MEAS - SV(TEICH): 43.6 ML

## 2021-08-30 ENCOUNTER — TELEPHONE (OUTPATIENT)
Dept: ONCOLOGY | Facility: CLINIC | Age: 81
End: 2021-08-30

## 2021-08-30 NOTE — TELEPHONE ENCOUNTER
Caller: Dalton Dallas Sr.    Relationship to patient: Self    Best call back number: [phone]    Chief complaint: MISSED APPT ON 08/24  PT/INR CHECK/  RN REVIEW  Type of visit:     Requested date:  08/31  AFTERNOON IF POSSIBLE.       If rescheduling, when is the original appointment: 08/24     Additional notes:    PLEASE CALL PT TO ADVISE IF CAN MAKE THIS APPT OR IF NEED TO SCHEDULE FOR ANOTHER DATE

## 2021-08-31 ENCOUNTER — LAB (OUTPATIENT)
Dept: LAB | Facility: HOSPITAL | Age: 81
End: 2021-08-31

## 2021-08-31 ENCOUNTER — HOSPITAL ENCOUNTER (OUTPATIENT)
Dept: ONCOLOGY | Facility: HOSPITAL | Age: 81
Setting detail: INFUSION SERIES
Discharge: HOME | End: 2021-08-31

## 2021-08-31 DIAGNOSIS — Z79.01 LONG TERM (CURRENT) USE OF ANTICOAGULANTS: Primary | ICD-10-CM

## 2021-08-31 DIAGNOSIS — I82.409 DEEP VEIN THROMBOSIS (DVT) OF LOWER EXTREMITY, UNSPECIFIED CHRONICITY, UNSPECIFIED LATERALITY, UNSPECIFIED VEIN (HCC): ICD-10-CM

## 2021-08-31 LAB — INR PPP: 2.2 (ref 0.8–1.2)

## 2021-08-31 PROCEDURE — 36416 COLLJ CAPILLARY BLOOD SPEC: CPT

## 2021-08-31 PROCEDURE — 85610 PROTHROMBIN TIME: CPT

## 2021-09-07 RX ORDER — CLOPIDOGREL BISULFATE 75 MG/1
TABLET ORAL
Qty: 90 TABLET | Refills: 2 | Status: SHIPPED | OUTPATIENT
Start: 2021-09-07 | End: 2022-06-06

## 2021-09-29 ENCOUNTER — HOSPITAL ENCOUNTER (OUTPATIENT)
Dept: ONCOLOGY | Facility: HOSPITAL | Age: 81
Setting detail: INFUSION SERIES
Discharge: HOME | End: 2021-09-29

## 2021-09-29 ENCOUNTER — TELEPHONE (OUTPATIENT)
Dept: ONCOLOGY | Facility: CLINIC | Age: 81
End: 2021-09-29

## 2021-09-29 ENCOUNTER — LAB (OUTPATIENT)
Dept: LAB | Facility: HOSPITAL | Age: 81
End: 2021-09-29

## 2021-09-29 DIAGNOSIS — I82.409 DEEP VEIN THROMBOSIS (DVT) OF LOWER EXTREMITY, UNSPECIFIED CHRONICITY, UNSPECIFIED LATERALITY, UNSPECIFIED VEIN (HCC): ICD-10-CM

## 2021-09-29 DIAGNOSIS — Z79.01 LONG TERM (CURRENT) USE OF ANTICOAGULANTS: ICD-10-CM

## 2021-09-29 DIAGNOSIS — Z79.01 LONG TERM (CURRENT) USE OF ANTICOAGULANTS: Primary | ICD-10-CM

## 2021-09-29 LAB — INR PPP: 2.2 (ref 0.8–1.2)

## 2021-09-29 PROCEDURE — 85610 PROTHROMBIN TIME: CPT

## 2021-09-29 PROCEDURE — 36416 COLLJ CAPILLARY BLOOD SPEC: CPT

## 2021-09-29 NOTE — TELEPHONE ENCOUNTER
Caller: Dalton Dallas Sr.    Relationship to patient: Self    Best call back number: [phone]    Type of visit: LAB AND PT/INR    Requested date: ASAP    If rescheduling, when is the original appointment: 08/28    Additional notes: PLEASE CALL ONCE R/S.

## 2021-10-08 ENCOUNTER — OFFICE VISIT (OUTPATIENT)
Dept: FAMILY MEDICINE CLINIC | Facility: CLINIC | Age: 81
End: 2021-10-08

## 2021-10-08 VITALS
BODY MASS INDEX: 42.66 KG/M2 | HEART RATE: 67 BPM | WEIGHT: 298 LBS | OXYGEN SATURATION: 97 % | DIASTOLIC BLOOD PRESSURE: 85 MMHG | SYSTOLIC BLOOD PRESSURE: 125 MMHG | RESPIRATION RATE: 18 BRPM | TEMPERATURE: 96.8 F | HEIGHT: 70 IN

## 2021-10-08 DIAGNOSIS — I27.20 PULMONARY HYPERTENSION (HCC): Chronic | ICD-10-CM

## 2021-10-08 DIAGNOSIS — E11.40 TYPE 2 DIABETES MELLITUS WITH DIABETIC NEUROPATHY, WITH LONG-TERM CURRENT USE OF INSULIN (HCC): Chronic | ICD-10-CM

## 2021-10-08 DIAGNOSIS — I50.32 DIASTOLIC DYSFUNCTION WITH CHRONIC HEART FAILURE (HCC): Chronic | ICD-10-CM

## 2021-10-08 DIAGNOSIS — I73.9 PERIPHERAL VASCULAR DISEASE (HCC): ICD-10-CM

## 2021-10-08 DIAGNOSIS — D68.59 THROMBOPHILIA (HCC): Chronic | ICD-10-CM

## 2021-10-08 DIAGNOSIS — E78.2 MIXED HYPERLIPIDEMIA: Chronic | ICD-10-CM

## 2021-10-08 DIAGNOSIS — Z79.4 TYPE 2 DIABETES MELLITUS WITH DIABETIC NEUROPATHY, WITH LONG-TERM CURRENT USE OF INSULIN (HCC): Chronic | ICD-10-CM

## 2021-10-08 DIAGNOSIS — Z23 NEED FOR INFLUENZA VACCINATION: ICD-10-CM

## 2021-10-08 DIAGNOSIS — G47.30 SLEEP APNEA, UNSPECIFIED TYPE: Chronic | ICD-10-CM

## 2021-10-08 DIAGNOSIS — I10 ESSENTIAL HYPERTENSION: Primary | Chronic | ICD-10-CM

## 2021-10-08 DIAGNOSIS — I25.10 CORONARY ARTERY DISEASE INVOLVING NATIVE CORONARY ARTERY OF NATIVE HEART WITHOUT ANGINA PECTORIS: ICD-10-CM

## 2021-10-08 DIAGNOSIS — E03.9 ACQUIRED HYPOTHYROIDISM: Chronic | ICD-10-CM

## 2021-10-08 PROCEDURE — G0008 ADMIN INFLUENZA VIRUS VAC: HCPCS | Performed by: FAMILY MEDICINE

## 2021-10-08 PROCEDURE — 90662 IIV NO PRSV INCREASED AG IM: CPT | Performed by: FAMILY MEDICINE

## 2021-10-08 PROCEDURE — 99214 OFFICE O/P EST MOD 30 MIN: CPT | Performed by: FAMILY MEDICINE

## 2021-10-08 NOTE — PROGRESS NOTES
Subjective   Dalton Dallas Sr. is a 81 y.o. male.     Chief Complaint   Patient presents with   • Hypothyroidism     3 month followup   • Hypertension   • Hyperlipidemia       HPI  Chief complaint: Hypertension hyperlipidemia heart failure with normal ejection fraction peripheral vascular disease thrombophilia hypothyroidism type 2 diabetes mellitus    Patient is a 81-year-old white male comes in for follow-up and maintenance of his current problems which include    1.  hypertension-stable-patient is currently on Norvasc 5 mg once a day hydrochlorothiazide 25 mg daily atenolol 50 mg daily Avapro 300 mg daily.  He denied headache lightheadedness dizziness or chest pain.    2.  Hyperlipidemia-stable out of is on Lipitor 80 mg daily and fenofibrate 54 mg daily.  Denies myalgias and arthralgias.  No nausea or anorexia.    3.  Type 2 diabetes mellitus-stable-patient is on Humulin R 35 units at bedtime Jardiance 25 mg daily Humulin R 24 units in the morning and Metformin 2000 mg a day.  He denied polyphagia or polyuria.  Get an A1c done regularly through his endocrinologist.    4.  Thrombophilia-stable-patient is on Coumadin 8 mg a day.    5.  Coronary artery disease-stable-patient is currently on Imdur 30 mg daily atenolol 50 mg daily Plavix 75 mg daily.  Denies chest pain shortness breath orthopnea PND.    6.  Hypothyroidism-stable the patient Synthroid 1 7 5 mg a day.  He denied heat or cold intolerance.    7.  Peripheral vascular disease-stable on patient on Plavix 75 mg daily.  Denies headache lightheadedness dizziness or chest pain.        The following portions of the patient's history were reviewed and updated as appropriate: allergies, current medications, past family history, past medical history, past social history, past surgical history and problem list.    Review of Systems    Objective     /85 (BP Location: Left arm, Patient Position: Sitting, Cuff Size: Large Adult)   Pulse 67   Temp 96.8 °F  "(36 °C) (Infrared)   Resp 18   Ht 177.8 cm (70\")   Wt 135 kg (298 lb)   SpO2 97%   BMI 42.76 kg/m²     Physical Exam  Vitals and nursing note reviewed.   Constitutional:       Appearance: He is well-developed. He is obese.   HENT:      Head: Normocephalic and atraumatic.   Eyes:      Pupils: Pupils are equal, round, and reactive to light.   Cardiovascular:      Rate and Rhythm: Normal rate and regular rhythm.      Heart sounds: Normal heart sounds.   Pulmonary:      Effort: Pulmonary effort is normal.      Breath sounds: Normal breath sounds.   Abdominal:      General: Bowel sounds are normal.      Palpations: Abdomen is soft.   Musculoskeletal:         General: Normal range of motion.      Cervical back: Neck supple.   Skin:     General: Skin is warm and dry.   Neurological:      Mental Status: He is alert and oriented to person, place, and time.   Psychiatric:         Behavior: Behavior normal.         Thought Content: Thought content normal.         Judgment: Judgment normal.       Lipid Panel (07/04/2021 04:58)  Basic Metabolic Panel (07/04/2021 04:58)  CBC (No Diff) (07/04/2021 04:58)      Assessment/Plan   Diagnoses and all orders for this visit:    1. Essential hypertension (Primary)    2. Mixed hyperlipidemia    3. Diastolic dysfunction with chronic heart failure (HCC)    4. Coronary artery disease involving native coronary artery of native heart without angina pectoris    5. Peripheral vascular disease (HCC)    6. Thrombophilia (HCC)    7. Acquired hypothyroidism    8. Type 2 diabetes mellitus with diabetic neuropathy, with long-term current use of insulin (HCC)    9. Pulmonary hypertension (HCC)    10. Sleep apnea, unspecified type      Patient Instructions   Continue your current medications and treat,ent.    Follow up in the office in 3 months.    Have the follow up labs as ordered.          Gabino Sibley Jr., MD    10/08/21  "

## 2021-10-15 RX ORDER — IRBESARTAN 300 MG/1
TABLET ORAL
Qty: 90 TABLET | Refills: 3 | Status: SHIPPED | OUTPATIENT
Start: 2021-10-15 | End: 2022-10-10

## 2021-10-26 ENCOUNTER — LAB (OUTPATIENT)
Dept: LAB | Facility: HOSPITAL | Age: 81
End: 2021-10-26

## 2021-10-26 ENCOUNTER — HOSPITAL ENCOUNTER (OUTPATIENT)
Dept: ONCOLOGY | Facility: HOSPITAL | Age: 81
Setting detail: INFUSION SERIES
Discharge: HOME | End: 2021-10-26

## 2021-10-26 DIAGNOSIS — I82.409 DEEP VEIN THROMBOSIS (DVT) OF LOWER EXTREMITY, UNSPECIFIED CHRONICITY, UNSPECIFIED LATERALITY, UNSPECIFIED VEIN (HCC): ICD-10-CM

## 2021-10-26 DIAGNOSIS — Z79.01 LONG TERM (CURRENT) USE OF ANTICOAGULANTS: Primary | ICD-10-CM

## 2021-10-26 DIAGNOSIS — Z79.01 LONG TERM (CURRENT) USE OF ANTICOAGULANTS: ICD-10-CM

## 2021-10-26 LAB — INR PPP: 2.4 (ref 0.8–1.2)

## 2021-10-26 PROCEDURE — 36416 COLLJ CAPILLARY BLOOD SPEC: CPT

## 2021-10-26 PROCEDURE — 85610 PROTHROMBIN TIME: CPT

## 2021-11-02 ENCOUNTER — LAB (OUTPATIENT)
Dept: LAB | Facility: HOSPITAL | Age: 81
End: 2021-11-02

## 2021-11-02 DIAGNOSIS — E78.2 MIXED HYPERLIPIDEMIA: Chronic | ICD-10-CM

## 2021-11-02 DIAGNOSIS — Z79.4 TYPE 2 DIABETES MELLITUS WITH DIABETIC NEUROPATHY, WITH LONG-TERM CURRENT USE OF INSULIN (HCC): Chronic | ICD-10-CM

## 2021-11-02 DIAGNOSIS — E11.40 TYPE 2 DIABETES MELLITUS WITH DIABETIC NEUROPATHY, WITH LONG-TERM CURRENT USE OF INSULIN (HCC): Chronic | ICD-10-CM

## 2021-11-02 LAB
ALBUMIN SERPL-MCNC: 4.4 G/DL (ref 3.5–5.2)
ALBUMIN/GLOB SERPL: 2 G/DL
ALP SERPL-CCNC: 37 U/L (ref 39–117)
ALT SERPL W P-5'-P-CCNC: 22 U/L (ref 1–41)
ANION GAP SERPL CALCULATED.3IONS-SCNC: 11.6 MMOL/L (ref 5–15)
AST SERPL-CCNC: 21 U/L (ref 1–40)
BILIRUB SERPL-MCNC: 0.3 MG/DL (ref 0–1.2)
BUN SERPL-MCNC: 23 MG/DL (ref 8–23)
BUN/CREAT SERPL: 19.7 (ref 7–25)
CALCIUM SPEC-SCNC: 9.5 MG/DL (ref 8.6–10.5)
CHLORIDE SERPL-SCNC: 100 MMOL/L (ref 98–107)
CO2 SERPL-SCNC: 24.4 MMOL/L (ref 22–29)
CREAT SERPL-MCNC: 1.17 MG/DL (ref 0.76–1.27)
GFR SERPL CREATININE-BSD FRML MDRD: 60 ML/MIN/1.73
GLOBULIN UR ELPH-MCNC: 2.2 GM/DL
GLUCOSE SERPL-MCNC: 174 MG/DL (ref 65–99)
HBA1C MFR BLD: 7.8 % (ref 3.5–5.6)
POTASSIUM SERPL-SCNC: 4.4 MMOL/L (ref 3.5–5.2)
PROT SERPL-MCNC: 6.6 G/DL (ref 6–8.5)
SODIUM SERPL-SCNC: 136 MMOL/L (ref 136–145)

## 2021-11-02 PROCEDURE — 83036 HEMOGLOBIN GLYCOSYLATED A1C: CPT

## 2021-11-02 PROCEDURE — 80053 COMPREHEN METABOLIC PANEL: CPT

## 2021-11-02 PROCEDURE — 36415 COLL VENOUS BLD VENIPUNCTURE: CPT

## 2021-11-09 ENCOUNTER — OFFICE VISIT (OUTPATIENT)
Dept: ENDOCRINOLOGY | Facility: CLINIC | Age: 81
End: 2021-11-09

## 2021-11-09 VITALS
BODY MASS INDEX: 42.86 KG/M2 | WEIGHT: 299.4 LBS | HEART RATE: 71 BPM | TEMPERATURE: 97.2 F | SYSTOLIC BLOOD PRESSURE: 130 MMHG | HEIGHT: 70 IN | DIASTOLIC BLOOD PRESSURE: 60 MMHG

## 2021-11-09 DIAGNOSIS — E03.9 ACQUIRED HYPOTHYROIDISM: Chronic | ICD-10-CM

## 2021-11-09 DIAGNOSIS — E55.9 VITAMIN D DEFICIENCY: ICD-10-CM

## 2021-11-09 DIAGNOSIS — E78.2 MIXED HYPERLIPIDEMIA: Chronic | ICD-10-CM

## 2021-11-09 DIAGNOSIS — E11.40 TYPE 2 DIABETES MELLITUS WITH DIABETIC NEUROPATHY, WITH LONG-TERM CURRENT USE OF INSULIN (HCC): Primary | Chronic | ICD-10-CM

## 2021-11-09 DIAGNOSIS — Z79.4 TYPE 2 DIABETES MELLITUS WITH DIABETIC NEUROPATHY, WITH LONG-TERM CURRENT USE OF INSULIN (HCC): Primary | Chronic | ICD-10-CM

## 2021-11-09 LAB — GLUCOSE BLDC GLUCOMTR-MCNC: 204 MG/DL (ref 70–105)

## 2021-11-09 PROCEDURE — 99214 OFFICE O/P EST MOD 30 MIN: CPT | Performed by: INTERNAL MEDICINE

## 2021-11-09 PROCEDURE — 82962 GLUCOSE BLOOD TEST: CPT | Performed by: INTERNAL MEDICINE

## 2021-11-09 RX ORDER — WARFARIN SODIUM 1 MG/1
TABLET ORAL
COMMUNITY
Start: 2021-10-26 | End: 2022-02-28 | Stop reason: SDUPTHER

## 2021-11-09 RX ORDER — LEVOTHYROXINE SODIUM 175 UG/1
TABLET ORAL
Qty: 90 TABLET | Refills: 3 | Status: SHIPPED | OUTPATIENT
Start: 2021-11-09 | End: 2022-11-02

## 2021-11-09 RX ORDER — PEN NEEDLE, DIABETIC 29 G X1/2"
NEEDLE, DISPOSABLE MISCELLANEOUS
COMMUNITY
Start: 2021-10-30 | End: 2022-04-28

## 2021-11-09 NOTE — PATIENT INSTRUCTIONS
Keep up the good work!  See eye doctor as scheduled.  Increase U500 insulin to 36 units before supper.  Continue exercise.  Continue other diabees, thyroid & chol meds & vit D supplemets.  Call if blood sugars are running under 100 or over 200.  F/u in 6 months, with fasting labs prior.

## 2021-11-10 NOTE — PROGRESS NOTES
[facility]        Patient Care Team:  Gabino Sibley Jr., MD as PCP - General (Family Medicine)    Chief Complaint:    Chief Complaint   Patient presents with   • Diabetes     follow up, Type 2,   4hr PP, Humulin R 24 units Similyn 120 units  this am         Subjective   Here for diabetes f/u  Blood sugars: higher in am  Exercise program: yard work  Taking vit D    Interval History:     Patient Complaints: R knee pain  Patient Denies:  hypoglycemia  History taken from: patient    Review of Systems:   Review of Systems   Eyes: Negative for blurred vision.   Cardiovascular: Positive for leg swelling.   Gastrointestinal: Negative for nausea.   Endocrine: Negative for polyuria.   Musculoskeletal: Positive for arthralgias.   Neurological: Negative for headache.     Lost  2 lb since last visit    Objective     Vital Signs  Temp:  [97.2 °F (36.2 °C)] 97.2 °F (36.2 °C)  Heart Rate:  [71] 71  BP: (130)/(60) 130/60    Physical Exam:     General Appearance:    Alert, cooperative, in no acute distress. Obese   Head:    Normocephalic, without obvious abnormality, atraumatic   Eyes:            Lids and lashes normal, conjunctivae and sclerae normal, no   icterus, no pallor, corneas clear, PERRLA   Throat:   No oral lesions,  oral mucosa moist   Neck:   No adenopathy, supple,  no thyromegaly, no   carotid bruit   Lungs:     Decreased breath sounds    Heart:    Regular rhythm and normal rate   Chest Wall:    No abnormalities observed   Abdomen:     Normal bowel sounds, soft                 Extremities:   Plantar calluses, hammer toes, trace edema               Pulses:   Pulses palpable and equal bilaterally   Skin:   Dry. Stasis dermatitis   Neurologic:  DTR absent in ankles, absent vibratory sense in toes, able to feel the 10g monofilament, except toes  (better than 1y ago)          Results Review:    I have reviewed the patient's new clinical results, labs & imaging.    Medication Review:   Prior  "to Admission medications    Medication Sig Start Date End Date Taking? Authorizing Provider   Accu-Chek Softclix Lancets lancets Use to test blood sugars 3 times daily. DXE11.65 7/15/21 7/15/22 Yes Virginia Shaver MD   amLODIPine (NORVASC) 5 MG tablet Take 1 tablet by mouth Daily for 270 days. 7/20/21 4/16/22 Yes Edwar Ackerman MD   atenolol (TENORMIN) 50 MG tablet TAKE 1 TABLET DAILY 5/5/21  Yes Virginia Shaver MD   atorvastatin (LIPITOR) 80 MG tablet Take 1 tablet by mouth Every Night for 270 days. 7/20/21 4/16/22 Yes Edwar Ackerman MD   BD Insulin Syringe U/F 31G X 5/16\" 0.5 ML misc  10/30/21  Yes Lee Ruggiero MD   Blood Glucose Monitoring Suppl (Accu-Chek Guide) w/Device kit 1 each Daily. 7/15/21  Yes Virginia Shaver MD   clopidogrel (PLAVIX) 75 MG tablet TAKE 1 TABLET DAILY 9/7/21  Yes Edwar Ackerman MD   Empagliflozin (Jardiance) 25 MG tablet Take 25 mg by mouth Daily. 5/19/21  Yes Virginia Shaver MD   fenofibrate (TRICOR) 54 MG tablet TAKE 1 TABLET DAILY 3/25/21  Yes Edwar Ackerman MD   ferrous sulfate 325 (65 FE) MG tablet Take 325 mg by mouth 2 (Two) Times a Day.   Yes Lee Ruggiero MD   gabapentin (NEURONTIN) 100 MG capsule TAKE 1 CAPSULE THREE TIMES A DAY 7/2/21  Yes Gabino Sibley Jr., MD   glucose blood (Accu-Chek Guide) test strip Use to test blood sugars 3 times daily. DXE11.65 7/15/21  Yes Virginia Shaver MD   hydroCHLOROthiazide (HYDRODIURIL) 25 MG tablet TAKE 1 TABLET DAILY 4/26/21  Yes Edwar Ackerman MD   Insulin Regular Human, Conc, (HumuLIN R) 500 UNIT/ML solution pen-injector CONCENTRATED injection Draw up to 24 unit markings & inject ac breakfast, 36 unit markings & injects ac supper.   Yes Lee Ruggiero MD   irbesartan (AVAPRO) 300 MG tablet TAKE 1 TABLET DAILY 10/15/21  Yes Virginia Shaver MD   isosorbide mononitrate (IMDUR) 30 MG 24 hr tablet Take 1 tablet by mouth Daily for 270 days. 7/20/21 4/16/22 Yes Edwar Ackerman MD   levothyroxine (SYNTHROID, " LEVOTHROID) 175 MCG tablet TAKE 1 TABLET DAILY 11/9/21  Yes Virginia Shaver MD   metFORMIN ER (GLUCOPHAGE-XR) 500 MG 24 hr tablet Take 4 tablets by mouth Daily With Dinner. 7/6/21  Yes Royce Herman MD   Omega-3 Fatty Acids (FISH OIL) 1000 MG capsule capsule Take 1,000 mg by mouth 2 (Two) Times a Day.   Yes Lee Ruggiero MD   Pramlintide Acetate 2700 MCG/2.7ML solution pen-injector Inject 120 mcg under the skin into the appropriate area as directed 3 (Three) Times a Day. 8/27/20  Yes Virginia Shaver MD   vitamin B-12 (CYANOCOBALAMIN) 100 MCG tablet Take 100 mcg by mouth Daily. 4/15/15  Yes Lee Ruggiero MD   vitamin D (ERGOCALCIFEROL) 1.25 MG (15992 UT) capsule capsule TAKE 1 CAPSULE ONCE A WEEK 5/4/21  Yes Virginia Shaver MD   warfarin (COUMADIN) 1 MG tablet  10/26/21  Yes Lee Ruggiero MD   warfarin (COUMADIN) 3 MG tablet TAKE 1 TABLET DAILY 7/2/21  Yes Hadley Gaffney MD   warfarin (COUMADIN) 5 MG tablet TAKE 1 TABLET AS DIRECTED 6/28/21  Yes Hadley Gaffney MD   Insulin Regular Human, Conc, (HumuLIN R) 500 UNIT/ML solution pen-injector CONCENTRATED injection Inject 35 Units under the skin into the appropriate area as directed Daily With Dinner.  11/9/21  Lee Ruggiero MD   levothyroxine (SYNTHROID, LEVOTHROID) 175 MCG tablet TAKE 1 TABLET DAILY 11/11/20 11/9/21  Virginia Shaver MD       Lab Results (most recent)     Procedure Component Value Units Date/Time    POC Glucose [[id number]]  (Abnormal) Collected: 11/09/21 1217    Specimen: Blood Updated: 11/09/21 1218     Glucose 204 mg/dL      Comment: Serial Number: [id number]:  [id number]           Lab Results   Component Value Date    HGBA1C 7.8 (H) 11/02/2021    HGBA1C 7.4 (H) 05/03/2021    HGBA1C 7.0 (H) 10/28/2020      Lab Results   Component Value Date    GLUCOSE 174 (H) 11/02/2021    BUN 23 11/02/2021    CREATININE 1.17 11/02/2021    EGFRIFNONA 60 (L) 11/02/2021    BCR 19.7 11/02/2021    K 4.4  11/02/2021    CO2 24.4 11/02/2021    CALCIUM 9.5 11/02/2021    ALBUMIN 4.40 11/02/2021    LABIL2 1.7 03/28/2019    AST 21 11/02/2021    ALT 22 11/02/2021    CHOL 145 07/04/2021    LDL 72 07/04/2021    HDL 29 (L) 07/04/2021    TRIG 270 (H) 07/04/2021     Lab Results   Component Value Date    TSH 2.800 05/03/2021    FREET4 1.31 05/03/2021    AFHO25IZ 66.5 05/03/2021       Assessment/Plan     Diagnoses and all orders for this visit:    1. Type 2 diabetes mellitus with diabetic neuropathy, with long-term current use of insulin (HCC) (Primary)  -     Hemoglobin A1c; Future  -     Microalbumin / Creatinine Urine Ratio - Urine, Clean Catch; Future    2. Acquired hypothyroidism  -     TSH; Future  -     T4, Free; Future    3. Mixed hyperlipidemia  -     Comprehensive Metabolic Panel; Future  -     Lipid Panel; Future    4. Vitamin D deficiency  -     Vitamin D 25 Hydroxy; Future    Other orders  -     POC Glucose    Glucose control stable. Will check thyroid, lipid & vit D status next visit.    See eye doctor as scheduled.  Increase U500 insulin to 36 units before supper.  Continue exercise.  Continue other diabees, thyroid & chol meds & vit D supplemets.  Call if blood sugars are running under 100 or over 200.        Virginia Shaver MD  11/09/21  22:18 EST

## 2021-11-15 RX ORDER — PRAMLINTIDE ACETATE 1000 UG/ML
INJECTION SUBCUTANEOUS
Qty: 32.4 ML | Refills: 3 | Status: SHIPPED | OUTPATIENT
Start: 2021-11-15 | End: 2022-10-12 | Stop reason: SDUPTHER

## 2021-11-17 ENCOUNTER — OFFICE VISIT (OUTPATIENT)
Dept: PULMONOLOGY | Facility: HOSPITAL | Age: 81
End: 2021-11-17

## 2021-11-17 VITALS
RESPIRATION RATE: 13 BRPM | HEART RATE: 69 BPM | SYSTOLIC BLOOD PRESSURE: 155 MMHG | DIASTOLIC BLOOD PRESSURE: 84 MMHG | BODY MASS INDEX: 42.55 KG/M2 | OXYGEN SATURATION: 97 % | TEMPERATURE: 98.5 F | HEIGHT: 70 IN | WEIGHT: 297.2 LBS

## 2021-11-17 DIAGNOSIS — G47.33 OSA (OBSTRUCTIVE SLEEP APNEA): Primary | ICD-10-CM

## 2021-11-17 PROCEDURE — G0463 HOSPITAL OUTPT CLINIC VISIT: HCPCS

## 2021-11-17 NOTE — PROGRESS NOTES
PULMONARY/ CRITICAL CARE/ SLEEP MEDICINE OUTPATIENT CONSULT/ FOLLOW UP NOTE        Patient Name:  Dalton Dallas Sr.    :  1940    Medical Record:  [id number]    PRIMARY CARE PHYSICIAN     Gabino Sibley Jr., MD    REASON FOR CONSULTATION    Dalton Dallas Sr. is a 81 y.o. male who is referred for consultation for obstructive sleep apnea  REVIEW OF SYSTEMS    Constitutional:  Denies fever or chills   Eyes:  Denies change in visual acuity   HENT:  Denies nasal congestion or sore throat   Respiratory:  Denies cough or shortness of breath   Cardiovascular:  Denies chest pain or edema   GI:  Denies abdominal pain, nausea, vomiting, bloody stools or diarrhea   :  Denies dysuria   Musculoskeletal:  Denies back pain or joint pain   Integument:  Denies rash   Neurologic:  Denies headache, focal weakness or sensory changes   Endocrine:  Denies polyuria or polydipsia   Lymphatic:  Denies swollen glands   Psychiatric:  Denies depression or anxiety     MEDICAL HISTORY    Past Medical History:   Diagnosis Date   • ACE-inhibitor cough 2005   • Coronary artery disease    • Diabetes mellitus, type 2 (HCC)    • ED (erectile dysfunction)    • Hx of blood clots 2014    Blood clot left leg    • Hyperlipidemia 2000   • Hypertension    • Hypogonadism, male    • Hypothyroidism 2001   • Obesity    • Peripheral neuropathy    • Pulmonary embolism (HCC) 2014   • Rectal fistula    • Sleep apnea 12/10/2013   • Vitamin D deficiency         SURGICAL HISTORY    Past Surgical History:   Procedure Laterality Date   • CARDIAC CATHETERIZATION      PTCA: ; PTCA: 2015 LCX stent    • CARDIAC CATHETERIZATION Left 7/3/2021    Procedure: Cardiac Catheterization/Vascular Study;  Surgeon: Edwar Ackerman MD;  Location: [facility] INVASIVE LOC[facility];  Service: Cardiovascular;  Laterality: Left;   • CARDIOVASCULAR STRESS TEST     • CATARACT EXTRACTION  2016 & 16    • COLONOSCOPY N/A  "2019    Procedure: COLONOSCOPY WITH ENDOSCOPIC CLIPPING X1 OF POST POLYPECTOMY BLEED SITE;  Surgeon: Jhonny Orellana MD;  Location: [facility] ENDOSCOPY;  Service: Gastroenterology   • CORONARY STENT PLACEMENT     • INGUINAL HERNIA REPAIR Left    • UMBILICAL HERNIA REPAIR          FAMILY HISTORY    Family History   Problem Relation Age of Onset   • Heart disease Mother    • Leukemia Father        SOCIAL HISTORY    Social History     Tobacco Use   • Smoking status: Former Smoker     Packs/day: 1.50     Years: 20.00     Pack years: 30.00     Types: Cigarettes     Quit date:      Years since quittin.9   • Smokeless tobacco: Never Used   • Tobacco comment: quit    Substance Use Topics   • Alcohol use: No        ALLERGIES    No Known Allergies      MEDICATIONS    Current Outpatient Medications on File Prior to Visit   Medication Sig Dispense Refill   • Accu-Chek Softclix Lancets lancets Use to test blood sugars 3 times daily. DXE11.65 100 each 11   • amLODIPine (NORVASC) 5 MG tablet Take 1 tablet by mouth Daily for 270 days. 90 tablet 2   • atenolol (TENORMIN) 50 MG tablet TAKE 1 TABLET DAILY 90 tablet 3   • atorvastatin (LIPITOR) 80 MG tablet Take 1 tablet by mouth Every Night for 270 days. 90 tablet 2   • BD Insulin Syringe U/F 31G X 5/16\" 0.5 ML misc      • Blood Glucose Monitoring Suppl (Accu-Chek Guide) w/Device kit 1 each Daily. 1 kit 1   • clopidogrel (PLAVIX) 75 MG tablet TAKE 1 TABLET DAILY 90 tablet 2   • Empagliflozin (Jardiance) 25 MG tablet Take 25 mg by mouth Daily. 90 tablet 3   • fenofibrate (TRICOR) 54 MG tablet TAKE 1 TABLET DAILY 90 tablet 3   • ferrous sulfate 325 (65 FE) MG tablet Take 325 mg by mouth 2 (Two) Times a Day.     • gabapentin (NEURONTIN) 100 MG capsule TAKE 1 CAPSULE THREE TIMES A  capsule 3   • glucose blood (Accu-Chek Guide) test strip Use to test blood sugars 3 times daily. DXE11.65 100 each 11   • hydroCHLOROthiazide (HYDRODIURIL) 25 MG tablet TAKE 1 TABLET " "DAILY 90 tablet 3   • Insulin Regular Human, Conc, (HumuLIN R) 500 UNIT/ML solution pen-injector CONCENTRATED injection Draw up to 24 unit markings & inject ac breakfast, 36 unit markings & injects ac supper.     • irbesartan (AVAPRO) 300 MG tablet TAKE 1 TABLET DAILY 90 tablet 3   • isosorbide mononitrate (IMDUR) 30 MG 24 hr tablet Take 1 tablet by mouth Daily for 270 days. 90 tablet 2   • levothyroxine (SYNTHROID, LEVOTHROID) 175 MCG tablet TAKE 1 TABLET DAILY 90 tablet 3   • metFORMIN ER (GLUCOPHAGE-XR) 500 MG 24 hr tablet Take 4 tablets by mouth Daily With Dinner.     • Omega-3 Fatty Acids (FISH OIL) 1000 MG capsule capsule Take 1,000 mg by mouth 2 (Two) Times a Day.     • SymlinPen 120 2700 MCG/2.7ML solution pen-injector INJECT 120 MCG UNDER THE SKIN INTO THE APPROPRIATE AREA AS DIRECTED THREE TIMES A DAY 32.4 mL 3   • vitamin B-12 (CYANOCOBALAMIN) 100 MCG tablet Take 100 mcg by mouth Daily.     • vitamin D (ERGOCALCIFEROL) 1.25 MG (51029 UT) capsule capsule TAKE 1 CAPSULE ONCE A WEEK 12 capsule 3   • warfarin (COUMADIN) 1 MG tablet      • warfarin (COUMADIN) 3 MG tablet TAKE 1 TABLET DAILY 90 tablet 3   • warfarin (COUMADIN) 5 MG tablet TAKE 1 TABLET AS DIRECTED 90 tablet 3     No current facility-administered medications on file prior to visit.       PHYSICAL EXAM    Vitals:    11/17/21 1515   BP: 155/84   BP Location: Left arm   Patient Position: Sitting   Cuff Size: Adult   Pulse: 69   Resp: 13   Temp: 98.5 °F (36.9 °C)   TempSrc: Oral   SpO2: 97%   Weight: 135 kg (297 lb 3.2 oz)   Height: 177.8 cm (70\")        Constitutional:  Well developed, well nourished, no acute distress, non-toxic appearance   Eyes:  PERRL, conjunctiva normal   HENT:  Atraumatic, external ears normal, nose normal, oropharynx moist, no pharyngeal exudates. mallampatti   Neck- normal range of motion, no tenderness, supple   Respiratory:  No respiratory distress, normal breath sounds, no rales, no wheezing   Cardiovascular:  Normal " rate, normal rhythm, no murmurs, no gallops, no rubs   GI:  Soft, nondistended, normal bowel sounds, nontender, no organomegaly, no mass, no rebound, no guarding   :  No costovertebral angle tenderness   Musculoskeletal:  No edema, no tenderness, no deformities. Back- no tenderness  Integument:  Well hydrated, no rash   Lymphatic:  No lymphadenopathy noted   Neurologic:  Alert & oriented x 3, CN 2-12 normal, normal motor function, normal sensory function, no focal deficits noted   Psychiatric:  Speech and behavior appropriate     No radiology results for the last 90 days.   Results for orders placed during the hospital encounter of 08/12/21    Adult Transthoracic Echo Complete W/ Cont if Necessary Per Protocol    Interpretation Summary  · Left ventricular systolic function is normal.  · Left ventricular ejection fraction is 60 to 65%  · Left ventricular diastolic function was normal.      ASSESSMENT & PLAN:         Sleep apnea, unspecified type has air curve 10 V auto compliance 100%, average use 6 hours 45 minutes,   patient is compliant and benefiting from therapy  Nocturnal oxygen   Discussed with patient cardiovascular consequences of untreated sleep apnea    2D echo no evidence of pulmonary hypertension  Essential hypertension (Primary)  Mixed hyperlipidemia  Diastolic dysfunction with chronic heart failure (HCC)  Coronary artery disease involving native coronary artery of native heart without angina pectoris  Peripheral vascular disease (HCC)  Thrombophilia (HCC)  Acquired hypothyroidism  Type 2 diabetes mellitus with diabetic neuropathy, with long-term current use of insulin (HCC)    This document has been electronically signed by  Jelani Vieira MD  15:31 EST

## 2021-11-23 ENCOUNTER — LAB (OUTPATIENT)
Dept: LAB | Facility: HOSPITAL | Age: 81
End: 2021-11-23

## 2021-11-23 ENCOUNTER — APPOINTMENT (OUTPATIENT)
Dept: LAB | Facility: HOSPITAL | Age: 81
End: 2021-11-23

## 2021-11-23 ENCOUNTER — HOSPITAL ENCOUNTER (OUTPATIENT)
Dept: ONCOLOGY | Facility: HOSPITAL | Age: 81
Setting detail: INFUSION SERIES
Discharge: HOME | End: 2021-11-23

## 2021-11-23 ENCOUNTER — APPOINTMENT (OUTPATIENT)
Dept: ONCOLOGY | Facility: HOSPITAL | Age: 81
End: 2021-11-23

## 2021-11-23 DIAGNOSIS — Z79.01 LONG TERM (CURRENT) USE OF ANTICOAGULANTS: ICD-10-CM

## 2021-11-23 DIAGNOSIS — Z79.01 LONG TERM (CURRENT) USE OF ANTICOAGULANTS: Primary | ICD-10-CM

## 2021-11-23 DIAGNOSIS — I82.409 DEEP VEIN THROMBOSIS (DVT) OF LOWER EXTREMITY, UNSPECIFIED CHRONICITY, UNSPECIFIED LATERALITY, UNSPECIFIED VEIN (HCC): ICD-10-CM

## 2021-11-23 LAB — INR PPP: 2.4 (ref 0.8–1.2)

## 2021-11-23 PROCEDURE — 36416 COLLJ CAPILLARY BLOOD SPEC: CPT

## 2021-11-23 PROCEDURE — 85610 PROTHROMBIN TIME: CPT

## 2021-12-20 ENCOUNTER — OFFICE VISIT (OUTPATIENT)
Dept: ONCOLOGY | Facility: CLINIC | Age: 81
End: 2021-12-20

## 2021-12-20 ENCOUNTER — LAB (OUTPATIENT)
Dept: LAB | Facility: HOSPITAL | Age: 81
End: 2021-12-20

## 2021-12-20 VITALS
SYSTOLIC BLOOD PRESSURE: 133 MMHG | OXYGEN SATURATION: 96 % | WEIGHT: 301.2 LBS | BODY MASS INDEX: 43.12 KG/M2 | HEART RATE: 68 BPM | DIASTOLIC BLOOD PRESSURE: 72 MMHG | TEMPERATURE: 96.9 F | HEIGHT: 70 IN | RESPIRATION RATE: 18 BRPM

## 2021-12-20 DIAGNOSIS — I82.409 DEEP VEIN THROMBOSIS (DVT) OF LOWER EXTREMITY, UNSPECIFIED CHRONICITY, UNSPECIFIED LATERALITY, UNSPECIFIED VEIN (HCC): Primary | ICD-10-CM

## 2021-12-20 DIAGNOSIS — I82.409 DEEP VEIN THROMBOSIS (DVT) OF LOWER EXTREMITY, UNSPECIFIED CHRONICITY, UNSPECIFIED LATERALITY, UNSPECIFIED VEIN (HCC): ICD-10-CM

## 2021-12-20 DIAGNOSIS — Z79.01 LONG TERM (CURRENT) USE OF ANTICOAGULANTS: ICD-10-CM

## 2021-12-20 LAB
BASOPHILS # BLD AUTO: 0.04 10*3/MM3 (ref 0–0.2)
BASOPHILS NFR BLD AUTO: 0.6 % (ref 0–1.5)
DEPRECATED RDW RBC AUTO: 47.5 FL (ref 37–54)
EOSINOPHIL # BLD AUTO: 0.27 10*3/MM3 (ref 0–0.4)
EOSINOPHIL NFR BLD AUTO: 3.7 % (ref 0.3–6.2)
ERYTHROCYTE [DISTWIDTH] IN BLOOD BY AUTOMATED COUNT: 14.2 % (ref 12.3–15.4)
HCT VFR BLD AUTO: 42.7 % (ref 37.5–51)
HGB BLD-MCNC: 14.2 G/DL (ref 13–17.7)
INR PPP: 2.6 (ref 0.8–1.2)
LYMPHOCYTES # BLD AUTO: 2.09 10*3/MM3 (ref 0.7–3.1)
LYMPHOCYTES NFR BLD AUTO: 28.8 % (ref 19.6–45.3)
MCH RBC QN AUTO: 31.3 PG (ref 26.6–33)
MCHC RBC AUTO-ENTMCNC: 33.3 G/DL (ref 31.5–35.7)
MCV RBC AUTO: 94.3 FL (ref 79–97)
MONOCYTES # BLD AUTO: 0.61 10*3/MM3 (ref 0.1–0.9)
MONOCYTES NFR BLD AUTO: 8.4 % (ref 5–12)
NEUTROPHILS NFR BLD AUTO: 4.24 10*3/MM3 (ref 1.7–7)
NEUTROPHILS NFR BLD AUTO: 58.5 % (ref 42.7–76)
PLATELET # BLD AUTO: 178 10*3/MM3 (ref 140–450)
PMV BLD AUTO: 11.2 FL (ref 6–12)
RBC # BLD AUTO: 4.53 10*6/MM3 (ref 4.14–5.8)
WBC NRBC COR # BLD: 7.25 10*3/MM3 (ref 3.4–10.8)

## 2021-12-20 PROCEDURE — 99214 OFFICE O/P EST MOD 30 MIN: CPT | Performed by: INTERNAL MEDICINE

## 2021-12-20 PROCEDURE — 85025 COMPLETE CBC W/AUTO DIFF WBC: CPT | Performed by: INTERNAL MEDICINE

## 2021-12-20 PROCEDURE — 85610 PROTHROMBIN TIME: CPT

## 2021-12-20 NOTE — PROGRESS NOTES
HEMATOLOGY ONCOLOGY FOLLOW UP        Patient name: Dalton Dallas Sr.  : 1940  MRN: [id number]  Primary Care Physician: Gabino Sibley Jr., MD  Referring Physician: Gabino Sibley Jr., *  Reason For Consult:     Chief Complaint   Patient presents with   • Follow-up     Long term (current) use of anticoagulants   History of PE and DVT  Anticoagulation management  Anemia    History of Present Illness:  Mr. Dallas is a 80-year-old gentleman, remote smoker, with a history of diabetes, coronary artery disease and hypertension who presented to [facility] on 14 with symptoms of shortness of breath and chest discomfort.  CT scan of the chest on 14 showed bilateral pulmonary emboli with large clot burden.  There were filling defects predominantly in the right upper lobe, and lower and middle lobe pulmonary arterial trees along with emboli in the left upper lobe and left lower lobe arterial tree as well.  Mediastinal,  hilar and subcarinal lymphadenopathy was also noted.  The largest lymph node in the subcarinal location measured 3.9 x 1.9 cm.  The patient was started on anticoagulation.  Bilateral lower extremity venous Doppler on 14 showed a filling defect within the left gastroc veins.  The patient was put on anticoagulation.  Thrombolysis was also under consideration considering the extensive clot burden, but the patient did not require it.       Patient denied any prior history of thromboembolism before this episode.  His brother may have had   a leg DVT.    · 14 - Creatinine 1.3, BUN 20.  · 14 - CBC:  WBC 14.4, hemoglobin 14.9, platelet count 241,000.   · 14 - Factor V Leiden negative.  Factor VIII level 323 (H).  Protein C activity 112.6% (N).    Protein S activity 11.3 (L).  Activated protein C resistance 2.1 (L).  Antithrombin III 83% (N).    D-dimer 2.15.    · 14 - Serum homocysteine 8 (N).  Anticardiolipin antibody negative.  Phosphatidylserine    antibody negative.  Beta-2 glycoprotein antibody negative.   · 2/19/14 - Prothrombin gene mutation negative.    · 2/20/14 - FLAKO-2 mutation test negative.    · 3/10/14 - Protein S activity 93% (N).  Homocysteine level 11 (N).    · 4/11/14 - Factor VIII level 260 (H).   · 4/11/14 - CT scan of the chest without contrast:  Complete resolution of air space opacities in   the lower lobes.  Mediastinal lymphadenopathy is overall very similar to the prior study.  It is   nonspecific.  Three vessel coronary artery atherosclerotic calcification.  Questionable lobular   contour of the liver, question cirrhosis.  AP window lymph node measures about 1.1 cm,   paratracheal lymph node measures about 1.7 cm and subcarinal lymph node measures about 1.2 cm.  · 5/2/14 - INR 4.1.   · 7/30/14 - WBC 5.7, hemoglobin 12.1, platelet count 228,000, MCV 94.0.  INR 3.0.   · 10/17/14 - CT scan of the chest:  Chronic lung changes are stable.  Several borderline to mildly   enlarged mediastinal lymph nodes are also unchanged from six months ago.  They are likely benign   reactive lymph nodes.    · 10/29/14 - Vitamin B12 185, ferritin 57, iron saturation 19%, TIBC 473 (H), serum iron 88,   creatinine 1.3, BUN 18, folate 17.7.  · 11/4/14 - Patient underwent upper GI endoscopy and colonoscopy:  Several nonbleeding diverticula   were seen in the sigmoid colon.  Diverticulosis appeared moderate severity.  Four sessile polyps   of benign appearance were found in the cecum and descending colon.  Polypectomies were performed.    Surgical pathology:  Sessile serrated polyps.  A tubular adenoma is noted in the descending   colon.    · 11/25/14 - INR 4.2.   · 11/25/14 - WBC 5.7, hemoglobin 11.4, platelet count 210,000.    · 2/24/15 - WBC 7.7, hemoglobin 12.9, platelet count 245,000, MCV 93,000.  · 2/24/15 - INR 2.6.  Patient’s Coumadin dose is 9 mg.     · 3/12/15 - Creatinine 1.3.    · 4/21/15 - Patient admitted with chest pain and had cardiac  catheterization.  He had a stent   placed.    · 5/8/15 - CT scan of the chest without contrast:  Stable appearance of mediastinal   lymphadenopathy over the past two studies going back to April 2014.  Stable pulmonary fibrotic   changes and scarring.    · 6/25/15 - WBC 5.3, hemoglobin 12.6, platelet count 187,000.     · 12/17/15 - WBC 5.1, hemoglobin 13.6, platelet count 208,000, MCV 93.1.  Iron saturation 21%,   TIBC 415, serum iron 85.    · 12/17/15 - Chest x-ray:  No acute process.     · 4/19/16 - INR 2.1.    · 5/17/16 - INR 2.2.  · 6/16/16 - WBC 5.5, hemoglobin 12.7, MCV 90.5, platelet count 205,000.  INR 2.3 (patient on   Coumadin 7.5 mg).    · 12/15/16 - WBC 5.0, hemoglobin 13.0, platelet count 174,000.  INR 1.9 (Coumadin dose 7.5).   · 6/15/17 - WBC 5.5, hemoglobin 12.5, platelet count 168,000, MCV 94.4.  INR 2.6 (Coumadin dose 8   mg).   · 11/2/17 - INR 2.    · 12/14/17 - WBC 5.04, hemoglobin 12.5, platelet count 167,000, MCV 94.    · 6/14/18 - WBC 4.9, hemoglobin 12.5, platelet count 172,000, MCV 93.5.    · 12/13/18 - INR 2.4.  WBC 4.9, hemoglobin 12.8, platelet count 180,000.    11/23/2019 INR is 2.55  11/11/2019: Colonoscopy: Cecum polypectomy shows tubular adenoma, transverse colon polypectomy shows tubular adenoma.   Patient readmitted after colonoscopy to hospital due to GI bleeding from polyp biopsy..  11/22/2019 hemoglobin 9.3 due to GI bleeding  11/23/2019 cecal ulcer with visible vessel and stigmata of recent bleeding which was clipped  Moderate left-sided diverticulosis  12/12/2019 INR 1.9  12/12/2019 WBC 4.9, hemoglobin 11.2, MCV 98.0, platelets 182, ferritin 85.2, iron 115, iron saturation 24%, TIBC 471, B12 greater than 2000, folate 13.8,  4/23/2020: ESR 12  5/6/2020: 25 hydroxy vitamin D 31.9  6/11/2020: INR 2.1, WBC 6.3, hemoglobin 13.5, platelets 204,  ·  9/28/2020: Lower extremity venous Doppler:: Normal  · 11/25/2020: INR 2.8  · 12/31/2020: WBC 6.4, hemoglobin 13.7, platelets 212, INR  1.2,  · 5/3/2021: 25 hydroxy vitamin D 66.5, TSH 2.8, creatinine 1.24,  · 5/12/2021: INR 2.7,  · 6/9/2021: INR 2.5,  · Subsequent INR has been stable.    Subjective:    Continues to be on plavix, no new symptoms of leg pain and swelling.    The following portions of the patient's history were reviewed and updated as appropriate: allergies, current medications, past family history, past medical history, past social history, past surgical history and problem list.       Past Medical History:   Diagnosis Date   • ACE-inhibitor cough 06/2005   • Coronary artery disease    • Diabetes mellitus, type 2 (HCC) 1995   • ED (erectile dysfunction)    • Hx of blood clots 2014    Blood clot left leg    • Hyperlipidemia 08/2000   • Hypertension    • Hypogonadism, male 1998   • Hypothyroidism 06/2001   • Obesity    • Peripheral neuropathy    • Pulmonary embolism (HCC) 02/2014   • Rectal fistula    • Sleep apnea 12/10/2013   • Vitamin D deficiency        Past Surgical History:   Procedure Laterality Date   • CARDIAC CATHETERIZATION  2008    PTCA: 2008; PTCA: 4/21/2015 LCX stent    • CARDIAC CATHETERIZATION Left 7/3/2021    Procedure: Cardiac Catheterization/Vascular Study;  Surgeon: Edwar Ackerman MD;  Location: [facility] CATH INVASIVE LOCATION;  Service: Cardiovascular;  Laterality: Left;   • CARDIOVASCULAR STRESS TEST  2020   • CATARACT EXTRACTION  01/11/2016 1-4-16 & 1-11-16    • COLONOSCOPY N/A 11/23/2019    Procedure: COLONOSCOPY WITH ENDOSCOPIC CLIPPING X1 OF POST POLYPECTOMY BLEED SITE;  Surgeon: Jhonny Orellana MD;  Location: [facility] ENDOSCOPY;  Service: Gastroenterology   • CORONARY STENT PLACEMENT     • INGUINAL HERNIA REPAIR Left    • UMBILICAL HERNIA REPAIR           Current Outpatient Medications:   •  Accu-Chek Softclix Lancets lancets, Use to test blood sugars 3 times daily. DXE11.65, Disp: 100 each, Rfl: 11  •  amLODIPine (NORVASC) 5 MG tablet, Take 1 tablet by mouth Daily for 270 days., Disp: 90 tablet, Rfl: 2  •   "atenolol (TENORMIN) 50 MG tablet, TAKE 1 TABLET DAILY, Disp: 90 tablet, Rfl: 3  •  atorvastatin (LIPITOR) 80 MG tablet, Take 1 tablet by mouth Every Night for 270 days., Disp: 90 tablet, Rfl: 2  •  BD Insulin Syringe U/F 31G X 5/16\" 0.5 ML misc, , Disp: , Rfl:   •  Blood Glucose Monitoring Suppl (Accu-Chek Guide) w/Device kit, 1 each Daily., Disp: 1 kit, Rfl: 1  •  clopidogrel (PLAVIX) 75 MG tablet, TAKE 1 TABLET DAILY, Disp: 90 tablet, Rfl: 2  •  Empagliflozin (Jardiance) 25 MG tablet, Take 25 mg by mouth Daily., Disp: 90 tablet, Rfl: 3  •  fenofibrate (TRICOR) 54 MG tablet, TAKE 1 TABLET DAILY, Disp: 90 tablet, Rfl: 3  •  ferrous sulfate 325 (65 FE) MG tablet, Take 325 mg by mouth 2 (Two) Times a Day., Disp: , Rfl:   •  gabapentin (NEURONTIN) 100 MG capsule, TAKE 1 CAPSULE THREE TIMES A DAY, Disp: 270 capsule, Rfl: 3  •  glucose blood (Accu-Chek Guide) test strip, Use to test blood sugars 3 times daily. DXE11.65, Disp: 100 each, Rfl: 11  •  hydroCHLOROthiazide (HYDRODIURIL) 25 MG tablet, TAKE 1 TABLET DAILY, Disp: 90 tablet, Rfl: 3  •  Insulin Regular Human, Conc, (HumuLIN R) 500 UNIT/ML solution pen-injector CONCENTRATED injection, Draw up to 24 unit markings & inject ac breakfast, 36 unit markings & injects ac supper., Disp: , Rfl:   •  irbesartan (AVAPRO) 300 MG tablet, TAKE 1 TABLET DAILY, Disp: 90 tablet, Rfl: 3  •  isosorbide mononitrate (IMDUR) 30 MG 24 hr tablet, Take 1 tablet by mouth Daily for 270 days., Disp: 90 tablet, Rfl: 2  •  levothyroxine (SYNTHROID, LEVOTHROID) 175 MCG tablet, TAKE 1 TABLET DAILY, Disp: 90 tablet, Rfl: 3  •  metFORMIN ER (GLUCOPHAGE-XR) 500 MG 24 hr tablet, Take 4 tablets by mouth Daily With Dinner., Disp: , Rfl:   •  Omega-3 Fatty Acids (FISH OIL) 1000 MG capsule capsule, Take 1,000 mg by mouth 2 (Two) Times a Day., Disp: , Rfl:   •  SymlinPen 120 2700 MCG/2.7ML solution pen-injector, INJECT 120 MCG UNDER THE SKIN INTO THE APPROPRIATE AREA AS DIRECTED THREE TIMES A DAY, Disp: " "32.4 mL, Rfl: 3  •  vitamin B-12 (CYANOCOBALAMIN) 100 MCG tablet, Take 100 mcg by mouth Daily., Disp: , Rfl:   •  vitamin D (ERGOCALCIFEROL) 1.25 MG (92855 UT) capsule capsule, TAKE 1 CAPSULE ONCE A WEEK, Disp: 12 capsule, Rfl: 3  •  warfarin (COUMADIN) 1 MG tablet, , Disp: , Rfl:   •  warfarin (COUMADIN) 3 MG tablet, TAKE 1 TABLET DAILY, Disp: 90 tablet, Rfl: 3  •  warfarin (COUMADIN) 5 MG tablet, TAKE 1 TABLET AS DIRECTED, Disp: 90 tablet, Rfl: 3    No Known Allergies    Family History   Problem Relation Age of Onset   • Heart disease Mother    • Leukemia Father        Cancer-related family history is not on file.    Social History     Tobacco Use   • Smoking status: Former Smoker     Packs/day: 1.50     Years: 20.00     Pack years: 30.00     Types: Cigarettes     Quit date:      Years since quittin.9   • Smokeless tobacco: Never Used   • Tobacco comment: quit    Vaping Use   • Vaping Use: Never used   Substance Use Topics   • Alcohol use: No   • Drug use: No       I have reviewed the history of present illness, past medical history, family history, social history, lab results, all notes and other records since the patient was last seen on  2019.    ROS:     Objective:  Vital signs:  Vitals:    21 1102   BP: 133/72   Pulse: 68   Resp: 18   Temp: 96.9 °F (36.1 °C)   SpO2: 96%   Weight: (!) 137 kg (301 lb 3.2 oz)   Height: 177.8 cm (70\")   PainSc: 0-No pain     ECOG  (1) Restricted in physically strenuous activity, ambulatory and able to do work of light nature    Physical Exam:   Physical Exam   Constitutional: He is oriented to person, place, and time. He appears well-developed. No distress.   Obese male   HENT:   Head: Normocephalic and atraumatic.   Eyes: Conjunctivae are normal. Right eye exhibits no discharge. Left eye exhibits no discharge. No scleral icterus.   Neck: No thyromegaly present.   Cardiovascular: Normal rate and normal heart sounds. Exam reveals no gallop and no friction " rub.   Pulmonary/Chest: Effort normal. No stridor. No respiratory distress. He has no wheezes.   Abdominal: Soft. Normal appearance and bowel sounds are normal. He exhibits no mass. There is no abdominal tenderness. There is no rebound and no guarding.   Musculoskeletal: Normal range of motion. No swelling, tenderness or signs of injury.   Lymphadenopathy:     He has no cervical adenopathy.   Neurological: He is alert and oriented to person, place, and time. He exhibits normal muscle tone.   Skin: Skin is warm. No rash noted. He is not diaphoretic. No erythema.   Psychiatric: His behavior is normal. Mood normal.   Vitals reviewed.     I have reexamined the patient and the results are consistent with the previously documented exam. Soraya Cortez MD       Lab Results - Last 18 Months   Lab Units 12/20/21  1048 07/04/21  0458 07/03/21  0417   WBC 10*3/mm3 7.25 5.50 5.70   HEMOGLOBIN g/dL 14.2 13.8 13.8   HEMATOCRIT % 42.7 40.1 40.0   PLATELETS 10*3/mm3 178 185 172   MCV fL 94.3 92.3 91.8     Lab Results - Last 18 Months   Lab Units 11/02/21  0903 07/04/21  0458 07/03/21  0417 07/01/21  2218 05/03/21  0928   SODIUM mmol/L 136 138 139   < > 138   POTASSIUM mmol/L 4.4 4.6 4.5   < > 4.8   CHLORIDE mmol/L 100 99 100   < > 99   CO2 mmol/L 24.4 28.0 25.0   < > 25.2   BUN mg/dL 23 22 19   < > 26*   CREATININE mg/dL 1.17 1.34* 1.20   < > 1.24   CALCIUM mg/dL 9.5 8.9 9.3   < > 9.7   BILIRUBIN mg/dL 0.3  --  0.4  --  0.3   ALK PHOS U/L 37*  --  40  --  38*   ALT (SGPT) U/L 22  --  25  --  23   AST (SGOT) U/L 21  --  30  --  18   GLUCOSE mg/dL 174* 198* 187*   < > 259*    < > = values in this interval not displayed.       Lab Results   Component Value Date    GLUCOSE 174 (H) 11/02/2021    BUN 23 11/02/2021    CREATININE 1.17 11/02/2021    EGFRIFNONA 60 (L) 11/02/2021    BCR 19.7 11/02/2021    K 4.4 11/02/2021    CO2 24.4 11/02/2021    CALCIUM 9.5 11/02/2021    ALBUMIN 4.40 11/02/2021    LABIL2 1.7 03/28/2019    AST 21 11/02/2021     ALT 22 11/02/2021       Lab Results - Last 18 Months   Lab Units 12/20/21  1048 11/23/21  1339 10/26/21  1022   INR  2.60* 2.40* 2.40*       Lab Results   Component Value Date    IRON 108 06/24/2021    TIBC 401 06/24/2021    FERRITIN 117.00 06/24/2021       Lab Results   Component Value Date    FOLATE 13.80 12/12/2019       No results found for: OCCULTBLD    No results found for: RETICCTPCT    Lab Results   Component Value Date    [id number] >2,000 (H) 12/12/2019     No results found for: SPEP, UPEP  No results found for: LDH, URICACID  Lab Results   Component Value Date    SEDRATE 12 04/23/2020     No results found for: FIBRINOGEN, HAPTOGLOBIN  Lab Results   Component Value Date    PTT 31.5 (H) 11/22/2019    INR 2.60 (H) 12/20/2021     No results found for:   No results found for: CEA  No components found for: CA-19-9  No results found for: PSA      Assessment/Plan     Assessment:  1. History of bilateral pulmonary emboli with high clot burden and DVT:  His thrombosis was   unprovoked.  Hypercoagulability testing revealed elevated factor VIII level on two occasions.  He   is on anticoagulation with Coumadin.  He also has a questionable positive family history of DVT.    He has been recommended indefinite lifetime anticoagulation.  His INRs have been very stable. Will get home monitoring set up.  2. History of mediastinal lymphadenopathy:  His CT scan shows stable lymphadenopathy.    These are very likely benign lymph nodes.    3. Mild CKD:  He has slight anemia of chronic kidney disease.   4. History of iron deficiency:  He had a colonoscopy and that showed polyps and moderately severe   diverticulosis.  Pathology showed a tubular adenoma in the descending colon.  He is on iron   supplements p.o. BID. History of iron deficiency.  Hb continues to be normal.  5. Vitamin B12 deficiency:  He is on p.o. B12 supplements. No anemia currently.  6. Dizziness/light-headedness:  Possibly could be from orthostatic  hypertension. .    He will see Dr. Sibley for this problem.    7.  History of  GI bleeding.  Due to polypectomy from colonoscopy.     PLAN:         1.  Continue same dose Coumadin.  INR in 1 month will need to continue anticoagulation indefinitely since patient has history of large unprovoked PE and DVT. We will set up for home INR monitoring.  2. Continue oral iron supplement.   Iron levels have been normal.   3. Continue plavix.  4.  Follow-up in 6 months      Deep vein thrombosis (DVT) of lower extremity, unspecified chronicity, unspecified laterality, unspecified vein (HCC)  - CBC & Differential    Orders Placed This Encounter   Procedures   • CBC Auto Differential   • CBC & Differential        Thank you very much for providing the opportunity to participate in this patient’s care. Please do not hesitate to call if there are any other questions.    I have reviewed and confirmed the accuracy of the patient's history: Chief complaint, HPI, ROS, Subjective and Past Family Social History as entered by the MA/MIMIN/RN.     Soraya Cortez MD 12/20/21

## 2022-01-04 ENCOUNTER — DOCUMENTATION (OUTPATIENT)
Dept: ONCOLOGY | Facility: HOSPITAL | Age: 82
End: 2022-01-04

## 2022-01-04 ENCOUNTER — TELEPHONE (OUTPATIENT)
Dept: ONCOLOGY | Facility: CLINIC | Age: 82
End: 2022-01-04

## 2022-01-04 NOTE — TELEPHONE ENCOUNTER
Caller: Dalton Dallas Sr.    Relationship: Self     NUMBER CALLED FROM : [phone]    Who are you requesting to speak with (clinical staff, provider,  specific staff member): WAS NOT SURE     Do you know the name of the person who called: DALTON      What was the call regarding:   RECEIVED A CALL FROM OFFICE FROM SOMEONE CALLING TO SEE IF HAD RECEIVED INR MACHINE AND IF THE COMPANY HAD CONTACTED HIM YET     WAS RETURNING THE CALL.    I CALLED  AND WAS TRANSFERRED TO NURSES LINE BUT NO ONE WAS AVAILABLE SENDING ENCOUNTER TO FOLLOW UP WITH PATIENT.     WHILE ON THE CALL PT ASK IF I WAS IN [place] AND I STATED YES, AND THEN HE HUNG UP THE PHONE .     Do you require a callback: YES

## 2022-01-05 NOTE — TELEPHONE ENCOUNTER
Pt was wondering if it got lost in the mail because he was told it was on the way but has no yet got it through the mail. Pt had no other questions. I explained I would do my best to figure out how to help him and give him a call back if I figured out anything.

## 2022-01-24 DIAGNOSIS — E11.40 TYPE 2 DIABETES MELLITUS WITH DIABETIC NEUROPATHY, WITHOUT LONG-TERM CURRENT USE OF INSULIN: ICD-10-CM

## 2022-01-25 RX ORDER — INSULIN HUMAN 500 [IU]/ML
INJECTION, SOLUTION SUBCUTANEOUS
Qty: 60 ML | Refills: 3 | Status: SHIPPED | OUTPATIENT
Start: 2022-01-25 | End: 2022-05-10

## 2022-02-02 ENCOUNTER — TELEPHONE (OUTPATIENT)
Dept: ONCOLOGY | Facility: HOSPITAL | Age: 82
End: 2022-02-02

## 2022-02-02 LAB — INR PPP: 2.4

## 2022-02-02 NOTE — PROGRESS NOTES
Called patient to check on his INR. He states it was 2.4 Monday and he has been checking it weekly and reporting it to [facility]. I asked him to please call us and let us know as well as we are following his dosages, triage number given. Patient verbalized understanding.

## 2022-02-07 ENCOUNTER — OFFICE VISIT (OUTPATIENT)
Dept: FAMILY MEDICINE CLINIC | Facility: CLINIC | Age: 82
End: 2022-02-07

## 2022-02-07 VITALS
DIASTOLIC BLOOD PRESSURE: 75 MMHG | SYSTOLIC BLOOD PRESSURE: 149 MMHG | OXYGEN SATURATION: 95 % | BODY MASS INDEX: 42.95 KG/M2 | TEMPERATURE: 96.9 F | WEIGHT: 300 LBS | HEART RATE: 75 BPM | HEIGHT: 70 IN

## 2022-02-07 DIAGNOSIS — Z00.00 ENCOUNTER FOR ANNUAL WELLNESS EXAM IN MEDICARE PATIENT: ICD-10-CM

## 2022-02-07 DIAGNOSIS — I50.32 DIASTOLIC DYSFUNCTION WITH CHRONIC HEART FAILURE: Chronic | ICD-10-CM

## 2022-02-07 DIAGNOSIS — Z79.4 TYPE 2 DIABETES MELLITUS WITH DIABETIC NEUROPATHY, WITH LONG-TERM CURRENT USE OF INSULIN: Chronic | ICD-10-CM

## 2022-02-07 DIAGNOSIS — G47.30 SLEEP APNEA, UNSPECIFIED TYPE: Chronic | ICD-10-CM

## 2022-02-07 DIAGNOSIS — E03.9 ACQUIRED HYPOTHYROIDISM: Chronic | ICD-10-CM

## 2022-02-07 DIAGNOSIS — E11.40 TYPE 2 DIABETES MELLITUS WITH DIABETIC NEUROPATHY, WITH LONG-TERM CURRENT USE OF INSULIN: Chronic | ICD-10-CM

## 2022-02-07 DIAGNOSIS — E78.2 MIXED HYPERLIPIDEMIA: Chronic | ICD-10-CM

## 2022-02-07 DIAGNOSIS — D68.59 THROMBOPHILIA: Chronic | ICD-10-CM

## 2022-02-07 DIAGNOSIS — I10 ESSENTIAL HYPERTENSION: Primary | Chronic | ICD-10-CM

## 2022-02-07 PROCEDURE — 1160F RVW MEDS BY RX/DR IN RCRD: CPT | Performed by: FAMILY MEDICINE

## 2022-02-07 PROCEDURE — G0439 PPPS, SUBSEQ VISIT: HCPCS | Performed by: FAMILY MEDICINE

## 2022-02-07 PROCEDURE — 1170F FXNL STATUS ASSESSED: CPT | Performed by: FAMILY MEDICINE

## 2022-02-07 PROCEDURE — 99214 OFFICE O/P EST MOD 30 MIN: CPT | Performed by: FAMILY MEDICINE

## 2022-02-07 PROCEDURE — 96160 PT-FOCUSED HLTH RISK ASSMT: CPT | Performed by: FAMILY MEDICINE

## 2022-02-07 PROCEDURE — 1126F AMNT PAIN NOTED NONE PRSNT: CPT | Performed by: FAMILY MEDICINE

## 2022-02-07 NOTE — PROGRESS NOTES
Subjective   Dalton Dallas Sr. is a 81 y.o. male.     Chief Complaint   Patient presents with   • Medicare Wellness-subsequent     sub medicare wellness   • Diabetes     4 month f/u   • Hyperlipidemia   • Hypertension   • Hypothyroidism       HPI  Chief complaint: Hypertension hyperlipidemia hypothyroidism heart failure with normal ejection fraction    Patient is a 81-year-old white male comes in for follow-up and maintenance of his current problems which include    1.  Hypertension-stable-patient on Avapro 300 mg daily atenolol 50 mg daily Norvasc 5 mg daily hydrochlorothiazide 25 mg daily.  He denies headache lightheadedness dizziness or chest pain.    2.  Hyperlipidemia-stable-patient on Lipitor 80 mg daily and TriCor 54 mg daily.  He is asymptomatic.    3.  Type 2 diabetes mellitus-stable-patient on Jardiance 25 mg daily Metformin 2000 mg daily short acting insulin 24 units in the morning and 36 units in the afternoon Symlin 120 mg 3 times a day.  He is asymptomatic.  Blood sugars are controlled.  He does have diabetic neuropathy.  Is on gabapentin 103 times a day.  He states he has pain in his legs but it he does not want to increase the dose of the gabapentin.    4.  Coronary artery disease/heart failure with normal ejection fraction-stable-the patient is currently on Plavix 75 mg daily long-acting nitroglycerin 30 mg daily atenolol 50 mg daily.  He denies chest pain shortness of breath orthopnea or PND.    5.  Hypothyroidism-stable-patient on Synthroid 0.175 mg daily.  Denied heat or cold intolerance.    6.  Thrombophilia-stable-patient on Coumadin on a regular basis.    7.  Sleep apnea stable on CPAP.    The following portions of the patient's history were reviewed and updated as appropriate: allergies, current medications, past family history, past medical history, past social history, past surgical history and problem list.    Review of Systems    Objective     /75 (BP Location: Left arm,  "Patient Position: Sitting, Cuff Size: Adult)   Pulse 75   Temp 96.9 °F (36.1 °C) (Infrared)   Ht 177.8 cm (70\")   Wt 136 kg (300 lb)   SpO2 95%   BMI 43.05 kg/m²     Physical Exam  Vitals and nursing note reviewed.   Constitutional:       Appearance: He is well-developed. He is obese.   HENT:      Head: Normocephalic and atraumatic.   Eyes:      Pupils: Pupils are equal, round, and reactive to light.   Cardiovascular:      Rate and Rhythm: Normal rate and regular rhythm.      Pulses: Normal pulses.      Heart sounds: Normal heart sounds. No murmur heard.  No friction rub. No gallop.    Pulmonary:      Effort: Pulmonary effort is normal.      Breath sounds: Normal breath sounds.   Abdominal:      General: Bowel sounds are normal.      Palpations: Abdomen is soft.   Musculoskeletal:         General: Normal range of motion.      Cervical back: Normal range of motion and neck supple.   Skin:     General: Skin is warm and dry.   Neurological:      Mental Status: He is alert and oriented to person, place, and time.   Psychiatric:         Behavior: Behavior normal.         Thought Content: Thought content normal.         Judgment: Judgment normal.         Comprehensive Metabolic Panel (11/02/2021 09:03)  Hemoglobin A1c (11/02/2021 09:03)  Lipid Panel (07/04/2021 04:58)  Basic Metabolic Panel (07/04/2021 04:58)  CBC (No Diff) (07/04/2021 04:58)        Assessment/Plan   Diagnoses and all orders for this visit:    1. Essential hypertension (Primary)    2. Encounter for annual wellness exam in Medicare patient    3. Mixed hyperlipidemia    4. Diastolic dysfunction with chronic heart failure (HCC)    5. Type 2 diabetes mellitus with diabetic neuropathy, with long-term current use of insulin (HCC)    6. Acquired hypothyroidism    7. Sleep apnea, unspecified type    8. Thrombophilia (HCC)      Patient Instructions   Continue your current medications and treatment.    Follow up in the office in 3 months.    Laboratory " testing at that time.      Gabino Sibley Jr., MD    02/07/22

## 2022-02-07 NOTE — PROGRESS NOTES
"The ABCs of the Annual Wellness Visit  Subsequent Medicare Wellness Visit    Chief Complaint   Patient presents with   • Medicare Wellness-subsequent     sub medicare wellness   • Diabetes     4 month f/u   • Hyperlipidemia   • Hypertension   • Hypothyroidism      Subjective    History of Present Illness:  Dalton Dallas Sr. is a 81 y.o. male who presents for a Subsequent Medicare Wellness Visit.    The following portions of the patient's history were reviewed and   updated as appropriate: allergies, current medications, past family history, past medical history, past social history, past surgical history and problem list.    Compared to one year ago, the patient feels his physical   health is the same.    Compared to one year ago, the patient feels his mental   health is the same.    Recent Hospitalizations:  This patient has had a [facility] by [facility] admission record on file within the last 365 days.    Current Medical Providers:  Patient Care Team:  Gabino Sibley Jr., MD as PCP - General (Family Medicine)    Outpatient Medications Prior to Visit   Medication Sig Dispense Refill   • Accu-Chek Softclix Lancets lancets Use to test blood sugars 3 times daily. DXE11.65 100 each 11   • amLODIPine (NORVASC) 5 MG tablet Take 1 tablet by mouth Daily for 270 days. 90 tablet 2   • atenolol (TENORMIN) 50 MG tablet TAKE 1 TABLET DAILY 90 tablet 3   • atorvastatin (LIPITOR) 80 MG tablet Take 1 tablet by mouth Every Night for 270 days. 90 tablet 2   • BD Insulin Syringe U/F 31G X 5/16\" 0.5 ML misc      • Blood Glucose Monitoring Suppl (Accu-Chek Guide) w/Device kit 1 each Daily. 1 kit 1   • clopidogrel (PLAVIX) 75 MG tablet TAKE 1 TABLET DAILY 90 tablet 2   • Empagliflozin (Jardiance) 25 MG tablet Take 25 mg by mouth Daily. 90 tablet 3   • fenofibrate (TRICOR) 54 MG tablet TAKE 1 TABLET DAILY 90 tablet 3   • ferrous sulfate 325 (65 FE) MG tablet Take 325 mg by mouth 2 (Two) Times a Day.     • gabapentin (NEURONTIN) 100 MG " capsule TAKE 1 CAPSULE THREE TIMES A  capsule 3   • glucose blood (Accu-Chek Guide) test strip Use to test blood sugars 3 times daily. DXE11.65 100 each 11   • hydroCHLOROthiazide (HYDRODIURIL) 25 MG tablet TAKE 1 TABLET DAILY 90 tablet 3   • insulin regular (HUMULIN R) 500 UNIT/ML CONCENTRATED injection DRAW TO 24 UNIT REI ON U-100 SYRINGE AND INJECT IN THE MORNING AND 36 UNIT REI IN THE EVENING 60 mL 3   • irbesartan (AVAPRO) 300 MG tablet TAKE 1 TABLET DAILY 90 tablet 3   • isosorbide mononitrate (IMDUR) 30 MG 24 hr tablet Take 1 tablet by mouth Daily for 270 days. 90 tablet 2   • levothyroxine (SYNTHROID, LEVOTHROID) 175 MCG tablet TAKE 1 TABLET DAILY 90 tablet 3   • metFORMIN ER (GLUCOPHAGE-XR) 500 MG 24 hr tablet Take 4 tablets by mouth Daily With Dinner.     • Omega-3 Fatty Acids (FISH OIL) 1000 MG capsule capsule Take 1,000 mg by mouth 2 (Two) Times a Day.     • SymlinPen 120 2700 MCG/2.7ML solution pen-injector INJECT 120 MCG UNDER THE SKIN INTO THE APPROPRIATE AREA AS DIRECTED THREE TIMES A DAY 32.4 mL 3   • vitamin B-12 (CYANOCOBALAMIN) 100 MCG tablet Take 100 mcg by mouth Daily.     • vitamin D (ERGOCALCIFEROL) 1.25 MG (17309 UT) capsule capsule TAKE 1 CAPSULE ONCE A WEEK 12 capsule 3   • warfarin (COUMADIN) 1 MG tablet      • warfarin (COUMADIN) 3 MG tablet TAKE 1 TABLET DAILY 90 tablet 3   • warfarin (COUMADIN) 5 MG tablet TAKE 1 TABLET AS DIRECTED 90 tablet 3     No facility-administered medications prior to visit.       No opioid medication identified on active medication list. I have reviewed chart for other potential  high risk medication/s and harmful drug interactions in the elderly.          Aspirin is not on active medication list.  Aspirin use is not indicated based on review of current medical condition/s. Risk of harm outweighs potential benefits.  .    Patient Active Problem List   Diagnosis   • Coronary artery disease involving native coronary artery of native heart   • Essential  "hypertension   • Mixed hyperlipidemia   • Deep vein thrombosis (DVT) ([facility])   • Eczema   • Hypothyroidism   • Body mass index (BMI) of 45.0-49.9 in adult ([facility])   • Sleep apnea   • Type 2 diabetes mellitus with diabetic neuropathy ([facility])   • Vitamin D deficiency   • Peripheral vascular disease ([facility])   • Chronic pulmonary embolism without acute cor pulmonale ([facility])   • Diastolic dysfunction with chronic heart failure ([facility])   • Pulmonary hypertension ([facility])   • Lumbar radiculopathy   • Thrombophilia ([facility])   • MI, acute, non ST segment elevation ([facility])     Advance Care Planning  Advance Directive is on file.  ACP discussion was held with the patient during this visit. Patient has an advance directive in EMR which is still valid.           Objective    Vitals:    02/07/22 0953   BP: 149/75   BP Location: Left arm   Patient Position: Sitting   Cuff Size: Adult   Pulse: 75   Temp: 96.9 °F (36.1 °C)   TempSrc: Infrared   SpO2: 95%   Weight: 136 kg (300 lb)   Height: 177.8 cm (70\")   PainSc: 0-No pain     BMI Readings from Last 1 Encounters:   02/07/22 43.05 kg/m²   BMI is above normal parameters. Recommendations include: exercise counseling    Does the patient have evidence of cognitive impairment? No    Physical Exam  Vitals and nursing note reviewed.   Constitutional:       Appearance: He is well-developed. He is obese.   HENT:      Head: Normocephalic and atraumatic.   Eyes:      Pupils: Pupils are equal, round, and reactive to light.   Cardiovascular:      Rate and Rhythm: Normal rate and regular rhythm.      Pulses: Normal pulses.      Heart sounds: Normal heart sounds. No murmur heard.  No friction rub. No gallop.    Pulmonary:      Effort: Pulmonary effort is normal.      Breath sounds: Normal breath sounds.   Abdominal:      General: Bowel sounds are normal.      Palpations: Abdomen is soft.   Musculoskeletal:         General: Normal range of motion.      Cervical back: Normal range of motion and neck supple.   Skin:     " General: Skin is warm and dry.   Neurological:      Mental Status: He is alert and oriented to person, place, and time.   Psychiatric:         Behavior: Behavior normal.         Thought Content: Thought content normal.         Judgment: Judgment normal.                 HEALTH RISK ASSESSMENT    Smoking Status:  Social History     Tobacco Use   Smoking Status Former Smoker   • Packs/day: 1.50   • Years: 20.00   • Pack years: 30.00   • Types: Cigarettes   • Quit date:    • Years since quittin.1   Smokeless Tobacco Never Used   Tobacco Comment    quit      Alcohol Consumption:  Social History     Substance and Sexual Activity   Alcohol Use No     Fall Risk Screen:    STEADI Fall Risk Assessment was completed, and patient is at LOW risk for falls.Assessment completed on:2022    Depression Screening:  PHQ-2/PHQ-9 Depression Screening 2022   Little interest or pleasure in doing things 0   Feeling down, depressed, or hopeless 0   Trouble falling or staying asleep, or sleeping too much -   Feeling tired or having little energy -   Poor appetite or overeating -   Feeling bad about yourself - or that you are a failure or have let yourself or your family down -   Trouble concentrating on things, such as reading the newspaper or watching television -   Moving or speaking so slowly that other people could have noticed. Or the opposite - being so fidgety or restless that you have been moving around a lot more than usual -   Thoughts that you would be better off dead, or of hurting yourself in some way -   Total Score 0       Health Habits and Functional and Cognitive Screening:  Functional & Cognitive Status 2022   Do you have difficulty preparing food and eating? No   Do you have difficulty bathing yourself, getting dressed or grooming yourself? No   Do you have difficulty using the toilet? No   Do you have difficulty moving around from place to place? No   Do you have trouble with steps or getting out  of a bed or a chair? No   Current Diet Well Balanced Diet   Dental Exam Up to date   Eye Exam Up to date   Exercise (times per week) 0 times per week   Current Exercises Include No Regular Exercise   Current Exercise Activities Include -   Do you need help using the phone?  No   Are you deaf or do you have serious difficulty hearing?  No   Do you need help with transportation? No   Do you need help shopping? No   Do you need help preparing meals?  No   Do you need help with housework?  No   Do you need help with laundry? No   Do you need help taking your medications? No   Do you need help managing money? No   Do you ever drive or ride in a car without wearing a seat belt? No   Have you felt unusual stress, anger or loneliness in the last month? No   Who do you live with? Alone   If you need help, do you have trouble finding someone available to you? No   Have you been bothered in the last four weeks by sexual problems? No   Do you have difficulty concentrating, remembering or making decisions? No       Age-appropriate Screening Schedule:  Refer to the list below for future screening recommendations based on patient's age, sex and/or medical conditions. Orders for these recommended tests are listed in the plan section. The patient has been provided with a written plan.    Health Maintenance   Topic Date Due   • ZOSTER VACCINE (2 of 3) 12/20/2016   • DIABETIC EYE EXAM  02/23/2022   • HEMOGLOBIN A1C  05/02/2022   • URINE MICROALBUMIN  05/03/2022   • LIPID PANEL  07/04/2022   • INFLUENZA VACCINE  Completed   • TDAP/TD VACCINES  Discontinued              Assessment/Plan   CMS Preventative Services Quick Reference  Risk Factors Identified During Encounter  Immunizations Discussed/Encouraged (specific Immunizations; Td, Influenza, Pneumococcal 23, Shingrix and COVID19  The above risks/problems have been discussed with the patient.  Follow up actions/plans if indicated are seen below in the Assessment/Plan  Section.  Pertinent information has been shared with the patient in the After Visit Summary.    Diagnoses and all orders for this visit:    1. Encounter for annual wellness exam in Medicare patient (Primary)        Follow Up:   No follow-ups on file.     An After Visit Summary and PPPS were made available to the patient.

## 2022-02-28 ENCOUNTER — TELEPHONE (OUTPATIENT)
Dept: ONCOLOGY | Facility: CLINIC | Age: 82
End: 2022-02-28

## 2022-02-28 LAB — INR PPP: 1.6

## 2022-02-28 RX ORDER — WARFARIN SODIUM 1 MG/1
TABLET ORAL
Qty: 30 TABLET | Refills: 3 | Status: SHIPPED | OUTPATIENT
Start: 2022-02-28

## 2022-02-28 NOTE — TELEPHONE ENCOUNTER
Caller: Dalton Dallas Sr.    Relationship: Self    Best call back number: [phone]    What was the call regarding: DALTON CALLED TO REPORT HIS INR OF 1.6 FOR TODAY. HE IS WANTING A CALL BACK TO DISCUSS.    Do you require a callback: YES

## 2022-02-28 NOTE — PROGRESS NOTES
INR 1.6 at home. advised pt to increase warfarin to 9mg daily and recheck in 1 week. Pt notified and v/u.

## 2022-03-02 RX ORDER — FENOFIBRATE 54 MG/1
TABLET ORAL
Qty: 90 TABLET | Refills: 3 | Status: SHIPPED | OUTPATIENT
Start: 2022-03-02 | End: 2022-12-08 | Stop reason: SDUPTHER

## 2022-03-07 RX ORDER — METFORMIN HYDROCHLORIDE 500 MG/1
TABLET, EXTENDED RELEASE ORAL
Qty: 360 TABLET | Refills: 3 | Status: SHIPPED | OUTPATIENT
Start: 2022-03-07

## 2022-03-14 LAB — INR PPP: 2.4

## 2022-03-17 ENCOUNTER — TELEPHONE (OUTPATIENT)
Dept: ONCOLOGY | Facility: HOSPITAL | Age: 82
End: 2022-03-17

## 2022-03-17 NOTE — PROGRESS NOTES
Called patient who states he checked his INR on Monday and it was 2.4. Patient continue 9mg daily and recheck Monday. I asked patient to please call in his results. Gave him triages direct line.

## 2022-03-22 ENCOUNTER — TELEPHONE (OUTPATIENT)
Dept: ONCOLOGY | Facility: HOSPITAL | Age: 82
End: 2022-03-22

## 2022-03-22 LAB — INR PPP: 2.5

## 2022-03-28 ENCOUNTER — TELEPHONE (OUTPATIENT)
Dept: ONCOLOGY | Facility: HOSPITAL | Age: 82
End: 2022-03-28

## 2022-03-28 DIAGNOSIS — Z79.01 LONG TERM (CURRENT) USE OF ANTICOAGULANTS: ICD-10-CM

## 2022-03-28 LAB — INR PPP: 2.1

## 2022-03-28 RX ORDER — WARFARIN SODIUM 5 MG/1
5 TABLET ORAL TAKE AS DIRECTED
Qty: 7 TABLET | Refills: 0 | Status: SHIPPED | OUTPATIENT
Start: 2022-03-28 | End: 2022-05-31 | Stop reason: SDUPTHER

## 2022-03-28 NOTE — PROGRESS NOTES
Patient called in his weekly INR of 2.1. Continue 9mg and recheck next week due to insurance, he checks weekly at home.

## 2022-04-04 ENCOUNTER — TELEPHONE (OUTPATIENT)
Dept: ONCOLOGY | Facility: HOSPITAL | Age: 82
End: 2022-04-04

## 2022-04-04 LAB — INR PPP: 3.9

## 2022-04-04 NOTE — TELEPHONE ENCOUNTER
Pt. Called this am reporting his INR 3.9 today. Pt. States he has no change in medications or diet restrictions. Pt. Stated his blood sugar has been elevated also.   Instructions given for pt. To hold warfarin today and tomorrow and repeat INR on Wednesday 4/6/22 and call with results.   Pt. Verbalized understanding of all instructions.

## 2022-04-05 RX ORDER — ERGOCALCIFEROL 1.25 MG/1
CAPSULE ORAL
Qty: 12 CAPSULE | Refills: 3 | Status: SHIPPED | OUTPATIENT
Start: 2022-04-05 | End: 2023-03-06 | Stop reason: SDUPTHER

## 2022-04-06 ENCOUNTER — TELEPHONE (OUTPATIENT)
Dept: ONCOLOGY | Facility: HOSPITAL | Age: 82
End: 2022-04-06

## 2022-04-06 LAB — INR PPP: 1.7

## 2022-04-06 NOTE — PROGRESS NOTES
Patient was held for two days and today's INR is 1.7. Asked him to restart today at a decreased dose of 8mg daily and recheck Friday.

## 2022-04-11 ENCOUNTER — TELEPHONE (OUTPATIENT)
Dept: ONCOLOGY | Facility: HOSPITAL | Age: 82
End: 2022-04-11

## 2022-04-11 LAB — INR PPP: 1.5

## 2022-04-11 RX ORDER — AMLODIPINE BESYLATE 5 MG/1
TABLET ORAL
Qty: 90 TABLET | Refills: 3 | Status: SHIPPED | OUTPATIENT
Start: 2022-04-11 | End: 2022-12-08 | Stop reason: SDUPTHER

## 2022-04-11 RX ORDER — ATORVASTATIN CALCIUM 80 MG/1
TABLET, FILM COATED ORAL
Qty: 90 TABLET | Refills: 3 | Status: SHIPPED | OUTPATIENT
Start: 2022-04-11 | End: 2023-03-29

## 2022-04-11 RX ORDER — ISOSORBIDE MONONITRATE 30 MG/1
TABLET, EXTENDED RELEASE ORAL
Qty: 90 TABLET | Refills: 3 | Status: SHIPPED | OUTPATIENT
Start: 2022-04-11 | End: 2022-12-08 | Stop reason: SDUPTHER

## 2022-04-11 NOTE — TELEPHONE ENCOUNTER
Received call from the patient reporting his INR of 1.5 today.  Chart reviewed.  Patient currently taking 8 mg of Warfarin, was high last week on 9 mg.  Advised to increase to 8.5 mg of Warfarin.  Patient stated that he does not have supplies to repeat INR this week but will have them to check on Monday 4/18/22.  Advised patient that if he notes any signs or symptoms of clotting such as swelling, redness or pain to contact the office.  Patient v/u.

## 2022-04-18 ENCOUNTER — TELEPHONE (OUTPATIENT)
Dept: ONCOLOGY | Facility: CLINIC | Age: 82
End: 2022-04-18

## 2022-04-18 ENCOUNTER — DOCUMENTATION (OUTPATIENT)
Dept: ONCOLOGY | Facility: CLINIC | Age: 82
End: 2022-04-18

## 2022-04-18 LAB — INR PPP: 1.9

## 2022-04-25 ENCOUNTER — TELEPHONE (OUTPATIENT)
Dept: ONCOLOGY | Facility: HOSPITAL | Age: 82
End: 2022-04-25

## 2022-04-25 LAB — INR PPP: 2

## 2022-04-25 RX ORDER — HYDROCHLOROTHIAZIDE 25 MG/1
TABLET ORAL
Qty: 90 TABLET | Refills: 3 | Status: SHIPPED | OUTPATIENT
Start: 2022-04-25 | End: 2022-12-08 | Stop reason: SDUPTHER

## 2022-04-25 NOTE — PROGRESS NOTES
Received call from patient reporting his INR of 2.0 today.  Patient to continue on 8.5 mg of warfarin and repeat INR in one week.  Patient v/u.

## 2022-04-26 ENCOUNTER — TELEPHONE (OUTPATIENT)
Dept: ENDOCRINOLOGY | Facility: CLINIC | Age: 82
End: 2022-04-26

## 2022-04-26 NOTE — TELEPHONE ENCOUNTER
Tried to call patient regarding lab closure. No answer left vm asking to go to [facility] or to contact our office and let know where to fax orders.   Lab appt is now cx'ed.

## 2022-04-28 DIAGNOSIS — E11.40 TYPE 2 DIABETES MELLITUS WITH DIABETIC NEUROPATHY, WITH LONG-TERM CURRENT USE OF INSULIN: Chronic | ICD-10-CM

## 2022-04-28 DIAGNOSIS — Z79.4 TYPE 2 DIABETES MELLITUS WITH DIABETIC NEUROPATHY, WITH LONG-TERM CURRENT USE OF INSULIN: Chronic | ICD-10-CM

## 2022-04-28 RX ORDER — PEN NEEDLE, DIABETIC 29 G X1/2"
NEEDLE, DISPOSABLE MISCELLANEOUS
Qty: 200 EACH | Refills: 3 | Status: SHIPPED | OUTPATIENT
Start: 2022-04-28 | End: 2023-02-06 | Stop reason: SDUPTHER

## 2022-04-28 RX ORDER — EMPAGLIFLOZIN 25 MG/1
TABLET, FILM COATED ORAL
Qty: 90 TABLET | Refills: 3 | Status: SHIPPED | OUTPATIENT
Start: 2022-04-28 | End: 2023-03-20

## 2022-04-29 RX ORDER — BLOOD SUGAR DIAGNOSTIC
STRIP MISCELLANEOUS
COMMUNITY
Start: 2022-04-16 | End: 2022-05-10

## 2022-05-02 ENCOUNTER — TELEPHONE (OUTPATIENT)
Dept: ONCOLOGY | Facility: HOSPITAL | Age: 82
End: 2022-05-02

## 2022-05-02 LAB — INR PPP: 2

## 2022-05-02 RX ORDER — ATENOLOL 50 MG/1
TABLET ORAL
Qty: 90 TABLET | Refills: 3 | Status: SHIPPED | OUTPATIENT
Start: 2022-05-02 | End: 2022-12-08 | Stop reason: SDUPTHER

## 2022-05-03 ENCOUNTER — LAB (OUTPATIENT)
Dept: LAB | Facility: HOSPITAL | Age: 82
End: 2022-05-03

## 2022-05-03 DIAGNOSIS — E11.40 TYPE 2 DIABETES MELLITUS WITH DIABETIC NEUROPATHY, WITH LONG-TERM CURRENT USE OF INSULIN: Chronic | ICD-10-CM

## 2022-05-03 DIAGNOSIS — E55.9 VITAMIN D DEFICIENCY: ICD-10-CM

## 2022-05-03 DIAGNOSIS — E03.9 ACQUIRED HYPOTHYROIDISM: Chronic | ICD-10-CM

## 2022-05-03 DIAGNOSIS — Z79.4 TYPE 2 DIABETES MELLITUS WITH DIABETIC NEUROPATHY, WITH LONG-TERM CURRENT USE OF INSULIN: Chronic | ICD-10-CM

## 2022-05-03 DIAGNOSIS — E78.2 MIXED HYPERLIPIDEMIA: Chronic | ICD-10-CM

## 2022-05-03 LAB
25(OH)D3 SERPL-MCNC: 53.5 NG/ML (ref 30–100)
ALBUMIN SERPL-MCNC: 4.2 G/DL (ref 3.5–5.2)
ALBUMIN UR-MCNC: <1.2 MG/DL
ALBUMIN/GLOB SERPL: 1.9 G/DL
ALP SERPL-CCNC: 43 U/L (ref 39–117)
ALT SERPL W P-5'-P-CCNC: 20 U/L (ref 1–41)
ANION GAP SERPL CALCULATED.3IONS-SCNC: 16 MMOL/L (ref 5–15)
AST SERPL-CCNC: 19 U/L (ref 1–40)
BILIRUB SERPL-MCNC: 0.4 MG/DL (ref 0–1.2)
BUN SERPL-MCNC: 19 MG/DL (ref 8–23)
BUN/CREAT SERPL: 15.6 (ref 7–25)
CALCIUM SPEC-SCNC: 9.8 MG/DL (ref 8.6–10.5)
CHLORIDE SERPL-SCNC: 100 MMOL/L (ref 98–107)
CHOLEST SERPL-MCNC: 142 MG/DL (ref 0–200)
CO2 SERPL-SCNC: 22 MMOL/L (ref 22–29)
CREAT SERPL-MCNC: 1.22 MG/DL (ref 0.76–1.27)
CREAT UR-MCNC: 23.1 MG/DL
EGFRCR SERPLBLD CKD-EPI 2021: 60.3 ML/MIN/1.73
GLOBULIN UR ELPH-MCNC: 2.2 GM/DL
GLUCOSE SERPL-MCNC: 250 MG/DL (ref 65–99)
HBA1C MFR BLD: 8.4 % (ref 3.5–5.6)
HDLC SERPL-MCNC: 39 MG/DL (ref 40–60)
LDLC SERPL CALC-MCNC: 74 MG/DL (ref 0–100)
LDLC/HDLC SERPL: 1.76 {RATIO}
MICROALBUMIN/CREAT UR: NORMAL MG/G{CREAT}
POTASSIUM SERPL-SCNC: 4.7 MMOL/L (ref 3.5–5.2)
PROT SERPL-MCNC: 6.4 G/DL (ref 6–8.5)
SODIUM SERPL-SCNC: 138 MMOL/L (ref 136–145)
T4 FREE SERPL-MCNC: 1.82 NG/DL (ref 0.93–1.7)
TRIGL SERPL-MCNC: 172 MG/DL (ref 0–150)
TSH SERPL DL<=0.05 MIU/L-ACNC: 2.47 UIU/ML (ref 0.27–4.2)
VLDLC SERPL-MCNC: 29 MG/DL (ref 5–40)

## 2022-05-03 PROCEDURE — 83036 HEMOGLOBIN GLYCOSYLATED A1C: CPT

## 2022-05-03 PROCEDURE — 84443 ASSAY THYROID STIM HORMONE: CPT

## 2022-05-03 PROCEDURE — 82043 UR ALBUMIN QUANTITATIVE: CPT

## 2022-05-03 PROCEDURE — 36415 COLL VENOUS BLD VENIPUNCTURE: CPT

## 2022-05-03 PROCEDURE — 80053 COMPREHEN METABOLIC PANEL: CPT

## 2022-05-03 PROCEDURE — 82570 ASSAY OF URINE CREATININE: CPT

## 2022-05-03 PROCEDURE — 82306 VITAMIN D 25 HYDROXY: CPT

## 2022-05-03 PROCEDURE — 80061 LIPID PANEL: CPT

## 2022-05-03 PROCEDURE — 84439 ASSAY OF FREE THYROXINE: CPT

## 2022-05-03 NOTE — PROGRESS NOTES
Date of Office Visit: 2022  Encounter Provider: Dr. Edwar Ackerman  Place of Service: [facility] CARDIOLOGY [place]  Patient Name: Dalton Dallas Sr.  :1940  Gabino Sibley Jr., MD    Chief Complaint   Patient presents with   • Coronary Artery Disease   • Hypertension   • Hyperlipidemia   • Diabetes   • Follow-up     History of Present Illness    I'm pleased to see Mr. Dallas in my office today as a followup     As you know, patient is 81 years old white gentleman whose past medical history is significant for hypertension, hyperlipidemia, coronary artery disease, coronary artery stenting, diabetes mellitus, DVT who came for followup.     In , patient developed symptom of shortness of breath and subsequent stress test was abnormal.  Cardiac catheterization showed LAD was free of disease, RCA had 50% stenosis, and LCX has 80% stenosis.  Patient underwent LCX/om stenting.  In , repeat cardiac catheterization showed 80-90% stenosis of LCX/OM1 patient underwent successful stenting.  LAD had 25% stenosis.  LVEF was 60%.  In 2018, echocardiogram showed EF of 60-65%. In 2020, patient underwent stress test which showed no myocardial ischemia.  Small fixed inferior defect was noted.    In 2021, patient was admitted at [facility] with symptom of unstable angina.  Patient underwent cardiac catheterization and it showed patent stents.  Distal LCx had 70 to 80% stenosis but it was a small caliber vessel.  LVEF was 55 to 60%.    In 2021, patient underwent echocardiogram and it showed ejection fraction of 60 to 65% and no significant valvular heart disease noted.    This is 6 months follow-up for the patient.  Patient denies any symptom of chest pain or tightness or heaviness.  Patient denies any orthopnea, PND, syncope or presyncope.  No leg edema noted.    EKG showed normal sinus rhythm.  Isolated PVCs noted.    At this stage, patient is doing well.  His blood  pressure and heart rate is controlled.  His symptoms are contained.  Diet modification and weight loss is emphasized.  Diabetic control is encouraged.  Patient had recent cardiac catheterization which showed patent stent.  Overall I am pleased with the patient condition.  Risk factor modification should be modified        Past Medical History:   Diagnosis Date   • ACE-inhibitor cough 06/2005   • Coronary artery disease    • Diabetes mellitus, type 2 (HCC) 1995   • ED (erectile dysfunction)    • Hx of blood clots 2014    Blood clot left leg    • Hyperlipidemia 08/2000   • Hypertension    • Hypogonadism, male 1998   • Hypothyroidism 06/2001   • Obesity    • Peripheral neuropathy    • Pulmonary embolism (HCC) 02/2014   • Rectal fistula    • Sleep apnea 12/10/2013   • Vitamin D deficiency          Past Surgical History:   Procedure Laterality Date   • CARDIAC CATHETERIZATION  2008    PTCA: 2008; PTCA: 4/21/2015 LCX stent    • CARDIAC CATHETERIZATION Left 7/3/2021    Procedure: Cardiac Catheterization/Vascular Study;  Surgeon: Edwar Ackerman MD;  Location: [facility] CATH INVASIVE LOCATION;  Service: Cardiovascular;  Laterality: Left;   • CARDIOVASCULAR STRESS TEST  2020   • CATARACT EXTRACTION  01/11/2016 1-4-16 & 1-11-16    • COLONOSCOPY N/A 11/23/2019    Procedure: COLONOSCOPY WITH ENDOSCOPIC CLIPPING X1 OF POST POLYPECTOMY BLEED SITE;  Surgeon: Jhonny Orellana MD;  Location: [facility] ENDOSCOPY;  Service: Gastroenterology   • CORONARY STENT PLACEMENT     • INGUINAL HERNIA REPAIR Left    • UMBILICAL HERNIA REPAIR             Current Outpatient Medications:   •  Accu-Chek Irene Plus test strip, , Disp: , Rfl:   •  Accu-Chek Softclix Lancets lancets, Use to test blood sugars 3 times daily. DXE11.65, Disp: 100 each, Rfl: 11  •  amLODIPine (NORVASC) 5 MG tablet, TAKE 1 TABLET DAILY, Disp: 90 tablet, Rfl: 3  •  atenolol (TENORMIN) 50 MG tablet, TAKE 1 TABLET DAILY, Disp: 90 tablet, Rfl: 3  •  atorvastatin (LIPITOR) 80 MG  "tablet, TAKE 1 TABLET EVERY NIGHT, Disp: 90 tablet, Rfl: 3  •  BD Insulin Syringe U/F 31G X 5/16\" 0.5 ML misc, USE 1 NEW SYRINGE WITH EACH DOSE OF INSULIN TWICE A DAY, Disp: 200 each, Rfl: 3  •  Blood Glucose Monitoring Suppl (Accu-Chek Guide) w/Device kit, 1 each Daily., Disp: 1 kit, Rfl: 1  •  clopidogrel (PLAVIX) 75 MG tablet, TAKE 1 TABLET DAILY, Disp: 90 tablet, Rfl: 2  •  fenofibrate (TRICOR) 54 MG tablet, TAKE 1 TABLET DAILY, Disp: 90 tablet, Rfl: 3  •  ferrous sulfate 325 (65 FE) MG tablet, Take 325 mg by mouth 2 (Two) Times a Day., Disp: , Rfl:   •  gabapentin (NEURONTIN) 100 MG capsule, TAKE 1 CAPSULE THREE TIMES A DAY, Disp: 270 capsule, Rfl: 3  •  glucose blood (Accu-Chek Guide) test strip, Use to test blood sugars 3 times daily. DXE11.65, Disp: 100 each, Rfl: 11  •  hydroCHLOROthiazide (HYDRODIURIL) 25 MG tablet, TAKE 1 TABLET DAILY, Disp: 90 tablet, Rfl: 3  •  insulin regular (HUMULIN R) 500 UNIT/ML CONCENTRATED injection, DRAW TO 24 UNIT REI ON U-100 SYRINGE AND INJECT IN THE MORNING AND 36 UNIT REI IN THE EVENING, Disp: 60 mL, Rfl: 3  •  irbesartan (AVAPRO) 300 MG tablet, TAKE 1 TABLET DAILY, Disp: 90 tablet, Rfl: 3  •  isosorbide mononitrate (IMDUR) 30 MG 24 hr tablet, TAKE 1 TABLET DAILY, Disp: 90 tablet, Rfl: 3  •  Jardiance 25 MG tablet tablet, TAKE 1 TABLET DAILY (THIS IS AN INCREASE IN DOSE), Disp: 90 tablet, Rfl: 3  •  levothyroxine (SYNTHROID, LEVOTHROID) 175 MCG tablet, TAKE 1 TABLET DAILY, Disp: 90 tablet, Rfl: 3  •  metFORMIN ER (GLUCOPHAGE-XR) 500 MG 24 hr tablet, TAKE 4 TABLETS AT SUPPER, Disp: 360 tablet, Rfl: 3  •  Omega-3 Fatty Acids (FISH OIL) 1000 MG capsule capsule, Take 1,000 mg by mouth 2 (Two) Times a Day., Disp: , Rfl:   •  SymlinPen 120 2700 MCG/2.7ML solution pen-injector, INJECT 120 MCG UNDER THE SKIN INTO THE APPROPRIATE AREA AS DIRECTED THREE TIMES A DAY, Disp: 32.4 mL, Rfl: 3  •  vitamin B-12 (CYANOCOBALAMIN) 100 MCG tablet, Take 100 mcg by mouth Daily., Disp: , Rfl: "   •  vitamin D (ERGOCALCIFEROL) 1.25 MG (56420 UT) capsule capsule, TAKE 1 CAPSULE ONCE A WEEK, Disp: 12 capsule, Rfl: 3  •  warfarin (COUMADIN) 1 MG tablet, Take 1 tablet PO every evening as directed., Disp: 30 tablet, Rfl: 3  •  warfarin (COUMADIN) 3 MG tablet, TAKE 1 TABLET DAILY, Disp: 90 tablet, Rfl: 3  •  warfarin (COUMADIN) 5 MG tablet, Take 1 tablet by mouth Take As Directed., Disp: 7 tablet, Rfl: 0      Social History     Socioeconomic History   • Marital status:    Tobacco Use   • Smoking status: Former Smoker     Packs/day: 1.50     Years: 20.00     Pack years: 30.00     Types: Cigarettes     Quit date:      Years since quittin.3   • Smokeless tobacco: Never Used   • Tobacco comment: quit    Vaping Use   • Vaping Use: Never used   Substance and Sexual Activity   • Alcohol use: No   • Drug use: No   • Sexual activity: Defer         Review of Systems   Constitutional: Negative for chills and fever.   HENT: Negative for ear discharge and nosebleeds.    Eyes: Negative for discharge and redness.   Cardiovascular: Negative for chest pain, orthopnea, palpitations, paroxysmal nocturnal dyspnea and syncope.   Respiratory: Positive for shortness of breath. Negative for cough and wheezing.    Endocrine: Negative for heat intolerance.   Skin: Negative for rash.   Musculoskeletal: Positive for arthritis, back pain and joint pain. Negative for myalgias.   Gastrointestinal: Negative for abdominal pain, melena, nausea and vomiting.   Genitourinary: Negative for dysuria and hematuria.   Neurological: Negative for dizziness, light-headedness, numbness and tremors.   Psychiatric/Behavioral: Negative for depression. The patient is not nervous/anxious.        Procedures      ECG 12 Lead    Date/Time: 2022 12:30 PM  Performed by: Edwar Ackerman MD  Authorized by: Edwar Ackerman MD   Comparison: compared with previous ECG   Similar to previous ECG  Rhythm: sinus rhythm  Other findings: non-specific ST-T  "wave changes    Clinical impression: normal ECG            ECG 12 Lead    (Results Pending)           Objective:    /70 (BP Location: Left arm, Cuff Size: Large Adult)   Pulse 63   Ht 177.8 cm (70\")   Wt 135 kg (297 lb)   SpO2 98%   BMI 42.62 kg/m²         Constitutional:       Appearance: Well-developed.   Eyes:      General: No scleral icterus.        Right eye: No discharge.   HENT:      Head: Normocephalic and atraumatic.   Neck:      Thyroid: No thyromegaly.      Lymphadenopathy: No cervical adenopathy.   Pulmonary:      Effort: Pulmonary effort is normal. No respiratory distress.      Breath sounds: Normal breath sounds. No wheezing. No rales.   Cardiovascular:      Normal rate. Regular rhythm.      No gallop.   Abdominal:      Tenderness: There is no abdominal tenderness.   Skin:     Findings: No erythema or rash.   Neurological:      Mental Status: Alert and oriented to person, place, and time.             Assessment:       Diagnosis Plan   1. Coronary artery disease involving native coronary artery of native heart without angina pectoris  ECG 12 Lead   2. Diastolic dysfunction with chronic heart failure (HCC)  ECG 12 Lead   3. Essential hypertension  ECG 12 Lead   4. Mixed hyperlipidemia  ECG 12 Lead   5. Type 2 diabetes mellitus with diabetic neuropathy, with long-term current use of insulin (HCC)  ECG 12 Lead            Plan:       MDM:    1.  CAD:    Patient had recent cardiac catheterization which showed patent stents.  Continue current treatment.  Risk factor modification recommended    2.  Hypertension:    Blood pressure is very well controlled current treatment would be continued    3.  Hyperlipidemia:    Patient is on Lipitor.  Repeat lipid panel in future.  I advised symptom have blood work on next visit with PCP    4.  Diabetes mellitus:    Patient is on insulin and metformin.  Weight loss and diet modification discussed  "

## 2022-05-04 ENCOUNTER — OFFICE VISIT (OUTPATIENT)
Dept: CARDIOLOGY | Facility: CLINIC | Age: 82
End: 2022-05-04

## 2022-05-04 VITALS
WEIGHT: 297 LBS | HEART RATE: 63 BPM | HEIGHT: 70 IN | DIASTOLIC BLOOD PRESSURE: 70 MMHG | SYSTOLIC BLOOD PRESSURE: 134 MMHG | BODY MASS INDEX: 42.52 KG/M2 | OXYGEN SATURATION: 98 %

## 2022-05-04 DIAGNOSIS — Z79.4 TYPE 2 DIABETES MELLITUS WITH DIABETIC NEUROPATHY, WITH LONG-TERM CURRENT USE OF INSULIN: Chronic | ICD-10-CM

## 2022-05-04 DIAGNOSIS — I50.32 DIASTOLIC DYSFUNCTION WITH CHRONIC HEART FAILURE: Chronic | ICD-10-CM

## 2022-05-04 DIAGNOSIS — E11.40 TYPE 2 DIABETES MELLITUS WITH DIABETIC NEUROPATHY, WITH LONG-TERM CURRENT USE OF INSULIN: Chronic | ICD-10-CM

## 2022-05-04 DIAGNOSIS — I10 ESSENTIAL HYPERTENSION: Chronic | ICD-10-CM

## 2022-05-04 DIAGNOSIS — I25.10 CORONARY ARTERY DISEASE INVOLVING NATIVE CORONARY ARTERY OF NATIVE HEART WITHOUT ANGINA PECTORIS: Primary | Chronic | ICD-10-CM

## 2022-05-04 DIAGNOSIS — E78.2 MIXED HYPERLIPIDEMIA: Chronic | ICD-10-CM

## 2022-05-04 PROCEDURE — 99214 OFFICE O/P EST MOD 30 MIN: CPT | Performed by: INTERNAL MEDICINE

## 2022-05-04 PROCEDURE — 93000 ELECTROCARDIOGRAM COMPLETE: CPT | Performed by: INTERNAL MEDICINE

## 2022-05-06 ENCOUNTER — OFFICE VISIT (OUTPATIENT)
Dept: FAMILY MEDICINE CLINIC | Facility: CLINIC | Age: 82
End: 2022-05-06

## 2022-05-06 VITALS
RESPIRATION RATE: 18 BRPM | HEIGHT: 70 IN | WEIGHT: 298.2 LBS | OXYGEN SATURATION: 96 % | BODY MASS INDEX: 42.69 KG/M2 | TEMPERATURE: 97.1 F | DIASTOLIC BLOOD PRESSURE: 57 MMHG | SYSTOLIC BLOOD PRESSURE: 134 MMHG | HEART RATE: 76 BPM

## 2022-05-06 DIAGNOSIS — E11.40 TYPE 2 DIABETES MELLITUS WITH DIABETIC NEUROPATHY, WITH LONG-TERM CURRENT USE OF INSULIN: Chronic | ICD-10-CM

## 2022-05-06 DIAGNOSIS — G47.30 SLEEP APNEA, UNSPECIFIED TYPE: Chronic | ICD-10-CM

## 2022-05-06 DIAGNOSIS — I50.32 DIASTOLIC DYSFUNCTION WITH CHRONIC HEART FAILURE: Chronic | ICD-10-CM

## 2022-05-06 DIAGNOSIS — I73.9 PERIPHERAL VASCULAR DISEASE: ICD-10-CM

## 2022-05-06 DIAGNOSIS — E78.2 MIXED HYPERLIPIDEMIA: Chronic | ICD-10-CM

## 2022-05-06 DIAGNOSIS — E66.01 MORBID (SEVERE) OBESITY DUE TO EXCESS CALORIES: ICD-10-CM

## 2022-05-06 DIAGNOSIS — E03.9 ACQUIRED HYPOTHYROIDISM: Chronic | ICD-10-CM

## 2022-05-06 DIAGNOSIS — Z79.4 TYPE 2 DIABETES MELLITUS WITH DIABETIC NEUROPATHY, WITH LONG-TERM CURRENT USE OF INSULIN: Chronic | ICD-10-CM

## 2022-05-06 DIAGNOSIS — I10 ESSENTIAL HYPERTENSION: Primary | Chronic | ICD-10-CM

## 2022-05-06 DIAGNOSIS — D68.59 THROMBOPHILIA: Chronic | ICD-10-CM

## 2022-05-06 PROCEDURE — 99214 OFFICE O/P EST MOD 30 MIN: CPT | Performed by: FAMILY MEDICINE

## 2022-05-06 NOTE — PROGRESS NOTES
Subjective   Dalton Dallas Sr. is a 81 y.o. male.     Chief Complaint   Patient presents with   • Hypertension   • Hyperlipidemia   • Diabetes   • Heart Problem   • thrombophilia     6 month follow up        HPI  Chief complaint: Hypertension hyperlipidemia coronary artery disease peripheral vascular disease thrombophilia hypothyroidism type 2 diabetes mellitus    The patient is a 81-year-old white male comes in for follow-up and maintenance of his current problems which include    1.  Hypertension-stable-patient is currently on atenolol 50 mg daily amlodipine 5 mg daily Avapro 300 mg once a day hydrochlorothiazide 25 mg daily.  He denied headache lightheadedness dizziness or chest pain.    2.  Wuaavzgazkecja-lzggki-yuqorvf on Lipitor 80 mg daily and Tricor 54 mg daily.  He is asymptomatic.    3.  Type 2 diabetes mellitus-stable-the patient is currently on metformin 2000 mg a day Jardiance 25 mg daily Symlin 120 mcg 3 times a day and short acting insulin.  He is asymptomatic.  He does have diabetic neuropathy and is on gabapentin 100 mg 3 times a day.    4.  Coronary artery disease/heart failure with normal ejection fraction-stable-patient is currently on aspirin 81 mg daily long-acting nitroglycerin 30 mg daily and atenolol 50 mg daily and Plavix 75 mg daily P denies chest pain shortness of breath orthopnea or PND.    5.  Hypothyroidism-stable-patient is currently on Synthroid 0.175 mg daily.  Denied heat or cold intolerance or tremor.    6.  Thrombophilia-stable and patient is current on Coumadin.  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    7 peripheral vascular disease-stable-patient on Plavix 75 mg daily.  Denied weakness or numbness on one side of the body of the other.  9 problems processing information or speech disorder..    The following portions of the patient's history were reviewed and updated as appropriate: allergies, current medications, past family history, past medical history, past  "social history, past surgical history and problem list.    Review of Systems    Objective     /57   Pulse 76   Temp 97.1 °F (36.2 °C) (Infrared)   Resp 18   Ht 177.8 cm (70\") Comment: stated  Wt 135 kg (298 lb 3.2 oz)   SpO2 96%   BMI 42.79 kg/m²     Physical Exam  Vitals and nursing note reviewed.   Constitutional:       Appearance: He is well-developed. He is obese.   HENT:      Head: Normocephalic and atraumatic.   Eyes:      Pupils: Pupils are equal, round, and reactive to light.   Cardiovascular:      Rate and Rhythm: Normal rate and regular rhythm.      Heart sounds: Normal heart sounds.   Pulmonary:      Effort: Pulmonary effort is normal.      Breath sounds: Normal breath sounds.   Abdominal:      General: Bowel sounds are normal.      Palpations: Abdomen is soft.   Musculoskeletal:         General: Normal range of motion.      Cervical back: Neck supple.   Skin:     General: Skin is warm and dry.   Neurological:      Mental Status: He is alert and oriented to person, place, and time.   Psychiatric:         Behavior: Behavior normal.         Thought Content: Thought content normal.         Judgment: Judgment normal.         Vitamin D 25 Hydroxy (05/03/2022 08:57)  T4, Free (05/03/2022 08:57)  TSH (05/03/2022 08:57)  Lipid Panel (05/03/2022 08:57)  Comprehensive Metabolic Panel (05/03/2022 08:57)  Microalbumin / Creatinine Urine Ratio - Urine, Clean Catch (05/03/2022 08:57)  Hemoglobin A1c (05/03/2022 08:57)    Assessment & Plan   Diagnoses and all orders for this visit:    1. Essential hypertension (Primary)    2. Mixed hyperlipidemia    3. Diastolic dysfunction with chronic heart failure (HCC)    4. Peripheral vascular disease (HCC)    5. Thrombophilia ([facility])    6. Acquired hypothyroidism    7. Type 2 diabetes mellitus with diabetic neuropathy, with long-term current use of insulin ([facility])    8. Sleep apnea, unspecified type    9. Morbid (severe) obesity due to excess calories ([facility])      Patient " Instructions   Continue your current medications and treatment.    Follow up in the office in 3 months.    Laboratory testing at that  time.      Gabino Sibley Jr., MD    05/18/22

## 2022-05-09 ENCOUNTER — TELEPHONE (OUTPATIENT)
Dept: ONCOLOGY | Facility: HOSPITAL | Age: 82
End: 2022-05-09

## 2022-05-09 LAB — INR PPP: 2.1

## 2022-05-09 NOTE — TELEPHONE ENCOUNTER
Pt called to report INR 2.1 today. Pt to continue taking warfarin 8.5 mg daily and re-check INR in 1 week. He verbalized understanding.

## 2022-05-10 ENCOUNTER — TELEPHONE (OUTPATIENT)
Dept: ENDOCRINOLOGY | Facility: CLINIC | Age: 82
End: 2022-05-10

## 2022-05-10 ENCOUNTER — OFFICE VISIT (OUTPATIENT)
Dept: ENDOCRINOLOGY | Facility: CLINIC | Age: 82
End: 2022-05-10

## 2022-05-10 VITALS
HEART RATE: 65 BPM | SYSTOLIC BLOOD PRESSURE: 116 MMHG | WEIGHT: 294 LBS | BODY MASS INDEX: 42.09 KG/M2 | DIASTOLIC BLOOD PRESSURE: 50 MMHG | HEIGHT: 70 IN

## 2022-05-10 DIAGNOSIS — E11.40 TYPE 2 DIABETES MELLITUS WITH DIABETIC NEUROPATHY, WITHOUT LONG-TERM CURRENT USE OF INSULIN: Primary | ICD-10-CM

## 2022-05-10 DIAGNOSIS — E55.9 VITAMIN D DEFICIENCY: ICD-10-CM

## 2022-05-10 DIAGNOSIS — E78.2 MIXED HYPERLIPIDEMIA: Chronic | ICD-10-CM

## 2022-05-10 LAB — GLUCOSE BLDC GLUCOMTR-MCNC: 337 MG/DL (ref 70–105)

## 2022-05-10 PROCEDURE — 99214 OFFICE O/P EST MOD 30 MIN: CPT | Performed by: INTERNAL MEDICINE

## 2022-05-10 PROCEDURE — 82962 GLUCOSE BLOOD TEST: CPT | Performed by: INTERNAL MEDICINE

## 2022-05-10 RX ORDER — BLOOD SUGAR DIAGNOSTIC
STRIP MISCELLANEOUS
Qty: 300 EACH | Refills: 3 | Status: SHIPPED | OUTPATIENT
Start: 2022-05-10 | End: 2022-05-13

## 2022-05-10 RX ORDER — INSULIN HUMAN 500 [IU]/ML
INJECTION, SOLUTION SUBCUTANEOUS
Qty: 60 ML | Refills: 3
Start: 2022-05-10 | End: 2023-02-06 | Stop reason: SDUPTHER

## 2022-05-10 NOTE — PATIENT INSTRUCTIONS
Continue exercise as able.  Drink plenty of water.  Check test trips when you get home.  Increase U 500 insulin to 40 units before supper.  Continue other diabetes, thyroid & lipid meds & vit D supplements.  Call if blood sugars are running under 100 or over 200.  F/u in 6 months, with labs prior.

## 2022-05-10 NOTE — TELEPHONE ENCOUNTER
Patient left voicemail stating the test strips he has at home do not go to the Accu-Chek Guide meter Dr Shaver gave him today at his visit. I Called patient back and got his voicemail. Left a message to let me know which pharmacy he would like that sent to and how many times a day he is testing.

## 2022-05-10 NOTE — TELEPHONE ENCOUNTER
Patient called back and left message to order them as 90 day supply at [facility]. Prescription sent.

## 2022-05-11 DIAGNOSIS — E11.40 TYPE 2 DIABETES MELLITUS WITH DIABETIC NEUROPATHY, WITHOUT LONG-TERM CURRENT USE OF INSULIN: Primary | ICD-10-CM

## 2022-05-11 RX ORDER — LANCETS
EACH MISCELLANEOUS
Qty: 100 EACH | Refills: 11 | Status: SHIPPED | OUTPATIENT
Start: 2022-05-11 | End: 2023-05-11

## 2022-05-11 NOTE — PROGRESS NOTES
[facility]        Patient Care Team:  Gabino Sibley Jr., MD as PCP - General (Family Medicine)    Chief Complaint:    Chief Complaint   Patient presents with   • Diabetes     Type 2, , 2hr PP, Humulin R 24 units today, Symlin 120 units today, Ketones Negative         Subjective   Here for diabetes f/u  Blood sugars: in the 200's in am  Exercise program: yard work  Taking vit D weekly    Interval History:     Patient Complaints: R knee pain  Patient Denies: hypoglycemia  History taken from: patient    Review of Systems:   Review of Systems   HENT: Negative for trouble swallowing.    Eyes: Negative for blurred vision.   Cardiovascular: Negative for palpitations.   Gastrointestinal: Negative for nausea.   Endocrine: Negative for polyuria.   Musculoskeletal: Positive for back pain.        R knee pain   Neurological: Negative for tremors and headache.     Lost  5 lb since last visit    Objective     Vital Signs  Heart Rate:  [65] 65  BP: (116)/(50) 116/50    Physical Exam:     General Appearance:    Alert, cooperative, in no acute distress. Obese   Head:    Normocephalic, without obvious abnormality, atraumatic   Eyes:            Lids and lashes normal, conjunctivae and sclerae normal, no   icterus, no pallor, corneas clear, PERRLA   Throat:   No oral lesions,  oral mucosa moist   Neck:   No adenopathy, supple,  no thyromegaly, no   carotid bruit   Lungs:     Decreased breath sounds    Heart:    Regular rhythm and normal rate   Chest Wall:    No abnormalities observed   Abdomen:     Normal bowel sounds, soft                 Extremities:   Moves all extremities well, no edema               Pulses:   Pulses palpable and equal bilaterally   Skin:   Dry. Stasis dermatitis   Neurologic:  DTR absent, able to feel the 10g monofilament, except toes          Results Review:    I have reviewed the patient's new clinical results, labs & imaging.    Medication Review:   Prior to Admission medications  "   Medication Sig Start Date End Date Taking? Authorizing Provider   Accu-Chek Softclix Lancets lancets Use to test blood sugars 3 times daily. DXE11.65 7/15/21 7/15/22 Yes Virginia Shaver MD   amLODIPine (NORVASC) 5 MG tablet TAKE 1 TABLET DAILY 4/11/22  Yes Edwar Ackerman MD   atenolol (TENORMIN) 50 MG tablet TAKE 1 TABLET DAILY 5/2/22  Yes Virginia Shaver MD   atorvastatin (LIPITOR) 80 MG tablet TAKE 1 TABLET EVERY NIGHT 4/11/22  Yes Edwar Ackerman MD   BD Insulin Syringe U/F 31G X 5/16\" 0.5 ML misc USE 1 NEW SYRINGE WITH EACH DOSE OF INSULIN TWICE A DAY 4/28/22  Yes Virginia Shaver MD   Blood Glucose Monitoring Suppl (Accu-Chek Guide) w/Device kit 1 each Daily. 7/15/21  Yes Virginia Shaver MD   clopidogrel (PLAVIX) 75 MG tablet TAKE 1 TABLET DAILY 9/7/21  Yes Edwar Ackerman MD   fenofibrate (TRICOR) 54 MG tablet TAKE 1 TABLET DAILY 3/2/22  Yes Edwar Ackerman MD   ferrous sulfate 325 (65 FE) MG tablet Take 325 mg by mouth 2 (Two) Times a Day.   Yes ProviderLee MD   gabapentin (NEURONTIN) 100 MG capsule TAKE 1 CAPSULE THREE TIMES A DAY 7/2/21  Yes Gabino Sibley Jr., MD   hydroCHLOROthiazide (HYDRODIURIL) 25 MG tablet TAKE 1 TABLET DAILY 4/25/22  Yes Edwar Ackerman MD   insulin regular (HUMULIN R) 500 UNIT/ML CONCENTRATED injection DRAW TO 24 UNIT REI ON U-100 SYRINGE AND INJECT IN THE MORNING AND 40 UNIT REI IN THE EVENING 5/10/22  Yes Virginia Shaver MD   irbesartan (AVAPRO) 300 MG tablet TAKE 1 TABLET DAILY 10/15/21  Yes Virginia Shaver MD   isosorbide mononitrate (IMDUR) 30 MG 24 hr tablet TAKE 1 TABLET DAILY 4/11/22  Yes Edwar Ackerman MD   Jardiance 25 MG tablet tablet TAKE 1 TABLET DAILY (THIS IS AN INCREASE IN DOSE) 4/28/22  Yes Virginia Shaver MD   levothyroxine (SYNTHROID, LEVOTHROID) 175 MCG tablet TAKE 1 TABLET DAILY 11/9/21  Yes Virginia Shaver MD   metFORMIN ER (GLUCOPHAGE-XR) 500 MG 24 hr tablet TAKE 4 TABLETS AT SUPPER 3/7/22  Yes Virginia Shaver MD   Omega-3 " Fatty Acids (FISH OIL) 1000 MG capsule capsule Take 1,000 mg by mouth 2 (Two) Times a Day.   Yes Lee Ruggiero MD   SymlinPen 120 2700 MCG/2.7ML solution pen-injector INJECT 120 MCG UNDER THE SKIN INTO THE APPROPRIATE AREA AS DIRECTED THREE TIMES A DAY 11/15/21  Yes Virginia Shaver MD   vitamin B-12 (CYANOCOBALAMIN) 100 MCG tablet Take 100 mcg by mouth Daily. 4/15/15  Yes Lee Ruggiero MD   vitamin D (ERGOCALCIFEROL) 1.25 MG (80605 UT) capsule capsule TAKE 1 CAPSULE ONCE A WEEK 4/5/22  Yes Virginia Shaver MD   warfarin (COUMADIN) 1 MG tablet Take 1 tablet PO every evening as directed. 2/28/22  Yes Soraya Cortez MD   warfarin (COUMADIN) 3 MG tablet TAKE 1 TABLET DAILY 7/2/21  Yes Hadley Gaffney MD   warfarin (COUMADIN) 5 MG tablet Take 1 tablet by mouth Take As Directed. 3/28/22  Yes Nancy Ma APRN   Accu-Chek Irene Plus test strip  4/16/22 5/10/22 Yes Lee Ruggiero MD   glucose blood (Accu-Chek Guide) test strip Use to test blood sugars 3 times daily. DXE11.65 7/15/21 5/10/22 Yes Virginia Shaver MD   insulin regular (HUMULIN R) 500 UNIT/ML CONCENTRATED injection DRAW TO 24 UNIT REI ON U-100 SYRINGE AND INJECT IN THE MORNING AND 36 UNIT REI IN THE EVENING 1/25/22 5/10/22 Yes Virginia Shaver MD   glucose blood (Accu-Chek Guide) test strip Use to test blood sugars 3 times daily. DXE11.65 5/10/22   Virginia Shaver MD       Lab Results (most recent)     Procedure Component Value Units Date/Time    POC Glucose [[id number]]  (Abnormal) Collected: 05/10/22 1118    Specimen: Blood Updated: 05/10/22 1119     Glucose 337 mg/dL      Comment: Serial Number: [id number]:  [id number]           Lab Results   Component Value Date    HGBA1C 8.4 (H) 05/03/2022    HGBA1C 7.8 (H) 11/02/2021    HGBA1C 7.4 (H) 05/03/2021      Lab Results   Component Value Date    GLUCOSE 250 (H) 05/03/2022    BUN 19 05/03/2022    CREATININE 1.22 05/03/2022    EGFRIFNONA 60 (L) 11/02/2021    BCR 15.6  05/03/2022    K 4.7 05/03/2022    CO2 22.0 05/03/2022    CALCIUM 9.8 05/03/2022    ALBUMIN 4.20 05/03/2022    LABIL2 1.7 03/28/2019    AST 19 05/03/2022    ALT 20 05/03/2022    CHOL 142 05/03/2022    LDL 74 05/03/2022    HDL 39 (L) 05/03/2022    TRIG 172 (H) 05/03/2022     Lab Results   Component Value Date    TSH 2.470 05/03/2022    FREET4 1.82 (H) 05/03/2022    STXR02TE 53.5 05/03/2022     Microalb/cr ratio <1    Assessment & Plan     Diagnoses and all orders for this visit:    1. Type 2 diabetes mellitus with diabetic neuropathy, without long-term current use of insulin (HCC) (Primary)  -     insulin regular (HUMULIN R) 500 UNIT/ML CONCENTRATED injection; DRAW TO 24 UNIT REI ON U-100 SYRINGE AND INJECT IN THE MORNING AND 40 UNIT REI IN THE EVENING  Dispense: 60 mL; Refill: 3  -     Hemoglobin A1c; Future    2. Mixed hyperlipidemia  -     Comprehensive Metabolic Panel; Future    3. Vitamin D deficiency    Other orders  -     POC Glucose    Glucose control worse. Lipids, thyroid & vit D stable.    Continue exercise as able.  Drink plenty of water.  Check test trips when you get home.  New Guide meter given to pt.  Increase U 500 insulin to 40 units before supper.  Continue other diabetes, thyroid & lipid meds & vit D supplements.  Call if blood sugars are running under 100 or over 200.        Virginia Shaver MD  05/10/22  21:38 EDT

## 2022-05-13 DIAGNOSIS — E11.40 TYPE 2 DIABETES MELLITUS WITH DIABETIC NEUROPATHY, WITHOUT LONG-TERM CURRENT USE OF INSULIN: ICD-10-CM

## 2022-05-13 RX ORDER — BLOOD SUGAR DIAGNOSTIC
STRIP MISCELLANEOUS
Qty: 400 EACH | Refills: 3 | Status: SHIPPED | OUTPATIENT
Start: 2022-05-13 | End: 2022-07-22 | Stop reason: SDUPTHER

## 2022-05-13 RX ORDER — BLOOD-GLUCOSE METER
1 EACH MISCELLANEOUS 4 TIMES DAILY
Qty: 1 KIT | Refills: 0 | Status: SHIPPED | OUTPATIENT
Start: 2022-05-13

## 2022-05-16 ENCOUNTER — TELEPHONE (OUTPATIENT)
Dept: ONCOLOGY | Facility: HOSPITAL | Age: 82
End: 2022-05-16

## 2022-05-16 LAB — INR PPP: 2.1

## 2022-05-16 NOTE — PROGRESS NOTES
Patient called in INR of 2.1. continue 8.5mg daily, patient checks weekly at home and states he has plenty of tabs.

## 2022-05-18 ENCOUNTER — OFFICE VISIT (OUTPATIENT)
Dept: PULMONOLOGY | Facility: HOSPITAL | Age: 82
End: 2022-05-18

## 2022-05-18 VITALS
OXYGEN SATURATION: 95 % | SYSTOLIC BLOOD PRESSURE: 140 MMHG | HEART RATE: 70 BPM | WEIGHT: 295 LBS | BODY MASS INDEX: 42.23 KG/M2 | DIASTOLIC BLOOD PRESSURE: 80 MMHG | HEIGHT: 70 IN | RESPIRATION RATE: 14 BRPM

## 2022-05-18 DIAGNOSIS — G47.33 OSA (OBSTRUCTIVE SLEEP APNEA): Primary | ICD-10-CM

## 2022-05-18 PROCEDURE — G0463 HOSPITAL OUTPT CLINIC VISIT: HCPCS

## 2022-05-18 NOTE — PROGRESS NOTES
PULMONARY/ CRITICAL CARE/ SLEEP MEDICINE OUTPATIENT CONSULT/ FOLLOW UP NOTE        Patient Name:  Dalton Dallas Sr.    :  1940    Medical Record:  [id number]    PRIMARY CARE PHYSICIAN     Gabino Sibley Jr., MD    REASON FOR CONSULTATION    Dalton Dallas Sr. is a 81 y.o. male who is referred for consultation for HAN  REVIEW OF SYSTEMS    Constitutional:  Denies fever or chills   Eyes:  Denies change in visual acuity   HENT:  Denies nasal congestion or sore throat   Respiratory:  Denies cough or shortness of breath   Cardiovascular:  Denies chest pain or edema   GI:  Denies abdominal pain, nausea, vomiting, bloody stools or diarrhea   :  Denies dysuria   Musculoskeletal:  Denies back pain or joint pain   Integument:  Denies rash   Neurologic:  Denies headache, focal weakness or sensory changes   Endocrine:  Denies polyuria or polydipsia   Lymphatic:  Denies swollen glands   Psychiatric:  Denies depression or anxiety     MEDICAL HISTORY    Past Medical History:   Diagnosis Date   • ACE-inhibitor cough 2005   • Coronary artery disease    • Diabetes mellitus, type 2 (HCC)    • ED (erectile dysfunction)    • Hx of blood clots 2014    Blood clot left leg    • Hyperlipidemia 2000   • Hypertension    • Hypogonadism, male    • Hypothyroidism 2001   • Obesity    • Peripheral neuropathy    • Pulmonary embolism (HCC) 2014   • Rectal fistula    • Sleep apnea 12/10/2013   • Vitamin D deficiency         SURGICAL HISTORY    Past Surgical History:   Procedure Laterality Date   • CARDIAC CATHETERIZATION      PTCA: ; PTCA: 2015 LCX stent    • CARDIAC CATHETERIZATION Left 7/3/2021    Procedure: Cardiac Catheterization/Vascular Study;  Surgeon: Edwar Ackerman MD;  Location: [facility] INVASIVE LOCATION;  Service: Cardiovascular;  Laterality: Left;   • CARDIOVASCULAR STRESS TEST     • CATARACT EXTRACTION  2016 & 16    • COLONOSCOPY N/A 2019     "Procedure: COLONOSCOPY WITH ENDOSCOPIC CLIPPING X1 OF POST POLYPECTOMY BLEED SITE;  Surgeon: Jhonny Orellana MD;  Location: [facility] ENDOSCOPY;  Service: Gastroenterology   • CORONARY STENT PLACEMENT     • INGUINAL HERNIA REPAIR Left    • UMBILICAL HERNIA REPAIR          FAMILY HISTORY    Family History   Problem Relation Age of Onset   • Heart disease Mother    • Leukemia Father        SOCIAL HISTORY    Social History     Tobacco Use   • Smoking status: Former Smoker     Packs/day: 1.50     Years: 20.00     Pack years: 30.00     Types: Cigarettes     Quit date:      Years since quittin.4   • Smokeless tobacco: Never Used   • Tobacco comment: quit    Substance Use Topics   • Alcohol use: No        ALLERGIES    No Known Allergies      MEDICATIONS    Current Outpatient Medications on File Prior to Visit   Medication Sig Dispense Refill   • Accu-Chek Irene Plus test strip USE TO CHECK BLOOD SUGAR BEFORE MEALS AND AT BEDTIME 400 each 3   • Accu-Chek Softclix Lancets lancets Use to test blood sugars 3 times daily. DXE11.65 100 each 11   • amLODIPine (NORVASC) 5 MG tablet TAKE 1 TABLET DAILY 90 tablet 3   • atenolol (TENORMIN) 50 MG tablet TAKE 1 TABLET DAILY 90 tablet 3   • atorvastatin (LIPITOR) 80 MG tablet TAKE 1 TABLET EVERY NIGHT 90 tablet 3   • BD Insulin Syringe U/F 31G X 5/16\" 0.5 ML misc USE 1 NEW SYRINGE WITH EACH DOSE OF INSULIN TWICE A  each 3   • Blood Glucose Monitoring Suppl (Accu-Chek Irene Plus) w/Device kit 1 each 4 (Four) Times a Day. 1 kit 0   • clopidogrel (PLAVIX) 75 MG tablet TAKE 1 TABLET DAILY 90 tablet 2   • fenofibrate (TRICOR) 54 MG tablet TAKE 1 TABLET DAILY 90 tablet 3   • ferrous sulfate 325 (65 FE) MG tablet Take 325 mg by mouth 2 (Two) Times a Day.     • gabapentin (NEURONTIN) 100 MG capsule TAKE 1 CAPSULE THREE TIMES A  capsule 3   • hydroCHLOROthiazide (HYDRODIURIL) 25 MG tablet TAKE 1 TABLET DAILY 90 tablet 3   • insulin regular (HUMULIN R) 500 UNIT/ML " "CONCENTRATED injection DRAW TO 24 UNIT REI ON U-100 SYRINGE AND INJECT IN THE MORNING AND 40 UNIT REI IN THE EVENING 60 mL 3   • irbesartan (AVAPRO) 300 MG tablet TAKE 1 TABLET DAILY 90 tablet 3   • isosorbide mononitrate (IMDUR) 30 MG 24 hr tablet TAKE 1 TABLET DAILY 90 tablet 3   • Jardiance 25 MG tablet tablet TAKE 1 TABLET DAILY (THIS IS AN INCREASE IN DOSE) 90 tablet 3   • levothyroxine (SYNTHROID, LEVOTHROID) 175 MCG tablet TAKE 1 TABLET DAILY 90 tablet 3   • metFORMIN ER (GLUCOPHAGE-XR) 500 MG 24 hr tablet TAKE 4 TABLETS AT SUPPER 360 tablet 3   • Omega-3 Fatty Acids (FISH OIL) 1000 MG capsule capsule Take 1,000 mg by mouth 2 (Two) Times a Day.     • SymlinPen 120 2700 MCG/2.7ML solution pen-injector INJECT 120 MCG UNDER THE SKIN INTO THE APPROPRIATE AREA AS DIRECTED THREE TIMES A DAY 32.4 mL 3   • vitamin B-12 (CYANOCOBALAMIN) 100 MCG tablet Take 100 mcg by mouth Daily.     • vitamin D (ERGOCALCIFEROL) 1.25 MG (11054 UT) capsule capsule TAKE 1 CAPSULE ONCE A WEEK 12 capsule 3   • warfarin (COUMADIN) 1 MG tablet Take 1 tablet PO every evening as directed. 30 tablet 3   • warfarin (COUMADIN) 3 MG tablet TAKE 1 TABLET DAILY 90 tablet 3   • warfarin (COUMADIN) 5 MG tablet Take 1 tablet by mouth Take As Directed. 7 tablet 0     No current facility-administered medications on file prior to visit.       PHYSICAL EXAM    Vitals:    05/18/22 1404   BP: 140/80   BP Location: Left arm   Patient Position: Sitting   Cuff Size: Adult   Pulse: 70   Resp: 14   SpO2: 95%   Weight: 134 kg (295 lb)   Height: 176.5 cm (69.5\")        Constitutional:  Well developed, well nourished, no acute distress, non-toxic appearance   Eyes:  PERRL, conjunctiva normal   HENT:  Atraumatic, external ears normal, nose normal, oropharynx moist, no pharyngeal exudates. mallampatti   Neck- normal range of motion, no tenderness, supple   Respiratory:  No respiratory distress, normal breath sounds, no rales, no wheezing   Cardiovascular:  Normal " rate, normal rhythm, no murmurs, no gallops, no rubs   GI:  Soft, nondistended, normal bowel sounds, nontender, no organomegaly, no mass, no rebound, no guarding   :  No costovertebral angle tenderness   Musculoskeletal:  No edema, no tenderness, no deformities. Back- no tenderness  Integument:  Well hydrated, no rash   Lymphatic:  No lymphadenopathy noted   Neurologic:  Alert & oriented x 3, CN 2-12 normal, normal motor function, normal sensory function, no focal deficits noted   Psychiatric:  Speech and behavior appropriate     No radiology results for the last 90 days.   Results for orders placed during the hospital encounter of 08/12/21    Adult Transthoracic Echo Complete W/ Cont if Necessary Per Protocol    Interpretation Summary  · Left ventricular systolic function is normal.  · Left ventricular ejection fraction is 60 to 65%  · Left ventricular diastolic function was normal.      ASSESSMENT & PLAN:      Sleep apnea,[facility]  air curve 10 V auto compliance 100%, average use 6 hours 45 minutes, residual AHI 1.9  patient is compliant and benefiting from therapy    Nocturnal oxygen   Discussed with patient cardiovascular consequences of untreated sleep apnea     2D echo no evidence of pulmonary hypertension  Essential hypertension (Primary)  Mixed hyperlipidemia  Diastolic dysfunction with chronic heart failure (HCC)  Coronary artery disease involving native coronary artery of native heart without angina pectoris  Peripheral vascular disease (HCC)  Thrombophilia (HCC)  Acquired hypothyroidism  Type 2 diabetes mellitus with diabetic neuropathy, with long-term current use of insulin (HCC)    This document has been electronically signed by  Jelani Vieira MD  14:35 EDT

## 2022-05-23 ENCOUNTER — TELEPHONE (OUTPATIENT)
Dept: ONCOLOGY | Facility: HOSPITAL | Age: 82
End: 2022-05-23

## 2022-05-23 LAB — INR PPP: 2.3

## 2022-05-23 NOTE — PROGRESS NOTES
INR 2.3, Advised pt to continue taking 8.5mg daily and recheck next tuesday as the office is closed monday for the holiday.

## 2022-05-27 ENCOUNTER — TELEPHONE (OUTPATIENT)
Dept: FAMILY MEDICINE CLINIC | Facility: CLINIC | Age: 82
End: 2022-05-27

## 2022-05-27 NOTE — TELEPHONE ENCOUNTER
Caller: Dalton Dallas Sr.    Relationship: Self    Best call back number: [phone]       What was the call regarding:     PATIENT HAD SPOKE WITH YOU REGARDING THE DEPARTMENT/PERSONS (ORTHORPEDICS)  WHO USE LASER TO REPLACE KNEES WITH.  HE SAID YOU GAVE HIM A NAME AND HE DIDN'T WRITE IT DOWN.    HE WOULD LIKE FOR YOU TO GIVE HIM THAT NAME AGAIN ALONG WITH A NUMBER.    ALSO IF HE NEEDS A REFERRAL FOR THIS CAN YOU TAKE CARE OF THAT FOR HIM.      PLEASE GIVE PATIENT A CALL BACK

## 2022-05-27 NOTE — TELEPHONE ENCOUNTER
Returned the call and left a message on the patient's voicemail that I am unaware of anybody that does laser knee replacement therapy.  I gave him at [facility] name.  He is to call back with questions.

## 2022-05-27 NOTE — TELEPHONE ENCOUNTER
I returned the call and left a message on the patient's voicemail.  I am unaware of anybody who does laser knee replacement therapy.

## 2022-05-31 ENCOUNTER — TELEPHONE (OUTPATIENT)
Dept: ONCOLOGY | Facility: HOSPITAL | Age: 82
End: 2022-05-31

## 2022-05-31 DIAGNOSIS — Z79.01 LONG TERM (CURRENT) USE OF ANTICOAGULANTS: ICD-10-CM

## 2022-05-31 LAB — INR PPP: 2.2

## 2022-05-31 RX ORDER — WARFARIN SODIUM 5 MG/1
5 TABLET ORAL TAKE AS DIRECTED
Qty: 7 TABLET | Refills: 0 | Status: SHIPPED | OUTPATIENT
Start: 2022-05-31 | End: 2022-06-06 | Stop reason: SDUPTHER

## 2022-05-31 RX ORDER — WARFARIN SODIUM 3 MG/1
3 TABLET ORAL DAILY
Qty: 90 TABLET | Refills: 3 | Status: SHIPPED | OUTPATIENT
Start: 2022-05-31 | End: 2023-03-10 | Stop reason: SDUPTHER

## 2022-05-31 NOTE — TELEPHONE ENCOUNTER
Received call from patient reporting his INR of 2.2 today on 8.5 mg of Warfarin.  Patient advised to continue current dose and repeat in one week.  Patient asking for refill of 5 mg and 3 mg [facility]  Prescriptions sent.

## 2022-06-06 ENCOUNTER — TELEPHONE (OUTPATIENT)
Dept: ONCOLOGY | Facility: HOSPITAL | Age: 82
End: 2022-06-06

## 2022-06-06 DIAGNOSIS — Z79.01 LONG TERM (CURRENT) USE OF ANTICOAGULANTS: ICD-10-CM

## 2022-06-06 LAB — INR PPP: 3

## 2022-06-06 RX ORDER — WARFARIN SODIUM 5 MG/1
5 TABLET ORAL TAKE AS DIRECTED
Qty: 90 TABLET | Refills: 0 | Status: SHIPPED | OUTPATIENT
Start: 2022-06-06 | End: 2022-09-14

## 2022-06-06 RX ORDER — CLOPIDOGREL BISULFATE 75 MG/1
TABLET ORAL
Qty: 90 TABLET | Refills: 3 | Status: SHIPPED | OUTPATIENT
Start: 2022-06-06 | End: 2022-12-08 | Stop reason: SDUPTHER

## 2022-06-06 NOTE — PROGRESS NOTES
Patient called in his INR of 3.0, continue current dose. Patient checks weekly with home machine. He let me know that last weeks script was only called in for seven tabs. [facility] 5mg [facility], 90 day supply. Patient verbalized understanding.

## 2022-06-13 ENCOUNTER — TELEPHONE (OUTPATIENT)
Dept: ONCOLOGY | Facility: HOSPITAL | Age: 82
End: 2022-06-13

## 2022-06-13 LAB — INR PPP: 1.7

## 2022-06-13 NOTE — PROGRESS NOTES
PT called today with INR results of 1.7 on coumadin dose of 8.5mg he told me that he was sick a couple days during the week and did not take one day of Coumadin. I told him to stay on the dose of 8.5mg and retest in three days and call in to let us know.He VU.

## 2022-06-14 NOTE — PROGRESS NOTES
HEMATOLOGY ONCOLOGY FOLLOW UP        Patient name: Dalton Dallas Sr.  : 1940  MRN: [id number]  Primary Care Physician: Gabino Sibley Jr., MD  Referring Physician: Gabino Sibley Jr., *  Reason For Consult:     Chief Complaint   Patient presents with   • Follow-up     Deep vein thrombosis (DVT) of lower extremity, unspecified chronicity, unspecified laterality, unspecified vein (HCC)   History of PE and DVT  Anticoagulation management  Anemia    History of Present Illness:  Mr. Dallas is a 80-year-old gentleman, remote smoker, with a history of diabetes, coronary artery disease and hypertension who presented to [facility] on 14 with symptoms of shortness of breath and chest discomfort.  CT scan of the chest on 14 showed bilateral pulmonary emboli with large clot burden.  There were filling defects predominantly in the right upper lobe, and lower and middle lobe pulmonary arterial trees along with emboli in the left upper lobe and left lower lobe arterial tree as well.  Mediastinal,  hilar and subcarinal lymphadenopathy was also noted.  The largest lymph node in the subcarinal location measured 3.9 x 1.9 cm.  The patient was started on anticoagulation.  Bilateral lower extremity venous Doppler on 14 showed a filling defect within the left gastroc veins.  The patient was put on anticoagulation.  Thrombolysis was also under consideration considering the extensive clot burden, but the patient did not require it.       Patient denied any prior history of thromboembolism before this episode.  His brother may have had   a leg DVT.    · 14 - Creatinine 1.3, BUN 20.  · 14 - CBC:  WBC 14.4, hemoglobin 14.9, platelet count 241,000.   · 14 - Factor V Leiden negative.  Factor VIII level 323 (H).  Protein C activity 112.6% (N).    Protein S activity 11.3 (L).  Activated protein C resistance 2.1 (L).  Antithrombin III 83% (N).    D-dimer 2.15.    · 14 - Serum  homocysteine 8 (N).  Anticardiolipin antibody negative.  Phosphatidylserine   antibody negative.  Beta-2 glycoprotein antibody negative.   · 2/19/14 - Prothrombin gene mutation negative.    · 2/20/14 - FLAKO-2 mutation test negative.    · 3/10/14 - Protein S activity 93% (N).  Homocysteine level 11 (N).    · 4/11/14 - Factor VIII level 260 (H).   · 4/11/14 - CT scan of the chest without contrast:  Complete resolution of air space opacities in   the lower lobes.  Mediastinal lymphadenopathy is overall very similar to the prior study.  It is   nonspecific.  Three vessel coronary artery atherosclerotic calcification.  Questionable lobular   contour of the liver, question cirrhosis.  AP window lymph node measures about 1.1 cm,   paratracheal lymph node measures about 1.7 cm and subcarinal lymph node measures about 1.2 cm.  · 5/2/14 - INR 4.1.   · 7/30/14 - WBC 5.7, hemoglobin 12.1, platelet count 228,000, MCV 94.0.  INR 3.0.   · 10/17/14 - CT scan of the chest:  Chronic lung changes are stable.  Several borderline to mildly   enlarged mediastinal lymph nodes are also unchanged from six months ago.  They are likely benign   reactive lymph nodes.    · 10/29/14 - Vitamin B12 185, ferritin 57, iron saturation 19%, TIBC 473 (H), serum iron 88,   creatinine 1.3, BUN 18, folate 17.7.  · 11/4/14 - Patient underwent upper GI endoscopy and colonoscopy:  Several nonbleeding diverticula   were seen in the sigmoid colon.  Diverticulosis appeared moderate severity.  Four sessile polyps   of benign appearance were found in the cecum and descending colon.  Polypectomies were performed.    Surgical pathology:  Sessile serrated polyps.  A tubular adenoma is noted in the descending   colon.    · 11/25/14 - INR 4.2.   · 11/25/14 - WBC 5.7, hemoglobin 11.4, platelet count 210,000.    · 2/24/15 - WBC 7.7, hemoglobin 12.9, platelet count 245,000, MCV 93,000.  · 2/24/15 - INR 2.6.  Patient’s Coumadin dose is 9 mg.     · 3/12/15 - Creatinine 1.3.     · 4/21/15 - Patient admitted with chest pain and had cardiac catheterization.  He had a stent   placed.    · 5/8/15 - CT scan of the chest without contrast:  Stable appearance of mediastinal   lymphadenopathy over the past two studies going back to April 2014.  Stable pulmonary fibrotic   changes and scarring.    · 6/25/15 - WBC 5.3, hemoglobin 12.6, platelet count 187,000.     · 12/17/15 - WBC 5.1, hemoglobin 13.6, platelet count 208,000, MCV 93.1.  Iron saturation 21%,   TIBC 415, serum iron 85.    · 12/17/15 - Chest x-ray:  No acute process.     · 4/19/16 - INR 2.1.    · 5/17/16 - INR 2.2.  · 6/16/16 - WBC 5.5, hemoglobin 12.7, MCV 90.5, platelet count 205,000.  INR 2.3 (patient on   Coumadin 7.5 mg).    · 12/15/16 - WBC 5.0, hemoglobin 13.0, platelet count 174,000.  INR 1.9 (Coumadin dose 7.5).   · 6/15/17 - WBC 5.5, hemoglobin 12.5, platelet count 168,000, MCV 94.4.  INR 2.6 (Coumadin dose 8   mg).   · 11/2/17 - INR 2.    · 12/14/17 - WBC 5.04, hemoglobin 12.5, platelet count 167,000, MCV 94.    · 6/14/18 - WBC 4.9, hemoglobin 12.5, platelet count 172,000, MCV 93.5.    · 12/13/18 - INR 2.4.  WBC 4.9, hemoglobin 12.8, platelet count 180,000.    11/23/2019 INR is 2.55  11/11/2019: Colonoscopy: Cecum polypectomy shows tubular adenoma, transverse colon polypectomy shows tubular adenoma.   Patient readmitted after colonoscopy to hospital due to GI bleeding from polyp biopsy..  11/22/2019 hemoglobin 9.3 due to GI bleeding  11/23/2019 cecal ulcer with visible vessel and stigmata of recent bleeding which was clipped  Moderate left-sided diverticulosis  12/12/2019 INR 1.9  12/12/2019 WBC 4.9, hemoglobin 11.2, MCV 98.0, platelets 182, ferritin 85.2, iron 115, iron saturation 24%, TIBC 471, B12 greater than 2000, folate 13.8,  4/23/2020: ESR 12  5/6/2020: 25 hydroxy vitamin D 31.9  6/11/2020: INR 2.1, WBC 6.3, hemoglobin 13.5, platelets 204,  ·  9/28/2020: Lower extremity venous Doppler:: Normal  · 11/25/2020: INR  2.8  · 12/31/2020: WBC 6.4, hemoglobin 13.7, platelets 212, INR 1.2,  · 5/3/2021: 25 hydroxy vitamin D 66.5, TSH 2.8, creatinine 1.24,  · 5/12/2021: INR 2.7,  · 6/9/2021: INR 2.5,  · Subsequent INR has been stable.    Subjective:    INR has been lower at 1.6, takes 8.5 daily.    The following portions of the patient's history were reviewed and updated as appropriate: allergies, current medications, past family history, past medical history, past social history, past surgical history and problem list.       Past Medical History:   Diagnosis Date   • ACE-inhibitor cough 06/2005   • Coronary artery disease    • Diabetes mellitus, type 2 (HCC) 1995   • ED (erectile dysfunction)    • Hx of blood clots 2014    Blood clot left leg    • Hyperlipidemia 08/2000   • Hypertension    • Hypogonadism, male 1998   • Hypothyroidism 06/2001   • Obesity    • Peripheral neuropathy    • Pulmonary embolism (HCC) 02/2014   • Rectal fistula    • Sleep apnea 12/10/2013   • Vitamin D deficiency        Past Surgical History:   Procedure Laterality Date   • CARDIAC CATHETERIZATION  2008    PTCA: 2008; PTCA: 4/21/2015 LCX stent    • CARDIAC CATHETERIZATION Left 7/3/2021    Procedure: Cardiac Catheterization/Vascular Study;  Surgeon: Edwar Ackerman MD;  Location: [facility] CATH INVASIVE LOCATION;  Service: Cardiovascular;  Laterality: Left;   • CARDIOVASCULAR STRESS TEST  2020   • CATARACT EXTRACTION  01/11/2016 1-4-16 & 1-11-16    • COLONOSCOPY N/A 11/23/2019    Procedure: COLONOSCOPY WITH ENDOSCOPIC CLIPPING X1 OF POST POLYPECTOMY BLEED SITE;  Surgeon: Jhonny Orellana MD;  Location: [facility] ENDOSCOPY;  Service: Gastroenterology   • CORONARY STENT PLACEMENT     • INGUINAL HERNIA REPAIR Left    • UMBILICAL HERNIA REPAIR           Current Outpatient Medications:   •  Accu-Chek Irene Plus test strip, USE TO CHECK BLOOD SUGAR BEFORE MEALS AND AT BEDTIME, Disp: 400 each, Rfl: 3  •  Accu-Chek Softclix Lancets lancets, Use to test blood sugars 3 times  "daily. DXE11.65, Disp: 100 each, Rfl: 11  •  amLODIPine (NORVASC) 5 MG tablet, TAKE 1 TABLET DAILY, Disp: 90 tablet, Rfl: 3  •  atenolol (TENORMIN) 50 MG tablet, TAKE 1 TABLET DAILY, Disp: 90 tablet, Rfl: 3  •  atorvastatin (LIPITOR) 80 MG tablet, TAKE 1 TABLET EVERY NIGHT, Disp: 90 tablet, Rfl: 3  •  BD Insulin Syringe U/F 31G X 5/16\" 0.5 ML misc, USE 1 NEW SYRINGE WITH EACH DOSE OF INSULIN TWICE A DAY, Disp: 200 each, Rfl: 3  •  Blood Glucose Monitoring Suppl (Accu-Chek Irene Plus) w/Device kit, 1 each 4 (Four) Times a Day., Disp: 1 kit, Rfl: 0  •  clopidogrel (PLAVIX) 75 MG tablet, TAKE 1 TABLET DAILY, Disp: 90 tablet, Rfl: 3  •  fenofibrate (TRICOR) 54 MG tablet, TAKE 1 TABLET DAILY, Disp: 90 tablet, Rfl: 3  •  ferrous sulfate 325 (65 FE) MG tablet, Take 325 mg by mouth 2 (Two) Times a Day., Disp: , Rfl:   •  gabapentin (NEURONTIN) 100 MG capsule, TAKE 1 CAPSULE THREE TIMES A DAY, Disp: 270 capsule, Rfl: 3  •  hydroCHLOROthiazide (HYDRODIURIL) 25 MG tablet, TAKE 1 TABLET DAILY, Disp: 90 tablet, Rfl: 3  •  insulin regular (HUMULIN R) 500 UNIT/ML CONCENTRATED injection, DRAW TO 24 UNIT REI ON U-100 SYRINGE AND INJECT IN THE MORNING AND 40 UNIT REI IN THE EVENING, Disp: 60 mL, Rfl: 3  •  irbesartan (AVAPRO) 300 MG tablet, TAKE 1 TABLET DAILY, Disp: 90 tablet, Rfl: 3  •  isosorbide mononitrate (IMDUR) 30 MG 24 hr tablet, TAKE 1 TABLET DAILY, Disp: 90 tablet, Rfl: 3  •  Jardiance 25 MG tablet tablet, TAKE 1 TABLET DAILY (THIS IS AN INCREASE IN DOSE), Disp: 90 tablet, Rfl: 3  •  levothyroxine (SYNTHROID, LEVOTHROID) 175 MCG tablet, TAKE 1 TABLET DAILY, Disp: 90 tablet, Rfl: 3  •  metFORMIN ER (GLUCOPHAGE-XR) 500 MG 24 hr tablet, TAKE 4 TABLETS AT SUPPER, Disp: 360 tablet, Rfl: 3  •  Omega-3 Fatty Acids (FISH OIL) 1000 MG capsule capsule, Take 1,000 mg by mouth 2 (Two) Times a Day., Disp: , Rfl:   •  SymlinPen 120 2700 MCG/2.7ML solution pen-injector, INJECT 120 MCG UNDER THE SKIN INTO THE APPROPRIATE AREA AS " "DIRECTED THREE TIMES A DAY, Disp: 32.4 mL, Rfl: 3  •  vitamin B-12 (CYANOCOBALAMIN) 100 MCG tablet, Take 100 mcg by mouth Daily., Disp: , Rfl:   •  vitamin D (ERGOCALCIFEROL) 1.25 MG (05444 UT) capsule capsule, TAKE 1 CAPSULE ONCE A WEEK, Disp: 12 capsule, Rfl: 3  •  warfarin (COUMADIN) 1 MG tablet, Take 1 tablet PO every evening as directed., Disp: 30 tablet, Rfl: 3  •  warfarin (COUMADIN) 3 MG tablet, Take 1 tablet by mouth Daily., Disp: 90 tablet, Rfl: 3  •  warfarin (COUMADIN) 5 MG tablet, Take 1 tablet by mouth Take As Directed., Disp: 90 tablet, Rfl: 0    No Known Allergies    Family History   Problem Relation Age of Onset   • Heart disease Mother    • Leukemia Father        Cancer-related family history is not on file.    Social History     Tobacco Use   • Smoking status: Former Smoker     Packs/day: 1.50     Years: 20.00     Pack years: 30.00     Types: Cigarettes     Quit date:      Years since quittin.4   • Smokeless tobacco: Never Used   • Tobacco comment: quit    Vaping Use   • Vaping Use: Never used   Substance Use Topics   • Alcohol use: No   • Drug use: No       I have reviewed the history of present illness, past medical history, family history, social history, lab results, all notes and other records since the patient was last seen on  2019.    ROS:     Objective:  Vital signs:  Vitals:    06/15/22 1022   BP: 117/54   Pulse: 68   Resp: 16   Temp: 97.1 °F (36.2 °C)   SpO2: 95%   Weight: 134 kg (295 lb)  Comment: verbal   Height: 176.5 cm (69.5\")   PainSc: 0-No pain     ECOG  (1) Restricted in physically strenuous activity, ambulatory and able to do work of light nature    Physical Exam:   Physical Exam   Constitutional: He is oriented to person, place, and time. He appears well-developed. No distress.   Obese male   HENT:   Head: Normocephalic and atraumatic.   Eyes: Conjunctivae are normal. Right eye exhibits no discharge. Left eye exhibits no discharge. No scleral icterus.   Neck: " No thyromegaly present.   Cardiovascular: Normal rate, regular rhythm and normal heart sounds. Exam reveals no gallop and no friction rub.   Pulmonary/Chest: Effort normal. No stridor. No respiratory distress. He has no wheezes.   Abdominal: Soft. Normal appearance and bowel sounds are normal. He exhibits no mass. There is no abdominal tenderness. There is no rebound and no guarding.   Musculoskeletal: Normal range of motion. No swelling, tenderness or signs of injury.   Lymphadenopathy:     He has no cervical adenopathy.   Neurological: He is alert and oriented to person, place, and time. He exhibits normal muscle tone.   Skin: Skin is warm. No rash noted. He is not diaphoretic. No erythema.   Psychiatric: His behavior is normal. Mood normal.      I have reexamined the patient and the results are consistent with the previously documented exam. Soraya Cortez MD       Lab Results - Last 18 Months   Lab Units 06/15/22  1020 12/20/21  1048 07/04/21  0458   WBC 10*3/mm3 6.60 7.25 5.50   HEMOGLOBIN g/dL 13.4 14.2 13.8   HEMATOCRIT % 39.5 42.7 40.1   PLATELETS 10*3/mm3 226 178 185   MCV fL 93.6 94.3 92.3     Lab Results - Last 18 Months   Lab Units 05/03/22  0857 11/02/21  0903 07/04/21  0458 07/03/21  0417   SODIUM mmol/L 138 136 138 139   POTASSIUM mmol/L 4.7 4.4 4.6 4.5   CHLORIDE mmol/L 100 100 99 100   CO2 mmol/L 22.0 24.4 28.0 25.0   BUN mg/dL 19 23 22 19   CREATININE mg/dL 1.22 1.17 1.34* 1.20   CALCIUM mg/dL 9.8 9.5 8.9 9.3   BILIRUBIN mg/dL 0.4 0.3  --  0.4   ALK PHOS U/L 43 37*  --  40   ALT (SGPT) U/L 20 22  --  25   AST (SGOT) U/L 19 21  --  30   GLUCOSE mg/dL 250* 174* 198* 187*       Lab Results   Component Value Date    GLUCOSE 250 (H) 05/03/2022    BUN 19 05/03/2022    CREATININE 1.22 05/03/2022    EGFRIFNONA 60 (L) 11/02/2021    BCR 15.6 05/03/2022    K 4.7 05/03/2022    CO2 22.0 05/03/2022    CALCIUM 9.8 05/03/2022    ALBUMIN 4.20 05/03/2022    LABIL2 1.7 03/28/2019    AST 19 05/03/2022    ALT 20  05/03/2022       Lab Results - Last 18 Months   Lab Units 06/15/22  1020 06/13/22  0000 06/06/22  0000   INR  1.60* 1.70 3.00       Lab Results   Component Value Date    IRON 108 06/24/2021    TIBC 401 06/24/2021    FERRITIN 117.00 06/24/2021       Lab Results   Component Value Date    FOLATE 13.80 12/12/2019       No results found for: OCCULTBLD    No results found for: RETICCTPCT    Lab Results   Component Value Date    SRVTHYEL86 >2,000 (H) 12/12/2019     No results found for: SPEP, UPEP  No results found for: LDH, URICACID  Lab Results   Component Value Date    SEDRATE 12 04/23/2020     No results found for: FIBRINOGEN, HAPTOGLOBIN  Lab Results   Component Value Date    PTT 31.5 (H) 11/22/2019    INR 1.60 (H) 06/15/2022     No results found for:   No results found for: CEA  No components found for: CA-19-9  No results found for: PSA      Assessment & Plan     Assessment:  1. History of bilateral pulmonary emboli with high clot burden and DVT:  His thrombosis was   unprovoked.  Hypercoagulability testing revealed elevated factor VIII level on two occasions.  He   is on anticoagulation with Coumadin.  He also has a questionable positive family history of DVT.    He has been recommended indefinite lifetime anticoagulation.  previously INR stable, now has been subtherapeutic with 1.6, he missed dose on Thursday last week. Increase to 9 mg daily. Recheck next week.   2. History of mediastinal lymphadenopathy:  His CT scan shows stable lymphadenopathy.    These are very likely benign lymph nodes.    3. Mild CKD:  He has slight anemia of chronic kidney disease.   4. History of iron deficiency:  He had a colonoscopy and that showed polyps and moderately severe   diverticulosis.  Pathology showed a tubular adenoma in the descending colon.  He is on iron   supplements p.o. BID. History of iron deficiency. Hb is normal, will monitor.  5. Vitamin B12 deficiency:  He is on p.o. B12 supplements.   6.  Dizziness/light-headedness:  this has resolved.  7.  History of  GI bleeding.  Due to polypectomy from colonoscopy.           There are no diagnoses linked to this encounter.  No orders of the defined types were placed in this encounter.       Thank you very much for providing the opportunity to participate in this patient’s care. Please do not hesitate to call if there are any other questions.    I have reviewed and confirmed the accuracy of the patient's history: Chief complaint, HPI, ROS, Subjective and Past Family Social History as entered by the MA/LPN/RN.     Soraya Cortez MD 06/15/22

## 2022-06-15 ENCOUNTER — OFFICE VISIT (OUTPATIENT)
Dept: ONCOLOGY | Facility: CLINIC | Age: 82
End: 2022-06-15

## 2022-06-15 ENCOUNTER — LAB (OUTPATIENT)
Dept: LAB | Facility: HOSPITAL | Age: 82
End: 2022-06-15

## 2022-06-15 VITALS
OXYGEN SATURATION: 95 % | HEART RATE: 68 BPM | HEIGHT: 70 IN | DIASTOLIC BLOOD PRESSURE: 54 MMHG | BODY MASS INDEX: 42.23 KG/M2 | SYSTOLIC BLOOD PRESSURE: 117 MMHG | WEIGHT: 295 LBS | TEMPERATURE: 97.1 F | RESPIRATION RATE: 16 BRPM

## 2022-06-15 DIAGNOSIS — I82.409 DEEP VEIN THROMBOSIS (DVT) OF LOWER EXTREMITY, UNSPECIFIED CHRONICITY, UNSPECIFIED LATERALITY, UNSPECIFIED VEIN: Primary | ICD-10-CM

## 2022-06-15 DIAGNOSIS — Z79.01 LONG TERM (CURRENT) USE OF ANTICOAGULANTS: ICD-10-CM

## 2022-06-15 DIAGNOSIS — I82.409 DEEP VEIN THROMBOSIS (DVT) OF LOWER EXTREMITY, UNSPECIFIED CHRONICITY, UNSPECIFIED LATERALITY, UNSPECIFIED VEIN: ICD-10-CM

## 2022-06-15 LAB
BASOPHILS # BLD AUTO: 0.05 10*3/MM3 (ref 0–0.2)
BASOPHILS NFR BLD AUTO: 0.8 % (ref 0–1.5)
DEPRECATED RDW RBC AUTO: 47.3 FL (ref 37–54)
EOSINOPHIL # BLD AUTO: 0.22 10*3/MM3 (ref 0–0.4)
EOSINOPHIL NFR BLD AUTO: 3.3 % (ref 0.3–6.2)
ERYTHROCYTE [DISTWIDTH] IN BLOOD BY AUTOMATED COUNT: 14.5 % (ref 12.3–15.4)
HCT VFR BLD AUTO: 39.5 % (ref 37.5–51)
HGB BLD-MCNC: 13.4 G/DL (ref 13–17.7)
INR PPP: 1.6 (ref 0.8–1.2)
LYMPHOCYTES # BLD AUTO: 1.93 10*3/MM3 (ref 0.7–3.1)
LYMPHOCYTES NFR BLD AUTO: 29.2 % (ref 19.6–45.3)
MCH RBC QN AUTO: 31.8 PG (ref 26.6–33)
MCHC RBC AUTO-ENTMCNC: 33.9 G/DL (ref 31.5–35.7)
MCV RBC AUTO: 93.6 FL (ref 79–97)
MONOCYTES # BLD AUTO: 0.51 10*3/MM3 (ref 0.1–0.9)
MONOCYTES NFR BLD AUTO: 7.7 % (ref 5–12)
NEUTROPHILS NFR BLD AUTO: 3.89 10*3/MM3 (ref 1.7–7)
NEUTROPHILS NFR BLD AUTO: 59 % (ref 42.7–76)
PLATELET # BLD AUTO: 226 10*3/MM3 (ref 140–450)
PMV BLD AUTO: 10.2 FL (ref 6–12)
RBC # BLD AUTO: 4.22 10*6/MM3 (ref 4.14–5.8)
WBC NRBC COR # BLD: 6.6 10*3/MM3 (ref 3.4–10.8)

## 2022-06-15 PROCEDURE — 85025 COMPLETE CBC W/AUTO DIFF WBC: CPT

## 2022-06-15 PROCEDURE — 85610 PROTHROMBIN TIME: CPT

## 2022-06-15 PROCEDURE — 99213 OFFICE O/P EST LOW 20 MIN: CPT | Performed by: INTERNAL MEDICINE

## 2022-06-15 RX ORDER — WARFARIN SODIUM 4 MG/1
4 TABLET ORAL NIGHTLY
Qty: 90 TABLET | Refills: 3 | Status: SHIPPED | OUTPATIENT
Start: 2022-06-15 | End: 2022-12-21

## 2022-06-20 ENCOUNTER — TELEPHONE (OUTPATIENT)
Dept: ONCOLOGY | Facility: HOSPITAL | Age: 82
End: 2022-06-20

## 2022-06-20 LAB — INR PPP: 2.5

## 2022-06-20 NOTE — PROGRESS NOTES
Patient called in INR of 2.5. He states he was told to increase to 9mg last week, plan updated and patient to continue. He checks weekly at home.

## 2022-06-21 ENCOUNTER — APPOINTMENT (OUTPATIENT)
Dept: LAB | Facility: HOSPITAL | Age: 82
End: 2022-06-21

## 2022-06-25 ENCOUNTER — HOSPITAL ENCOUNTER (EMERGENCY)
Facility: HOSPITAL | Age: 82
Discharge: HOME | End: 2022-06-25
Attending: EMERGENCY MEDICINE | Admitting: EMERGENCY MEDICINE

## 2022-06-25 VITALS
HEART RATE: 84 BPM | DIASTOLIC BLOOD PRESSURE: 54 MMHG | BODY MASS INDEX: 41.5 KG/M2 | HEIGHT: 70 IN | OXYGEN SATURATION: 95 % | RESPIRATION RATE: 18 BRPM | SYSTOLIC BLOOD PRESSURE: 132 MMHG | WEIGHT: 289.9 LBS | TEMPERATURE: 98.2 F

## 2022-06-25 DIAGNOSIS — Z79.01 ON WARFARIN THERAPY: ICD-10-CM

## 2022-06-25 DIAGNOSIS — T14.8XXA ABRASION: Primary | ICD-10-CM

## 2022-06-25 LAB
INR PPP: 2.31 (ref 2–3)
PROTHROMBIN TIME: 22.7 SECONDS (ref 19.4–28.5)

## 2022-06-25 PROCEDURE — 36415 COLL VENOUS BLD VENIPUNCTURE: CPT

## 2022-06-25 PROCEDURE — 85610 PROTHROMBIN TIME: CPT | Performed by: NURSE PRACTITIONER

## 2022-06-25 PROCEDURE — 99283 EMERGENCY DEPT VISIT LOW MDM: CPT

## 2022-06-25 NOTE — ED PROVIDER NOTES
Subjective   Chief complaint: bleeding toe      Context: Patient is 81-year-old male who comes in amatory by private vehicle stating he has some bleeding from his left great toe.  He states he pulled a small piece of skin off this morning before putting on his socks and has been bleeding ever since.  He is currently on warfarin.  He states they did adjust his medication increased it from 8 mg to 9 mg 2 weeks ago otherwise denies any other new medications or antibiotics.  Has chronic neuropathy.  Up-to-date on tetanus.  He is not well controlled with his diabetes and typically runs less than 200 and currently follows with Dr. Shaver for this    Location: Left great toe  Duration: Shortly prior to arrival  Timing:  Quality/Severity: Denies  Modifying factors: Continues to bleed with bandage  Associated symptoms:        Additional hx provided by: self    PCP: [facility]                 Review of Systems   Constitutional: Negative for fever.   HENT: Negative.    Eyes: Negative for visual disturbance.   Skin: Positive for wound.   Allergic/Immunologic: Negative for immunocompromised state.   Neurological:        Chronic neuropathy   Hematological: Bruises/bleeds easily.   Psychiatric/Behavioral: Negative for confusion.       Past Medical History:   Diagnosis Date   • ACE-inhibitor cough 06/2005   • Coronary artery disease    • Diabetes mellitus, type 2 (HCC) 1995   • ED (erectile dysfunction)    • Hx of blood clots 2014    Blood clot left leg    • Hyperlipidemia 08/2000   • Hypertension    • Hypogonadism, male 1998   • Hypothyroidism 06/2001   • Obesity    • Peripheral neuropathy    • Pulmonary embolism (HCC) 02/2014   • Rectal fistula    • Sleep apnea 12/10/2013   • Vitamin D deficiency        No Known Allergies    Past Surgical History:   Procedure Laterality Date   • CARDIAC CATHETERIZATION  2008    PTCA: 2008; PTCA: 4/21/2015 LCX stent    • CARDIAC CATHETERIZATION Left 7/3/2021    Procedure: Cardiac  Catheterization/Vascular Study;  Surgeon: Edwra Ackerman MD;  Location: [facility] CATH INVASIVE LOCATION;  Service: Cardiovascular;  Laterality: Left;   • CARDIOVASCULAR STRESS TEST     • CATARACT EXTRACTION  2016 & 16    • COLONOSCOPY N/A 2019    Procedure: COLONOSCOPY WITH ENDOSCOPIC CLIPPING X1 OF POST POLYPECTOMY BLEED SITE;  Surgeon: Jhonny Orellana MD;  Location: [facility] ENDOSCOPY;  Service: Gastroenterology   • CORONARY STENT PLACEMENT     • INGUINAL HERNIA REPAIR Left    • UMBILICAL HERNIA REPAIR         Family History   Problem Relation Age of Onset   • Heart disease Mother    • Leukemia Father        Social History     Socioeconomic History   • Marital status:    Tobacco Use   • Smoking status: Former Smoker     Packs/day: 1.50     Years: 20.00     Pack years: 30.00     Types: Cigarettes     Quit date:      Years since quittin.5   • Smokeless tobacco: Never Used   • Tobacco comment: quit    Vaping Use   • Vaping Use: Never used   Substance and Sexual Activity   • Alcohol use: No   • Drug use: No   • Sexual activity: Defer           Objective   Physical Exam     Vital signs and traige nurse note reviewed.  Constitutional:  Awake, alert; well developed and well nourished.  No acute distress, the patient is examined in hospital gown.  Obese  HEENT:  Normocephalic, atraumatic;  with intact EOM; oropharynx is pink and moist without edema or erythema.  Neck: Supple, full range of motion without pain;   Cardiovascular: Regular rate and rhythm,    Pulmonary: Respiratory effort regular, nonlabored;   Musculoskeletal: There is a small superficial abrasion noted to the dorsum distal phalanx of the left great toe with continued minor active bleeding.  Neuro: Alert oriented x3, speech is clear and appropriate.      Procedures           ED Course            Labs Reviewed   PROTIME-INR - Normal     Medications - No data to display  No radiology results for the last  "day  Prior to Admission medications    Medication Sig Start Date End Date Taking? Authorizing Provider   Accu-Chek Irene Plus test strip USE TO CHECK BLOOD SUGAR BEFORE MEALS AND AT BEDTIME 5/13/22   Virginia Sahver MD   Accu-Chek Softclix Lancets lancets Use to test blood sugars 3 times daily. DXE11.65 5/11/22 5/11/23  Virginia Shaver MD   amLODIPine (NORVASC) 5 MG tablet TAKE 1 TABLET DAILY 4/11/22   Edwar Ackerman MD   atenolol (TENORMIN) 50 MG tablet TAKE 1 TABLET DAILY 5/2/22   Virginia Shaver MD   atorvastatin (LIPITOR) 80 MG tablet TAKE 1 TABLET EVERY NIGHT 4/11/22   Edwar Ackerman MD   BD Insulin Syringe U/F 31G X 5/16\" 0.5 ML misc USE 1 NEW SYRINGE WITH EACH DOSE OF INSULIN TWICE A DAY 4/28/22   Virginia Shaver MD   Blood Glucose Monitoring Suppl (Accu-Chek Irene Plus) w/Device kit 1 each 4 (Four) Times a Day. 5/13/22   Virginia Shaver MD   clopidogrel (PLAVIX) 75 MG tablet TAKE 1 TABLET DAILY 6/6/22   Edwar Ackerman MD   fenofibrate (TRICOR) 54 MG tablet TAKE 1 TABLET DAILY 3/2/22   Edwar Ackerman MD   ferrous sulfate 325 (65 FE) MG tablet Take 325 mg by mouth 2 (Two) Times a Day.    Provider, MD Lee   gabapentin (NEURONTIN) 100 MG capsule TAKE 1 CAPSULE THREE TIMES A DAY 7/2/21   Gabino Sibley Jr., MD   hydroCHLOROthiazide (HYDRODIURIL) 25 MG tablet TAKE 1 TABLET DAILY 4/25/22   Edwar Ackerman MD   insulin regular (HUMULIN R) 500 UNIT/ML CONCENTRATED injection DRAW TO 24 UNIT REI ON U-100 SYRINGE AND INJECT IN THE MORNING AND 40 UNIT REI IN THE EVENING 5/10/22   Virginia Shaver MD   irbesartan (AVAPRO) 300 MG tablet TAKE 1 TABLET DAILY 10/15/21   Virginia Shaver MD   isosorbide mononitrate (IMDUR) 30 MG 24 hr tablet TAKE 1 TABLET DAILY 4/11/22   Edwar Ackerman MD   Jardiance 25 MG tablet tablet TAKE 1 TABLET DAILY (THIS IS AN INCREASE IN DOSE) 4/28/22   Virginia Shaver MD   levothyroxine (SYNTHROID, LEVOTHROID) 175 MCG tablet TAKE 1 TABLET DAILY 11/9/21   Virginia Shaver, " "MD   metFORMIN ER (GLUCOPHAGE-XR) 500 MG 24 hr tablet TAKE 4 TABLETS AT SUPPER 3/7/22   Virginia Shaver MD   Omega-3 Fatty Acids (FISH OIL) 1000 MG capsule capsule Take 1,000 mg by mouth 2 (Two) Times a Day.    Lee Ruggiero MD   SymlinPen 120 2700 MCG/2.7ML solution pen-injector INJECT 120 MCG UNDER THE SKIN INTO THE APPROPRIATE AREA AS DIRECTED THREE TIMES A DAY 11/15/21   Virginia Shaver MD   vitamin B-12 (CYANOCOBALAMIN) 100 MCG tablet Take 100 mcg by mouth Daily. 4/15/15   Lee Ruggiero MD   vitamin D (ERGOCALCIFEROL) 1.25 MG (49086 UT) capsule capsule TAKE 1 CAPSULE ONCE A WEEK 4/5/22   Virginia Shaver MD   warfarin (COUMADIN) 1 MG tablet Take 1 tablet PO every evening as directed. 2/28/22   Soraya Cortez MD   warfarin (COUMADIN) 3 MG tablet Take 1 tablet by mouth Daily. 5/31/22   Soraya Cortez MD   warfarin (Coumadin) 4 MG tablet Take 1 tablet by mouth Every Night. 6/15/22   Soraya Cortez MD   warfarin (COUMADIN) 5 MG tablet Take 1 tablet by mouth Take As Directed. 6/6/22   Soraya Cortez MD                                       MDM  Number of Diagnoses or Management Options  Abrasion  On warfarin therapy  Diagnosis management comments: /54 (BP Location: Left arm, Patient Position: Sitting)   Pulse 84   Temp 98.2 °F (36.8 °C) (Oral)   Resp 18   Ht 177.8 cm (70\")   Wt 132 kg (289 lb 14.5 oz)   SpO2 95%   BMI 41.60 kg/m²             Comorbidities:  has a past medical history of ACE-inhibitor cough (06/2005), Coronary artery disease, Diabetes mellitus, type 2 (HCC) (1995), ED (erectile dysfunction), blood clots (2014), Hyperlipidemia (08/2000), Hypertension, Hypogonadism, male (1998), Hypothyroidism (06/2001), Obesity, Peripheral neuropathy, Pulmonary embolism (HCC) (02/2014), Rectal fistula, Sleep apnea (12/10/2013), and Vitamin D deficiency.  Differentials: Coumadin toxicity laceration abrasion not all inclusive of differentials considered  Radiology interpretation: Not " felt to be emergently warranted  Lab interpretation:  Labs viewed by me significant for, INR 2.3    Appropriate PPE worn during exam.  Toe was cleansed and dressed  .  On reexam there is no active bleeding.  We discussed the importance of close follow-up and potential for nonhealing wound and need for follow-up with PCP or podiatry to watch for signs of infection he verbalizes understanding.    i discussed findings with patient who voices understanding of discharge instructions, signs and symptoms requiring return to ED; discharged improved and in stable condition with follow up for re-evaluation.  This document is intended for medical expert use only. Reading of this document by patients and/or patient's family without participating medical staff guidance may result in misinterpretation and unintended morbidity.  Any interpretation of such data is the responsibility of the patient and/or family member responsible for the patient in concert with their primary or specialist providers, not to be left for sources of online searches such as [employer], [employer] or similar queries. Relying on these approaches to knowledge may result in misinterpretation, misguided goals of care and even death should patients or family members try recommendations outside of the realm of professional medical care in a supervised inpatient environment.         Final diagnoses:   Abrasion   On warfarin therapy       ED Disposition  ED Disposition     ED Disposition   Discharge    Condition   Stable    Comment   --             Gabino Sibley Jr., MD  [address] [facility] [address]  [place] [address]  [phone]    Schedule an appointment as soon as possible for a visit       Geovanny Adam Jr., DPM  [address]  [place] [address]  [phone]               Medication List      No changes were made to your prescriptions during this visit.          Susanna Red, APRN  06/25/22 1220

## 2022-06-25 NOTE — DISCHARGE INSTRUCTIONS
Keep clean and dry.  Watch for signs of infection.  Follow-up with your PCP or podiatrist, check your feet frequently.  INR today is 2.3.  Return for any new or worsening symptoms

## 2022-06-27 ENCOUNTER — ANTICOAGULATION VISIT (OUTPATIENT)
Dept: ONCOLOGY | Facility: HOSPITAL | Age: 82
End: 2022-06-27

## 2022-06-27 LAB — INR PPP: 3.5

## 2022-07-01 LAB — INR PPP: 2.3

## 2022-07-05 ENCOUNTER — TELEPHONE (OUTPATIENT)
Dept: ONCOLOGY | Facility: HOSPITAL | Age: 82
End: 2022-07-05

## 2022-07-05 NOTE — PROGRESS NOTES
Pt left a VM on triage phone with an INR of 2.3 I called him back and left VM to stay at 9 mg on his coumadin and to call in INR in one week.

## 2022-07-11 ENCOUNTER — TELEPHONE (OUTPATIENT)
Dept: ONCOLOGY | Facility: HOSPITAL | Age: 82
End: 2022-07-11

## 2022-07-11 LAB — INR PPP: 3.9

## 2022-07-11 NOTE — PROGRESS NOTES
Received call from the patient reporting his INR of 3.9 today on 9 mg of Warfarin.  He denies any med or diet changes.  He denies any bleeding.  Discussed holding his dose today and repeating his INR tomorrow.  The patient stated that he would like to go down to 8 mg and repeat in one week.  Advised that this is fine as long as he repeats his INR and contacts the office for any unexplained bruising or bleeding.  Patient v/u.

## 2022-07-19 ENCOUNTER — TELEPHONE (OUTPATIENT)
Dept: ONCOLOGY | Facility: HOSPITAL | Age: 82
End: 2022-07-19

## 2022-07-19 LAB — INR PPP: 2.7

## 2022-07-19 NOTE — PROGRESS NOTES
Patient called in INR last night of 2.7. Continue 8mg, check in one week. Patient states he has plenty of tabs at home.

## 2022-07-22 DIAGNOSIS — E11.40 TYPE 2 DIABETES MELLITUS WITH DIABETIC NEUROPATHY, WITHOUT LONG-TERM CURRENT USE OF INSULIN: ICD-10-CM

## 2022-07-22 RX ORDER — BLOOD SUGAR DIAGNOSTIC
STRIP MISCELLANEOUS
Qty: 400 EACH | Refills: 3 | Status: SHIPPED | OUTPATIENT
Start: 2022-07-22

## 2022-07-26 ENCOUNTER — TELEPHONE (OUTPATIENT)
Dept: ONCOLOGY | Facility: HOSPITAL | Age: 82
End: 2022-07-26

## 2022-07-26 NOTE — TELEPHONE ENCOUNTER
Received a vm from pt stating that his INR today is 2.5.  Attempted to call pt back and had to leave a vm instructing him to continue Warfarin 8 mg daily and recheck in one week.  Pt checks INR at home.  Instructed to call back w/ any questions or concerns.

## 2022-08-05 RX ORDER — GABAPENTIN 100 MG/1
CAPSULE ORAL
Qty: 270 CAPSULE | Refills: 3 | Status: SHIPPED | OUTPATIENT
Start: 2022-08-05 | End: 2022-12-08 | Stop reason: SDUPTHER

## 2022-08-08 ENCOUNTER — TELEPHONE (OUTPATIENT)
Dept: ONCOLOGY | Facility: HOSPITAL | Age: 82
End: 2022-08-08

## 2022-08-08 LAB — INR PPP: 2.3

## 2022-08-08 NOTE — TELEPHONE ENCOUNTER
Patient called with results of INR of 2.3 today on 8 mg of Warfarin.  Patient advised to continue same dose and recheck in one week.  Advised to contact the office with any issues.

## 2022-08-23 ENCOUNTER — ANTICOAGULATION VISIT (OUTPATIENT)
Dept: ONCOLOGY | Facility: CLINIC | Age: 82
End: 2022-08-23

## 2022-08-23 LAB — INR PPP: 2.3

## 2022-08-23 NOTE — PROGRESS NOTES
Pt INR today is 2.3. Pt was advised to stay on the same dose. He v/u. Pt checks weekly due to checking at home.

## 2022-08-24 ENCOUNTER — OFFICE VISIT (OUTPATIENT)
Dept: FAMILY MEDICINE CLINIC | Facility: CLINIC | Age: 82
End: 2022-08-24

## 2022-08-24 VITALS
BODY MASS INDEX: 41.95 KG/M2 | OXYGEN SATURATION: 95 % | HEART RATE: 68 BPM | SYSTOLIC BLOOD PRESSURE: 127 MMHG | DIASTOLIC BLOOD PRESSURE: 73 MMHG | TEMPERATURE: 96.9 F | WEIGHT: 293 LBS | HEIGHT: 70 IN | RESPIRATION RATE: 16 BRPM

## 2022-08-24 DIAGNOSIS — E11.40 TYPE 2 DIABETES MELLITUS WITH DIABETIC NEUROPATHY, WITH LONG-TERM CURRENT USE OF INSULIN: Chronic | ICD-10-CM

## 2022-08-24 DIAGNOSIS — D68.59 THROMBOPHILIA: Chronic | ICD-10-CM

## 2022-08-24 DIAGNOSIS — I10 ESSENTIAL HYPERTENSION: Primary | Chronic | ICD-10-CM

## 2022-08-24 DIAGNOSIS — I73.9 PERIPHERAL VASCULAR DISEASE: ICD-10-CM

## 2022-08-24 DIAGNOSIS — E78.2 MIXED HYPERLIPIDEMIA: Chronic | ICD-10-CM

## 2022-08-24 DIAGNOSIS — Z79.4 TYPE 2 DIABETES MELLITUS WITH DIABETIC NEUROPATHY, WITH LONG-TERM CURRENT USE OF INSULIN: Chronic | ICD-10-CM

## 2022-08-24 DIAGNOSIS — I25.10 CORONARY ARTERY DISEASE INVOLVING NATIVE CORONARY ARTERY OF NATIVE HEART WITHOUT ANGINA PECTORIS: Chronic | ICD-10-CM

## 2022-08-24 DIAGNOSIS — E03.9 ACQUIRED HYPOTHYROIDISM: Chronic | ICD-10-CM

## 2022-08-24 PROCEDURE — 99214 OFFICE O/P EST MOD 30 MIN: CPT | Performed by: FAMILY MEDICINE

## 2022-08-24 NOTE — PROGRESS NOTES
"Subjective   Dalton Dallas Sr. is a 82 y.o. male.     Chief Complaint   Patient presents with   • Diabetes     3 month follow up   • Hypothyroidism   • Hypertension   • Hyperlipidemia       HPI  Chief complaint: Hypertension hyperlipidemia hypothyroidism diabetes    Patient is an 82-year-old white male comes in for follow-up and maintenance of his current problems which include    1.  Hypertension-stable-the patient is on Avapro 300 mg daily atenolol 50 mg daily Norvasc 5 mg daily hydrochlorothiazide 25 mg daily.  Blood pressure stable.    2.  Hyperlipidemia-stable-patient on Lipitor 80 mg daily and Tricor 54 mg daily.  He denies myalgias and arthralgias.    3.  Type 2 diabetes mellitus-stable-the patient is on Jardiance 25 mg a day metformin 2000 mg a daySymlin 120 mcg 3 times a day before meals.  Blood sugars are stable.    4.  Coronary artery disease/heart failure with normal ejection fraction-stable-the patient is currently on long-acting nitroglycerin 30 mg daily Coumadin and Plavix 75 mg daily.  Denies chest pain.    5.  Hypothyroidism-stable Adapin is on Synthroid 0.175 mg daily.  Denied heat or cold intolerance.    The following portions of the patient's history were reviewed and updated as appropriate: allergies, current medications, past family history, past medical history, past social history, past surgical history and problem list.    Review of Systems    Objective     /73 (BP Location: Left arm, Patient Position: Sitting, Cuff Size: Adult)   Pulse 68   Temp 96.9 °F (36.1 °C) (Infrared)   Resp 16   Ht 177.8 cm (70\")   Wt 133 kg (293 lb)   SpO2 95%   BMI 42.04 kg/m²     Physical Exam  Vitals and nursing note reviewed.   Constitutional:       Appearance: He is well-developed. He is obese.   HENT:      Head: Normocephalic and atraumatic.   Eyes:      Pupils: Pupils are equal, round, and reactive to light.   Cardiovascular:      Rate and Rhythm: Normal rate and regular rhythm.      Pulses: " Normal pulses.      Heart sounds: Normal heart sounds. No murmur heard.    No friction rub. No gallop.   Pulmonary:      Effort: Pulmonary effort is normal.      Breath sounds: Normal breath sounds.   Abdominal:      General: Bowel sounds are normal.      Palpations: Abdomen is soft.   Musculoskeletal:         General: Normal range of motion.      Cervical back: Neck supple.   Skin:     General: Skin is warm and dry.   Neurological:      Mental Status: He is alert and oriented to person, place, and time.   Psychiatric:         Behavior: Behavior normal.         Thought Content: Thought content normal.         Judgment: Judgment normal.         Vitamin D 25 Hydroxy (05/03/2022 08:57)  T4, Free (05/03/2022 08:57)  TSH (05/03/2022 08:57)  Lipid Panel (05/03/2022 08:57)  Comprehensive Metabolic Panel (05/03/2022 08:57)  Microalbumin / Creatinine Urine Ratio - Urine, Clean Catch (05/03/2022 08:57)  Hemoglobin A1c (05/03/2022 08:57)    Assessment & Plan   Diagnoses and all orders for this visit:    1. Essential hypertension (Primary)    2. Coronary artery disease involving native coronary artery of native heart without angina pectoris    3. Mixed hyperlipidemia    4. Peripheral vascular disease (HCC)    5. Thrombophilia (HCC)    6. Type 2 diabetes mellitus with diabetic neuropathy, with long-term current use of insulin (HCC)    7. Acquired hypothyroidism      Patient Instructions   Continue your current medications and treatment.    Follow up in the office in 3 months.    Laboratory testing at that time.      Gabino Sibley Jr., MD    08/24/22

## 2022-08-29 ENCOUNTER — TELEPHONE (OUTPATIENT)
Dept: ONCOLOGY | Facility: CLINIC | Age: 82
End: 2022-08-29

## 2022-08-29 LAB — INR PPP: 2.1

## 2022-08-29 NOTE — TELEPHONE ENCOUNTER
After speaking with Dr. Cortez, I contacted the patient. I informed Mr. Dallas that per Dr. Cortez, he is to increase his dose to 8.5 mg and retest his INR in 1 week. Patient voiced understanding and stated he will take 8.5 mg. I advised patient to contact our office if he needs anything or if he has any questions, patient confirmed.

## 2022-08-29 NOTE — TELEPHONE ENCOUNTER
Received a phone call from  Burt. Patient stated he is calling to report his INR. Patient stated his INR from today is 2.1. I advised patient to remain taking 8 mg and recheck in 1 week. Patient stated his INR on 8/15/22 was 2.4 then last week his INR was 2.3 therefore he asked if he should increase his dose by 0.5 mg. I asked if I could place him on hold to verify, he confirmed.     Patient was no longer on hold when I returned to speak with him.

## 2022-09-07 ENCOUNTER — TELEPHONE (OUTPATIENT)
Dept: ONCOLOGY | Facility: CLINIC | Age: 82
End: 2022-09-07

## 2022-09-07 ENCOUNTER — ANTICOAGULATION VISIT (OUTPATIENT)
Dept: ONCOLOGY | Facility: CLINIC | Age: 82
End: 2022-09-07

## 2022-09-07 LAB — INR PPP: 2.3

## 2022-09-07 NOTE — TELEPHONE ENCOUNTER
Caller: Dalton Dallas Sr.    Relationship: Self    Best call back number: [phone]    Caller requesting test results: REPORTING A PTINR    What test was performed: PTINR    When was the test performed: 9/6/22    Where was the test performed: PATIENT HOME    Additional notes: INR 2.3    ATTEMPTED TO WT NO ANSWER

## 2022-09-12 ENCOUNTER — ANTICOAGULATION VISIT (OUTPATIENT)
Dept: ONCOLOGY | Facility: CLINIC | Age: 82
End: 2022-09-12

## 2022-09-12 ENCOUNTER — TELEPHONE (OUTPATIENT)
Dept: ONCOLOGY | Facility: CLINIC | Age: 82
End: 2022-09-12

## 2022-09-12 LAB — INR PPP: 2.3

## 2022-09-12 NOTE — PROGRESS NOTES
Called pt and advised him to stay on the same dose. Pt checks her INR weekly at home. He v/u and had no other questions.

## 2022-09-12 NOTE — TELEPHONE ENCOUNTER
Caller: Dalton Dallas Sr.    Relationship: Self    Best call back number: [phone]    What is the best time to reach you: [phone]    Who are you requesting to speak with (clinical staff, provider,  specific staff member):     What was the call regarding: PTS INR TODAY IS 2.3.    Do you require a callback: YES

## 2022-09-13 DIAGNOSIS — Z79.01 LONG TERM (CURRENT) USE OF ANTICOAGULANTS: ICD-10-CM

## 2022-09-14 RX ORDER — WARFARIN SODIUM 5 MG/1
TABLET ORAL
Qty: 90 TABLET | Refills: 3 | Status: SHIPPED | OUTPATIENT
Start: 2022-09-14 | End: 2022-12-08 | Stop reason: SDUPTHER

## 2022-09-19 ENCOUNTER — TELEPHONE (OUTPATIENT)
Dept: ONCOLOGY | Facility: CLINIC | Age: 82
End: 2022-09-19

## 2022-09-19 LAB — INR PPP: 3.1

## 2022-09-19 NOTE — TELEPHONE ENCOUNTER
Caller: DEBI    Relationship to patient: SELF    Best call back number:[phone]      Patient is needing: TO REPORT HIS INR 3.1 ON 9-

## 2022-09-24 NOTE — PLAN OF CARE
Goal Outcome Evaluation:  Plan of Care Reviewed With: patient           Outcome Summary: Pt come into the ED from home with chest pain that would not go away. Pt does have a cardiac history. Pt was kept NPO overnight but we are unable to do a stress test today due to a jump in his tropnin. Pt reamins on nirt gtt at 10. VVS and pt rested through the night.   Patient/Caregiver provided printed discharge information.

## 2022-09-26 ENCOUNTER — ANTICOAGULATION VISIT (OUTPATIENT)
Dept: ONCOLOGY | Facility: CLINIC | Age: 82
End: 2022-09-26

## 2022-09-26 ENCOUNTER — TELEPHONE (OUTPATIENT)
Dept: ONCOLOGY | Facility: CLINIC | Age: 82
End: 2022-09-26

## 2022-09-26 LAB — INR PPP: 1.6

## 2022-09-26 NOTE — TELEPHONE ENCOUNTER
Caller: Dalton Dallas Sr.    Relationship: Self    Best call back number: [phone]    What was the call regarding: DALTON CALLED TO REPORT HIS INR FOR TODAY. HE SAYS IT IS 1.6

## 2022-09-26 NOTE — PROGRESS NOTES
Pt was advised to increase by 0.5 mg. Pt is back up to dose of 8.5 mg. He v/u and no other questions.

## 2022-10-01 ENCOUNTER — HOSPITAL ENCOUNTER (EMERGENCY)
Facility: HOSPITAL | Age: 82
Discharge: HOME | End: 2022-10-01
Attending: EMERGENCY MEDICINE | Admitting: EMERGENCY MEDICINE

## 2022-10-01 VITALS
HEIGHT: 70 IN | WEIGHT: 299 LBS | DIASTOLIC BLOOD PRESSURE: 70 MMHG | TEMPERATURE: 98 F | HEART RATE: 79 BPM | BODY MASS INDEX: 42.8 KG/M2 | SYSTOLIC BLOOD PRESSURE: 139 MMHG | OXYGEN SATURATION: 99 % | RESPIRATION RATE: 16 BRPM

## 2022-10-01 DIAGNOSIS — L50.9 URTICARIA: Primary | ICD-10-CM

## 2022-10-01 PROCEDURE — 99282 EMERGENCY DEPT VISIT SF MDM: CPT

## 2022-10-01 PROCEDURE — 96372 THER/PROPH/DIAG INJ SC/IM: CPT

## 2022-10-01 PROCEDURE — 25010000002 METHYLPREDNISOLONE PER 125 MG: Performed by: EMERGENCY MEDICINE

## 2022-10-01 RX ORDER — METHYLPREDNISOLONE SODIUM SUCCINATE 125 MG/2ML
80 INJECTION, POWDER, LYOPHILIZED, FOR SOLUTION INTRAMUSCULAR; INTRAVENOUS ONCE
Status: COMPLETED | OUTPATIENT
Start: 2022-10-01 | End: 2022-10-01

## 2022-10-01 RX ORDER — PREDNISONE 20 MG/1
20 TABLET ORAL 2 TIMES DAILY
Qty: 10 TABLET | Refills: 0 | Status: SHIPPED | OUTPATIENT
Start: 2022-10-01 | End: 2023-02-17

## 2022-10-01 RX ADMIN — METHYLPREDNISOLONE SODIUM SUCCINATE 80 MG: 125 INJECTION, POWDER, FOR SOLUTION INTRAMUSCULAR; INTRAVENOUS at 04:31

## 2022-10-01 NOTE — ED NOTES
"Pt reports he was cutting up a tree with his son and there was a \"bright green plant on it with 3 leaves\". Pt reports hes had   "

## 2022-10-01 NOTE — ED PROVIDER NOTES
Subjective   History of Present Illness  82-year-old male complains of patchy pruritic urticaria about trunk and extremities after cutting a tree down several days ago.  Symptoms are moderate.  No oral swelling, no dyspnea, no fever, no new medicines to suggest an alternative etiology of urticaria other than contact dermatitis.  Patient requests a steroid shot.  Patient does have a glucometer and can monitor his blood sugars closely at home while on steroids.        Review of Systems   Constitutional: Negative.    HENT: Negative.    Respiratory: Negative.    Cardiovascular: Negative.    Skin: Positive for rash.   Neurological: Negative.        Past Medical History:   Diagnosis Date   • ACE-inhibitor cough 06/2005   • Coronary artery disease    • Diabetes mellitus, type 2 (HCC) 1995   • ED (erectile dysfunction)    • Hx of blood clots 2014    Blood clot left leg    • Hyperlipidemia 08/2000   • Hypertension    • Hypogonadism, male 1998   • Hypothyroidism 06/2001   • Obesity    • Peripheral neuropathy    • Pulmonary embolism (HCC) 02/2014   • Rectal fistula    • Sleep apnea 12/10/2013   • Vitamin D deficiency        No Known Allergies    Past Surgical History:   Procedure Laterality Date   • CARDIAC CATHETERIZATION  2008    PTCA: 2008; PTCA: 4/21/2015 LCX stent    • CARDIAC CATHETERIZATION Left 7/3/2021    Procedure: Cardiac Catheterization/Vascular Study;  Surgeon: Edwar Ackerman MD;  Location: [facility] CATH INVASIVE LOCATION;  Service: Cardiovascular;  Laterality: Left;   • CARDIOVASCULAR STRESS TEST  2020   • CATARACT EXTRACTION  01/11/2016 1-4-16 & 1-11-16    • COLONOSCOPY N/A 11/23/2019    Procedure: COLONOSCOPY WITH ENDOSCOPIC CLIPPING X1 OF POST POLYPECTOMY BLEED SITE;  Surgeon: Jhonny Orellana MD;  Location: [facility] ENDOSCOPY;  Service: Gastroenterology   • CORONARY STENT PLACEMENT     • INGUINAL HERNIA REPAIR Left    • UMBILICAL HERNIA REPAIR         Family History   Problem Relation Age of Onset   • Heart  disease Mother    • Leukemia Father        Social History     Socioeconomic History   • Marital status:    Tobacco Use   • Smoking status: Former Smoker     Packs/day: 1.50     Years: 20.00     Pack years: 30.00     Types: Cigarettes     Quit date:      Years since quittin.7   • Smokeless tobacco: Never Used   • Tobacco comment: quit    Vaping Use   • Vaping Use: Never used   Substance and Sexual Activity   • Alcohol use: No   • Drug use: No   • Sexual activity: Defer           Objective   Physical Exam  Constitutional:       Appearance: He is obese.   HENT:      Head: Normocephalic and atraumatic.      Mouth/Throat:      Mouth: Mucous membranes are moist.      Pharynx: Oropharynx is clear.   Eyes:      Conjunctiva/sclera: Conjunctivae normal.      Pupils: Pupils are equal, round, and reactive to light.   Cardiovascular:      Rate and Rhythm: Normal rate and regular rhythm.      Heart sounds: Normal heart sounds.   Pulmonary:      Effort: Pulmonary effort is normal.      Breath sounds: Normal breath sounds.   Musculoskeletal:         General: Normal range of motion.   Skin:     General: Skin is warm and dry.      Capillary Refill: Capillary refill takes less than 2 seconds.      Comments: Patchy blanchable urticaria about extremities and trunk   Neurological:      General: No focal deficit present.      Mental Status: He is alert and oriented to person, place, and time.         Procedures           ED Course                                           MDM  Number of Diagnoses or Management Options  Urticaria  Diagnosis management comments: Patient counseled to stop his prednisone if he has any sustained sugars in the 300s.      Final diagnoses:   Urticaria       ED Disposition  ED Disposition     ED Disposition   Discharge    Condition   Stable    Comment   --             Gabino Sibley Jr., MD  [address] [place] [address]  [phone]      As needed         Medication  List      New Prescriptions    predniSONE 20 MG tablet  Commonly known as: DELTASONE  Take 1 tablet by mouth 2 (Two) Times a Day.           Where to Get Your Medications      These medications were sent to [id number] - [place], [place] - [address] [facility] [address] - [phone]  - [phone] FX  [address] [facility] [place] [address]    Phone: [phone]   · predniSONE 20 MG tablet          Dominick Glez MD  10/01/22 040

## 2022-10-03 ENCOUNTER — TELEPHONE (OUTPATIENT)
Dept: ONCOLOGY | Facility: CLINIC | Age: 82
End: 2022-10-03

## 2022-10-03 ENCOUNTER — ANTICOAGULATION VISIT (OUTPATIENT)
Dept: ONCOLOGY | Facility: CLINIC | Age: 82
End: 2022-10-03

## 2022-10-03 LAB — INR PPP: 3.5

## 2022-10-03 NOTE — TELEPHONE ENCOUNTER
Caller: Dalton Dallas Sr.    Relationship: Self    Best call back number: [phone]    What is the best time to reach you: ANYTIME    Who are you requesting to speak with (clinical staff, provider,  specific staff member): DR ALFARO OR NURSE    What was the call regarding: PTS 10/3 INR WAS 3.5. PLEASE CALL TO ADVISE ON MED ADJUSTMENTS.     Do you require a callback: YES

## 2022-10-03 NOTE — PROGRESS NOTES
INR resulted at 3.5 today. Per protocol pt was advised to decrease to 7.5 mg and recheck in Wednesday. He v/u and had no other questions.

## 2022-10-05 ENCOUNTER — ANTICOAGULATION VISIT (OUTPATIENT)
Dept: ONCOLOGY | Facility: CLINIC | Age: 82
End: 2022-10-05

## 2022-10-05 ENCOUNTER — TELEPHONE (OUTPATIENT)
Dept: ONCOLOGY | Facility: CLINIC | Age: 82
End: 2022-10-05

## 2022-10-05 LAB — INR PPP: 3.1

## 2022-10-05 NOTE — TELEPHONE ENCOUNTER
Caller: Dalton Dallas Sr.    Relationship: Self    Best call back number: [phone]      What was the call regarding: PATIENT CALLED IN HIS INR RESULTS FROM TODAY. HIS RESULTS FOR HIS INR IS 3.1    Do you require a callback: YES

## 2022-10-05 NOTE — PROGRESS NOTES
Pt INR was 3.1 today. Pt was advised to stay on same dose and recheck on Friday to ensure dose is adjusted if needed prior to the weekend. He v/u and had no other questions.

## 2022-10-10 ENCOUNTER — TELEPHONE (OUTPATIENT)
Dept: ONCOLOGY | Facility: CLINIC | Age: 82
End: 2022-10-10

## 2022-10-10 ENCOUNTER — ANTICOAGULATION VISIT (OUTPATIENT)
Dept: ONCOLOGY | Facility: CLINIC | Age: 82
End: 2022-10-10

## 2022-10-10 LAB — INR PPP: 2.3

## 2022-10-10 RX ORDER — IRBESARTAN 300 MG/1
TABLET ORAL
Qty: 90 TABLET | Refills: 2 | Status: SHIPPED | OUTPATIENT
Start: 2022-10-10 | End: 2022-12-08 | Stop reason: SDUPTHER

## 2022-10-10 NOTE — TELEPHONE ENCOUNTER
Caller: Dalton Dallas Sr.    Relationship: Self    Best call back number: [phone]    What is the best time to reach you: ANYTIME    Who are you requesting to speak with (clinical staff, provider,  specific staff member): DOCTOR, NURSE    What was the call regarding: INR READING 2.3    Do you require a callback: YES

## 2022-10-11 ENCOUNTER — PATIENT OUTREACH (OUTPATIENT)
Dept: CASE MANAGEMENT | Facility: OTHER | Age: 82
End: 2022-10-11

## 2022-10-11 NOTE — OUTREACH NOTE
AMBULATORY CASE MANAGEMENT NOTE    Name and Relationship of Patient/Support Person: Dalton Dallas Sr. - Self    Adult Patient Profile  Questions/Answers    Flowsheet Row Most Recent Value   Primary Source of Support/Comfort child(kacie)   Current Living Arrangements home        Send Education  Questions/Answers    Flowsheet Row Most Recent Value   Annual Wellness Visit:  Patient Has Completed   Other Patient Education/Resources  24/7 [facility] Nurse Call Line   24/7 Nurse Call Line Education Method Verbal   Advanced Directives: Patient Has        SDOH updated and reviewed with the patient during this program:  Financial Resource Strain: Low Risk    • Difficulty of Paying Living Expenses: Not hard at all      Food Insecurity: No Food Insecurity   • Worried About Running Out of Food in the Last Year: Never true   • Ran Out of Food in the Last Year: Never true      Transportation Needs: No Transportation Needs   • Lack of Transportation (Medical): No   • Lack of Transportation (Non-Medical): No     Patient Outreach    Pt discharged from [facility] ED on 10/1/22, seen for rash. RN-ACM outreach call made to pt. Explained role of RN-ACM. Pt reports rash resolved. He denies any symptoms or concerns. Reviewed ED AVS with pt. Education provided. Pt states BG has been fine. Reviewed SDOH. He denies any needs. UTD on AWV. AD on file. He has next routine PCP appt scheduled. No questions per pt. Advised him to call with any needs. Follow up outreach prn.     NAKUL ESTRADA  Ambulatory Case Management    10/11/2022, 10:24 EDT

## 2022-10-12 RX ORDER — PRAMLINTIDE ACETATE 1000 UG/ML
INJECTION SUBCUTANEOUS
Qty: 32.4 ML | Refills: 3 | Status: SHIPPED | OUTPATIENT
Start: 2022-10-12 | End: 2022-10-14 | Stop reason: SDUPTHER

## 2022-10-14 RX ORDER — PRAMLINTIDE ACETATE 1000 UG/ML
INJECTION SUBCUTANEOUS
Qty: 32.4 ML | Refills: 3 | Status: SHIPPED | OUTPATIENT
Start: 2022-10-14

## 2022-10-17 ENCOUNTER — TELEPHONE (OUTPATIENT)
Dept: ONCOLOGY | Facility: CLINIC | Age: 82
End: 2022-10-17

## 2022-10-17 ENCOUNTER — TELEPHONE (OUTPATIENT)
Dept: ENDOCRINOLOGY | Facility: CLINIC | Age: 82
End: 2022-10-17

## 2022-10-17 ENCOUNTER — ANTICOAGULATION VISIT (OUTPATIENT)
Dept: ONCOLOGY | Facility: CLINIC | Age: 82
End: 2022-10-17

## 2022-10-17 LAB — INR PPP: 2.9

## 2022-10-17 NOTE — TELEPHONE ENCOUNTER
Caller: Dalton Dallas Sr.    Relationship: Self    Best call back number: [phone]    Caller requesting test results: CALLING TO REPORT INR    What test was performed: INR    When was the test performed: 10/17/22    Where was the test performed: PATIENT HOME    Additional notes: PATIENT CURRENT INR 2.9    ATTEMPTED TO WT NO ANSWER

## 2022-10-18 RX ORDER — PEN NEEDLE, DIABETIC 29 G X1/2"
NEEDLE, DISPOSABLE MISCELLANEOUS
Qty: 200 EACH | Refills: 2 | Status: SHIPPED | OUTPATIENT
Start: 2022-10-18

## 2022-10-20 NOTE — TELEPHONE ENCOUNTER
PA on Symlin denied. It says it is not covered under Part B but patient can check and see if Part D will cover it.

## 2022-10-24 ENCOUNTER — TELEPHONE (OUTPATIENT)
Dept: ONCOLOGY | Facility: CLINIC | Age: 82
End: 2022-10-24

## 2022-10-24 LAB — INR PPP: 3.8

## 2022-10-24 NOTE — TELEPHONE ENCOUNTER
Caller: Dalton Dallas Sr.    Relationship: Self    Best call back number: [phone]    What is the best time to reach you: ANYTIME    Who are you requesting to speak with (clinical staff, provider,  specific staff member): DR ALFARO OR NURSE    What was the call regarding: PTS 10/24 INR IS 3.8 - HE WAS SICK Thursday AND Friday BUT IS FEELING BETTER NOW. HE IS ON 8 MG OF WARFARIN.    PLEASE CALL WITH ANY MED CHANGES.     Do you require a callback: YES

## 2022-10-24 NOTE — TELEPHONE ENCOUNTER
Attempted to contact patient- Left voicemail with callback number.    He will need to hold Warfarin for two days and recheck at that point.

## 2022-10-25 NOTE — TELEPHONE ENCOUNTER
Spoke to Francisco, he verbalized understanding to hold for 2 days and recheck, He states he is feeling better.

## 2022-10-27 ENCOUNTER — ANTICOAGULATION VISIT (OUTPATIENT)
Dept: ONCOLOGY | Facility: CLINIC | Age: 82
End: 2022-10-27

## 2022-10-27 LAB — INR PPP: 1.5

## 2022-10-27 NOTE — PROGRESS NOTES
Received a call from the pt reporting his INR. He said that his INR is 1.5 and he held on Tuesday and Wednesday. I asked what his INR was on Monday and he said it was 3.8. I asked for him to hold while I discussed the dosing with LUCAS Cruz. He was agreeable to hold, but stated that he felt like he should go back on the 8 mg which he was on previously. I spoke to Nancy and she advised that he reduce his dose to 7 mg. I relayed this to the pt and he stated that he felt like he should go back on 8 mg. I explained that we were concerned his INR would be too high again on 8 mg and that's why we wanted him to reduce to 7 mg. He stated that he does not have pills to make 7 mg. I told him that I have listed that he has 5 mg and 1 mg tablets. Pt stated that he has 5 mg and 3 mg tablets. I told him I could send in 1 mg tablets. Pt stated he would go back on 8 mg and recheck on Monday.

## 2022-11-01 ENCOUNTER — ANTICOAGULATION VISIT (OUTPATIENT)
Dept: ONCOLOGY | Facility: CLINIC | Age: 82
End: 2022-11-01

## 2022-11-01 ENCOUNTER — TELEPHONE (OUTPATIENT)
Dept: ONCOLOGY | Facility: CLINIC | Age: 82
End: 2022-11-01

## 2022-11-01 LAB — INR PPP: 1.7

## 2022-11-01 NOTE — TELEPHONE ENCOUNTER
Caller: Dalton Dallas Sr.    Relationship: Self    Best call back number: [phone]    What was the call regarding: DALTON CALLED TO REPORT HIS INR FOR TODAY. HE SAYS IT IS 1.7.

## 2022-11-01 NOTE — PROGRESS NOTES
INR 1.7. Pt was called and advised to stay on his same dose and recheck on Friday. No answer. Detailed v/m was left.

## 2022-11-02 RX ORDER — LEVOTHYROXINE SODIUM 175 UG/1
TABLET ORAL
Qty: 90 TABLET | Refills: 0 | Status: SHIPPED | OUTPATIENT
Start: 2022-11-02 | End: 2022-12-08 | Stop reason: SDUPTHER

## 2022-11-03 ENCOUNTER — LAB (OUTPATIENT)
Dept: LAB | Facility: HOSPITAL | Age: 82
End: 2022-11-03

## 2022-11-03 ENCOUNTER — OFFICE VISIT (OUTPATIENT)
Dept: FAMILY MEDICINE CLINIC | Facility: CLINIC | Age: 82
End: 2022-11-03

## 2022-11-03 ENCOUNTER — HOSPITAL ENCOUNTER (OUTPATIENT)
Dept: GENERAL RADIOLOGY | Facility: HOSPITAL | Age: 82
Discharge: HOME | End: 2022-11-03

## 2022-11-03 VITALS
OXYGEN SATURATION: 96 % | HEART RATE: 71 BPM | DIASTOLIC BLOOD PRESSURE: 60 MMHG | WEIGHT: 293 LBS | BODY MASS INDEX: 42.04 KG/M2 | SYSTOLIC BLOOD PRESSURE: 98 MMHG | TEMPERATURE: 97.1 F

## 2022-11-03 DIAGNOSIS — E11.40 TYPE 2 DIABETES MELLITUS WITH DIABETIC NEUROPATHY, WITH LONG-TERM CURRENT USE OF INSULIN: Chronic | ICD-10-CM

## 2022-11-03 DIAGNOSIS — E78.2 MIXED HYPERLIPIDEMIA: Chronic | ICD-10-CM

## 2022-11-03 DIAGNOSIS — I10 ESSENTIAL HYPERTENSION: Chronic | ICD-10-CM

## 2022-11-03 DIAGNOSIS — L97.523 ULCER OF LEFT FOOT WITH NECROSIS OF MUSCLE: ICD-10-CM

## 2022-11-03 DIAGNOSIS — E11.40 TYPE 2 DIABETES MELLITUS WITH DIABETIC NEUROPATHY, WITHOUT LONG-TERM CURRENT USE OF INSULIN: ICD-10-CM

## 2022-11-03 DIAGNOSIS — L97.525 ULCER OF LEFT FOOT WITH MUSCLE INVOLVEMENT WITHOUT EVIDENCE OF NECROSIS: Primary | ICD-10-CM

## 2022-11-03 DIAGNOSIS — Z79.4 TYPE 2 DIABETES MELLITUS WITH DIABETIC NEUROPATHY, WITH LONG-TERM CURRENT USE OF INSULIN: Chronic | ICD-10-CM

## 2022-11-03 DIAGNOSIS — L97.525 ULCER OF LEFT FOOT WITH MUSCLE INVOLVEMENT WITHOUT EVIDENCE OF NECROSIS: ICD-10-CM

## 2022-11-03 DIAGNOSIS — I73.9 PERIPHERAL VASCULAR DISEASE: ICD-10-CM

## 2022-11-03 DIAGNOSIS — D68.59 THROMBOPHILIA: Chronic | ICD-10-CM

## 2022-11-03 DIAGNOSIS — I25.10 CORONARY ARTERY DISEASE INVOLVING NATIVE CORONARY ARTERY OF NATIVE HEART WITHOUT ANGINA PECTORIS: Chronic | ICD-10-CM

## 2022-11-03 LAB
ALBUMIN SERPL-MCNC: 3.9 G/DL (ref 3.5–5.2)
ALBUMIN/GLOB SERPL: 1.3 G/DL
ALP SERPL-CCNC: 43 U/L (ref 39–117)
ALT SERPL W P-5'-P-CCNC: 17 U/L (ref 1–41)
ANION GAP SERPL CALCULATED.3IONS-SCNC: 9.6 MMOL/L (ref 5–15)
AST SERPL-CCNC: 18 U/L (ref 1–40)
BASOPHILS # BLD AUTO: 0.06 10*3/MM3 (ref 0–0.2)
BASOPHILS NFR BLD AUTO: 0.6 % (ref 0–1.5)
BILIRUB SERPL-MCNC: 0.3 MG/DL (ref 0–1.2)
BUN SERPL-MCNC: 27 MG/DL (ref 8–23)
BUN/CREAT SERPL: 18.6 (ref 7–25)
CALCIUM SPEC-SCNC: 9.9 MG/DL (ref 8.6–10.5)
CHLORIDE SERPL-SCNC: 101 MMOL/L (ref 98–107)
CHOLEST SERPL-MCNC: 132 MG/DL (ref 0–200)
CO2 SERPL-SCNC: 26.4 MMOL/L (ref 22–29)
CREAT SERPL-MCNC: 1.45 MG/DL (ref 0.76–1.27)
CREAT UR-MCNC: 57.3 MG/DL
DEPRECATED RDW RBC AUTO: 43.3 FL (ref 37–54)
EGFRCR SERPLBLD CKD-EPI 2021: 48.7 ML/MIN/1.73
EOSINOPHIL # BLD AUTO: 0.29 10*3/MM3 (ref 0–0.4)
EOSINOPHIL NFR BLD AUTO: 2.8 % (ref 0.3–6.2)
ERYTHROCYTE [DISTWIDTH] IN BLOOD BY AUTOMATED COUNT: 13.1 % (ref 12.3–15.4)
ERYTHROCYTE [SEDIMENTATION RATE] IN BLOOD: 29 MM/HR (ref 0–20)
GLOBULIN UR ELPH-MCNC: 2.9 GM/DL
GLUCOSE SERPL-MCNC: 181 MG/DL (ref 65–99)
HBA1C MFR BLD: 7.9 % (ref 3.5–5.6)
HCT VFR BLD AUTO: 38.9 % (ref 37.5–51)
HDLC SERPL-MCNC: 36 MG/DL (ref 40–60)
HGB BLD-MCNC: 13 G/DL (ref 13–17.7)
IMM GRANULOCYTES # BLD AUTO: 0.03 10*3/MM3 (ref 0–0.05)
IMM GRANULOCYTES NFR BLD AUTO: 0.3 % (ref 0–0.5)
LDLC SERPL CALC-MCNC: 69 MG/DL (ref 0–100)
LDLC/HDLC SERPL: 1.79 {RATIO}
LYMPHOCYTES # BLD AUTO: 2.45 10*3/MM3 (ref 0.7–3.1)
LYMPHOCYTES NFR BLD AUTO: 23.4 % (ref 19.6–45.3)
MCH RBC QN AUTO: 30.3 PG (ref 26.6–33)
MCHC RBC AUTO-ENTMCNC: 33.4 G/DL (ref 31.5–35.7)
MCV RBC AUTO: 90.7 FL (ref 79–97)
MONOCYTES # BLD AUTO: 0.67 10*3/MM3 (ref 0.1–0.9)
MONOCYTES NFR BLD AUTO: 6.4 % (ref 5–12)
NEUTROPHILS NFR BLD AUTO: 6.96 10*3/MM3 (ref 1.7–7)
NEUTROPHILS NFR BLD AUTO: 66.5 % (ref 42.7–76)
NRBC BLD AUTO-RTO: 0 /100 WBC (ref 0–0.2)
PLATELET # BLD AUTO: 306 10*3/MM3 (ref 140–450)
PMV BLD AUTO: 10.3 FL (ref 6–12)
POTASSIUM SERPL-SCNC: 4.7 MMOL/L (ref 3.5–5.2)
PROT ?TM UR-MCNC: 5.2 MG/DL
PROT SERPL-MCNC: 6.8 G/DL (ref 6–8.5)
PROT/CREAT UR: 90.8 MG/G CREA (ref 0–200)
RBC # BLD AUTO: 4.29 10*6/MM3 (ref 4.14–5.8)
SODIUM SERPL-SCNC: 137 MMOL/L (ref 136–145)
TRIGL SERPL-MCNC: 157 MG/DL (ref 0–150)
VLDLC SERPL-MCNC: 27 MG/DL (ref 5–40)
WBC NRBC COR # BLD: 10.46 10*3/MM3 (ref 3.4–10.8)

## 2022-11-03 PROCEDURE — 84156 ASSAY OF PROTEIN URINE: CPT

## 2022-11-03 PROCEDURE — 80061 LIPID PANEL: CPT

## 2022-11-03 PROCEDURE — 85025 COMPLETE CBC W/AUTO DIFF WBC: CPT

## 2022-11-03 PROCEDURE — 36415 COLL VENOUS BLD VENIPUNCTURE: CPT

## 2022-11-03 PROCEDURE — 87070 CULTURE OTHR SPECIMN AEROBIC: CPT | Performed by: FAMILY MEDICINE

## 2022-11-03 PROCEDURE — 80053 COMPREHEN METABOLIC PANEL: CPT

## 2022-11-03 PROCEDURE — 83036 HEMOGLOBIN GLYCOSYLATED A1C: CPT

## 2022-11-03 PROCEDURE — 87205 SMEAR GRAM STAIN: CPT | Performed by: FAMILY MEDICINE

## 2022-11-03 PROCEDURE — 73630 X-RAY EXAM OF FOOT: CPT

## 2022-11-03 PROCEDURE — 85652 RBC SED RATE AUTOMATED: CPT

## 2022-11-03 PROCEDURE — 99214 OFFICE O/P EST MOD 30 MIN: CPT | Performed by: FAMILY MEDICINE

## 2022-11-03 PROCEDURE — 82570 ASSAY OF URINE CREATININE: CPT

## 2022-11-03 NOTE — PROGRESS NOTES
Subjective   Dalton Dallas Sr. is a 82 y.o. male.     Chief Complaint   Patient presents with   • Hypertension     3 month f/u    • Hyperlipidemia   • Diabetes   • Wound Check     Lt x3 weeks ago        HPI  Chief complaint: Hypertension hyperlipidemia coronary artery disease thrombophilia type 2 diabetes mellitus foot ulcer    The patient is an 82-year-old white male comes in for follow-up and maintenance of his current problems which include    1.  Hypertension-stable-the patient is currently taking amlodipine 5 mg daily and atenolol 50 mg daily Avapro 300 mg daily hydrochlorothiazide 25 mg daily.  Blood pressure stable.    2.  Hyperlipidemia-stable after patient on Lipitor 80 mg daily Tricor 54 mg daily.  Denies myalgias and arthralgias.    3.  Type 2 diabetes mellitus-stable-patient is currently on Jardiance 25 mg daily metformin 2000 mg daily pramlintide 120 mcg 3 times a day and sliding scale insulin.  Blood sugars are stable.    4.  Coronary artery disease/heart failure with normal ejection fraction-stable-patient on Imdur 30 mg daily Plavix 75 mg daily and atenolol.  He denied chest pain shortness of breath orthopnea or PND.    5.  Thrombophilia-stable-patient on Coumadin.      6. foot ulcer-new-patient states that 2 to 3 weeks ago he developed where he noticed a foot ulcer over the head of his fifth metatarsal.  He states it is sore.      The following portions of the patient's history were reviewed and updated as appropriate: allergies, current medications, past family history, past medical history, past social history, past surgical history and problem list.    Review of Systems    Objective     BP 98/60 (BP Location: Left arm, Patient Position: Sitting, Cuff Size: Large Adult)   Pulse 71   Temp 97.1 °F (36.2 °C) (Infrared)   Wt 133 kg (293 lb)   SpO2 96%   BMI 42.04 kg/m²     Physical Exam  Vitals and nursing note reviewed.   Constitutional:       Appearance: He is well-developed. He is obese.    HENT:      Head: Normocephalic and atraumatic.   Eyes:      Pupils: Pupils are equal, round, and reactive to light.   Cardiovascular:      Rate and Rhythm: Normal rate and regular rhythm.      Pulses: Normal pulses.      Heart sounds: Normal heart sounds. No murmur heard.    No friction rub. No gallop.   Pulmonary:      Effort: Pulmonary effort is normal.      Breath sounds: Normal breath sounds.   Abdominal:      General: Bowel sounds are normal.      Palpations: Abdomen is soft.   Musculoskeletal:         General: Normal range of motion.      Cervical back: Neck supple.      Comments: Patient has a 1.5 cm diameter foot ulcer involving the plantar aspect of the left foot over the head of the fifth metatarsal.  There is a full thickness ulcer.  There is no drainage.  There is no surrounding erythema.   Skin:     General: Skin is warm and dry.   Neurological:      Mental Status: He is alert and oriented to person, place, and time.   Psychiatric:         Behavior: Behavior normal.         Thought Content: Thought content normal.         Judgment: Judgment normal.           Assessment & Plan   Diagnoses and all orders for this visit:    1. Ulcer of left foot with muscle involvement without evidence of necrosis (HCC) (Primary)  -     XR Foot 3+ View Left; Future  -     CBC & Differential; Future  -     Comprehensive Metabolic Panel; Future  -     Sedimentation Rate; Future  -     PT Wound Care; Future    2. Ulcer of left foot with necrosis of muscle (HCC), I cultured the ulcer.  I wrapped in a dressing.  I recommended x-rays and labs.  Recommended wound therapy consultation.  He is to follow-up with me in 1 week or sooner if needed.  -     CBC & Differential; Future    3. Type 2 diabetes mellitus with diabetic neuropathy, with long-term current use of insulin (HCC)  -     Hemoglobin A1c; Future  -     Protein / Creatinine Ratio, Urine - Urine, Clean Catch; Future    4. Mixed hyperlipidemia  -     Lipid Panel;  Future    5. Essential hypertension    6. Peripheral vascular disease (HCC)    7. Coronary artery disease involving native coronary artery of native heart without angina pectoris    8. Thrombophilia (HCC)      Patient Instructions   Have the labs and the xray done and call for results.    Go to the wound center for further evaluation and treatment.    Follow up in the offcie in 1 week.      Gabino Sibley Jr., MD    11/03/22

## 2022-11-03 NOTE — PATIENT INSTRUCTIONS
Have the labs and the xray done and call for results.    Go to the wound center for further evaluation and treatment.    Follow up in the offcie in 1 week.

## 2022-11-04 ENCOUNTER — LAB REQUISITION (OUTPATIENT)
Dept: LAB | Facility: HOSPITAL | Age: 82
End: 2022-11-04

## 2022-11-04 DIAGNOSIS — L97.525 NON-PRESSURE CHRONIC ULCER OF OTHER PART OF LEFT FOOT WITH MUSCLE INVOLVEMENT WITHOUT EVIDENCE OF NECROSIS: ICD-10-CM

## 2022-11-07 ENCOUNTER — TELEPHONE (OUTPATIENT)
Dept: ONCOLOGY | Facility: CLINIC | Age: 82
End: 2022-11-07

## 2022-11-07 LAB
BACTERIA SPEC AEROBE CULT: NORMAL
GRAM STN SPEC: NORMAL
INR PPP: 2.5 (ref 2–3)

## 2022-11-07 NOTE — TELEPHONE ENCOUNTER
Caller: Dalton Dallas Sr.    Relationship: Self    Best call back number:  [phone]    What is the best time to reach you: ANY    Who are you requesting to speak with (clinical staff, provider,  specific staff member): DR. ALFARO'S NURSE    Do you know the name of the person who called: LOKI    What was the call regarding: INR FOR TODAY IS 2.5    Do you require a callback:  IF NEEDED

## 2022-11-07 NOTE — TELEPHONE ENCOUNTER
Contacted patient to let him know to continue same dose and recheck in one week.     Patient verbalized understanding and had no further questions.

## 2022-11-08 NOTE — PROGRESS NOTES
Date of Office Visit: 2022  Encounter Provider: Dr. Edwar Ackerman  Place of Service: [facility] [place]  Patient Name: Dalton Dallas Sr.  :1940  Gabino Sibley Jr., MD    Chief Complaint   Patient presents with   • Coronary Artery Disease   • Hypertension   • Hyperlipidemia   • Diabetes   • Follow-up     History of Present Illness    I'm pleased to see Mr. Dallas in my office today as a followup     As you know, patient is 82 years old white gentleman whose past medical history is significant for hypertension, hyperlipidemia, coronary artery disease, coronary artery stenting, diabetes mellitus, DVT who came for followup.     In , patient developed symptom of shortness of breath and subsequent stress test was abnormal.  Cardiac catheterization showed LAD was free of disease, RCA had 50% stenosis, and LCX has 80% stenosis.  Patient underwent LCX/om stenting.  In , repeat cardiac catheterization showed 80-90% stenosis of LCX/OM1 patient underwent successful stenting.  LAD had 25% stenosis.  LVEF was 60%.  In 2018, echocardiogram showed EF of 60-65%. In 2020, patient underwent stress test which showed no myocardial ischemia.  Small fixed inferior defect was noted.    In 2021, patient was admitted at [facility] with symptom of unstable angina.  Patient underwent cardiac catheterization and it showed patent stents.  Distal LCx had 70 to 80% stenosis but it was a small caliber vessel.  LVEF was 55 to 60%.    In 2021, patient underwent echocardiogram and it showed ejection fraction of 60 to 65% and no significant valvular heart disease noted.    This is 6 months follow-up for the patient.  Patient is overall doing well.  Patient has developed ulcer on left side of the foot.  It is painful and patient is being referred to wound center.  Patient denies any chest pain.  Patient is not very active.  Patient is short of breath on exertion.  Patient  denies any orthopnea PND no syncope or presyncope.    EKG showed normal sinus rhythm nonspecific ST changes noted.    At this stage I am overall pleased with the patient condition.  I would recommend to continue current treatment his blood pressure is controlled.  Diabetic and cholesterol control is emphasized.  Patient is also emphasized to follow-up with wound center for the foot ulcer.  Patient may need CHIOMA.  I would leave that decision to this wound center.        Past Medical History:   Diagnosis Date   • ACE-inhibitor cough 06/2005   • Coronary artery disease    • Diabetes mellitus, type 2 (HCC) 1995   • ED (erectile dysfunction)    • Hx of blood clots 2014    Blood clot left leg    • Hyperlipidemia 08/2000   • Hypertension    • Hypogonadism, male 1998   • Hypothyroidism 06/2001   • Obesity    • Peripheral neuropathy    • Pulmonary embolism (HCC) 02/2014   • Rectal fistula    • Sleep apnea 12/10/2013   • Vitamin D deficiency          Past Surgical History:   Procedure Laterality Date   • CARDIAC CATHETERIZATION  2008    PTCA: 2008; PTCA: 4/21/2015 LCX stent    • CARDIAC CATHETERIZATION Left 7/3/2021    Procedure: Cardiac Catheterization/Vascular Study;  Surgeon: Edwar Ackerman MD;  Location: [facility] CATH INVASIVE LOCATION;  Service: Cardiovascular;  Laterality: Left;   • CARDIOVASCULAR STRESS TEST  2020   • CATARACT EXTRACTION  01/11/2016 1-4-16 & 1-11-16    • COLONOSCOPY N/A 11/23/2019    Procedure: COLONOSCOPY WITH ENDOSCOPIC CLIPPING X1 OF POST POLYPECTOMY BLEED SITE;  Surgeon: Jhonny Orellana MD;  Location: [facility] ENDOSCOPY;  Service: Gastroenterology   • CORONARY STENT PLACEMENT     • INGUINAL HERNIA REPAIR Left    • UMBILICAL HERNIA REPAIR             Current Outpatient Medications:   •  Accu-Chek Irene Plus test strip, USE TO CHECK BLOOD SUGAR BEFORE MEALS AND AT BEDTIME, Disp: 400 each, Rfl: 3  •  Accu-Chek Softclix Lancets lancets, Use to test blood sugars 3 times daily. DXE11.65, Disp: 100 each,  "Rfl: 11  •  amLODIPine (NORVASC) 5 MG tablet, TAKE 1 TABLET DAILY, Disp: 90 tablet, Rfl: 3  •  atenolol (TENORMIN) 50 MG tablet, TAKE 1 TABLET DAILY, Disp: 90 tablet, Rfl: 3  •  atorvastatin (LIPITOR) 80 MG tablet, TAKE 1 TABLET EVERY NIGHT, Disp: 90 tablet, Rfl: 3  •  BD Insulin Syringe U/F 31G X 5/16\" 0.5 ML misc, USE 1 NEW SYRINGE WITH EACH DOSE OF INSULIN TWICE A DAY, Disp: 200 each, Rfl: 3  •  Blood Glucose Monitoring Suppl (Accu-Chek Irene Plus) w/Device kit, 1 each 4 (Four) Times a Day., Disp: 1 kit, Rfl: 0  •  clopidogrel (PLAVIX) 75 MG tablet, TAKE 1 TABLET DAILY, Disp: 90 tablet, Rfl: 3  •  fenofibrate (TRICOR) 54 MG tablet, TAKE 1 TABLET DAILY, Disp: 90 tablet, Rfl: 3  •  ferrous sulfate 325 (65 FE) MG tablet, Take 325 mg by mouth 2 (Two) Times a Day., Disp: , Rfl:   •  gabapentin (NEURONTIN) 100 MG capsule, TAKE 1 CAPSULE THREE TIMES A DAY, Disp: 270 capsule, Rfl: 3  •  hydroCHLOROthiazide (HYDRODIURIL) 25 MG tablet, TAKE 1 TABLET DAILY, Disp: 90 tablet, Rfl: 3  •  Insulin Pen Needle (BD ULTRA-FINE PEN NEEDLES) 29G X 12.7MM misc, Use to inject insulin twice daily. DX E11.65, Disp: 200 each, Rfl: 2  •  insulin regular (HUMULIN R) 500 UNIT/ML CONCENTRATED injection, DRAW TO 24 UNIT REI ON U-100 SYRINGE AND INJECT IN THE MORNING AND 40 UNIT REI IN THE EVENING, Disp: 60 mL, Rfl: 3  •  irbesartan (AVAPRO) 300 MG tablet, TAKE 1 TABLET DAILY, Disp: 90 tablet, Rfl: 2  •  isosorbide mononitrate (IMDUR) 30 MG 24 hr tablet, TAKE 1 TABLET DAILY, Disp: 90 tablet, Rfl: 3  •  Jardiance 25 MG tablet tablet, TAKE 1 TABLET DAILY (THIS IS AN INCREASE IN DOSE), Disp: 90 tablet, Rfl: 3  •  levothyroxine (SYNTHROID, LEVOTHROID) 175 MCG tablet, TAKE 1 TABLET DAILY, Disp: 90 tablet, Rfl: 0  •  metFORMIN ER (GLUCOPHAGE-XR) 500 MG 24 hr tablet, TAKE 4 TABLETS AT SUPPER, Disp: 360 tablet, Rfl: 3  •  Omega-3 Fatty Acids (FISH OIL) 1000 MG capsule capsule, Take 1,000 mg by mouth 2 (Two) Times a Day., Disp: , Rfl:   •  Pramlintide " Acetate (SymlinPen 120) 2700 MCG/2.7ML solution pen-injector, 120 MCG UNDER THE SKIN INTO THE APPROPRIATE AREA AS DIRECTED THREE TIMES A DAY, Disp: 32.4 mL, Rfl: 3  •  predniSONE (DELTASONE) 20 MG tablet, Take 1 tablet by mouth 2 (Two) Times a Day., Disp: 10 tablet, Rfl: 0  •  vitamin B-12 (CYANOCOBALAMIN) 100 MCG tablet, Take 100 mcg by mouth Daily., Disp: , Rfl:   •  vitamin D (ERGOCALCIFEROL) 1.25 MG (33934 UT) capsule capsule, TAKE 1 CAPSULE ONCE A WEEK, Disp: 12 capsule, Rfl: 3  •  warfarin (COUMADIN) 1 MG tablet, Take 1 tablet PO every evening as directed., Disp: 30 tablet, Rfl: 3  •  warfarin (COUMADIN) 3 MG tablet, Take 1 tablet by mouth Daily., Disp: 90 tablet, Rfl: 3  •  warfarin (Coumadin) 4 MG tablet, Take 1 tablet by mouth Every Night., Disp: 90 tablet, Rfl: 3  •  warfarin (COUMADIN) 5 MG tablet, TAKE 1 TABLET AS DIRECTED, Disp: 90 tablet, Rfl: 3      Social History     Socioeconomic History   • Marital status:    Tobacco Use   • Smoking status: Former     Packs/day: 1.50     Years: 20.00     Pack years: 30.00     Types: Cigarettes     Quit date:      Years since quittin.8   • Smokeless tobacco: Never   • Tobacco comments:     quit    Vaping Use   • Vaping Use: Never used   Substance and Sexual Activity   • Alcohol use: No   • Drug use: No   • Sexual activity: Defer         Review of Systems   Constitutional: Negative for chills and fever.   HENT: Negative for ear discharge and nosebleeds.    Eyes: Negative for discharge and redness.   Cardiovascular: Negative for chest pain, orthopnea, palpitations, paroxysmal nocturnal dyspnea and syncope.   Respiratory: Positive for shortness of breath. Negative for cough and wheezing.    Endocrine: Negative for heat intolerance.   Skin: Negative for rash.   Musculoskeletal: Positive for arthritis, back pain and joint pain. Negative for myalgias.   Gastrointestinal: Negative for abdominal pain, melena, nausea and vomiting.   Genitourinary:  "Negative for dysuria and hematuria.   Neurological: Negative for dizziness, light-headedness, numbness and tremors.   Psychiatric/Behavioral: Negative for depression. The patient is not nervous/anxious.        Procedures      ECG 12 Lead    Date/Time: 11/9/2022 1:40 PM  Performed by: Edwar Ackerman MD  Authorized by: Edwar Ackerman MD   Comparison: compared with previous ECG   Similar to previous ECG  Rhythm: sinus rhythm  Other findings: non-specific ST-T wave changes    Clinical impression: normal ECG            ECG 12 Lead    (Results Pending)           Objective:    /64 (BP Location: Right arm, Patient Position: Sitting, Cuff Size: Large Adult)   Pulse 71   Ht 177.8 cm (70\")   Wt 133 kg (294 lb)   SpO2 98%   BMI 42.18 kg/m²         Constitutional:       Appearance: Well-developed.   Eyes:      General: No scleral icterus.        Right eye: No discharge.   HENT:      Head: Normocephalic and atraumatic.   Neck:      Thyroid: No thyromegaly.      Lymphadenopathy: No cervical adenopathy.   Pulmonary:      Effort: Pulmonary effort is normal. No respiratory distress.      Breath sounds: Normal breath sounds. No wheezing. No rales.   Cardiovascular:      Normal rate. Regular rhythm.      No gallop.   Abdominal:      Tenderness: There is no abdominal tenderness.   Skin:     Findings: No erythema or rash.   Neurological:      Mental Status: Alert and oriented to person, place, and time.             Assessment:       Diagnosis Plan   1. Coronary artery disease involving native coronary artery of native heart without angina pectoris  ECG 12 Lead      2. Essential hypertension  ECG 12 Lead      3. Mixed hyperlipidemia  ECG 12 Lead      4. Type 2 diabetes mellitus with diabetic neuropathy, with long-term current use of insulin ([facility])  ECG 12 Lead               Plan:       MDM:    1.  CAD:    Patient had recent cardiac catheterization and stent in LCx was patent.  Patient has small vessel disease.  Patient is " asymptomatic.  Continue current treatment    2.  Hypertension:    Blood pressure is very well controlled current treatment would be continued    3.  Hyperlipidemia:    Patient is on Lipitor.  Repeat lipid panel testing    4.  Diabetes mellitus:    Diabetic control is emphasized.  Patient is on Jardiance.

## 2022-11-09 ENCOUNTER — OFFICE VISIT (OUTPATIENT)
Dept: CARDIOLOGY | Facility: CLINIC | Age: 82
End: 2022-11-09

## 2022-11-09 VITALS
SYSTOLIC BLOOD PRESSURE: 131 MMHG | HEART RATE: 71 BPM | OXYGEN SATURATION: 98 % | DIASTOLIC BLOOD PRESSURE: 64 MMHG | WEIGHT: 294 LBS | BODY MASS INDEX: 42.09 KG/M2 | HEIGHT: 70 IN

## 2022-11-09 DIAGNOSIS — I10 ESSENTIAL HYPERTENSION: Chronic | ICD-10-CM

## 2022-11-09 DIAGNOSIS — I25.10 CORONARY ARTERY DISEASE INVOLVING NATIVE CORONARY ARTERY OF NATIVE HEART WITHOUT ANGINA PECTORIS: Primary | Chronic | ICD-10-CM

## 2022-11-09 DIAGNOSIS — E78.2 MIXED HYPERLIPIDEMIA: Chronic | ICD-10-CM

## 2022-11-09 DIAGNOSIS — Z79.4 TYPE 2 DIABETES MELLITUS WITH DIABETIC NEUROPATHY, WITH LONG-TERM CURRENT USE OF INSULIN: Chronic | ICD-10-CM

## 2022-11-09 DIAGNOSIS — E11.40 TYPE 2 DIABETES MELLITUS WITH DIABETIC NEUROPATHY, WITH LONG-TERM CURRENT USE OF INSULIN: Chronic | ICD-10-CM

## 2022-11-09 PROCEDURE — 99214 OFFICE O/P EST MOD 30 MIN: CPT | Performed by: INTERNAL MEDICINE

## 2022-11-09 PROCEDURE — 93000 ELECTROCARDIOGRAM COMPLETE: CPT | Performed by: INTERNAL MEDICINE

## 2022-11-10 ENCOUNTER — OFFICE VISIT (OUTPATIENT)
Dept: FAMILY MEDICINE CLINIC | Facility: CLINIC | Age: 82
End: 2022-11-10

## 2022-11-10 VITALS
SYSTOLIC BLOOD PRESSURE: 136 MMHG | HEIGHT: 70 IN | RESPIRATION RATE: 18 BRPM | DIASTOLIC BLOOD PRESSURE: 67 MMHG | TEMPERATURE: 96.9 F | HEART RATE: 68 BPM | WEIGHT: 293 LBS | BODY MASS INDEX: 41.95 KG/M2 | OXYGEN SATURATION: 90 %

## 2022-11-10 DIAGNOSIS — L97.511 SKIN ULCER OF TOE OF RIGHT FOOT, LIMITED TO BREAKDOWN OF SKIN: ICD-10-CM

## 2022-11-10 DIAGNOSIS — L97.523 ULCER OF LEFT FOOT WITH NECROSIS OF MUSCLE: Primary | ICD-10-CM

## 2022-11-10 PROCEDURE — 99213 OFFICE O/P EST LOW 20 MIN: CPT | Performed by: FAMILY MEDICINE

## 2022-11-10 NOTE — PATIENT INSTRUCTIONS
Continue current sterile dressings of the foot ulcers.    Follow-up with wound therapy tomorrow.    Follow-up in the office in 3 months.

## 2022-11-10 NOTE — PROGRESS NOTES
"Subjective   Dalton Dallas Sr. is a 82 y.o. male.     Chief Complaint   Patient presents with   • Foot Ulcer     1 week follow up       HPI  Complaint: Foot ulcer    The patient is an 82-year-old white male comes in for follow-up and maintenance of his current problems which include    1. diabetic foot ulcer-deteriorated-patient was found by me to have a diabetic foot ulcer involving the lateral aspect of the left foot.  The ulcer at that time was about 1 cm in diameter.  It had an eschar in the middle of it.  It was not draining any material.  The surrounding skin was not erythematous.  The patient had laboratory testing and an x-ray of the foot which did not suggest osteomyelitis.  The patient's been treating this with sterile dressings.  He is scheduled to see wound therapy tomorrow.  A culture of the wound did not grow any bacteria.  He states that he did see his podiatrist earlier this week who \"clean the wound up a little bit\".  The wound looks larger to me today than it was a week ago.    2.  Toe ulcer-new-the patient also has a ulcer involving the distal plantar aspect of the right second toe.  He states its been there for extended period of time.  It does not drain material.  It is not sore.        The following portions of the patient's history were reviewed and updated as appropriate: allergies, current medications, past family history, past medical history, past social history, past surgical history and problem list.    Review of Systems    Objective     /67 (BP Location: Right arm, Patient Position: Sitting, Cuff Size: Large Adult)   Pulse 68   Temp 96.9 °F (36.1 °C) (Infrared)   Resp 18   Ht 177.8 cm (70\")   Wt 133 kg (293 lb)   SpO2 90%   BMI 42.04 kg/m²     Physical Exam  Vitals and nursing note reviewed.   Constitutional:       Appearance: He is well-developed. He is obese.   HENT:      Head: Normocephalic and atraumatic.   Eyes:      Pupils: Pupils are equal, round, and reactive " to light.   Cardiovascular:      Rate and Rhythm: Normal rate and regular rhythm.      Heart sounds: Normal heart sounds.   Pulmonary:      Effort: Pulmonary effort is normal.      Breath sounds: Normal breath sounds.   Abdominal:      General: Bowel sounds are normal.      Palpations: Abdomen is soft.   Musculoskeletal:         General: Normal range of motion.      Cervical back: Neck supple.   Skin:     General: Skin is warm and dry.      Comments: 1.5 cm ulcer over the head of the left fifth metatarsal draining a small amount of fluid    Small ulcer of the distal right second toe   Neurological:      Mental Status: He is alert and oriented to person, place, and time.   Psychiatric:         Behavior: Behavior normal.         Thought Content: Thought content normal.         Judgment: Judgment normal.         Protime-INR (11/07/2022)  Wound Culture - Wound, Foot, Left (11/03/2022 15:00)  Protein / Creatinine Ratio, Urine - Urine, Clean Catch (11/03/2022 11:58)  Lipid Panel (11/03/2022 11:58)  Sedimentation Rate (11/03/2022 11:58)  Hemoglobin A1c (11/03/2022 11:58)  Comprehensive Metabolic Panel (11/03/2022 11:58)  XR Foot 3+ View Left (11/03/2022 12:20)    Assessment & Plan   Diagnoses and all orders for this visit:    1. Ulcer of left foot with necrosis of muscle (HCC) (Primary)    2. Skin ulcer of toe of right foot, limited to breakdown of skin (HCC)      Patient Instructions   Continue current sterile dressings of the foot ulcers.    Follow-up with wound therapy tomorrow.    Follow-up in the office in 3 months.      Gabino Sibley Jr., MD    11/10/22

## 2022-11-11 ENCOUNTER — LAB REQUISITION (OUTPATIENT)
Dept: LAB | Facility: HOSPITAL | Age: 82
End: 2022-11-11

## 2022-11-11 ENCOUNTER — OFFICE VISIT (OUTPATIENT)
Dept: WOUND CARE | Facility: HOSPITAL | Age: 82
End: 2022-11-11

## 2022-11-11 ENCOUNTER — TRANSCRIBE ORDERS (OUTPATIENT)
Dept: ADMINISTRATIVE | Facility: HOSPITAL | Age: 82
End: 2022-11-11

## 2022-11-11 DIAGNOSIS — E11.59 TYPE 2 DIABETES MELLITUS WITH OTHER CIRCULATORY COMPLICATIONS: ICD-10-CM

## 2022-11-11 DIAGNOSIS — E11.59 TYPE 2 DIABETES MELLITUS WITH OTHER CIRCULATORY COMPLICATIONS: Primary | ICD-10-CM

## 2022-11-11 DIAGNOSIS — E11.621 TYPE 2 DIABETES MELLITUS WITH FOOT ULCER (CODE): ICD-10-CM

## 2022-11-11 PROCEDURE — G0463 HOSPITAL OUTPT CLINIC VISIT: HCPCS

## 2022-11-11 PROCEDURE — 87205 SMEAR GRAM STAIN: CPT | Performed by: SURGERY

## 2022-11-11 PROCEDURE — 97602 WOUND(S) CARE NON-SELECTIVE: CPT

## 2022-11-11 PROCEDURE — 87070 CULTURE OTHR SPECIMN AEROBIC: CPT | Performed by: SURGERY

## 2022-11-13 LAB
BACTERIA SPEC AEROBE CULT: NORMAL
GRAM STN SPEC: NORMAL

## 2022-11-14 ENCOUNTER — TELEPHONE (OUTPATIENT)
Dept: ONCOLOGY | Facility: CLINIC | Age: 82
End: 2022-11-14

## 2022-11-14 LAB — INR PPP: 2.9

## 2022-11-14 NOTE — TELEPHONE ENCOUNTER
Gabriella ross transferred patient phone call to me. Patient stated his INR from today is 2.9. I verified if he is currently taking 8 mg; patient confirmed. I advised patient to continue taking 8 mg and recheck in 1 week from today (11/21/22). Patient verified and voiced understanding.

## 2022-11-17 ENCOUNTER — HOSPITAL ENCOUNTER (OUTPATIENT)
Dept: CARDIOLOGY | Facility: HOSPITAL | Age: 82
Discharge: HOME | End: 2022-11-17
Admitting: NURSE PRACTITIONER

## 2022-11-17 DIAGNOSIS — E11.59 TYPE 2 DIABETES MELLITUS WITH OTHER CIRCULATORY COMPLICATIONS: ICD-10-CM

## 2022-11-17 LAB
BH CV LOWER ARTERIAL LEFT ABI RATIO: 1.28
BH CV LOWER ARTERIAL LEFT DORSALIS PEDIS SYS MAX: 196
BH CV LOWER ARTERIAL LEFT GREAT TOE SYS MAX: 42
BH CV LOWER ARTERIAL LEFT POST TIBIAL SYS MAX: 120
BH CV LOWER ARTERIAL LEFT TBI RATIO: 0.27
BH CV LOWER ARTERIAL RIGHT ABI RATIO: 1.25
BH CV LOWER ARTERIAL RIGHT DORSALIS PEDIS SYS MAX: 192
BH CV LOWER ARTERIAL RIGHT GREAT TOE SYS MAX: 46
BH CV LOWER ARTERIAL RIGHT POST TIBIAL SYS MAX: 163
BH CV LOWER ARTERIAL RIGHT TBI RATIO: 0.3
MAXIMAL PREDICTED HEART RATE: 140 BPM
STRESS TARGET HR: 119 BPM
UPPER ARTERIAL LEFT ARM BRACHIAL SYS MAX: 152 MMHG
UPPER ARTERIAL RIGHT ARM BRACHIAL SYS MAX: 152 MMHG

## 2022-11-17 PROCEDURE — 93922 UPR/L XTREMITY ART 2 LEVELS: CPT

## 2022-11-21 ENCOUNTER — ANTICOAGULATION VISIT (OUTPATIENT)
Dept: ONCOLOGY | Facility: CLINIC | Age: 82
End: 2022-11-21

## 2022-11-21 ENCOUNTER — TELEPHONE (OUTPATIENT)
Dept: ONCOLOGY | Facility: CLINIC | Age: 82
End: 2022-11-21

## 2022-11-21 LAB — INR PPP: 3

## 2022-11-21 NOTE — PROGRESS NOTES
Pt INR was 3.0 today he was advised to decrease his dose to 7.5mg daily due to the fact he has been slowing increasing. He v/u and had no other questions.

## 2022-11-21 NOTE — TELEPHONE ENCOUNTER
Caller: Dalton Dallas Sr.    Relationship: Self    Best call back number: [phone]    Who are you requesting to speak with (clinical staff, provider,  specific staff member): CLINICAL      What was the call regarding: PATIENT CALLED IN HIS INR FROM TODAY, IT IS 3.0    Do you require a callback: YES

## 2022-11-23 ENCOUNTER — OFFICE VISIT (OUTPATIENT)
Dept: WOUND CARE | Facility: HOSPITAL | Age: 82
End: 2022-11-23

## 2022-11-23 DIAGNOSIS — L97.509 TYPE 2 DIABETES MELLITUS WITH FOOT ULCER, UNSPECIFIED WHETHER LONG TERM INSULIN USE: ICD-10-CM

## 2022-11-23 DIAGNOSIS — E11.621 TYPE 2 DIABETES MELLITUS WITH FOOT ULCER, UNSPECIFIED WHETHER LONG TERM INSULIN USE: ICD-10-CM

## 2022-11-23 PROCEDURE — 97597 DBRDMT OPN WND 1ST 20 CM/<: CPT

## 2022-11-25 ENCOUNTER — DOCUMENTATION (OUTPATIENT)
Dept: PODIATRY | Facility: CLINIC | Age: 82
End: 2022-11-25

## 2022-11-25 DIAGNOSIS — L97.522 DIABETIC ULCER OF LEFT FOOT ASSOCIATED WITH TYPE 2 DIABETES MELLITUS, WITH FAT LAYER EXPOSED, UNSPECIFIED PART OF FOOT: Primary | ICD-10-CM

## 2022-11-25 DIAGNOSIS — I73.9 PERIPHERAL VASCULAR DISEASE: ICD-10-CM

## 2022-11-25 DIAGNOSIS — E11.621 DIABETIC ULCER OF LEFT FOOT ASSOCIATED WITH TYPE 2 DIABETES MELLITUS, WITH FAT LAYER EXPOSED, UNSPECIFIED PART OF FOOT: Primary | ICD-10-CM

## 2022-11-28 ENCOUNTER — HOME HEALTH ADMISSION (OUTPATIENT)
Dept: HOME HEALTH SERVICES | Facility: HOME HEALTHCARE | Age: 82
End: 2022-11-28
Payer: MEDICARE

## 2022-11-28 ENCOUNTER — TRANSCRIBE ORDERS (OUTPATIENT)
Dept: HOME HEALTH SERVICES | Facility: HOME HEALTHCARE | Age: 82
End: 2022-11-28

## 2022-11-28 DIAGNOSIS — L97.519 DIABETIC ULCER OF TOE OF RIGHT FOOT ASSOCIATED WITH TYPE 2 DIABETES MELLITUS, UNSPECIFIED ULCER STAGE: Primary | ICD-10-CM

## 2022-11-28 DIAGNOSIS — E11.621 DIABETIC ULCER OF TOE OF RIGHT FOOT ASSOCIATED WITH TYPE 2 DIABETES MELLITUS, UNSPECIFIED ULCER STAGE: Primary | ICD-10-CM

## 2022-11-29 ENCOUNTER — HOME CARE VISIT (OUTPATIENT)
Dept: HOME HEALTH SERVICES | Facility: HOME HEALTHCARE | Age: 82
End: 2022-11-29
Payer: MEDICARE

## 2022-11-29 DIAGNOSIS — R09.89 DECREASED PEDAL PULSES: ICD-10-CM

## 2022-11-29 DIAGNOSIS — I73.9 PERIPHERAL VASCULAR DISEASE: Primary | ICD-10-CM

## 2022-11-29 PROCEDURE — G0299 HHS/HOSPICE OF RN EA 15 MIN: HCPCS

## 2022-11-29 NOTE — HOME HEALTH
SOC Note:  Pt. with no recent hospitalizations, but sees wound care and being treated for diabetic ulcer to left foot, and right 2nd toe.     Home Health ordered for: disciplines SN only    Reason for Hosp/Primary Dx/Co-morbidities: diabetic ulcers    Focus of Care: Diabetes Mellitus with ulcer    Current Functional status/mobility/DME: uses cane to ambulate    HB status/Living Arrangements: homebound, lives alone    Skin Integrity/wound status: wound to left foot and right 2nd toe    Code Status: full code    Fall Risk: moderate    POC confirmed with patient on date 11/29/22.    Plan for next visit: Wound care, CP assess, pain assess, falls assess

## 2022-11-30 VITALS
RESPIRATION RATE: 19 BRPM | HEIGHT: 69 IN | WEIGHT: 295 LBS | DIASTOLIC BLOOD PRESSURE: 84 MMHG | TEMPERATURE: 98 F | SYSTOLIC BLOOD PRESSURE: 138 MMHG | HEART RATE: 70 BPM | BODY MASS INDEX: 43.69 KG/M2

## 2022-12-02 ENCOUNTER — HOME CARE VISIT (OUTPATIENT)
Dept: HOME HEALTH SERVICES | Facility: HOME HEALTHCARE | Age: 82
End: 2022-12-02
Payer: MEDICARE

## 2022-12-02 VITALS
SYSTOLIC BLOOD PRESSURE: 150 MMHG | HEART RATE: 79 BPM | TEMPERATURE: 97.3 F | DIASTOLIC BLOOD PRESSURE: 70 MMHG | OXYGEN SATURATION: 98 % | RESPIRATION RATE: 18 BRPM

## 2022-12-02 PROCEDURE — G0299 HHS/HOSPICE OF RN EA 15 MIN: HCPCS

## 2022-12-02 NOTE — HOME HEALTH
Routine Visit Note: SN to assess patient. Patient denies new medications. Patient with neuropathy and c/o pain to bilateral feet at night. SN to perform wound dressing change    Skill/education provided: Reinforced the importance to check feet daily for cracks/sores    Patient/caregiver response: Patient verbalizes understanding    Plan for next visit: SN to assess patient, pain, medications, oxygen safety and perform wound dressing change    Other pertinent info:

## 2022-12-05 ENCOUNTER — HOME CARE VISIT (OUTPATIENT)
Dept: HOME HEALTH SERVICES | Facility: HOME HEALTHCARE | Age: 82
End: 2022-12-05
Payer: MEDICARE

## 2022-12-05 VITALS
HEART RATE: 85 BPM | TEMPERATURE: 97 F | RESPIRATION RATE: 18 BRPM | SYSTOLIC BLOOD PRESSURE: 124 MMHG | OXYGEN SATURATION: 99 % | DIASTOLIC BLOOD PRESSURE: 62 MMHG

## 2022-12-05 PROCEDURE — G0299 HHS/HOSPICE OF RN EA 15 MIN: HCPCS

## 2022-12-05 PROCEDURE — G0180 MD CERTIFICATION HHA PATIENT: HCPCS

## 2022-12-06 LAB — INR PPP: 3.3

## 2022-12-06 NOTE — HOME HEALTH
Routine Visit Note:  CP assess, pain assess, wound care and assess    Skill/education provided: Wound care and assess    Patient/caregiver response: tolerated well.     Plan for next visit: CP assess, falls assess, follow up wcc    Other pertinent info: [facility] on 12/7/22.

## 2022-12-07 ENCOUNTER — OFFICE VISIT (OUTPATIENT)
Dept: WOUND CARE | Facility: HOSPITAL | Age: 82
End: 2022-12-07

## 2022-12-07 ENCOUNTER — HOME CARE VISIT (OUTPATIENT)
Dept: HOME HEALTH SERVICES | Facility: HOME HEALTHCARE | Age: 82
End: 2022-12-07
Payer: MEDICARE

## 2022-12-07 ENCOUNTER — TELEPHONE (OUTPATIENT)
Dept: ONCOLOGY | Facility: CLINIC | Age: 82
End: 2022-12-07

## 2022-12-07 DIAGNOSIS — L97.512 NON-PRS CHRONIC ULCER OTH PRT RIGHT FOOT W FAT LAYER EXPOSED: ICD-10-CM

## 2022-12-07 DIAGNOSIS — L97.523 NON-PRS CHRONIC ULCER OTH PRT LEFT FOOT W NECROSIS OF MUSCLE: ICD-10-CM

## 2022-12-07 DIAGNOSIS — E11.621 TYPE 2 DIABETES MELLITUS WITH FOOT ULCER, UNSPECIFIED WHETHER LONG TERM INSULIN USE: ICD-10-CM

## 2022-12-07 DIAGNOSIS — L97.509 TYPE 2 DIABETES MELLITUS WITH FOOT ULCER, UNSPECIFIED WHETHER LONG TERM INSULIN USE: ICD-10-CM

## 2022-12-07 PROCEDURE — 97597 DBRDMT OPN WND 1ST 20 CM/<: CPT

## 2022-12-07 PROCEDURE — 99213 OFFICE O/P EST LOW 20 MIN: CPT

## 2022-12-07 PROCEDURE — 11056 PARNG/CUTG B9 HYPRKR LES 2-4: CPT

## 2022-12-07 RX ORDER — WARFARIN SODIUM 2 MG/1
TABLET ORAL
Qty: 30 TABLET | Refills: 2 | Status: SHIPPED | OUTPATIENT
Start: 2022-12-07 | End: 2023-02-20

## 2022-12-07 NOTE — PROGRESS NOTES
INR 3.3, pt stated that he has been taking 8 mg and will reduce to 7 mg of coumadin. He asked if I could send in a prescription for 2 mg tablets to [facility]. Prescription sent.

## 2022-12-07 NOTE — TELEPHONE ENCOUNTER
Provider: DR ALFARO  Caller: DEBI  Relationship to Patient: SELF    Reason for Call: INR RESULTS TAKEN 12-6      INR: 3.3      PLEASE ADVISE

## 2022-12-08 RX ORDER — COLLAGENASE SANTYL 250 [ARB'U]/G
OINTMENT TOPICAL
COMMUNITY
Start: 2022-11-14 | End: 2023-02-17

## 2022-12-09 ENCOUNTER — HOME CARE VISIT (OUTPATIENT)
Dept: HOME HEALTH SERVICES | Facility: HOME HEALTHCARE | Age: 82
End: 2022-12-09
Payer: MEDICARE

## 2022-12-09 VITALS
DIASTOLIC BLOOD PRESSURE: 74 MMHG | SYSTOLIC BLOOD PRESSURE: 128 MMHG | TEMPERATURE: 98.4 F | OXYGEN SATURATION: 97 % | HEART RATE: 66 BPM | RESPIRATION RATE: 20 BRPM

## 2022-12-09 PROCEDURE — G0299 HHS/HOSPICE OF RN EA 15 MIN: HCPCS

## 2022-12-12 ENCOUNTER — HOME CARE VISIT (OUTPATIENT)
Dept: HOME HEALTH SERVICES | Facility: HOME HEALTHCARE | Age: 82
End: 2022-12-12
Payer: MEDICARE

## 2022-12-12 ENCOUNTER — OFFICE VISIT (OUTPATIENT)
Dept: ENDOCRINOLOGY | Facility: CLINIC | Age: 82
End: 2022-12-12

## 2022-12-12 VITALS
RESPIRATION RATE: 20 BRPM | TEMPERATURE: 97.8 F | SYSTOLIC BLOOD PRESSURE: 126 MMHG | HEART RATE: 68 BPM | DIASTOLIC BLOOD PRESSURE: 66 MMHG

## 2022-12-12 VITALS
WEIGHT: 291 LBS | DIASTOLIC BLOOD PRESSURE: 66 MMHG | BODY MASS INDEX: 41.66 KG/M2 | SYSTOLIC BLOOD PRESSURE: 126 MMHG | HEIGHT: 70 IN | HEART RATE: 68 BPM

## 2022-12-12 DIAGNOSIS — E55.9 VITAMIN D DEFICIENCY: ICD-10-CM

## 2022-12-12 DIAGNOSIS — Z79.4 TYPE 2 DIABETES MELLITUS WITH DIABETIC POLYNEUROPATHY, WITH LONG-TERM CURRENT USE OF INSULIN: Primary | ICD-10-CM

## 2022-12-12 DIAGNOSIS — E11.42 TYPE 2 DIABETES MELLITUS WITH DIABETIC POLYNEUROPATHY, WITH LONG-TERM CURRENT USE OF INSULIN: Primary | ICD-10-CM

## 2022-12-12 DIAGNOSIS — E03.9 HYPOTHYROIDISM, UNSPECIFIED TYPE: Chronic | ICD-10-CM

## 2022-12-12 DIAGNOSIS — E78.2 MIXED HYPERLIPIDEMIA: Chronic | ICD-10-CM

## 2022-12-12 LAB
GLUCOSE BLDC GLUCOMTR-MCNC: 127 MG/DL (ref 70–105)
INR PPP: 2.1

## 2022-12-12 PROCEDURE — 99214 OFFICE O/P EST MOD 30 MIN: CPT | Performed by: INTERNAL MEDICINE

## 2022-12-12 PROCEDURE — 82962 GLUCOSE BLOOD TEST: CPT | Performed by: INTERNAL MEDICINE

## 2022-12-12 PROCEDURE — G0299 HHS/HOSPICE OF RN EA 15 MIN: HCPCS

## 2022-12-12 NOTE — HOME HEALTH
Routine Visit Note: SN to assess patient and pain. Patient denies pain or any new medications. SN to perform wound care and dressing change    Skill/education provided: Reinforced elevating BLE when swelling is present    Patient/caregiver response: Patient verbalizes understanding    Plan for next visit: SN to assess patient and perform wound care and dressing change    Other pertinent info:

## 2022-12-12 NOTE — PATIENT INSTRUCTIONS
Keep up the good work!  Drink plenty of water.  Continue diabetes & lipid meds & vit D supplements.  Call if blood sugars are running under 100 or over 200.  F/u in 6 months, with fasting labs prior.

## 2022-12-13 ENCOUNTER — OFFICE VISIT (OUTPATIENT)
Dept: PODIATRY | Facility: CLINIC | Age: 82
End: 2022-12-13

## 2022-12-13 ENCOUNTER — ANTICOAGULATION VISIT (OUTPATIENT)
Dept: ONCOLOGY | Facility: CLINIC | Age: 82
End: 2022-12-13

## 2022-12-13 ENCOUNTER — TELEPHONE (OUTPATIENT)
Dept: ONCOLOGY | Facility: CLINIC | Age: 82
End: 2022-12-13

## 2022-12-13 VITALS — RESPIRATION RATE: 20 BRPM | WEIGHT: 291 LBS | HEIGHT: 70 IN | BODY MASS INDEX: 41.66 KG/M2

## 2022-12-13 DIAGNOSIS — L97.511 CHRONIC ULCER OF RIGHT FOOT LIMITED TO BREAKDOWN OF SKIN: Primary | ICD-10-CM

## 2022-12-13 DIAGNOSIS — E11.42 DM TYPE 2 WITH DIABETIC PERIPHERAL NEUROPATHY: ICD-10-CM

## 2022-12-13 DIAGNOSIS — M20.41 HAMMER TOE OF RIGHT FOOT: ICD-10-CM

## 2022-12-13 DIAGNOSIS — I87.301 CHRONIC VENOUS HYPERTENSION WITHOUT COMPLICATIONS, RIGHT: ICD-10-CM

## 2022-12-13 PROCEDURE — 99213 OFFICE O/P EST LOW 20 MIN: CPT | Performed by: PODIATRIST

## 2022-12-13 NOTE — TELEPHONE ENCOUNTER
Caller: Dalton Dallas Sr.    Relationship: Self    Best call back number: [phone]    What is the best time to reach you: ANY    Who are you requesting to speak with (clinical staff, provider,  specific staff member): DR. ALFARO'S NURSE    Do you know the name of the person who called: LOKI    What was the call regarding: PT INR LAST NIGHT AT FIVE O'CLOCK WAS 2.1    Do you require a callback:  YES, PLEASE, IF ANY MED CHANGES NEEDED.

## 2022-12-13 NOTE — PROGRESS NOTES
"12/13/2022  Foot and Ankle Surgery - New Patient   Provider: Dr. Aron Holt DPM  Location: [facility]    Subjective:  Dalton Dallas Sr. is a 82 y.o. male.     Chief Complaint   Patient presents with   • Right Foot - Hammer Toe   • Initial Evaluation     LUMA Sibley md date unknown       HPI:   The patient is an 82-year-old male who presents to the clinic for evaluation of right foot issues.    He reports he has an \"ulcer\" on the tip of his right second digit.     He acknowledges he is being seen by LUCAS Villa at the wound center every 2 weeks for his left foot wound. He notes he receives home health care 3 days weekly. He denies any previous wounds.    Additionally, the patient reports he has been diabetic since the 1990s. He states his blood glucose A1c was 7.9 percent on 11/03/2022.  Patient denies any issues involving the right foot outside of the small superficial wound.  He has not had any redness, drainage, or other concerning features    No Known Allergies    Past Medical History:   Diagnosis Date   • ACE-inhibitor cough 06/2005   • Coronary artery disease    • Diabetes mellitus, type 2 (HCC) 1995   • ED (erectile dysfunction)    • Hx of blood clots 2014    Blood clot left leg    • Hyperlipidemia 08/2000   • Hypertension    • Hypogonadism, male 1998   • Hypothyroidism 06/2001   • Obesity    • Peripheral neuropathy    • Pulmonary embolism ([facility]) 02/2014   • Rectal fistula    • Sleep apnea 12/10/2013   • Vitamin D deficiency        Past Surgical History:   Procedure Laterality Date   • CARDIAC CATHETERIZATION  2008    PTCA: 2008; PTCA: 4/21/2015 LCX stent    • CARDIAC CATHETERIZATION Left 7/3/2021    Procedure: Cardiac Catheterization/Vascular Study;  Surgeon: Edwar Ackerman MD;  Location: [facility] INVASIVE LOCATION;  Service: Cardiovascular;  Laterality: Left;   • CARDIOVASCULAR STRESS TEST  2020   • CATARACT EXTRACTION  01/11/2016 1-4-16 & 1-11-16    • COLONOSCOPY N/A " "2019    Procedure: COLONOSCOPY WITH ENDOSCOPIC CLIPPING X1 OF POST POLYPECTOMY BLEED SITE;  Surgeon: Jhonny Orellana MD;  Location: [facility]OSCOPY;  Service: Gastroenterology   • CORONARY STENT PLACEMENT     • INGUINAL HERNIA REPAIR Left    • UMBILICAL HERNIA REPAIR         Family History   Problem Relation Age of Onset   • Heart disease Mother    • Leukemia Father        Social History     Socioeconomic History   • Marital status:    Tobacco Use   • Smoking status: Former     Packs/day: 1.50     Years: 20.00     Pack years: 30.00     Types: Cigarettes     Quit date:      Years since quittin.9   • Smokeless tobacco: Never   • Tobacco comments:     quit    Vaping Use   • Vaping Use: Never used   Substance and Sexual Activity   • Alcohol use: No   • Drug use: No   • Sexual activity: Defer        Current Outpatient Medications on File Prior to Visit   Medication Sig Dispense Refill   • Accu-Chek Irene Plus test strip USE TO CHECK BLOOD SUGAR BEFORE MEALS AND AT BEDTIME 400 each 3   • Accu-Chek Softclix Lancets lancets Use to test blood sugars 3 times daily. DXE11.65 100 each 11   • amLODIPine (NORVASC) 5 MG tablet Take 5 mg by mouth Daily.     • atenolol (TENORMIN) 50 MG tablet Take 50 mg by mouth Daily.     • atorvastatin (LIPITOR) 80 MG tablet TAKE 1 TABLET EVERY NIGHT 90 tablet 3   • BD Insulin Syringe U/F 31G X \" 0.5 ML misc USE 1 NEW SYRINGE WITH EACH DOSE OF INSULIN TWICE A  each 3   • Blood Glucose Monitoring Suppl (Accu-Chek Irene Plus) w/Device kit 1 each 4 (Four) Times a Day. 1 kit 0   • clopidogrel (PLAVIX) 75 MG tablet Take 75 mg by mouth Daily.     • fenofibrate (TRICOR) 54 MG tablet Take 54 mg by mouth Daily.     • ferrous sulfate 325 (65 FE) MG tablet Take 325 mg by mouth 2 (Two) Times a Day.     • gabapentin (NEURONTIN) 100 MG capsule Take 100 mg by mouth 3 (Three) Times a Day.     • hydroCHLOROthiazide (HYDRODIURIL) 25 MG tablet Take 25 mg by mouth Daily.     • " Insulin Pen Needle (BD ULTRA-FINE PEN NEEDLES) 29G X 12.7MM misc Use to inject insulin twice daily. DX E11.65 200 each 2   • insulin regular (HUMULIN R) 500 UNIT/ML CONCENTRATED injection DRAW TO 24 UNIT REI ON U-100 SYRINGE AND INJECT IN THE MORNING AND 40 UNIT REI IN THE EVENING 60 mL 3   • irbesartan (AVAPRO) 300 MG tablet Take 300 mg by mouth Daily.     • isosorbide mononitrate (IMDUR) 30 MG 24 hr tablet Take 30 mg by mouth Daily.     • Jardiance 25 MG tablet tablet TAKE 1 TABLET DAILY (THIS IS AN INCREASE IN DOSE) 90 tablet 3   • levothyroxine (SYNTHROID, LEVOTHROID) 175 MCG tablet Take 175 mcg by mouth Daily.     • metFORMIN ER (GLUCOPHAGE-XR) 500 MG 24 hr tablet TAKE 4 TABLETS AT SUPPER 360 tablet 3   • O2 (OXYGEN) Inhale 1 L/min Every Night.     • Omega-3 Fatty Acids (FISH OIL) 1000 MG capsule capsule Take 1,000 mg by mouth 2 (Two) Times a Day.     • Pramlintide Acetate (SymlinPen 120) 2700 MCG/2.7ML solution pen-injector 120 MCG UNDER THE SKIN INTO THE APPROPRIATE AREA AS DIRECTED THREE TIMES A DAY 32.4 mL 3   • predniSONE (DELTASONE) 20 MG tablet Take 1 tablet by mouth 2 (Two) Times a Day. 10 tablet 0   • Santyl 250 UNIT/GM ointment APPLY TO WOUNDS ONCE DAILY     • vitamin B-12 (CYANOCOBALAMIN) 100 MCG tablet Take 100 mcg by mouth Daily.     • vitamin D (ERGOCALCIFEROL) 1.25 MG (67776 UT) capsule capsule TAKE 1 CAPSULE ONCE A WEEK 12 capsule 3   • warfarin (COUMADIN) 1 MG tablet Take 1 tablet PO every evening as directed. 30 tablet 3   • warfarin (COUMADIN) 2 MG tablet Take one tablet by mouth daily or as directed based on your INR. 30 tablet 2   • warfarin (COUMADIN) 3 MG tablet Take 1 tablet by mouth Daily. 90 tablet 3   • warfarin (Coumadin) 4 MG tablet Take 1 tablet by mouth Every Night. 90 tablet 3   • warfarin (COUMADIN) 5 MG tablet Take 5 mg by mouth Every Night.       No current facility-administered medications on file prior to visit.       Review of Systems:  General: Denies fever, chills,  "fatigue, and weakness.  Eyes: Denies vision loss, blurry vision, and excessive redness.  ENT: Denies hearing issues and difficulty swallowing.  Cardiovascular: Denies palpitations, chest pain, or syncopal episodes.  Respiratory: Denies shortness of breath, wheezing, and coughing.  GI: Denies abdominal pain, nausea, and vomiting.   : Denies frequency, hematuria, and urgency.  Musculoskeletal: Denies muscle cramps, joint pains, and stiffness.  Derm: Denies rash, open wounds, or suspicious lesions.  Neuro: Denies headaches, numbness, loss of coordination, and tremors.  Psych: Denies anxiety and depression.  Endocrine: Denies temperature intolerance and changes in appetite.  Heme: Denies bleeding disorders or abnormal bruising.     Objective   Resp 20   Ht 177.8 cm (70\")   Wt 132 kg (291 lb)   BMI 41.75 kg/m²     Foot/Ankle Exam:       General:   Appearance: appears stated age and healthy and obesity    Orientation: AAOx3    Affect: appropriate      VASCULAR      Right Foot Vascularity   Normal vascular exam    Dorsalis pedis:  2+  Posterior tibial:  2+  Skin Temperature: warm    Edema Grading:  3+ and pitting  CFT:  < 3 seconds  Pedal Hair Growth:  Absent      NEUROLOGIC     Right Foot Neurologic   Light touch sensation:  Diminished  Hot/Cold sensation: diminished    Achilles reflex:  2+     MUSCULOSKELETAL      Right Foot Musculoskeletal   Ecchymosis:  None  Tenderness: none    Arch:  Normal  Hammertoe:  Second toe, third toe, fourth toe and fifth toe     MUSCLE STRENGTH     Right Foot Muscle Strength   Normal strength    Foot dorsiflexion:  5  Foot plantar flexion:  5  Foot inversion:  5  Foot eversion:  5     DERMATOLOGIC     Right Foot Dermatologic   Skin: ulcer    Skin: right foot skin not intact, no right foot cellulitis, no right foot drainage, no right foot redness and no right foot maceration    Nails comment:  Nails 1-5     Left Foot Dermatologic   Nails comment:  Nails 1-5     TESTS     Right Foot Tests "   Anterior drawer: negative    Varus tilt: negative       Image:       Right Foot Additional Comments No obvious deformity or instability.  Moderate equinus contracture with knee extended and flexed      Assessment & Plan   Diagnoses and all orders for this visit:    1. Chronic ulcer of right foot limited to breakdown of skin (HCC) (Primary)    2. Hammer toe of right foot  -     XR Foot 3+ View Right    3. DM type 2 with diabetic peripheral neuropathy (HCC)    4. Chronic venous hypertension without complications, right      Patient presents to clinic for evaluation of his right foot.  He is seen Brigida Cee in the wound care center regarding his left lower extremity.  Given that the foot is dressed, evaluation on the left lower extremity has been deferred.  Guarding his right second toe, he does have a wound involving the distal aspect of the digit that he feels is looking better.  He has not had any significant signs of infection to the area in the past.  He is unsure exactly how long the wound has been present.  I did discuss the diagnosis and treatment options with him at length.  I have recommended that we proceed with a crest pad for further offloading at this time.  I would like him to apply Betadine to the digit.  We did discuss appropriate shoes and to even consider an open toed shoe at this time until the wound heals.  He understands that if symptoms persist that we may need to consider flexor tenotomy or potentially other operative repair.  I do feel that this would be best addressed once the left lower extremity issues have completely resolved.  Patient is in agreement.  He does have significant swelling involving the right lower extremity secondary to chronic venous hypertension which appears relatively stable at this time.  We did discuss compression garments.  He understands that he is at moderate to high diabetic foot risk.  We discussed the importance of routine diabetic foot checks and glycemic  control.  Patient is to follow-up with Brigida in the wound care center next week regarding his left lower extremity and I would like to see him in 6 weeks regarding both lower extremities.  Greater than 30 minutes was spent before, during, and after evaluation for patient care.    Orders Placed This Encounter   Procedures   • XR Foot 3+ View Right     Order Specific Question:   Reason for Exam:     Answer:   rt 2nd toe ulcer   r/o osteomyelitis    room 13  wb     Order Specific Question:   Does this patient have a diabetic monitoring/medication delivering device on?     Answer:   No     Order Specific Question:   Release to patient     Answer:   Routine Release        Note is dictated utilizing voice recognition software. Unfortunately this leads to occasional typographical errors. I apologize in advance if the situation occurs. If questions occur please do not hesitate to call our office.    Transcribed from ambient dictation for CHRISSY Holt DPM by Char Martinez.  12/13/22   11:44 EST    Patient or patient representative verbalized consent to the visit recording.  I have personally performed the services described in this document as transcribed by the above individual, and it is both accurate and complete.

## 2022-12-14 ENCOUNTER — HOME CARE VISIT (OUTPATIENT)
Dept: HOME HEALTH SERVICES | Facility: HOME HEALTHCARE | Age: 82
End: 2022-12-14
Payer: MEDICARE

## 2022-12-14 VITALS
HEART RATE: 76 BPM | DIASTOLIC BLOOD PRESSURE: 72 MMHG | RESPIRATION RATE: 18 BRPM | TEMPERATURE: 97 F | SYSTOLIC BLOOD PRESSURE: 122 MMHG | OXYGEN SATURATION: 97 %

## 2022-12-14 PROCEDURE — G0299 HHS/HOSPICE OF RN EA 15 MIN: HCPCS

## 2022-12-14 NOTE — HOME HEALTH
Routine Visit Note:  wound care, CP assess, DM education, skin assessment    Skill/education provided: wound care    Patient/caregiver response: tolerated well.    Plan for next visit: wound care and assess, CP assess    Other pertinent info: n/a

## 2022-12-15 ENCOUNTER — LAB (OUTPATIENT)
Dept: LAB | Facility: HOSPITAL | Age: 82
End: 2022-12-15

## 2022-12-15 ENCOUNTER — OFFICE VISIT (OUTPATIENT)
Dept: ONCOLOGY | Facility: CLINIC | Age: 82
End: 2022-12-15

## 2022-12-15 VITALS
SYSTOLIC BLOOD PRESSURE: 117 MMHG | WEIGHT: 291 LBS | DIASTOLIC BLOOD PRESSURE: 66 MMHG | HEIGHT: 70 IN | TEMPERATURE: 98.8 F | HEART RATE: 78 BPM | OXYGEN SATURATION: 94 % | BODY MASS INDEX: 41.66 KG/M2 | RESPIRATION RATE: 18 BRPM

## 2022-12-15 DIAGNOSIS — D50.9 IRON DEFICIENCY ANEMIA, UNSPECIFIED IRON DEFICIENCY ANEMIA TYPE: ICD-10-CM

## 2022-12-15 DIAGNOSIS — I82.409 DEEP VEIN THROMBOSIS (DVT) OF LOWER EXTREMITY, UNSPECIFIED CHRONICITY, UNSPECIFIED LATERALITY, UNSPECIFIED VEIN: Primary | ICD-10-CM

## 2022-12-15 DIAGNOSIS — I82.409 DEEP VEIN THROMBOSIS (DVT) OF LOWER EXTREMITY, UNSPECIFIED CHRONICITY, UNSPECIFIED LATERALITY, UNSPECIFIED VEIN: ICD-10-CM

## 2022-12-15 DIAGNOSIS — E11.59 TYPE 2 DIABETES MELLITUS WITH OTHER CIRCULATORY COMPLICATIONS: Primary | ICD-10-CM

## 2022-12-15 LAB
BASOPHILS # BLD AUTO: 0.04 10*3/MM3 (ref 0–0.2)
BASOPHILS NFR BLD AUTO: 0.4 % (ref 0–1.5)
DEPRECATED RDW RBC AUTO: 47.5 FL (ref 37–54)
EOSINOPHIL # BLD AUTO: 0.3 10*3/MM3 (ref 0–0.4)
EOSINOPHIL NFR BLD AUTO: 3.2 % (ref 0.3–6.2)
ERYTHROCYTE [DISTWIDTH] IN BLOOD BY AUTOMATED COUNT: 14.5 % (ref 12.3–15.4)
HCT VFR BLD AUTO: 40.3 % (ref 37.5–51)
HGB BLD-MCNC: 13.3 G/DL (ref 13–17.7)
HOLD SPECIMEN: NORMAL
HOLD SPECIMEN: NORMAL
LYMPHOCYTES # BLD AUTO: 2.55 10*3/MM3 (ref 0.7–3.1)
LYMPHOCYTES NFR BLD AUTO: 27.5 % (ref 19.6–45.3)
MCH RBC QN AUTO: 30.4 PG (ref 26.6–33)
MCHC RBC AUTO-ENTMCNC: 33 G/DL (ref 31.5–35.7)
MCV RBC AUTO: 92.2 FL (ref 79–97)
MONOCYTES # BLD AUTO: 0.6 10*3/MM3 (ref 0.1–0.9)
MONOCYTES NFR BLD AUTO: 6.5 % (ref 5–12)
NEUTROPHILS NFR BLD AUTO: 5.78 10*3/MM3 (ref 1.7–7)
NEUTROPHILS NFR BLD AUTO: 62.4 % (ref 42.7–76)
PLATELET # BLD AUTO: 347 10*3/MM3 (ref 140–450)
PMV BLD AUTO: 9.6 FL (ref 6–12)
RBC # BLD AUTO: 4.37 10*6/MM3 (ref 4.14–5.8)
WBC NRBC COR # BLD: 9.27 10*3/MM3 (ref 3.4–10.8)
WHOLE BLOOD HOLD COAG: NORMAL

## 2022-12-15 PROCEDURE — 85025 COMPLETE CBC W/AUTO DIFF WBC: CPT

## 2022-12-15 PROCEDURE — 99213 OFFICE O/P EST LOW 20 MIN: CPT | Performed by: INTERNAL MEDICINE

## 2022-12-15 PROCEDURE — 36415 COLL VENOUS BLD VENIPUNCTURE: CPT

## 2022-12-15 NOTE — PROGRESS NOTES
HEMATOLOGY ONCOLOGY FOLLOW UP        Patient name: Dalton Dallas Sr.  : 1940  MRN: [id number]  Primary Care Physician: Gabino Sibley Jr., MD  Referring Physician: Gabino Sibley Jr., *  Reason For Consult:     Chief Complaint   Patient presents with   • Follow-up     Deep vein thrombosis (DVT) of lower extremity, unspecified chronicity, unspecified laterality, unspecified vein (HCC)   History of PE and DVT  Anticoagulation management  Anemia    History of Present Illness:  Mr. Dallas is a 82 y.o. gentleman, remote smoker, with a history of diabetes, coronary artery disease and hypertension who presented to [facility] on 14 with symptoms of shortness of breath and chest discomfort.  CT scan of the chest on 14 showed bilateral pulmonary emboli with large clot burden.  There were filling defects predominantly in the right upper lobe, and lower and middle lobe pulmonary arterial trees along with emboli in the left upper lobe and left lower lobe arterial tree as well.  Mediastinal,  hilar and subcarinal lymphadenopathy was also noted.  The largest lymph node in the subcarinal location measured 3.9 x 1.9 cm.  The patient was started on anticoagulation.  Bilateral lower extremity venous Doppler on 14 showed a filling defect within the left gastroc veins.  The patient was put on anticoagulation.  Thrombolysis was also under consideration considering the extensive clot burden, but the patient did not require it.       Patient denied any prior history of thromboembolism before this episode.  His brother may have had   a leg DVT.    · 14 - Creatinine 1.3, BUN 20.  · 14 - CBC:  WBC 14.4, hemoglobin 14.9, platelet count 241,000.   · 14 - Factor V Leiden negative.  Factor VIII level 323 (H).  Protein C activity 112.6% (N).    Protein S activity 11.3 (L).  Activated protein C resistance 2.1 (L).  Antithrombin III 83% (N).    D-dimer 2.15.    · 14 - Serum  homocysteine 8 (N).  Anticardiolipin antibody negative.  Phosphatidylserine   antibody negative.  Beta-2 glycoprotein antibody negative.   · 2/19/14 - Prothrombin gene mutation negative.    · 2/20/14 - FLAKO-2 mutation test negative.    · 3/10/14 - Protein S activity 93% (N).  Homocysteine level 11 (N).    · 4/11/14 - Factor VIII level 260 (H).   · 4/11/14 - CT scan of the chest without contrast:  Complete resolution of air space opacities in   the lower lobes.  Mediastinal lymphadenopathy is overall very similar to the prior study.  It is   nonspecific.  Three vessel coronary artery atherosclerotic calcification.  Questionable lobular   contour of the liver, question cirrhosis.  AP window lymph node measures about 1.1 cm,   paratracheal lymph node measures about 1.7 cm and subcarinal lymph node measures about 1.2 cm.  · 5/2/14 - INR 4.1.   · 7/30/14 - WBC 5.7, hemoglobin 12.1, platelet count 228,000, MCV 94.0.  INR 3.0.   · 10/17/14 - CT scan of the chest:  Chronic lung changes are stable.  Several borderline to mildly   enlarged mediastinal lymph nodes are also unchanged from six months ago.  They are likely benign   reactive lymph nodes.    · 10/29/14 - Vitamin B12 185, ferritin 57, iron saturation 19%, TIBC 473 (H), serum iron 88,   creatinine 1.3, BUN 18, folate 17.7.  · 11/4/14 - Patient underwent upper GI endoscopy and colonoscopy:  Several nonbleeding diverticula   were seen in the sigmoid colon.  Diverticulosis appeared moderate severity.  Four sessile polyps   of benign appearance were found in the cecum and descending colon.  Polypectomies were performed.    Surgical pathology:  Sessile serrated polyps.  A tubular adenoma is noted in the descending   colon.    · 11/25/14 - INR 4.2.   · 11/25/14 - WBC 5.7, hemoglobin 11.4, platelet count 210,000.    · 2/24/15 - WBC 7.7, hemoglobin 12.9, platelet count 245,000, MCV 93,000.  · 2/24/15 - INR 2.6.  Patient’s Coumadin dose is 9 mg.     · 3/12/15 - Creatinine 1.3.     · 4/21/15 - Patient admitted with chest pain and had cardiac catheterization.  He had a stent   placed.    · 5/8/15 - CT scan of the chest without contrast:  Stable appearance of mediastinal   lymphadenopathy over the past two studies going back to April 2014.  Stable pulmonary fibrotic   changes and scarring.    · 6/25/15 - WBC 5.3, hemoglobin 12.6, platelet count 187,000.     · 12/17/15 - WBC 5.1, hemoglobin 13.6, platelet count 208,000, MCV 93.1.  Iron saturation 21%,   TIBC 415, serum iron 85.    · 12/17/15 - Chest x-ray:  No acute process.     · 4/19/16 - INR 2.1.    · 5/17/16 - INR 2.2.  · 6/16/16 - WBC 5.5, hemoglobin 12.7, MCV 90.5, platelet count 205,000.  INR 2.3 (patient on   Coumadin 7.5 mg).    · 12/15/16 - WBC 5.0, hemoglobin 13.0, platelet count 174,000.  INR 1.9 (Coumadin dose 7.5).   · 6/15/17 - WBC 5.5, hemoglobin 12.5, platelet count 168,000, MCV 94.4.  INR 2.6 (Coumadin dose 8   mg).   · 11/2/17 - INR 2.    · 12/14/17 - WBC 5.04, hemoglobin 12.5, platelet count 167,000, MCV 94.    · 6/14/18 - WBC 4.9, hemoglobin 12.5, platelet count 172,000, MCV 93.5.    · 12/13/18 - INR 2.4.  WBC 4.9, hemoglobin 12.8, platelet count 180,000.    11/23/2019 INR is 2.55  11/11/2019: Colonoscopy: Cecum polypectomy shows tubular adenoma, transverse colon polypectomy shows tubular adenoma.   Patient readmitted after colonoscopy to hospital due to GI bleeding from polyp biopsy..  11/22/2019 hemoglobin 9.3 due to GI bleeding  11/23/2019 cecal ulcer with visible vessel and stigmata of recent bleeding which was clipped  Moderate left-sided diverticulosis  12/12/2019 INR 1.9  12/12/2019 WBC 4.9, hemoglobin 11.2, MCV 98.0, platelets 182, ferritin 85.2, iron 115, iron saturation 24%, TIBC 471, B12 greater than 2000, folate 13.8,  4/23/2020: ESR 12  5/6/2020: 25 hydroxy vitamin D 31.9  6/11/2020: INR 2.1, WBC 6.3, hemoglobin 13.5, platelets 204,  ·  9/28/2020: Lower extremity venous Doppler:: Normal  · 11/25/2020: INR  2.8  · 12/31/2020: WBC 6.4, hemoglobin 13.7, platelets 212, INR 1.2,  · 5/3/2021: 25 hydroxy vitamin D 66.5, TSH 2.8, creatinine 1.24,  · 5/12/2021: INR 2.7,  · 6/9/2021: INR 2.5,  · Subsequent INR has been stable. Now on 8mg daily    Subjective:  No bleeding concerns.        Past Medical History:   Diagnosis Date   • ACE-inhibitor cough 06/2005   • Coronary artery disease    • Diabetes mellitus, type 2 (HCC) 1995   • ED (erectile dysfunction)    • Hx of blood clots 2014    Blood clot left leg    • Hyperlipidemia 08/2000   • Hypertension    • Hypogonadism, male 1998   • Hypothyroidism 06/2001   • Obesity    • Peripheral neuropathy    • Pulmonary embolism (HCC) 02/2014   • Rectal fistula    • Sleep apnea 12/10/2013   • Vitamin D deficiency        Past Surgical History:   Procedure Laterality Date   • CARDIAC CATHETERIZATION  2008    PTCA: 2008; PTCA: 4/21/2015 LCX stent    • CARDIAC CATHETERIZATION Left 7/3/2021    Procedure: Cardiac Catheterization/Vascular Study;  Surgeon: Edwar Ackerman MD;  Location: [facility] CATH INVASIVE LOCATION;  Service: Cardiovascular;  Laterality: Left;   • CARDIOVASCULAR STRESS TEST  2020   • CATARACT EXTRACTION  01/11/2016 1-4-16 & 1-11-16    • COLONOSCOPY N/A 11/23/2019    Procedure: COLONOSCOPY WITH ENDOSCOPIC CLIPPING X1 OF POST POLYPECTOMY BLEED SITE;  Surgeon: Jhonny Orellana MD;  Location: [facility] ENDOSCOPY;  Service: Gastroenterology   • CORONARY STENT PLACEMENT     • INGUINAL HERNIA REPAIR Left    • UMBILICAL HERNIA REPAIR           Current Outpatient Medications:   •  Accu-Chek Irene Plus test strip, USE TO CHECK BLOOD SUGAR BEFORE MEALS AND AT BEDTIME, Disp: 400 each, Rfl: 3  •  Accu-Chek Softclix Lancets lancets, Use to test blood sugars 3 times daily. DXE11.65, Disp: 100 each, Rfl: 11  •  amLODIPine (NORVASC) 5 MG tablet, Take 5 mg by mouth Daily., Disp: , Rfl:   •  atenolol (TENORMIN) 50 MG tablet, Take 50 mg by mouth Daily., Disp: , Rfl:   •  atorvastatin (LIPITOR) 80 MG  "tablet, TAKE 1 TABLET EVERY NIGHT, Disp: 90 tablet, Rfl: 3  •  BD Insulin Syringe U/F 31G X 5/16\" 0.5 ML misc, USE 1 NEW SYRINGE WITH EACH DOSE OF INSULIN TWICE A DAY, Disp: 200 each, Rfl: 3  •  Blood Glucose Monitoring Suppl (Accu-Chek Irene Plus) w/Device kit, 1 each 4 (Four) Times a Day., Disp: 1 kit, Rfl: 0  •  clopidogrel (PLAVIX) 75 MG tablet, Take 75 mg by mouth Daily., Disp: , Rfl:   •  fenofibrate (TRICOR) 54 MG tablet, Take 54 mg by mouth Daily., Disp: , Rfl:   •  ferrous sulfate 325 (65 FE) MG tablet, Take 325 mg by mouth 2 (Two) Times a Day., Disp: , Rfl:   •  gabapentin (NEURONTIN) 100 MG capsule, Take 100 mg by mouth 3 (Three) Times a Day., Disp: , Rfl:   •  hydroCHLOROthiazide (HYDRODIURIL) 25 MG tablet, Take 25 mg by mouth Daily., Disp: , Rfl:   •  Insulin Pen Needle (BD ULTRA-FINE PEN NEEDLES) 29G X 12.7MM misc, Use to inject insulin twice daily. DX E11.65, Disp: 200 each, Rfl: 2  •  insulin regular (HUMULIN R) 500 UNIT/ML CONCENTRATED injection, DRAW TO 24 UNIT REI ON U-100 SYRINGE AND INJECT IN THE MORNING AND 40 UNIT REI IN THE EVENING, Disp: 60 mL, Rfl: 3  •  irbesartan (AVAPRO) 300 MG tablet, Take 300 mg by mouth Daily., Disp: , Rfl:   •  isosorbide mononitrate (IMDUR) 30 MG 24 hr tablet, Take 30 mg by mouth Daily., Disp: , Rfl:   •  Jardiance 25 MG tablet tablet, TAKE 1 TABLET DAILY (THIS IS AN INCREASE IN DOSE), Disp: 90 tablet, Rfl: 3  •  levothyroxine (SYNTHROID, LEVOTHROID) 175 MCG tablet, Take 175 mcg by mouth Daily., Disp: , Rfl:   •  metFORMIN ER (GLUCOPHAGE-XR) 500 MG 24 hr tablet, TAKE 4 TABLETS AT SUPPER, Disp: 360 tablet, Rfl: 3  •  O2 (OXYGEN), Inhale 1 L/min Every Night., Disp: , Rfl:   •  Omega-3 Fatty Acids (FISH OIL) 1000 MG capsule capsule, Take 1,000 mg by mouth 2 (Two) Times a Day., Disp: , Rfl:   •  Pramlintide Acetate (SymlinPen 120) 2700 MCG/2.7ML solution pen-injector, 120 MCG UNDER THE SKIN INTO THE APPROPRIATE AREA AS DIRECTED THREE TIMES A DAY, Disp: 32.4 mL, Rfl: " "3  •  predniSONE (DELTASONE) 20 MG tablet, Take 1 tablet by mouth 2 (Two) Times a Day., Disp: 10 tablet, Rfl: 0  •  Santyl 250 UNIT/GM ointment, APPLY TO WOUNDS ONCE DAILY, Disp: , Rfl:   •  vitamin B-12 (CYANOCOBALAMIN) 100 MCG tablet, Take 100 mcg by mouth Daily., Disp: , Rfl:   •  vitamin D (ERGOCALCIFEROL) 1.25 MG (87551 UT) capsule capsule, TAKE 1 CAPSULE ONCE A WEEK, Disp: 12 capsule, Rfl: 3  •  warfarin (COUMADIN) 1 MG tablet, Take 1 tablet PO every evening as directed., Disp: 30 tablet, Rfl: 3  •  warfarin (COUMADIN) 2 MG tablet, Take one tablet by mouth daily or as directed based on your INR., Disp: 30 tablet, Rfl: 2  •  warfarin (COUMADIN) 3 MG tablet, Take 1 tablet by mouth Daily., Disp: 90 tablet, Rfl: 3  •  warfarin (Coumadin) 4 MG tablet, Take 1 tablet by mouth Every Night., Disp: 90 tablet, Rfl: 3  •  warfarin (COUMADIN) 5 MG tablet, Take 5 mg by mouth Every Night., Disp: , Rfl:     No Known Allergies    Family History   Problem Relation Age of Onset   • Heart disease Mother    • Leukemia Father        Cancer-related family history is not on file.    Social History     Tobacco Use   • Smoking status: Former     Packs/day: 1.50     Years: 20.00     Pack years: 30.00     Types: Cigarettes     Quit date:      Years since quittin.9   • Smokeless tobacco: Never   • Tobacco comments:     quit    Vaping Use   • Vaping Use: Never used   Substance Use Topics   • Alcohol use: No   • Drug use: No       ROS:     Objective:  Vital signs:  Vitals:    12/15/22 1330   BP: 117/66   Pulse: 78   Resp: 18   Temp: 98.8 °F (37.1 °C)   SpO2: 94%   Weight: 132 kg (291 lb)   Height: 177.8 cm (70\")   PainSc: 0-No pain     ECOG  (1) Restricted in physically strenuous activity, ambulatory and able to do work of light nature    Physical Exam:   Physical Exam   Constitutional: He is oriented to person, place, and time. He appears well-developed. No distress.   Obese male   HENT:   Head: Normocephalic and atraumatic. "   Nose: Nose normal.   Mouth/Throat: Mucous membranes are moist.   Eyes: Conjunctivae are normal. Right eye exhibits no discharge. Left eye exhibits no discharge. No scleral icterus.   Neck: No thyromegaly present.   Cardiovascular: Normal rate, regular rhythm and normal heart sounds. Exam reveals no gallop and no friction rub.   Pulmonary/Chest: Effort normal. No stridor. No respiratory distress. He has no wheezes.   Abdominal: Soft. Normal appearance and bowel sounds are normal. He exhibits no mass. There is no abdominal tenderness. There is no rebound and no guarding.   Musculoskeletal: Normal range of motion. No swelling, tenderness or signs of injury.   Lymphadenopathy:     He has no cervical adenopathy.   Neurological: He is alert and oriented to person, place, and time. He exhibits normal muscle tone.   Skin: Skin is warm. No rash noted. He is not diaphoretic. No erythema.   Psychiatric: His behavior is normal. Mood normal.     Lab Results - Last 18 Months   Lab Units 12/15/22  1318 11/03/22  1158 06/15/22  1020   WBC 10*3/mm3 9.27 10.46 6.60   HEMOGLOBIN g/dL 13.3 13.0 13.4   HEMATOCRIT % 40.3 38.9 39.5   PLATELETS 10*3/mm3 347 306 226   MCV fL 92.2 90.7 93.6     Lab Results - Last 18 Months   Lab Units 11/03/22  1158 05/03/22  0857 11/02/21  0903   SODIUM mmol/L 137 138 136   POTASSIUM mmol/L 4.7 4.7 4.4   CHLORIDE mmol/L 101 100 100   CO2 mmol/L 26.4 22.0 24.4   BUN mg/dL 27* 19 23   CREATININE mg/dL 1.45* 1.22 1.17   CALCIUM mg/dL 9.9 9.8 9.5   BILIRUBIN mg/dL 0.3 0.4 0.3   ALK PHOS U/L 43 43 37*   ALT (SGPT) U/L 17 20 22   AST (SGOT) U/L 18 19 21   GLUCOSE mg/dL 181* 250* 174*       Lab Results   Component Value Date    GLUCOSE 181 (H) 11/03/2022    BUN 27 (H) 11/03/2022    CREATININE 1.45 (H) 11/03/2022    EGFRIFNONA 60 (L) 11/02/2021    BCR 18.6 11/03/2022    K 4.7 11/03/2022    CO2 26.4 11/03/2022    CALCIUM 9.9 11/03/2022    ALBUMIN 3.90 11/03/2022    LABIL2 1.7 03/28/2019    AST 18 11/03/2022    ALT  17 11/03/2022       Lab Results - Last 18 Months   Lab Units 12/12/22  0000 12/06/22  0000 11/21/22  0000   INR  2.10 3.30 3.00       Lab Results   Component Value Date    IRON 108 06/24/2021    TIBC 401 06/24/2021    FERRITIN 117.00 06/24/2021       Lab Results   Component Value Date    FOLATE 13.80 12/12/2019       No results found for: OCCULTBLD    No results found for: RETICCTPCT    Lab Results   Component Value Date    [id number] >2,000 (H) 12/12/2019     No results found for: SPEP, UPEP  No results found for: LDH, URICACID  Lab Results   Component Value Date    SEDRATE 29 (H) 11/03/2022     No results found for: FIBRINOGEN, HAPTOGLOBIN  Lab Results   Component Value Date    PTT 31.5 (H) 11/22/2019    INR 2.10 12/12/2022     No results found for:   No results found for: CEA  No components found for: CA-19-9  No results found for: PSA      Assessment & Plan     Assessment:  1. History of bilateral pulmonary emboli with high clot burden and DVT:  His thrombosis was   unprovoked.  Hypercoagulability testing revealed elevated factor VIII level on two occasions.  He   is on anticoagulation with Coumadin.  He also has a questionable positive family history of DVT.    He has been recommended indefinite lifetime anticoagulation.  continue 8 mg daily.   2. History of mediastinal lymphadenopathy:  His CT scan shows stable lymphadenopathy.    These are very likely benign lymph nodes. No further intervention.  3. Mild CKD:  He has slight anemia of chronic kidney disease.   4. History of iron deficiency:  He had a colonoscopy and that showed polyps and moderately severe   diverticulosis.  Pathology showed a tubular adenoma in the descending colon.  He is on iron   supplements p.o. BID. History of iron deficiency. Hb continues to be normal.  5. Vitamin B12 deficiency:  He is on p.o. B12 supplements. Recheck levels today  6. Dizziness/light-headedness: resolved  7.  History of  GI bleeding.  Due to polypectomy from  colonoscopy.       There are no diagnoses linked to this encounter.  No orders of the defined types were placed in this encounter.       Thank you very much for providing the opportunity to participate in this patient’s care. Please do not hesitate to call if there are any other questions.

## 2022-12-16 ENCOUNTER — HOME CARE VISIT (OUTPATIENT)
Dept: HOME HEALTH SERVICES | Facility: HOME HEALTHCARE | Age: 82
End: 2022-12-16
Payer: MEDICARE

## 2022-12-16 PROCEDURE — G0299 HHS/HOSPICE OF RN EA 15 MIN: HCPCS

## 2022-12-18 VITALS
DIASTOLIC BLOOD PRESSURE: 78 MMHG | RESPIRATION RATE: 20 BRPM | HEART RATE: 75 BPM | TEMPERATURE: 97.9 F | SYSTOLIC BLOOD PRESSURE: 124 MMHG | OXYGEN SATURATION: 98 %

## 2022-12-18 NOTE — HOME HEALTH
Routine Visit Note: SN to assess patient, review medications, pain and perform wound care and dressing change    Skill/education provided: Reinforced continuing to monitor for s/s of infection    Patient/caregiver response: Patient verbalizes understanding    Plan for next visit: SN to assess patient and perform wound care and dressing change    Other pertinent info:

## 2022-12-19 ENCOUNTER — HOME CARE VISIT (OUTPATIENT)
Dept: HOME HEALTH SERVICES | Facility: HOME HEALTHCARE | Age: 82
End: 2022-12-19
Payer: MEDICARE

## 2022-12-19 VITALS
DIASTOLIC BLOOD PRESSURE: 84 MMHG | TEMPERATURE: 97 F | OXYGEN SATURATION: 97 % | SYSTOLIC BLOOD PRESSURE: 122 MMHG | RESPIRATION RATE: 19 BRPM | HEART RATE: 66 BPM

## 2022-12-19 PROCEDURE — G0299 HHS/HOSPICE OF RN EA 15 MIN: HCPCS

## 2022-12-19 NOTE — HOME HEALTH
Routine Visit Note:  CP assess, pain assess, falls assess, wound care and assess    Skill/education provided: wound care    Patient/caregiver response: tolerated well    Plan for next visit: Follow up Wound care appt. on 12/21/22.    Other pertinent info: n/a

## 2022-12-20 ENCOUNTER — ANTICOAGULATION VISIT (OUTPATIENT)
Dept: ONCOLOGY | Facility: CLINIC | Age: 82
End: 2022-12-20

## 2022-12-20 ENCOUNTER — TELEPHONE (OUTPATIENT)
Dept: ONCOLOGY | Facility: CLINIC | Age: 82
End: 2022-12-20

## 2022-12-20 LAB — INR PPP: 2

## 2022-12-20 NOTE — TELEPHONE ENCOUNTER
Caller: Dalton Dallas Sr.    Relationship: Self    Best call back number: [phone]    What is the best time to reach you: ANYTIME    Who are you requesting to speak with (clinical staff, provider,  specific staff member): DR ALFARO OR NURSE    What was the call regarding: PTS INR TODAY IS 2.0    Do you require a callback: YES

## 2022-12-21 ENCOUNTER — OFFICE VISIT (OUTPATIENT)
Dept: WOUND CARE | Facility: HOSPITAL | Age: 82
End: 2022-12-21

## 2022-12-21 ENCOUNTER — HOME CARE VISIT (OUTPATIENT)
Dept: HOME HEALTH SERVICES | Facility: HOME HEALTHCARE | Age: 82
End: 2022-12-21
Payer: MEDICARE

## 2022-12-21 PROCEDURE — G0463 HOSPITAL OUTPT CLINIC VISIT: HCPCS

## 2022-12-21 NOTE — PROGRESS NOTES
[facility]        Patient Care Team:  Gabino Sibley Jr., MD as PCP - General (Family Medicine)  Soraya Cortez MD as Consulting Physician (Hematology and Oncology)    Chief Complaint:    Chief Complaint   Patient presents with   • Diabetes     Type 2, , 3 hr PP, Humulin R u-500 24 units this am         Subjective   Here for diabetes f/u  Blood sugars: did not bring records  Exercise program: none  Taking vit D weekly    Interval History:     Patient Complaints: going to wound clinic Q 2 weeks  Patient Denies: hypoglycemia  History taken from: patient    Review of Systems:   Review of Systems   HENT: Negative for trouble swallowing.    Eyes: Negative for blurred vision.   Gastrointestinal: Negative for nausea.   Endocrine: Negative for polyuria.   Musculoskeletal: Positive for gait problem.   Skin: Positive for wound.   Neurological: Negative for headache.     Lost  3 lb since last visit    Objective     Vital Signs      Vitals:    12/12/22 1105   BP: 126/66   Pulse: 68         Physical Exam:     General Appearance:    Alert, cooperative, in no acute distress. Obese   Head:    Normocephalic, without obvious abnormality, atraumatic   Eyes:            Lids and lashes normal, conjunctivae and sclerae normal, no   icterus, no pallor, corneas clear, PERRLA   Throat:   No oral lesions,  oral mucosa moist   Neck:   No adenopathy, supple,  no thyromegaly, no   carotid bruit   Lungs:     Decreased breath sounds    Heart:    Regular rhythm and normal rate   Chest Wall:    No abnormalities observed   Abdomen:     Normal bowel sounds, soft                 Extremities:   Hammer toes, no edema               Pulses:   Pulses palpable and equal bilaterally   Skin:   Dry. L foot ulcer. Wearing a boot   Neurologic:  DTR absent in ankles, absent vibratory sense in toes, able to feel the 10g monofilament in heels only ( same as 1y ago )            Results Review:    I have reviewed the patient's new  "clinical results, labs & imaging.    Medication Review:   Prior to Admission medications    Medication Sig Start Date End Date Taking? Authorizing Provider   Accu-Chek Irene Plus test strip USE TO CHECK BLOOD SUGAR BEFORE MEALS AND AT BEDTIME 7/22/22  Yes Virginia Shaver MD   Accu-Chek Softclix Lancets lancets Use to test blood sugars 3 times daily. DXE11.65 5/11/22 5/11/23 Yes Virginia Shaver MD   amLODIPine (NORVASC) 5 MG tablet Take 5 mg by mouth Daily. 11/29/22  Yes Lee Ruggiero MD   atenolol (TENORMIN) 50 MG tablet Take 50 mg by mouth Daily. 11/29/22  Yes Lee Ruggiero MD   atorvastatin (LIPITOR) 80 MG tablet TAKE 1 TABLET EVERY NIGHT 4/11/22  Yes Edwar Ackerman MD   BD Insulin Syringe U/F 31G X 5/16\" 0.5 ML misc USE 1 NEW SYRINGE WITH EACH DOSE OF INSULIN TWICE A DAY 4/28/22  Yes Virginia Shaver MD   Blood Glucose Monitoring Suppl (Accu-Chek Irene Plus) w/Device kit 1 each 4 (Four) Times a Day. 5/13/22  Yes Virginia Shaver MD   clopidogrel (PLAVIX) 75 MG tablet Take 75 mg by mouth Daily. 11/29/22  Yes Lee Ruggiero MD   fenofibrate (TRICOR) 54 MG tablet Take 54 mg by mouth Daily. 11/29/22  Yes Lee Ruggiero MD   ferrous sulfate 325 (65 FE) MG tablet Take 325 mg by mouth 2 (Two) Times a Day.   Yes Lee Ruggiero MD   gabapentin (NEURONTIN) 100 MG capsule Take 100 mg by mouth 3 (Three) Times a Day. 11/29/22  Yes Lee Ruggiero MD   hydroCHLOROthiazide (HYDRODIURIL) 25 MG tablet Take 25 mg by mouth Daily. 11/29/22  Yes Lee Ruggiero MD   Insulin Pen Needle (BD ULTRA-FINE PEN NEEDLES) 29G X 12.7MM misc Use to inject insulin twice daily. DX E11.65 10/18/22  Yes Virginia Shaver MD   insulin regular (HUMULIN R) 500 UNIT/ML CONCENTRATED injection DRAW TO 24 UNIT REI ON U-100 SYRINGE AND INJECT IN THE MORNING AND 40 UNIT REI IN THE EVENING 5/10/22  Yes Virginia Shaver MD   irbesartan (AVAPRO) 300 MG tablet Take 300 mg by mouth Daily. 11/29/22  " Yes Lee Ruggiero MD   isosorbide mononitrate (IMDUR) 30 MG 24 hr tablet Take 30 mg by mouth Daily. 11/29/22  Yes Lee Ruggiero MD   Jardiance 25 MG tablet tablet TAKE 1 TABLET DAILY (THIS IS AN INCREASE IN DOSE) 4/28/22  Yes Virginia Shaver MD   levothyroxine (SYNTHROID, LEVOTHROID) 175 MCG tablet Take 175 mcg by mouth Daily. 11/29/22  Yes Lee Ruggiero MD   metFORMIN ER (GLUCOPHAGE-XR) 500 MG 24 hr tablet TAKE 4 TABLETS AT SUPPER 3/7/22  Yes Virginia Shaver MD   O2 (OXYGEN) Inhale 1 L/min Every Night. 11/29/22  Yes Lee Ruggiero MD   Omega-3 Fatty Acids (FISH OIL) 1000 MG capsule capsule Take 1,000 mg by mouth 2 (Two) Times a Day.   Yes Lee Ruggiero MD   Pramlintide Acetate (SymlinPen 120) 2700 MCG/2.7ML solution pen-injector 120 MCG UNDER THE SKIN INTO THE APPROPRIATE AREA AS DIRECTED THREE TIMES A DAY 10/14/22  Yes Virginia Shaver MD   predniSONE (DELTASONE) 20 MG tablet Take 1 tablet by mouth 2 (Two) Times a Day. 10/1/22  Yes Dominick Glez MD   Santyl 250 UNIT/GM ointment APPLY TO WOUNDS ONCE DAILY 11/14/22  Yes Lee Ruggiero MD   vitamin B-12 (CYANOCOBALAMIN) 100 MCG tablet Take 100 mcg by mouth Daily. 4/15/15  Yes Lee Ruggiero MD   vitamin D (ERGOCALCIFEROL) 1.25 MG (82079 UT) capsule capsule TAKE 1 CAPSULE ONCE A WEEK 4/5/22  Yes Virginia Shaver MD   warfarin (COUMADIN) 1 MG tablet Take 1 tablet PO every evening as directed. 2/28/22  Yes Soraya Cortez MD   warfarin (COUMADIN) 2 MG tablet Take one tablet by mouth daily or as directed based on your INR. 12/7/22  Yes Soraya Cortez MD   warfarin (COUMADIN) 3 MG tablet Take 1 tablet by mouth Daily. 5/31/22  Yes Soraya Cortez MD   warfarin (Coumadin) 4 MG tablet Take 1 tablet by mouth Every Night. 6/15/22  Yes Soraya Cortez MD   warfarin (COUMADIN) 5 MG tablet Take 5 mg by mouth Every Night. 11/29/22  Yes Provider, Historical, MD       Lab Results (most recent)     Procedure Component Value  Units Date/Time    POC Glucose [[id number]]  (Abnormal) Collected: 12/12/22 1110    Specimen: Blood Updated: 12/12/22 1113     Glucose 127 mg/dL      Comment: Serial Number: [id number]:  [id number]           Lab Results   Component Value Date    HGBA1C 7.9 (H) 11/03/2022    HGBA1C 8.4 (H) 05/03/2022    HGBA1C 7.8 (H) 11/02/2021      Lab Results   Component Value Date    GLUCOSE 181 (H) 11/03/2022    BUN 27 (H) 11/03/2022    CREATININE 1.45 (H) 11/03/2022    EGFRIFNONA 60 (L) 11/02/2021    BCR 18.6 11/03/2022    K 4.7 11/03/2022    CO2 26.4 11/03/2022    CALCIUM 9.9 11/03/2022    ALBUMIN 3.90 11/03/2022    LABIL2 1.7 03/28/2019    AST 18 11/03/2022    ALT 17 11/03/2022    CHOL 132 11/03/2022    LDL 69 11/03/2022    HDL 36 (L) 11/03/2022    TRIG 157 (H) 11/03/2022     Lab Results   Component Value Date    TSH 2.470 05/03/2022    FREET4 1.82 (H) 05/03/2022    KABB38GN 53.5 05/03/2022       Assessment & Plan     Diagnoses and all orders for this visit:    1. Type 2 diabetes mellitus with diabetic polyneuropathy, with long-term current use of insulin (HCC) (Primary)  -     Hemoglobin A1c; Future  -     Microalbumin / Creatinine Urine Ratio - Urine, Clean Catch; Future    2. Vitamin D deficiency  -     Vitamin D,25-Hydroxy; Future    3. Hypothyroidism, unspecified type  -     TSH; Future  -     T4, Free; Future    4. Mixed hyperlipidemia  -     Comprehensive Metabolic Panel; Future  -     Lipid Panel; Future    Other orders  -     POC Glucose    Glucose control improved. Will check lipid, thyroid & vit D status next visit.    Drink plenty of water.  Continue diabetes & lipid meds & vit D supplements.  Call if blood sugars are running under 100 or over 200.  See eye doctor in Feb as scheduled.        Virginia Shaver MD  12/21/22  12:56 EST

## 2022-12-22 ENCOUNTER — APPOINTMENT (OUTPATIENT)
Dept: VASCULAR SURGERY | Facility: HOSPITAL | Age: 82
End: 2022-12-22

## 2022-12-22 ENCOUNTER — TRANSCRIBE ORDERS (OUTPATIENT)
Dept: ADMINISTRATIVE | Facility: HOSPITAL | Age: 82
End: 2022-12-22

## 2022-12-22 DIAGNOSIS — I73.9 PERIPHERAL VASCULAR DISEASE, UNSPECIFIED: Primary | ICD-10-CM

## 2022-12-22 PROCEDURE — G0463 HOSPITAL OUTPT CLINIC VISIT: HCPCS

## 2022-12-23 ENCOUNTER — HOME CARE VISIT (OUTPATIENT)
Dept: HOME HEALTH SERVICES | Facility: HOME HEALTHCARE | Age: 82
End: 2022-12-23
Payer: MEDICARE

## 2022-12-26 ENCOUNTER — HOME CARE VISIT (OUTPATIENT)
Dept: HOME HEALTH SERVICES | Facility: HOME HEALTHCARE | Age: 82
End: 2022-12-26
Payer: MEDICARE

## 2022-12-27 ENCOUNTER — ANTICOAGULATION VISIT (OUTPATIENT)
Dept: ONCOLOGY | Facility: CLINIC | Age: 82
End: 2022-12-27

## 2022-12-27 ENCOUNTER — TELEPHONE (OUTPATIENT)
Dept: ONCOLOGY | Facility: CLINIC | Age: 82
End: 2022-12-27

## 2022-12-27 LAB — INR PPP: 2.9

## 2022-12-27 NOTE — PROGRESS NOTES
Pt was asked what dose he was taking as he went of 2.0 to 2.9. Pt reported her was taking 8mg. Pt was edcuated that per notes he was told to be on 7mg. Pt stated he was not aware of this and has been on 8mg. Pt will stated he will start taking 7.5mg.

## 2022-12-27 NOTE — TELEPHONE ENCOUNTER
Caller: Dalton Dallas Sr.    Relationship: Self    Best call back number: [phone]    Caller requesting test results: REPORTING INR    What test was performed: PTINR    When was the test performed: 12/26/22    Where was the test performed:PATIENTS HOME    Additional notes: PATIENT IN 2.9    ATTEMPTED TO WT NO ANSWER

## 2022-12-28 ENCOUNTER — HOME CARE VISIT (OUTPATIENT)
Dept: HOME HEALTH SERVICES | Facility: HOME HEALTHCARE | Age: 82
End: 2022-12-28
Payer: MEDICARE

## 2022-12-28 VITALS
OXYGEN SATURATION: 97 % | HEART RATE: 67 BPM | DIASTOLIC BLOOD PRESSURE: 80 MMHG | TEMPERATURE: 98.1 F | RESPIRATION RATE: 18 BRPM | SYSTOLIC BLOOD PRESSURE: 124 MMHG

## 2022-12-28 PROCEDURE — G0299 HHS/HOSPICE OF RN EA 15 MIN: HCPCS

## 2022-12-29 NOTE — HOME HEALTH
Routine Visit Note: SN to assess patient, review medications and perform wound care and dressing change. Patient denies pain. Patient verbalizes using oxygen safely    Skill/education provided: Reinforced patient to continue monitoring for s/s of infection    Patient/caregiver response: Patient verbalizes understanding    Plan for next visit: SN to assess patient and perform wound care and dressing change    Other pertinent info:

## 2022-12-30 ENCOUNTER — HOME CARE VISIT (OUTPATIENT)
Dept: HOME HEALTH SERVICES | Facility: HOME HEALTHCARE | Age: 82
End: 2022-12-30
Payer: MEDICARE

## 2022-12-30 VITALS
OXYGEN SATURATION: 97 % | SYSTOLIC BLOOD PRESSURE: 136 MMHG | DIASTOLIC BLOOD PRESSURE: 80 MMHG | TEMPERATURE: 98.7 F | HEART RATE: 71 BPM | RESPIRATION RATE: 18 BRPM

## 2022-12-30 PROCEDURE — G0299 HHS/HOSPICE OF RN EA 15 MIN: HCPCS

## 2022-12-30 NOTE — HOME HEALTH
Routine Visit Note: SN to assess patient, review for any medication changes, perform wound care and dressing change    Skill/education provided: Reinforced to check skin daily for cracks/sores    Patient/caregiver response: Patient verbalizes understanding    Plan for next visit: SN to assess patient and perform wound care and dressing change    Other pertinent info:

## 2023-01-02 ENCOUNTER — HOME CARE VISIT (OUTPATIENT)
Dept: HOME HEALTH SERVICES | Facility: HOME HEALTHCARE | Age: 83
End: 2023-01-02
Payer: MEDICARE

## 2023-01-03 ENCOUNTER — TELEPHONE (OUTPATIENT)
Dept: ONCOLOGY | Facility: CLINIC | Age: 83
End: 2023-01-03

## 2023-01-03 ENCOUNTER — ANTICOAGULATION VISIT (OUTPATIENT)
Dept: ONCOLOGY | Facility: CLINIC | Age: 83
End: 2023-01-03
Payer: MEDICARE

## 2023-01-03 LAB — INR PPP: 2.1

## 2023-01-03 NOTE — TELEPHONE ENCOUNTER
Caller: Dalton Dallas Sr.    Relationship: Self    Best call back number: [phone]    What is the best time to reach you: ANYTIME    Who are you requesting to speak with (clinical staff, provider,  specific staff member): DR ALFARO OR NURSE    What was the call regarding: PTS 1/2/23 INR WAS 2.1. PLEASE CALL TO ADVISE.     Do you require a callback: YES

## 2023-01-04 ENCOUNTER — HOME CARE VISIT (OUTPATIENT)
Dept: HOME HEALTH SERVICES | Facility: HOME HEALTHCARE | Age: 83
End: 2023-01-04
Payer: MEDICARE

## 2023-01-04 ENCOUNTER — OFFICE VISIT (OUTPATIENT)
Dept: WOUND CARE | Facility: HOSPITAL | Age: 83
End: 2023-01-04
Payer: MEDICARE

## 2023-01-04 DIAGNOSIS — E11.621 TYPE 2 DIABETES MELLITUS WITH FOOT ULCER, UNSPECIFIED WHETHER LONG TERM INSULIN USE: ICD-10-CM

## 2023-01-04 DIAGNOSIS — L97.523 NON-PRS CHRONIC ULCER OTH PRT LEFT FOOT W NECROSIS OF MUSCLE: ICD-10-CM

## 2023-01-04 DIAGNOSIS — L97.512 NON-PRS CHRONIC ULCER OTH PRT RIGHT FOOT W FAT LAYER EXPOSED: ICD-10-CM

## 2023-01-04 DIAGNOSIS — L97.509 TYPE 2 DIABETES MELLITUS WITH FOOT ULCER, UNSPECIFIED WHETHER LONG TERM INSULIN USE: ICD-10-CM

## 2023-01-04 PROCEDURE — G0463 HOSPITAL OUTPT CLINIC VISIT: HCPCS

## 2023-01-04 PROCEDURE — 99213 OFFICE O/P EST LOW 20 MIN: CPT

## 2023-01-06 ENCOUNTER — HOME CARE VISIT (OUTPATIENT)
Dept: HOME HEALTH SERVICES | Facility: HOME HEALTHCARE | Age: 83
End: 2023-01-06
Payer: MEDICARE

## 2023-01-06 VITALS
SYSTOLIC BLOOD PRESSURE: 148 MMHG | DIASTOLIC BLOOD PRESSURE: 80 MMHG | OXYGEN SATURATION: 98 % | HEART RATE: 70 BPM | RESPIRATION RATE: 18 BRPM | TEMPERATURE: 97.7 F

## 2023-01-06 PROCEDURE — G0299 HHS/HOSPICE OF RN EA 15 MIN: HCPCS

## 2023-01-06 NOTE — HOME HEALTH
Routine Visit Note:    Skill/education provided: wound care, DM, safety, meds    Patient/caregiver response: delicia well    Plan for next visit: wound care, assess DM, meds    Other pertinent info:

## 2023-01-09 ENCOUNTER — TELEPHONE (OUTPATIENT)
Dept: ONCOLOGY | Facility: CLINIC | Age: 83
End: 2023-01-09
Payer: MEDICARE

## 2023-01-09 ENCOUNTER — HOME CARE VISIT (OUTPATIENT)
Dept: HOME HEALTH SERVICES | Facility: HOME HEALTHCARE | Age: 83
End: 2023-01-09
Payer: MEDICARE

## 2023-01-09 VITALS
OXYGEN SATURATION: 96 % | HEART RATE: 66 BPM | RESPIRATION RATE: 20 BRPM | SYSTOLIC BLOOD PRESSURE: 124 MMHG | TEMPERATURE: 98.1 F | DIASTOLIC BLOOD PRESSURE: 72 MMHG

## 2023-01-09 LAB — INR PPP: 1.7 (ref 0.9–1.1)

## 2023-01-09 PROCEDURE — G0299 HHS/HOSPICE OF RN EA 15 MIN: HCPCS

## 2023-01-09 NOTE — TELEPHONE ENCOUNTER
Caller: Dalton Dallas Sr.    Relationship: Self    Best call back number: [phone]    What is the best time to reach you: ANYTIME    Who are you requesting to speak with (clinical staff, provider,  specific staff member): DR ALFARO OR NURSE    What was the call regarding: PTS 1/9/23 INR IS 1.7. PLEASE CALL TO ADVISE.     Do you require a callback: YES

## 2023-01-09 NOTE — HOME HEALTH
Routine Visit Note: SN to assess patient, review medications and perform wound care and dressing change    Skill/education provided: Instructed to report any changes in medications    Patient/caregiver response: Patient verbalizes understanding    Plan for next visit: SN to assess patient and perform wound care and dressing change    Other pertinent info:

## 2023-01-11 ENCOUNTER — HOME CARE VISIT (OUTPATIENT)
Dept: HOME HEALTH SERVICES | Facility: HOME HEALTHCARE | Age: 83
End: 2023-01-11
Payer: MEDICARE

## 2023-01-11 VITALS
HEART RATE: 74 BPM | OXYGEN SATURATION: 97 % | DIASTOLIC BLOOD PRESSURE: 66 MMHG | WEIGHT: 291 LBS | SYSTOLIC BLOOD PRESSURE: 142 MMHG | RESPIRATION RATE: 18 BRPM | BODY MASS INDEX: 41.75 KG/M2

## 2023-01-11 PROCEDURE — G0299 HHS/HOSPICE OF RN EA 15 MIN: HCPCS

## 2023-01-11 NOTE — HOME HEALTH
Routine Visit Note:    Skill/education provided: wound care and assessment, med education, pain assessment, fall prevention education, diabetes education    Patient/caregiver response: pt stated understanding     Plan for next visit: wound care and assessment, med education, pain assessment, fall prevention education, diabetes education     Other pertinent info:  monitor supplies for ordering

## 2023-01-13 ENCOUNTER — HOME CARE VISIT (OUTPATIENT)
Dept: HOME HEALTH SERVICES | Facility: HOME HEALTHCARE | Age: 83
End: 2023-01-13
Payer: MEDICARE

## 2023-01-13 VITALS
SYSTOLIC BLOOD PRESSURE: 128 MMHG | HEART RATE: 71 BPM | OXYGEN SATURATION: 97 % | TEMPERATURE: 97.7 F | RESPIRATION RATE: 20 BRPM | DIASTOLIC BLOOD PRESSURE: 78 MMHG

## 2023-01-13 PROCEDURE — G0299 HHS/HOSPICE OF RN EA 15 MIN: HCPCS

## 2023-01-14 NOTE — HOME HEALTH
Routine Visit Note: SN to assess patient, review medications, perform wound care and dressing change    Skill/education provided: Reinforced to report any changes in pain    Patient/caregiver response: Patient verbalizes understandin    Plan for next visit: SN to assess patient and perform wound care and dressing change    Other pertinent info:

## 2023-01-16 ENCOUNTER — HOME CARE VISIT (OUTPATIENT)
Dept: HOME HEALTH SERVICES | Facility: HOME HEALTHCARE | Age: 83
End: 2023-01-16
Payer: MEDICARE

## 2023-01-17 ENCOUNTER — HOME CARE VISIT (OUTPATIENT)
Dept: HOME HEALTH SERVICES | Facility: HOME HEALTHCARE | Age: 83
End: 2023-01-17
Payer: MEDICARE

## 2023-01-18 ENCOUNTER — HOME CARE VISIT (OUTPATIENT)
Dept: HOME HEALTH SERVICES | Facility: HOME HEALTHCARE | Age: 83
End: 2023-01-18
Payer: MEDICARE

## 2023-01-18 ENCOUNTER — TRANSCRIBE ORDERS (OUTPATIENT)
Dept: ADMINISTRATIVE | Facility: HOSPITAL | Age: 83
End: 2023-01-18
Payer: MEDICARE

## 2023-01-18 ENCOUNTER — ANTICOAGULATION VISIT (OUTPATIENT)
Dept: ONCOLOGY | Facility: CLINIC | Age: 83
End: 2023-01-18
Payer: MEDICARE

## 2023-01-18 ENCOUNTER — TELEPHONE (OUTPATIENT)
Dept: ONCOLOGY | Facility: CLINIC | Age: 83
End: 2023-01-18

## 2023-01-18 ENCOUNTER — OFFICE VISIT (OUTPATIENT)
Dept: WOUND CARE | Facility: HOSPITAL | Age: 83
End: 2023-01-18
Payer: MEDICARE

## 2023-01-18 ENCOUNTER — HOSPITAL ENCOUNTER (OUTPATIENT)
Dept: CARDIOLOGY | Facility: HOSPITAL | Age: 83
Discharge: HOME | End: 2023-01-18
Payer: MEDICARE

## 2023-01-18 DIAGNOSIS — L97.512 NON-PRS CHRONIC ULCER OTH PRT RIGHT FOOT W FAT LAYER EXPOSED: ICD-10-CM

## 2023-01-18 DIAGNOSIS — M79.669 CALF PAIN, UNSPECIFIED LATERALITY: ICD-10-CM

## 2023-01-18 DIAGNOSIS — O22.30 DVT (DEEP VEIN THROMBOSIS) IN PREGNANCY: Primary | ICD-10-CM

## 2023-01-18 DIAGNOSIS — E11.621 TYPE 2 DIABETES MELLITUS WITH FOOT ULCER, UNSPECIFIED WHETHER LONG TERM INSULIN USE: ICD-10-CM

## 2023-01-18 DIAGNOSIS — L97.523 NON-PRS CHRONIC ULCER OTH PRT LEFT FOOT W NECROSIS OF MUSCLE: ICD-10-CM

## 2023-01-18 DIAGNOSIS — L97.509 TYPE 2 DIABETES MELLITUS WITH FOOT ULCER, UNSPECIFIED WHETHER LONG TERM INSULIN USE: ICD-10-CM

## 2023-01-18 LAB
BH CV LOWER VASCULAR LEFT COMMON FEMORAL AUGMENT: NORMAL
BH CV LOWER VASCULAR LEFT COMMON FEMORAL COMPETENT: NORMAL
BH CV LOWER VASCULAR LEFT COMMON FEMORAL COMPRESS: NORMAL
BH CV LOWER VASCULAR LEFT COMMON FEMORAL PHASIC: NORMAL
BH CV LOWER VASCULAR LEFT COMMON FEMORAL SPONT: NORMAL
BH CV LOWER VASCULAR LEFT DISTAL FEMORAL COMPRESS: NORMAL
BH CV LOWER VASCULAR LEFT GASTRONEMIUS COMPRESS: NORMAL
BH CV LOWER VASCULAR LEFT GREATER SAPH AK COMPRESS: NORMAL
BH CV LOWER VASCULAR LEFT GREATER SAPH BK COMPRESS: NORMAL
BH CV LOWER VASCULAR LEFT LESSER SAPH COMPRESS: NORMAL
BH CV LOWER VASCULAR LEFT MID FEMORAL AUGMENT: NORMAL
BH CV LOWER VASCULAR LEFT MID FEMORAL COMPETENT: NORMAL
BH CV LOWER VASCULAR LEFT MID FEMORAL COMPRESS: NORMAL
BH CV LOWER VASCULAR LEFT MID FEMORAL PHASIC: NORMAL
BH CV LOWER VASCULAR LEFT MID FEMORAL SPONT: NORMAL
BH CV LOWER VASCULAR LEFT PERONEAL COMPRESS: NORMAL
BH CV LOWER VASCULAR LEFT POPLITEAL AUGMENT: NORMAL
BH CV LOWER VASCULAR LEFT POPLITEAL COMPETENT: NORMAL
BH CV LOWER VASCULAR LEFT POPLITEAL COMPRESS: NORMAL
BH CV LOWER VASCULAR LEFT POPLITEAL PHASIC: NORMAL
BH CV LOWER VASCULAR LEFT POPLITEAL SPONT: NORMAL
BH CV LOWER VASCULAR LEFT POSTERIOR TIBIAL COMPRESS: NORMAL
BH CV LOWER VASCULAR LEFT PROXIMAL FEMORAL COMPRESS: NORMAL
BH CV LOWER VASCULAR LEFT SAPHENOFEMORAL JUNCTION COMPRESS: NORMAL
BH CV LOWER VASCULAR RIGHT COMMON FEMORAL AUGMENT: NORMAL
BH CV LOWER VASCULAR RIGHT COMMON FEMORAL COMPETENT: NORMAL
BH CV LOWER VASCULAR RIGHT COMMON FEMORAL COMPRESS: NORMAL
BH CV LOWER VASCULAR RIGHT COMMON FEMORAL PHASIC: NORMAL
BH CV LOWER VASCULAR RIGHT COMMON FEMORAL SPONT: NORMAL
BH CV POP FLUID COLLECT LEFT: 1
INR PPP: 2
MAXIMAL PREDICTED HEART RATE: 140 BPM
STRESS TARGET HR: 119 BPM

## 2023-01-18 PROCEDURE — 99213 OFFICE O/P EST LOW 20 MIN: CPT

## 2023-01-18 PROCEDURE — 93971 EXTREMITY STUDY: CPT

## 2023-01-18 PROCEDURE — G0463 HOSPITAL OUTPT CLINIC VISIT: HCPCS

## 2023-01-18 NOTE — TELEPHONE ENCOUNTER
Caller: Dalton Dallas Sr.    Relationship: Self    Best call back number: [phone]    What is the best time to reach you: ANYTIME    Who are you requesting to speak with (clinical staff, provider,  specific staff member): CLINICAL     What was the call regarding: INR READING FOR TODAY 2.0    Do you require a callback: YES

## 2023-01-20 ENCOUNTER — HOME CARE VISIT (OUTPATIENT)
Dept: HOME HEALTH SERVICES | Facility: HOME HEALTHCARE | Age: 83
End: 2023-01-20
Payer: MEDICARE

## 2023-01-20 VITALS
RESPIRATION RATE: 20 BRPM | SYSTOLIC BLOOD PRESSURE: 128 MMHG | OXYGEN SATURATION: 97 % | DIASTOLIC BLOOD PRESSURE: 74 MMHG | HEART RATE: 67 BPM | TEMPERATURE: 98 F

## 2023-01-20 PROCEDURE — G0299 HHS/HOSPICE OF RN EA 15 MIN: HCPCS

## 2023-01-21 NOTE — HOME HEALTH
THE FOLLOWING INSTRUCTIONS WERE REVIEWED UPON DISCHARGE:    Call your doctor if you develop a new or worsening symptom    Continue to take the medications prescribed by your doctor. A medication list is attached. Be sure to keep them informed of all medications you take including over the counter herbal, vitamins/minerals and the ones you take only when needed.    Follow the diabetic diet as ordered by your doctor.     Continue with home program as instructed by therapist (handouts given).    Continue wound care as ordered.     Instructions given to [id number]    Instructions provided per Visit

## 2023-01-23 ENCOUNTER — TELEPHONE (OUTPATIENT)
Dept: ONCOLOGY | Facility: CLINIC | Age: 83
End: 2023-01-23

## 2023-01-23 ENCOUNTER — OFFICE VISIT (OUTPATIENT)
Dept: PULMONOLOGY | Facility: HOSPITAL | Age: 83
End: 2023-01-23
Payer: MEDICARE

## 2023-01-23 ENCOUNTER — ANTICOAGULATION VISIT (OUTPATIENT)
Dept: ONCOLOGY | Facility: CLINIC | Age: 83
End: 2023-01-23
Payer: MEDICARE

## 2023-01-23 VITALS
OXYGEN SATURATION: 98 % | SYSTOLIC BLOOD PRESSURE: 145 MMHG | RESPIRATION RATE: 14 BRPM | HEIGHT: 70 IN | WEIGHT: 291 LBS | HEART RATE: 71 BPM | BODY MASS INDEX: 41.66 KG/M2 | DIASTOLIC BLOOD PRESSURE: 70 MMHG

## 2023-01-23 DIAGNOSIS — G47.33 OSA (OBSTRUCTIVE SLEEP APNEA): Primary | ICD-10-CM

## 2023-01-23 LAB — INR PPP: 2.3

## 2023-01-23 PROCEDURE — G0463 HOSPITAL OUTPT CLINIC VISIT: HCPCS

## 2023-01-23 NOTE — PROGRESS NOTES
PULMONARY/ CRITICAL CARE/ SLEEP MEDICINE OUTPATIENT CONSULT/ FOLLOW UP NOTE        Patient Name:  Dalton Dallas Sr.    :  1940    Medical Record:  [id number]    PRIMARY CARE PHYSICIAN     Gabino Sibley Jr., MD    REASON FOR CONSULTATION    Dalton Dallas Sr. is a 82 y.o. male who is referred for consultation for HAN  REVIEW OF SYSTEMS    Constitutional:  Denies fever or chills   Eyes:  Denies change in visual acuity   HENT:  Denies nasal congestion or sore throat   Respiratory:  Denies cough or shortness of breath   Cardiovascular:  Denies chest pain or edema   GI:  Denies abdominal pain, nausea, vomiting, bloody stools or diarrhea   :  Denies dysuria   Musculoskeletal:  Denies back pain or joint pain   Integument:  Denies rash   Neurologic:  Denies headache, focal weakness or sensory changes   Endocrine:  Denies polyuria or polydipsia   Lymphatic:  Denies swollen glands   Psychiatric:  Denies depression or anxiety     MEDICAL HISTORY    Past Medical History:   Diagnosis Date   • ACE-inhibitor cough 2005   • Coronary artery disease    • Diabetes mellitus, type 2 (HCC)    • ED (erectile dysfunction)    • Hx of blood clots 2014    Blood clot left leg    • Hyperlipidemia 2000   • Hypertension    • Hypogonadism, male    • Hypothyroidism 2001   • Obesity    • Peripheral neuropathy    • Pulmonary embolism (HCC) 2014   • Rectal fistula    • Sleep apnea 12/10/2013   • Vitamin D deficiency         SURGICAL HISTORY    Past Surgical History:   Procedure Laterality Date   • CARDIAC CATHETERIZATION      PTCA: ; PTCA: 2015 LCX stent    • CARDIAC CATHETERIZATION Left 7/3/2021    Procedure: Cardiac Catheterization/Vascular Study;  Surgeon: Edwar Ackerman MD;  Location: [facility] INVAS[facility] LOC[facility];  Service: Cardiovascular;  Laterality: Left;   • CARDIOVASCULAR STRESS TEST     • CATARACT EXTRACTION  2016 & 16    • COLONOSCOPY N/A 2019     "Procedure: COLONOSCOPY WITH ENDOSCOPIC CLIPPING X1 OF POST POLYPECTOMY BLEED SITE;  Surgeon: Jhonny Orellana MD;  Location: [facility] ENDOSCOPY;  Service: Gastroenterology   • CORONARY STENT PLACEMENT     • INGUINAL HERNIA REPAIR Left    • UMBILICAL HERNIA REPAIR          FAMILY HISTORY    Family History   Problem Relation Age of Onset   • Heart disease Mother    • Leukemia Father        SOCIAL HISTORY    Social History     Tobacco Use   • Smoking status: Former     Packs/day: 1.50     Years: 20.00     Pack years: 30.00     Types: Cigarettes     Quit date:      Years since quittin.0     Passive exposure: Past   • Smokeless tobacco: Never   • Tobacco comments:     quit    Substance Use Topics   • Alcohol use: No        ALLERGIES    No Known Allergies      MEDICATIONS    Current Outpatient Medications on File Prior to Visit   Medication Sig Dispense Refill   • Accu-Chek Irene Plus test strip USE TO CHECK BLOOD SUGAR BEFORE MEALS AND AT BEDTIME 400 each 3   • Accu-Chek Softclix Lancets lancets Use to test blood sugars 3 times daily. DXE11.65 100 each 11   • amLODIPine (NORVASC) 5 MG tablet Take 5 mg by mouth Daily. Indications: High Blood Pressure Disorder     • atenolol (TENORMIN) 50 MG tablet Take 50 mg by mouth Daily. Indications: High Blood Pressure Disorder     • atorvastatin (LIPITOR) 80 MG tablet TAKE 1 TABLET EVERY NIGHT 90 tablet 3   • BD Insulin Syringe U/F 31G X 5/16\" 0.5 ML misc USE 1 NEW SYRINGE WITH EACH DOSE OF INSULIN TWICE A  each 3   • Blood Glucose Monitoring Suppl (Accu-Chek Irene Plus) w/Device kit 1 each 4 (Four) Times a Day. 1 kit 0   • clopidogrel (PLAVIX) 75 MG tablet Take 75 mg by mouth Daily. Indications: Acute Coronary Syndrome, Cerebrovascular Accident or Stroke     • fenofibrate (TRICOR) 54 MG tablet Take 54 mg by mouth Daily. Indications: High Amount of Triglycerides in the Blood     • ferrous sulfate 325 (65 FE) MG tablet Take 325 mg by mouth 2 (Two) Times a Day.     • " gabapentin (NEURONTIN) 100 MG capsule Take 100 mg by mouth 3 (Three) Times a Day. Indications: Neuropathic Pain     • hydroCHLOROthiazide (HYDRODIURIL) 25 MG tablet Take 25 mg by mouth Daily. Indications: Edema     • Insulin Pen Needle (BD ULTRA-FINE PEN NEEDLES) 29G X 12.7MM misc Use to inject insulin twice daily. DX E11.65 200 each 2   • insulin regular (HUMULIN R) 500 UNIT/ML CONCENTRATED injection DRAW TO 24 UNIT REI ON U-100 SYRINGE AND INJECT IN THE MORNING AND 40 UNIT REI IN THE EVENING 60 mL 3   • irbesartan (AVAPRO) 300 MG tablet Take 300 mg by mouth Daily. Indications: High Blood Pressure Disorder     • isosorbide mononitrate (IMDUR) 30 MG 24 hr tablet Take 30 mg by mouth Daily. Indications: Stable Angina Pectoris     • Jardiance 25 MG tablet tablet TAKE 1 TABLET DAILY (THIS IS AN INCREASE IN DOSE) 90 tablet 3   • levothyroxine (SYNTHROID, LEVOTHROID) 175 MCG tablet Take 175 mcg by mouth Daily. Indications: Underactive Thyroid     • metFORMIN ER (GLUCOPHAGE-XR) 500 MG 24 hr tablet TAKE 4 TABLETS AT SUPPER 360 tablet 3   • O2 (OXYGEN) Inhale 1 L/min Every Night. Indications: marianne     • Omega-3 Fatty Acids (FISH OIL) 1000 MG capsule capsule Take 1,000 mg by mouth 2 (Two) Times a Day.     • Pramlintide Acetate (SymlinPen 120) 2700 MCG/2.7ML solution pen-injector 120 MCG UNDER THE SKIN INTO THE APPROPRIATE AREA AS DIRECTED THREE TIMES A DAY 32.4 mL 3   • predniSONE (DELTASONE) 20 MG tablet Take 1 tablet by mouth 2 (Two) Times a Day. 10 tablet 0   • Santyl 250 UNIT/GM ointment APPLY TO WOUNDS ONCE DAILY     • vitamin B-12 (CYANOCOBALAMIN) 100 MCG tablet Take 1 tablet by mouth Daily. Indications: Inadequate Vitamin B12     • vitamin D (ERGOCALCIFEROL) 1.25 MG (58549 UT) capsule capsule TAKE 1 CAPSULE ONCE A WEEK 12 capsule 3   • warfarin (COUMADIN) 1 MG tablet Take 1 tablet PO every evening as directed. 30 tablet 3   • warfarin (COUMADIN) 2 MG tablet Take one tablet by mouth daily or as directed based on your  INR. 30 tablet 2   • warfarin (COUMADIN) 3 MG tablet Take 1 tablet by mouth Daily. 90 tablet 3   • warfarin (COUMADIN) 5 MG tablet Take 5 mg by mouth Every Night. Indications: Atrial Fibrillation       No current facility-administered medications on file prior to visit.       PHYSICAL EXAM    There were no vitals filed for this visit.     Constitutional:  Well developed, well nourished, no acute distress, non-toxic appearance   Eyes:  PERRL, conjunctiva normal   HENT:  Atraumatic, external ears normal, nose normal, oropharynx moist, no pharyngeal exudates. mallampatti   Neck- normal range of motion, no tenderness, supple   Respiratory:  No respiratory distress, normal breath sounds, no rales, no wheezing   Cardiovascular:  Normal rate, normal rhythm, no murmurs, no gallops, no rubs   GI:  Soft, nondistended, normal bowel sounds, nontender, no organomegaly, no mass, no rebound, no guarding   :  No costovertebral angle tenderness   Musculoskeletal:  No edema, no tenderness, no deformities. Back- no tenderness  Integument:  Well hydrated, no rash   Lymphatic:  No lymphadenopathy noted   Neurologic:  Alert & oriented x 3, CN 2-12 normal, normal motor function, normal sensory function, no focal deficits noted   Psychiatric:  Speech and behavior appropriate     XR Foot 3+ View Left    Result Date: 11/4/2022   1. No plain film of evidence of osteomyelitis in left foot. 2. Mild to moderate osteoarthritic changes of the left foot.  Electronically Signed By-Philomena Loomis MD On:11/4/2022 9:13 AM This report was finalized on [id number] by  Philomena Loomis MD.    XR Foot 3+ View Right    Result Date: 12/13/2022  Hammertoes deformity of the second and third digits limiting evaluation. Questionable subtle periostitis is seen within the distal tip of the second distal phalanx which may related to osteomyelitis. No additional periosteal changes. No evidence of fracture or dislocation.  Electronically Signed By-Kathy Cantu MD  On:12/13/2022 3:18 PM This report was finalized on [id number] by  Kathy Cantu MD.     Results for orders placed during the hospital encounter of 08/12/21    Adult Transthoracic Echo Complete W/ Cont if Necessary Per Protocol    Interpretation Summary  · Left ventricular systolic function is normal.  · Left ventricular ejection fraction is 60 to 65%  · Left ventricular diastolic function was normal.      ASSESSMENT & PLAN:      Sleep apnea,[facility]  air curve 10 V auto compliance 99%, average use 6 hours 44 minutes, residual AHI 2  patient is compliant and benefiting from therapy     Nocturnal oxygen   Discussed with patient cardiovascular consequences of untreated sleep apnea     2D echo no evidence of pulmonary hypertension  Essential hypertension (Primary)  Mixed hyperlipidemia  Diastolic dysfunction with chronic heart failure (HCC)  Coronary artery disease involving native coronary artery of native heart without angina pectoris  Peripheral vascular disease (HCC)  Thrombophilia (HCC)  Acquired hypothyroidism  Type 2 diabetes mellitus with diabetic neuropathy, with long-term current use of insulin (HCC)    This document has been electronically signed by      Jelani Vieira MD  11:55 EST

## 2023-01-23 NOTE — TELEPHONE ENCOUNTER
Caller: Dalton Dallas Sr.    Relationship to patient: SELF    Best call back number: [phone]    Patient is needing: TO REPORT INR OF 2.3 ON 1-23-23.

## 2023-01-24 ENCOUNTER — OFFICE VISIT (OUTPATIENT)
Dept: PODIATRY | Facility: CLINIC | Age: 83
End: 2023-01-24
Payer: MEDICARE

## 2023-01-24 VITALS — OXYGEN SATURATION: 99 % | HEIGHT: 70 IN | BODY MASS INDEX: 41.66 KG/M2 | WEIGHT: 291 LBS

## 2023-01-24 DIAGNOSIS — E11.42 DM TYPE 2 WITH DIABETIC PERIPHERAL NEUROPATHY: ICD-10-CM

## 2023-01-24 DIAGNOSIS — I87.301 CHRONIC VENOUS HYPERTENSION WITHOUT COMPLICATIONS, RIGHT: ICD-10-CM

## 2023-01-24 DIAGNOSIS — M20.41 HAMMER TOE OF RIGHT FOOT: ICD-10-CM

## 2023-01-24 DIAGNOSIS — L84 PRE-ULCERATIVE CALLUSES: ICD-10-CM

## 2023-01-24 DIAGNOSIS — I73.9 PERIPHERAL VASCULAR DISEASE: ICD-10-CM

## 2023-01-24 DIAGNOSIS — L97.511 CHRONIC ULCER OF RIGHT FOOT LIMITED TO BREAKDOWN OF SKIN: Primary | ICD-10-CM

## 2023-01-24 PROCEDURE — 28010 INCISION OF TOE TENDON: CPT | Performed by: PODIATRIST

## 2023-01-24 PROCEDURE — 99213 OFFICE O/P EST LOW 20 MIN: CPT | Performed by: PODIATRIST

## 2023-01-24 NOTE — PROGRESS NOTES
"01/24/2023  Foot and Ankle Surgery - Established Patient/Follow-up  Provider: Dr. Aron Holt DPM  Location: [facility]    Subjective:  Dalton Dallas Sr. is a 82 y.o. male.       Chief Complaint   Patient presents with   • Right Foot - Foot Ulcer, Wound Check, Peripheral Neuropathy, Diabetes   • Left Foot - Peripheral Neuropathy, Diabetes   • Follow-up     MEI Sibley MD DATE UNKNOWN      The patient is a 82-year-old male who presents to the clinic for a follow-up regarding his bilateral feet.    He reports he is doing well. He states he had an ulcer on his left foot, which has now healed. He notes he saw LUCAS Mcnamara on 01/18/2023. He states he has a wound care center appointment on 02/01/2023. He reports he is wearing his diabetic shoes and inserts. He notes he wears a crest pad under his foot, which does help. He reports having lost feeling in his left 2nd toe. He reports his foot doctor is not in network with his insurance and would like to transfer care. He reports he has to get his toenails cut approximately every 12 weeks.      No Known Allergies    Current Outpatient Medications on File Prior to Visit   Medication Sig Dispense Refill   • Accu-Chek Irene Plus test strip USE TO CHECK BLOOD SUGAR BEFORE MEALS AND AT BEDTIME 400 each 3   • Accu-Chek Softclix Lancets lancets Use to test blood sugars 3 times daily. DXE11.65 100 each 11   • amLODIPine (NORVASC) 5 MG tablet Take 5 mg by mouth Daily. Indications: High Blood Pressure Disorder     • atenolol (TENORMIN) 50 MG tablet Take 50 mg by mouth Daily. Indications: High Blood Pressure Disorder     • atorvastatin (LIPITOR) 80 MG tablet TAKE 1 TABLET EVERY NIGHT 90 tablet 3   • BD Insulin Syringe U/F 31G X 5/16\" 0.5 ML misc USE 1 NEW SYRINGE WITH EACH DOSE OF INSULIN TWICE A  each 3   • Blood Glucose Monitoring Suppl (Accu-Chek Irene Plus) w/Device kit 1 each 4 (Four) Times a Day. 1 kit 0   • clopidogrel (PLAVIX) 75 MG tablet Take 75 " mg by mouth Daily. Indications: Acute Coronary Syndrome, Cerebrovascular Accident or Stroke     • fenofibrate (TRICOR) 54 MG tablet Take 54 mg by mouth Daily. Indications: High Amount of Triglycerides in the Blood     • ferrous sulfate 325 (65 FE) MG tablet Take 325 mg by mouth 2 (Two) Times a Day.     • gabapentin (NEURONTIN) 100 MG capsule Take 100 mg by mouth 3 (Three) Times a Day. Indications: Neuropathic Pain     • hydroCHLOROthiazide (HYDRODIURIL) 25 MG tablet Take 25 mg by mouth Daily. Indications: Edema     • Insulin Pen Needle (BD ULTRA-FINE PEN NEEDLES) 29G X 12.7MM misc Use to inject insulin twice daily. DX E11.65 200 each 2   • insulin regular (HUMULIN R) 500 UNIT/ML CONCENTRATED injection DRAW TO 24 UNIT REI ON U-100 SYRINGE AND INJECT IN THE MORNING AND 40 UNIT REI IN THE EVENING 60 mL 3   • irbesartan (AVAPRO) 300 MG tablet Take 300 mg by mouth Daily. Indications: High Blood Pressure Disorder     • isosorbide mononitrate (IMDUR) 30 MG 24 hr tablet Take 30 mg by mouth Daily. Indications: Stable Angina Pectoris     • Jardiance 25 MG tablet tablet TAKE 1 TABLET DAILY (THIS IS AN INCREASE IN DOSE) 90 tablet 3   • levothyroxine (SYNTHROID, LEVOTHROID) 175 MCG tablet Take 175 mcg by mouth Daily. Indications: Underactive Thyroid     • metFORMIN ER (GLUCOPHAGE-XR) 500 MG 24 hr tablet TAKE 4 TABLETS AT SUPPER 360 tablet 3   • O2 (OXYGEN) Inhale 1 L/min Every Night. Indications: marianne     • Omega-3 Fatty Acids (FISH OIL) 1000 MG capsule capsule Take 1,000 mg by mouth 2 (Two) Times a Day.     • Pramlintide Acetate (SymlinPen 120) 2700 MCG/2.7ML solution pen-injector 120 MCG UNDER THE SKIN INTO THE APPROPRIATE AREA AS DIRECTED THREE TIMES A DAY 32.4 mL 3   • predniSONE (DELTASONE) 20 MG tablet Take 1 tablet by mouth 2 (Two) Times a Day. 10 tablet 0   • Santyl 250 UNIT/GM ointment APPLY TO WOUNDS ONCE DAILY     • vitamin B-12 (CYANOCOBALAMIN) 100 MCG tablet Take 1 tablet by mouth Daily. Indications: Inadequate  "Vitamin B12     • vitamin D (ERGOCALCIFEROL) 1.25 MG (97005 UT) capsule capsule TAKE 1 CAPSULE ONCE A WEEK 12 capsule 3   • warfarin (COUMADIN) 1 MG tablet Take 1 tablet PO every evening as directed. 30 tablet 3   • warfarin (COUMADIN) 2 MG tablet Take one tablet by mouth daily or as directed based on your INR. 30 tablet 2   • warfarin (COUMADIN) 3 MG tablet Take 1 tablet by mouth Daily. 90 tablet 3   • warfarin (COUMADIN) 5 MG tablet Take 5 mg by mouth Every Night. Indications: Atrial Fibrillation       No current facility-administered medications on file prior to visit.       Objective   Ht 177.8 cm (70\")   Wt 132 kg (291 lb)   SpO2 99%   BMI 41.75 kg/m²     Foot/Ankle Exam:       General:   Appearance: appears stated age and healthy and obesity    Orientation: AAOx3    Affect: appropriate      VASCULAR      Right Foot Vascularity   Normal vascular exam    Dorsalis pedis:  2+  Posterior tibial:  2+  Skin Temperature: warm    Edema Grading:  3+ and pitting  CFT:  < 3 seconds  Pedal Hair Growth:  Absent  Varicosities: none        NEUROLOGIC     Right Foot Neurologic   Light touch sensation:  Diminished  Hot/Cold sensation: diminished    Achilles reflex:  2+     MUSCULOSKELETAL      Right Foot Musculoskeletal   Ecchymosis:  None  Tenderness: none    Arch:  Normal  Hammertoe:  Second toe, third toe, fourth toe and fifth toe     MUSCLE STRENGTH     Right Foot Muscle Strength   Normal strength    Foot dorsiflexion:  5  Foot plantar flexion:  5  Foot inversion:  5  Foot eversion:  5     DERMATOLOGIC     Right Foot Dermatologic   Skin: ulcer    Skin: right foot skin not intact, no right foot cellulitis, no right foot drainage, no right foot redness and no right foot maceration    Nails: onychomycosis, abnormally thick and dystrophic nails    Nails comment:  Nails 1-5     Left Foot Dermatologic   Nails: onychomycosis, abnormally thick and dystrophic nails    Nails comment:  Nails 1-5     TESTS     Right Foot Tests "   Anterior drawer: negative    Varus tilt: negative       Image:       Right Foot Additional Comments No obvious deformity or instability.  Moderate equinus contracture with knee extended and flexed    01/24/2023  With a previous open wound, a preulcerative callus was noted on the distal aspect of the right 2nd toe. There are no signs of infection today. Moderate rigid hammertoe deformity. .        Left Foot Additional Comments: 01/24/2022  Preulcerative callus formation involving the plantar aspect of the left fifth metatarsal head region. No open wound or signs of infection at this time.      Assessment & Plan   Diagnoses and all orders for this visit:    1. Chronic ulcer of right foot limited to breakdown of skin (HCC) (Primary)    2. Hammer toe of right foot    3. Pre-ulcerative calluses    4. DM type 2 with diabetic peripheral neuropathy (HCC)    5. Chronic venous hypertension without complications, right    6. Peripheral vascular disease (HCC)      Patient has been referred to me by my nurse practitioner for evaluation of his feet.  Patient recently had open wound involving the plantar lateral aspect of his left foot.  The wound has recently healed.  He has been wearing his diabetic shoes and inserts on a daily basis and has no further complications to the left foot at this time.  He does complain however of recurrent wound involving the distal aspect of his right second digit.  Clinically, he does have a significant hemorrhage and deformity which is semireducible.  I did discuss the situation with the patient at length.  Patient has had concerns of infection in the past.  He does have moderate to high pedal foot risk.  Patient does have a history of peripheral vascular disease.  We did discuss the possibility of a percutaneous flexor tenotomy that would hopefully decrease the stress to the distal aspect of the digit and hopefully prevent further ulcerations.  We did discuss risks secondary to his diabetes as  well as PVD.  He understands that wound healing complications, infection, and potentially amputation is possible.  Procedure was performed in office without issue.  I have asked that he monitor the toe closely and call with any new issues or concerns.  I would like him to continue using his crest pad daily.  Patient is to return in 4 weeks for reevaluation.  Greater than 20 minutes was spent before, during, and after evaluation for patient care.    Percutaneous flexor tenotomy: Right second digit    Informed consent and timeout was performed prior to procedure.  No anesthesia was required secondary to patient's neuropathy.  The plantar aspect of the digit at the level of the proximal phalanx was cleansed with an alcohol pad.  An 18-gauge hypodermic needle was then inserted into the plantar aspect of the toe.  The toe was straightened allowing for maximal tension on the flexor tendons.  The tendons were released without complication.  Hemostasis was achieved followed by a dry compressive dressing.  Patient tolerated the procedure well.  No orders of the defined types were placed in this encounter.         Note is dictated utilizing voice recognition software. Unfortunately this leads to occasional typographical errors. I apologize in advance if the situation occurs. If questions occur please do not hesitate to call our office.    Transcribed from ambient dictation for CHRISSY Holt DPM by Leona Aaron.  01/24/23   14:45 EST    Patient or patient representative verbalized consent to the visit recording.  I have personally performed the services described in this document as transcribed by the above individual, and it is both accurate and complete.

## 2023-01-30 ENCOUNTER — ANTICOAGULATION VISIT (OUTPATIENT)
Dept: ONCOLOGY | Facility: CLINIC | Age: 83
End: 2023-01-30
Payer: MEDICARE

## 2023-01-30 ENCOUNTER — TELEPHONE (OUTPATIENT)
Dept: ONCOLOGY | Facility: CLINIC | Age: 83
End: 2023-01-30
Payer: MEDICARE

## 2023-01-30 LAB — INR PPP: 2.3

## 2023-02-02 ENCOUNTER — OFFICE VISIT (OUTPATIENT)
Dept: WOUND CARE | Facility: HOSPITAL | Age: 83
End: 2023-02-02
Payer: MEDICARE

## 2023-02-02 DIAGNOSIS — L97.509 TYPE 2 DIABETES MELLITUS WITH FOOT ULCER, UNSPECIFIED WHETHER LONG TERM INSULIN USE: ICD-10-CM

## 2023-02-02 DIAGNOSIS — L97.512 NON-PRS CHRONIC ULCER OTH PRT RIGHT FOOT W FAT LAYER EXPOSED: ICD-10-CM

## 2023-02-02 DIAGNOSIS — L97.523 NON-PRS CHRONIC ULCER OTH PRT LEFT FOOT W NECROSIS OF MUSCLE: ICD-10-CM

## 2023-02-02 DIAGNOSIS — E11.621 TYPE 2 DIABETES MELLITUS WITH FOOT ULCER, UNSPECIFIED WHETHER LONG TERM INSULIN USE: ICD-10-CM

## 2023-02-02 PROCEDURE — 99024 POSTOP FOLLOW-UP VISIT: CPT

## 2023-02-02 PROCEDURE — G0463 HOSPITAL OUTPT CLINIC VISIT: HCPCS

## 2023-02-06 DIAGNOSIS — E11.40 TYPE 2 DIABETES MELLITUS WITH DIABETIC NEUROPATHY, WITHOUT LONG-TERM CURRENT USE OF INSULIN: ICD-10-CM

## 2023-02-06 RX ORDER — BLOOD SUGAR DIAGNOSTIC
1 STRIP MISCELLANEOUS 2 TIMES DAILY
Qty: 200 EACH | Refills: 3 | Status: SHIPPED | OUTPATIENT
Start: 2023-02-06

## 2023-02-06 RX ORDER — INSULIN HUMAN 500 [IU]/ML
INJECTION, SOLUTION SUBCUTANEOUS
Qty: 60 ML | Refills: 3 | Status: SHIPPED | OUTPATIENT
Start: 2023-02-06

## 2023-02-07 ENCOUNTER — OFFICE VISIT (OUTPATIENT)
Dept: PODIATRY | Facility: CLINIC | Age: 83
End: 2023-02-07
Payer: MEDICARE

## 2023-02-07 VITALS — BODY MASS INDEX: 41.66 KG/M2 | HEIGHT: 70 IN | RESPIRATION RATE: 20 BRPM | WEIGHT: 291 LBS

## 2023-02-07 DIAGNOSIS — M20.41 HAMMER TOE OF RIGHT FOOT: ICD-10-CM

## 2023-02-07 DIAGNOSIS — L97.511 CHRONIC ULCER OF RIGHT FOOT LIMITED TO BREAKDOWN OF SKIN: ICD-10-CM

## 2023-02-07 DIAGNOSIS — L84 PRE-ULCERATIVE CALLUSES: Primary | ICD-10-CM

## 2023-02-07 DIAGNOSIS — E11.42 DM TYPE 2 WITH DIABETIC PERIPHERAL NEUROPATHY: ICD-10-CM

## 2023-02-07 DIAGNOSIS — I87.303 CHRONIC VENOUS HYPERTENSION WITHOUT COMPLICATIONS, BILATERAL: ICD-10-CM

## 2023-02-07 PROCEDURE — 99212 OFFICE O/P EST SF 10 MIN: CPT | Performed by: PODIATRIST

## 2023-02-07 NOTE — PROGRESS NOTES
02/07/2023  Foot and Ankle Surgery - Established Patient/Follow-up  Provider: Dr. Aron Holt DPM  Location: [facility]    Subjective:  Dalton Dallas Sr. is a 82 y.o. male.     Chief Complaint   Patient presents with   • Right Foot - Follow-up, Callouses, Hammer Toe, Wound Check, Diabetes   • Left Foot - Wound Check   • Follow-up     MEI Sibley MD date unknown       HPI:   The patient is an 82-year-old male who returns for follow-up regarding his feet.    He was last seen 2 weeks ago on 01/24/2023. He reports that the wound on his right second toe has improved. He states that he used to wear compression stockings, but discontinued them after this year. The patient notes that he is planning to return to wearing compression stockings. He is inquiring about his toenails needing to be clipped today.    No Known Allergies    Current Outpatient Medications on File Prior to Visit   Medication Sig Dispense Refill   • Accu-Chek Irene Plus test strip USE TO CHECK BLOOD SUGAR BEFORE MEALS AND AT BEDTIME 400 each 3   • Accu-Chek Softclix Lancets lancets Use to test blood sugars 3 times daily. DXE11.65 100 each 11   • amLODIPine (NORVASC) 5 MG tablet Take 5 mg by mouth Daily. Indications: High Blood Pressure Disorder     • atenolol (TENORMIN) 50 MG tablet Take 50 mg by mouth Daily. Indications: High Blood Pressure Disorder     • atorvastatin (LIPITOR) 80 MG tablet TAKE 1 TABLET EVERY NIGHT 90 tablet 3   • Blood Glucose Monitoring Suppl (Accu-Chek Irene Plus) w/Device kit 1 each 4 (Four) Times a Day. 1 kit 0   • clopidogrel (PLAVIX) 75 MG tablet Take 75 mg by mouth Daily. Indications: Acute Coronary Syndrome, Cerebrovascular Accident or Stroke     • fenofibrate (TRICOR) 54 MG tablet Take 54 mg by mouth Daily. Indications: High Amount of Triglycerides in the Blood     • ferrous sulfate 325 (65 FE) MG tablet Take 325 mg by mouth 2 (Two) Times a Day.     • gabapentin (NEURONTIN) 100 MG capsule Take 100 mg by mouth  "3 (Three) Times a Day. Indications: Neuropathic Pain     • hydroCHLOROthiazide (HYDRODIURIL) 25 MG tablet Take 25 mg by mouth Daily. Indications: Edema     • Insulin Pen Needle (BD ULTRA-FINE PEN NEEDLES) 29G X 12.7MM misc Use to inject insulin twice daily. DX E11.65 200 each 2   • insulin regular (HUMULIN R) 500 UNIT/ML CONCENTRATED injection DRAW TO 24 UNIT REI ON U-100 SYRINGE AND INJECT IN THE MORNING AND 40 UNIT REI IN THE EVENING  Indications: Type 2 Diabetes 60 mL 3   • Insulin Syringe-Needle U-100 (BD Insulin Syringe U/F) 31G X 5/16\" 0.5 ML misc 1 each by Other route 2 (Two) Times a Day. 200 each 3   • irbesartan (AVAPRO) 300 MG tablet Take 300 mg by mouth Daily. Indications: High Blood Pressure Disorder     • isosorbide mononitrate (IMDUR) 30 MG 24 hr tablet Take 30 mg by mouth Daily. Indications: Stable Angina Pectoris     • Jardiance 25 MG tablet tablet TAKE 1 TABLET DAILY (THIS IS AN INCREASE IN DOSE) 90 tablet 3   • levothyroxine (SYNTHROID, LEVOTHROID) 175 MCG tablet Take 175 mcg by mouth Daily. Indications: Underactive Thyroid     • metFORMIN ER (GLUCOPHAGE-XR) 500 MG 24 hr tablet TAKE 4 TABLETS AT SUPPER 360 tablet 3   • O2 (OXYGEN) Inhale 1 L/min Every Night. Indications: marianne     • Omega-3 Fatty Acids (FISH OIL) 1000 MG capsule capsule Take 1,000 mg by mouth 2 (Two) Times a Day.     • Pramlintide Acetate (SymlinPen 120) 2700 MCG/2.7ML solution pen-injector 120 MCG UNDER THE SKIN INTO THE APPROPRIATE AREA AS DIRECTED THREE TIMES A DAY 32.4 mL 3   • Santyl 250 UNIT/GM ointment APPLY TO WOUNDS ONCE DAILY     • vitamin B-12 (CYANOCOBALAMIN) 100 MCG tablet Take 1 tablet by mouth Daily. Indications: Inadequate Vitamin B12     • vitamin D (ERGOCALCIFEROL) 1.25 MG (99015 UT) capsule capsule TAKE 1 CAPSULE ONCE A WEEK 12 capsule 3   • warfarin (COUMADIN) 1 MG tablet Take 1 tablet PO every evening as directed. 30 tablet 3   • warfarin (COUMADIN) 2 MG tablet Take one tablet by mouth daily or as directed " "based on your INR. 30 tablet 2   • warfarin (COUMADIN) 3 MG tablet Take 1 tablet by mouth Daily. 90 tablet 3   • warfarin (COUMADIN) 5 MG tablet Take 5 mg by mouth Every Night. Indications: Atrial Fibrillation     • predniSONE (DELTASONE) 20 MG tablet Take 1 tablet by mouth 2 (Two) Times a Day. 10 tablet 0     No current facility-administered medications on file prior to visit.       Objective   Resp 20   Ht 177.8 cm (70\")   Wt 132 kg (291 lb)   BMI 41.75 kg/m²     Foot/Ankle Exam:       General:   Appearance: appears stated age and healthy and obesity    Orientation: AAOx3    Affect: appropriate      VASCULAR      Right Foot Vascularity   Normal vascular exam    Dorsalis pedis:  2+  Posterior tibial:  2+  Skin Temperature: warm    Edema Grading:  3+ and pitting  CFT:  < 3 seconds  Pedal Hair Growth:  Absent  Varicosities: none        NEUROLOGIC     Right Foot Neurologic   Light touch sensation:  Diminished  Hot/Cold sensation: diminished    Achilles reflex:  2+     MUSCULOSKELETAL      Right Foot Musculoskeletal   Ecchymosis:  None  Tenderness: none    Arch:  Normal  Hammertoe:  Second toe, third toe, fourth toe and fifth toe     MUSCLE STRENGTH     Right Foot Muscle Strength   Normal strength    Foot dorsiflexion:  5  Foot plantar flexion:  5  Foot inversion:  5  Foot eversion:  5     DERMATOLOGIC     Right Foot Dermatologic   Skin: ulcer    Skin: right foot skin not intact, no right foot cellulitis, no right foot drainage, no right foot redness and no right foot maceration    Nails: onychomycosis, abnormally thick and dystrophic nails    Nails comment:  Nails 1-5     Left Foot Dermatologic   Nails: onychomycosis, abnormally thick and dystrophic nails    Nails comment:  Nails 1-5     TESTS     Right Foot Tests   Anterior drawer: negative    Varus tilt: negative       Image:       Right Foot Additional Comments No obvious deformity or instability.  Moderate equinus contracture with knee extended and " flexed    01/24/2023  With a previous open wound, a preulcerative callus was noted on the distal aspect of the right 2nd toe. There are no signs of infection today. Moderate rigid hammertoe deformity. .    02/07/2023  Wound to the distal aspect of the right second toe is now resolved. Minimal eschar formation. No signs of infection. No other complicating features. Continued pitting edema to both lower extremities. Mild xerosis to bilateral feet.      Left Foot Additional Comments: 01/24/2022  Preulcerative callus formation involving the plantar aspect of the left fifth metatarsal head region. No open wound or signs of infection at this time.      Assessment & Plan   Diagnoses and all orders for this visit:    1. Pre-ulcerative calluses (Primary)    2. Chronic venous hypertension without complications, bilateral    3. DM type 2 with diabetic peripheral neuropathy (HCC)    4. Chronic ulcer of right foot limited to breakdown of skin (HCC)    5. Hammer toe of right foot      Patient returns for follow-up regarding his feet.  Patient did undergo flexor tenotomy to the right second toe last visit.  The toe is rectus in orientation with no open wound involving the distal aspect of the digit at this time.  He has responded well to treatment.  His left foot appears to be doing very well with no ulcerations or areas of concern.  He continues to have significant edema to both lower extremities and mild xerosis.  I have asked that he apply moisturizer on a daily basis.  We did discuss the continued efforts for compression therapy.  Patient is to monitor his feet.  He understands that he remains at moderate to high risk of pedal complications.  We did review appropriate shoes and importance of glycemic control.  Patient is to return in 2 months for reevaluation with LUZMA Allred.      No orders of the defined types were placed in this encounter.         Note is dictated utilizing voice recognition software. Unfortunately this leads  to occasional typographical errors. I apologize in advance if the situation occurs. If questions occur please do not hesitate to call our office.    Transcribed from ambient dictation for CHRISSY Holt DPM by Carli Cabrera.  02/07/23   12:49 EST    Patient or patient representative verbalized consent to the visit recording.  I have personally performed the services described in this document as transcribed by the above individual, and it is both accurate and complete.

## 2023-02-09 ENCOUNTER — TELEPHONE (OUTPATIENT)
Dept: ONCOLOGY | Facility: CLINIC | Age: 83
End: 2023-02-09

## 2023-02-09 ENCOUNTER — ANTICOAGULATION VISIT (OUTPATIENT)
Dept: ONCOLOGY | Facility: CLINIC | Age: 83
End: 2023-02-09
Payer: MEDICARE

## 2023-02-09 LAB — INR PPP: 2.3

## 2023-02-09 NOTE — TELEPHONE ENCOUNTER
Caller: Dalton Dallas Sr.    Relationship: Self    Best call back number: [phone]    What is the best time to reach you: ANYTIME    Who are you requesting to speak with (clinical staff, provider, specific staff member): CLINICAL TEAM    What was the call regarding: PTS INR MON 2/6 WAS 2.3. PLEASE CALL WITH MED ADJUSTMENTS.     Do you require a callback: YES

## 2023-02-13 ENCOUNTER — ANTICOAGULATION VISIT (OUTPATIENT)
Dept: ONCOLOGY | Facility: CLINIC | Age: 83
End: 2023-02-13
Payer: MEDICARE

## 2023-02-13 ENCOUNTER — TELEPHONE (OUTPATIENT)
Dept: ONCOLOGY | Facility: CLINIC | Age: 83
End: 2023-02-13

## 2023-02-13 LAB — INR PPP: 2.3

## 2023-02-13 NOTE — TELEPHONE ENCOUNTER
Caller: Dalton Dallas Sr.    Relationship: Self    Best call back number: [phone]    What is the best time to reach you: ANYTIME    Who are you requesting to speak with (clinical staff, provider, specific staff member): CLINICAL TEAM    What was the call regarding: PT CHECKED HIS INR TODAY 2/13 AND IT WAS 2.3. PLEASE CALL TO ADVISE OF ANY MED ADJUSTMENTS.     Do you require a callback: YES

## 2023-02-17 ENCOUNTER — OFFICE VISIT (OUTPATIENT)
Dept: FAMILY MEDICINE CLINIC | Facility: CLINIC | Age: 83
End: 2023-02-17
Payer: MEDICARE

## 2023-02-17 VITALS
OXYGEN SATURATION: 97 % | HEIGHT: 70 IN | WEIGHT: 292 LBS | TEMPERATURE: 98.3 F | BODY MASS INDEX: 41.8 KG/M2 | RESPIRATION RATE: 17 BRPM | DIASTOLIC BLOOD PRESSURE: 69 MMHG | SYSTOLIC BLOOD PRESSURE: 133 MMHG | HEART RATE: 61 BPM

## 2023-02-17 DIAGNOSIS — Z00.00 ENCOUNTER FOR ANNUAL WELLNESS EXAM IN MEDICARE PATIENT: Primary | ICD-10-CM

## 2023-02-17 DIAGNOSIS — G25.9 ABNORMAL LEG MOVEMENT: ICD-10-CM

## 2023-02-17 PROCEDURE — 1159F MED LIST DOCD IN RCRD: CPT | Performed by: FAMILY MEDICINE

## 2023-02-17 PROCEDURE — 1170F FXNL STATUS ASSESSED: CPT | Performed by: FAMILY MEDICINE

## 2023-02-17 PROCEDURE — G0439 PPPS, SUBSEQ VISIT: HCPCS | Performed by: FAMILY MEDICINE

## 2023-02-17 PROCEDURE — 1126F AMNT PAIN NOTED NONE PRSNT: CPT | Performed by: FAMILY MEDICINE

## 2023-02-17 PROCEDURE — 96160 PT-FOCUSED HLTH RISK ASSMT: CPT | Performed by: FAMILY MEDICINE

## 2023-02-17 NOTE — PROGRESS NOTES
The ABCs of the Annual Wellness Visit  Subsequent Medicare Wellness Visit    Subjective    Dalton Dallas Sr. is a 82 y.o. male who presents for a Subsequent Medicare Wellness Visit.    The following portions of the patient's history were reviewed and   updated as appropriate: allergies, current medications, past family history, past medical history, past social history, past surgical history and problem list.    Compared to one year ago, the patient feels his physical   health is the same.    Compared to one year ago, the patient feels his mental   health is the same.    Recent Hospitalizations:  He was not admitted to the hospital during the last year.       Current Medical Providers:  Patient Care Team:  Gabino Sibley Jr., MD as PCP - General (Family Medicine)  Soraya Cortez MD as Consulting Physician (Hematology and Oncology)    Outpatient Medications Prior to Visit   Medication Sig Dispense Refill   • Accu-Chek Irene Plus test strip USE TO CHECK BLOOD SUGAR BEFORE MEALS AND AT BEDTIME 400 each 3   • Accu-Chek Softclix Lancets lancets Use to test blood sugars 3 times daily. DXE11.65 100 each 11   • amLODIPine (NORVASC) 5 MG tablet Take 5 mg by mouth Daily. Indications: High Blood Pressure Disorder     • atenolol (TENORMIN) 50 MG tablet Take 50 mg by mouth Daily. Indications: High Blood Pressure Disorder     • atorvastatin (LIPITOR) 80 MG tablet TAKE 1 TABLET EVERY NIGHT 90 tablet 3   • Blood Glucose Monitoring Suppl (Accu-Chek Irene Plus) w/Device kit 1 each 4 (Four) Times a Day. 1 kit 0   • clopidogrel (PLAVIX) 75 MG tablet Take 75 mg by mouth Daily. Indications: Acute Coronary Syndrome, Cerebrovascular Accident or Stroke     • fenofibrate (TRICOR) 54 MG tablet Take 54 mg by mouth Daily. Indications: High Amount of Triglycerides in the Blood     • ferrous sulfate 325 (65 FE) MG tablet Take 325 mg by mouth 2 (Two) Times a Day.     • gabapentin (NEURONTIN) 100 MG capsule Take 100 mg by mouth 3 (Three)  "Times a Day. Indications: Neuropathic Pain     • hydroCHLOROthiazide (HYDRODIURIL) 25 MG tablet Take 25 mg by mouth Daily. Indications: Edema     • Insulin Pen Needle (BD ULTRA-FINE PEN NEEDLES) 29G X 12.7MM misc Use to inject insulin twice daily. DX E11.65 200 each 2   • insulin regular (HUMULIN R) 500 UNIT/ML CONCENTRATED injection DRAW TO 24 UNIT REI ON U-100 SYRINGE AND INJECT IN THE MORNING AND 40 UNIT REI IN THE EVENING  Indications: Type 2 Diabetes 60 mL 3   • Insulin Syringe-Needle U-100 (BD Insulin Syringe U/F) 31G X 5/16\" 0.5 ML misc 1 each by Other route 2 (Two) Times a Day. 200 each 3   • irbesartan (AVAPRO) 300 MG tablet Take 300 mg by mouth Daily. Indications: High Blood Pressure Disorder     • isosorbide mononitrate (IMDUR) 30 MG 24 hr tablet Take 30 mg by mouth Daily. Indications: Stable Angina Pectoris     • Jardiance 25 MG tablet tablet TAKE 1 TABLET DAILY (THIS IS AN INCREASE IN DOSE) 90 tablet 3   • levothyroxine (SYNTHROID, LEVOTHROID) 175 MCG tablet Take 175 mcg by mouth Daily. Indications: Underactive Thyroid     • metFORMIN ER (GLUCOPHAGE-XR) 500 MG 24 hr tablet TAKE 4 TABLETS AT SUPPER 360 tablet 3   • O2 (OXYGEN) Inhale 1 L/min Every Night. Indications: marianne     • Omega-3 Fatty Acids (FISH OIL) 1000 MG capsule capsule Take 1,000 mg by mouth 2 (Two) Times a Day.     • Pramlintide Acetate (SymlinPen 120) 2700 MCG/2.7ML solution pen-injector 120 MCG UNDER THE SKIN INTO THE APPROPRIATE AREA AS DIRECTED THREE TIMES A DAY 32.4 mL 3   • vitamin B-12 (CYANOCOBALAMIN) 100 MCG tablet Take 1 tablet by mouth Daily. Indications: Inadequate Vitamin B12     • vitamin D (ERGOCALCIFEROL) 1.25 MG (99349 UT) capsule capsule TAKE 1 CAPSULE ONCE A WEEK 12 capsule 3   • warfarin (COUMADIN) 1 MG tablet Take 1 tablet PO every evening as directed. 30 tablet 3   • warfarin (COUMADIN) 2 MG tablet Take one tablet by mouth daily or as directed based on your INR. 30 tablet 2   • warfarin (COUMADIN) 3 MG tablet Take 1 " "tablet by mouth Daily. 90 tablet 3   • warfarin (COUMADIN) 5 MG tablet Take 5 mg by mouth Every Night. Indications: Atrial Fibrillation     • predniSONE (DELTASONE) 20 MG tablet Take 1 tablet by mouth 2 (Two) Times a Day. 10 tablet 0   • Santyl 250 UNIT/GM ointment APPLY TO WOUNDS ONCE DAILY       No facility-administered medications prior to visit.       No opioid medication identified on active medication list. I have reviewed chart for other potential  high risk medication/s and harmful drug interactions in the elderly.          Aspirin is not on active medication list.  Aspirin use is not indicated based on review of current medical condition/s. Risk of harm outweighs potential benefits.  .    Patient Active Problem List   Diagnosis   • Coronary artery disease involving native coronary artery of native heart   • Essential hypertension   • Mixed hyperlipidemia   • Deep vein thrombosis (DVT) ([facility])   • Eczema   • Hypothyroidism   • Body mass index (BMI) of 45.0-49.9 in adult (HCC)   • Sleep apnea   • Vitamin D deficiency   • Peripheral vascular disease (HCC)   • Chronic pulmonary embolism without acute cor pulmonale ([facility])   • Diastolic dysfunction with chronic heart failure (HCC)   • Pulmonary hypertension (HCC)   • Lumbar radiculopathy   • Thrombophilia ([facility])   • MI, acute, non ST segment elevation (HCC)   • Type 2 diabetes mellitus with diabetic polyneuropathy, with long-term current use of insulin (HCC)   • Abnormal leg movement     Advance Care Planning  Advance Directive is on file.  ACP discussion was held with the patient during this visit. Patient has an advance directive in EMR which is still valid.      Objective    Vitals:    02/17/23 1024   BP: 133/69   BP Location: Right arm   Patient Position: Sitting   Cuff Size: Large Adult   Pulse: 61   Resp: 17   Temp: 98.3 °F (36.8 °C)   TempSrc: Oral   SpO2: 97%   Weight: 132 kg (292 lb)   Height: 177.8 cm (70\")   PainSc: 0-No pain     Estimated body mass index " "is 41.9 kg/m² as calculated from the following:    Height as of this encounter: 177.8 cm (70\").    Weight as of this encounter: 132 kg (292 lb).    Class 3 Severe Obesity (BMI >=40). Obesity-related health conditions include the following: hypertension, diabetes mellitus and dyslipidemias. Obesity is unchanged. BMI is is above average; BMI management plan is completed. We discussed portion control and increasing exercise.      Does the patient have evidence of cognitive impairment? No          HEALTH RISK ASSESSMENT    Smoking Status:  Social History     Tobacco Use   Smoking Status Former   • Packs/day: 1.50   • Years: 20.00   • Pack years: 30.00   • Types: Cigarettes   • Quit date:    • Years since quittin.1   • Passive exposure: Past   Smokeless Tobacco Never   Tobacco Comments    quit      Alcohol Consumption:  Social History     Substance and Sexual Activity   Alcohol Use No     Fall Risk Screen:    STEADI Fall Risk Assessment was completed, and patient is at LOW risk for falls.Assessment completed on:2023    Depression Screening:  PHQ-2/PHQ-9 Depression Screening 2023   Little Interest or Pleasure in Doing Things 0-->not at all   Feeling Down, Depressed or Hopeless 0-->not at all   PHQ-9: Brief Depression Severity Measure Score 0       Health Habits and Functional and Cognitive Screening:  Functional & Cognitive Status 2023   Do you have difficulty preparing food and eating? No   Do you have difficulty bathing yourself, getting dressed or grooming yourself? No   Do you have difficulty using the toilet? No   Do you have difficulty moving around from place to place? No   Do you have trouble with steps or getting out of a bed or a chair? No   Current Diet Well Balanced Diet   Dental Exam Up to date   Eye Exam Up to date   Exercise (times per week) 0 times per week   Current Exercises Include No Regular Exercise   Current Exercise Activities Include -   Do you need help using the phone? "  No   Are you deaf or do you have serious difficulty hearing?  Yes   Do you need help with transportation? No   Do you need help shopping? No   Do you need help preparing meals?  No   Do you need help with housework?  No   Do you need help with laundry? No   Do you need help taking your medications? No   Do you need help managing money? No   Do you ever drive or ride in a car without wearing a seat belt? No   Have you felt unusual stress, anger or loneliness in the last month? No   Who do you live with? Alone   If you need help, do you have trouble finding someone available to you? No   Have you been bothered in the last four weeks by sexual problems? No   Do you have difficulty concentrating, remembering or making decisions? No       Age-appropriate Screening Schedule:  Refer to the list below for future screening recommendations based on patient's age, sex and/or medical conditions. Orders for these recommended tests are listed in the plan section. The patient has been provided with a written plan.    Health Maintenance   Topic Date Due   • ZOSTER VACCINE (2 of 3) 08/24/2023 (Originally 12/20/2016)   • DIABETIC EYE EXAM  02/22/2023   • HEMOGLOBIN A1C  05/03/2023   • LIPID PANEL  11/03/2023   • URINE MICROALBUMIN  11/03/2023   • INFLUENZA VACCINE  Completed   • TDAP/TD VACCINES  Discontinued                CMS Preventative Services Quick Reference  Risk Factors Identified During Encounter  Immunizations Discussed/Encouraged: Td, Influenza, Pneumococcal 23, Prevnar 20 (Pneumococcal 20-valent conjugate), Vaxneuvance (Pneumococcal 15-valent conjugate), Shingrix and COVID19  The above risks/problems have been discussed with the patient.  Pertinent information has been shared with the patient in the After Visit Summary.  An After Visit Summary and PPPS were made available to the patient.    Follow Up:   Next Medicare Wellness visit to be scheduled in 1 year.       Additional E&M Note during same encounter  "follows:  Patient has multiple medical problems which are significant and separately identifiable that require additional work above and beyond the Medicare Wellness Visit.      Chief Complaint  Medicare Wellness-subsequent, Diabetes, Hypertension, and Hyperlipidemia (3 mth f/u)    Subjective        HPI  Dalton Dalals Sr. is also being seen today for hypertension hyperlipidemia coronary artery disease peripheral vascular disease thrombophilia and hypothyroidism.    Patient's medical problems are stable.    Patient does complain of some involuntary movement of the left foot.  He states that it will dorsiflex towards the end of the afternoon.  He states that he is not able to control it.         Objective   Vital Signs:  /69 (BP Location: Right arm, Patient Position: Sitting, Cuff Size: Large Adult)   Pulse 61   Temp 98.3 °F (36.8 °C) (Oral)   Resp 17   Ht 177.8 cm (70\")   Wt 132 kg (292 lb)   SpO2 97%   BMI 41.90 kg/m²     Physical Exam  Vitals and nursing note reviewed.   Constitutional:       Appearance: He is well-developed.   HENT:      Head: Normocephalic and atraumatic.   Eyes:      Pupils: Pupils are equal, round, and reactive to light.   Cardiovascular:      Rate and Rhythm: Normal rate and regular rhythm.      Pulses: Normal pulses.      Heart sounds: Normal heart sounds. No murmur heard.    No friction rub.   Pulmonary:      Effort: Pulmonary effort is normal.      Breath sounds: Normal breath sounds.   Abdominal:      General: Bowel sounds are normal.      Palpations: Abdomen is soft.   Musculoskeletal:         General: Normal range of motion.      Cervical back: Neck supple.   Skin:     General: Skin is warm and dry.   Neurological:      Mental Status: He is oriented to person, place, and time.   Psychiatric:         Behavior: Behavior normal.         Thought Content: Thought content normal.         Judgment: Judgment normal.                    Protein / Creatinine Ratio, Urine - Urine, " Clean Catch (11/03/2022 11:58)  Lipid Panel (11/03/2022 11:58)  Sedimentation Rate (11/03/2022 11:58)  Hemoglobin A1c (11/03/2022 11:58)  Comprehensive Metabolic Panel (11/03/2022 11:58)  CBC & Differential (11/03/2022 11:58)       Assessment and Plan   Diagnoses and all orders for this visit:    1. Encounter for annual wellness exam in Medicare patient (Primary)    2. Abnormal leg movement-the patient was advised that there is medication he could try to control the leg movement.  Is called Requip.  He did have to take it 3 times a day.  Patient is going to think about whether he wants to take more medication.       Follow Up   Return in about 3 months (around 5/17/2023).  Patient was given instructions and counseling regarding his condition or for health maintenance advice. Please see specific information pulled into the AVS if appropriate.

## 2023-02-17 NOTE — PATIENT INSTRUCTIONS
Continue your current medications and treatment.    Consider taking Requip for the involuntary movement of the lower extremity.    Follow up in the office in 3 months.

## 2023-02-20 RX ORDER — WARFARIN SODIUM 2 MG/1
TABLET ORAL
Qty: 60 TABLET | Refills: 5 | Status: SHIPPED | OUTPATIENT
Start: 2023-02-20 | End: 2023-03-23 | Stop reason: SDUPTHER

## 2023-03-01 ENCOUNTER — TELEPHONE (OUTPATIENT)
Dept: ONCOLOGY | Facility: CLINIC | Age: 83
End: 2023-03-01
Payer: MEDICARE

## 2023-03-01 LAB — INR PPP: 2.3

## 2023-03-01 NOTE — TELEPHONE ENCOUNTER
Provider: DR ALFARO  Caller: DEBI     Reason for Call: INR RESULTS DONE Monday @ 4:30 (2-27)    INR: 2.3    PLEASE ADVISE

## 2023-03-02 ENCOUNTER — ANTICOAGULATION VISIT (OUTPATIENT)
Dept: PHARMACY | Facility: HOSPITAL | Age: 83
End: 2023-03-02
Payer: MEDICARE

## 2023-03-02 DIAGNOSIS — D68.59 THROMBOPHILIA: Primary | Chronic | ICD-10-CM

## 2023-03-02 NOTE — PROGRESS NOTES
Anticoagulation Clinic Progress Note    Patient's visit was held via telephone today. Patient agreed to pharmacist care per CCA.    Anticoagulation Summary  As of 3/2/2023    INR goal:  2.0-3.0   TTR:  85.4 % (3.6 y)   INR used for dosin.30 (3/1/2023)   Warfarin maintenance plan:  8 mg (5 mg x 1 and 1 mg x 3) every day; Starting 3/2/2023   Weekly warfarin total:  56 mg   No change documented:  Taylor Ambriz RP   Plan last modified:  Lynn Baum, RN (2023)   Next INR check:  3/6/2023   Priority:  Maintenance   Target end date:  Indefinite    Indications    Deep vein thrombosis (DVT) ([facility]) [I82.409]  Thrombophilia (HCC) [D68.59]             Anticoagulation Episode Summary     INR check location:      Preferred lab:      Send INR reminders to:   [facility] CBC ANTICOAG POOL    Comments:  [facility] Home maninder      Anticoagulation Care Providers     Provider Role Specialty Phone number    Soraya Cortez MD Referring Hematology and Oncology [phone]          Drug interactions: has remained unchanged.  Diet: has remained unchanged.    Clinic Interview:  No pertinent clinical findings have been reported.    INR History:  Anticoagulation Monitoring 2023 2023 3/2/2023   INR 2.30 2.30 2.30   INR Date 2023 2023 3/1/2023   INR Goal 2.0-3.0 2.0-3.0 2.0-3.0   Trend Same Same Same   Last Week Total 56 mg 56 mg 56 mg   Next Week Total 56 mg 56 mg 56 mg   Sun 8 mg - 8 mg   Mon 8 mg - -   Tue 8 mg - -   Wed 8 mg - -   Thu 8 mg - 8 mg   Fri 8 mg - 8 mg   Sat 8 mg - 8 mg   Visit Report - - -   Some recent data might be hidden       Plan:  1. INR is Therapeutic today and has been stable past month- see above in Anticoagulation Summary.   Will instruct Dalton Dallas Sr. to Continue their warfarin regimen- see above in Anticoagulation Summary.  2. Follow up in 1 week--tests at home weekly  3.They have been instructed to call if any changes in medications, doses, concerns, etc. Patient expresses  understanding and has no further questions at this time.  4. Pt to call INR as faxes not being received from [employer] at this time.    Taylor Ambriz RPH

## 2023-03-06 RX ORDER — ERGOCALCIFEROL 1.25 MG/1
50000 CAPSULE ORAL WEEKLY
Qty: 13 CAPSULE | Refills: 3 | Status: SHIPPED | OUTPATIENT
Start: 2023-03-06

## 2023-03-06 NOTE — TELEPHONE ENCOUNTER
Patient left VM to send Vit D to [facility]. Rx sent. Tried to call patient to notify him it has been done. No answer no VM.

## 2023-03-07 ENCOUNTER — ANTICOAGULATION VISIT (OUTPATIENT)
Dept: PHARMACY | Facility: HOSPITAL | Age: 83
End: 2023-03-07
Payer: MEDICARE

## 2023-03-07 ENCOUNTER — TELEPHONE (OUTPATIENT)
Dept: ONCOLOGY | Facility: CLINIC | Age: 83
End: 2023-03-07

## 2023-03-07 DIAGNOSIS — D68.59 THROMBOPHILIA: Primary | Chronic | ICD-10-CM

## 2023-03-07 LAB — INR PPP: 2.2

## 2023-03-07 NOTE — PROGRESS NOTES
Anticoagulation Clinic Progress Note    Patient's visit was held via telephone today.    Anticoagulation Summary  As of 3/7/2023    INR goal:  2.0-3.0   TTR:  85.4 % (3.6 y)   INR used for dosin.20 (3/7/2023)   Warfarin maintenance plan:  8 mg (5 mg x 1 and 1 mg x 3) every day; Starting 3/7/2023   Weekly warfarin total:  56 mg   Plan last modified:  Lynn Baum RN (2023)   Next INR check:  3/14/2023   Priority:  Maintenance   Target end date:  Indefinite    Indications    Deep vein thrombosis (DVT) ([facility]) [I82.409]  Thrombophilia (HCC) [D68.59]             Anticoagulation Episode Summary     INR check location:      Preferred lab:      Send INR reminders to:  [facility] CBC ANTICOAG POOL    Comments:  [facility] Home maninder      Anticoagulation Care Providers     Provider Role Specialty Phone number    Soraya Cortez MD Referring Hematology and Oncology [phone]          Drug interactions: has remained unchanged.  Diet: has remained unchanged.    Clinic Interview:  No pertinent clinical findings have been reported.    INR History:  Anticoagulation Monitoring 2023 3/2/2023 3/7/2023   INR 2.30 2.30 2.20   INR Date 2023 3/1/2023 3/7/2023   INR Goal 2.0-3.0 2.0-3.0 2.0-3.0   Trend Same Same Same   Last Week Total 56 mg 56 mg 56 mg   Next Week Total 56 mg 56 mg 56 mg   Sun - 8 mg 8 mg   Mon - - 8 mg   Tue - - 8 mg   Wed - - 8 mg   Thu - 8 mg 8 mg   Fri - 8 mg 8 mg   Sat - 8 mg 8 mg   Visit Report - - -   Some recent data might be hidden       Plan:  1. INR is Therapeutic today- see above in Anticoagulation Summary.   Will instruct Dalton Dallas SrZoie to Continue their warfarin regimen- see above in Anticoagulation Summary.  2. Follow up in 1 week--[facility] [employer] has not been getting his INRs so he has been calling us.  3.They have been instructed to call if any changes in medications, doses, concerns, etc. Patient expresses understanding and has no further questions at this time.    Taylor  Pb, [facility]

## 2023-03-07 NOTE — TELEPHONE ENCOUNTER
"  Caller: Dalton Dallas Sr. \"LOKI\"    Relationship: Self    Best call back number: [phone]    What is the best time to reach you: ASAP    Who are you requesting to speak with (clinical staff, provider,  specific staff member): CLINICAL    What was the call regarding: PT IS REPORTING HIS INR IS 2.2    Do you require a callback: YES        "

## 2023-03-10 RX ORDER — WARFARIN SODIUM 3 MG/1
3 TABLET ORAL DAILY
Qty: 90 TABLET | Refills: 3 | Status: SHIPPED | OUTPATIENT
Start: 2023-03-10

## 2023-03-10 RX ORDER — WARFARIN SODIUM 5 MG/1
5 TABLET ORAL NIGHTLY
Qty: 30 TABLET | Refills: 2 | Status: SHIPPED | OUTPATIENT
Start: 2023-03-10

## 2023-03-13 ENCOUNTER — TELEPHONE (OUTPATIENT)
Dept: ONCOLOGY | Facility: CLINIC | Age: 83
End: 2023-03-13

## 2023-03-13 ENCOUNTER — ANTICOAGULATION VISIT (OUTPATIENT)
Dept: PHARMACY | Facility: HOSPITAL | Age: 83
End: 2023-03-13
Payer: MEDICARE

## 2023-03-13 DIAGNOSIS — D68.59 THROMBOPHILIA: Primary | Chronic | ICD-10-CM

## 2023-03-13 LAB — INR PPP: 2.2

## 2023-03-13 NOTE — PROGRESS NOTES
Anticoagulation Clinic Progress Note    Patient's visit was held via telephone today.    Anticoagulation Summary  As of 3/13/2023    INR goal:  2.0-3.0   TTR:  85.5 % (3.6 y)   INR used for dosin.20 (3/13/2023)   Warfarin maintenance plan:  8 mg (5 mg x 1 and 1 mg x 3) every day; Starting 3/13/2023   Weekly warfarin total:  56 mg   Plan last modified:  Lynn Baum RN (2023)   Next INR check:     Priority:  Maintenance   Target end date:  Indefinite    Indications    Deep vein thrombosis (DVT) ([facility]) [I82.409]  Thrombophilia ([facility]) [D68.59]             Anticoagulation Episode Summary     INR check location:      Preferred lab:      Send INR reminders to:   [facility] CBC ANTICOAG POOL    Comments:  [facility] Home maninder      Anticoagulation Care Providers     Provider Role Specialty Phone number    Soraya Cortez MD Referring Hematology and Oncology [phone]          Drug interactions: has remained unchanged.  Diet: has remained unchanged.    Clinic Interview:  No pertinent clinical findings have been reported.    INR History:  Anticoagulation Monitoring 3/2/2023 3/7/2023 3/13/2023   INR 2.30 2.20 2.20   INR Date 3/1/2023 3/7/2023 3/13/2023   INR Goal 2.0-3.0 2.0-3.0 2.0-3.0   Trend Same Same Same   Last Week Total 56 mg 56 mg 56 mg   Next Week Total 56 mg 56 mg 56 mg   Sun 8 mg 8 mg 8 mg   Mon - 8 mg 8 mg   Tue - 8 mg 8 mg   Wed - 8 mg 8 mg   Thu 8 mg 8 mg 8 mg   Fri 8 mg 8 mg 8 mg   Sat 8 mg 8 mg 8 mg   Visit Report - - -   Some recent data might be hidden       Plan:  1. INR is Therapeutic today- see above in Anticoagulation Summary.   Will instruct Dalton Dallas Sr. to Continue their warfarin regimen- see above in Anticoagulation Summary.  2. Follow up in 1 week--currently unable to put INR through [facility] home testing so will call us for now until we can get that corrected.  3.They have been instructed to call if any changes in medications, doses, concerns, etc. Patient expresses understanding  and has no further questions at this time.    Taylor Ambriz, [facility]

## 2023-03-13 NOTE — TELEPHONE ENCOUNTER
"  Caller: Dalton Dallas Sr. \"LOKI\"    Relationship: Self    Best call back number: [phone]      What was the call regarding: PATIENT CALLED IN HIS INR RESULTS FROM TODAY   INR IS 2.2    Do you require a callback: YES          "

## 2023-03-18 DIAGNOSIS — E11.40 TYPE 2 DIABETES MELLITUS WITH DIABETIC NEUROPATHY, WITH LONG-TERM CURRENT USE OF INSULIN: Chronic | ICD-10-CM

## 2023-03-18 DIAGNOSIS — Z79.4 TYPE 2 DIABETES MELLITUS WITH DIABETIC NEUROPATHY, WITH LONG-TERM CURRENT USE OF INSULIN: Chronic | ICD-10-CM

## 2023-03-20 ENCOUNTER — ANTICOAGULATION VISIT (OUTPATIENT)
Dept: PHARMACY | Facility: HOSPITAL | Age: 83
End: 2023-03-20
Payer: MEDICARE

## 2023-03-20 DIAGNOSIS — D68.59 THROMBOPHILIA: Primary | Chronic | ICD-10-CM

## 2023-03-20 LAB — INR PPP: 2.5

## 2023-03-20 RX ORDER — ATENOLOL 50 MG/1
TABLET ORAL
Qty: 90 TABLET | Refills: 3 | Status: SHIPPED | OUTPATIENT
Start: 2023-03-20

## 2023-03-20 RX ORDER — HYDROCHLOROTHIAZIDE 25 MG/1
TABLET ORAL
Qty: 90 TABLET | Refills: 3 | Status: SHIPPED | OUTPATIENT
Start: 2023-03-20

## 2023-03-20 RX ORDER — EMPAGLIFLOZIN 25 MG/1
TABLET, FILM COATED ORAL
Qty: 90 TABLET | Refills: 3 | Status: SHIPPED | OUTPATIENT
Start: 2023-03-20

## 2023-03-20 NOTE — PROGRESS NOTES
Anticoagulation Clinic Progress Note    Patient's visit was held via telephone today.    Anticoagulation Summary  As of 3/20/2023    INR goal:  2.0-3.0   TTR:  85.6 % (3.6 y)   INR used for dosin.50 (3/20/2023)   Warfarin maintenance plan:  8 mg (5 mg x 1 and 1 mg x 3) every day; Starting 3/20/2023   Weekly warfarin total:  56 mg   Plan last modified:  Lynn Baum RN (2023)   Next INR check:  3/27/2023   Priority:  High   Target end date:  Indefinite    Indications    Deep vein thrombosis (DVT) ([facility]) [I82.409]  Thrombophilia (HCC) [D68.59]             Anticoagulation Episode Summary     INR check location:      Preferred lab:      Send INR reminders to:   [facility] CBC ANTICOAG POOL    Comments:  [facility] maninder      Anticoagulation Care Providers     Provider Role Specialty Phone number    Soraya Cortez MD Referring Hematology and Oncology [phone]          Drug interactions: has remained unchanged.  Diet: has remained unchanged.    Clinic Interview:  No pertinent clinical findings have been reported.    INR History:  Anticoagulation Monitoring 3/7/2023 3/13/2023 3/20/2023   INR 2.20 2.20 2.50   INR Date 3/7/2023 3/13/2023 3/20/2023   INR Goal 2.0-3.0 2.0-3.0 2.0-3.0   Trend Same Same Same   Last Week Total 56 mg 56 mg 56 mg   Next Week Total 56 mg 56 mg 56 mg   Sun 8 mg 8 mg 8 mg   Mon 8 mg 8 mg 8 mg   Tue 8 mg 8 mg 8 mg   Wed 8 mg 8 mg 8 mg   Thu 8 mg 8 mg 8 mg   Fri 8 mg 8 mg 8 mg   Sat 8 mg 8 mg 8 mg   Visit Report - - -   Some recent data might be hidden       Plan:  1. INR is Therapeutic today- see above in Anticoagulation Summary.   Will instruct Dalton Dixonogast Sr. to Continue their warfarin regimen- see above in Anticoagulation Summary.  2. Follow up in 1 week--home maninder  3.They have been instructed to call if any changes in medications, doses, concerns, etc. Patient expresses understanding and has no further questions at this time.    Taylor Ambriz [facility]

## 2023-03-23 RX ORDER — WARFARIN SODIUM 2 MG/1
TABLET ORAL
Qty: 60 TABLET | Refills: 5 | Status: SHIPPED | OUTPATIENT
Start: 2023-03-23

## 2023-03-23 RX ORDER — AMLODIPINE BESYLATE 5 MG/1
TABLET ORAL
Qty: 90 TABLET | Refills: 3 | Status: SHIPPED | OUTPATIENT
Start: 2023-03-23

## 2023-03-23 RX ORDER — ISOSORBIDE MONONITRATE 30 MG/1
TABLET, EXTENDED RELEASE ORAL
Qty: 90 TABLET | Refills: 3 | Status: SHIPPED | OUTPATIENT
Start: 2023-03-23

## 2023-03-23 NOTE — TELEPHONE ENCOUNTER
"  Caller: Dalton Dallas Sr. \"LOKI\"    Relationship: Self    Best call back number: [phone]    Requested Prescriptions:   Requested Prescriptions     Pending Prescriptions Disp Refills   • warfarin (COUMADIN) 2 MG tablet 60 tablet 5     Sig: TAKE 1 TABLET BY MOUTH DAILY OR  AS DIRECTED BASED ON INR        Pharmacy where request should be sent: [facility] - [place], [place] - [address] [facility] [phone] [facility] [phone] [facility]     Last office visit with prescribing clinician: 12/15/2022   Last telemedicine visit with prescribing clinician: 6/15/2023   Next office visit with prescribing clinician: Visit date not found     Does the patient have less than a 3 day supply:  [] Yes  [x] No    Would you like a call back once the refill request has been completed: [] Yes [x] No    If the office needs to give you a call back, can they leave a voicemail: [] Yes [] No      "

## 2023-03-28 ENCOUNTER — TELEPHONE (OUTPATIENT)
Dept: ONCOLOGY | Facility: CLINIC | Age: 83
End: 2023-03-28

## 2023-03-28 ENCOUNTER — ANTICOAGULATION VISIT (OUTPATIENT)
Dept: PHARMACY | Facility: HOSPITAL | Age: 83
End: 2023-03-28
Payer: MEDICARE

## 2023-03-28 DIAGNOSIS — D68.59 THROMBOPHILIA: Primary | Chronic | ICD-10-CM

## 2023-03-28 LAB — INR PPP: 2.4

## 2023-03-28 NOTE — TELEPHONE ENCOUNTER
"Caller: Dalton Dallas Sr. \"LOKI\"    Relationship: Self    Best call back number: [phone]    What is the best time to reach you: ANYTIME    Who are you requesting to speak with (clinical staff, provider,  specific staff member): CLINICAL TEAM    What was the call regarding: PTS 3/28 INR IS 2.4. PLEASE CALL WITH MED ADJUSTMENTS.     Do you require a callback: YES  "

## 2023-03-28 NOTE — PROGRESS NOTES
Anticoagulation Clinic Progress Note    Patient's visit was held via telephone today.    Anticoagulation Summary  As of 3/28/2023    INR goal:  2.0-3.0   TTR:  85.7 % (3.6 y)   INR used for dosin.40 (3/28/2023)   Warfarin maintenance plan:  8 mg (5 mg x 1 and 1 mg x 3) every day; Starting 3/28/2023   Weekly warfarin total:  56 mg   Plan last modified:  Lynn Baum RN (2023)   Next INR check:  2023   Priority:  High   Target end date:  Indefinite    Indications    Deep vein thrombosis (DVT) ([facility]) [I82.409]  Thrombophilia (HCC) [D68.59]             Anticoagulation Episode Summary     INR check location:      Preferred lab:      Send INR reminders to:  [facility] CBC ANTICOAG POOL    Comments:  MDINR Home maninder      Anticoagulation Care Providers     Provider Role Specialty Phone number    Soraya Cortez MD Referring Hematology and Oncology [phone]          Drug interactions: has remained unchanged.  Diet: has remained unchanged.    Clinic Interview:  No pertinent clinical findings have been reported.    INR History:  Anticoagulation Monitoring 3/13/2023 3/20/2023 3/28/2023   INR 2.20 2.50 2.40   INR Date 3/13/2023 3/20/2023 3/28/2023   INR Goal 2.0-3.0 2.0-3.0 2.0-3.0   Trend Same Same Same   Last Week Total 56 mg 56 mg 56 mg   Next Week Total 56 mg 56 mg 56 mg   Sun 8 mg 8 mg 8 mg   Mon 8 mg 8 mg 8 mg   Tue 8 mg 8 mg 8 mg   Wed 8 mg 8 mg 8 mg   Thu 8 mg 8 mg 8 mg   Fri 8 mg 8 mg 8 mg   Sat 8 mg 8 mg 8 mg   Visit Report - - -   Some recent data might be hidden       Plan:  1. INR is Therapeutic today- see above in Anticoagulation Summary.   Will instruct Dalton Dallas Sr. to Continue their warfarin regimen- see above in Anticoagulation Summary.  2. Follow up in 1 week--MDINR home test  3.They have been instructed to call if any changes in medications, doses, concerns, etc. Patient expresses understanding and has no further questions at this time.    Taylor Ambriz [facility]

## 2023-03-29 RX ORDER — ATORVASTATIN CALCIUM 80 MG/1
TABLET, FILM COATED ORAL
Qty: 90 TABLET | Refills: 3 | Status: SHIPPED | OUTPATIENT
Start: 2023-03-29

## 2023-03-30 ENCOUNTER — HOSPITAL ENCOUNTER (OUTPATIENT)
Dept: CARDIOLOGY | Facility: HOSPITAL | Age: 83
Discharge: HOME | End: 2023-03-30
Payer: MEDICARE

## 2023-03-30 ENCOUNTER — APPOINTMENT (OUTPATIENT)
Dept: VASCULAR SURGERY | Facility: HOSPITAL | Age: 83
End: 2023-03-30
Payer: MEDICARE

## 2023-03-30 DIAGNOSIS — I73.9 PERIPHERAL VASCULAR DISEASE, UNSPECIFIED: ICD-10-CM

## 2023-03-30 LAB
BH CV LOWER ARTERIAL LEFT ABI RATIO: 1.42
BH CV LOWER ARTERIAL LEFT DORSALIS PEDIS SYS MAX: 193
BH CV LOWER ARTERIAL LEFT GREAT TOE SYS MAX: 38
BH CV LOWER ARTERIAL LEFT POST TIBIAL SYS MAX: 162
BH CV LOWER ARTERIAL LEFT TBI RATIO: 0.28
BH CV LOWER ARTERIAL RIGHT ABI RATIO: 1.29
BH CV LOWER ARTERIAL RIGHT DORSALIS PEDIS SYS MAX: 176
BH CV LOWER ARTERIAL RIGHT GREAT TOE SYS MAX: 53
BH CV LOWER ARTERIAL RIGHT POST TIBIAL SYS MAX: 156
BH CV LOWER ARTERIAL RIGHT TBI RATIO: 0.39
MAXIMAL PREDICTED HEART RATE: 140 BPM
STRESS TARGET HR: 119 BPM
UPPER ARTERIAL LEFT ARM BRACHIAL SYS MAX: 125 MMHG
UPPER ARTERIAL RIGHT ARM BRACHIAL SYS MAX: 136 MMHG

## 2023-03-30 PROCEDURE — G0463 HOSPITAL OUTPT CLINIC VISIT: HCPCS

## 2023-03-30 PROCEDURE — 93922 UPR/L XTREMITY ART 2 LEVELS: CPT

## 2023-04-04 ENCOUNTER — OFFICE VISIT (OUTPATIENT)
Dept: PODIATRY | Facility: CLINIC | Age: 83
End: 2023-04-04
Payer: MEDICARE

## 2023-04-04 VITALS — HEIGHT: 70 IN | RESPIRATION RATE: 20 BRPM | BODY MASS INDEX: 41.8 KG/M2 | WEIGHT: 292 LBS

## 2023-04-04 DIAGNOSIS — L97.822 VENOUS STASIS ULCER OF OTHER PART OF LEFT LOWER LEG WITH FAT LAYER EXPOSED, UNSPECIFIED WHETHER VARICOSE VEINS PRESENT: ICD-10-CM

## 2023-04-04 DIAGNOSIS — I87.2 VENOUS INSUFFICIENCY, PERIPHERAL: ICD-10-CM

## 2023-04-04 DIAGNOSIS — I83.028 VENOUS STASIS ULCER OF OTHER PART OF LEFT LOWER LEG WITH FAT LAYER EXPOSED, UNSPECIFIED WHETHER VARICOSE VEINS PRESENT: ICD-10-CM

## 2023-04-04 DIAGNOSIS — E11.42 DM TYPE 2 WITH DIABETIC PERIPHERAL NEUROPATHY: Primary | ICD-10-CM

## 2023-04-04 PROCEDURE — 99213 OFFICE O/P EST LOW 20 MIN: CPT

## 2023-04-04 PROCEDURE — 1160F RVW MEDS BY RX/DR IN RCRD: CPT

## 2023-04-04 PROCEDURE — 1159F MED LIST DOCD IN RCRD: CPT

## 2023-04-04 NOTE — PROGRESS NOTES
"04/04/2023  Foot and Ankle Surgery - Established Patient/Follow-up  Provider: LUCAS Ramos   Location: [facility]    Subjective:  Dalton Dallas Sr. is a 82 y.o. male.     Chief Complaint   Patient presents with   • Left Foot - Callouses, Diabetes, Peripheral Neuropathy   • Right Foot - Callouses, Hammer Toe, Foot Ulcer, Diabetes, Peripheral Neuropathy   • Left Lower Leg - Wound Check   • Follow-up     MEI DIAZ MD 2/17/2023       The patient reports he is unsure how he obtained the wound on his left lower extremity. He states he noticed it a couple of weeks ago and it looked like a blood blister. The patient states he was wearing compression stockings and when he took it off, he noticed blood. He has been applying a Band-Aid on it. He mentions it is possible he hit his lower extremity and would \"never know it\". The patient states last night, he started applying \"that crazy stuff they used on there, I used on my foot\". He denies any fevers or chills.    He reports his hammertoe to right second toe has done well since hammertoe procedure. He mentions his blood glucose levels have been good. The patient reports it has been 6 months since he had his toenails trimmed.    The patient states he was seen by his vascular surgeon on 03/30/2023 and had an abnormal Ankle-Brachial Index test. He reports the provider addressed his wound and had no concerns.    He states he experiences lower extremity edema but it improves at night time.    The patient reports his back has been cold all winter.     No Known Allergies    Current Outpatient Medications on File Prior to Visit   Medication Sig Dispense Refill   • Accu-Chek Irene Plus test strip USE TO CHECK BLOOD SUGAR BEFORE MEALS AND AT BEDTIME 400 each 3   • Accu-Chek Softclix Lancets lancets Use to test blood sugars 3 times daily. DXE11.65 100 each 11   • amLODIPine (NORVASC) 5 MG tablet TAKE 1 TABLET DAILY 90 tablet 3   • atenolol (TENORMIN) 50 MG tablet " "TAKE 1 TABLET BY MOUTH DAILY 90 tablet 3   • atorvastatin (LIPITOR) 80 MG tablet TAKE 1 TABLET EVERY NIGHT 90 tablet 3   • Blood Glucose Monitoring Suppl (Accu-Chek Irene Plus) w/Device kit 1 each 4 (Four) Times a Day. 1 kit 0   • clopidogrel (PLAVIX) 75 MG tablet Take 1 tablet by mouth Daily. Indications: Acute Coronary Syndrome, Cerebrovascular Accident or Stroke     • fenofibrate (TRICOR) 54 MG tablet Take 1 tablet by mouth Daily. Indications: High Amount of Triglycerides in the Blood     • ferrous sulfate 325 (65 FE) MG tablet Take 1 tablet by mouth 2 (Two) Times a Day.     • gabapentin (NEURONTIN) 100 MG capsule Take 1 capsule by mouth 3 (Three) Times a Day. Indications: Neuropathic Pain     • hydroCHLOROthiazide (HYDRODIURIL) 25 MG tablet TAKE 1 TABLET BY MOUTH DAILY 90 tablet 3   • Insulin Pen Needle (BD ULTRA-FINE PEN NEEDLES) 29G X 12.7MM misc Use to inject insulin twice daily. DX E11.65 200 each 2   • insulin regular (HUMULIN R) 500 UNIT/ML CONCENTRATED injection DRAW TO 24 UNIT REI ON U-100 SYRINGE AND INJECT IN THE MORNING AND 40 UNIT REI IN THE EVENING  Indications: Type 2 Diabetes 60 mL 3   • Insulin Syringe-Needle U-100 (BD Insulin Syringe U/F) 31G X 5/16\" 0.5 ML misc 1 each by Other route 2 (Two) Times a Day. 200 each 3   • irbesartan (AVAPRO) 300 MG tablet Take 1 tablet by mouth Daily. Indications: High Blood Pressure Disorder     • isosorbide mononitrate (IMDUR) 30 MG 24 hr tablet TAKE 1 TABLET DAILY 90 tablet 3   • Jardiance 25 MG tablet tablet TAKE 1 TABLET BY MOUTH DAILY 90 tablet 3   • levothyroxine (SYNTHROID, LEVOTHROID) 175 MCG tablet Take 1 tablet by mouth Daily. Indications: Underactive Thyroid     • metFORMIN ER (GLUCOPHAGE-XR) 500 MG 24 hr tablet TAKE 4 TABLETS AT SUPPER 360 tablet 3   • O2 (OXYGEN) Inhale 1 L/min Every Night. Indications: marianne     • Omega-3 Fatty Acids (FISH OIL) 1000 MG capsule capsule Take 1 capsule by mouth 2 (Two) Times a Day.     • Pramlintide Acetate (SymlinPen " "120) 2700 MCG/2.7ML solution pen-injector 120 MCG UNDER THE SKIN INTO THE APPROPRIATE AREA AS DIRECTED THREE TIMES A DAY 32.4 mL 3   • vitamin B-12 (CYANOCOBALAMIN) 100 MCG tablet Take 1 tablet by mouth Daily. Indications: Inadequate Vitamin B12     • vitamin D (ERGOCALCIFEROL) 1.25 MG (37616 UT) capsule capsule Take 1 capsule by mouth 1 (One) Time Per Week. Indications: Vitamin D Deficiency 13 capsule 3   • warfarin (COUMADIN) 1 MG tablet Take 1 tablet PO every evening as directed. 30 tablet 3   • warfarin (COUMADIN) 2 MG tablet TAKE 1 TABLET BY MOUTH DAILY OR  AS DIRECTED BASED ON INR 60 tablet 5   • warfarin (COUMADIN) 3 MG tablet Take 1 tablet by mouth Daily. Indications: Atrial Fibrillation 90 tablet 3   • warfarin (COUMADIN) 5 MG tablet Take 1 tablet by mouth Every Night. Indications: Atrial Fibrillation 30 tablet 2     No current facility-administered medications on file prior to visit.       Objective   Resp 20   Ht 177.8 cm (70\")   Wt 132 kg (292 lb)   BMI 41.90 kg/m²     Foot/Ankle Exam:       General:   Diabetic Foot Exam Performed    Appearance: appears stated age and healthy, obesity and elderly    Orientation: AAOx3    Affect: appropriate    Gait: unimpaired    Assistance: independent    Shoe Gear:  Casual shoes and socks    VASCULAR      Right Foot Vascularity   Dorsalis pedis:  1+  Posterior tibial:  1+  Skin Temperature: warm    Edema Gradin+  CFT:  < 3 seconds  Pedal Hair Growth:  Absent  Varicosities: mild varicosities       Left Foot Vascularity   Dorsalis pedis:  1+  Posterior tibial:  1+  Skin Temperature: warm    Edema Gradin+  CFT:  < 3 seconds  Pedal Hair Growth:  Absent  Varicosities: mild varicosities        NEUROLOGIC     Right Foot Neurologic   Light touch sensation:  Normal  Protective Sensation using Hull-Sirena Monofilament:  1     Left Foot Neurologic   Light touch sensation:  Normal  Protective Sensation using Hull-Sirena Monofilament:  2     MUSCULOSKELETAL "      Right Foot Musculoskeletal   Ecchymosis:  None  Hammertoe:  Third toe and fourth toe     DERMATOLOGIC     Right Foot Dermatologic   Skin: corn, dry skin and skin changes    Nails: onychomycosis, abnormally thick and dystrophic nails       Left Foot Dermatologic   Skin: skin changes and ulcer    Nails: onychomycosis, abnormally thick and dystrophic nails        Right Foot Additional Comments 04/04/2023: Faint pedal pulse to bilateral lower extremities.       Left Foot Additional Comments: 04/04/2023: Faint pedal pulse to bilateral lower extremities. Wound to left lateral lower leg, measuring 1.2 cm x 1.5 cm and the depth of it is about 0.2 cm. Wound bed is covered with 80%, adherent slough otherwise wound bed red, moist. Periwound clear, dry, and intact. Small serous drainage noted on Band-Aid.      Assessment & Plan   Diagnoses and all orders for this visit:    1. 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 Venous insufficiency, peripheral    3. Venous stasis ulcer of other part of left lower leg with fat layer exposed, unspecified whether varicose veins present      Left lower extremity wound    I offered the patient home health care but he would like to try to take care of it on his own first. I advised the patient I will order the patient Hydrofiber with silver as well as a foam dressing for his wound care. I advised him to change the dressing every 3 days. I advised the patient if his wound does not improve, a compression wrap might need to be added. I informed the patient to call the office if he experiences any signs of infection. Additionally will review [facility] notes to assure patient is okay for compression. These notes were not immediately available.  I advised the patient to keep his lower extremities elevated while at home and to wear his compression stocking if he is able to tolerate. Advised pt to monitor for any signs/symtoms of infection and call office or seek urgent care should  they occur before scheduled appt.     Explained importance of diabetic foot care, daily foot checks, and glycemic control. Patient should check both feet on a daily basis, monitor and control blood sugars, make sure that both feet and in between toes are towel dried after baths or showers. Avoid barefoot walking at all times. Check shoes before putting them on.   Patient was given information on proper foot care. Call the office at the first signs of a wound or with signs of infection.    Patient will follow up in 1 week for reevaluation for LLE ulcer.              No orders of the defined types were placed in this encounter.        Transcribed from ambient dictation for LUCAS Villa by Maria Antonia Rivera.  04/04/23   13:35 EDT    Patient or patient representative verbalized consent to the visit recording.  I have personally performed the services described in this document as transcribed by the above individual, and it is both accurate and complete.  LUCAS Villa  4/7/2023  11:54 EDT

## 2023-04-05 ENCOUNTER — ANTICOAGULATION VISIT (OUTPATIENT)
Dept: PHARMACY | Facility: HOSPITAL | Age: 83
End: 2023-04-05
Payer: MEDICARE

## 2023-04-05 DIAGNOSIS — D68.59 THROMBOPHILIA: Primary | Chronic | ICD-10-CM

## 2023-04-05 LAB — INR PPP: 2.4

## 2023-04-05 NOTE — PROGRESS NOTES
Anticoagulation Clinic Progress Note    Patient's visit was held via telephone today.    Anticoagulation Summary  As of 2023    INR goal:  2.0-3.0   TTR:  85.8 % (3.7 y)   INR used for dosin.40 (2023)   Warfarin maintenance plan:  8 mg (5 mg x 1 and 1 mg x 3) every day; Starting 2023   Weekly warfarin total:  56 mg   Plan last modified:  Lynn Baum RN (2023)   Next INR check:  2023   Priority:  High   Target end date:  Indefinite    Indications    Deep vein thrombosis (DVT) [I82.409]  Thrombophilia [D68.59]             Anticoagulation Episode Summary     INR check location:      Preferred lab:      Send INR reminders to:   [facility] ON[facility] CBC ANTICOAG POOL    Comments:  MDINR Home maninder      Anticoagulation Care Providers     Provider Role Specialty Phone number    Soraya Cortez MD Referring Hematology and Oncology [phone]          Drug interactions: has remained unchanged.  Diet: has remained unchanged.    Clinic Interview:  No pertinent clinical findings have been reported.    INR History:  Anticoagulation Monitoring 3/20/2023 3/28/2023 2023   INR 2.50 2.40 2.40   INR Date 3/20/2023 3/28/2023 2023   INR Goal 2.0-3.0 2.0-3.0 2.0-3.0   Trend Same Same Same   Last Week Total 56 mg 56 mg 56 mg   Next Week Total 56 mg 56 mg 56 mg   Sun 8 mg 8 mg 8 mg   Mon 8 mg 8 mg 8 mg   Tue 8 mg 8 mg 8 mg   Wed 8 mg 8 mg 8 mg   Thu 8 mg 8 mg 8 mg   Fri 8 mg 8 mg 8 mg   Sat 8 mg 8 mg 8 mg   Visit Report - - -   Some recent data might be hidden       Plan:  1. INR is Therapeutic today- see above in Anticoagulation Summary.   Will instruct Dalton Dallas SrZoie to Continue their warfarin regimen- see above in Anticoagulation Summary.  2. Follow up in 1 week--home test  3.They have been instructed to call if any changes in medications, doses, concerns, etc. Patient expresses understanding and has no further questions at this time.    Taylor Ambriz [facility]

## 2023-04-10 RX ORDER — ATORVASTATIN CALCIUM 80 MG/1
80 TABLET, FILM COATED ORAL NIGHTLY
Qty: 90 TABLET | Refills: 3 | Status: SHIPPED | OUTPATIENT
Start: 2023-04-10

## 2023-04-10 RX ORDER — LEVOTHYROXINE SODIUM 175 UG/1
175 TABLET ORAL DAILY
Qty: 90 TABLET | Refills: 0 | Status: SHIPPED | OUTPATIENT
Start: 2023-04-10

## 2023-04-10 RX ORDER — ISOSORBIDE MONONITRATE 30 MG/1
30 TABLET, EXTENDED RELEASE ORAL DAILY
Qty: 90 TABLET | Refills: 3 | Status: SHIPPED | OUTPATIENT
Start: 2023-04-10

## 2023-04-10 RX ORDER — AMLODIPINE BESYLATE 5 MG/1
5 TABLET ORAL DAILY
Qty: 90 TABLET | Refills: 3 | Status: SHIPPED | OUTPATIENT
Start: 2023-04-10

## 2023-04-10 RX ORDER — CLOPIDOGREL BISULFATE 75 MG/1
TABLET ORAL
Qty: 90 TABLET | Refills: 3 | Status: SHIPPED | OUTPATIENT
Start: 2023-04-10

## 2023-04-10 NOTE — TELEPHONE ENCOUNTER
"    Caller: Dalton Dallas Sr. \"LOKI\"    Relationship: Self    Best call back number: [phone]    Requested Prescriptions:   Requested Prescriptions     Pending Prescriptions Disp Refills   • isosorbide mononitrate (IMDUR) 30 MG 24 hr tablet 90 tablet 3     Sig: Take 1 tablet by mouth Daily.   • amLODIPine (NORVASC) 5 MG tablet 90 tablet 3     Sig: Take 1 tablet by mouth Daily.   • atorvastatin (LIPITOR) 80 MG tablet 90 tablet 3     Sig: Take 1 tablet by mouth Every Night. Indications: High Amount of Fats in the Blood        Pharmacy where request should be sent: [facility] - [place], [place] - [address] [facility] [phone] [facility] [phone] FX     Last office visit with prescribing clinician: 11/9/2022   Last telemedicine visit with prescribing clinician: 8/9/2023   Next office visit with prescribing clinician: 8/9/2023     Additional details provided by patient: PT IS NEEDING REFILL SENT TO [facility] FOR A 90 DAY SUPPLY. HAS A WEEK SUPPLY LEFT AND IT TAKES A WEEK AND A HALF TO BE DELIVERED. PLEASE ADVISE, THANK YOU.    Does the patient have less than a 3 day supply:  [] Yes  [x] No    Would you like a call back once the refill request has been completed: [] Yes [] No    If the office needs to give you a call back, can they leave a voicemail: [] Yes [] No    Alex Ruggiero Rep   04/10/23 08:53 EDT       "

## 2023-04-11 ENCOUNTER — OFFICE VISIT (OUTPATIENT)
Dept: PODIATRY | Facility: CLINIC | Age: 83
End: 2023-04-11
Payer: MEDICARE

## 2023-04-11 ENCOUNTER — ANTICOAGULATION VISIT (OUTPATIENT)
Dept: PHARMACY | Facility: HOSPITAL | Age: 83
End: 2023-04-11
Payer: MEDICARE

## 2023-04-11 VITALS
BODY MASS INDEX: 41.8 KG/M2 | HEART RATE: 78 BPM | WEIGHT: 292 LBS | HEIGHT: 70 IN | RESPIRATION RATE: 20 BRPM | OXYGEN SATURATION: 93 %

## 2023-04-11 DIAGNOSIS — L97.929 CHRONIC VENOUS HYPERTENSION (IDIOPATHIC) WITH ULCER OF LEFT LOWER EXTREMITY: ICD-10-CM

## 2023-04-11 DIAGNOSIS — E11.42 DM TYPE 2 WITH DIABETIC PERIPHERAL NEUROPATHY: ICD-10-CM

## 2023-04-11 DIAGNOSIS — L97.922 CHRONIC ULCER OF LEFT LOWER EXTREMITY WITH FAT LAYER EXPOSED: ICD-10-CM

## 2023-04-11 DIAGNOSIS — M21.862 ACQUIRED POSTERIOR EQUINUS, LEFT: ICD-10-CM

## 2023-04-11 DIAGNOSIS — D68.59 THROMBOPHILIA: Primary | Chronic | ICD-10-CM

## 2023-04-11 DIAGNOSIS — I87.312 CHRONIC VENOUS HYPERTENSION (IDIOPATHIC) WITH ULCER OF LEFT LOWER EXTREMITY: ICD-10-CM

## 2023-04-11 DIAGNOSIS — L84 PRE-ULCERATIVE CALLUSES: Primary | ICD-10-CM

## 2023-04-11 LAB — INR PPP: 2.5

## 2023-04-11 PROCEDURE — 1160F RVW MEDS BY RX/DR IN RCRD: CPT | Performed by: PODIATRIST

## 2023-04-11 PROCEDURE — 1159F MED LIST DOCD IN RCRD: CPT | Performed by: PODIATRIST

## 2023-04-11 PROCEDURE — 99214 OFFICE O/P EST MOD 30 MIN: CPT | Performed by: PODIATRIST

## 2023-04-11 PROCEDURE — 29580 STRAPPING UNNA BOOT: CPT | Performed by: PODIATRIST

## 2023-04-11 RX ORDER — DOXYCYCLINE HYCLATE 100 MG
100 TABLET ORAL 2 TIMES DAILY
Qty: 20 TABLET | Refills: 0 | Status: SHIPPED | OUTPATIENT
Start: 2023-04-11 | End: 2023-04-21

## 2023-04-11 NOTE — PROGRESS NOTES
Anticoagulation Clinic Progress Note    Patient's visit was held via telephone today.    Anticoagulation Summary  As of 2023    INR goal:  2.0-3.0   TTR:  85.8 % (3.7 y)   INR used for dosin.50 (2023)   Warfarin maintenance plan:  8 mg (5 mg x 1 and 1 mg x 3) every day; Starting 2023   Weekly warfarin total:  56 mg   Plan last modified:  Lynn Baum RN (2023)   Next INR check:  2023   Priority:  High   Target end date:  Indefinite    Indications    Deep vein thrombosis (DVT) [I82.409]  Thrombophilia [D68.59]             Anticoagulation Episode Summary     INR check location:      Preferred lab:      Send INR reminders to:   [facility] ON[facility] CBC ANTICOAG POOL    Comments:  MDINR Home maninder      Anticoagulation Care Providers     Provider Role Specialty Phone number    Soraya Cortez MD Referring Hematology and Oncology [phone]          Drug interactions: has remained unchanged.  Diet: has remained unchanged.    Clinic Interview:  No pertinent clinical findings have been reported.    INR History:      3/20/2023     8:43 AM 3/28/2023    12:00 AM 3/28/2023    12:20 PM 2023    12:00 AM 2023     1:31 PM 2023    12:00 AM 2023     8:39 AM   Anticoagulation Monitoring   INR 2.50  2.40  2.40  2.50   INR Date 3/20/2023  3/28/2023  2023  2023   INR Goal 2.0-3.0  2.0-3.0  2.0-3.0  2.0-3.0   Trend Same  Same  Same  Same   Last Week Total 56 mg  56 mg  56 mg  56 mg   Next Week Total 56 mg  56 mg  56 mg  56 mg   Sun 8 mg  8 mg  8 mg  8 mg   Mon 8 mg  8 mg  8 mg  8 mg   Tue 8 mg  8 mg  8 mg  8 mg   Wed 8 mg  8 mg  8 mg  8 mg   Thu 8 mg  8 mg  8 mg  8 mg   Fri 8 mg  8 mg  8 mg  8 mg   Sat 8 mg  8 mg  8 mg  8 mg   Historical INR  2.40       2.40  C     2.50            C Corrected result    This result is from an external source.       Plan:  1. INR is Therapeutic today- see above in Anticoagulation Summary.   Will instruct Dalton Dallas Sr. to Continue their warfarin  regimen- see above in Anticoagulation Summary.  2. Follow up in 1 week  3.They have been instructed to call if any changes in medications, doses, concerns, etc. Patient expresses understanding and has no further questions at this time.    Talyor Ambriz RP

## 2023-04-11 NOTE — PROGRESS NOTES
"04/11/2023  Foot and Ankle Surgery - Established Patient/Follow-up  Provider: Dr. Aron Holt DPM  Location: [facility]    Subjective:  Dalton Dallas Sr. is a 82 y.o. male.     Chief Complaint   Patient presents with   • Left Lower Leg - Follow-up, Wound Check   • Left Foot - Wound Check     RIGHT GREAT TOE WOUND   • Follow-up     LUMA Sibley md  2/17/2023       HPI: The patient is a 82-year-old male who presents to the clinic for a follow-up regarding his left foot.    The patient reports he has been seeing LUCAS Cardoso, at the wound care center and in clinic. He states he has been doing well. He notes his left lower extremity has been wrapped; however, it has not provided any relief. The patient states he does not have home health visits at this time; however, he did have home health visits with his foot. He does not desire to have his foot wrapped up and inquires if he can just have it wrapped from the ankle up. He reports he needs to keep the bandage off the wound secondary to it irritating it. He reports he stubbed his toe while getting in the shower. He notes he is wearing a regular shoe. The patient states he has been in a boot in the past and he is not wearing it again. He reports his bilateral lower extremities swell during the day; however, it decreases at night. He notes he used to wear a compression stocking; however, it irritated his lower extremity.    The patient states he thinks he needs an antibiotic secondary to there is some \"stuff\" that is coming out of the wound. He notes \"it\" looks lumpy.    No Known Allergies    Current Outpatient Medications on File Prior to Visit   Medication Sig Dispense Refill   • Accu-Chek Irene Plus test strip USE TO CHECK BLOOD SUGAR BEFORE MEALS AND AT BEDTIME 400 each 3   • Accu-Chek Softclix Lancets lancets Use to test blood sugars 3 times daily. DXE11.65 100 each 11   • amLODIPine (NORVASC) 5 MG tablet Take 1 tablet by mouth Daily. 90 " "tablet 3   • atenolol (TENORMIN) 50 MG tablet TAKE 1 TABLET BY MOUTH DAILY 90 tablet 3   • atorvastatin (LIPITOR) 80 MG tablet Take 1 tablet by mouth Every Night. Indications: High Amount of Fats in the Blood 90 tablet 3   • Blood Glucose Monitoring Suppl (Accu-Chek Irene Plus) w/Device kit 1 each 4 (Four) Times a Day. 1 kit 0   • clopidogrel (PLAVIX) 75 MG tablet TAKE 1 TABLET DAILY 90 tablet 3   • fenofibrate (TRICOR) 54 MG tablet Take 1 tablet by mouth Daily. Indications: High Amount of Triglycerides in the Blood     • ferrous sulfate 325 (65 FE) MG tablet Take 1 tablet by mouth 2 (Two) Times a Day.     • gabapentin (NEURONTIN) 100 MG capsule Take 1 capsule by mouth 3 (Three) Times a Day. Indications: Neuropathic Pain     • hydroCHLOROthiazide (HYDRODIURIL) 25 MG tablet TAKE 1 TABLET BY MOUTH DAILY 90 tablet 3   • Insulin Pen Needle (BD ULTRA-FINE PEN NEEDLES) 29G X 12.7MM misc Use to inject insulin twice daily. DX E11.65 200 each 2   • insulin regular (HUMULIN R) 500 UNIT/ML CONCENTRATED injection DRAW TO 24 UNIT REI ON U-100 SYRINGE AND INJECT IN THE MORNING AND 40 UNIT REI IN THE EVENING  Indications: Type 2 Diabetes 60 mL 3   • Insulin Syringe-Needle U-100 (BD Insulin Syringe U/F) 31G X 5/16\" 0.5 ML misc 1 each by Other route 2 (Two) Times a Day. 200 each 3   • irbesartan (AVAPRO) 300 MG tablet Take 1 tablet by mouth Daily. Indications: High Blood Pressure Disorder     • isosorbide mononitrate (IMDUR) 30 MG 24 hr tablet Take 1 tablet by mouth Daily. 90 tablet 3   • Jardiance 25 MG tablet tablet TAKE 1 TABLET BY MOUTH DAILY 90 tablet 3   • levothyroxine (SYNTHROID, LEVOTHROID) 175 MCG tablet Take 1 tablet by mouth Daily. Indications: Underactive Thyroid 90 tablet 0   • metFORMIN ER (GLUCOPHAGE-XR) 500 MG 24 hr tablet TAKE 4 TABLETS AT SUPPER 360 tablet 3   • O2 (OXYGEN) Inhale 1 L/min Every Night. Indications: marianne     • Omega-3 Fatty Acids (FISH OIL) 1000 MG capsule capsule Take 1 capsule by mouth 2 (Two) " "Times a Day.     • Pramlintide Acetate (SymlinPen 120) 2700 MCG/2.7ML solution pen-injector 120 MCG UNDER THE SKIN INTO THE APPROPRIATE AREA AS DIRECTED THREE TIMES A DAY 32.4 mL 3   • vitamin B-12 (CYANOCOBALAMIN) 100 MCG tablet Take 1 tablet by mouth Daily. Indications: Inadequate Vitamin B12     • vitamin D (ERGOCALCIFEROL) 1.25 MG (74411 UT) capsule capsule Take 1 capsule by mouth 1 (One) Time Per Week. Indications: Vitamin D Deficiency 13 capsule 3   • warfarin (COUMADIN) 1 MG tablet Take 1 tablet PO every evening as directed. 30 tablet 3   • warfarin (COUMADIN) 2 MG tablet TAKE 1 TABLET BY MOUTH DAILY OR  AS DIRECTED BASED ON INR 60 tablet 5   • warfarin (COUMADIN) 3 MG tablet Take 1 tablet by mouth Daily. Indications: Atrial Fibrillation 90 tablet 3   • warfarin (COUMADIN) 5 MG tablet Take 1 tablet by mouth Every Night. Indications: Atrial Fibrillation 30 tablet 2     No current facility-administered medications on file prior to visit.       Objective   Pulse 78   Resp 20   Ht 177.8 cm (70\")   Wt 132 kg (292 lb)   SpO2 93%   BMI 41.90 kg/m²     Foot/Ankle Exam    GENERAL  Diabetic foot exam performed    Appearance:  obese and elderly  Orientation:  AAOx3  Affect:  appropriate  Gait:  unimpaired  Assistance:  independent    VASCULAR     Right Foot Vascularity   Dorsalis pedis:  1+  Posterior tibial:  1+  Skin temperature:  warm  Edema gradin+  CFT:  < 3 seconds  Pedal hair growth:  Absent  Varicosities:  mild varicosities     Left Foot Vascularity   Dorsalis pedis:  1+  Posterior tibial:  1+  Skin temperature:  warm  Edema gradin+  CFT:  < 3 seconds  Pedal hair growth:  Absent  Varicosities:  mild varicosities     NEUROLOGIC     Right Foot Neurologic   Light touch sensation: normal     Left Foot Neurologic   Light touch sensation: normal    MUSCULOSKELETAL     Right Foot Musculoskeletal   Hammertoe:  Third toe and fourth toe    DERMATOLOGIC      Right Foot Dermatologic   Skin  Positive for corn, " dryness and skin changes.      Left Foot Dermatologic   Skin  Positive for skin changes and ulcer.      Right foot additional comments: 04/04/2023: Faint pedal pulse to bilateral lower extremities.      Left foot additional comments: 04/04/2023: Faint pedal pulse to bilateral lower extremities. Wound to left lateral lower leg, measuring 1.2 cm x 1.5 cm and the depth of it is about 0.2 cm. Wound bed is covered with 80%, adherent slough otherwise wound bed red, moist. Periwound clear, dry, and intact. Small serous drainage noted on Band-Aid.    04/11/2023 Pre-ulcerative callus formation involving the plantar lateral aspect of the left foot at the fifth metatarsal head region. Open wound to the lateral aspect of the lower leg measuring approximately 1.5 cm in diameter and extensive subcutaneous tissue. Mild exudate noted to the base of the wound. No periwound erythema or denis signs of infection. Moderate pitting edema to the left lower extremity.      Assessment & Plan   Diagnoses and all orders for this visit:    1. Pre-ulcerative calluses (Primary)    2. Chronic venous hypertension (idiopathic) with ulcer of left lower extremity    3. 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 DM type 2 with diabetic peripheral neuropathy    5. Acquired posterior equinus, left    Other orders  -     doxycycline (VIBRAMYICN) 100 MG tablet; Take 1 tablet by mouth 2 (Two) Times a Day for 10 days.  Dispense: 20 tablet; Refill: 0        The patient is a 82-year-old male who presents to the office today for a follow-up regarding his left foot wound. I explained that he has a significant amount of weight and activity that is putting pressure on the wound. I discussed he has a callus formation which is a preulcerative callus. I recommend an emery board to ensure the callus remains thinned down. I explained that weight is not displacing evenly across his foot when he is ambulating. I explained that is a difficult  thing to offload secondary to how his body wants to function. I discussed I am able to prescribe an antibiotic and the fluid that is leaking from the wound is secondary to the fluid on his leg, mixed with the byproduct of his body; serous fluid. I explained the area does not look like it is actively infected; however, I can give his something to suppress to make sure there is no active infection. I recommend continued compression. I will place him in a wrap today. I explained he is retaining fluid and will continue if it is not fixed. I suggest he put himself back in the boot. He will keep the wrap intact until he sees LUCAS Mcnamara next week.     Greater than 30 minutes was spent before, during, and after evaluation for patient care.    No orders of the defined types were placed in this encounter.         Note is dictated utilizing voice recognition software. Unfortunately this leads to occasional typographical errors. I apologize in advance if the situation occurs. If questions occur please do not hesitate to call our office.    Transcribed from ambient dictation for CHRISSY Holt DPM by Chaya Mays.  04/11/23   14:16 EDT    Patient or patient representative verbalized consent to the visit recording.  I have personally performed the services described in this document as transcribed by the above individual, and it is both accurate and complete.

## 2023-04-17 ENCOUNTER — ANTICOAGULATION VISIT (OUTPATIENT)
Dept: PHARMACY | Facility: HOSPITAL | Age: 83
End: 2023-04-17
Payer: MEDICARE

## 2023-04-17 DIAGNOSIS — D68.59 THROMBOPHILIA: Primary | Chronic | ICD-10-CM

## 2023-04-17 LAB — INR PPP: 2.7

## 2023-04-17 RX ORDER — METFORMIN HYDROCHLORIDE 500 MG/1
TABLET, EXTENDED RELEASE ORAL
Qty: 360 TABLET | Refills: 3 | Status: SHIPPED | OUTPATIENT
Start: 2023-04-17 | End: 2023-04-18 | Stop reason: SDUPTHER

## 2023-04-17 NOTE — PROGRESS NOTES
Anticoagulation Clinic Progress Note    Patient's visit was held by phone today.    Anticoagulation Summary  As of 2023    INR goal:  2.0-3.0   TTR:  85.9 % (3.7 y)   INR used for dosin.70 (2023)   Warfarin maintenance plan:  8 mg (5 mg x 1 and 1 mg x 3) every day; Starting 2023   Weekly warfarin total:  56 mg   No change documented:  Taylor mAbriz [facility]   Plan last modified:  Lynn Baum RN (2023)   Next INR check:  2023   Priority:  High   Target end date:  Indefinite    Indications    Deep vein thrombosis (DVT) [I82.409]  Thrombophilia [D68.59]             Anticoagulation Episode Summary     INR check location:      Preferred lab:      Send INR reminders to:   [facility] ONC CBC ANTICOAG POOL    Comments:  MDINR Home maninder      Anticoagulation Care Providers     Provider Role Specialty Phone number    Soraya Cortez MD Referring Hematology and Oncology [phone]          Drug interactions: has remained unchanged.  Diet: has remained unchanged.    Clinic Interview:  No pertinent clinical findings have been reported.    INR History:      3/28/2023    12:20 PM 2023    12:00 AM 2023     1:31 PM 2023    12:00 AM 2023     8:39 AM 2023    12:00 AM 2023     8:57 AM   Anticoagulation Monitoring   INR 2.40  2.40  2.50  2.70   INR Date 3/28/2023  2023  2023  2023   INR Goal 2.0-3.0  2.0-3.0  2.0-3.0  2.0-3.0   Trend Same  Same  Same  Same   Last Week Total 56 mg  56 mg  56 mg  56 mg   Next Week Total 56 mg  56 mg  56 mg  56 mg   Sun 8 mg  8 mg  8 mg  8 mg   Mon 8 mg  8 mg  8 mg  8 mg   Tue 8 mg  8 mg  8 mg  8 mg   Wed 8 mg  8 mg  8 mg  8 mg   Thu 8 mg  8 mg  8 mg  8 mg   Fri 8 mg  8 mg  8 mg  8 mg   Sat 8 mg  8 mg  8 mg  8 mg   Historical INR  2.40  C     2.50       2.70         Visit Report    Report Report        C Corrected result    This result is from an external source.       Plan:  1. INR is Therapeutic today- see above in Anticoagulation  Summary.   Will instruct Dalton CHAU Burt Sr. to Continue their warfarin regimen- see above in Anticoagulation Summary.  2. Follow up in 1 weeks--home test  3.They have been instructed to call if any changes in medications, doses, concerns, etc. Patient expresses understanding and has no further questions at this time.    Taylor Ambriz [facility]

## 2023-04-18 RX ORDER — METFORMIN HYDROCHLORIDE 500 MG/1
TABLET, EXTENDED RELEASE ORAL
Qty: 360 TABLET | Refills: 3 | Status: SHIPPED | OUTPATIENT
Start: 2023-04-18

## 2023-04-21 ENCOUNTER — OFFICE VISIT (OUTPATIENT)
Dept: PODIATRY | Facility: CLINIC | Age: 83
End: 2023-04-21
Payer: MEDICARE

## 2023-04-21 ENCOUNTER — HOME HEALTH ADMISSION (OUTPATIENT)
Dept: HOME HEALTH SERVICES | Facility: HOME HEALTHCARE | Age: 83
End: 2023-04-21
Payer: MEDICARE

## 2023-04-21 VITALS — RESPIRATION RATE: 20 BRPM | HEIGHT: 70 IN | WEIGHT: 292 LBS | BODY MASS INDEX: 41.8 KG/M2

## 2023-04-21 DIAGNOSIS — L97.422 DIABETIC ULCER OF LEFT MIDFOOT ASSOCIATED WITH TYPE 2 DIABETES MELLITUS, WITH FAT LAYER EXPOSED: ICD-10-CM

## 2023-04-21 DIAGNOSIS — I83.028 VENOUS STASIS ULCER OF OTHER PART OF LEFT LOWER LEG WITH FAT LAYER EXPOSED, UNSPECIFIED WHETHER VARICOSE VEINS PRESENT: ICD-10-CM

## 2023-04-21 DIAGNOSIS — E11.621 DIABETIC ULCER OF LEFT MIDFOOT ASSOCIATED WITH TYPE 2 DIABETES MELLITUS, WITH FAT LAYER EXPOSED: ICD-10-CM

## 2023-04-21 DIAGNOSIS — L97.822 VENOUS STASIS ULCER OF OTHER PART OF LEFT LOWER LEG WITH FAT LAYER EXPOSED, UNSPECIFIED WHETHER VARICOSE VEINS PRESENT: ICD-10-CM

## 2023-04-21 DIAGNOSIS — E11.42 DM TYPE 2 WITH DIABETIC PERIPHERAL NEUROPATHY: Primary | ICD-10-CM

## 2023-04-21 NOTE — PROGRESS NOTES
04/21/2023  Foot and Ankle Surgery - Established Patient/Follow-up  Provider: LUCAS Ramos   Location: [facility]    Subjective:  Dalton Dallas Sr. is a 82 y.o. male.     Chief Complaint   Patient presents with   • Left Foot - Wound Check, Follow-up   • Left Lower Leg - Follow-up, Wound Check   • Follow-up     2/17/2023       The patient presents today for reevaluation of left lower extremity ulcer.    Patient was last seen by Dr. Holt approximately 10 days ago and had an Unna boot placed with dressing.  He reports he is finished oral antibiotic.  He denies any fever chills or signs of systemic infection.  He acknowledges that he is not able to dress or to assess the wound to his left lateral lower leg.  He reports there is no one at home to help him with dressing changes.  He does report that he can place dressings to his left foot somewhat.  He reports wound to his left foot has returned.  He states he has tolerated Unna boot in place.  He is agreeable to home health care helping with dressing changes.      No Known Allergies    Current Outpatient Medications on File Prior to Visit   Medication Sig Dispense Refill   • Accu-Chek Irene Plus test strip USE TO CHECK BLOOD SUGAR BEFORE MEALS AND AT BEDTIME 400 each 3   • Accu-Chek Softclix Lancets lancets Use to test blood sugars 3 times daily. DXE11.65 100 each 11   • amLODIPine (NORVASC) 5 MG tablet Take 1 tablet by mouth Daily. 90 tablet 3   • atenolol (TENORMIN) 50 MG tablet TAKE 1 TABLET BY MOUTH DAILY 90 tablet 3   • atorvastatin (LIPITOR) 80 MG tablet Take 1 tablet by mouth Every Night. Indications: High Amount of Fats in the Blood 90 tablet 3   • Blood Glucose Monitoring Suppl (Accu-Chek Irene Plus) w/Device kit 1 each 4 (Four) Times a Day. 1 kit 0   • clopidogrel (PLAVIX) 75 MG tablet TAKE 1 TABLET DAILY 90 tablet 3   • doxycycline (VIBRAMYICN) 100 MG tablet Take 1 tablet by mouth 2 (Two) Times a Day for 10 days. 20 tablet 0   •  "fenofibrate (TRICOR) 54 MG tablet Take 1 tablet by mouth Daily. Indications: High Amount of Triglycerides in the Blood     • ferrous sulfate 325 (65 FE) MG tablet Take 1 tablet by mouth 2 (Two) Times a Day.     • gabapentin (NEURONTIN) 100 MG capsule Take 1 capsule by mouth 3 (Three) Times a Day. Indications: Neuropathic Pain     • hydroCHLOROthiazide (HYDRODIURIL) 25 MG tablet TAKE 1 TABLET BY MOUTH DAILY 90 tablet 3   • Insulin Pen Needle (BD ULTRA-FINE PEN NEEDLES) 29G X 12.7MM misc Use to inject insulin twice daily. DX E11.65 200 each 2   • insulin regular (HUMULIN R) 500 UNIT/ML CONCENTRATED injection DRAW TO 24 UNIT REI ON U-100 SYRINGE AND INJECT IN THE MORNING AND 40 UNIT REI IN THE EVENING  Indications: Type 2 Diabetes 60 mL 3   • Insulin Syringe-Needle U-100 (BD Insulin Syringe U/F) 31G X 5/16\" 0.5 ML misc 1 each by Other route 2 (Two) Times a Day. 200 each 3   • irbesartan (AVAPRO) 300 MG tablet Take 1 tablet by mouth Daily. Indications: High Blood Pressure Disorder     • isosorbide mononitrate (IMDUR) 30 MG 24 hr tablet Take 1 tablet by mouth Daily. 90 tablet 3   • Jardiance 25 MG tablet tablet TAKE 1 TABLET BY MOUTH DAILY 90 tablet 3   • levothyroxine (SYNTHROID, LEVOTHROID) 175 MCG tablet Take 1 tablet by mouth Daily. Indications: Underactive Thyroid 90 tablet 0   • metFORMIN ER (GLUCOPHAGE-XR) 500 MG 24 hr tablet Take 4 tablets at supper  Indications: Type 2 Diabetes 360 tablet 3   • O2 (OXYGEN) Inhale 1 L/min Every Night. Indications: marianne     • Omega-3 Fatty Acids (FISH OIL) 1000 MG capsule capsule Take 1 capsule by mouth 2 (Two) Times a Day.     • Pramlintide Acetate (SymlinPen 120) 2700 MCG/2.7ML solution pen-injector 120 MCG UNDER THE SKIN INTO THE APPROPRIATE AREA AS DIRECTED THREE TIMES A DAY 32.4 mL 3   • vitamin B-12 (CYANOCOBALAMIN) 100 MCG tablet Take 1 tablet by mouth Daily. Indications: Inadequate Vitamin B12     • vitamin D (ERGOCALCIFEROL) 1.25 MG (93935 UT) capsule capsule Take 1 " "capsule by mouth 1 (One) Time Per Week. Indications: Vitamin D Deficiency 13 capsule 3   • warfarin (COUMADIN) 1 MG tablet Take 1 tablet PO every evening as directed. 30 tablet 3   • warfarin (COUMADIN) 2 MG tablet TAKE 1 TABLET BY MOUTH DAILY OR  AS DIRECTED BASED ON INR 60 tablet 5   • warfarin (COUMADIN) 3 MG tablet Take 1 tablet by mouth Daily. Indications: Atrial Fibrillation 90 tablet 3   • warfarin (COUMADIN) 5 MG tablet Take 1 tablet by mouth Every Night. Indications: Atrial Fibrillation 30 tablet 2     No current facility-administered medications on file prior to visit.       Objective   Resp 20   Ht 177.8 cm (70\")   Wt 132 kg (292 lb)   BMI 41.90 kg/m²     Foot/Ankle Exam    GENERAL  Appearance:  appears stated age and elderly  Orientation:  AAOx3  Affect:  appropriate  Gait:  unimpaired  Assistance:  independent  Right shoe gear: casual shoe and sock  Left shoe gear: casual shoe and sock    VASCULAR     Left Foot Vascularity   Dorsalis pedis:  1+ (Faint)  Skin temperature:  warm  Edema grading:  Trace  CFT:  < 3 seconds  Pedal hair growth:  Absent  Varicosities:  none     NEUROLOGIC     Left Foot Neurologic   Light touch sensation: normal    MUSCULOSKELETAL     Left Foot Musculoskeletal   Ecchymosis:  none  Tenderness:  none (Left lateral leg wound)    DERMATOLOGIC        Left Foot Dermatologic   Skin  Positive for skin changes and ulcer.      Left foot additional comments: The wound on his left lateral leg was 1.2 x 1.5 x 0.2 cm, today it is 1.5 x 1.5 x 0.2 cm. The wound bed of this wound has approximately 75% slough on it, otherwise it's pink, moist, and clean. The area around the wound is excoriated. There are no signs of infection.  Small slightly purulent drainage on old dressing.  Do feel this is related to dressing being in place for 10 days.    There is also an ulcer on his left plantar, fifth metatarsal head that measures 0.8 x 0.6 x 0.2 cm.  Scant serosanguineous drainage noted wound bed " pink, moist, clean periwound clear dry and intact.      Assessment & Plan   Diagnoses and all orders for this visit:    1. 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 Venous stasis ulcer of other part of left lower leg with fat layer exposed, unspecified whether varicose veins present    3. Diabetic ulcer of left midfoot associated with type 2 diabetes mellitus, with fat layer exposed      1. Left lower extremity ulcer.    On exam left lateral lower extremity ulcer measures about the same but wound bed is more necrotic.  I do feel that this is a expected progression as there is ecchymosis noted in previous wound bed.  Do feel patient would benefit from more frequent dressing changes.  Do feel slightly purulent drainage noted today on dressing is from drainage on dressing being quite old.  No other signs of infection noted today.  Patient is sensate and would not tolerate debridement today of the wound.    Additionally patient has return of left plantar foot diabetic ulcer today.    Will recommend Hydrofiber with silver followed by ABD pad and 2 layer light compression to left lower extremity to be changed 3 times weekly.  Would consider cam walking boot for offloading of the left lower extremity though I do feel that this will rub on left lateral leg wound so we will have patient partially offload left plantar foot wound with surgical shoe and keep activity low.    Will recommend patient continue to monitor for any systemic signs of infection or worsening signs of infection to wounds which were reviewed today at length.  Would like for patient to keep activity low and therefore do not want activity of driving to multiple appointments.  Would like for patient to follow-up with me in 2 weeks for reevaluation and to call office or seek urgent care with any questions or concerns should they arise before scheduled appointment.    I              No orders of the defined types were placed in this  encounter.       Transcribed from ambient dictation for LUCAS Villa by Franky Logan.  04/21/23   14:36 EDT    Patient or patient representative verbalized consent to the visit recording.  I have personally performed the services described in this document as transcribed by the above individual, and it is both accurate and complete.  LUCAS Villa  4/21/2023  17:38 EDT

## 2023-04-24 ENCOUNTER — ANTICOAGULATION VISIT (OUTPATIENT)
Dept: PHARMACY | Facility: HOSPITAL | Age: 83
End: 2023-04-24
Payer: MEDICARE

## 2023-04-24 ENCOUNTER — TELEPHONE (OUTPATIENT)
Dept: PODIATRY | Facility: CLINIC | Age: 83
End: 2023-04-24
Payer: MEDICARE

## 2023-04-24 DIAGNOSIS — D68.59 THROMBOPHILIA: Primary | Chronic | ICD-10-CM

## 2023-04-24 LAB — INR PPP: 2.9

## 2023-04-24 NOTE — PROGRESS NOTES
Anticoagulation Clinic Progress Note    Patient's visit was held by phone today.    Anticoagulation Summary  As of 2023    INR goal:  2.0-3.0   TTR:  85.9 % (3.7 y)   INR used for dosin.90 (2023)   Warfarin maintenance plan:  8 mg (5 mg x 1 and 1 mg x 3) every day; Starting 2023   Weekly warfarin total:  56 mg   No change documented:  Taylor Ambriz [facility]   Plan last modified:  Lynn Baum RN (2023)   Next INR check:  2023   Priority:  High   Target end date:  Indefinite    Indications    Deep vein thrombosis (DVT) [I82.409]  Thrombophilia [D68.59]             Anticoagulation Episode Summary     INR check location:      Preferred lab:      Send INR reminders to:   [facility] ONC CBC ANTICOAG POOL    Comments:  MDINR Home maninder      Anticoagulation Care Providers     Provider Role Specialty Phone number    Soraya Cortez MD Referring Hematology and Oncology [phone]          Drug interactions: has remained unchanged.  Diet: has remained unchanged.    Clinic Interview:  No pertinent clinical findings have been reported.    INR History:      2023     1:31 PM 2023    12:00 AM 2023     8:39 AM 2023    12:00 AM 2023     8:57 AM 2023    12:00 AM 2023    12:01 PM   Anticoagulation Monitoring   INR 2.40  2.50  2.70  2.90   INR Date 2023   INR Goal 2.0-3.0  2.0-3.0  2.0-3.0  2.0-3.0   Trend Same  Same  Same  Same   Last Week Total 56 mg  56 mg  56 mg  56 mg   Next Week Total 56 mg  56 mg  56 mg  56 mg   Sun 8 mg  8 mg  8 mg  8 mg   Mon 8 mg  8 mg  8 mg  8 mg   Tue 8 mg  8 mg  8 mg  8 mg   Wed 8 mg  8 mg  8 mg  8 mg   Thu 8 mg  8 mg  8 mg  8 mg   Fri 8 mg  8 mg  8 mg  8 mg   Sat 8 mg  8 mg  8 mg  8 mg   Historical INR  2.50       2.70       2.90         Visit Report  Report Report           This result is from an external source.       Plan:  1. INR is Therapeutic today- see above in Anticoagulation Summary.   Will instruct  Dalton CHAU Burt Sr. to Continue their warfarin regimen- see above in Anticoagulation Summary.  2. Follow up in 1 week  3.They have been instructed to call if any changes in medications, doses, concerns, etc. Patient expresses understanding and has no further questions at this time.    Taylor Ambriz [facility]

## 2023-04-24 NOTE — TELEPHONE ENCOUNTER
Caller: DESI    Relationship to patient: [facility]    Best call back number: [phone]    Patient is needing: DESI WITH [facility] WAS CALLING TO LET YOUR OFFICE KNOW THAT THEY HAD TO DECLINE THE REFERRAL THEY RECEIVED FOR THIS PATIENT DUE TO NOT HAVING ENOUGH STAFF AND NOT KNOWING WHEN THEY COULD GET TO THIS PATIENT. DESI STATED THEY RECEIVED A REFERRAL ON Friday, 4.21.23 AND TODAY SO SHE WANTED TO MAKE SURE YOU ALL GOT HER MESSAGE THAT THEY ARE DECLINING THIS REFERRAL DUE TO NOT HAVING ENOUGH STAFF. I ATTEMPTED TO WARM TRANSFER CALL TO THE CLINICAL BUT RECEIVED NO ANSWER. IF YOU HAVE ANY QUESTIONS YOU CAN GIVE DESI A CALL BACK [phone]. THANK YOU!

## 2023-05-01 ENCOUNTER — ANTICOAGULATION VISIT (OUTPATIENT)
Dept: PHARMACY | Facility: HOSPITAL | Age: 83
End: 2023-05-01
Payer: MEDICARE

## 2023-05-01 DIAGNOSIS — D68.59 THROMBOPHILIA: Primary | Chronic | ICD-10-CM

## 2023-05-01 LAB — INR PPP: 2.7

## 2023-05-01 NOTE — PROGRESS NOTES
Anticoagulation Clinic Progress Note    Patient's visit was held by phone today.    Anticoagulation Summary  As of 2023    INR goal:  2.0-3.0   TTR:  86.0 % (3.7 y)   INR used for dosin.70 (2023)   Warfarin maintenance plan:  8 mg (5 mg x 1 and 1 mg x 3) every day; Starting 2023   Weekly warfarin total:  56 mg   No change documented:  Taylor Ambriz [facility]   Plan last modified:  Lynn Baum RN (2023)   Next INR check:  2023   Priority:  High   Target end date:  Indefinite    Indications    Deep vein thrombosis (DVT) [I82.409]  Thrombophilia [D68.59]             Anticoagulation Episode Summary     INR check location:      Preferred lab:      Send INR reminders to:   [facility] CBC ANTICOAG POOL    Comments:  MDINR Home maninder      Anticoagulation Care Providers     Provider Role Specialty Phone number    Soraya Cortez MD Referring Hematology and Oncology [phone]          Drug interactions: has remained unchanged.  Diet: has remained unchanged.    Clinic Interview:  No pertinent clinical findings have been reported.    INR History:      2023     8:39 AM 2023    12:00 AM 2023     8:57 AM 2023    12:00 AM 2023    12:01 PM 2023    12:00 AM 2023     4:56 PM   Anticoagulation Monitoring   INR 2.50  2.70  2.90  2.70   INR Date 2023   INR Goal 2.0-3.0  2.0-3.0  2.0-3.0  2.0-3.0   Trend Same  Same  Same  Same   Last Week Total 56 mg  56 mg  56 mg  56 mg   Next Week Total 56 mg  56 mg  56 mg  56 mg   Sun 8 mg  8 mg  8 mg  8 mg   Mon 8 mg  8 mg  8 mg  8 mg   Tue 8 mg  8 mg  8 mg  8 mg   Wed 8 mg  8 mg  8 mg  8 mg   Thu 8 mg  8 mg  8 mg  8 mg   Fri 8 mg  8 mg  8 mg  8 mg   Sat 8 mg  8 mg  8 mg  8 mg   Historical INR  2.70       2.90       2.70         Visit Report Report             This result is from an external source.       Plan:  1. INR is Therapeutic today- see above in Anticoagulation Summary.   Will instruct Dalton CHAU  Burt Sr. to Continue their warfarin regimen- see above in Anticoagulation Summary.  2. Follow up in 1 week-home test  3.They have been instructed to call if any changes in medications, doses, concerns, etc. Patient expresses understanding and has no further questions at this time.    Taylor Ambriz [facility]

## 2023-05-08 ENCOUNTER — ANTICOAGULATION VISIT (OUTPATIENT)
Dept: PHARMACY | Facility: HOSPITAL | Age: 83
End: 2023-05-08
Payer: MEDICARE

## 2023-05-08 DIAGNOSIS — D68.59 THROMBOPHILIA: Primary | Chronic | ICD-10-CM

## 2023-05-08 LAB — INR PPP: 2.1

## 2023-05-08 NOTE — PROGRESS NOTES
Anticoagulation Clinic Progress Note    Patient's visit was held by phone today.    Anticoagulation Summary  As of 2023    INR goal:  2.0-3.0   TTR:  86.1 % (3.8 y)   INR used for dosin.10 (2023)   Warfarin maintenance plan:  8 mg (5 mg x 1 and 1 mg x 3) every day; Starting 2023   Weekly warfarin total:  56 mg   No change documented:  Taylor Ambriz [facility]   Plan last modified:  Lynn Baum RN (2023)   Next INR check:  5/15/2023   Priority:  High   Target end date:  Indefinite    Indications    Deep vein thrombosis (DVT) [I82.409]  Thrombophilia [D68.59]             Anticoagulation Episode Summary     INR check location:      Preferred lab:      Send INR reminders to:   [facility] CBC ANTICOAG POOL    Comments:  MDINR Home maninder      Anticoagulation Care Providers     Provider Role Specialty Phone number    Soraya Cortez MD Referring Hematology and Oncology [phone]          Drug interactions: has remained unchanged.  Diet: has remained unchanged.    Clinic Interview:  No pertinent clinical findings have been reported.    INR History:      2023     8:57 AM 2023    12:00 AM 2023    12:01 PM 2023    12:00 AM 2023     4:56 PM 2023    12:00 AM 2023     5:35 PM   Anticoagulation Monitoring   INR 2.70  2.90  2.70  2.10   INR Date 2023   INR Goal 2.0-3.0  2.0-3.0  2.0-3.0  2.0-3.0   Trend Same  Same  Same  Same   Last Week Total 56 mg  56 mg  56 mg  56 mg   Next Week Total 56 mg  56 mg  56 mg  56 mg   Sun 8 mg  8 mg  8 mg  8 mg   Mon 8 mg  8 mg  8 mg  8 mg   Tue 8 mg  8 mg  8 mg  8 mg   Wed 8 mg  8 mg  8 mg  8 mg   Thu 8 mg  8 mg  8 mg  8 mg   Fri 8 mg  8 mg  8 mg  8 mg   Sat 8 mg  8 mg  8 mg  8 mg   Historical INR  2.90       2.70       2.10             This result is from an external source.       Plan:  1. INR is Therapeutic today- see above in Anticoagulation Summary.   Will instruct Dalton Dallas Sr. to Continue their  warfarin regimen- see above in Anticoagulation Summary.  2. Follow up in 1 week  3.They have been instructed to call if any changes in medications, doses, concerns, etc. Patient expresses understanding and has no further questions at this time.    Taylor Ambriz RP

## 2023-05-09 ENCOUNTER — OFFICE VISIT (OUTPATIENT)
Dept: PODIATRY | Facility: CLINIC | Age: 83
End: 2023-05-09
Payer: MEDICARE

## 2023-05-09 VITALS
HEIGHT: 70 IN | BODY MASS INDEX: 41.8 KG/M2 | HEART RATE: 65 BPM | WEIGHT: 292 LBS | RESPIRATION RATE: 20 BRPM | OXYGEN SATURATION: 99 %

## 2023-05-09 DIAGNOSIS — L84 CALLUS OF PERIWOUND SKIN: ICD-10-CM

## 2023-05-09 DIAGNOSIS — E11.621 DIABETIC ULCER OF LEFT MIDFOOT ASSOCIATED WITH TYPE 2 DIABETES MELLITUS, WITH FAT LAYER EXPOSED: ICD-10-CM

## 2023-05-09 DIAGNOSIS — L97.422 DIABETIC ULCER OF LEFT MIDFOOT ASSOCIATED WITH TYPE 2 DIABETES MELLITUS, WITH FAT LAYER EXPOSED: ICD-10-CM

## 2023-05-09 DIAGNOSIS — I83.028 VENOUS STASIS ULCER OF OTHER PART OF LEFT LOWER LEG WITH FAT LAYER EXPOSED, UNSPECIFIED WHETHER VARICOSE VEINS PRESENT: Primary | ICD-10-CM

## 2023-05-09 DIAGNOSIS — L97.822 VENOUS STASIS ULCER OF OTHER PART OF LEFT LOWER LEG WITH FAT LAYER EXPOSED, UNSPECIFIED WHETHER VARICOSE VEINS PRESENT: Primary | ICD-10-CM

## 2023-05-09 DIAGNOSIS — E11.42 DM TYPE 2 WITH DIABETIC PERIPHERAL NEUROPATHY: ICD-10-CM

## 2023-05-09 NOTE — PROGRESS NOTES
05/09/2023  Foot and Ankle Surgery - Established Patient/Follow-up  Provider: LUCAS Ramos   Location: [facility]    Subjective:  Dalton Dallas Sr. is a 82 y.o. male.     Chief Complaint   Patient presents with   • Left Foot - Wound Check, Follow-up   • Left Lower Leg - Wound Check, Follow-up   • Follow-up     Gabino pugh md        Patient presents to office today for reevaluation of left lower extremity venous stasis ulceration and left foot diabetic ulcer.  Patient reports that he has had home health care assisting with dressing changes but they only come once weekly and he is changing dressing every other day.  Patient denies that compression wrap has been in place.  Patient reports that he received wound care kit with honey gel.  He reports that his left lower extremity is very swollen today.  He has been keeping activity low and wearing postop shoe to help offload the left foot ulcer.      No Known Allergies    Current Outpatient Medications on File Prior to Visit   Medication Sig Dispense Refill   • Accu-Chek Irene Plus test strip USE TO CHECK BLOOD SUGAR BEFORE MEALS AND AT BEDTIME 400 each 3   • Accu-Chek Softclix Lancets lancets Use to test blood sugars 3 times daily. DXE11.65 100 each 11   • amLODIPine (NORVASC) 5 MG tablet Take 1 tablet by mouth Daily. 90 tablet 3   • atenolol (TENORMIN) 50 MG tablet TAKE 1 TABLET BY MOUTH DAILY 90 tablet 3   • atorvastatin (LIPITOR) 80 MG tablet Take 1 tablet by mouth Every Night. Indications: High Amount of Fats in the Blood 90 tablet 3   • Blood Glucose Monitoring Suppl (Accu-Chek Irene Plus) w/Device kit 1 each 4 (Four) Times a Day. 1 kit 0   • clopidogrel (PLAVIX) 75 MG tablet TAKE 1 TABLET DAILY 90 tablet 3   • fenofibrate (TRICOR) 54 MG tablet Take 1 tablet by mouth Daily. Indications: High Amount of Triglycerides in the Blood     • ferrous sulfate 325 (65 FE) MG tablet Take 1 tablet by mouth 2 (Two) Times a Day.     • gabapentin  "(NEURONTIN) 100 MG capsule Take 1 capsule by mouth 3 (Three) Times a Day. Indications: Neuropathic Pain     • hydroCHLOROthiazide (HYDRODIURIL) 25 MG tablet TAKE 1 TABLET BY MOUTH DAILY 90 tablet 3   • Insulin Pen Needle (BD ULTRA-FINE PEN NEEDLES) 29G X 12.7MM misc Use to inject insulin twice daily. DX E11.65 200 each 2   • insulin regular (HUMULIN R) 500 UNIT/ML CONCENTRATED injection DRAW TO 24 UNIT REI ON U-100 SYRINGE AND INJECT IN THE MORNING AND 40 UNIT REI IN THE EVENING  Indications: Type 2 Diabetes 60 mL 3   • Insulin Syringe-Needle U-100 (BD Insulin Syringe U/F) 31G X 5/16\" 0.5 ML misc 1 each by Other route 2 (Two) Times a Day. 200 each 3   • irbesartan (AVAPRO) 300 MG tablet Take 1 tablet by mouth Daily. Indications: High Blood Pressure Disorder     • isosorbide mononitrate (IMDUR) 30 MG 24 hr tablet Take 1 tablet by mouth Daily. 90 tablet 3   • Jardiance 25 MG tablet tablet TAKE 1 TABLET BY MOUTH DAILY 90 tablet 3   • levothyroxine (SYNTHROID, LEVOTHROID) 175 MCG tablet Take 1 tablet by mouth Daily. Indications: Underactive Thyroid 90 tablet 0   • metFORMIN ER (GLUCOPHAGE-XR) 500 MG 24 hr tablet Take 4 tablets at supper  Indications: Type 2 Diabetes 360 tablet 3   • O2 (OXYGEN) Inhale 1 L/min Every Night. Indications: marianne     • Omega-3 Fatty Acids (FISH OIL) 1000 MG capsule capsule Take 1 capsule by mouth 2 (Two) Times a Day.     • Pramlintide Acetate (SymlinPen 120) 2700 MCG/2.7ML solution pen-injector 120 MCG UNDER THE SKIN INTO THE APPROPRIATE AREA AS DIRECTED THREE TIMES A DAY 32.4 mL 3   • vitamin B-12 (CYANOCOBALAMIN) 100 MCG tablet Take 1 tablet by mouth Daily. Indications: Inadequate Vitamin B12     • vitamin D (ERGOCALCIFEROL) 1.25 MG (40742 UT) capsule capsule Take 1 capsule by mouth 1 (One) Time Per Week. Indications: Vitamin D Deficiency 13 capsule 3   • warfarin (COUMADIN) 1 MG tablet Take 1 tablet PO every evening as directed. 30 tablet 3   • warfarin (COUMADIN) 2 MG tablet TAKE 1 TABLET " "BY MOUTH DAILY OR  AS DIRECTED BASED ON INR 60 tablet 5   • warfarin (COUMADIN) 3 MG tablet Take 1 tablet by mouth Daily. Indications: Atrial Fibrillation 90 tablet 3   • warfarin (COUMADIN) 5 MG tablet Take 1 tablet by mouth Every Night. Indications: Atrial Fibrillation 30 tablet 2     No current facility-administered medications on file prior to visit.       Objective   Pulse 65   Resp 20   Ht 177.8 cm (70\")   Wt 132 kg (292 lb)   SpO2 99%   BMI 41.90 kg/m²     Foot/Ankle Exam    GENERAL  Appearance:  appears stated age and elderly  Orientation:  AAOx3  Affect:  appropriate  Gait:  unimpaired  Assistance:  cane use  Right shoe gear: casual shoe and sock  Left shoe gear: surgical shoe and sock    VASCULAR     Left Foot Vascularity   Dorsalis pedis:  1+  Posterior tibial:  1+  Skin temperature:  warm  Edema gradin+  CFT:  < 3 seconds  Pedal hair growth:  Absent  Varicosities:  none     NEUROLOGIC     Left Foot Neurologic   Light touch sensation: normal    MUSCULOSKELETAL     Left Foot Musculoskeletal   Ecchymosis:  none  Tenderness:  none    DERMATOLOGIC        Left Foot Dermatologic   Skin  Positive for corn and ulcer.      Left foot additional comments: 2023 : left lateral leg measures 1.5 x 2 cm x 0.2 cm. The wound bed is pink and covered with approximately 30 to 40% adherent yellow slough.  Periwound with excoriation and slight maceration.  Moderate serosanguineous drainage noted on old dressing.      Left plantar foot measures one point or 0.8 x 0.5 x 0.2 cm.  Periwound maceration.  Wound bed pink, moist, clean.  Periwound otherwise clear dry and intact.  Moderate serosanguineous drainage noted on old dressing.    Resting appears to be calcium alginate      Assessment & Plan   Diagnoses and all orders for this visit:    1. Venous stasis ulcer of other part of left lower leg with fat layer exposed, unspecified whether varicose veins present (Primary)    2. Diabetic ulcer of left midfoot " associated with type 2 diabetes mellitus, with fat layer exposed    3. Callus of periwound skin    4. DM type 2 with diabetic peripheral neuropathy      On exam left lateral leg wound appears slightly improved with  wound bed but left lower leg is quite swollen today and periwound is with more excoriation and maceration.  Left plantar foot wound has callus and maceration which was pared down today with curette and scissors.  Wound after periwound callus paring measures approximately 1 x 0.7 x 0.2 cm.    Excisional Debridement to left lateral lower leg    Pre ulcer measurement: 1.5 x 2 x 0.2 cm    Post ulcer measurement: 1.5 x 2 x 0.3 cm    Anesthesia: None, as patient is neuropathic    Bleeding: <5cc    Pre-op pain: 0    Post-op pain: 0    Complications: None    Consent and time out was performed before proceeding with the procedure.  Excisional debridement to the level of subcutaneous tissue was performed with curette without complication.  Viable and nonviable skin, subcutaneous tissue, left lateral leg ulcer was excised to a healthy bleeding base.  No purulence or proximal extension was identified. Hemostasis was achieved with pressure.  Dry sterile dressings were applied.  Patient tolerated the procedure well.    Recommend patient continue with home health care assisting with dressing changes again recommend Hydrofiber with silver to wounds followed by ABD pad and 2 layer multilayer wrap compression dressings change 3 times weekly.  Did call [facility] to clarify orders.  Unna boot was applied today in clinic due to supplies.  Would like for patient to continue to keep activity low and continue with postop shoe for partial offloading.  We will have patient follow-up with me in 2 weeks for reevaluation.                    No orders of the defined types were placed in this encounter.      Transcribed from ambient dictation for LUCAS Villa by Maria Antonia Rivera.  05/09/23   16:38 EDT    Patient or  patient representative verbalized consent to the visit recording.  I have personally performed the services described in this document as transcribed by the above individual, and it is both accurate and complete.  Triny Cee, APRN  5/9/2023  17:36 EDT

## 2023-05-15 ENCOUNTER — ANTICOAGULATION VISIT (OUTPATIENT)
Dept: PHARMACY | Facility: HOSPITAL | Age: 83
End: 2023-05-15
Payer: MEDICARE

## 2023-05-15 DIAGNOSIS — D68.59 THROMBOPHILIA: Primary | Chronic | ICD-10-CM

## 2023-05-15 LAB — INR PPP: 2.7

## 2023-05-15 NOTE — PROGRESS NOTES
Anticoagulation Clinic Progress Note    Patient's visit was held by phone today.    Anticoagulation Summary  As of 5/15/2023    INR goal:  2.0-3.0   TTR:  86.2 % (3.8 y)   INR used for dosin.70 (5/15/2023)   Warfarin maintenance plan:  8 mg (5 mg x 1 and 1 mg x 3) every day; Starting 5/15/2023   Weekly warfarin total:  56 mg   Plan last modified:  Lynn Baum RN (2023)   Next INR check:  2023   Priority:  High   Target end date:  Indefinite    Indications    Deep vein thrombosis (DVT) [I82.409]  Thrombophilia [D68.59]             Anticoagulation Episode Summary     INR check location:      Preferred lab:      Send INR reminders to:   [facility] CBC ANTICOAG POOL    Comments:  MDINR Home maninder      Anticoagulation Care Providers     Provider Role Specialty Phone number    Soraya Cortez MD Referring Hematology and Oncology [phone]          Drug interactions: has remained unchanged.  Diet: has remained unchanged.    Clinic Interview:  No pertinent clinical findings have been reported.    INR History:      2023    12:01 PM 2023    12:00 AM 2023     4:56 PM 2023    12:00 AM 2023     5:35 PM 5/15/2023    12:00 AM 5/15/2023     2:39 PM   Anticoagulation Monitoring   INR 2.90  2.70  2.10  2.70   INR Date 2023  2023  2023  5/15/2023   INR Goal 2.0-3.0  2.0-3.0  2.0-3.0  2.0-3.0   Trend Same  Same  Same  Same   Last Week Total 56 mg  56 mg  56 mg  56 mg   Next Week Total 56 mg  56 mg  56 mg  56 mg   Sun 8 mg  8 mg  8 mg  8 mg   Mon 8 mg  8 mg  8 mg  8 mg   Tue 8 mg  8 mg  8 mg  8 mg   Wed 8 mg  8 mg  8 mg  8 mg   Thu 8 mg  8 mg  8 mg  8 mg   Fri 8 mg  8 mg  8 mg  8 mg   Sat 8 mg  8 mg  8 mg  8 mg   Historical INR  2.70       2.10       2.70             This result is from an external source.       Plan:  1. INR is Therapeutic today- see above in Anticoagulation Summary.   Will instruct Dalton Dallas Sr. to Continue their warfarin regimen- see above in  Anticoagulation Summary.  2. Follow up in 1 weeks  3.They have been instructed to call if any changes in medications, doses, concerns, etc. Patient expresses understanding and has no further questions at this time.    Taylor Ambriz RPH

## 2023-05-22 ENCOUNTER — ANTICOAGULATION VISIT (OUTPATIENT)
Dept: PHARMACY | Facility: HOSPITAL | Age: 83
End: 2023-05-22
Payer: MEDICARE

## 2023-05-22 DIAGNOSIS — D68.59 THROMBOPHILIA: Primary | Chronic | ICD-10-CM

## 2023-05-22 LAB — INR PPP: 2.4

## 2023-05-22 NOTE — PROGRESS NOTES
Anticoagulation Clinic Progress Note    Patient's visit was held by phone today.    Anticoagulation Summary  As of 2023    INR goal:  2.0-3.0   TTR:  86.2 % (3.8 y)   INR used for dosin.40 (2023)   Warfarin maintenance plan:  8 mg (5 mg x 1 and 1 mg x 3) every day; Starting 2023   Weekly warfarin total:  56 mg   No change documented:  Taylor Ambriz [facility]   Plan last modified:  Lynn Baum RN (2023)   Next INR check:  2023   Priority:  High   Target end date:  Indefinite    Indications    Deep vein thrombosis (DVT) [I82.409]  Thrombophilia [D68.59]             Anticoagulation Episode Summary     INR check location:      Preferred lab:      Send INR reminders to:   [facility] CBC ANTICOAG POOL    Comments:  MDINR Home maninder      Anticoagulation Care Providers     Provider Role Specialty Phone number    Soraya Cortez MD Referring Hematology and Oncology [phone]          Drug interactions: has remained unchanged.  Diet: has remained unchanged.    Clinic Interview:  No pertinent clinical findings have been reported.    INR History:      2023     4:56 PM 2023    12:00 AM 2023     5:35 PM 5/15/2023    12:00 AM 5/15/2023     2:39 PM 2023    12:00 AM 2023    12:52 PM   Anticoagulation Monitoring   INR 2.70  2.10  2.70  2.40   INR Date 2023  2023  5/15/2023  2023   INR Goal 2.0-3.0  2.0-3.0  2.0-3.0  2.0-3.0   Trend Same  Same  Same  Same   Last Week Total 56 mg  56 mg  56 mg  56 mg   Next Week Total 56 mg  56 mg  56 mg  56 mg   Sun 8 mg  8 mg  8 mg  8 mg   Mon 8 mg  8 mg  8 mg  8 mg   Tue 8 mg  8 mg  8 mg  8 mg   Wed 8 mg  8 mg  8 mg  8 mg   Thu 8 mg  8 mg  8 mg  8 mg   Fri 8 mg  8 mg  8 mg  8 mg   Sat 8 mg  8 mg  8 mg  8 mg   Historical INR  2.10       2.70       2.40             This result is from an external source.       Plan:  1. INR is Therapeutic today- see above in Anticoagulation Summary.   Will instruct Dalton Dallas Sr. to Continue  their warfarin regimen- see above in Anticoagulation Summary.  2. Follow up in 1 week-home test  3.They have been instructed to call if any changes in medications, doses, concerns, etc. Patient expresses understanding and has no further questions at this time.    Taylor Ambriz RP

## 2023-05-24 ENCOUNTER — OFFICE VISIT (OUTPATIENT)
Dept: FAMILY MEDICINE CLINIC | Facility: CLINIC | Age: 83
End: 2023-05-24
Payer: MEDICARE

## 2023-05-24 VITALS
DIASTOLIC BLOOD PRESSURE: 68 MMHG | BODY MASS INDEX: 40.8 KG/M2 | OXYGEN SATURATION: 97 % | TEMPERATURE: 98.6 F | HEIGHT: 70 IN | SYSTOLIC BLOOD PRESSURE: 118 MMHG | HEART RATE: 78 BPM | WEIGHT: 285 LBS

## 2023-05-24 DIAGNOSIS — Z79.4 TYPE 2 DIABETES MELLITUS WITH DIABETIC POLYNEUROPATHY, WITH LONG-TERM CURRENT USE OF INSULIN: ICD-10-CM

## 2023-05-24 DIAGNOSIS — E03.9 HYPOTHYROIDISM, UNSPECIFIED TYPE: Chronic | ICD-10-CM

## 2023-05-24 DIAGNOSIS — M54.16 LUMBAR RADICULOPATHY: ICD-10-CM

## 2023-05-24 DIAGNOSIS — E78.2 MIXED HYPERLIPIDEMIA: Chronic | ICD-10-CM

## 2023-05-24 DIAGNOSIS — E11.42 TYPE 2 DIABETES MELLITUS WITH DIABETIC POLYNEUROPATHY, WITH LONG-TERM CURRENT USE OF INSULIN: ICD-10-CM

## 2023-05-24 DIAGNOSIS — G47.30 SLEEP APNEA, UNSPECIFIED TYPE: Chronic | ICD-10-CM

## 2023-05-24 DIAGNOSIS — E66.01 CLASS 3 SEVERE OBESITY WITHOUT SERIOUS COMORBIDITY WITH BODY MASS INDEX (BMI) OF 40.0 TO 44.9 IN ADULT, UNSPECIFIED OBESITY TYPE: ICD-10-CM

## 2023-05-24 DIAGNOSIS — I27.82 OTHER CHRONIC PULMONARY EMBOLISM WITHOUT ACUTE COR PULMONALE: Primary | Chronic | ICD-10-CM

## 2023-05-24 DIAGNOSIS — I25.10 CORONARY ARTERY DISEASE INVOLVING NATIVE CORONARY ARTERY OF NATIVE HEART WITHOUT ANGINA PECTORIS: Chronic | ICD-10-CM

## 2023-05-24 DIAGNOSIS — I10 ESSENTIAL HYPERTENSION: Chronic | ICD-10-CM

## 2023-05-24 PROCEDURE — 99214 OFFICE O/P EST MOD 30 MIN: CPT | Performed by: NURSE PRACTITIONER

## 2023-05-24 PROCEDURE — 3074F SYST BP LT 130 MM HG: CPT | Performed by: NURSE PRACTITIONER

## 2023-05-24 PROCEDURE — 3078F DIAST BP <80 MM HG: CPT | Performed by: NURSE PRACTITIONER

## 2023-05-24 NOTE — PATIENT INSTRUCTIONS
Eat healthy  Monitor blood sugars if having lows contact endocrinology  Walk as tolerated using cane

## 2023-05-24 NOTE — ASSESSMENT & PLAN NOTE
Patient's (Body mass index is 40.89 kg/m².) indicates that they are morbidly/severely obese (BMI > 40 or > 35 with obesity - related health condition) with health conditions that include obstructive sleep apnea, hypertension, coronary heart disease, diabetes mellitus, dyslipidemias and peripheral vascular disease . Weight is improving with treatment. BMI  is above average; BMI management plan is completed. We discussed portion control, increasing exercise and joining a fitness center or start home based exercise program.

## 2023-05-24 NOTE — PROGRESS NOTES
Subjective        Dalton Dallas Sr. is a 82 y.o. male.     Chief Complaint   Patient presents with   • Diabetes   • Hypertension   • Hyperlipidemia     3 month f/u       History of Present Illness  Patient is here for management of his chronic medical problems: diabetes, hypertension, hyperlipidemia.sleep apnea with oxygen 2L with cpap.    Oxygen dependent sleeping 2L with cpap. Followed by dr Vieira.     Diabetes has had low numbers around supper had low in middle of night this week also. He is checking 3 times a day . Humulin 24 units am and 40 pm jardiance 25 mg daily metformin xr 4 tablets at supper. Pramlintide 120 mcg tid . Followed by Dr Shaver no recent labs.     CAD followed by cardiology: plavix 75 mg daily hctz 25 mg daily atenolol 50 mg daily amlodipne 5mg daily imdur 30 mg daily     Hypothyroidism levothyroxine 175 mcg daily.     Hyperlipidemia atorvastatn 80 mg daily    Hypertension : has CAD and is diabetic  Amlodipine 5 mg daily atenolol 50 mg daily hctz 25 mg daily irbesartan 300mg daily     PE : on coumadin monitored by pulonology Dr Vieira.     Balance issues using cane for support. He has wound behind 5th toe followed by podiatry .    The following portions of the patient's history were reviewed and updated as appropriate: allergies, current medications, past family history, past medical history, past social history, past surgical history and problem list.      Current Outpatient Medications:   •  Accu-Chek Irene Plus test strip, USE TO CHECK BLOOD SUGAR BEFORE MEALS AND AT BEDTIME, Disp: 400 each, Rfl: 3  •  amLODIPine (NORVASC) 5 MG tablet, Take 1 tablet by mouth Daily., Disp: 90 tablet, Rfl: 3  •  atenolol (TENORMIN) 50 MG tablet, TAKE 1 TABLET BY MOUTH DAILY, Disp: 90 tablet, Rfl: 3  •  atorvastatin (LIPITOR) 80 MG tablet, Take 1 tablet by mouth Every Night. Indications: High Amount of Fats in the Blood, Disp: 90 tablet, Rfl: 3  •  Blood Glucose Monitoring Suppl (Accu-Chek Irene Plus)  "w/Device kit, 1 each 4 (Four) Times a Day., Disp: 1 kit, Rfl: 0  •  clopidogrel (PLAVIX) 75 MG tablet, TAKE 1 TABLET DAILY, Disp: 90 tablet, Rfl: 3  •  fenofibrate (TRICOR) 54 MG tablet, Take 1 tablet by mouth Daily. Indications: High Amount of Triglycerides in the Blood, Disp: , Rfl:   •  ferrous sulfate 325 (65 FE) MG tablet, Take 1 tablet by mouth 2 (Two) Times a Day., Disp: , Rfl:   •  gabapentin (NEURONTIN) 100 MG capsule, Take 1 capsule by mouth 3 (Three) Times a Day. Indications: Neuropathic Pain, Disp: , Rfl:   •  hydroCHLOROthiazide (HYDRODIURIL) 25 MG tablet, TAKE 1 TABLET BY MOUTH DAILY, Disp: 90 tablet, Rfl: 3  •  Insulin Pen Needle (BD ULTRA-FINE PEN NEEDLES) 29G X 12.7MM misc, Use to inject insulin twice daily. DX E11.65, Disp: 200 each, Rfl: 2  •  insulin regular (HUMULIN R) 500 UNIT/ML CONCENTRATED injection, DRAW TO 24 UNIT REI ON U-100 SYRINGE AND INJECT IN THE MORNING AND 40 UNIT RIE IN THE EVENING  Indications: Type 2 Diabetes, Disp: 60 mL, Rfl: 3  •  Insulin Syringe-Needle U-100 (BD Insulin Syringe U/F) 31G X 5/16\" 0.5 ML misc, 1 each by Other route 2 (Two) Times a Day., Disp: 200 each, Rfl: 3  •  irbesartan (AVAPRO) 300 MG tablet, Take 1 tablet by mouth Daily. Indications: High Blood Pressure Disorder, Disp: , Rfl:   •  isosorbide mononitrate (IMDUR) 30 MG 24 hr tablet, Take 1 tablet by mouth Daily., Disp: 90 tablet, Rfl: 3  •  Jardiance 25 MG tablet tablet, TAKE 1 TABLET BY MOUTH DAILY, Disp: 90 tablet, Rfl: 3  •  levothyroxine (SYNTHROID, LEVOTHROID) 175 MCG tablet, Take 1 tablet by mouth Daily. Indications: Underactive Thyroid, Disp: 90 tablet, Rfl: 0  •  metFORMIN ER (GLUCOPHAGE-XR) 500 MG 24 hr tablet, Take 4 tablets at supper  Indications: Type 2 Diabetes, Disp: 360 tablet, Rfl: 3  •  O2 (OXYGEN), Inhale 1 L/min Every Night. Indications: marianne, Disp: , Rfl:   •  Omega-3 Fatty Acids (FISH OIL) 1000 MG capsule capsule, Take 1 capsule by mouth 2 (Two) Times a Day., Disp: , Rfl:   •  " Pramlintide Acetate (SymlinPen 120) 2700 MCG/2.7ML solution pen-injector, 120 MCG UNDER THE SKIN INTO THE APPROPRIATE AREA AS DIRECTED THREE TIMES A DAY, Disp: 32.4 mL, Rfl: 3  •  vitamin B-12 (CYANOCOBALAMIN) 100 MCG tablet, Take 1 tablet by mouth Daily. Indications: Inadequate Vitamin B12, Disp: , Rfl:   •  vitamin D (ERGOCALCIFEROL) 1.25 MG (14308 UT) capsule capsule, Take 1 capsule by mouth 1 (One) Time Per Week. Indications: Vitamin D Deficiency, Disp: 13 capsule, Rfl: 3  •  warfarin (COUMADIN) 3 MG tablet, Take 1 tablet by mouth Daily. Indications: Atrial Fibrillation, Disp: 90 tablet, Rfl: 3  •  warfarin (COUMADIN) 5 MG tablet, Take 1 tablet by mouth Every Night. Indications: Atrial Fibrillation, Disp: 30 tablet, Rfl: 2  •  warfarin (COUMADIN) 1 MG tablet, Take 1 tablet PO every evening as directed., Disp: 30 tablet, Rfl: 3  •  warfarin (COUMADIN) 2 MG tablet, TAKE 1 TABLET BY MOUTH DAILY OR  AS DIRECTED BASED ON INR, Disp: 60 tablet, Rfl: 5    Recent Results (from the past 4032 hour(s))   Protime-INR    Collection Time: 12/12/22 12:00 AM    Specimen: Blood   Result Value Ref Range    INR 2.10    POC Glucose    Collection Time: 12/12/22 11:10 AM    Specimen: Blood   Result Value Ref Range    Glucose 127 (H) 70 - 105 mg/dL   CBC Auto Differential    Collection Time: 12/15/22  1:18 PM    Specimen: Blood   Result Value Ref Range    WBC 9.27 3.40 - 10.80 10*3/mm3    RBC 4.37 4.14 - 5.80 10*6/mm3    Hemoglobin 13.3 13.0 - 17.7 g/dL    Hematocrit 40.3 37.5 - 51.0 %    MCV 92.2 79.0 - 97.0 fL    MCH 30.4 26.6 - 33.0 pg    MCHC 33.0 31.5 - 35.7 g/dL    RDW 14.5 12.3 - 15.4 %    RDW-SD 47.5 37.0 - 54.0 fl    MPV 9.6 6.0 - 12.0 fL    Platelets 347 140 - 450 10*3/mm3    Neutrophil % 62.4 42.7 - 76.0 %    Lymphocyte % 27.5 19.6 - 45.3 %    Monocyte % 6.5 5.0 - 12.0 %    Eosinophil % 3.2 0.3 - 6.2 %    Basophil % 0.4 0.0 - 1.5 %    Neutrophils, Absolute 5.78 1.70 - 7.00 10*3/mm3    Lymphocytes, Absolute 2.55 0.70 - 3.10  10*3/mm3    Monocytes, Absolute 0.60 0.10 - 0.90 10*3/mm3    Eosinophils, Absolute 0.30 0.00 - 0.40 10*3/mm3    Basophils, Absolute 0.04 0.00 - 0.20 10*3/mm3   Gold Top - SST    Collection Time: 12/15/22  1:18 PM   Result Value Ref Range    Extra Tube Hold for add-ons.    Green Top (Gel)    Collection Time: 12/15/22  1:18 PM   Result Value Ref Range    Extra Tube Hold for add-ons.    Light Blue Top    Collection Time: 12/15/22  1:18 PM   Result Value Ref Range    Extra Tube Hold for add-ons.    Protime-INR    Collection Time: 12/20/22 12:00 AM    Specimen: Blood   Result Value Ref Range    INR 2.00    Protime-INR    Collection Time: 12/27/22 12:00 AM    Specimen: Blood   Result Value Ref Range    INR 2.90    Protime-INR    Collection Time: 01/03/23 12:00 AM    Specimen: Blood   Result Value Ref Range    INR 2.10    POC INR    Collection Time: 01/09/23 12:00 AM    Specimen: Blood   Result Value Ref Range    INR 1.70 (A) 0.9 - 1.1   Protime-INR    Collection Time: 01/18/23 12:00 AM    Specimen: Blood   Result Value Ref Range    INR 2.00    Duplex venous lower extremity left CAR    Collection Time: 01/18/23 12:36 PM   Result Value Ref Range    Target HR (85%) 119 bpm    Max. Pred. HR (100%) 140 bpm    BH CV POP FLUID COLLECT LEFT 1.0     Right Common Femoral Spont Y     Right Common Femoral Competent Y     Right Common Femoral Phasic Y     Right Common Femoral Compress C     Right Common Femoral Augment Y     Left Common Femoral Spont Y     Left Common Femoral Competent Y     Left Common Femoral Phasic Y     Left Common Femoral Compress C     Left Common Femoral Augment Y     Left Saphenofemoral Junction Compress C     Left Proximal Femoral Compress C     Left Mid Femoral Spont Y     Left Mid Femoral Competent Y     Left Mid Femoral Phasic Y     Left Mid Femoral Compress C     Left Mid Femoral Augment Y     Left Distal Femoral Compress C     Left Popliteal Spont Y     Left Popliteal Competent Y     Left Popliteal  Phasic Y     Left Popliteal Compress C     Left Popliteal Augment Y     Left Posterior Tibial Compress C     Left Peroneal Compress C     Left Gastronemius Compress C     Left Greater Saph AK Compress C     Left Greater Saph BK Compress C     Left Lesser Saph Compress C    Protime-INR    Collection Time: 01/23/23 12:00 AM    Specimen: Blood   Result Value Ref Range    INR 2.30    Protime-INR    Collection Time: 01/30/23 12:00 AM    Specimen: Blood   Result Value Ref Range    INR 2.30    Protime-INR    Collection Time: 02/09/23 12:00 AM    Specimen: Blood   Result Value Ref Range    INR 2.30    Protime-INR    Collection Time: 02/13/23 12:00 AM    Specimen: Blood   Result Value Ref Range    INR 2.30    Protime-INR    Collection Time: 03/01/23 12:00 AM    Specimen: Blood   Result Value Ref Range    INR 2.30    Protime-INR    Collection Time: 03/07/23 12:00 AM    Specimen: Blood   Result Value Ref Range    INR 2.20    Protime-INR    Collection Time: 03/13/23 12:00 AM    Specimen: Blood   Result Value Ref Range    INR 2.20    Protime-INR    Collection Time: 03/20/23 12:00 AM    Specimen: Blood   Result Value Ref Range    INR 2.50    Protime-INR    Collection Time: 03/28/23 12:00 AM    Specimen: Blood   Result Value Ref Range    INR 2.40    Doppler Ankle Brachial Index Single Level CAR    Collection Time: 03/30/23  9:41 AM   Result Value Ref Range    Target HR (85%) 119 bpm    Max. Pred. HR (100%) 140 bpm    Upper arterial right arm brachial sys max 136 mmHg    Upper arterial left arm brachial sys max 125 mmHg    RIGHT POST TIBIAL SYS      LEFT POST TIBIAL SYS      RIGHT DORSALIS PEDIS SYS      LEFT DORSALIS PEDIS SYS      RIGHT GREAT TOE SYS MAX 53     LEFT GREAT TOE SYS MAX 38     RIGHT CHIOMA RATIO 1.29     LEFT CHIOMA RATIO 1.42     RIGHT TBI RATIO 0.39     LEFT TBI RATIO 0.28    Protime-INR    Collection Time: 04/05/23 12:00 AM    Specimen: Blood   Result Value Ref Range    INR 2.40   "  Protime-INR    Collection Time: 04/11/23 12:00 AM    Specimen: Blood   Result Value Ref Range    INR 2.50    Protime-INR    Collection Time: 04/17/23 12:00 AM    Specimen: Blood   Result Value Ref Range    INR 2.70    Protime-INR    Collection Time: 04/24/23 12:00 AM    Specimen: Blood   Result Value Ref Range    INR 2.90    Protime-INR    Collection Time: 05/01/23 12:00 AM    Specimen: Blood   Result Value Ref Range    INR 2.70    Protime-INR    Collection Time: 05/08/23 12:00 AM    Specimen: Blood   Result Value Ref Range    INR 2.10    Protime-INR    Collection Time: 05/15/23 12:00 AM    Specimen: Blood   Result Value Ref Range    INR 2.70    Protime-INR    Collection Time: 05/22/23 12:00 AM    Specimen: Blood   Result Value Ref Range    INR 2.40          Review of Systems    Objective     /68 (BP Location: Left arm, Patient Position: Sitting, Cuff Size: Large Adult)   Pulse 78   Temp 98.6 °F (37 °C) (Infrared)   Ht 177.8 cm (70\")   Wt 129 kg (285 lb)   SpO2 97%   BMI 40.89 kg/m²     Physical Exam  Vitals and nursing note reviewed.   Constitutional:       Appearance: He is obese.   HENT:      Head: Normocephalic.      Right Ear: Tympanic membrane and external ear normal.      Left Ear: Tympanic membrane and external ear normal.      Nose: Nose normal.      Mouth/Throat:      Mouth: Mucous membranes are moist.   Eyes:      Pupils: Pupils are equal, round, and reactive to light.   Cardiovascular:      Rate and Rhythm: Normal rate and regular rhythm.      Heart sounds: Normal heart sounds.      Comments: Holosystolic heart sounds  Pulmonary:      Effort: Pulmonary effort is normal.   Abdominal:      General: Abdomen is protuberant. Bowel sounds are normal.      Tenderness: There is no abdominal tenderness.   Musculoskeletal:        Feet:    Feet:      Comments: Reports wound under left 5th toe.   Wearing boot on left foot with dresing intact    Skin:     General: Skin is warm.   Neurological:      " General: No focal deficit present.      Mental Status: He is alert and oriented to person, place, and time.   Psychiatric:         Mood and Affect: Mood normal.         Result Review :                Assessment & Plan    Diagnoses and all orders for this visit:    1. 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 Essential hypertension    3. Coronary artery disease involving native coronary artery of native heart without angina pectoris    4. Mixed hyperlipidemia    5. Class 3 severe obesity without serious comorbidity with body mass index (BMI) of 40.0 to 44.9 in adult, unspecified obesity type  Assessment & Plan:  Patient's (Body mass index is 40.89 kg/m².) indicates that they are morbidly/severely obese (BMI > 40 or > 35 with obesity - related health condition) with health conditions that include obstructive sleep apnea, hypertension, coronary heart disease, diabetes mellitus, dyslipidemias and peripheral vascular disease . Weight is improving with treatment. BMI  is above average; BMI management plan is completed. We discussed portion control, increasing exercise and joining a fitness center or start home based exercise program.       6. Hypothyroidism, unspecified type    7. Type 2 diabetes mellitus with diabetic polyneuropathy, with long-term current use of insulin    8. Lumbar radiculopathy    9. Sleep apnea, unspecified type    There are no Patient Instructions on file for this visit.    Follow Up   No follow-ups on file.    Patient was given instructions and counseling regarding his condition or for health maintenance advice. Please see specific information pulled into the AVS if appropriate.     Joan Johns, APRN    05/24/23

## 2023-05-26 ENCOUNTER — OFFICE VISIT (OUTPATIENT)
Dept: PODIATRY | Facility: CLINIC | Age: 83
End: 2023-05-26
Payer: MEDICARE

## 2023-05-26 VITALS — WEIGHT: 285 LBS | BODY MASS INDEX: 40.8 KG/M2 | RESPIRATION RATE: 20 BRPM | HEIGHT: 70 IN

## 2023-05-26 DIAGNOSIS — I83.028 VENOUS STASIS ULCER OF OTHER PART OF LEFT LOWER LEG WITH FAT LAYER EXPOSED, UNSPECIFIED WHETHER VARICOSE VEINS PRESENT: Primary | ICD-10-CM

## 2023-05-26 DIAGNOSIS — L97.822 VENOUS STASIS ULCER OF OTHER PART OF LEFT LOWER LEG WITH FAT LAYER EXPOSED, UNSPECIFIED WHETHER VARICOSE VEINS PRESENT: Primary | ICD-10-CM

## 2023-05-26 DIAGNOSIS — L97.929 CHRONIC VENOUS HYPERTENSION (IDIOPATHIC) WITH ULCER OF LEFT LOWER EXTREMITY: ICD-10-CM

## 2023-05-26 DIAGNOSIS — L97.422 DIABETIC ULCER OF LEFT MIDFOOT ASSOCIATED WITH TYPE 2 DIABETES MELLITUS, WITH FAT LAYER EXPOSED: ICD-10-CM

## 2023-05-26 DIAGNOSIS — E11.42 DM TYPE 2 WITH DIABETIC PERIPHERAL NEUROPATHY: ICD-10-CM

## 2023-05-26 DIAGNOSIS — I87.312 CHRONIC VENOUS HYPERTENSION (IDIOPATHIC) WITH ULCER OF LEFT LOWER EXTREMITY: ICD-10-CM

## 2023-05-26 DIAGNOSIS — L84 CALLUS OF PERIWOUND SKIN: ICD-10-CM

## 2023-05-26 DIAGNOSIS — E11.621 DIABETIC ULCER OF LEFT MIDFOOT ASSOCIATED WITH TYPE 2 DIABETES MELLITUS, WITH FAT LAYER EXPOSED: ICD-10-CM

## 2023-05-26 NOTE — PROGRESS NOTES
05/26/2023  Foot and Ankle Surgery - Established Patient/Follow-up  Provider: LUCAS Ramos   Location: [facility]    Subjective:  Dalton Dallas Sr. is a 82 y.o. male.     Chief Complaint   Patient presents with   • Left Lower Leg - Follow-up, Wound Check   • Left Foot - Wound Check, Follow-up   • Follow-up     LUMA Sibley md   date unknown       The patient returns to the office today for follow-up regarding his left lower extremity.     The patient believes the wound to his left lateral lower extremity has reopened significantly since his last visit. He states home health care visits 3 days weekly, Monday, Wednesday, and Friday. Additionally, he has a wound to his left foot. The patient reports one of his home health nurses has been applying Manuka honey to left foot wound. He notes he tolerated wearing the CAM walking boot in the past.     No Known Allergies    Current Outpatient Medications on File Prior to Visit   Medication Sig Dispense Refill   • Accu-Chek Irene Plus test strip USE TO CHECK BLOOD SUGAR BEFORE MEALS AND AT BEDTIME 400 each 3   • amLODIPine (NORVASC) 5 MG tablet Take 1 tablet by mouth Daily. 90 tablet 3   • atenolol (TENORMIN) 50 MG tablet TAKE 1 TABLET BY MOUTH DAILY 90 tablet 3   • atorvastatin (LIPITOR) 80 MG tablet Take 1 tablet by mouth Every Night. Indications: High Amount of Fats in the Blood 90 tablet 3   • Blood Glucose Monitoring Suppl (Accu-Chek Irene Plus) w/Device kit 1 each 4 (Four) Times a Day. 1 kit 0   • clopidogrel (PLAVIX) 75 MG tablet TAKE 1 TABLET DAILY 90 tablet 3   • fenofibrate (TRICOR) 54 MG tablet Take 1 tablet by mouth Daily. Indications: High Amount of Triglycerides in the Blood     • ferrous sulfate 325 (65 FE) MG tablet Take 1 tablet by mouth 2 (Two) Times a Day.     • gabapentin (NEURONTIN) 100 MG capsule Take 1 capsule by mouth 3 (Three) Times a Day. Indications: Neuropathic Pain     • hydroCHLOROthiazide (HYDRODIURIL) 25 MG tablet TAKE 1  "TABLET BY MOUTH DAILY 90 tablet 3   • Insulin Pen Needle (BD ULTRA-FINE PEN NEEDLES) 29G X 12.7MM misc Use to inject insulin twice daily. DX E11.65 200 each 2   • insulin regular (HUMULIN R) 500 UNIT/ML CONCENTRATED injection DRAW TO 24 UNIT REI ON U-100 SYRINGE AND INJECT IN THE MORNING AND 40 UNIT REI IN THE EVENING  Indications: Type 2 Diabetes 60 mL 3   • Insulin Syringe-Needle U-100 (BD Insulin Syringe U/F) 31G X 5/16\" 0.5 ML misc 1 each by Other route 2 (Two) Times a Day. 200 each 3   • irbesartan (AVAPRO) 300 MG tablet Take 1 tablet by mouth Daily. Indications: High Blood Pressure Disorder     • isosorbide mononitrate (IMDUR) 30 MG 24 hr tablet Take 1 tablet by mouth Daily. 90 tablet 3   • Jardiance 25 MG tablet tablet TAKE 1 TABLET BY MOUTH DAILY 90 tablet 3   • levothyroxine (SYNTHROID, LEVOTHROID) 175 MCG tablet Take 1 tablet by mouth Daily. Indications: Underactive Thyroid 90 tablet 0   • metFORMIN ER (GLUCOPHAGE-XR) 500 MG 24 hr tablet Take 4 tablets at supper  Indications: Type 2 Diabetes 360 tablet 3   • O2 (OXYGEN) Inhale 1 L/min Every Night. Indications: marianne     • Omega-3 Fatty Acids (FISH OIL) 1000 MG capsule capsule Take 1 capsule by mouth 2 (Two) Times a Day.     • Pramlintide Acetate (SymlinPen 120) 2700 MCG/2.7ML solution pen-injector 120 MCG UNDER THE SKIN INTO THE APPROPRIATE AREA AS DIRECTED THREE TIMES A DAY 32.4 mL 3   • vitamin B-12 (CYANOCOBALAMIN) 100 MCG tablet Take 1 tablet by mouth Daily. Indications: Inadequate Vitamin B12     • vitamin D (ERGOCALCIFEROL) 1.25 MG (33869 UT) capsule capsule Take 1 capsule by mouth 1 (One) Time Per Week. Indications: Vitamin D Deficiency 13 capsule 3   • warfarin (COUMADIN) 1 MG tablet Take 1 tablet PO every evening as directed. 30 tablet 3   • warfarin (COUMADIN) 2 MG tablet TAKE 1 TABLET BY MOUTH DAILY OR  AS DIRECTED BASED ON INR 60 tablet 5   • warfarin (COUMADIN) 3 MG tablet Take 1 tablet by mouth Daily. Indications: Atrial Fibrillation 90 " "tablet 3   • warfarin (COUMADIN) 5 MG tablet Take 1 tablet by mouth Every Night. Indications: Atrial Fibrillation 30 tablet 2     No current facility-administered medications on file prior to visit.       Objective   Resp 20   Ht 177.8 cm (70\")   Wt 129 kg (285 lb)   BMI 40.89 kg/m²     Foot/Ankle Exam    GENERAL  Appearance:  appears stated age and elderly  Orientation:  AAOx3  Affect:  appropriate  Gait:  unimpaired  Assistance:  cane use  Right shoe gear: casual shoe and sock  Left shoe gear: surgical shoe and sock    VASCULAR     Left Foot Vascularity   Dorsalis pedis:  1+  Posterior tibial:  1+  Skin temperature:  warm  Edema gradin+  CFT:  < 3 seconds  Pedal hair growth:  Absent  Varicosities:  none     NEUROLOGIC     Left Foot Neurologic   Light touch sensation: normal    MUSCULOSKELETAL     Left Foot Musculoskeletal   Ecchymosis:  none  Tenderness:  none    DERMATOLOGIC        Left Foot Dermatologic   Skin  Positive for corn and ulcer.      Left foot additional comments: 2023 : left lateral leg measures 1.5 x 2 cm x 0.2 cm. The wound bed is pink and covered with approximately 30 to 40% adherent yellow slough.  Periwound with excoriation and slight maceration.  Moderate serosanguineous drainage noted on old dressing.      Left plantar foot measures one point or 0.8 x 0.5 x 0.2 cm.  Periwound maceration.  Wound bed pink, moist, clean.  Periwound otherwise clear dry and intact.  Moderate serosanguineous drainage noted on old dressing.    Resting appears to be calcium alginate    2023  Wound to left lateral lower extremity appears to be improved and measures 1.4 cm x 2.0 x 0.1 cm today.   Wound to left foot also appears improved and measures 0.5 cm x 0.9 cm x 0.2 cm today with slight undermining.       Assessment & Plan   Diagnoses and all orders for this visit:    1. Venous stasis ulcer of other part of left lower leg with fat layer exposed, unspecified whether varicose veins present " (Primary)  -     Ambulatory Referral to Wound Clinic    2. Diabetic ulcer of left midfoot associated with type 2 diabetes mellitus, with fat layer exposed  -     Ambulatory Referral to Wound Clinic    3. Chronic venous hypertension (idiopathic) with ulcer of left lower extremity    4. Callus of periwound skin    5. DM type 2 with diabetic peripheral neuropathy    Other orders  -     Application Unnaboot      Patient returns to the office today for follow-up regarding his left lower extremity. Wounds have improved slightly. Do feel patient needs better offloading for left foot ulcer.  Ankle brachial index testing obtained in 03/2023 was reviewed today. Both wounds appear improved at this time. The wounds were debrided in the office today. Patient tolerated the procedure well without complication. Previously home health care was performing dressing changes 3 times weekly consisting of Hydrofiber and Silver with an ABD pan and a 2-layer compression wrap. New changes to his dressings were ordered today consisting of Hydrofera Blue Transfer with an ABD pad to the lower extremity, continuing to change 3 times weekly, and collagen with Hydrofera Blue Transfer to his left foot, abd pad  Will recommend continue two layer light compression. changing 3 times weekly. A copy of the orders was provided to the patient. Recommended wearing the CAM walking boot again to offload the left foot wound, but advised him to monitor for any irritation to the wound on his left lower extremity as the boot may need to be discontinued should evidence of irritation arise. Will have patient follow up withme in the wound center in 2 weeks.     Excisional Debridement to left foot ulcer     Pre ulcer measurement: 0.5x0.9x 0.3    Post ulcer measurement: 0.6x1x0.3    Anesthesia: None, as patient is neuropathic    Bleeding: <5cc    Pre-op pain: 0    Post-op pain: 0    Complications: None    Consent and time out was performed before proceeding with the  procedure.  Excisional debridement to the level of subcutaneousl tissue  was performed with a 15 blade and curette without complication.  Viable and nonviable skin, subcutaneous tissue, left foot ulcer was excised to a healthy bleeding base.  No purulence or proximal extension was identified. Hemostasis was achieved with pressure.  Dry sterile dressings were applied.  Patient tolerated the procedure well.    Orders Placed This Encounter   Procedures   • Ambulatory Referral to Wound Clinic     Referral Priority:   Routine     Referral Type:   Consultation     Referral Reason:   Specialty Services Required     Number of Visits Requested:   1   • Application Unnaboot        Transcribed from ambient dictation for LUCAS Villa by Julia Cárdenas.  05/26/23   13:19 EDT    Patient or patient representative verbalized consent to the visit recording.  I have personally performed the services described in this document as transcribed by the above individual, and it is both accurate and complete.  LUCAS Villa  5/26/2023  16:34 EDT

## 2023-05-30 ENCOUNTER — ANTICOAGULATION VISIT (OUTPATIENT)
Dept: PHARMACY | Facility: HOSPITAL | Age: 83
End: 2023-05-30

## 2023-05-30 DIAGNOSIS — D68.59 THROMBOPHILIA: Chronic | ICD-10-CM

## 2023-05-30 DIAGNOSIS — I82.513 CHRONIC DEEP VEIN THROMBOSIS (DVT) OF FEMORAL VEIN OF BOTH LOWER EXTREMITIES: Primary | Chronic | ICD-10-CM

## 2023-05-30 LAB — INR PPP: 2.6

## 2023-05-30 NOTE — PROGRESS NOTES
Anticoagulation Clinic Progress Note    Patient's visit was held by phone today.    Anticoagulation Summary  As of 2023    INR goal:  2.0-3.0   TTR:  86.3 % (3.8 y)   INR used for dosin.60 (2023)   Warfarin maintenance plan:  8 mg (5 mg x 1 and 1 mg x 3) every day; Starting 2023   Weekly warfarin total:  56 mg   No change documented:  Karen Oates [facility]   Plan last modified:  Lynn Baum RN (2023)   Next INR check:  2023   Priority:  High   Target end date:  Indefinite    Indications    Deep vein thrombosis (DVT) [I82.409]  Thrombophilia [D68.59]             Anticoagulation Episode Summary     INR check location:      Preferred lab:      Send INR reminders to:   [facility] CBC ANTICOAG POOL    Comments:  [facility] Home maninder      Anticoagulation Care Providers     Provider Role Specialty Phone number    Soraya Cortez MD Referring Hematology and Oncology [phone]          Drug interactions: has remained unchanged.  Diet: has remained unchanged.    Clinic Interview:  No pertinent clinical findings have been reported.    INR History:      2023     5:35 PM 5/15/2023    12:00 AM 5/15/2023     2:39 PM 2023    12:00 AM 2023    12:52 PM 2023    12:00 AM 2023    10:39 AM   Anticoagulation Monitoring   INR 2.10  2.70  2.40  2.60   INR Date 2023  5/15/2023  2023  2023   INR Goal 2.0-3.0  2.0-3.0  2.0-3.0  2.0-3.0   Trend Same  Same  Same  Same   Last Week Total 56 mg  56 mg  56 mg  56 mg   Next Week Total 56 mg  56 mg  56 mg  56 mg   Sun 8 mg  8 mg  8 mg  8 mg   Mon 8 mg  8 mg  8 mg  8 mg   Tue 8 mg  8 mg  8 mg  8 mg   Wed 8 mg  8 mg  8 mg  8 mg   Thu 8 mg  8 mg  8 mg  8 mg   Fri 8 mg  8 mg  8 mg  8 mg   Sat 8 mg  8 mg  8 mg  8 mg   Historical INR  2.70       2.40       2.60             This result is from an external source.       Plan:  1. INR is Therapeutic today- see above in Anticoagulation Summary.   Will instruct Dalton Dallas Sr. to  Continue their warfarin regimen- see above in Anticoagulation Summary.  2. Follow up in 1 weeks- home test   3.They have been instructed to call if any changes in medications, doses, concerns, etc. Patient expresses understanding and has no further questions at this time.    Karen Oates RP

## 2023-05-31 ENCOUNTER — TELEPHONE (OUTPATIENT)
Dept: PODIATRY | Facility: CLINIC | Age: 83
End: 2023-05-31

## 2023-05-31 NOTE — TELEPHONE ENCOUNTER
Meghna with [facility] home [facility] called to report the wound on bottom of foot has tannish, cream color pus coming from wound.      [phone]

## 2023-06-05 ENCOUNTER — ANTICOAGULATION VISIT (OUTPATIENT)
Dept: PHARMACY | Facility: HOSPITAL | Age: 83
End: 2023-06-05
Payer: MEDICARE

## 2023-06-05 DIAGNOSIS — D68.59 THROMBOPHILIA: Chronic | ICD-10-CM

## 2023-06-05 DIAGNOSIS — I82.513 CHRONIC DEEP VEIN THROMBOSIS (DVT) OF FEMORAL VEIN OF BOTH LOWER EXTREMITIES: Primary | Chronic | ICD-10-CM

## 2023-06-05 LAB — INR PPP: 2.3

## 2023-06-05 NOTE — PROGRESS NOTES
Anticoagulation Clinic Progress Note    Patient's visit was held by phone today.    Anticoagulation Summary  As of 2023      INR goal:  2.0-3.0   TTR:  86.4 % (3.8 y)   INR used for dosin.30 (2023)   Warfarin maintenance plan:  8 mg (5 mg x 1 and 1 mg x 3) every day; Starting 2023   Weekly warfarin total:  56 mg   Plan last modified:  Lynn Baum RN (2023)   Next INR check:  2023   Priority:  High   Target end date:  Indefinite    Indications    Deep vein thrombosis (DVT) [I82.409]  Thrombophilia [D68.59]                 Anticoagulation Episode Summary       INR check location:      Preferred lab:      Send INR reminders to:   [facility] CBC ANTICOAG POOL    Comments:  [facility] Home maninder          Anticoagulation Care Providers       Provider Role Specialty Phone number    Soraya Cortez MD Referring Hematology and Oncology [phone]            Drug interactions: has remained unchanged.  Diet: has remained unchanged.    Clinic Interview:  No pertinent clinical findings have been reported.    INR History:      5/15/2023     2:39 PM 2023    12:00 AM 2023    12:52 PM 2023    12:00 AM 2023    10:39 AM 2023    12:00 AM 2023     8:53 AM   Anticoagulation Monitoring   INR 2.70  2.40  2.60  2.30   INR Date 5/15/2023  2023  2023  2023   INR Goal 2.0-3.0  2.0-3.0  2.0-3.0  2.0-3.0   Trend Same  Same  Same  Same   Last Week Total 56 mg  56 mg  56 mg  56 mg   Next Week Total 56 mg  56 mg  56 mg  56 mg   Sun 8 mg  8 mg  8 mg  8 mg   Mon 8 mg  8 mg  8 mg  8 mg   Tue 8 mg  8 mg  8 mg  8 mg   Wed 8 mg  8 mg  8 mg  8 mg   Thu 8 mg  8 mg  8 mg  8 mg   Fri 8 mg  8 mg  8 mg  8 mg   Sat 8 mg  8 mg  8 mg  8 mg   Historical INR  2.40      2.60      2.30            This result is from an external source.       Plan:  1. INR is Therapeutic today- see above in Anticoagulation Summary.   Will instruct Dalton Dallas Sr. to Continue their warfarin regimen- see above  in Anticoagulation Summary.  2. Follow up in 1 weeks  3.They have been instructed to call if any changes in medications, doses, concerns, etc. Patient expresses understanding and has no further questions at this time.    Karen Oates RPH

## 2023-06-07 ENCOUNTER — OFFICE VISIT (OUTPATIENT)
Dept: WOUND CARE | Facility: HOSPITAL | Age: 83
End: 2023-06-07
Payer: MEDICARE

## 2023-06-07 PROCEDURE — G0463 HOSPITAL OUTPT CLINIC VISIT: HCPCS

## 2023-06-08 ENCOUNTER — LAB (OUTPATIENT)
Dept: LAB | Facility: HOSPITAL | Age: 83
End: 2023-06-08
Payer: MEDICARE

## 2023-06-08 DIAGNOSIS — D50.9 IRON DEFICIENCY ANEMIA, UNSPECIFIED IRON DEFICIENCY ANEMIA TYPE: ICD-10-CM

## 2023-06-08 DIAGNOSIS — I82.409 DEEP VEIN THROMBOSIS (DVT) OF LOWER EXTREMITY, UNSPECIFIED CHRONICITY, UNSPECIFIED LATERALITY, UNSPECIFIED VEIN: ICD-10-CM

## 2023-06-08 LAB
BASOPHILS # BLD AUTO: 0.03 10*3/MM3 (ref 0–0.2)
BASOPHILS NFR BLD AUTO: 0.4 % (ref 0–1.5)
DEPRECATED RDW RBC AUTO: 48.8 FL (ref 37–54)
EOSINOPHIL # BLD AUTO: 0.32 10*3/MM3 (ref 0–0.4)
EOSINOPHIL NFR BLD AUTO: 4 % (ref 0.3–6.2)
ERYTHROCYTE [DISTWIDTH] IN BLOOD BY AUTOMATED COUNT: 14.9 % (ref 12.3–15.4)
FERRITIN SERPL-MCNC: 91.87 NG/ML (ref 30–400)
HCT VFR BLD AUTO: 40.6 % (ref 37.5–51)
HGB BLD-MCNC: 13.4 G/DL (ref 13–17.7)
IRON 24H UR-MRATE: 84 MCG/DL (ref 59–158)
IRON SATN MFR SERPL: 20 % (ref 20–50)
LYMPHOCYTES # BLD AUTO: 2.35 10*3/MM3 (ref 0.7–3.1)
LYMPHOCYTES NFR BLD AUTO: 29.2 % (ref 19.6–45.3)
MCH RBC QN AUTO: 30.6 PG (ref 26.6–33)
MCHC RBC AUTO-ENTMCNC: 33 G/DL (ref 31.5–35.7)
MCV RBC AUTO: 92.7 FL (ref 79–97)
MONOCYTES # BLD AUTO: 0.6 10*3/MM3 (ref 0.1–0.9)
MONOCYTES NFR BLD AUTO: 7.5 % (ref 5–12)
NEUTROPHILS NFR BLD AUTO: 4.74 10*3/MM3 (ref 1.7–7)
NEUTROPHILS NFR BLD AUTO: 58.9 % (ref 42.7–76)
PLATELET # BLD AUTO: 242 10*3/MM3 (ref 140–450)
PMV BLD AUTO: 10.1 FL (ref 6–12)
RBC # BLD AUTO: 4.38 10*6/MM3 (ref 4.14–5.8)
TIBC SERPL-MCNC: 413 MCG/DL (ref 298–536)
TRANSFERRIN SERPL-MCNC: 277 MG/DL (ref 200–360)
WBC NRBC COR # BLD: 8.04 10*3/MM3 (ref 3.4–10.8)

## 2023-06-08 PROCEDURE — 83540 ASSAY OF IRON: CPT | Performed by: INTERNAL MEDICINE

## 2023-06-08 PROCEDURE — 84466 ASSAY OF TRANSFERRIN: CPT | Performed by: INTERNAL MEDICINE

## 2023-06-08 PROCEDURE — 82728 ASSAY OF FERRITIN: CPT | Performed by: INTERNAL MEDICINE

## 2023-06-08 PROCEDURE — 36415 COLL VENOUS BLD VENIPUNCTURE: CPT

## 2023-06-08 PROCEDURE — 85025 COMPLETE CBC W/AUTO DIFF WBC: CPT

## 2023-06-12 ENCOUNTER — ANTICOAGULATION VISIT (OUTPATIENT)
Dept: PHARMACY | Facility: HOSPITAL | Age: 83
End: 2023-06-12
Payer: MEDICARE

## 2023-06-12 DIAGNOSIS — D68.59 THROMBOPHILIA: Primary | Chronic | ICD-10-CM

## 2023-06-12 LAB — INR PPP: 2.8

## 2023-06-12 NOTE — PROGRESS NOTES
HEMATOLOGY ONCOLOGY FOLLOW UP        Patient name: Dalton Dallas Sr.  : 1940  MRN: [id number]  Primary Care Physician: Gabino Sibley Jr., MD  Referring Physician: No ref. provider found  Reason For Consult:     Chief Complaint   Patient presents with    Follow-up     6 month follow up  Deep vein thrombosis (DVT) of lower extremity   History of PE and DVT  Anticoagulation management  Anemia    History of Present Illness:  Mr. Dallas is a 82 y.o. gentleman, remote smoker, with a history of diabetes, coronary artery disease and hypertension who presented to [facility] on 14 with symptoms of shortness of breath and chest discomfort.  CT scan of the chest on 14 showed bilateral pulmonary emboli with large clot burden.  There were filling defects predominantly in the right upper lobe, and lower and middle lobe pulmonary arterial trees along with emboli in the left upper lobe and left lower lobe arterial tree as well.  Mediastinal,  hilar and subcarinal lymphadenopathy was also noted.  The largest lymph node in the subcarinal location measured 3.9 x 1.9 cm.  The patient was started on anticoagulation.  Bilateral lower extremity venous Doppler on 14 showed a filling defect within the left gastroc veins.  The patient was put on anticoagulation.  Thrombolysis was also under consideration considering the extensive clot burden, but the patient did not require it.       Patient denied any prior history of thromboembolism before this episode.  His brother may have had   a leg DVT.    · 14 - Creatinine 1.3, BUN 20.  · 14 - CBC:  WBC 14.4, hemoglobin 14.9, platelet count 241,000.   · 14 - Factor V Leiden negative.  Factor VIII level 323 (H).  Protein C activity 112.6% (N).    Protein S activity 11.3 (L).  Activated protein C resistance 2.1 (L).  Antithrombin III 83% (N).    D-dimer 2.15.    · 14 - Serum homocysteine 8 (N).  Anticardiolipin antibody negative.   Phosphatidylserine   antibody negative.  Beta-2 glycoprotein antibody negative.   · 2/19/14 - Prothrombin gene mutation negative.    · 2/20/14 - FLAKO-2 mutation test negative.    · 3/10/14 - Protein S activity 93% (N).  Homocysteine level 11 (N).    · 4/11/14 - Factor VIII level 260 (H).   · 4/11/14 - CT scan of the chest without contrast:  Complete resolution of air space opacities in   the lower lobes.  Mediastinal lymphadenopathy is overall very similar to the prior study.  It is   nonspecific.  Three vessel coronary artery atherosclerotic calcification.  Questionable lobular   contour of the liver, question cirrhosis.  AP window lymph node measures about 1.1 cm,   paratracheal lymph node measures about 1.7 cm and subcarinal lymph node measures about 1.2 cm.  · 5/2/14 - INR 4.1.   · 7/30/14 - WBC 5.7, hemoglobin 12.1, platelet count 228,000, MCV 94.0.  INR 3.0.   · 10/17/14 - CT scan of the chest:  Chronic lung changes are stable.  Several borderline to mildly   enlarged mediastinal lymph nodes are also unchanged from six months ago.  They are likely benign   reactive lymph nodes.    · 10/29/14 - Vitamin B12 185, ferritin 57, iron saturation 19%, TIBC 473 (H), serum iron 88,   creatinine 1.3, BUN 18, folate 17.7.  · 11/4/14 - Patient underwent upper GI endoscopy and colonoscopy:  Several nonbleeding diverticula   were seen in the sigmoid colon.  Diverticulosis appeared moderate severity.  Four sessile polyps   of benign appearance were found in the cecum and descending colon.  Polypectomies were performed.    Surgical pathology:  Sessile serrated polyps.  A tubular adenoma is noted in the descending   colon.    · 11/25/14 - INR 4.2.   · 11/25/14 - WBC 5.7, hemoglobin 11.4, platelet count 210,000.    · 2/24/15 - WBC 7.7, hemoglobin 12.9, platelet count 245,000, MCV 93,000.  · 2/24/15 - INR 2.6.  Patient’s Coumadin dose is 9 mg.     · 3/12/15 - Creatinine 1.3.    · 4/21/15 - Patient admitted with chest pain and had  cardiac catheterization.  He had a stent   placed.    · 5/8/15 - CT scan of the chest without contrast:  Stable appearance of mediastinal   lymphadenopathy over the past two studies going back to April 2014.  Stable pulmonary fibrotic   changes and scarring.    · 6/25/15 - WBC 5.3, hemoglobin 12.6, platelet count 187,000.     · 12/17/15 - WBC 5.1, hemoglobin 13.6, platelet count 208,000, MCV 93.1.  Iron saturation 21%,   TIBC 415, serum iron 85.    · 12/17/15 - Chest x-ray:  No acute process.     · 4/19/16 - INR 2.1.    · 5/17/16 - INR 2.2.  · 6/16/16 - WBC 5.5, hemoglobin 12.7, MCV 90.5, platelet count 205,000.  INR 2.3 (patient on   Coumadin 7.5 mg).    · 12/15/16 - WBC 5.0, hemoglobin 13.0, platelet count 174,000.  INR 1.9 (Coumadin dose 7.5).   · 6/15/17 - WBC 5.5, hemoglobin 12.5, platelet count 168,000, MCV 94.4.  INR 2.6 (Coumadin dose 8   mg).   · 11/2/17 - INR 2.    · 12/14/17 - WBC 5.04, hemoglobin 12.5, platelet count 167,000, MCV 94.    · 6/14/18 - WBC 4.9, hemoglobin 12.5, platelet count 172,000, MCV 93.5.    · 12/13/18 - INR 2.4.  WBC 4.9, hemoglobin 12.8, platelet count 180,000.    11/23/2019 INR is 2.55  11/11/2019: Colonoscopy: Cecum polypectomy shows tubular adenoma, transverse colon polypectomy shows tubular adenoma.   Patient readmitted after colonoscopy to hospital due to GI bleeding from polyp biopsy..  11/22/2019 hemoglobin 9.3 due to GI bleeding  11/23/2019 cecal ulcer with visible vessel and stigmata of recent bleeding which was clipped  Moderate left-sided diverticulosis  12/12/2019 INR 1.9  12/12/2019 WBC 4.9, hemoglobin 11.2, MCV 98.0, platelets 182, ferritin 85.2, iron 115, iron saturation 24%, TIBC 471, B12 greater than 2000, folate 13.8,  4/23/2020: ESR 12  5/6/2020: 25 hydroxy vitamin D 31.9  6/11/2020: INR 2.1, WBC 6.3, hemoglobin 13.5, platelets 204,   9/28/2020: Lower extremity venous Doppler:: Normal  11/25/2020: INR 2.8  12/31/2020: WBC 6.4, hemoglobin 13.7, platelets 212, INR  1.2,  5/3/2021: 25 hydroxy vitamin D 66.5, TSH 2.8, creatinine 1.24,  5/12/2021: INR 2.7,  6/9/2021: INR 2.5,  Subsequent INR has been stable. Now on 8mg daily  6/8/2023: WBC 8.04, hemoglobin 13.4, MCV 92.7, platelets 242,000.  Ferritin 91.87.  Iron 84, iron sat 20%, TIBC 413  6/12/2023: INR 2.8.  Continued on warfarin 8 mg    Subjective:  Francisco is here today for his routine follow-up.  He reports stable INR is on his home monitoring and no issues.  He denies any signs or symptoms of bleeding.  He does note easy bruising on the backs of his hands when he bumps them.        Past Medical History:   Diagnosis Date    ACE-inhibitor cough 06/2005    Coronary artery disease     Diabetes mellitus, type 2 1995    ED (erectile dysfunction)     Hx of blood clots 2014    Blood clot left leg     Hyperlipidemia 08/2000    Hypertension     Hypogonadism, male 1998    Hypothyroidism 06/2001    Obesity     Peripheral neuropathy     Pulmonary embolism 02/2014    Rectal fistula     Sleep apnea 12/10/2013    Vitamin D deficiency        Past Surgical History:   Procedure Laterality Date    CARDIAC CATHETERIZATION  2008    PTCA: 2008; PTCA: 4/21/2015 LCX stent     CARDIAC CATHETERIZATION Left 7/3/2021    Procedure: Cardiac Catheterization/Vascular Study;  Surgeon: Edwar Ackerman MD;  Location: [facility] CATH INVASIVE LOCATION;  Service: Cardiovascular;  Laterality: Left;    CARDIOVASCULAR STRESS TEST  2020    CATARACT EXTRACTION  01/11/2016 1-4-16 & 1-11-16     COLONOSCOPY N/A 11/23/2019    Procedure: COLONOSCOPY WITH ENDOSCOPIC CLIPPING X1 OF POST POLYPECTOMY BLEED SITE;  Surgeon: Jhonny Orellana MD;  Location: [facility] ENDOSCOPY;  Service: Gastroenterology    CORONARY STENT PLACEMENT      INGUINAL HERNIA REPAIR Left     UMBILICAL HERNIA REPAIR           Current Outpatient Medications:     Accu-Chek Irene Plus test strip, USE TO CHECK BLOOD SUGAR BEFORE MEALS AND AT BEDTIME, Disp: 400 each, Rfl: 3    amLODIPine (NORVASC) 5 MG tablet, Take  "1 tablet by mouth Daily., Disp: 90 tablet, Rfl: 3    atenolol (TENORMIN) 50 MG tablet, TAKE 1 TABLET BY MOUTH DAILY, Disp: 90 tablet, Rfl: 3    atorvastatin (LIPITOR) 80 MG tablet, Take 1 tablet by mouth Every Night. Indications: High Amount of Fats in the Blood, Disp: 90 tablet, Rfl: 3    Blood Glucose Monitoring Suppl (Accu-Chek Irene Plus) w/Device kit, 1 each 4 (Four) Times a Day., Disp: 1 kit, Rfl: 0    clopidogrel (PLAVIX) 75 MG tablet, TAKE 1 TABLET DAILY, Disp: 90 tablet, Rfl: 3    fenofibrate (TRICOR) 54 MG tablet, Take 1 tablet by mouth Daily. Indications: High Amount of Triglycerides in the Blood, Disp: , Rfl:     ferrous sulfate 325 (65 FE) MG tablet, Take 1 tablet by mouth 2 (Two) Times a Day., Disp: , Rfl:     gabapentin (NEURONTIN) 100 MG capsule, Take 1 capsule by mouth 3 (Three) Times a Day. Indications: Neuropathic Pain, Disp: , Rfl:     hydroCHLOROthiazide (HYDRODIURIL) 25 MG tablet, TAKE 1 TABLET BY MOUTH DAILY, Disp: 90 tablet, Rfl: 3    Insulin Pen Needle (BD ULTRA-FINE PEN NEEDLES) 29G X 12.7MM misc, Use to inject insulin twice daily. DX E11.65, Disp: 200 each, Rfl: 2    insulin regular (HUMULIN R) 500 UNIT/ML CONCENTRATED injection, DRAW TO 24 UNIT REI ON U-100 SYRINGE AND INJECT IN THE MORNING AND 40 UNIT REI IN THE EVENING  Indications: Type 2 Diabetes, Disp: 60 mL, Rfl: 3    Insulin Syringe-Needle U-100 (BD Insulin Syringe U/F) 31G X 5/16\" 0.5 ML misc, 1 each by Other route 2 (Two) Times a Day., Disp: 200 each, Rfl: 3    irbesartan (AVAPRO) 300 MG tablet, Take 1 tablet by mouth Daily. Indications: High Blood Pressure Disorder, Disp: , Rfl:     isosorbide mononitrate (IMDUR) 30 MG 24 hr tablet, Take 1 tablet by mouth Daily., Disp: 90 tablet, Rfl: 3    Jardiance 25 MG tablet tablet, TAKE 1 TABLET BY MOUTH DAILY, Disp: 90 tablet, Rfl: 3    levothyroxine (SYNTHROID, LEVOTHROID) 175 MCG tablet, Take 1 tablet by mouth Daily. Indications: Underactive Thyroid, Disp: 90 tablet, Rfl: 0    " metFORMIN ER (GLUCOPHAGE-XR) 500 MG 24 hr tablet, Take 4 tablets at supper  Indications: Type 2 Diabetes, Disp: 360 tablet, Rfl: 3    O2 (OXYGEN), Inhale 1 L/min Every Night. Indications: marianne, Disp: , Rfl:     Omega-3 Fatty Acids (FISH OIL) 1000 MG capsule capsule, Take 1 capsule by mouth 2 (Two) Times a Day., Disp: , Rfl:     Pramlintide Acetate (SymlinPen 120) 2700 MCG/2.7ML solution pen-injector, 120 MCG UNDER THE SKIN INTO THE APPROPRIATE AREA AS DIRECTED THREE TIMES A DAY, Disp: 32.4 mL, Rfl: 3    vitamin B-12 (CYANOCOBALAMIN) 100 MCG tablet, Take 1 tablet by mouth Daily. Indications: Inadequate Vitamin B12, Disp: , Rfl:     vitamin D (ERGOCALCIFEROL) 1.25 MG (80130 UT) capsule capsule, Take 1 capsule by mouth 1 (One) Time Per Week. Indications: Vitamin D Deficiency, Disp: 13 capsule, Rfl: 3    warfarin (COUMADIN) 1 MG tablet, Take 1 tablet PO every evening as directed., Disp: 30 tablet, Rfl: 3    warfarin (COUMADIN) 2 MG tablet, TAKE 1 TABLET BY MOUTH DAILY OR  AS DIRECTED BASED ON INR, Disp: 60 tablet, Rfl: 5    warfarin (COUMADIN) 3 MG tablet, Take 1 tablet by mouth Daily. Indications: Atrial Fibrillation, Disp: 90 tablet, Rfl: 3    warfarin (COUMADIN) 5 MG tablet, Take 1 tablet by mouth Every Night. Indications: Atrial Fibrillation, Disp: 30 tablet, Rfl: 2    No Known Allergies    Family History   Problem Relation Age of Onset    Heart disease Mother     Leukemia Father        Cancer-related family history is not on file.    Social History     Tobacco Use    Smoking status: Former     Packs/day: 1.50     Years: 20.00     Pack years: 30.00     Types: Cigarettes     Quit date:      Years since quittin.4     Passive exposure: Past    Smokeless tobacco: Never    Tobacco comments:     quit    Vaping Use    Vaping Use: Never used   Substance Use Topics    Alcohol use: No    Drug use: No       ROS:     Objective:  Vital signs:  Vitals:    06/15/23 1042   BP: 126/72   Pulse: 61   SpO2: 97%   Weight:  "132 kg (291 lb)   Height: 177.8 cm (70\")   PainSc: 0-No pain     ECOG  (1) Restricted in physically strenuous activity, ambulatory and able to do work of light nature    Physical Exam:   Physical Exam   Constitutional: He is oriented to person, place, and time. He appears well-developed. No distress.   Obese male   HENT:   Head: Normocephalic and atraumatic.   Nose: Nose normal.   Mouth/Throat: Mucous membranes are moist.   Eyes: Conjunctivae are normal. Right eye exhibits no discharge. Left eye exhibits no discharge. No scleral icterus.   Neck: No thyromegaly present.   Cardiovascular: Normal rate, regular rhythm and normal heart sounds. Exam reveals no gallop and no friction rub.   Pulmonary/Chest: Effort normal. No stridor. No respiratory distress. He has no wheezes.   Abdominal: Soft. Normal appearance and bowel sounds are normal. He exhibits no mass. There is no abdominal tenderness. There is no rebound and no guarding.   Musculoskeletal: Normal range of motion. No swelling, tenderness or signs of injury.   Lymphadenopathy:     He has no cervical adenopathy.   Neurological: He is alert and oriented to person, place, and time. He exhibits normal muscle tone.   Skin: Skin is warm. No rash noted. He is not diaphoretic. No erythema.   Psychiatric: His behavior is normal. Mood normal.   Lab Results - Last 18 Months   Lab Units 06/08/23  0947 12/15/22  1318 11/03/22  1158   WBC 10*3/mm3 8.04 9.27 10.46   HEMOGLOBIN g/dL 13.4 13.3 13.0   HEMATOCRIT % 40.6 40.3 38.9   PLATELETS 10*3/mm3 242 347 306   MCV fL 92.7 92.2 90.7     Lab Results - Last 18 Months   Lab Units 11/03/22  1158 05/03/22  0857   SODIUM mmol/L 137 138   POTASSIUM mmol/L 4.7 4.7   CHLORIDE mmol/L 101 100   CO2 mmol/L 26.4 22.0   BUN mg/dL 27* 19   CREATININE mg/dL 1.45* 1.22   CALCIUM mg/dL 9.9 9.8   BILIRUBIN mg/dL 0.3 0.4   ALK PHOS U/L 43 43   ALT (SGPT) U/L 17 20   AST (SGOT) U/L 18 19   GLUCOSE mg/dL 181* 250*       Lab Results   Component Value " Date    GLUCOSE 181 (H) 11/03/2022    BUN 27 (H) 11/03/2022    CREATININE 1.45 (H) 11/03/2022    EGFRIFNONA 60 (L) 11/02/2021    BCR 18.6 11/03/2022    K 4.7 11/03/2022    CO2 26.4 11/03/2022    CALCIUM 9.9 11/03/2022    ALBUMIN 3.90 11/03/2022    LABIL2 1.7 03/28/2019    AST 18 11/03/2022    ALT 17 11/03/2022       Lab Results - Last 18 Months   Lab Units 06/12/23  0000 06/05/23  0000 05/30/23  0000   INR  2.80 2.30 2.60       Lab Results   Component Value Date    IRON 84 06/08/2023    TIBC 413 06/08/2023    FERRITIN 91.87 06/08/2023       Lab Results   Component Value Date    FOLATE 13.80 12/12/2019       No results found for: OCCULTBLD    No results found for: RETICCTPCT    Lab Results   Component Value Date    UVUOKLPT29 >2,000 (H) 12/12/2019     No results found for: SPEP, UPEP  No results found for: LDH, URICACID  Lab Results   Component Value Date    SEDRATE 29 (H) 11/03/2022     No results found for: FIBRINOGEN, HAPTOGLOBIN  Lab Results   Component Value Date    PTT 31.5 (H) 11/22/2019    INR 2.80 06/12/2023     No results found for:   No results found for: CEA  No components found for: CA-19-9  No results found for: PSA      Assessment & Plan     Assessment:  1. History of bilateral pulmonary emboli with high clot burden and DVT:  His thrombosis was   unprovoked.  Hypercoagulability testing revealed elevated factor VIII level on two occasions.  He   is on anticoagulation with Coumadin.  He also has a questionable positive family history of DVT.    He has been recommended indefinite lifetime anticoagulation.  INRs have been therapeutic.  Continue warfarin 8 mg p.o. daily along with home monitoring.  2. History of mediastinal lymphadenopathy:  His CT scan shows stable lymphadenopathy.    These are very likely benign lymph nodes. No further intervention.  3. Mild CKD:  He has slight anemia of chronic kidney disease.   4. History of iron deficiency:  He had a colonoscopy and that showed polyps and  moderately severe   diverticulosis.  Pathology showed a tubular adenoma in the descending colon.  He is on iron   supplements p.o. BID. History of iron deficiency. Hb continues to be normal and iron levels are also normal.  5. Vitamin B12 deficiency:  He is on p.o. B12 supplements.  6. Dizziness/light-headedness: resolved  7.  History of  GI bleeding.  Due to polypectomy from colonoscopy.       Deep vein thrombosis (DVT) of lower extremity, unspecified chronicity, unspecified laterality, unspecified vein    Iron deficiency anemia, unspecified iron deficiency anemia type    Long term (current) use of anticoagulants    No orders of the defined types were placed in this encounter.    Discussion: Reviewed with Francisco the signs and symptoms of bleeding to monitor for between appointments.  Asked him to notify us of any planned or upcoming procedures.  He has no issues with his home monitoring or communication with the Coumadin clinic.  He will follow-up with Dr. Cortez in 6 months with lab work to be completed 1 week prior to the appointment.     Thank you very much for providing the opportunity to participate in this patient’s care. Please do not hesitate to call if there are any other questions.

## 2023-06-12 NOTE — PROGRESS NOTES
Anticoagulation Clinic Progress Note    Patient's visit was held by phone today.    Anticoagulation Summary  As of 2023      INR goal:  2.0-3.0   TTR:  86.4 % (3.9 y)   INR used for dosin.80 (2023)   Warfarin maintenance plan:  8 mg (5 mg x 1 and 1 mg x 3) every day; Starting 2023   Weekly warfarin total:  56 mg   No change documented:  Taylor Ambriz [facility]   Plan last modified:  Lynn Baum RN (2023)   Next INR check:  2023   Priority:  High   Target end date:  Indefinite    Indications    Deep vein thrombosis (DVT) [I82.409]  Thrombophilia [D68.59]                 Anticoagulation Episode Summary       INR check location:      Preferred lab:      Send INR reminders to:   [facility] CBC ANTICOAG POOL    Comments:  [facility] Home maninder          Anticoagulation Care Providers       Provider Role Specialty Phone number    Soraya Cortez MD Referring Hematology and Oncology [phone]            Drug interactions: has remained unchanged.  Diet: has remained unchanged.    Clinic Interview:  No pertinent clinical findings have been reported.    INR History:      2023    12:52 PM 2023    12:00 AM 2023    10:39 AM 2023    12:00 AM 2023     8:53 AM 2023    12:00 AM 2023    10:25 AM   Anticoagulation Monitoring   INR 2.40  2.60  2.30  2.80   INR Date 2023   INR Goal 2.0-3.0  2.0-3.0  2.0-3.0  2.0-3.0   Trend Same  Same  Same  Same   Last Week Total 56 mg  56 mg  56 mg  56 mg   Next Week Total 56 mg  56 mg  56 mg  56 mg   Sun 8 mg  8 mg  8 mg  8 mg   Mon 8 mg  8 mg  8 mg  8 mg   Tue 8 mg  8 mg  8 mg  8 mg   Wed 8 mg  8 mg  8 mg  8 mg   Thu 8 mg  8 mg  8 mg  8 mg   Fri 8 mg  8 mg  8 mg  8 mg   Sat 8 mg  8 mg  8 mg  8 mg   Historical INR  2.60      2.30      2.80            This result is from an external source.       Plan:  1. INR is Therapeutic today (INR 2.8 NOT 3.8 as in [facility] home test system-- see above in Anticoagulation  Summary.   Will instruct Dalton CHAU Burt Sr. to Continue their warfarin regimen- see above in Anticoagulation Summary.  2. Follow up in 1 week  3.They have been instructed to call if any changes in medications, doses, concerns, etc. Patient expresses understanding and has no further questions at this time.    Taylor Ambriz [facility]

## 2023-06-14 ENCOUNTER — OFFICE VISIT (OUTPATIENT)
Dept: WOUND CARE | Facility: HOSPITAL | Age: 83
End: 2023-06-14
Payer: MEDICARE

## 2023-06-15 ENCOUNTER — OFFICE VISIT (OUTPATIENT)
Dept: ONCOLOGY | Facility: CLINIC | Age: 83
End: 2023-06-15
Payer: MEDICARE

## 2023-06-15 VITALS
OXYGEN SATURATION: 97 % | DIASTOLIC BLOOD PRESSURE: 72 MMHG | HEART RATE: 61 BPM | BODY MASS INDEX: 41.66 KG/M2 | HEIGHT: 70 IN | SYSTOLIC BLOOD PRESSURE: 126 MMHG | WEIGHT: 291 LBS

## 2023-06-15 DIAGNOSIS — I82.409 DEEP VEIN THROMBOSIS (DVT) OF LOWER EXTREMITY, UNSPECIFIED CHRONICITY, UNSPECIFIED LATERALITY, UNSPECIFIED VEIN: Primary | ICD-10-CM

## 2023-06-15 DIAGNOSIS — D50.9 IRON DEFICIENCY ANEMIA, UNSPECIFIED IRON DEFICIENCY ANEMIA TYPE: ICD-10-CM

## 2023-06-15 DIAGNOSIS — Z79.01 LONG TERM (CURRENT) USE OF ANTICOAGULANTS: ICD-10-CM

## 2023-06-17 RX ORDER — GABAPENTIN 100 MG/1
CAPSULE ORAL
Qty: 270 CAPSULE | Refills: 3 | Status: SHIPPED | OUTPATIENT
Start: 2023-06-17

## 2023-06-19 RX ORDER — LEVOTHYROXINE SODIUM 175 UG/1
TABLET ORAL
Qty: 90 TABLET | Refills: 3 | Status: SHIPPED | OUTPATIENT
Start: 2023-06-19

## 2023-06-19 RX ORDER — IRBESARTAN 300 MG/1
TABLET ORAL
Qty: 90 TABLET | Refills: 3 | Status: SHIPPED | OUTPATIENT
Start: 2023-06-19

## 2023-07-24 ENCOUNTER — OFFICE VISIT (OUTPATIENT)
Dept: WOUND CARE | Facility: HOSPITAL | Age: 83
End: 2023-07-24
Payer: MEDICARE

## 2023-07-24 ENCOUNTER — LAB (OUTPATIENT)
Dept: LAB | Facility: HOSPITAL | Age: 83
End: 2023-07-24
Payer: MEDICARE

## 2023-07-24 DIAGNOSIS — E78.2 MIXED HYPERLIPIDEMIA: Chronic | ICD-10-CM

## 2023-07-24 DIAGNOSIS — I87.312 CHRONIC VENOUS HYPERTENSION (IDIOPATHIC) WITH ULCER OF LEFT LOWER EXTREMITY: ICD-10-CM

## 2023-07-24 DIAGNOSIS — E11.621 TYPE 2 DIABETES MELLITUS WITH FOOT ULCER, UNSPECIFIED WHETHER LONG TERM INSULIN USE: ICD-10-CM

## 2023-07-24 DIAGNOSIS — L97.929 CHRONIC VENOUS HYPERTENSION (IDIOPATHIC) WITH ULCER OF LEFT LOWER EXTREMITY: ICD-10-CM

## 2023-07-24 DIAGNOSIS — I87.2 VENOUS INSUFFICIENCY (CHRONIC) (PERIPHERAL): Primary | ICD-10-CM

## 2023-07-24 DIAGNOSIS — I70.202 ATHEROSCLEROSIS OF NATIVE ARTERY OF LEFT LOWER EXTREMITY, WITH UNSPECIFIED PRESENCE OF CLINICAL MANIFESTATION: ICD-10-CM

## 2023-07-24 DIAGNOSIS — E03.9 HYPOTHYROIDISM, UNSPECIFIED TYPE: Chronic | ICD-10-CM

## 2023-07-24 DIAGNOSIS — L84 CORNS AND CALLOSITIES: ICD-10-CM

## 2023-07-24 DIAGNOSIS — E55.9 VITAMIN D DEFICIENCY: ICD-10-CM

## 2023-07-24 DIAGNOSIS — Z79.4 TYPE 2 DIABETES MELLITUS WITH DIABETIC POLYNEUROPATHY, WITH LONG-TERM CURRENT USE OF INSULIN: ICD-10-CM

## 2023-07-24 DIAGNOSIS — L97.509 TYPE 2 DIABETES MELLITUS WITH FOOT ULCER, UNSPECIFIED WHETHER LONG TERM INSULIN USE: ICD-10-CM

## 2023-07-24 DIAGNOSIS — E11.42 TYPE 2 DIABETES MELLITUS WITH DIABETIC POLYNEUROPATHY, WITH LONG-TERM CURRENT USE OF INSULIN: ICD-10-CM

## 2023-07-24 LAB
25(OH)D3 SERPL-MCNC: 54.8 NG/ML (ref 30–100)
ALBUMIN SERPL-MCNC: 4.2 G/DL (ref 3.5–5.2)
ALBUMIN UR-MCNC: <1.2 MG/DL
ALBUMIN/GLOB SERPL: 1.7 G/DL
ALP SERPL-CCNC: 38 U/L (ref 39–117)
ALT SERPL W P-5'-P-CCNC: 19 U/L (ref 1–41)
ANION GAP SERPL CALCULATED.3IONS-SCNC: 14.5 MMOL/L (ref 5–15)
AST SERPL-CCNC: 19 U/L (ref 1–40)
BILIRUB SERPL-MCNC: 0.4 MG/DL (ref 0–1.2)
BUN SERPL-MCNC: 25 MG/DL (ref 8–23)
BUN/CREAT SERPL: 18.4 (ref 7–25)
CALCIUM SPEC-SCNC: 10.1 MG/DL (ref 8.6–10.5)
CHLORIDE SERPL-SCNC: 99 MMOL/L (ref 98–107)
CHOLEST SERPL-MCNC: 141 MG/DL (ref 0–200)
CO2 SERPL-SCNC: 23.5 MMOL/L (ref 22–29)
CREAT SERPL-MCNC: 1.36 MG/DL (ref 0.76–1.27)
CREAT UR-MCNC: 21.5 MG/DL
EGFRCR SERPLBLD CKD-EPI 2021: 52.6 ML/MIN/1.73
GLOBULIN UR ELPH-MCNC: 2.5 GM/DL
GLUCOSE SERPL-MCNC: 210 MG/DL (ref 65–99)
HBA1C MFR BLD: 6.8 % (ref 4.8–5.6)
HDLC SERPL-MCNC: 41 MG/DL (ref 40–60)
LDLC SERPL CALC-MCNC: 75 MG/DL (ref 0–100)
LDLC/HDLC SERPL: 1.73 {RATIO}
MICROALBUMIN/CREAT UR: NORMAL MG/G{CREAT}
POTASSIUM SERPL-SCNC: 4.9 MMOL/L (ref 3.5–5.2)
PROT SERPL-MCNC: 6.7 G/DL (ref 6–8.5)
SODIUM SERPL-SCNC: 137 MMOL/L (ref 136–145)
T4 FREE SERPL-MCNC: 1.77 NG/DL (ref 0.93–1.7)
TRIGL SERPL-MCNC: 145 MG/DL (ref 0–150)
TSH SERPL DL<=0.05 MIU/L-ACNC: 1.57 UIU/ML (ref 0.27–4.2)
VLDLC SERPL-MCNC: 25 MG/DL (ref 5–40)

## 2023-07-24 PROCEDURE — 83036 HEMOGLOBIN GLYCOSYLATED A1C: CPT

## 2023-07-24 PROCEDURE — 82043 UR ALBUMIN QUANTITATIVE: CPT

## 2023-07-24 PROCEDURE — 36415 COLL VENOUS BLD VENIPUNCTURE: CPT

## 2023-07-24 PROCEDURE — 82570 ASSAY OF URINE CREATININE: CPT

## 2023-07-24 PROCEDURE — 80061 LIPID PANEL: CPT

## 2023-07-24 PROCEDURE — 84443 ASSAY THYROID STIM HORMONE: CPT

## 2023-07-24 PROCEDURE — 82306 VITAMIN D 25 HYDROXY: CPT

## 2023-07-24 PROCEDURE — 80053 COMPREHEN METABOLIC PANEL: CPT

## 2023-07-24 PROCEDURE — 84439 ASSAY OF FREE THYROXINE: CPT

## 2023-07-25 ENCOUNTER — ANTICOAGULATION VISIT (OUTPATIENT)
Dept: PHARMACY | Facility: HOSPITAL | Age: 83
End: 2023-07-25
Payer: MEDICARE

## 2023-07-25 DIAGNOSIS — D68.59 THROMBOPHILIA: Chronic | ICD-10-CM

## 2023-07-25 DIAGNOSIS — I82.513 CHRONIC DEEP VEIN THROMBOSIS (DVT) OF FEMORAL VEIN OF BOTH LOWER EXTREMITIES: Primary | Chronic | ICD-10-CM

## 2023-07-25 LAB — INR PPP: 2.3

## 2023-07-25 RX ORDER — PRAMLINTIDE ACETATE 1000 UG/ML
INJECTION SUBCUTANEOUS
Qty: 10.8 ML | Refills: 11 | Status: SHIPPED | OUTPATIENT
Start: 2023-07-25

## 2023-07-25 NOTE — PROGRESS NOTES
Anticoagulation Clinic Progress Note    Patient's visit was held by phone today.    Anticoagulation Summary  As of 2023      INR goal:  2.0-3.0   TTR:  86.8 % (4 y)   INR used for dosin.30 (2023)   Warfarin maintenance plan:  8 mg (5 mg x 1 and 1 mg x 3) every day; Starting 2023   Weekly warfarin total:  56 mg   No change documented:  Basilia Jorgensen, [facility]   Plan last modified:  Lynn Baum RN (2023)   Next INR check:  2023   Priority:  High   Target end date:  Indefinite    Indications    Deep vein thrombosis (DVT) [I82.409]  Thrombophilia [D68.59]                 Anticoagulation Episode Summary       INR check location:      Preferred lab:      Send INR reminders to:   [facility] CBC ANTICOAG POOL    Comments:  MDINR Home maninder          Anticoagulation Care Providers       Provider Role Specialty Phone number    Soraya Cortez MD Referring Hematology and Oncology [phone]            Drug interactions: has remained unchanged.  Diet: has remained unchanged.    Clinic Interview:  No pertinent clinical findings have been reported.    INR History:      2023    12:27 PM 7/10/2023    12:00 AM 7/10/2023     9:25 AM 2023    12:00 AM 2023     8:46 AM 2023    12:00 AM 2023     9:22 AM   Anticoagulation Monitoring   INR 2.70  2.30  2.70  2.30   INR Date 2023  7/10/2023  2023  2023   INR Goal 2.0-3.0  2.0-3.0  2.0-3.0  2.0-3.0   Trend Same  Same  Same  Same   Last Week Total 56 mg  56 mg  56 mg  56 mg   Next Week Total 56 mg  56 mg  56 mg  56 mg   Sun 8 mg  8 mg  8 mg  8 mg   Mon 8 mg  8 mg  8 mg  8 mg   Tue 8 mg  8 mg  8 mg  8 mg   Wed 8 mg  8 mg  8 mg  8 mg   Thu 8 mg  8 mg  8 mg  8 mg   Fri 8 mg  8 mg  8 mg  8 mg   Sat 8 mg  8 mg  8 mg  8 mg   Historical INR  2.30      2.70      2.30            This result is from an external source.       Plan:  1. INR is Therapeutic today- see above in Anticoagulation Summary.   Will instruct Dalton Dallas Sr.  to Continue their warfarin regimen- see above in Anticoagulation Summary.  2. Follow up in 1 week  3.They have been instructed to call if any changes in medications, doses, concerns, etc. Patient expresses understanding and has no further questions at this time.    Basilia Jorgensen [facility]

## 2023-07-27 NOTE — HOME HEALTH
Routine Visit Note: SN to assess patient, pain, medications and perform wound dressing changes as documented    Skill/education provided: Reinforced importance to check skin daily    Patient/caregiver response: Patient verbalizes understanding    Plan for next visit: SN to assess patient and perform wound dressing change    Other pertinent info:
None

## 2023-07-31 ENCOUNTER — OFFICE VISIT (OUTPATIENT)
Dept: ENDOCRINOLOGY | Facility: CLINIC | Age: 83
End: 2023-07-31
Payer: MEDICARE

## 2023-07-31 ENCOUNTER — ANTICOAGULATION VISIT (OUTPATIENT)
Dept: PHARMACY | Facility: HOSPITAL | Age: 83
End: 2023-07-31
Payer: MEDICARE

## 2023-07-31 VITALS
BODY MASS INDEX: 41.83 KG/M2 | HEIGHT: 70 IN | HEART RATE: 66 BPM | SYSTOLIC BLOOD PRESSURE: 106 MMHG | DIASTOLIC BLOOD PRESSURE: 52 MMHG | WEIGHT: 292.2 LBS

## 2023-07-31 DIAGNOSIS — E11.42 TYPE 2 DIABETES MELLITUS WITH DIABETIC POLYNEUROPATHY, WITH LONG-TERM CURRENT USE OF INSULIN: Primary | ICD-10-CM

## 2023-07-31 DIAGNOSIS — E55.9 VITAMIN D DEFICIENCY: ICD-10-CM

## 2023-07-31 DIAGNOSIS — D68.59 THROMBOPHILIA: Primary | Chronic | ICD-10-CM

## 2023-07-31 DIAGNOSIS — E78.2 MIXED HYPERLIPIDEMIA: Chronic | ICD-10-CM

## 2023-07-31 DIAGNOSIS — Z79.4 TYPE 2 DIABETES MELLITUS WITH DIABETIC POLYNEUROPATHY, WITH LONG-TERM CURRENT USE OF INSULIN: Primary | ICD-10-CM

## 2023-07-31 DIAGNOSIS — E03.9 HYPOTHYROIDISM, UNSPECIFIED TYPE: Chronic | ICD-10-CM

## 2023-07-31 LAB
GLUCOSE BLDC GLUCOMTR-MCNC: 210 MG/DL (ref 70–105)
INR PPP: 2.5

## 2023-07-31 PROCEDURE — 82948 REAGENT STRIP/BLOOD GLUCOSE: CPT | Performed by: INTERNAL MEDICINE

## 2023-07-31 RX ORDER — BLOOD SUGAR DIAGNOSTIC
STRIP MISCELLANEOUS
Qty: 400 EACH | Refills: 3 | Status: SHIPPED | OUTPATIENT
Start: 2023-07-31

## 2023-08-01 ENCOUNTER — OFFICE VISIT (OUTPATIENT)
Dept: PULMONOLOGY | Facility: HOSPITAL | Age: 83
End: 2023-08-01
Payer: MEDICARE

## 2023-08-01 ENCOUNTER — TELEPHONE (OUTPATIENT)
Dept: ENDOCRINOLOGY | Facility: CLINIC | Age: 83
End: 2023-08-01
Payer: MEDICARE

## 2023-08-01 VITALS
WEIGHT: 292 LBS | RESPIRATION RATE: 16 BRPM | HEIGHT: 70 IN | HEART RATE: 63 BPM | DIASTOLIC BLOOD PRESSURE: 68 MMHG | OXYGEN SATURATION: 96 % | BODY MASS INDEX: 41.8 KG/M2 | SYSTOLIC BLOOD PRESSURE: 121 MMHG

## 2023-08-01 DIAGNOSIS — G47.33 OSA (OBSTRUCTIVE SLEEP APNEA): Primary | ICD-10-CM

## 2023-08-01 PROCEDURE — G0463 HOSPITAL OUTPT CLINIC VISIT: HCPCS

## 2023-08-07 LAB — INR PPP: 2.3

## 2023-08-08 ENCOUNTER — ANTICOAGULATION VISIT (OUTPATIENT)
Dept: PHARMACY | Facility: HOSPITAL | Age: 83
End: 2023-08-08
Payer: MEDICARE

## 2023-08-08 DIAGNOSIS — I82.513 CHRONIC DEEP VEIN THROMBOSIS (DVT) OF FEMORAL VEIN OF BOTH LOWER EXTREMITIES: Primary | Chronic | ICD-10-CM

## 2023-08-08 DIAGNOSIS — D68.59 THROMBOPHILIA: Chronic | ICD-10-CM

## 2023-08-08 NOTE — PROGRESS NOTES
Date of Office Visit: 2023  Encounter Provider: Dr. Edwar Ackerman  Place of Service: [facility] CARDIOLOGY [place]  Patient Name: Dalton Dallas Sr.  :1940  Gabino Sibley Jr., MD    Chief Complaint   Patient presents with    Coronary Artery Disease    Hypertension    Hyperlipidemia    Diabetes    Follow-up     History of Present Illness    I'm pleased to see Mr. Dallas in my office today as a followup     As you know, patient is 83 years old white gentleman whose past medical history is significant for hypertension, hyperlipidemia, coronary artery disease, coronary artery stenting, diabetes mellitus, DVT who came for followup.     In , patient developed symptom of shortness of breath and subsequent stress test was abnormal.  Cardiac catheterization showed LAD was free of disease, RCA had 50% stenosis, and LCX has 80% stenosis.  Patient underwent LCX/om stenting.  In , repeat cardiac catheterization showed 80-90% stenosis of LCX/OM1 patient underwent successful stenting.  LAD had 25% stenosis.  LVEF was 60%.  In 2018, echocardiogram showed EF of 60-65%. In 2020, patient underwent stress test which showed no myocardial ischemia.  Small fixed inferior defect was noted.    In 2021, patient was admitted at [facility] with symptom of unstable angina.  Patient underwent cardiac catheterization and it showed patent stents.  Distal LCx had 70 to 80% stenosis but it was a small caliber vessel.  LVEF was 55 to 60%.    In 2021, patient underwent echocardiogram and it showed ejection fraction of 60 to 65% and no significant valvular heart disease noted.    Patient came today for follow-up.  Unfortunately he has developed diabetic foot ulcer of the left leg.  He is following with foot specialist as well as wound center.  Patient denies any chest pain or tightness or heaviness.  Patient has baseline shortness of breath.  Patient denies any orthopnea, PND,  syncope or presyncope.  No leg edema noted.    I EKG showed normal sinus rhythm nonspecific ST changes noted.    Overall patient is doing well.  His blood pressure is controlled.  He is angina free.  His recent LDL is 75 which is desirable.  However diabetic control is not desirable.  I advised him to have better diabetic control diet modification discussed.          Past Medical History:   Diagnosis Date    ACE-inhibitor cough 06/2005    Coronary artery disease     Diabetes mellitus, type 2 1995    ED (erectile dysfunction)     Hx of blood clots 2014    Blood clot left leg     Hyperlipidemia 08/2000    Hypertension     Hypogonadism, male 1998    Hypothyroidism 06/2001    Obesity     Peripheral neuropathy     Pulmonary embolism 02/2014    Rectal fistula     Sleep apnea 12/10/2013    Ulcer of left foot 11/2022    Vitamin D deficiency          Past Surgical History:   Procedure Laterality Date    CARDIAC CATHETERIZATION  2008    PTCA: 2008; PTCA: 4/21/2015 LCX stent     CARDIAC CATHETERIZATION Left 7/3/2021    Procedure: Cardiac Catheterization/Vascular Study;  Surgeon: Edwar Ackerman MD;  Location: [facility] CATH INVASIVE LOCATION;  Service: Cardiovascular;  Laterality: Left;    CARDIOVASCULAR STRESS TEST  2020    CATARACT EXTRACTION  01/11/2016 1-4-16 & 1-11-16     COLONOSCOPY N/A 11/23/2019    Procedure: COLONOSCOPY WITH ENDOSCOPIC CLIPPING X1 OF POST POLYPECTOMY BLEED SITE;  Surgeon: Jhonny Orellana MD;  Location: [facility] ENDOSCOPY;  Service: Gastroenterology    CORONARY STENT PLACEMENT      INGUINAL HERNIA REPAIR Left     UMBILICAL HERNIA REPAIR             Current Outpatient Medications:     Accu-Chek Irene Plus test strip, USE TO CHECK BLOOD SUGAR BEFORE MEALS AND AT BEDTIME, Disp: 400 each, Rfl: 3    amLODIPine (NORVASC) 5 MG tablet, Take 1 tablet by mouth Daily., Disp: 90 tablet, Rfl: 3    atenolol (TENORMIN) 50 MG tablet, TAKE 1 TABLET BY MOUTH DAILY, Disp: 90 tablet, Rfl: 3    atorvastatin (LIPITOR) 80 MG  "tablet, Take 1 tablet by mouth Every Night. Indications: High Amount of Fats in the Blood, Disp: 90 tablet, Rfl: 3    Blood Glucose Monitoring Suppl (Accu-Chek Irene Plus) w/Device kit, 1 each 4 (Four) Times a Day., Disp: 1 kit, Rfl: 0    clopidogrel (PLAVIX) 75 MG tablet, TAKE 1 TABLET DAILY, Disp: 90 tablet, Rfl: 3    fenofibrate (TRICOR) 54 MG tablet, Take 1 tablet by mouth Daily. Indications: High Amount of Triglycerides in the Blood, Disp: 90 tablet, Rfl: 1    ferrous sulfate 325 (65 FE) MG tablet, Take 1 tablet by mouth 2 (Two) Times a Day., Disp: , Rfl:     gabapentin (NEURONTIN) 100 MG capsule, TAKE 1 CAPSULE BY MOUTH THREE  TIMES A DAY, Disp: 270 capsule, Rfl: 3    hydroCHLOROthiazide (HYDRODIURIL) 25 MG tablet, TAKE 1 TABLET BY MOUTH DAILY, Disp: 90 tablet, Rfl: 3    Insulin Pen Needle (BD ULTRA-FINE PEN NEEDLES) 29G X 12.7MM misc, Use to inject insulin twice daily. DX E11.65, Disp: 200 each, Rfl: 2    insulin regular (HUMULIN R) 500 UNIT/ML CONCENTRATED injection, DRAW TO 24 UNIT REI ON U-100 SYRINGE AND INJECT IN THE MORNING AND 40 UNIT REI IN THE EVENING  Indications: Type 2 Diabetes (Patient taking differently: DRAW TO 24 UNIT REI ON U-100 SYRINGE AND INJECT IN THE MORNING AND 35 UNIT REI IN THE EVENING  Indications: Type 2 Diabetes), Disp: 60 mL, Rfl: 3    Insulin Syringe-Needle U-100 (BD Insulin Syringe U/F) 31G X 5/16\" 0.5 ML misc, 1 each by Other route 2 (Two) Times a Day., Disp: 200 each, Rfl: 3    irbesartan (AVAPRO) 300 MG tablet, TAKE 1 TABLET BY MOUTH DAILY, Disp: 90 tablet, Rfl: 3    isosorbide mononitrate (IMDUR) 30 MG 24 hr tablet, Take 1 tablet by mouth Daily., Disp: 90 tablet, Rfl: 3    Jardiance 25 MG tablet tablet, TAKE 1 TABLET BY MOUTH DAILY, Disp: 90 tablet, Rfl: 3    levothyroxine (SYNTHROID, LEVOTHROID) 175 MCG tablet, TAKE 1 TABLET BY MOUTH DAILY FOR UNDERACTIVE THYROID, Disp: 90 tablet, Rfl: 3    metFORMIN ER (GLUCOPHAGE-XR) 500 MG 24 hr tablet, Take 4 tablets at supper  " Indications: Type 2 Diabetes, Disp: 360 tablet, Rfl: 3    O2 (OXYGEN), Inhale 1 L/min Every Night. Indications: marianne, Disp: , Rfl:     Omega-3 Fatty Acids (FISH OIL) 1000 MG capsule capsule, Take 1 capsule by mouth 2 (Two) Times a Day., Disp: , Rfl:     Pramlintide Acetate (SymlinPen 120) 2700 MCG/2.7ML solution pen-injector, INJECT 120 MCG UNDER THE SKIN  INTO THE APPROPRIATE AREA AS  DIRECTED THREE TIMES A DAY, Disp: 10.8 mL, Rfl: 11    vitamin B-12 (CYANOCOBALAMIN) 100 MCG tablet, Take 1 tablet by mouth Daily. Indications: Inadequate Vitamin B12, Disp: , Rfl:     vitamin D (ERGOCALCIFEROL) 1.25 MG (01049 UT) capsule capsule, Take 1 capsule by mouth 1 (One) Time Per Week. Indications: Vitamin D Deficiency, Disp: 13 capsule, Rfl: 3    warfarin (COUMADIN) 1 MG tablet, Take 1 tablet PO every evening as directed., Disp: 30 tablet, Rfl: 3    warfarin (COUMADIN) 2 MG tablet, TAKE 1 TABLET BY MOUTH DAILY OR  AS DIRECTED BASED ON INR, Disp: 60 tablet, Rfl: 5    warfarin (COUMADIN) 3 MG tablet, Take 1 tablet by mouth Daily. Indications: Atrial Fibrillation, Disp: 90 tablet, Rfl: 3    warfarin (COUMADIN) 5 MG tablet, Take 1 tablet by mouth Every Night. Indications: Atrial Fibrillation, Disp: 30 tablet, Rfl: 2      Social History     Socioeconomic History    Marital status:    Tobacco Use    Smoking status: Former     Packs/day: 1.50     Years: 20.00     Pack years: 30.00     Types: Cigarettes     Quit date:      Years since quittin.6     Passive exposure: Past    Smokeless tobacco: Never    Tobacco comments:     quit    Vaping Use    Vaping Use: Never used   Substance and Sexual Activity    Alcohol use: No    Drug use: No    Sexual activity: Defer         Review of Systems   Constitutional: Negative for chills and fever.   HENT:  Negative for ear discharge and nosebleeds.    Eyes:  Negative for discharge and redness.   Cardiovascular:  Negative for chest pain, orthopnea, palpitations, paroxysmal nocturnal  "dyspnea and syncope.   Respiratory:  Positive for shortness of breath. Negative for cough and wheezing.    Endocrine: Negative for heat intolerance.   Skin:  Negative for rash.   Musculoskeletal:  Negative for arthritis and myalgias.   Gastrointestinal:  Negative for abdominal pain, melena, nausea and vomiting.   Genitourinary:  Negative for dysuria and hematuria.   Neurological:  Negative for dizziness, light-headedness, numbness and tremors.   Psychiatric/Behavioral:  Negative for depression. The patient is not nervous/anxious.      Procedures      ECG 12 Lead    Date/Time: 8/9/2023 7:00 PM  Performed by: Edwar Ackerman MD  Authorized by: Edwar Ackerman MD   Comparison: compared with previous ECG   Similar to previous ECG  Rhythm: sinus rhythm  Other findings: non-specific ST-T wave changes    Clinical impression: abnormal EKG        ECG 12 Lead    (Results Pending)           Objective:    /60 (BP Location: Right arm, Patient Position: Sitting, Cuff Size: Large Adult)   Pulse 62   Ht 177.8 cm (70\")   Wt 126 kg (278 lb)   SpO2 94%   BMI 39.89 kg/mý         Constitutional:       Appearance: Well-developed.   Eyes:      General: No scleral icterus.        Right eye: No discharge.   HENT:      Head: Normocephalic and atraumatic.   Neck:      Thyroid: No thyromegaly.      Lymphadenopathy: No cervical adenopathy.   Pulmonary:      Effort: Pulmonary effort is normal. No respiratory distress.      Breath sounds: Normal breath sounds. No wheezing. No rales.   Cardiovascular:      Normal rate. Regular rhythm.      No gallop.    Edema:     Peripheral edema absent.   Abdominal:      Tenderness: There is no abdominal tenderness.   Skin:     Findings: No erythema or rash.   Neurological:      Mental Status: Alert and oriented to person, place, and time.           Assessment:       Diagnosis Plan   1. Coronary artery disease involving native coronary artery of native heart without angina pectoris  ECG 12 Lead      2. " Essential hypertension  ECG 12 Lead      3. Mixed hyperlipidemia  ECG 12 Lead      4. Diastolic dysfunction with chronic heart failure  ECG 12 Lead      5. Type 2 diabetes mellitus with diabetic polyneuropathy, with long-term current use of insulin  ECG 12 Lead               Plan:       MDM:    1.  CAD:    Patient is angina lactose-free.  I would consider stress test in 2 years.    2.  Hypertension:    Blood pressure is very well-controlled current treatment with atenolol and an Avapro and amlodipine would be continued    3.  Hyperlipidemia:    Patient is on Lipitor.  Recent LDL is 75.    4.  Diabetes mellitus:    Patient is on metformin.  Continue current treatment    5.

## 2023-08-08 NOTE — PROGRESS NOTES
Anticoagulation Clinic Progress Note    Patient's visit was held by phone today.    Anticoagulation Summary  As of 2023      INR goal:  2.0-3.0   TTR:  87.0 % (4 y)   INR used for dosin.30 (2023)   Warfarin maintenance plan:  8 mg (5 mg x 1 and 1 mg x 3) every day   Weekly warfarin total:  56 mg   Plan last modified:  Lynn Baum RN (2023)   Next INR check:  8/15/2023   Priority:  Maintenance   Target end date:  Indefinite    Indications    Deep vein thrombosis (DVT) [I82.409]  Thrombophilia [D68.59]                 Anticoagulation Episode Summary       INR check location:      Preferred lab:      Send INR reminders to:   [facility] CBC ANTICOAG POOL    Comments:  MDINR Home maninder          Anticoagulation Care Providers       Provider Role Specialty Phone number    Soraya Cortez MD Referring Hematology and Oncology [phone]            Drug interactions: has remained unchanged.  Diet: has remained unchanged.    Clinic Interview:  No pertinent clinical findings have been reported.    INR History:      2023     8:46 AM 2023    12:00 AM 2023     9:22 AM 2023    12:00 AM 2023    10:30 AM 2023    12:00 AM 2023     9:51 AM   Anticoagulation Monitoring   INR 2.70  2.30  2.50  2.30   INR Date 2023   INR Goal 2.0-3.0  2.0-3.0  2.0-3.0  2.0-3.0   Trend Same  Same  Same  Same   Last Week Total 56 mg  56 mg  56 mg  56 mg   Next Week Total 56 mg  56 mg  56 mg  56 mg   Sun 8 mg  8 mg  8 mg  8 mg   Mon 8 mg  8 mg  8 mg  8 mg   Tue 8 mg  8 mg  8 mg  8 mg   Wed 8 mg  8 mg  8 mg  8 mg   Thu 8 mg  8 mg  8 mg  8 mg   Fri 8 mg  8 mg  8 mg  8 mg   Sat 8 mg  8 mg  8 mg  8 mg   Historical INR  2.30      2.50      2.30        Visit Report    Report Report         This result is from an external source.       Plan:  1. INR is Therapeutic today- see above in Anticoagulation Summary.   Will instruct Dalton Dallas Sr. to Continue their  warfarin regimen- see above in Anticoagulation Summary.  2. Follow up in 1 weeks- home test.   3.They have been instructed to call if any changes in medications, doses, concerns, etc. Patient expresses understanding and has no further questions at this time.    Karen Oates [facility]

## 2023-08-09 ENCOUNTER — OFFICE VISIT (OUTPATIENT)
Dept: WOUND CARE | Facility: HOSPITAL | Age: 83
End: 2023-08-09
Payer: MEDICARE

## 2023-08-09 ENCOUNTER — OFFICE VISIT (OUTPATIENT)
Dept: CARDIOLOGY | Facility: CLINIC | Age: 83
End: 2023-08-09
Payer: MEDICARE

## 2023-08-09 VITALS
OXYGEN SATURATION: 94 % | BODY MASS INDEX: 39.8 KG/M2 | WEIGHT: 278 LBS | DIASTOLIC BLOOD PRESSURE: 60 MMHG | HEART RATE: 62 BPM | HEIGHT: 70 IN | SYSTOLIC BLOOD PRESSURE: 123 MMHG

## 2023-08-09 DIAGNOSIS — E11.42 TYPE 2 DIABETES MELLITUS WITH DIABETIC POLYNEUROPATHY, WITH LONG-TERM CURRENT USE OF INSULIN: ICD-10-CM

## 2023-08-09 DIAGNOSIS — I50.32 DIASTOLIC DYSFUNCTION WITH CHRONIC HEART FAILURE: Chronic | ICD-10-CM

## 2023-08-09 DIAGNOSIS — E78.2 MIXED HYPERLIPIDEMIA: Chronic | ICD-10-CM

## 2023-08-09 DIAGNOSIS — I10 ESSENTIAL HYPERTENSION: Chronic | ICD-10-CM

## 2023-08-09 DIAGNOSIS — Z79.4 TYPE 2 DIABETES MELLITUS WITH DIABETIC POLYNEUROPATHY, WITH LONG-TERM CURRENT USE OF INSULIN: ICD-10-CM

## 2023-08-09 DIAGNOSIS — I25.10 CORONARY ARTERY DISEASE INVOLVING NATIVE CORONARY ARTERY OF NATIVE HEART WITHOUT ANGINA PECTORIS: Primary | Chronic | ICD-10-CM

## 2023-08-14 ENCOUNTER — ANTICOAGULATION VISIT (OUTPATIENT)
Dept: PHARMACY | Facility: HOSPITAL | Age: 83
End: 2023-08-14
Payer: MEDICARE

## 2023-08-14 DIAGNOSIS — Z79.4 TYPE 2 DIABETES MELLITUS WITH DIABETIC POLYNEUROPATHY, WITH LONG-TERM CURRENT USE OF INSULIN: ICD-10-CM

## 2023-08-14 DIAGNOSIS — E11.42 TYPE 2 DIABETES MELLITUS WITH DIABETIC POLYNEUROPATHY, WITH LONG-TERM CURRENT USE OF INSULIN: ICD-10-CM

## 2023-08-14 DIAGNOSIS — I82.513 CHRONIC DEEP VEIN THROMBOSIS (DVT) OF FEMORAL VEIN OF BOTH LOWER EXTREMITIES: Primary | Chronic | ICD-10-CM

## 2023-08-14 DIAGNOSIS — D68.59 THROMBOPHILIA: Chronic | ICD-10-CM

## 2023-08-14 LAB — INR PPP: 2.9

## 2023-08-14 RX ORDER — BLOOD SUGAR DIAGNOSTIC
STRIP MISCELLANEOUS
OUTPATIENT
Start: 2023-08-14

## 2023-08-14 NOTE — PROGRESS NOTES
I agree with this encounter and documentation.  Karen Oatse [facility]      Anticoagulation Clinic Progress Note    Patient's visit was held by phone today.    Anticoagulation Summary  As of 2023      INR goal:  2.0-3.0   TTR:  87.0 % (4 y)   INR used for dosin.90 (2023)   Warfarin maintenance plan:  8 mg (5 mg x 1 and 1 mg x 3) every day   Weekly warfarin total:  56 mg   No change documented:  Mirian Younger [facility]   Plan last modified:  Lynn Baum RN (2023)   Next INR check:  2023   Priority:  Maintenance   Target end date:  Indefinite    Indications    Deep vein thrombosis (DVT) [I82.409]  Thrombophilia [D68.59]                 Anticoagulation Episode Summary       INR check location:      Preferred lab:      Send INR reminders to:  [facility] CBC ANTICOAG POOL    Comments:  MDINR Home maninder          Anticoagulation Care Providers       Provider Role Specialty Phone number    Soraya Cortez MD Referring Hematology and Oncology [phone]            Drug interactions: has remained unchanged.  Diet: has remained unchanged.    Clinic Interview:  No pertinent clinical findings have been reported.    INR History:      2023     9:22 AM 2023    12:00 AM 2023    10:30 AM 2023    12:00 AM 2023     9:51 AM 2023    12:00 AM 2023     8:37 AM   Anticoagulation Monitoring   INR 2.30  2.50  2.30  2.90   INR Date 2023   INR Goal 2.0-3.0  2.0-3.0  2.0-3.0  2.0-3.0   Trend Same  Same  Same  Same   Last Week Total 56 mg  56 mg  56 mg  56 mg   Next Week Total 56 mg  56 mg  56 mg  56 mg   Sun 8 mg  8 mg  8 mg  8 mg   Mon 8 mg  8 mg  8 mg  8 mg   Tue 8 mg  8 mg  8 mg  8 mg   Wed 8 mg  8 mg  8 mg  8 mg   Thu 8 mg  8 mg  8 mg  8 mg   Fri 8 mg  8 mg  8 mg  8 mg   Sat 8 mg  8 mg  8 mg  8 mg   Historical INR  2.50      2.30      2.90        Visit Report  Report Report           This result is from an external source.       Plan:  1. INR is  Therapeutic today- see above in Anticoagulation Summary.   Will instruct Dalton CHAU Burt Sr. to Continue their warfarin regimen- see above in Anticoagulation Summary.  2. Follow up in 1 week- self maninder.  3.They have been instructed to call if any changes in medications, doses, concerns, etc. Patient expresses understanding and has no further questions at this time.    Katerina Younger, [facility]

## 2023-08-15 ENCOUNTER — TELEPHONE (OUTPATIENT)
Dept: ENDOCRINOLOGY | Facility: CLINIC | Age: 83
End: 2023-08-15
Payer: MEDICARE

## 2023-08-15 NOTE — TELEPHONE ENCOUNTER
Patient called with a question on his test strips, stating we denied a prescription. I advised we sent in the Irene test strips yesterday. He states he picked them up the other day. He says they are very expensive and the guide test strips are cheaper but since he just picked up the Irene does not want to switch right now.

## 2023-08-18 ENCOUNTER — TELEPHONE (OUTPATIENT)
Dept: FAMILY MEDICINE CLINIC | Facility: CLINIC | Age: 83
End: 2023-08-18
Payer: MEDICARE

## 2023-08-18 NOTE — TELEPHONE ENCOUNTER
Caller: GABRIELE PACKER    Relationship: Home Health    Best call back number: [phone]     GABRIELE IS CALLING IN TO REPORT A FALL WITH NO INJURIES.

## 2023-08-23 ENCOUNTER — OFFICE VISIT (OUTPATIENT)
Dept: WOUND CARE | Facility: HOSPITAL | Age: 83
End: 2023-08-23
Payer: MEDICARE

## 2023-08-24 ENCOUNTER — ANTICOAGULATION VISIT (OUTPATIENT)
Dept: PHARMACY | Facility: HOSPITAL | Age: 83
End: 2023-08-24
Payer: MEDICARE

## 2023-08-24 DIAGNOSIS — D68.59 THROMBOPHILIA: Primary | Chronic | ICD-10-CM

## 2023-08-24 LAB — INR PPP: 2.7

## 2023-08-24 NOTE — PROGRESS NOTES
Anticoagulation Clinic Progress Note    Patient's visit was held by phone today.    Anticoagulation Summary  As of 2023      INR goal:  2.0-3.0   TTR:  87.1 % (4.1 y)   INR used for dosin.70 (2023)   Warfarin maintenance plan:  8 mg (5 mg x 1 and 1 mg x 3) every day   Weekly warfarin total:  56 mg   No change documented:  Taylor Ambriz [facility]   Plan last modified:  Lnyn Baum RN (2023)   Next INR check:  2023   Priority:  Maintenance   Target end date:  Indefinite    Indications    Deep vein thrombosis (DVT) [I82.409]  Thrombophilia [D68.59]                 Anticoagulation Episode Summary       INR check location:      Preferred lab:      Send INR reminders to:   [facility] ON[facility] CBC ANTICOAG POOL    Comments:  MDINR Home maninder          Anticoagulation Care Providers       Provider Role Specialty Phone number    Soraya Cortez MD Referring Hematology and Oncology [phone]            Drug interactions: has remained unchanged.  Diet: has remained unchanged.    Clinic Interview:  No pertinent clinical findings have been reported.    INR History:      2023    10:30 AM 2023    12:00 AM 2023     9:51 AM 2023    12:00 AM 2023     8:37 AM 2023    12:00 AM 2023     1:25 PM   Anticoagulation Monitoring   INR 2.50  2.30  2.90  2.70   INR Date 2023   INR Goal 2.0-3.0  2.0-3.0  2.0-3.0  2.0-3.0   Trend Same  Same  Same  Same   Last Week Total 56 mg  56 mg  56 mg  56 mg   Next Week Total 56 mg  56 mg  56 mg  56 mg   Sun 8 mg  8 mg  8 mg  8 mg   Mon 8 mg  8 mg  8 mg  8 mg   Tue 8 mg  8 mg  8 mg  8 mg   Wed 8 mg  8 mg  8 mg  8 mg   Thu 8 mg  8 mg  8 mg  8 mg   Fri 8 mg  8 mg  8 mg  8 mg   Sat 8 mg  8 mg  8 mg  8 mg   Historical INR  2.30      2.90      2.70        Visit Report Report             This result is from an external source.       Plan:  1. INR is Therapeutic today- see above in Anticoagulation Summary.   Will instruct Dalton  W Burt Sr. to Continue their warfarin regimen- see above in Anticoagulation Summary.  2. Follow up in 1 week--home test  3.They have been instructed to call if any changes in medications, doses, concerns, etc. Left VM with instructions and to call back with updates or questions.     Taylor Ambriz RP

## 2023-08-29 ENCOUNTER — ANTICOAGULATION VISIT (OUTPATIENT)
Dept: PHARMACY | Facility: HOSPITAL | Age: 83
End: 2023-08-29
Payer: MEDICARE

## 2023-08-29 DIAGNOSIS — D68.59 THROMBOPHILIA: Primary | Chronic | ICD-10-CM

## 2023-08-29 LAB — INR PPP: 2.6

## 2023-08-29 NOTE — PROGRESS NOTES
Anticoagulation Clinic Progress Note    Patient's visit was held by phone today.    Anticoagulation Summary  As of 2023      INR goal:  2.0-3.0   TTR:  87.1 % (4.1 y)   INR used for dosin.60 (2023)   Warfarin maintenance plan:  8 mg (5 mg x 1 and 1 mg x 3) every day   Weekly warfarin total:  56 mg   Plan last modified:  Lynn Baum RN (2023)   Next INR check:     Priority:  Maintenance   Target end date:  Indefinite    Indications    Deep vein thrombosis (DVT) [I82.409]  Thrombophilia [D68.59]                 Anticoagulation Episode Summary       INR check location:      Preferred lab:      Send INR reminders to:   [facility] CBC ANTICOAG POOL    Comments:  MDINR Home maninder          Anticoagulation Care Providers       Provider Role Specialty Phone number    Soraya Cortez MD Referring Hematology and Oncology [phone]            Drug interactions: has remained unchanged.  Diet: has remained unchanged.    Clinic Interview:  No pertinent clinical findings have been reported.    INR History:      2023     9:51 AM 2023    12:00 AM 2023     8:37 AM 2023    12:00 AM 2023     1:25 PM 2023    12:00 AM 2023     1:23 PM   Anticoagulation Monitoring   INR 2.30  2.90  2.70  2.60   INR Date 2023   INR Goal 2.0-3.0  2.0-3.0  2.0-3.0  2.0-3.0   Trend Same  Same  Same  Same   Last Week Total 56 mg  56 mg  56 mg  56 mg   Next Week Total 56 mg  56 mg  56 mg  56 mg   Sun 8 mg  8 mg  8 mg  8 mg   Mon 8 mg  8 mg  8 mg  8 mg   Tue 8 mg  8 mg  8 mg  8 mg   Wed 8 mg  8 mg  8 mg  8 mg   Thu 8 mg  8 mg  8 mg  8 mg   Fri 8 mg  8 mg  8 mg  8 mg   Sat 8 mg  8 mg  8 mg  8 mg   Historical INR  2.90      2.70      2.60            This result is from an external source.       Plan:  1. INR is Therapeutic today- see above in Anticoagulation Summary.   Will instruct Dalton W Burt Sr. to Continue their warfarin regimen- see above in  Anticoagulation Summary.  2. Follow up in 1 week--home test  3.They have been instructed to call if any changes in medications, doses, concerns, etc. Left VM with instructions and to call back with updates or questions.     Taylor Ambriz RPH

## 2023-09-04 LAB — INR PPP: 3

## 2023-09-05 ENCOUNTER — ANTICOAGULATION VISIT (OUTPATIENT)
Dept: PHARMACY | Facility: HOSPITAL | Age: 83
End: 2023-09-05
Payer: MEDICARE

## 2023-09-05 DIAGNOSIS — D68.59 THROMBOPHILIA: Primary | Chronic | ICD-10-CM

## 2023-09-05 NOTE — PROGRESS NOTES
Anticoagulation Clinic Progress Note    Patient's visit was held by phone today.    Anticoagulation Summary  As of 9/5/2023      INR goal:  2.0-3.0   TTR:  87.2 % (4.1 y)   INR used for dosing:  3.00 (9/4/2023)   Warfarin maintenance plan:  8 mg (5 mg x 1 and 1 mg x 3) every day   Weekly warfarin total:  56 mg   Plan last modified:  Lynn Baum RN (1/18/2023)   Next INR check:  9/12/2023   Priority:  Maintenance   Target end date:  Indefinite    Indications    Deep vein thrombosis (DVT) [I82.409]  Thrombophilia [D68.59]                 Anticoagulation Episode Summary       INR check location:      Preferred lab:      Send INR reminders to:   [facility] CBC ANTICOAG POOL    Comments:  [facility]IN[facility] Home maninder          Anticoagulation Care Providers       Provider Role Specialty Phone number    Soraya Cortez MD Referring Hematology and Oncology [phone]            Drug interactions: has remained unchanged.  Diet: has remained unchanged.    Clinic Interview:  No pertinent clinical findings have been reported.    INR History:      8/14/2023     8:37 AM 8/24/2023    12:00 AM 8/24/2023     1:25 PM 8/29/2023    12:00 AM 8/29/2023     1:23 PM 9/4/2023    12:00 AM 9/5/2023    10:50 AM   Anticoagulation Monitoring   INR 2.90  2.70  2.60  3.00   INR Date 8/14/2023 8/24/2023 8/29/2023 9/4/2023   INR Goal 2.0-3.0  2.0-3.0  2.0-3.0  2.0-3.0   Trend Same  Same  Same  Same   Last Week Total 56 mg  56 mg  56 mg  56 mg   Next Week Total 56 mg  56 mg  56 mg  56 mg   Sun 8 mg  8 mg  8 mg  8 mg   Mon 8 mg  8 mg  8 mg  8 mg   Tue 8 mg  8 mg  8 mg  8 mg   Wed 8 mg  8 mg  8 mg  8 mg   Thu 8 mg  8 mg  8 mg  8 mg   Fri 8 mg  8 mg  8 mg  8 mg   Sat 8 mg  8 mg  8 mg  8 mg   Historical INR  2.70      2.60      3.00            This result is from an external source.       Plan:  1. INR is Therapeutic today- see above in Anticoagulation Summary.   Will instruct Dalton Dallas Sr. to Continue their warfarin regimen- see above in  Anticoagulation Summary.  2. Follow up in 1 weeks  3.They have been instructed to call if any changes in medications, doses, concerns, etc. Patient expresses understanding and has no further questions at this time.    Taylor Ambriz RPH

## 2023-09-06 ENCOUNTER — TELEPHONE (OUTPATIENT)
Dept: ENDOCRINOLOGY | Facility: CLINIC | Age: 83
End: 2023-09-06
Payer: MEDICARE

## 2023-09-06 NOTE — TELEPHONE ENCOUNTER
TIM that [employer] is the company we sent the order to and he can call them to confirm that with them. I did advise sometimes if a company finds they are out of network with an insurance they will forward it to another supplier.

## 2023-09-06 NOTE — TELEPHONE ENCOUNTER
"    Caller: Dalton Dallas Sr. \"LOKI\"     Relationship: SELF     Best call back number: [phone]    What is your medical concern? PT NEEDS TO KNOW WHAT DME CO WAS USED FOR CGM. PLEASE CALL AND ADVISE, PT IS GETTING SEVERAL CALLS A DAY REGARDING THIS FROM DIFFERENT COMPANIES.         "

## 2023-09-12 ENCOUNTER — ANTICOAGULATION VISIT (OUTPATIENT)
Dept: PHARMACY | Facility: HOSPITAL | Age: 83
End: 2023-09-12
Payer: MEDICARE

## 2023-09-12 DIAGNOSIS — D68.59 THROMBOPHILIA: Primary | Chronic | ICD-10-CM

## 2023-09-12 LAB — INR PPP: 2.1

## 2023-09-12 NOTE — PROGRESS NOTES
Anticoagulation Clinic Progress Note    Patient's visit was held by phone today.    Anticoagulation Summary  As of 2023      INR goal:  2.0-3.0   TTR:  87.3 % (4.1 y)   INR used for dosin.10 (2023)   Warfarin maintenance plan:  8 mg (5 mg x 1 and 1 mg x 3) every day   Weekly warfarin total:  56 mg   Plan last modified:  Lynn Baum RN (2023)   Next INR check:     Priority:  Maintenance   Target end date:  Indefinite    Indications    Deep vein thrombosis (DVT) [I82.409]  Thrombophilia [D68.59]                 Anticoagulation Episode Summary       INR check location:      Preferred lab:      Send INR reminders to:   [facility] CBC ANTICOAG POOL    Comments:  [facility] Home maninder          Anticoagulation Care Providers       Provider Role Specialty Phone number    Soraya Cortez MD Referring Hematology and Oncology [phone]            Drug interactions: has remained unchanged.  Diet: has remained unchanged.    Clinic Interview:  No pertinent clinical findings have been reported.    INR History:      2023     1:25 PM 2023    12:00 AM 2023     1:23 PM 2023    12:00 AM 2023    10:50 AM 2023    12:00 AM 2023     4:21 PM   Anticoagulation Monitoring   INR 2.70  2.60  3.00  2.10   INR Date 2023   INR Goal 2.0-3.0  2.0-3.0  2.0-3.0  2.0-3.0   Trend Same  Same  Same  Same   Last Week Total 56 mg  56 mg  56 mg  56 mg   Next Week Total 56 mg  56 mg  56 mg  56 mg   Sun 8 mg  8 mg  8 mg  8 mg   Mon 8 mg  8 mg  8 mg  8 mg   Tue 8 mg  8 mg  8 mg  8 mg   Wed 8 mg  8 mg  8 mg  8 mg   Thu 8 mg  8 mg  8 mg  8 mg   Fri 8 mg  8 mg  8 mg  8 mg   Sat 8 mg  8 mg  8 mg  8 mg   Historical INR  2.60      3.00      2.10            This result is from an external source.       Plan:  1. INR is Therapeutic today- see above in Anticoagulation Summary.   Will instruct Dalton W Burt Sr. to Continue their warfarin regimen- see above in Anticoagulation  Summary.  2. Follow up in 1 week--home test  3.They have been instructed to call if any changes in medications, doses, concerns, etc. Left VM with instructions and to call back with updates or questions.     Taylor Ambriz RPH

## 2023-09-13 ENCOUNTER — OFFICE VISIT (OUTPATIENT)
Dept: WOUND CARE | Facility: HOSPITAL | Age: 83
End: 2023-09-13
Payer: MEDICARE

## 2023-09-13 DIAGNOSIS — L97.822 VENOUS STASIS ULCER OF OTHER PART OF LEFT LOWER LEG WITH FAT LAYER EXPOSED, UNSPECIFIED WHETHER VARICOSE VEINS PRESENT: Primary | ICD-10-CM

## 2023-09-13 DIAGNOSIS — I83.028 VENOUS STASIS ULCER OF OTHER PART OF LEFT LOWER LEG WITH FAT LAYER EXPOSED, UNSPECIFIED WHETHER VARICOSE VEINS PRESENT: Primary | ICD-10-CM

## 2023-09-15 ENCOUNTER — TELEPHONE (OUTPATIENT)
Dept: PODIATRY | Facility: CLINIC | Age: 83
End: 2023-09-15
Payer: MEDICARE

## 2023-09-15 NOTE — TELEPHONE ENCOUNTER
----- Message from LUCAS Villa sent at 9/15/2023  7:26 AM EDT -----  Please call patient and let him know xray of left foot was clear of any signs of bone infection.    Thank you.

## 2023-09-15 NOTE — PROGRESS NOTES
Please call patient and let him know xray of left foot was clear of any signs of bone infection.    Thank you. 
verbalizes understanding/demonstrates understanding of teaching/Lactation team to follow up

## 2023-09-20 ENCOUNTER — TELEPHONE (OUTPATIENT)
Dept: ENDOCRINOLOGY | Facility: CLINIC | Age: 83
End: 2023-09-20
Payer: MEDICARE

## 2023-09-20 NOTE — TELEPHONE ENCOUNTER
Received fax from [employer] for Rx for CGM  and sensors.  Called pt and he states he is using [employer].  They have already sent him the  and sensors.  He does not know how to use the vicki 2.  Scheduled him with educator on 11/6 at 11am

## 2023-09-21 ENCOUNTER — ANTICOAGULATION VISIT (OUTPATIENT)
Dept: PHARMACY | Facility: HOSPITAL | Age: 83
End: 2023-09-21
Payer: MEDICARE

## 2023-09-21 ENCOUNTER — TELEPHONE (OUTPATIENT)
Dept: ONCOLOGY | Facility: CLINIC | Age: 83
End: 2023-09-21
Payer: MEDICARE

## 2023-09-21 DIAGNOSIS — I82.513 CHRONIC DEEP VEIN THROMBOSIS (DVT) OF FEMORAL VEIN OF BOTH LOWER EXTREMITIES: Primary | Chronic | ICD-10-CM

## 2023-09-21 DIAGNOSIS — I82.409 DEEP VEIN THROMBOSIS (DVT) OF LOWER EXTREMITY, UNSPECIFIED CHRONICITY, UNSPECIFIED LATERALITY, UNSPECIFIED VEIN: Primary | ICD-10-CM

## 2023-09-21 DIAGNOSIS — Z79.01 LONG TERM (CURRENT) USE OF ANTICOAGULANTS: ICD-10-CM

## 2023-09-21 DIAGNOSIS — D68.59 THROMBOPHILIA: Chronic | ICD-10-CM

## 2023-09-21 DIAGNOSIS — E11.40 TYPE 2 DIABETES MELLITUS WITH DIABETIC NEUROPATHY, WITHOUT LONG-TERM CURRENT USE OF INSULIN: ICD-10-CM

## 2023-09-21 LAB — INR PPP: 1.9

## 2023-09-21 RX ORDER — WARFARIN SODIUM 5 MG/1
5 TABLET ORAL NIGHTLY
Qty: 30 TABLET | Refills: 2 | Status: SHIPPED | OUTPATIENT
Start: 2023-09-21

## 2023-09-21 RX ORDER — SYRINGE-NEEDLE,INSULIN,0.5 ML 27GX1/2"
1 SYRINGE, EMPTY DISPOSABLE MISCELLANEOUS 2 TIMES DAILY
Qty: 200 EACH | Refills: 3 | Status: SHIPPED | OUTPATIENT
Start: 2023-09-21

## 2023-09-21 RX ORDER — WARFARIN SODIUM 3 MG/1
3 TABLET ORAL DAILY
Qty: 90 TABLET | Refills: 3 | Status: SHIPPED | OUTPATIENT
Start: 2023-09-21

## 2023-09-21 RX ORDER — PEN NEEDLE, DIABETIC 29 G X1/2"
NEEDLE, DISPOSABLE MISCELLANEOUS
Qty: 200 EACH | Refills: 2 | Status: SHIPPED | OUTPATIENT
Start: 2023-09-21

## 2023-09-21 NOTE — TELEPHONE ENCOUNTER
"    Caller: Dalton Dallas Sr. \"LOKI\"    Relationship: Self    Best call back number: [phone]     Requested Prescriptions:   Requested Prescriptions     Pending Prescriptions Disp Refills    warfarin (COUMADIN) 5 MG tablet 30 tablet 2     Sig: Take 1 tablet by mouth Every Night. Indications: Atrial Fibrillation    warfarin (COUMADIN) 3 MG tablet 90 tablet 3     Sig: Take 1 tablet by mouth Daily. Indications: Atrial Fibrillation        Pharmacy where request should be sent: [facility] - [place] [address] [facility] [phone] [facility] [phone]      Last office visit with prescribing clinician: 12/15/2022   Last telemedicine visit with prescribing clinician: Visit date not found   Next office visit with prescribing clinician: 12/19/2023       Does the patient have less than a 3 day supply:  [] Yes  [x] No    Would you like a call back once the refill request has been completed: [] Yes [x] No    If the office needs to give you a call back, can they leave a voicemail: [] Yes [x] No    Alex Espino Rep   09/21/23 09:17 EDT           "

## 2023-09-21 NOTE — PROGRESS NOTES
Anticoagulation Clinic Progress Note    Patient's visit was held by phone today.    Anticoagulation Summary  As of 2023      INR goal:  2.0-3.0   TTR:  87.0 % (4.1 y)   INR used for dosin.90 (2023)   Warfarin maintenance plan:  8 mg (5 mg x 1 and 1 mg x 3) every day   Weekly warfarin total:  56 mg   Plan last modified:  Lynn Baum RN (2023)   Next INR check:  2023   Priority:  Maintenance   Target end date:  Indefinite    Indications    Deep vein thrombosis (DVT) [I82.409]  Thrombophilia [D68.59]                 Anticoagulation Episode Summary       INR check location:      Preferred lab:      Send INR reminders to:   [facility] CBC ANTICOAG POOL    Comments:  [facility] Home maninder          Anticoagulation Care Providers       Provider Role Specialty Phone number    Soraya Cortez MD Referring Hematology and Oncology [phone]            Drug interactions: has remained unchanged.  Diet: has remained unchanged.    Clinic Interview:  No pertinent clinical findings have been reported.    INR History:      2023     1:23 PM 2023    12:00 AM 2023    10:50 AM 2023    12:00 AM 2023     4:21 PM 2023    12:00 AM 2023     3:30 PM   Anticoagulation Monitoring   INR 2.60  3.00  2.10  1.90   INR Date 2023   INR Goal 2.0-3.0  2.0-3.0  2.0-3.0  2.0-3.0   Trend Same  Same  Same  Same   Last Week Total 56 mg  56 mg  56 mg  56 mg   Next Week Total 56 mg  56 mg  56 mg  58 mg   Sun 8 mg  8 mg  8 mg  8 mg   Mon 8 mg  8 mg  8 mg  8 mg   Tue 8 mg  8 mg  8 mg  8 mg   Wed 8 mg  8 mg  8 mg  8 mg   Thu 8 mg  8 mg  8 mg  10 mg ()   Fri 8 mg  8 mg  8 mg  8 mg   Sat 8 mg  8 mg  8 mg  8 mg   Historical INR  3.00      2.10      1.90            This result is from an external source.       Plan:  1. INR is Subtherapeutic today- see above in Anticoagulation Summary.   Will instruct Dalton Dixonogast Sr. to boost to 10 mg tonight only and then  resume 8 mg daily dosing- see above in Anticoagulation Summary.  2. Follow up in 1 weeks- home testing.   3.They have been instructed to call if any changes in medications, doses, concerns, etc. Patient expresses understanding and has no further questions at this time.    Karen Oates [facility]

## 2023-09-21 NOTE — TELEPHONE ENCOUNTER
"    Caller: Dalton Dallas Sr. \"LOKI\"    Relationship: Self    Best call back number: [phone]    Requested Prescriptions:   Requested Prescriptions     Pending Prescriptions Disp Refills    Insulin Pen Needle (BD ULTRA-FINE PEN NEEDLES) 29G X 12.7MM misc 200 each 2     Sig: Use to inject insulin twice daily. DX E11.65    Insulin Syringe-Needle U-100 (BD Insulin Syringe U/F) 31G X 5/16\" 0.5 ML misc 200 each 3     Si each by Other route 2 (Two) Times a Day.        Pharmacy where request should be sent:  [facility] [address] [facility] [phone] [facility] [phone]  [phone]       Last office visit with prescribing clinician: 2023   Last telemedicine visit with prescribing clinician: Visit date not found   Next office visit with prescribing clinician: 3/12/2024     Additional details provided by patient:     Does the patient have less than a 3 day supply:  [x] Yes  [] No    Would you like a call back once the refill request has been completed: [] Yes [x] No    If the office needs to give you a call back, can they leave a voicemail: [x] Yes [] No    Alex Urena Rep   23 09:13 EDT         "

## 2023-09-25 ENCOUNTER — ANTICOAGULATION VISIT (OUTPATIENT)
Dept: PHARMACY | Facility: HOSPITAL | Age: 83
End: 2023-09-25

## 2023-09-25 DIAGNOSIS — D68.59 THROMBOPHILIA: Chronic | ICD-10-CM

## 2023-09-25 DIAGNOSIS — I82.513 CHRONIC DEEP VEIN THROMBOSIS (DVT) OF FEMORAL VEIN OF BOTH LOWER EXTREMITIES: Primary | Chronic | ICD-10-CM

## 2023-09-25 LAB — INR PPP: 2.4

## 2023-09-25 NOTE — PROGRESS NOTES
Anticoagulation Clinic Progress Note    Patient's visit was held by phone today.    Anticoagulation Summary  As of 2023      INR goal:  2.0-3.0   TTR:  87.0 % (4.1 y)   INR used for dosin.40 (2023)   Warfarin maintenance plan:  8 mg (5 mg x 1 and 1 mg x 3) every day   Weekly warfarin total:  56 mg   No change documented:  Karen Oates [facility]   Plan last modified:  Lynn Baum RN (2023)   Next INR check:  10/2/2023   Priority:  Maintenance   Target end date:  Indefinite    Indications    Deep vein thrombosis (DVT) [I82.409]  Thrombophilia [D68.59]                 Anticoagulation Episode Summary       INR check location:      Preferred lab:      Send INR reminders to:   [facility] CBC ANTICOAG POOL    Comments:  [facility] Home maninder          Anticoagulation Care Providers       Provider Role Specialty Phone number    Soarya Cortez MD Referring Hematology and Oncology [phone]            Drug interactions: has remained unchanged.  Diet: has remained unchanged.    Clinic Interview:  No pertinent clinical findings have been reported.    INR History:      2023    10:50 AM 2023    12:00 AM 2023     4:21 PM 2023    12:00 AM 2023     3:30 PM 2023    12:00 AM 2023     8:41 AM   Anticoagulation Monitoring   INR 3.00  2.10  1.90  2.40   INR Date 2023   INR Goal 2.0-3.0  2.0-3.0  2.0-3.0  2.0-3.0   Trend Same  Same  Same  Same   Last Week Total 56 mg  56 mg  56 mg  58 mg   Next Week Total 56 mg  56 mg  58 mg  56 mg   Sun 8 mg  8 mg  8 mg  8 mg   Mon 8 mg  8 mg  8 mg  8 mg   Tue 8 mg  8 mg  8 mg  8 mg   Wed 8 mg  8 mg  8 mg  8 mg   Thu 8 mg  8 mg  10 mg ()  8 mg   Fri 8 mg  8 mg  8 mg  8 mg   Sat 8 mg  8 mg  8 mg  8 mg   Historical INR  2.10      1.90      2.40            This result is from an external source.       Plan:  1. INR is Therapeutic today- see above in Anticoagulation Summary.   Will instruct Dalton Dallas  Sr. to Continue their warfarin regimen- see above in Anticoagulation Summary.  2. Follow up in 1 weeks- home test.   3.They have been instructed to call if any changes in medications, doses, concerns, etc. Patient expresses understanding and has no further questions at this time.    Karen Oates [facility]

## 2023-09-27 ENCOUNTER — OFFICE VISIT (OUTPATIENT)
Dept: WOUND CARE | Facility: HOSPITAL | Age: 83
End: 2023-09-27
Payer: MEDICARE

## 2023-10-02 ENCOUNTER — CLINICAL SUPPORT (OUTPATIENT)
Dept: FAMILY MEDICINE CLINIC | Facility: CLINIC | Age: 83
End: 2023-10-02
Payer: MEDICARE

## 2023-10-02 ENCOUNTER — ANTICOAGULATION VISIT (OUTPATIENT)
Dept: PHARMACY | Facility: HOSPITAL | Age: 83
End: 2023-10-02
Payer: MEDICARE

## 2023-10-02 DIAGNOSIS — I82.513 CHRONIC DEEP VEIN THROMBOSIS (DVT) OF FEMORAL VEIN OF BOTH LOWER EXTREMITIES: Primary | Chronic | ICD-10-CM

## 2023-10-02 DIAGNOSIS — Z23 NEED FOR IMMUNIZATION AGAINST INFLUENZA: Primary | ICD-10-CM

## 2023-10-02 DIAGNOSIS — D68.59 THROMBOPHILIA: Chronic | ICD-10-CM

## 2023-10-02 LAB — INR PPP: 2.2

## 2023-10-02 PROCEDURE — 90662 IIV NO PRSV INCREASED AG IM: CPT | Performed by: NURSE PRACTITIONER

## 2023-10-02 PROCEDURE — G0008 ADMIN INFLUENZA VIRUS VAC: HCPCS | Performed by: NURSE PRACTITIONER

## 2023-10-02 NOTE — PROGRESS NOTES
Anticoagulation Clinic Progress Note    Patient's visit was held by phone today.    Anticoagulation Summary  As of 10/2/2023      INR goal:  2.0-3.0   TTR:  87.1 % (4.2 y)   INR used for dosin.20 (10/2/2023)   Warfarin maintenance plan:  8 mg (5 mg x 1 and 1 mg x 3) every day   Weekly warfarin total:  56 mg   No change documented:  Karen Oates [facility]   Plan last modified:  Lynn Baum RN (2023)   Next INR check:     Priority:  Maintenance   Target end date:  Indefinite    Indications    Deep vein thrombosis (DVT) [I82.409]  Thrombophilia [D68.59]                 Anticoagulation Episode Summary       INR check location:      Preferred lab:      Send INR reminders to:   [facility] CBC ANTICOAG POOL    Comments:  [facility]IN[facility] Home maninder          Anticoagulation Care Providers       Provider Role Specialty Phone number    Soraya Cortez MD Referring Hematology and Oncology [phone]            Drug interactions: has remained unchanged.  Diet: has remained unchanged.    Clinic Interview:  No pertinent clinical findings have been reported.    INR History:      2023     4:21 PM 2023    12:00 AM 2023     3:30 PM 2023    12:00 AM 2023     8:41 AM 10/2/2023    12:00 AM 10/2/2023     4:42 PM   Anticoagulation Monitoring   INR 2.10  1.90  2.40  2.20   INR Date 2023  2023  2023  10/2/2023   INR Goal 2.0-3.0  2.0-3.0  2.0-3.0  2.0-3.0   Trend Same  Same  Same  Same   Last Week Total 56 mg  56 mg  58 mg  56 mg   Next Week Total 56 mg  58 mg  56 mg  56 mg   Sun 8 mg  8 mg  8 mg  8 mg   Mon 8 mg  8 mg  8 mg  8 mg   Tue 8 mg  8 mg  8 mg  8 mg   Wed 8 mg  8 mg  8 mg  8 mg   Thu 8 mg  10 mg ()  8 mg  8 mg   Fri 8 mg  8 mg  8 mg  8 mg   Sat 8 mg  8 mg  8 mg  8 mg   Historical INR  1.90      2.40      2.20            This result is from an external source.       Plan:  1. INR is Therapeutic today- see above in Anticoagulation Summary.   Will instruct Dalton Dallas Sr. to  Continue their warfarin regimen- see above in Anticoagulation Summary.  2. Follow up in 1 weeks- home test.   3.They have been instructed to call if any changes in medications, doses, concerns, etc. Patient expresses understanding and has no further questions at this time.    Karen Oates [facility]

## 2023-10-09 LAB — INR PPP: 2.7

## 2023-10-10 ENCOUNTER — ANTICOAGULATION VISIT (OUTPATIENT)
Dept: PHARMACY | Facility: HOSPITAL | Age: 83
End: 2023-10-10
Payer: MEDICARE

## 2023-10-10 DIAGNOSIS — D68.59 THROMBOPHILIA: Primary | Chronic | ICD-10-CM

## 2023-10-10 NOTE — PROGRESS NOTES
Anticoagulation Clinic Progress Note    Patient's visit was held by phone today.    Anticoagulation Summary  As of 10/10/2023      INR goal:  2.0-3.0   TTR:  87.1% (4.2 y)   INR used for dosin.70 (10/9/2023)   Warfarin maintenance plan:  8 mg (5 mg x 1 and 1 mg x 3) every day   Weekly warfarin total:  56 mg   No change documented:  Taylor Ambriz [facility]   Plan last modified:  Lynn Baum RN (2023)   Next INR check:  10/16/2023   Priority:  Maintenance   Target end date:  Indefinite    Indications    Deep vein thrombosis (DVT) [I82.409]  Thrombophilia [D68.59]                 Anticoagulation Episode Summary       INR check location:      Preferred lab:      Send INR reminders to:   [facility] CBC ANTICOAG POOL    Comments:  [facility]IN[facility] Home maninder          Anticoagulation Care Providers       Provider Role Specialty Phone number    Soraya Cortez MD Referring Hematology and Oncology [phone]            Drug interactions: has remained unchanged.  Diet: has remained unchanged.    Clinic Interview:  No pertinent clinical findings have been reported.    INR History:      2023     3:30 PM 2023    12:00 AM 2023     8:41 AM 10/2/2023    12:00 AM 10/2/2023     4:42 PM 10/9/2023    12:00 AM 10/10/2023     9:01 AM   Anticoagulation Monitoring   INR 1.90  2.40  2.20  2.70   INR Date 2023  2023  10/2/2023  10/9/2023   INR Goal 2.0-3.0  2.0-3.0  2.0-3.0  2.0-3.0   Trend Same  Same  Same  Same   Last Week Total 56 mg  58 mg  56 mg  56 mg   Next Week Total 58 mg  56 mg  56 mg  56 mg   Sun 8 mg  8 mg  8 mg  8 mg   Mon 8 mg  8 mg  8 mg  -   Tue 8 mg  8 mg  8 mg  8 mg   Wed 8 mg  8 mg  8 mg  8 mg   Thu 10 mg ()  8 mg  8 mg  8 mg   Fri 8 mg  8 mg  8 mg  8 mg   Sat 8 mg  8 mg  8 mg  8 mg   Historical INR  2.40      2.20      2.70            This result is from an external source.       Plan:  1. INR is Therapeutic today- see above in Anticoagulation Summary.   Will instruct Dalton Dallas Sr. to  Continue their warfarin regimen- see above in Anticoagulation Summary.  2. Follow up in 1 week--home test  3.They have been instructed to call if any changes in medications, doses, concerns, etc. Patient expresses understanding and has no further questions at this time.    Taylor Ambriz [facility]

## 2023-10-11 ENCOUNTER — OFFICE VISIT (OUTPATIENT)
Dept: WOUND CARE | Facility: HOSPITAL | Age: 83
End: 2023-10-11
Payer: MEDICARE

## 2023-10-13 ENCOUNTER — OFFICE VISIT (OUTPATIENT)
Dept: WOUND CARE | Facility: HOSPITAL | Age: 83
End: 2023-10-13
Payer: MEDICARE

## 2023-10-16 ENCOUNTER — ANTICOAGULATION VISIT (OUTPATIENT)
Dept: PHARMACY | Facility: HOSPITAL | Age: 83
End: 2023-10-16
Payer: MEDICARE

## 2023-10-16 DIAGNOSIS — D68.59 THROMBOPHILIA: Chronic | ICD-10-CM

## 2023-10-16 DIAGNOSIS — I82.513 CHRONIC DEEP VEIN THROMBOSIS (DVT) OF FEMORAL VEIN OF BOTH LOWER EXTREMITIES: Primary | Chronic | ICD-10-CM

## 2023-10-16 LAB — INR PPP: 2.5

## 2023-10-16 NOTE — PROGRESS NOTES
Anticoagulation Clinic Progress Note    Patient's visit was held by phone today.    Anticoagulation Summary  As of 10/16/2023      INR goal:  2.0-3.0   TTR:  87.2% (4.2 y)   INR used for dosin.50 (10/16/2023)   Warfarin maintenance plan:  8 mg (5 mg x 1 and 1 mg x 3) every day   Weekly warfarin total:  56 mg   No change documented:  Karen Oates [facility]   Plan last modified:  Lynn Baum RN (2023)   Next INR check:  10/16/2023   Priority:  Maintenance   Target end date:  Indefinite    Indications    Deep vein thrombosis (DVT) [I82.409]  Thrombophilia [D68.59]                 Anticoagulation Episode Summary       INR check location:      Preferred lab:      Send INR reminders to:   [facility] CBC ANTICOAG POOL    Comments:  [facility]IN[facility] Home maninder          Anticoagulation Care Providers       Provider Role Specialty Phone number    Soraya Cortez MD Referring Hematology and Oncology [phone]            Drug interactions: has remained unchanged.  Diet: has remained unchanged.    Clinic Interview:  No pertinent clinical findings have been reported.    INR History:      2023     8:41 AM 10/2/2023    12:00 AM 10/2/2023     4:42 PM 10/9/2023    12:00 AM 10/10/2023     9:01 AM 10/16/2023    12:00 AM 10/16/2023     9:10 AM   Anticoagulation Monitoring   INR 2.40  2.20  2.70  2.50   INR Date 2023  10/2/2023  10/9/2023  10/16/2023   INR Goal 2.0-3.0  2.0-3.0  2.0-3.0  2.0-3.0   Trend Same  Same  Same  Same   Last Week Total 58 mg  56 mg  56 mg  56 mg   Next Week Total 56 mg  56 mg  56 mg  56 mg   Sun 8 mg  8 mg  8 mg  -   Mon 8 mg  8 mg  -  -   Tue 8 mg  8 mg  8 mg  -   Wed 8 mg  8 mg  8 mg  -   Thu 8 mg  8 mg  8 mg  -   Fri 8 mg  8 mg  8 mg  -   Sat 8 mg  8 mg  8 mg  -   Historical INR  2.20      2.70      2.50            This result is from an external source.       Plan:  1. INR is Therapeutic today- see above in Anticoagulation Summary.   Will instruct Dalton Dallas Sr. to Continue their  warfarin regimen- see above in Anticoagulation Summary.  2. Follow up in 1 weeks- home test.  3.They have been instructed to call if any changes in medications, doses, concerns, etc. Patient expresses understanding and has no further questions at this time.    Karen Oates [facility]

## 2023-10-18 ENCOUNTER — OFFICE VISIT (OUTPATIENT)
Dept: WOUND CARE | Facility: HOSPITAL | Age: 83
End: 2023-10-18
Payer: MEDICARE

## 2023-10-18 DIAGNOSIS — I70.202 ATHEROSCLEROSIS OF NATIVE ARTERY OF LEFT LOWER EXTREMITY, WITH UNSPECIFIED PRESENCE OF CLINICAL MANIFESTATION: ICD-10-CM

## 2023-10-18 DIAGNOSIS — I87.2 VENOUS INSUFFICIENCY (CHRONIC) (PERIPHERAL): Primary | ICD-10-CM

## 2023-10-18 DIAGNOSIS — I87.312 CHRONIC VENOUS HYPERTENSION (IDIOPATHIC) WITH ULCER OF LEFT LOWER EXTREMITY: ICD-10-CM

## 2023-10-18 DIAGNOSIS — E11.621 TYPE 2 DIABETES MELLITUS WITH DIABETIC TOE ULCER: ICD-10-CM

## 2023-10-18 DIAGNOSIS — L97.509 TYPE 2 DIABETES MELLITUS WITH DIABETIC TOE ULCER: ICD-10-CM

## 2023-10-18 DIAGNOSIS — L97.929 CHRONIC VENOUS HYPERTENSION (IDIOPATHIC) WITH ULCER OF LEFT LOWER EXTREMITY: ICD-10-CM

## 2023-10-24 LAB — INR PPP: 2.6

## 2023-10-25 ENCOUNTER — OFFICE VISIT (OUTPATIENT)
Dept: WOUND CARE | Facility: HOSPITAL | Age: 83
End: 2023-10-25
Payer: MEDICARE

## 2023-10-25 ENCOUNTER — ANTICOAGULATION VISIT (OUTPATIENT)
Dept: PHARMACY | Facility: HOSPITAL | Age: 83
End: 2023-10-25
Payer: MEDICARE

## 2023-10-25 DIAGNOSIS — I87.2 VENOUS INSUFFICIENCY (CHRONIC) (PERIPHERAL): Primary | ICD-10-CM

## 2023-10-25 DIAGNOSIS — L97.929 CHRONIC VENOUS HYPERTENSION (IDIOPATHIC) WITH ULCER OF LEFT LOWER EXTREMITY: ICD-10-CM

## 2023-10-25 DIAGNOSIS — I70.202 ATHEROSCLEROSIS OF NATIVE ARTERY OF LEFT LOWER EXTREMITY, WITH UNSPECIFIED PRESENCE OF CLINICAL MANIFESTATION: ICD-10-CM

## 2023-10-25 DIAGNOSIS — D68.59 THROMBOPHILIA: Chronic | ICD-10-CM

## 2023-10-25 DIAGNOSIS — I87.312 CHRONIC VENOUS HYPERTENSION (IDIOPATHIC) WITH ULCER OF LEFT LOWER EXTREMITY: ICD-10-CM

## 2023-10-25 DIAGNOSIS — E11.621 TYPE 2 DIABETES MELLITUS WITH FOOT ULCER, UNSPECIFIED WHETHER LONG TERM INSULIN USE: ICD-10-CM

## 2023-10-25 DIAGNOSIS — I82.513 CHRONIC DEEP VEIN THROMBOSIS (DVT) OF FEMORAL VEIN OF BOTH LOWER EXTREMITIES: Primary | Chronic | ICD-10-CM

## 2023-10-25 DIAGNOSIS — L97.509 TYPE 2 DIABETES MELLITUS WITH FOOT ULCER, UNSPECIFIED WHETHER LONG TERM INSULIN USE: ICD-10-CM

## 2023-10-25 NOTE — PROGRESS NOTES
Anticoagulation Clinic Progress Note    Patient's visit was held via telephone today.    Anticoagulation Summary  As of 10/25/2023      INR goal:  2.0-3.0   TTR:  87.3% (4.2 y)   INR used for dosin.60 (10/24/2023)   Warfarin maintenance plan:  8 mg (5 mg x 1 and 1 mg x 3) every day   Weekly warfarin total:  56 mg   No change documented:  Karen Oates [facility]   Plan last modified:  Lynn Baum RN (2023)   Next INR check:  2023   Priority:  Maintenance   Target end date:  Indefinite    Indications    Deep vein thrombosis (DVT) [I82.409]  Thrombophilia [D68.59]                 Anticoagulation Episode Summary       INR check location:      Preferred lab:      Send INR reminders to:   [facility] CBC ANTICOAG POOL    Comments:  [facility] Home maninder          Anticoagulation Care Providers       Provider Role Specialty Phone number    Soraya Cortez MD Referring Hematology and Oncology [phone]            Clinic Interview:  Patient Findings     Negatives:  Signs/symptoms of thrombosis, Signs/symptoms of bleeding,   Laboratory test error suspected, Change in health, Change in alcohol use,   Change in activity, Upcoming invasive procedure, Emergency department   visit, Upcoming dental procedure, Missed doses, Extra doses, Change in   medications, Change in diet/appetite, Hospital admission, Bruising, Other   complaints      Clinical Outcomes     Negatives:  Major bleeding event, Thromboembolic event,   Anticoagulation-related hospital admission, Anticoagulation-related ED   visit, Anticoagulation-related fatality        INR History:      10/2/2023     4:42 PM 10/9/2023    12:00 AM 10/10/2023     9:01 AM 10/16/2023    12:00 AM 10/16/2023     9:10 AM 10/24/2023    12:00 AM 10/25/2023     9:18 AM   Anticoagulation Monitoring   INR 2.20  2.70  2.50  2.60   INR Date 10/2/2023  10/9/2023  10/16/2023  10/24/2023   INR Goal 2.0-3.0  2.0-3.0  2.0-3.0  2.0-3.0   Trend Same  Same  Same  Same   Last Week Total 56  mg  56 mg  56 mg  56 mg   Next Week Total 56 mg  56 mg  56 mg  56 mg   Sun 8 mg  8 mg  -  8 mg   Mon 8 mg  -  -  8 mg   Tue 8 mg  8 mg  -  8 mg   Wed 8 mg  8 mg  -  8 mg   Thu 8 mg  8 mg  -  8 mg   Fri 8 mg  8 mg  -  8 mg   Sat 8 mg  8 mg  -  8 mg   Historical INR  2.70      2.50      2.60            This result is from an external source.       Plan:  1. INR is Therapeutic today- see above in Anticoagulation Summary.  Will instruct Dalton Dallas Sr. to Continue their warfarin regimen- see above in Anticoagulation Summary.  2. Follow up in 1 week- home testing.  3. Patient declines warfarin refills.  4. Verbal information provided. Patient expresses understanding and has no further questions at this time.    Karen Oates [facility]

## 2023-10-30 ENCOUNTER — OFFICE VISIT (OUTPATIENT)
Dept: WOUND CARE | Facility: HOSPITAL | Age: 83
End: 2023-10-30
Payer: MEDICARE

## 2023-10-30 ENCOUNTER — ANTICOAGULATION VISIT (OUTPATIENT)
Dept: PHARMACY | Facility: HOSPITAL | Age: 83
End: 2023-10-30
Payer: MEDICARE

## 2023-10-30 DIAGNOSIS — I87.332 CHRONIC VENOUS HYPERTENSION (IDIOPATHIC) WITH ULCER AND INFLAMMATION OF LEFT LOWER EXTREMITY: ICD-10-CM

## 2023-10-30 DIAGNOSIS — I87.2 VENOUS INSUFFICIENCY (CHRONIC) (PERIPHERAL): Primary | ICD-10-CM

## 2023-10-30 DIAGNOSIS — D68.59 THROMBOPHILIA: Chronic | ICD-10-CM

## 2023-10-30 DIAGNOSIS — L97.509 TYPE 2 DIABETES MELLITUS WITH FOOT ULCER, UNSPECIFIED WHETHER LONG TERM INSULIN USE: ICD-10-CM

## 2023-10-30 DIAGNOSIS — I70.249 ATHEROSCLEROSIS OF NATIVE ARTERY OF LEFT LOWER EXTREMITY WITH ULCERATION, UNSPECIFIED ULCERATION SITE: ICD-10-CM

## 2023-10-30 DIAGNOSIS — I82.513 CHRONIC DEEP VEIN THROMBOSIS (DVT) OF FEMORAL VEIN OF BOTH LOWER EXTREMITIES: Primary | Chronic | ICD-10-CM

## 2023-10-30 DIAGNOSIS — E11.621 TYPE 2 DIABETES MELLITUS WITH FOOT ULCER, UNSPECIFIED WHETHER LONG TERM INSULIN USE: ICD-10-CM

## 2023-10-30 LAB — INR PPP: 3.2

## 2023-10-30 NOTE — PROGRESS NOTES
Anticoagulation Clinic Progress Note    Patient's visit was held by phone today.    Anticoagulation Summary  As of 10/30/2023      INR goal:  2.0-3.0   TTR:  87.2% (4.2 y)   INR used for dosing:  3.20 (10/30/2023)   Warfarin maintenance plan:  8 mg (5 mg x 1 and 1 mg x 3) every day   Weekly warfarin total:  56 mg   Plan last modified:  Lynn Baum RN (1/18/2023)   Next INR check:  11/6/2023   Priority:  Maintenance   Target end date:  Indefinite    Indications    Deep vein thrombosis (DVT) [I82.409]  Thrombophilia [D68.59]                 Anticoagulation Episode Summary       INR check location:      Preferred lab:      Send INR reminders to:   [facility] CBC ANTICOAG POOL    Comments:  MDINR Home maninder          Anticoagulation Care Providers       Provider Role Specialty Phone number    Soraya Cortez MD Referring Hematology and Oncology [phone]            Drug interactions: has remained unchanged.  Diet: has remained unchanged.    Clinic Interview:  No pertinent clinical findings have been reported.    INR History:      10/10/2023     9:01 AM 10/16/2023    12:00 AM 10/16/2023     9:10 AM 10/24/2023    12:00 AM 10/25/2023     9:18 AM 10/30/2023    12:00 AM 10/30/2023    10:51 AM   Anticoagulation Monitoring   INR 2.70  2.50  2.60  3.20   INR Date 10/9/2023  10/16/2023  10/24/2023  10/30/2023   INR Goal 2.0-3.0  2.0-3.0  2.0-3.0  2.0-3.0   Trend Same  Same  Same  Same   Last Week Total 56 mg  56 mg  56 mg  56 mg   Next Week Total 56 mg  56 mg  56 mg  53 mg   Sun 8 mg  -  8 mg  8 mg   Mon -  -  8 mg  5 mg (10/30)   Tue 8 mg  -  8 mg  8 mg   Wed 8 mg  -  8 mg  8 mg   Thu 8 mg  -  8 mg  8 mg   Fri 8 mg  -  8 mg  8 mg   Sat 8 mg  -  8 mg  8 mg   Historical INR  2.50      2.60      3.20        Visit Report     Report         This result is from an external source.       Plan:  1. INR is Supratherapeutic today-patient believes it is secondary to what he ate yesterday.    Will instruct Dalton Dallas Sr. to  take 5 mg today only and then resume 8 mg daily dosing.   2. Follow up in 1 weeks- home test.   3.They have been instructed to call if any changes in medications, doses, concerns, etc. Patient expresses understanding and has no further questions at this time.    Karen Oates [facility]

## 2023-11-03 ENCOUNTER — OFFICE VISIT (OUTPATIENT)
Dept: WOUND CARE | Facility: HOSPITAL | Age: 83
End: 2023-11-03
Payer: MEDICARE

## 2023-11-06 ENCOUNTER — ANTICOAGULATION VISIT (OUTPATIENT)
Dept: PHARMACY | Facility: HOSPITAL | Age: 83
End: 2023-11-06
Payer: MEDICARE

## 2023-11-06 DIAGNOSIS — I82.513 CHRONIC DEEP VEIN THROMBOSIS (DVT) OF FEMORAL VEIN OF BOTH LOWER EXTREMITIES: Primary | Chronic | ICD-10-CM

## 2023-11-06 DIAGNOSIS — D68.59 THROMBOPHILIA: Chronic | ICD-10-CM

## 2023-11-06 LAB — INR PPP: 2.9

## 2023-11-06 NOTE — PROGRESS NOTES
Anticoagulation Clinic Progress Note    Patient's visit was held by phone today.    Anticoagulation Summary  As of 2023      INR goal:  2.0-3.0   TTR:  86.9% (4.3 y)   INR used for dosin.90 (2023)   Warfarin maintenance plan:  8 mg (5 mg x 1 and 1 mg x 3) every day   Weekly warfarin total:  56 mg   Plan last modified:  Lynn Baum RN (2023)   Next INR check:  2023   Priority:  Maintenance   Target end date:  Indefinite    Indications    Deep vein thrombosis (DVT) [I82.409]  Thrombophilia [D68.59]                 Anticoagulation Episode Summary       INR check location:      Preferred lab:      Send INR reminders to:   [facility] CBC ANTICOAG POOL    Comments:  MDINR Home maninder          Anticoagulation Care Providers       Provider Role Specialty Phone number    Soraya Cortez MD Referring Hematology and Oncology [phone]            Drug interactions: has remained unchanged.  Diet: has remained unchanged.    Clinic Interview:  No pertinent clinical findings have been reported.    INR History:      10/16/2023     9:10 AM 10/24/2023    12:00 AM 10/25/2023     9:18 AM 10/30/2023    12:00 AM 10/30/2023    10:51 AM 2023    12:00 AM 2023     2:00 PM   Anticoagulation Monitoring   INR 2.50  2.60  3.20  2.90   INR Date 10/16/2023  10/24/2023  10/30/2023  2023   INR Goal 2.0-3.0  2.0-3.0  2.0-3.0  2.0-3.0   Trend Same  Same  Same  Same   Last Week Total 56 mg  56 mg  56 mg  53 mg   Next Week Total 56 mg  56 mg  53 mg  53 mg   Sun -  8 mg  8 mg  8 mg   Mon -  8 mg  5 mg (10/30)  5 mg ()   Tue -  8 mg  8 mg  8 mg   Wed -  8 mg  8 mg  8 mg   Thu -  8 mg  8 mg  8 mg   Fri -  8 mg  8 mg  8 mg   Sat -  8 mg  8 mg  8 mg   Historical INR  2.60      3.20      2.90        Visit Report   Report Report Report         This result is from an external source.       Plan:  1. INR is Therapeutic today- see above in Anticoagulation Summary.   Will instruct Dalton Dallas Sr. to Change  their warfarin regimen this week only- see above in Anticoagulation Summary.  2. Follow up in 1 weeks-  home test.   3.They have been instructed to call if any changes in medications, doses, concerns, etc. Patient expresses understanding and has no further questions at this time.    Karen Oates [facility]

## 2023-11-08 ENCOUNTER — OFFICE VISIT (OUTPATIENT)
Dept: WOUND CARE | Facility: HOSPITAL | Age: 83
End: 2023-11-08
Payer: MEDICARE

## 2023-11-08 DIAGNOSIS — L97.509 TYPE 2 DIABETES MELLITUS WITH FOOT ULCER, UNSPECIFIED WHETHER LONG TERM INSULIN USE: ICD-10-CM

## 2023-11-08 DIAGNOSIS — I87.312 CHRONIC VENOUS HYPERTENSION (IDIOPATHIC) WITH ULCER OF LEFT LOWER EXTREMITY: ICD-10-CM

## 2023-11-08 DIAGNOSIS — I70.202 ATHEROSCLEROSIS OF NATIVE ARTERY OF LEFT LOWER EXTREMITY, WITH UNSPECIFIED PRESENCE OF CLINICAL MANIFESTATION: ICD-10-CM

## 2023-11-08 DIAGNOSIS — E11.621 TYPE 2 DIABETES MELLITUS WITH FOOT ULCER, UNSPECIFIED WHETHER LONG TERM INSULIN USE: ICD-10-CM

## 2023-11-08 DIAGNOSIS — L97.929 CHRONIC VENOUS HYPERTENSION (IDIOPATHIC) WITH ULCER OF LEFT LOWER EXTREMITY: ICD-10-CM

## 2023-11-08 DIAGNOSIS — I87.2 VENOUS INSUFFICIENCY (CHRONIC) (PERIPHERAL): Primary | ICD-10-CM

## 2023-11-09 ENCOUNTER — OFFICE VISIT (OUTPATIENT)
Dept: ENDOCRINOLOGY | Facility: CLINIC | Age: 83
End: 2023-11-09
Payer: MEDICARE

## 2023-11-09 DIAGNOSIS — Z79.4 TYPE 2 DIABETES MELLITUS WITH DIABETIC POLYNEUROPATHY, WITH LONG-TERM CURRENT USE OF INSULIN: Primary | ICD-10-CM

## 2023-11-09 DIAGNOSIS — E11.42 TYPE 2 DIABETES MELLITUS WITH DIABETIC POLYNEUROPATHY, WITH LONG-TERM CURRENT USE OF INSULIN: Primary | ICD-10-CM

## 2023-11-09 PROCEDURE — G0108 DIAB MANAGE TRN  PER INDIV: HCPCS | Performed by: DIETITIAN, REGISTERED

## 2023-11-09 NOTE — PROGRESS NOTES
Pt here today for 11:00AM to 11:30AM for Individual  DSMES.     Pt having issues with Esau 2. Pt states that Esau only last 4-5 days before he receives error message stating he needs to change sensor. Home health nurse helps with application. Says first sensor didn't stick but last 2 with the issues did. Encouraged skintac. Provided pt with number for Esau to call and request 2 sensors be replaced. Encouraged pt to call the clinic if he gets another error message on the sensor that we placed for him. Pt acknowledged understanding.     Reviewed:  - Wear time   - Proper placement of sensor  - Proper skin prep for placement   - Esau help line   - When to use glucometer for BG monitoring  - When to call clinic for assistance           Most Recent A1c  Hemoglobin A1C   Date Value Ref Range Status   07/24/2023 6.80 (H) 4.80 - 5.60 % Final   11/03/2022 7.9 (H) 3.5 - 5.6 % Final   05/03/2022 8.4 (H) 3.5 - 5.6 % Final   11/02/2021 7.8 (H) 3.5 - 5.6 % Final            Current Medications     Current Outpatient Medications:     Accu-Chek Irene Plus test strip, USE TO CHECK BLOOD SUGAR BEFORE MEALS AND AT BEDTIME, Disp: 400 each, Rfl: 3    amLODIPine (NORVASC) 5 MG tablet, Take 1 tablet by mouth Daily., Disp: 90 tablet, Rfl: 3    atenolol (TENORMIN) 50 MG tablet, TAKE 1 TABLET BY MOUTH DAILY, Disp: 90 tablet, Rfl: 3    atorvastatin (LIPITOR) 80 MG tablet, Take 1 tablet by mouth Every Night. Indications: High Amount of Fats in the Blood, Disp: 90 tablet, Rfl: 3    Blood Glucose Monitoring Suppl (Accu-Chek Irene Plus) w/Device kit, 1 each 4 (Four) Times a Day., Disp: 1 kit, Rfl: 0    clopidogrel (PLAVIX) 75 MG tablet, TAKE 1 TABLET DAILY, Disp: 90 tablet, Rfl: 3    fenofibrate (TRICOR) 54 MG tablet, Take 1 tablet by mouth Daily. Indications: High Amount of Triglycerides in the Blood, Disp: 90 tablet, Rfl: 1    ferrous sulfate 325 (65 FE) MG tablet, Take 1 tablet by mouth 2 (Two) Times a Day., Disp: , Rfl:     gabapentin  "(NEURONTIN) 100 MG capsule, TAKE 1 CAPSULE BY MOUTH THREE  TIMES A DAY, Disp: 270 capsule, Rfl: 3    hydroCHLOROthiazide (HYDRODIURIL) 25 MG tablet, TAKE 1 TABLET BY MOUTH DAILY, Disp: 90 tablet, Rfl: 3    Insulin Pen Needle (BD ULTRA-FINE PEN NEEDLES) 29G X 12.7MM misc, Use to inject insulin twice daily. DX E11.65, Disp: 200 each, Rfl: 2    insulin regular (HUMULIN R) 500 UNIT/ML CONCENTRATED injection, DRAW TO 24 UNIT REI ON U-100 SYRINGE AND INJECT IN THE MORNING AND 40 UNIT REI IN THE EVENING  Indications: Type 2 Diabetes (Patient taking differently: DRAW TO 24 UNIT REI ON U-100 SYRINGE AND INJECT IN THE MORNING AND 35 UNIT REI IN THE EVENING  Indications: Type 2 Diabetes), Disp: 60 mL, Rfl: 3    Insulin Syringe-Needle U-100 (BD Insulin Syringe U/F) 31G X 5/16\" 0.5 ML misc, 1 each by Other route 2 (Two) Times a Day., Disp: 200 each, Rfl: 3    irbesartan (AVAPRO) 300 MG tablet, TAKE 1 TABLET BY MOUTH DAILY, Disp: 90 tablet, Rfl: 3    isosorbide mononitrate (IMDUR) 30 MG 24 hr tablet, Take 1 tablet by mouth Daily., Disp: 90 tablet, Rfl: 3    Jardiance 25 MG tablet tablet, TAKE 1 TABLET BY MOUTH DAILY, Disp: 90 tablet, Rfl: 3    levothyroxine (SYNTHROID, LEVOTHROID) 175 MCG tablet, TAKE 1 TABLET BY MOUTH DAILY FOR UNDERACTIVE THYROID, Disp: 90 tablet, Rfl: 3    metFORMIN ER (GLUCOPHAGE-XR) 500 MG 24 hr tablet, Take 4 tablets at supper  Indications: Type 2 Diabetes, Disp: 360 tablet, Rfl: 3    O2 (OXYGEN), Inhale 1 L/min Every Night. Indications: marianne, Disp: , Rfl:     Omega-3 Fatty Acids (FISH OIL) 1000 MG capsule capsule, Take 1 capsule by mouth 2 (Two) Times a Day., Disp: , Rfl:     Pramlintide Acetate (SymlinPen 120) 2700 MCG/2.7ML solution pen-injector, INJECT 120 MCG UNDER THE SKIN  INTO THE APPROPRIATE AREA AS  DIRECTED THREE TIMES A DAY, Disp: 10.8 mL, Rfl: 11    vitamin B-12 (CYANOCOBALAMIN) 100 MCG tablet, Take 1 tablet by mouth Daily. Indications: Inadequate Vitamin B12, Disp: , Rfl:     vitamin D " (ERGOCALCIFEROL) 1.25 MG (92779 UT) capsule capsule, Take 1 capsule by mouth 1 (One) Time Per Week. Indications: Vitamin D Deficiency, Disp: 13 capsule, Rfl: 3    warfarin (COUMADIN) 1 MG tablet, Take 1 tablet PO every evening as directed., Disp: 30 tablet, Rfl: 3    warfarin (COUMADIN) 2 MG tablet, TAKE 1 TABLET BY MOUTH DAILY OR  AS DIRECTED BASED ON INR, Disp: 60 tablet, Rfl: 5    warfarin (COUMADIN) 3 MG tablet, Take 1 tablet by mouth Daily. Indications: Atrial Fibrillation, Disp: 90 tablet, Rfl: 3    warfarin (COUMADIN) 5 MG tablet, Take 1 tablet by mouth Every Night. Indications: Atrial Fibrillation, Disp: 30 tablet, Rfl: 2        Pooja Crouch RD, LD, [facility]   Nutrition and Diabetes Education     Di[facility]   [facility]  [address] [place] [address]

## 2023-11-14 LAB — INR PPP: 2.5

## 2023-11-15 ENCOUNTER — ANTICOAGULATION VISIT (OUTPATIENT)
Dept: PHARMACY | Facility: HOSPITAL | Age: 83
End: 2023-11-15
Payer: MEDICARE

## 2023-11-15 ENCOUNTER — OFFICE VISIT (OUTPATIENT)
Dept: WOUND CARE | Facility: HOSPITAL | Age: 83
End: 2023-11-15
Payer: MEDICARE

## 2023-11-15 DIAGNOSIS — L97.929 CHRONIC VENOUS HYPERTENSION (IDIOPATHIC) WITH ULCER OF LEFT LOWER EXTREMITY: ICD-10-CM

## 2023-11-15 DIAGNOSIS — I82.513 CHRONIC DEEP VEIN THROMBOSIS (DVT) OF FEMORAL VEIN OF BOTH LOWER EXTREMITIES: Primary | Chronic | ICD-10-CM

## 2023-11-15 DIAGNOSIS — L84 CORNS AND CALLOSITIES: ICD-10-CM

## 2023-11-15 DIAGNOSIS — E11.621 TYPE 2 DIABETES MELLITUS WITH FOOT ULCER, UNSPECIFIED WHETHER LONG TERM INSULIN USE: ICD-10-CM

## 2023-11-15 DIAGNOSIS — D68.59 THROMBOPHILIA: Chronic | ICD-10-CM

## 2023-11-15 DIAGNOSIS — I70.202 ATHEROSCLEROSIS OF NATIVE ARTERY OF LEFT LOWER EXTREMITY, WITH UNSPECIFIED PRESENCE OF CLINICAL MANIFESTATION: Primary | ICD-10-CM

## 2023-11-15 DIAGNOSIS — L97.509 TYPE 2 DIABETES MELLITUS WITH FOOT ULCER, UNSPECIFIED WHETHER LONG TERM INSULIN USE: ICD-10-CM

## 2023-11-15 DIAGNOSIS — I87.2 VENOUS INSUFFICIENCY (CHRONIC) (PERIPHERAL): ICD-10-CM

## 2023-11-15 DIAGNOSIS — I87.312 CHRONIC VENOUS HYPERTENSION (IDIOPATHIC) WITH ULCER OF LEFT LOWER EXTREMITY: ICD-10-CM

## 2023-11-15 NOTE — PROGRESS NOTES
Anticoagulation Clinic Progress Note    Patient's visit was held by phone today.    Anticoagulation Summary  As of 11/15/2023      INR goal:  2.0-3.0   TTR:  87.0% (4.3 y)   INR used for dosin.50 (2023)   Warfarin maintenance plan:  8 mg (5 mg x 1 and 1 mg x 3) every day   Weekly warfarin total:  56 mg   No change documented:  Karen Oates [facility]   Plan last modified:  Lynn Baum RN (2023)   Next INR check:  2023   Priority:  Maintenance   Target end date:  Indefinite    Indications    Deep vein thrombosis (DVT) [I82.409]  Thrombophilia [D68.59]                 Anticoagulation Episode Summary       INR check location:      Preferred lab:      Send INR reminders to:   [facility] CBC ANTICOAG POOL    Comments:  MDINR Home maninder          Anticoagulation Care Providers       Provider Role Specialty Phone number    Soraya Cortez MD Referring Hematology and Oncology [phone]            Drug interactions: has remained unchanged.  Diet: has remained unchanged.    Clinic Interview:  No pertinent clinical findings have been reported.    INR History:      10/25/2023     9:18 AM 10/30/2023    12:00 AM 10/30/2023    10:51 AM 2023    12:00 AM 2023     2:00 PM 2023    12:00 AM 11/15/2023    11:28 AM   Anticoagulation Monitoring   INR 2.60  3.20  2.90  2.50   INR Date 10/24/2023  10/30/2023  2023  2023   INR Goal 2.0-3.0  2.0-3.0  2.0-3.0  2.0-3.0   Trend Same  Same  Same  Same   Last Week Total 56 mg  56 mg  53 mg  56 mg   Next Week Total 56 mg  53 mg  53 mg  56 mg   Sun 8 mg  8 mg  8 mg  8 mg   Mon 8 mg  5 mg (10/30)  5 mg ()  8 mg   Tue 8 mg  8 mg  8 mg  8 mg   Wed 8 mg  8 mg  8 mg  8 mg   Thu 8 mg  8 mg  8 mg  8 mg   Fri 8 mg  8 mg  8 mg  8 mg   Sat 8 mg  8 mg  8 mg  8 mg   Historical INR  3.20      2.90      2.50        Visit Report Report Report Report           This result is from an external source.       Plan:  1. INR is Therapeutic today- see above in  Anticoagulation Summary.   Will instruct Dalton Dallas Sr. to Continue their warfarin regimen- see above in Anticoagulation Summary.  2. Follow up in 1 weeks home test.   3.They have been instructed to call if any changes in medications, doses, concerns, etc. Patient expresses understanding and has no further questions at this time.    Karen Oates [facility]

## 2023-11-19 DIAGNOSIS — E11.40 TYPE 2 DIABETES MELLITUS WITH DIABETIC NEUROPATHY, WITHOUT LONG-TERM CURRENT USE OF INSULIN: ICD-10-CM

## 2023-11-20 RX ORDER — INSULIN HUMAN 500 [IU]/ML
INJECTION, SOLUTION SUBCUTANEOUS
Qty: 60 ML | Refills: 3 | Status: SHIPPED | OUTPATIENT
Start: 2023-11-20

## 2023-11-21 ENCOUNTER — ANTICOAGULATION VISIT (OUTPATIENT)
Dept: PHARMACY | Facility: HOSPITAL | Age: 83
End: 2023-11-21
Payer: MEDICARE

## 2023-11-21 DIAGNOSIS — D68.59 THROMBOPHILIA: Chronic | ICD-10-CM

## 2023-11-21 DIAGNOSIS — I82.513 CHRONIC DEEP VEIN THROMBOSIS (DVT) OF FEMORAL VEIN OF BOTH LOWER EXTREMITIES: Primary | Chronic | ICD-10-CM

## 2023-11-21 LAB — INR PPP: 2.8

## 2023-11-21 NOTE — PROGRESS NOTES
Anticoagulation Clinic Progress Note    Patient's visit was held by phone today.    Anticoagulation Summary  As of 2023      INR goal:  2.0-3.0   TTR:  87.1% (4.3 y)   INR used for dosin.80 (2023)   Warfarin maintenance plan:  8 mg (5 mg x 1 and 1 mg x 3) every day   Weekly warfarin total:  56 mg   No change documented:  Karen Oates [facility]   Plan last modified:  Lynn Baum RN (2023)   Next INR check:  2023   Priority:  Maintenance   Target end date:  Indefinite    Indications    Deep vein thrombosis (DVT) [I82.409]  Thrombophilia [D68.59]                 Anticoagulation Episode Summary       INR check location:      Preferred lab:      Send INR reminders to:   [facility] CBC ANTICOAG POOL    Comments:  MDINR Home maninder          Anticoagulation Care Providers       Provider Role Specialty Phone number    Soraya Cortez MD Referring Hematology and Oncology [phone]            Drug interactions: has remained unchanged.  Diet: has remained unchanged.    Clinic Interview:  No pertinent clinical findings have been reported.    INR History:      10/30/2023    10:51 AM 2023    12:00 AM 2023     2:00 PM 2023    12:00 AM 11/15/2023    11:28 AM 2023    12:00 AM 2023     9:47 AM   Anticoagulation Monitoring   INR 3.20  2.90  2.50  2.80   INR Date 10/30/2023  2023  2023  2023   INR Goal 2.0-3.0  2.0-3.0  2.0-3.0  2.0-3.0   Trend Same  Same  Same  Same   Last Week Total 56 mg  53 mg  56 mg  56 mg   Next Week Total 53 mg  53 mg  56 mg  56 mg   Sun 8 mg  8 mg  8 mg  8 mg   Mon 5 mg (10/30)  5 mg ()  8 mg  8 mg   Tue 8 mg  8 mg  8 mg  8 mg   Wed 8 mg  8 mg  8 mg  8 mg   Thu 8 mg  8 mg  8 mg  8 mg   Fri 8 mg  8 mg  8 mg  8 mg   Sat 8 mg  8 mg  8 mg  8 mg   Historical INR  2.90      2.50      2.80        Visit Report Report    Report         This result is from an external source.       Plan:  1. INR is Therapeutic today- see above in  Anticoagulation Summary.   Will instruct Dalton Dixonogast Sr. to Continue their warfarin regimen- see above in Anticoagulation Summary.  2. Follow up in 1 weeks- home test  3.They have been instructed to call if any changes in medications, doses, concerns, etc. Patient expresses understanding and has no further questions at this time.    Karen Oates [facility]

## 2023-11-22 ENCOUNTER — OFFICE VISIT (OUTPATIENT)
Dept: WOUND CARE | Facility: HOSPITAL | Age: 83
End: 2023-11-22
Payer: MEDICARE

## 2023-11-27 ENCOUNTER — ANTICOAGULATION VISIT (OUTPATIENT)
Dept: PHARMACY | Facility: HOSPITAL | Age: 83
End: 2023-11-27
Payer: MEDICARE

## 2023-11-27 DIAGNOSIS — D68.59 THROMBOPHILIA: Primary | Chronic | ICD-10-CM

## 2023-11-27 LAB — INR PPP: 2.6

## 2023-11-27 NOTE — PROGRESS NOTES
Anticoagulation Clinic Progress Note    Patient's visit was held by phone today.    Anticoagulation Summary  As of 2023      INR goal:  2.0-3.0   TTR:  87.1% (4.3 y)   INR used for dosin.60 (2023)   Warfarin maintenance plan:  8 mg (5 mg x 1 and 1 mg x 3) every day   Weekly warfarin total:  56 mg   Plan last modified:  Lynn Baum RN (2023)   Next INR check:  2023   Priority:  Maintenance   Target end date:  Indefinite    Indications    Deep vein thrombosis (DVT) [I82.409]  Thrombophilia [D68.59]                 Anticoagulation Episode Summary       INR check location:      Preferred lab:      Send INR reminders to:   [facility] ON[facility] CBC ANTICOAG POOL    Comments:  MDINR Home maninder          Anticoagulation Care Providers       Provider Role Specialty Phone number    Soraya Cortez MD Referring Hematology and Oncology [phone]            INR History:      2023     2:00 PM 2023    12:00 AM 11/15/2023    11:28 AM 2023    12:00 AM 2023     9:47 AM 2023    12:00 AM 2023     1:45 PM   Anticoagulation Monitoring   INR 2.90  2.50  2.80  2.60   INR Date 2023   INR Goal 2.0-3.0  2.0-3.0  2.0-3.0  2.0-3.0   Trend Same  Same  Same  Same   Last Week Total 53 mg  56 mg  56 mg  56 mg   Next Week Total 53 mg  56 mg  56 mg  56 mg   Sun 8 mg  8 mg  8 mg  8 mg   Mon 5 mg ()  8 mg  8 mg  8 mg   Tue 8 mg  8 mg  8 mg  8 mg   Wed 8 mg  8 mg  8 mg  8 mg   Thu 8 mg  8 mg  8 mg  8 mg   Fri 8 mg  8 mg  8 mg  8 mg   Sat 8 mg  8 mg  8 mg  8 mg   Historical INR  2.50      2.80      2.60        Visit Report   Report           This result is from an external source.       Plan:  1. INR is Therapeutic today- see above in Anticoagulation Summary.   Will instruct Dalton Dallas Sr. to Continue their warfarin regimen- see above in Anticoagulation Summary.  2. Follow up in 1 weeks  3.They have been instructed to call if any changes in  medications, doses, concerns, etc. Left VM with instructions and to call back with updates or questions.     Taylor Ambriz [facility]

## 2023-11-30 ENCOUNTER — OFFICE VISIT (OUTPATIENT)
Dept: FAMILY MEDICINE CLINIC | Facility: CLINIC | Age: 83
End: 2023-11-30
Payer: MEDICARE

## 2023-11-30 ENCOUNTER — LAB (OUTPATIENT)
Dept: LAB | Facility: HOSPITAL | Age: 83
End: 2023-11-30
Payer: MEDICARE

## 2023-11-30 VITALS
BODY MASS INDEX: 42.37 KG/M2 | WEIGHT: 296 LBS | DIASTOLIC BLOOD PRESSURE: 76 MMHG | TEMPERATURE: 98.4 F | OXYGEN SATURATION: 99 % | HEART RATE: 64 BPM | HEIGHT: 70 IN | SYSTOLIC BLOOD PRESSURE: 153 MMHG

## 2023-11-30 DIAGNOSIS — E55.9 VITAMIN D DEFICIENCY: Chronic | ICD-10-CM

## 2023-11-30 DIAGNOSIS — I25.10 CORONARY ARTERY DISEASE INVOLVING NATIVE CORONARY ARTERY OF NATIVE HEART WITHOUT ANGINA PECTORIS: Chronic | ICD-10-CM

## 2023-11-30 DIAGNOSIS — E66.01 CLASS 3 SEVERE OBESITY WITHOUT SERIOUS COMORBIDITY WITH BODY MASS INDEX (BMI) OF 40.0 TO 44.9 IN ADULT, UNSPECIFIED OBESITY TYPE: Chronic | ICD-10-CM

## 2023-11-30 DIAGNOSIS — I82.513 CHRONIC DEEP VEIN THROMBOSIS (DVT) OF FEMORAL VEIN OF BOTH LOWER EXTREMITIES: Chronic | ICD-10-CM

## 2023-11-30 DIAGNOSIS — E03.9 ACQUIRED HYPOTHYROIDISM: Chronic | ICD-10-CM

## 2023-11-30 DIAGNOSIS — Z12.5 PROSTATE CANCER SCREENING: Primary | ICD-10-CM

## 2023-11-30 DIAGNOSIS — G47.30 SLEEP APNEA, UNSPECIFIED TYPE: Chronic | ICD-10-CM

## 2023-11-30 DIAGNOSIS — E78.2 MIXED HYPERLIPIDEMIA: Chronic | ICD-10-CM

## 2023-11-30 DIAGNOSIS — Z12.5 PROSTATE CANCER SCREENING: ICD-10-CM

## 2023-11-30 DIAGNOSIS — I27.82 OTHER CHRONIC PULMONARY EMBOLISM WITHOUT ACUTE COR PULMONALE: Chronic | ICD-10-CM

## 2023-11-30 DIAGNOSIS — I73.9 PERIPHERAL VASCULAR DISEASE: Chronic | ICD-10-CM

## 2023-11-30 DIAGNOSIS — I10 ESSENTIAL HYPERTENSION: Chronic | ICD-10-CM

## 2023-11-30 LAB — PSA SERPL-MCNC: 0.4 NG/ML (ref 0–4)

## 2023-11-30 PROCEDURE — G0103 PSA SCREENING: HCPCS

## 2023-11-30 PROCEDURE — 36415 COLL VENOUS BLD VENIPUNCTURE: CPT

## 2023-11-30 NOTE — PROGRESS NOTES
Subjective        Dalton Dallas Sr. is a 83 y.o. male.     Chief Complaint   Patient presents with    Hypertension     6 month f/u       Hypertension      Patient is here for management of his chronic medical problems:   CAD, hypertension, hyperlipidemia, DVT, hypothyroidism, vit d def, pVD    Diabetes Mellitus: followed by Dr Chhaya pritchett 24 units am and 35 pm. Jardiace 25 mg daily. Metformin 500mg XR 4 tabs at supper.has PE, PVD. Pramlintide injections . Current with vision exam.he is followed by podiatry . 7/2023 A1C down to 6.8 high but down from 7.9    Hypothyroidism levothyroxine 175 mcg daily.     Hypertension: amlodipine 5 mg daily atenolol 50 mg daily hctz 25 mg daiy. Has DM CAD . Avapro 300 mg daily imdur 30 mg daily.    CAD followed by cardiology on plavix 75 mg daily imdur 30 mg daily. Has DM type 2.    Oxygen using with his Cpap at night.     PE; he is on coumadin therapy followed by dr Cortez . He is checking his levels at home.     PVD gabapentin 100 mg tid has had open wound left foot monitored by wound care . No current open lesion.   Wearing diabetic shoes.     Hyperlipidemia taking fenofibrate and atorvastatin 80 mg . Has CAD.           The following portions of the patient's history were reviewed and updated as appropriate: allergies, current medications, past family history, past medical history, past social history, past surgical history and problem list.      Current Outpatient Medications:     Accu-Chek Irene Plus test strip, USE TO CHECK BLOOD SUGAR BEFORE MEALS AND AT BEDTIME, Disp: 400 each, Rfl: 3    amLODIPine (NORVASC) 5 MG tablet, Take 1 tablet by mouth Daily., Disp: 90 tablet, Rfl: 3    atenolol (TENORMIN) 50 MG tablet, TAKE 1 TABLET BY MOUTH DAILY, Disp: 90 tablet, Rfl: 3    atorvastatin (LIPITOR) 80 MG tablet, Take 1 tablet by mouth Every Night. Indications: High Amount of Fats in the Blood, Disp: 90 tablet, Rfl: 3    Blood Glucose Monitoring Suppl (Accu-Chek Irene Plus)  "w/Device kit, 1 each 4 (Four) Times a Day., Disp: 1 kit, Rfl: 0    clopidogrel (PLAVIX) 75 MG tablet, TAKE 1 TABLET DAILY, Disp: 90 tablet, Rfl: 3    fenofibrate (TRICOR) 54 MG tablet, Take 1 tablet by mouth Daily. Indications: High Amount of Triglycerides in the Blood, Disp: 90 tablet, Rfl: 1    ferrous sulfate 325 (65 FE) MG tablet, Take 1 tablet by mouth 2 (Two) Times a Day., Disp: , Rfl:     gabapentin (NEURONTIN) 100 MG capsule, TAKE 1 CAPSULE BY MOUTH THREE  TIMES A DAY, Disp: 270 capsule, Rfl: 3    hydroCHLOROthiazide (HYDRODIURIL) 25 MG tablet, TAKE 1 TABLET BY MOUTH DAILY, Disp: 90 tablet, Rfl: 3    Insulin Pen Needle (BD ULTRA-FINE PEN NEEDLES) 29G X 12.7MM misc, Use to inject insulin twice daily. DX E11.65, Disp: 200 each, Rfl: 2    insulin regular (HUMULIN R) 500 UNIT/ML CONCENTRATED injection, DRAW TO 24 UNIT REI ON U-100 SYRINGE AND INJECT IN THE MORNING AND 35 UNIT REI IN THE EVENING  Indications: Type 2 Diabetes, Disp: 60 mL, Rfl: 3    Insulin Syringe-Needle U-100 (BD Insulin Syringe U/F) 31G X 5/16\" 0.5 ML misc, 1 each by Other route 2 (Two) Times a Day., Disp: 200 each, Rfl: 3    irbesartan (AVAPRO) 300 MG tablet, TAKE 1 TABLET BY MOUTH DAILY, Disp: 90 tablet, Rfl: 3    isosorbide mononitrate (IMDUR) 30 MG 24 hr tablet, Take 1 tablet by mouth Daily., Disp: 90 tablet, Rfl: 3    Jardiance 25 MG tablet tablet, TAKE 1 TABLET BY MOUTH DAILY, Disp: 90 tablet, Rfl: 3    levothyroxine (SYNTHROID, LEVOTHROID) 175 MCG tablet, TAKE 1 TABLET BY MOUTH DAILY FOR UNDERACTIVE THYROID, Disp: 90 tablet, Rfl: 3    metFORMIN ER (GLUCOPHAGE-XR) 500 MG 24 hr tablet, Take 4 tablets at supper  Indications: Type 2 Diabetes, Disp: 360 tablet, Rfl: 3    O2 (OXYGEN), Inhale 1 L/min Every Night. Indications: marianne, Disp: , Rfl:     Omega-3 Fatty Acids (FISH OIL) 1000 MG capsule capsule, Take 1 capsule by mouth 2 (Two) Times a Day., Disp: , Rfl:     Pramlintide Acetate (SymlinPen 120) 2700 MCG/2.7ML solution pen-injector, " INJECT 120 MCG UNDER THE SKIN  INTO THE APPROPRIATE AREA AS  DIRECTED THREE TIMES A DAY, Disp: 10.8 mL, Rfl: 11    vitamin B-12 (CYANOCOBALAMIN) 100 MCG tablet, Take 1 tablet by mouth Daily. Indications: Inadequate Vitamin B12, Disp: , Rfl:     vitamin D (ERGOCALCIFEROL) 1.25 MG (77735 UT) capsule capsule, Take 1 capsule by mouth 1 (One) Time Per Week. Indications: Vitamin D Deficiency, Disp: 13 capsule, Rfl: 3    warfarin (COUMADIN) 1 MG tablet, Take 1 tablet PO every evening as directed., Disp: 30 tablet, Rfl: 3    warfarin (COUMADIN) 2 MG tablet, TAKE 1 TABLET BY MOUTH DAILY OR  AS DIRECTED BASED ON INR, Disp: 60 tablet, Rfl: 5    warfarin (COUMADIN) 3 MG tablet, Take 1 tablet by mouth Daily. Indications: Atrial Fibrillation, Disp: 90 tablet, Rfl: 3    warfarin (COUMADIN) 5 MG tablet, Take 1 tablet by mouth Every Night. Indications: Atrial Fibrillation, Disp: 30 tablet, Rfl: 2    Recent Results (from the past 4032 hour(s))   Protime-INR    Collection Time: 06/19/23 12:00 AM    Specimen: Blood   Result Value Ref Range    INR 2.50    Protime-INR    Collection Time: 06/26/23 12:00 AM    Specimen: Blood   Result Value Ref Range    INR 2.80    Protime-INR    Collection Time: 07/04/23 12:00 AM    Specimen: Blood   Result Value Ref Range    INR 2.70    Protime-INR    Collection Time: 07/10/23 12:00 AM    Specimen: Blood   Result Value Ref Range    INR 2.30    Protime-INR    Collection Time: 07/17/23 12:00 AM    Specimen: Blood   Result Value Ref Range    INR 2.70    Hemoglobin A1c    Collection Time: 07/24/23  8:52 AM    Specimen: Blood   Result Value Ref Range    Hemoglobin A1C 6.80 (H) 4.80 - 5.60 %   Microalbumin / Creatinine Urine Ratio - Urine, Clean Catch    Collection Time: 07/24/23  8:52 AM    Specimen: Urine, Clean Catch   Result Value Ref Range    Microalbumin/Creatinine Ratio      Creatinine, Urine 21.5 mg/dL    Microalbumin, Urine <1.2 mg/dL   Comprehensive Metabolic Panel    Collection Time: 07/24/23  8:52  AM    Specimen: Blood   Result Value Ref Range    Glucose 210 (H) 65 - 99 mg/dL    BUN 25 (H) 8 - 23 mg/dL    Creatinine 1.36 (H) 0.76 - 1.27 mg/dL    Sodium 137 136 - 145 mmol/L    Potassium 4.9 3.5 - 5.2 mmol/L    Chloride 99 98 - 107 mmol/L    CO2 23.5 22.0 - 29.0 mmol/L    Calcium 10.1 8.6 - 10.5 mg/dL    Total Protein 6.7 6.0 - 8.5 g/dL    Albumin 4.2 3.5 - 5.2 g/dL    ALT (SGPT) 19 1 - 41 U/L    AST (SGOT) 19 1 - 40 U/L    Alkaline Phosphatase 38 (L) 39 - 117 U/L    Total Bilirubin 0.4 0.0 - 1.2 mg/dL    Globulin 2.5 gm/dL    A/G Ratio 1.7 g/dL    BUN/Creatinine Ratio 18.4 7.0 - 25.0    Anion Gap 14.5 5.0 - 15.0 mmol/L    eGFR 52.6 (L) >60.0 mL/min/1.73   Lipid Panel    Collection Time: 07/24/23  8:52 AM    Specimen: Blood   Result Value Ref Range    Total Cholesterol 141 0 - 200 mg/dL    Triglycerides 145 0 - 150 mg/dL    HDL Cholesterol 41 40 - 60 mg/dL    LDL Cholesterol  75 0 - 100 mg/dL    VLDL Cholesterol 25 5 - 40 mg/dL    LDL/HDL Ratio 1.73    TSH    Collection Time: 07/24/23  8:52 AM    Specimen: Blood   Result Value Ref Range    TSH 1.570 0.270 - 4.200 uIU/mL   T4, Free    Collection Time: 07/24/23  8:52 AM    Specimen: Blood   Result Value Ref Range    Free T4 1.77 (H) 0.93 - 1.70 ng/dL   Vitamin D,25-Hydroxy    Collection Time: 07/24/23  8:52 AM    Specimen: Blood   Result Value Ref Range    25 Hydroxy, Vitamin D 54.8 30.0 - 100.0 ng/ml   Protime-INR    Collection Time: 07/25/23 12:00 AM    Specimen: Blood   Result Value Ref Range    INR 2.30    Protime-INR    Collection Time: 07/31/23 12:00 AM    Specimen: Blood   Result Value Ref Range    INR 2.50    POC Glucose    Collection Time: 07/31/23 12:14 PM    Specimen: Blood   Result Value Ref Range    Glucose 210 (H) 70 - 105 mg/dL   Protime-INR    Collection Time: 08/07/23 12:00 AM    Specimen: Blood   Result Value Ref Range    INR 2.30    Protime-INR    Collection Time: 08/14/23 12:00 AM    Specimen: Blood   Result Value Ref Range    INR 2.90   "  Protime-INR    Collection Time: 08/24/23 12:00 AM    Specimen: Blood   Result Value Ref Range    INR 2.70    Protime-INR    Collection Time: 08/29/23 12:00 AM    Specimen: Blood   Result Value Ref Range    INR 2.60    Protime-INR    Collection Time: 09/04/23 12:00 AM    Specimen: Blood   Result Value Ref Range    INR 3.00    Protime-INR    Collection Time: 09/12/23 12:00 AM    Specimen: Blood   Result Value Ref Range    INR 2.10    Protime-INR    Collection Time: 09/21/23 12:00 AM    Specimen: Blood   Result Value Ref Range    INR 1.90    Protime-INR    Collection Time: 09/25/23 12:00 AM    Specimen: Blood   Result Value Ref Range    INR 2.40    Protime-INR    Collection Time: 10/02/23 12:00 AM    Specimen: Blood   Result Value Ref Range    INR 2.20    Protime-INR    Collection Time: 10/09/23 12:00 AM    Specimen: Blood   Result Value Ref Range    INR 2.70    Protime-INR    Collection Time: 10/16/23 12:00 AM    Specimen: Blood   Result Value Ref Range    INR 2.50    Protime-INR    Collection Time: 10/24/23 12:00 AM    Specimen: Blood   Result Value Ref Range    INR 2.60    Protime-INR    Collection Time: 10/30/23 12:00 AM    Specimen: Blood   Result Value Ref Range    INR 3.20    Protime-INR    Collection Time: 11/06/23 12:00 AM    Specimen: Blood   Result Value Ref Range    INR 2.90    Protime-INR    Collection Time: 11/14/23 12:00 AM    Specimen: Blood   Result Value Ref Range    INR 2.50    Protime-INR    Collection Time: 11/21/23 12:00 AM    Specimen: Blood   Result Value Ref Range    INR 2.80    Protime-INR    Collection Time: 11/27/23 12:00 AM    Specimen: Blood   Result Value Ref Range    INR 2.60          Review of Systems    Objective     /76   Pulse 64   Temp 98.4 °F (36.9 °C) (Infrared)   Ht 177.8 cm (70\")   Wt 134 kg (296 lb)   SpO2 99%   BMI 42.47 kg/m²     Physical Exam  Vitals and nursing note reviewed.   Constitutional:       Appearance: He is obese.   HENT:      Head: Normocephalic.    "   Right Ear: Tympanic membrane and external ear normal.      Left Ear: Tympanic membrane and external ear normal.      Nose: Nose normal.      Mouth/Throat:      Mouth: Mucous membranes are moist.   Eyes:      Pupils: Pupils are equal, round, and reactive to light.   Cardiovascular:      Rate and Rhythm: Normal rate and regular rhythm.      Heart sounds: Murmur heard.      Comments: Holosysolic murmur  Pulmonary:      Effort: Pulmonary effort is normal.      Breath sounds: Normal breath sounds.   Abdominal:      General: Abdomen is flat.      Palpations: Abdomen is soft.   Skin:     General: Skin is warm.   Neurological:      General: No focal deficit present.      Mental Status: He is alert and oriented to person, place, and time.   Psychiatric:         Mood and Affect: Mood normal.         Thought Content: Thought content normal.         Result Review :                Assessment & Plan    Diagnoses and all orders for this visit:    1. Coronary artery disease involving native coronary artery of native heart without angina pectoris  Comments:  stable followed by dr Ackerman    2. Essential hypertension  Comments:  stable    3. Mixed hyperlipidemia  Comments:  stable    4. Chronic deep vein thrombosis (DVT) of femoral vein of both lower extremities  Comments:  stable followed by hematology on warfarin    5. Class 3 severe obesity without serious comorbidity with body mass index (BMI) of 40.0 to 44.9 in adult, unspecified obesity type  Comments:  he walks as his foot pain allows  Assessment & Plan:  Patient's (Body mass index is 42.47 kg/m².) indicates that they are morbidly/severely obese (BMI > 40 or > 35 with obesity - related health condition) with health conditions that include hypertension, coronary heart disease, diabetes mellitus, dyslipidemias, and peripheral vascular disease . Weight is unchanged. BMI  is above average; BMI management plan is completed. We discussed portion control and increasing exercise.  Currently walking at home.         6. Acquired hypothyroidism  Comments:  stable    7. Vitamin D deficiency  Comments:  stable    8. Sleep apnea, unspecified type  Comments:  stable on cpap and oxygen  Assessment & Plan:  cpap      9. Peripheral vascular disease  Comments:  currently followed by wound care    10. Other chronic pulmonary embolism without acute cor pulmonale  Comments:  followed by hematology      Patient Instructions   Eat healthy monitor portion monitor blood sugars  Walk and exercise.   Chair yoga or chair aerobics         Follow Up   Return in about 6 months (around 5/30/2024).    Patient was given instructions and counseling regarding his condition or for health maintenance advice. Please see specific information pulled into the AVS if appropriate.     Joan Johns, APRN    11/30/23

## 2023-11-30 NOTE — ASSESSMENT & PLAN NOTE
Patient's (Body mass index is 42.47 kg/m².) indicates that they are morbidly/severely obese (BMI > 40 or > 35 with obesity - related health condition) with health conditions that include hypertension, coronary heart disease, diabetes mellitus, dyslipidemias, and peripheral vascular disease . Weight is unchanged. BMI  is above average; BMI management plan is completed. We discussed portion control and increasing exercise. Currently walking at home.

## 2023-11-30 NOTE — PATIENT INSTRUCTIONS
Eat healthy monitor portion monitor blood sugars  Walk and exercise.   Chair yoga or chair aerobics

## 2023-12-04 LAB — INR PPP: 2.9

## 2023-12-05 ENCOUNTER — ANTICOAGULATION VISIT (OUTPATIENT)
Dept: PHARMACY | Facility: HOSPITAL | Age: 83
End: 2023-12-05
Payer: MEDICARE

## 2023-12-05 DIAGNOSIS — D68.59 THROMBOPHILIA: Chronic | ICD-10-CM

## 2023-12-05 DIAGNOSIS — I82.513 CHRONIC DEEP VEIN THROMBOSIS (DVT) OF FEMORAL VEIN OF BOTH LOWER EXTREMITIES: Primary | Chronic | ICD-10-CM

## 2023-12-05 NOTE — PROGRESS NOTES
Anticoagulation Clinic Progress Note    Patient's visit was held in office today.    Anticoagulation Summary  As of 2023      INR goal:  2.0-3.0   TTR:  87.2% (4.3 y)   INR used for dosin.90 (2023)   Warfarin maintenance plan:  8 mg (5 mg x 1 and 1 mg x 3) every day   Weekly warfarin total:  56 mg   No change documented:  Karen Oates [facility]   Plan last modified:  Lynn Baum RN (2023)   Next INR check:  2023   Priority:  Maintenance   Target end date:  Indefinite    Indications    Deep vein thrombosis (DVT) [I82.409]  Thrombophilia [D68.59]                 Anticoagulation Episode Summary       INR check location:      Preferred lab:      Send INR reminders to:   [facility] ON[facility] CBC ANTICOAG POOL    Comments:  MDINR Home maninder          Anticoagulation Care Providers       Provider Role Specialty Phone number    Soraya Cortez MD Referring Hematology and Oncology [phone]            Clinic Interview:      INR History:      11/15/2023    11:28 AM 2023    12:00 AM 2023     9:47 AM 2023    12:00 AM 2023     1:45 PM 2023    12:00 AM 2023     9:47 AM   Anticoagulation Monitoring   INR 2.50  2.80  2.60  2.90   INR Date 2023   INR Goal 2.0-3.0  2.0-3.0  2.0-3.0  2.0-3.0   Trend Same  Same  Same  Same   Last Week Total 56 mg  56 mg  56 mg  56 mg   Next Week Total 56 mg  56 mg  56 mg  56 mg   Sun 8 mg  8 mg  8 mg  8 mg   Mon 8 mg  8 mg  8 mg  -   Tue 8 mg  8 mg  8 mg  8 mg   Wed 8 mg  8 mg  8 mg  8 mg   Thu 8 mg  8 mg  8 mg  8 mg   Fri 8 mg  8 mg  8 mg  8 mg   Sat 8 mg  8 mg  8 mg  8 mg   Historical INR  2.80      2.60      2.90        Visit Report Report             This result is from an external source.       Plan:  1. INR is Therapeutic today- see above in Anticoagulation Summary.  Will instruct Dalton W Burt Sr. to Continue their warfarin regimen- see above in Anticoagulation Summary.  2. Follow up in 1 week-  home test.   3. Patient declines warfarin refills.  4. Verbal and written information provided. Patient expresses understanding and has no further questions at this time.    Karen Oates [facility]

## 2023-12-06 ENCOUNTER — OFFICE VISIT (OUTPATIENT)
Dept: WOUND CARE | Facility: HOSPITAL | Age: 83
End: 2023-12-06
Payer: MEDICARE

## 2023-12-06 DIAGNOSIS — L97.929 CHRONIC VENOUS HYPERTENSION (IDIOPATHIC) WITH ULCER OF LEFT LOWER EXTREMITY: ICD-10-CM

## 2023-12-06 DIAGNOSIS — E11.621 TYPE 2 DIABETES MELLITUS WITH FOOT ULCER, UNSPECIFIED WHETHER LONG TERM INSULIN USE: ICD-10-CM

## 2023-12-06 DIAGNOSIS — I70.202 ATHEROSCLEROSIS OF NATIVE ARTERY OF LEFT LOWER EXTREMITY, WITH UNSPECIFIED PRESENCE OF CLINICAL MANIFESTATION: Primary | ICD-10-CM

## 2023-12-06 DIAGNOSIS — I87.2 VENOUS INSUFFICIENCY (CHRONIC) (PERIPHERAL): ICD-10-CM

## 2023-12-06 DIAGNOSIS — L97.509 TYPE 2 DIABETES MELLITUS WITH FOOT ULCER, UNSPECIFIED WHETHER LONG TERM INSULIN USE: ICD-10-CM

## 2023-12-06 DIAGNOSIS — I87.312 CHRONIC VENOUS HYPERTENSION (IDIOPATHIC) WITH ULCER OF LEFT LOWER EXTREMITY: ICD-10-CM

## 2023-12-06 RX ORDER — UREA 40 %
1 CREAM (GRAM) TOPICAL 2 TIMES DAILY
Qty: 85 EACH | Refills: 3 | Status: SHIPPED | OUTPATIENT
Start: 2023-12-06 | End: 2023-12-06 | Stop reason: SDUPTHER

## 2023-12-06 RX ORDER — UREA 40 %
1 CREAM (GRAM) TOPICAL 2 TIMES DAILY
Qty: 85 EACH | Refills: 3 | Status: SHIPPED | OUTPATIENT
Start: 2023-12-06

## 2023-12-06 RX ORDER — FENOFIBRATE 54 MG/1
TABLET ORAL
Qty: 90 TABLET | Refills: 3 | Status: SHIPPED | OUTPATIENT
Start: 2023-12-06

## 2023-12-06 NOTE — PROGRESS NOTES
Patient called to report INR of 1.7.   Patient notified to increase Warfarin 1mg and recheck INR in one week. Patient confirmed and had no other questions.   
.

## 2023-12-11 LAB — INR PPP: 3

## 2023-12-12 ENCOUNTER — ANTICOAGULATION VISIT (OUTPATIENT)
Dept: PHARMACY | Facility: HOSPITAL | Age: 83
End: 2023-12-12
Payer: MEDICARE

## 2023-12-12 DIAGNOSIS — D68.59 THROMBOPHILIA: Chronic | ICD-10-CM

## 2023-12-12 DIAGNOSIS — I82.513 CHRONIC DEEP VEIN THROMBOSIS (DVT) OF FEMORAL VEIN OF BOTH LOWER EXTREMITIES: Primary | Chronic | ICD-10-CM

## 2023-12-12 NOTE — PROGRESS NOTES
Anticoagulation Clinic Progress Note    Patient's visit was held by phone today.    Anticoagulation Summary  As of 12/12/2023      INR goal:  2.0-3.0   TTR:  87.2% (4.4 y)   INR used for dosing:  3.00 (12/11/2023)   Warfarin maintenance plan:  8 mg (5 mg x 1 and 1 mg x 3) every day   Weekly warfarin total:  56 mg   No change documented:  Karen Oates [facility]   Plan last modified:  Lynn Baum RN (1/18/2023)   Next INR check:  12/19/2023   Priority:  Maintenance   Target end date:  Indefinite    Indications    Deep vein thrombosis (DVT) [I82.409]  Thrombophilia [D68.59]                 Anticoagulation Episode Summary       INR check location:      Preferred lab:      Send INR reminders to:   [facility] CBC ANTICOAG POOL    Comments:  MDINR Home maninder          Anticoagulation Care Providers       Provider Role Specialty Phone number    Soraya Cortez MD Referring Hematology and Oncology [phone]            Drug interactions: has remained unchanged.  Diet: has remained unchanged.    Clinic Interview:  No pertinent clinical findings have been reported.    INR History:      11/21/2023     9:47 AM 11/27/2023    12:00 AM 11/27/2023     1:45 PM 12/4/2023    12:00 AM 12/5/2023     9:47 AM 12/11/2023    12:00 AM 12/12/2023    12:55 PM   Anticoagulation Monitoring   INR 2.80  2.60  2.90  3.00   INR Date 11/21/2023 11/27/2023 12/4/2023 12/11/2023   INR Goal 2.0-3.0  2.0-3.0  2.0-3.0  2.0-3.0   Trend Same  Same  Same  Same   Last Week Total 56 mg  56 mg  56 mg  56 mg   Next Week Total 56 mg  56 mg  56 mg  56 mg   Sun 8 mg  8 mg  8 mg  8 mg   Mon 8 mg  8 mg  -  8 mg   Tue 8 mg  8 mg  8 mg  8 mg   Wed 8 mg  8 mg  8 mg  8 mg   Thu 8 mg  8 mg  8 mg  8 mg   Fri 8 mg  8 mg  8 mg  8 mg   Sat 8 mg  8 mg  8 mg  8 mg   Historical INR  2.60      2.90      3.00            This result is from an external source.       Plan:  1. INR is Therapeutic today- see above in Anticoagulation Summary.   Will instruct Dalton Dallas  Sr. to Continue their warfarin regimen- see above in Anticoagulation Summary.  2. Follow up in 1 weeks- home test.   3.They have been instructed to call if any changes in medications, doses, concerns, etc. Patient expresses understanding and has no further questions at this time.    Karen Oates [facility]

## 2023-12-16 DIAGNOSIS — Z79.01 LONG TERM (CURRENT) USE OF ANTICOAGULANTS: ICD-10-CM

## 2023-12-16 DIAGNOSIS — I82.409 DEEP VEIN THROMBOSIS (DVT) OF LOWER EXTREMITY, UNSPECIFIED CHRONICITY, UNSPECIFIED LATERALITY, UNSPECIFIED VEIN: ICD-10-CM

## 2023-12-18 ENCOUNTER — TELEPHONE (OUTPATIENT)
Dept: ONCOLOGY | Facility: CLINIC | Age: 83
End: 2023-12-18

## 2023-12-18 ENCOUNTER — ANTICOAGULATION VISIT (OUTPATIENT)
Dept: PHARMACY | Facility: HOSPITAL | Age: 83
End: 2023-12-18
Payer: MEDICARE

## 2023-12-18 DIAGNOSIS — Z79.01 LONG TERM (CURRENT) USE OF ANTICOAGULANTS: ICD-10-CM

## 2023-12-18 DIAGNOSIS — I82.409 DEEP VEIN THROMBOSIS (DVT) OF LOWER EXTREMITY, UNSPECIFIED CHRONICITY, UNSPECIFIED LATERALITY, UNSPECIFIED VEIN: ICD-10-CM

## 2023-12-18 DIAGNOSIS — I82.513 CHRONIC DEEP VEIN THROMBOSIS (DVT) OF FEMORAL VEIN OF BOTH LOWER EXTREMITIES: Primary | Chronic | ICD-10-CM

## 2023-12-18 DIAGNOSIS — D68.59 THROMBOPHILIA: Chronic | ICD-10-CM

## 2023-12-18 LAB — INR PPP: 2.8

## 2023-12-18 RX ORDER — WARFARIN SODIUM 5 MG/1
TABLET ORAL
Qty: 90 TABLET | Refills: 3 | Status: SHIPPED | OUTPATIENT
Start: 2023-12-18

## 2023-12-18 RX ORDER — WARFARIN SODIUM 5 MG/1
TABLET ORAL
Qty: 90 TABLET | Refills: 3 | OUTPATIENT
Start: 2023-12-18

## 2023-12-18 NOTE — PROGRESS NOTES
HEMATOLOGY ONCOLOGY FOLLOW UP        Patient name: Dalton Dallas Sr.  : 1940  MRN: [id number]  Primary Care Physician: Joan Johns APRN  Referring Physician: Joan Johns APRN  Reason For Consult:     No chief complaint on file.  History of PE and DVT  Anticoagulation management  Anemia    History of Present Illness:  Mr. Dallas is a 83 y.o. gentleman, remote smoker, with a history of diabetes, coronary artery disease and hypertension who presented to [facility] on 14 with symptoms of shortness of breath and chest discomfort.  CT scan of the chest on 14 showed bilateral pulmonary emboli with large clot burden.  There were filling defects predominantly in the right upper lobe, and lower and middle lobe pulmonary arterial trees along with emboli in the left upper lobe and left lower lobe arterial tree as well.  Mediastinal,  hilar and subcarinal lymphadenopathy was also noted.  The largest lymph node in the subcarinal location measured 3.9 x 1.9 cm.  The patient was started on anticoagulation.  Bilateral lower extremity venous Doppler on 14 showed a filling defect within the left gastroc veins.  The patient was put on anticoagulation.  Thrombolysis was also under consideration considering the extensive clot burden, but the patient did not require it.       Patient denied any prior history of thromboembolism before this episode.  His brother may have had   a leg DVT.    · 14 - Creatinine 1.3, BUN 20.  · 14 - CBC:  WBC 14.4, hemoglobin 14.9, platelet count 241,000.   · 14 - Factor V Leiden negative.  Factor VIII level 323 (H).  Protein C activity 112.6% (N).    Protein S activity 11.3 (L).  Activated protein C resistance 2.1 (L).  Antithrombin III 83% (N).    D-dimer 2.15.    · 14 - Serum homocysteine 8 (N).  Anticardiolipin antibody negative.  Phosphatidylserine   antibody negative.  Beta-2 glycoprotein antibody negative.   · 14 -  Prothrombin gene mutation negative.    · 2/20/14 - FLAKO-2 mutation test negative.    · 3/10/14 - Protein S activity 93% (N).  Homocysteine level 11 (N).    · 4/11/14 - Factor VIII level 260 (H).   · 4/11/14 - CT scan of the chest without contrast:  Complete resolution of air space opacities in   the lower lobes.  Mediastinal lymphadenopathy is overall very similar to the prior study.  It is   nonspecific.  Three vessel coronary artery atherosclerotic calcification.  Questionable lobular   contour of the liver, question cirrhosis.  AP window lymph node measures about 1.1 cm,   paratracheal lymph node measures about 1.7 cm and subcarinal lymph node measures about 1.2 cm.  · 5/2/14 - INR 4.1.   · 7/30/14 - WBC 5.7, hemoglobin 12.1, platelet count 228,000, MCV 94.0.  INR 3.0.   · 10/17/14 - CT scan of the chest:  Chronic lung changes are stable.  Several borderline to mildly   enlarged mediastinal lymph nodes are also unchanged from six months ago.  They are likely benign   reactive lymph nodes.    · 10/29/14 - Vitamin B12 185, ferritin 57, iron saturation 19%, TIBC 473 (H), serum iron 88,   creatinine 1.3, BUN 18, folate 17.7.  · 11/4/14 - Patient underwent upper GI endoscopy and colonoscopy:  Several nonbleeding diverticula   were seen in the sigmoid colon.  Diverticulosis appeared moderate severity.  Four sessile polyps   of benign appearance were found in the cecum and descending colon.  Polypectomies were performed.    Surgical pathology:  Sessile serrated polyps.  A tubular adenoma is noted in the descending   colon.    · 11/25/14 - INR 4.2.   · 11/25/14 - WBC 5.7, hemoglobin 11.4, platelet count 210,000.    · 2/24/15 - WBC 7.7, hemoglobin 12.9, platelet count 245,000, MCV 93,000.  · 2/24/15 - INR 2.6.  Patient’s Coumadin dose is 9 mg.     · 3/12/15 - Creatinine 1.3.    · 4/21/15 - Patient admitted with chest pain and had cardiac catheterization.  He had a stent   placed.    · 5/8/15 - CT scan of the chest without  contrast:  Stable appearance of mediastinal   lymphadenopathy over the past two studies going back to April 2014.  Stable pulmonary fibrotic   changes and scarring.    · 6/25/15 - WBC 5.3, hemoglobin 12.6, platelet count 187,000.     · 12/17/15 - WBC 5.1, hemoglobin 13.6, platelet count 208,000, MCV 93.1.  Iron saturation 21%,   TIBC 415, serum iron 85.    · 12/17/15 - Chest x-ray:  No acute process.     · 4/19/16 - INR 2.1.    · 5/17/16 - INR 2.2.  · 6/16/16 - WBC 5.5, hemoglobin 12.7, MCV 90.5, platelet count 205,000.  INR 2.3 (patient on   Coumadin 7.5 mg).    · 12/15/16 - WBC 5.0, hemoglobin 13.0, platelet count 174,000.  INR 1.9 (Coumadin dose 7.5).   · 6/15/17 - WBC 5.5, hemoglobin 12.5, platelet count 168,000, MCV 94.4.  INR 2.6 (Coumadin dose 8   mg).   · 11/2/17 - INR 2.    · 12/14/17 - WBC 5.04, hemoglobin 12.5, platelet count 167,000, MCV 94.    · 6/14/18 - WBC 4.9, hemoglobin 12.5, platelet count 172,000, MCV 93.5.    · 12/13/18 - INR 2.4.  WBC 4.9, hemoglobin 12.8, platelet count 180,000.    11/23/2019 INR is 2.55  11/11/2019: Colonoscopy: Cecum polypectomy shows tubular adenoma, transverse colon polypectomy shows tubular adenoma.   Patient readmitted after colonoscopy to hospital due to GI bleeding from polyp biopsy..  11/22/2019 hemoglobin 9.3 due to GI bleeding  11/23/2019 cecal ulcer with visible vessel and stigmata of recent bleeding which was clipped  Moderate left-sided diverticulosis  12/12/2019 INR 1.9  12/12/2019 WBC 4.9, hemoglobin 11.2, MCV 98.0, platelets 182, ferritin 85.2, iron 115, iron saturation 24%, TIBC 471, B12 greater than 2000, folate 13.8,  4/23/2020: ESR 12  5/6/2020: 25 hydroxy vitamin D 31.9  6/11/2020: INR 2.1, WBC 6.3, hemoglobin 13.5, platelets 204,   9/28/2020: Lower extremity venous Doppler:: Normal  11/25/2020: INR 2.8  12/31/2020: WBC 6.4, hemoglobin 13.7, platelets 212, INR 1.2,  5/3/2021: 25 hydroxy vitamin D 66.5, TSH 2.8, creatinine 1.24,  5/12/2021: INR  2.7,  6/9/2021: INR 2.5,  Subsequent INR has been stable. Now on 8mg daily    Subjective:  No bleeding concerns, no blood in stools, no chest pain, shortness of breath.        Past Medical History:   Diagnosis Date    ACE-inhibitor cough 06/2005    Coronary artery disease     Diabetes mellitus, type 2 1995    ED (erectile dysfunction)     Hx of blood clots 2014    Blood clot left leg     Hyperlipidemia 08/2000    Hypertension     Hypogonadism, male 1998    Hypothyroidism 06/2001    Obesity     Peripheral neuropathy     Pulmonary embolism 02/2014    Rectal fistula     Sleep apnea 12/10/2013    Ulcer of left foot 11/2022    Vitamin D deficiency        Past Surgical History:   Procedure Laterality Date    CARDIAC CATHETERIZATION  2008    PTCA: 2008; PTCA: 4/21/2015 LCX stent     CARDIAC CATHETERIZATION Left 7/3/2021    Procedure: Cardiac Catheterization/Vascular Study;  Surgeon: Edwar Ackerman MD;  Location: [facility] CATH INVASIVE LOCATION;  Service: Cardiovascular;  Laterality: Left;    CARDIOVASCULAR STRESS TEST  2020    CATARACT EXTRACTION  01/11/2016 1-4-16 & 1-11-16     COLONOSCOPY N/A 11/23/2019    Procedure: COLONOSCOPY WITH ENDOSCOPIC CLIPPING X1 OF POST POLYPECTOMY BLEED SITE;  Surgeon: Jhonny Orellana MD;  Location: [facility] ENDOSCOPY;  Service: Gastroenterology    CORONARY STENT PLACEMENT      INGUINAL HERNIA REPAIR Left     UMBILICAL HERNIA REPAIR           Current Outpatient Medications:     Accu-Chek Irene Plus test strip, USE TO CHECK BLOOD SUGAR BEFORE MEALS AND AT BEDTIME, Disp: 400 each, Rfl: 3    amLODIPine (NORVASC) 5 MG tablet, Take 1 tablet by mouth Daily., Disp: 90 tablet, Rfl: 3    atenolol (TENORMIN) 50 MG tablet, TAKE 1 TABLET BY MOUTH DAILY, Disp: 90 tablet, Rfl: 3    atorvastatin (LIPITOR) 80 MG tablet, Take 1 tablet by mouth Every Night. Indications: High Amount of Fats in the Blood, Disp: 90 tablet, Rfl: 3    Blood Glucose Monitoring Suppl (Accu-Chek Irene Plus) w/Device kit, 1 each 4  "(Four) Times a Day., Disp: 1 kit, Rfl: 0    clopidogrel (PLAVIX) 75 MG tablet, TAKE 1 TABLET DAILY, Disp: 90 tablet, Rfl: 3    fenofibrate (TRICOR) 54 MG tablet, TAKE 1 TABLET BY MOUTH DAILY FOR HIGH AMOUNT OF TRIGLYCERIDES IN  THE BLOOD, Disp: 90 tablet, Rfl: 3    ferrous sulfate 325 (65 FE) MG tablet, Take 1 tablet by mouth 2 (Two) Times a Day., Disp: , Rfl:     gabapentin (NEURONTIN) 100 MG capsule, TAKE 1 CAPSULE BY MOUTH THREE  TIMES A DAY, Disp: 270 capsule, Rfl: 3    hydroCHLOROthiazide (HYDRODIURIL) 25 MG tablet, TAKE 1 TABLET BY MOUTH DAILY, Disp: 90 tablet, Rfl: 3    Insulin Pen Needle (BD ULTRA-FINE PEN NEEDLES) 29G X 12.7MM misc, Use to inject insulin twice daily. DX E11.65, Disp: 200 each, Rfl: 2    insulin regular (HUMULIN R) 500 UNIT/ML CONCENTRATED injection, DRAW TO 24 UNIT REI ON U-100 SYRINGE AND INJECT IN THE MORNING AND 35 UNIT REI IN THE EVENING  Indications: Type 2 Diabetes, Disp: 60 mL, Rfl: 3    Insulin Syringe-Needle U-100 (BD Insulin Syringe U/F) 31G X 5/16\" 0.5 ML misc, 1 each by Other route 2 (Two) Times a Day., Disp: 200 each, Rfl: 3    irbesartan (AVAPRO) 300 MG tablet, TAKE 1 TABLET BY MOUTH DAILY, Disp: 90 tablet, Rfl: 3    isosorbide mononitrate (IMDUR) 30 MG 24 hr tablet, Take 1 tablet by mouth Daily., Disp: 90 tablet, Rfl: 3    Jardiance 25 MG tablet tablet, TAKE 1 TABLET BY MOUTH DAILY, Disp: 90 tablet, Rfl: 3    levothyroxine (SYNTHROID, LEVOTHROID) 175 MCG tablet, TAKE 1 TABLET BY MOUTH DAILY FOR UNDERACTIVE THYROID, Disp: 90 tablet, Rfl: 3    metFORMIN ER (GLUCOPHAGE-XR) 500 MG 24 hr tablet, Take 4 tablets at supper  Indications: Type 2 Diabetes, Disp: 360 tablet, Rfl: 3    O2 (OXYGEN), Inhale 1 L/min Every Night. Indications: marianne, Disp: , Rfl:     Omega-3 Fatty Acids (FISH OIL) 1000 MG capsule capsule, Take 1 capsule by mouth 2 (Two) Times a Day., Disp: , Rfl:     Pramlintide Acetate (SymlinPen 120) 2700 MCG/2.7ML solution pen-injector, INJECT 120 MCG UNDER THE SKIN  INTO " THE APPROPRIATE AREA AS  DIRECTED THREE TIMES A DAY, Disp: 10.8 mL, Rfl: 11    urea (CARMOL) 40 % cream, Apply 1 application  topically to the appropriate area as directed 2 (Two) Times a Day. Apply twice daily to calluses. Add vitamin D 5,000 units, Disp: 85 each, Rfl: 3    vitamin B-12 (CYANOCOBALAMIN) 100 MCG tablet, Take 1 tablet by mouth Daily. Indications: Inadequate Vitamin B12, Disp: , Rfl:     vitamin D (ERGOCALCIFEROL) 1.25 MG (25090 UT) capsule capsule, Take 1 capsule by mouth 1 (One) Time Per Week. Indications: Vitamin D Deficiency, Disp: 13 capsule, Rfl: 3    warfarin (COUMADIN) 1 MG tablet, Take 1 tablet PO every evening as directed., Disp: 30 tablet, Rfl: 3    warfarin (COUMADIN) 2 MG tablet, TAKE 1 TABLET BY MOUTH DAILY OR  AS DIRECTED BASED ON INR, Disp: 60 tablet, Rfl: 5    warfarin (COUMADIN) 3 MG tablet, Take 1 tablet by mouth Daily. Indications: Atrial Fibrillation, Disp: 90 tablet, Rfl: 3    warfarin (COUMADIN) 5 MG tablet, TAKE 1 TABLET BY MOUTH AT NIGHT  FOR ATRIAL FIBRILLATION, Disp: 90 tablet, Rfl: 3    No Known Allergies    Family History   Problem Relation Age of Onset    Heart disease Mother     Leukemia Father        Cancer-related family history is not on file.    Social History     Tobacco Use    Smoking status: Former     Packs/day: 1.50     Years: 20.00     Additional pack years: 0.00     Total pack years: 30.00     Types: Cigarettes     Quit date:      Years since quittin.9     Passive exposure: Past    Smokeless tobacco: Never    Tobacco comments:     quit    Vaping Use    Vaping Use: Never used   Substance Use Topics    Alcohol use: No    Drug use: No       ROS:     Objective:  Vital signs:  There were no vitals filed for this visit.    ECOG  (1) Restricted in physically strenuous activity, ambulatory and able to do work of light nature    Physical Exam:   Physical Exam   Constitutional: He is oriented to person, place, and time. He appears well-developed. No  distress.   Obese male   HENT:   Head: Normocephalic and atraumatic.   Nose: Nose normal.   Mouth/Throat: Mucous membranes are moist.   Eyes: Conjunctivae are normal. Right eye exhibits no discharge. Left eye exhibits no discharge. No scleral icterus.   Neck: No thyromegaly present.   Cardiovascular: Normal rate, regular rhythm and normal heart sounds. Exam reveals no gallop and no friction rub.   Pulmonary/Chest: Effort normal. No stridor. No respiratory distress. He has no wheezes.   Abdominal: Soft. Normal appearance and bowel sounds are normal. He exhibits no mass. There is no abdominal tenderness. There is no rebound and no guarding.   Musculoskeletal: Normal range of motion. No swelling, tenderness or signs of injury.   Lymphadenopathy:     He has no cervical adenopathy.   Neurological: He is alert and oriented to person, place, and time. He exhibits normal muscle tone.   Skin: Skin is warm. No rash noted. He is not diaphoretic. No erythema.   Psychiatric: His behavior is normal. Mood normal.     Lab Results - Last 18 Months   Lab Units 06/08/23  0947 12/15/22  1318 11/03/22  1158   WBC 10*3/mm3 8.04 9.27 10.46   HEMOGLOBIN g/dL 13.4 13.3 13.0   HEMATOCRIT % 40.6 40.3 38.9   PLATELETS 10*3/mm3 242 347 306   MCV fL 92.7 92.2 90.7     Lab Results - Last 18 Months   Lab Units 07/24/23  0852 11/03/22  1158   SODIUM mmol/L 137 137   POTASSIUM mmol/L 4.9 4.7   CHLORIDE mmol/L 99 101   CO2 mmol/L 23.5 26.4   BUN mg/dL 25* 27*   CREATININE mg/dL 1.36* 1.45*   CALCIUM mg/dL 10.1 9.9   BILIRUBIN mg/dL 0.4 0.3   ALK PHOS U/L 38* 43   ALT (SGPT) U/L 19 17   AST (SGOT) U/L 19 18   GLUCOSE mg/dL 210* 181*       Lab Results   Component Value Date    GLUCOSE 210 (H) 07/24/2023    BUN 25 (H) 07/24/2023    CREATININE 1.36 (H) 07/24/2023    EGFRIFNONA 60 (L) 11/02/2021    BCR 18.4 07/24/2023    K 4.9 07/24/2023    CO2 23.5 07/24/2023    CALCIUM 10.1 07/24/2023    ALBUMIN 4.2 07/24/2023    LABIL2 1.7 03/28/2019    AST 19  "07/24/2023    ALT 19 07/24/2023       Lab Results - Last 18 Months   Lab Units 12/18/23  0000 12/11/23  0000 12/04/23  0000   INR  2.80 3.00 2.90       Lab Results   Component Value Date    IRON 84 06/08/2023    TIBC 413 06/08/2023    FERRITIN 91.87 06/08/2023       Lab Results   Component Value Date    FOLATE 13.80 12/12/2019       No results found for: \"OCCULTBLD\"    No results found for: \"RETICCTPCT\"    Lab Results   Component Value Date    [id number] >2,000 (H) 12/12/2019     No results found for: \"SPEP\", \"UPEP\"  No results found for: \"LDH\", \"URICACID\"  Lab Results   Component Value Date    SEDRATE 29 (H) 11/03/2022     No results found for: \"FIBRINOGEN\", \"HAPTOGLOBIN\"  Lab Results   Component Value Date    PTT 31.5 (H) 11/22/2019    INR 2.80 12/18/2023     No results found for: \"\"  No results found for: \"CEA\"  No components found for: \"CA-19-9\"  Lab Results   Component Value Date    PSA 0.397 11/30/2023         Assessment & Plan     Assessment:  1. History of bilateral pulmonary emboli with high clot burden and DVT:  His thrombosis was   unprovoked.  Hypercoagulability testing revealed elevated factor VIII level on two occasions.  He   is on anticoagulation with Coumadin.  He also has a questionable positive family history of DVT.    he continues to be on indefinite anticoagulation with 8 mg daily. Continue same, INR has been stable.   2. History of mediastinal lymphadenopathy:  His CT scan shows stable lymphadenopathy.    These are very likely benign lymph nodes. No further intervention.   3. Mild CKD:  He has slight anemia of chronic kidney disease.   4. History of iron deficiency:  He had a colonoscopy and that showed polyps and moderately severe   diverticulosis.  Pathology showed a tubular adenoma in the descending colon.  He is on iron   supplements p.o. BID. History of iron deficiency. Hb continues to be normal, recheck in 6 months. Check iron studies today   5. Vitamin B12 deficiency:  He is on " p.o. B12 supplements. Recheck levels today  6. Dizziness/light-headedness: resolved  7.  History of  GI bleeding.  Due to polypectomy from colonoscopy.       There are no diagnoses linked to this encounter.  No orders of the defined types were placed in this encounter.       Thank you very much for providing the opportunity to participate in this patient’s care. Please do not hesitate to call if there are any other questions.

## 2023-12-18 NOTE — TELEPHONE ENCOUNTER
"  Caller: Dalton Dallas Sr. \"LOKI\"    Relationship: Self    Best call back number:[phone]    What is the best time to reach you: ANY.LEAVE VM    Who are you requesting to speak with (clinical staff, provider,  specific staff member): SCHEDULING      What was the call regarding: PATIENT CALLED TO SEE IF HE CAN ADD LAB APPT TOMORROW WITH DR ALFARO. PATIENT MISSED LAB APPT ON 12/15/23    Is it okay if the provider responds through MyChart: NO          "

## 2023-12-18 NOTE — PROGRESS NOTES
Anticoagulation Clinic Progress Note    Patient's visit was held by phone today.    Anticoagulation Summary  As of 2023      INR goal:  2.0-3.0   TTR:  87.3% (4.4 y)   INR used for dosin.80 (2023)   Warfarin maintenance plan:  8 mg (5 mg x 1 and 1 mg x 3) every day   Weekly warfarin total:  56 mg   No change documented:  Karen Oates [facility]   Plan last modified:  Lynn Baum RN (2023)   Next INR check:  2023   Priority:  Maintenance   Target end date:  Indefinite    Indications    Deep vein thrombosis (DVT) [I82.409]  Thrombophilia [D68.59]                 Anticoagulation Episode Summary       INR check location:      Preferred lab:      Send INR reminders to:   [facility] ONC CBC ANTICOAG POOL    Comments:  MDINR Home maninder          Anticoagulation Care Providers       Provider Role Specialty Phone number    Soraya Cortez MD Referring Hematology and Oncology [phone]            Drug interactions: has remained unchanged.  Diet: has remained unchanged.    Clinic Interview:  No pertinent clinical findings have been reported.    INR History:      2023     1:45 PM 2023    12:00 AM 2023     9:47 AM 2023    12:00 AM 2023    12:55 PM 2023    12:00 AM 2023    11:45 AM   Anticoagulation Monitoring   INR 2.60  2.90  3.00  2.80   INR Date 2023   INR Goal 2.0-3.0  2.0-3.0  2.0-3.0  2.0-3.0   Trend Same  Same  Same  Same   Last Week Total 56 mg  56 mg  56 mg  56 mg   Next Week Total 56 mg  56 mg  56 mg  56 mg   Sun 8 mg  8 mg  8 mg  8 mg   Mon 8 mg  -  8 mg  8 mg   Tue 8 mg  8 mg  8 mg  8 mg   Wed 8 mg  8 mg  8 mg  8 mg   Thu 8 mg  8 mg  8 mg  8 mg   Fri 8 mg  8 mg  8 mg  8 mg   Sat 8 mg  8 mg  8 mg  8 mg   Historical INR  2.90      3.00      2.80            This result is from an external source.       Plan:  1. INR is Therapeutic today- see above in Anticoagulation Summary.   Will instruct Dalton Dallas  Sr. to Continue their warfarin regimen- see above in Anticoagulation Summary.  2. Follow up in 1 weeks- home test..  3.They have been instructed to call if any changes in medications, doses, concerns, etc. Patient expresses understanding and has no further questions at this time.    Karen Oates [facility]

## 2023-12-19 ENCOUNTER — LAB (OUTPATIENT)
Dept: LAB | Facility: HOSPITAL | Age: 83
End: 2023-12-19
Payer: MEDICARE

## 2023-12-19 ENCOUNTER — OFFICE VISIT (OUTPATIENT)
Dept: ONCOLOGY | Facility: CLINIC | Age: 83
End: 2023-12-19
Payer: MEDICARE

## 2023-12-19 VITALS
DIASTOLIC BLOOD PRESSURE: 69 MMHG | RESPIRATION RATE: 14 BRPM | HEIGHT: 70 IN | TEMPERATURE: 98.2 F | BODY MASS INDEX: 41.63 KG/M2 | WEIGHT: 290.8 LBS | OXYGEN SATURATION: 94 % | SYSTOLIC BLOOD PRESSURE: 113 MMHG | HEART RATE: 74 BPM

## 2023-12-19 DIAGNOSIS — I82.409 DEEP VEIN THROMBOSIS (DVT) OF LOWER EXTREMITY, UNSPECIFIED CHRONICITY, UNSPECIFIED LATERALITY, UNSPECIFIED VEIN: Primary | ICD-10-CM

## 2023-12-19 DIAGNOSIS — D50.9 IRON DEFICIENCY ANEMIA, UNSPECIFIED IRON DEFICIENCY ANEMIA TYPE: Primary | ICD-10-CM

## 2023-12-19 DIAGNOSIS — D50.9 IRON DEFICIENCY ANEMIA, UNSPECIFIED IRON DEFICIENCY ANEMIA TYPE: ICD-10-CM

## 2023-12-19 LAB
BASOPHILS # BLD AUTO: 0.04 10*3/MM3 (ref 0–0.2)
BASOPHILS NFR BLD AUTO: 0.5 % (ref 0–1.5)
DEPRECATED RDW RBC AUTO: 49.4 FL (ref 37–54)
EOSINOPHIL # BLD AUTO: 0.27 10*3/MM3 (ref 0–0.4)
EOSINOPHIL NFR BLD AUTO: 3.4 % (ref 0.3–6.2)
ERYTHROCYTE [DISTWIDTH] IN BLOOD BY AUTOMATED COUNT: 14.6 % (ref 12.3–15.4)
FERRITIN SERPL-MCNC: 126 NG/ML (ref 30–400)
HCT VFR BLD AUTO: 42.6 % (ref 37.5–51)
HGB BLD-MCNC: 13.7 G/DL (ref 13–17.7)
HOLD SPECIMEN: NORMAL
IRON 24H UR-MRATE: 69 MCG/DL (ref 59–158)
IRON SATN MFR SERPL: 15 % (ref 20–50)
LYMPHOCYTES # BLD AUTO: 2.13 10*3/MM3 (ref 0.7–3.1)
LYMPHOCYTES NFR BLD AUTO: 26.8 % (ref 19.6–45.3)
MCH RBC QN AUTO: 30.6 PG (ref 26.6–33)
MCHC RBC AUTO-ENTMCNC: 32.2 G/DL (ref 31.5–35.7)
MCV RBC AUTO: 95.3 FL (ref 79–97)
MONOCYTES # BLD AUTO: 0.64 10*3/MM3 (ref 0.1–0.9)
MONOCYTES NFR BLD AUTO: 8 % (ref 5–12)
NEUTROPHILS NFR BLD AUTO: 4.88 10*3/MM3 (ref 1.7–7)
NEUTROPHILS NFR BLD AUTO: 61.3 % (ref 42.7–76)
PLATELET # BLD AUTO: 227 10*3/MM3 (ref 140–450)
PMV BLD AUTO: 10.1 FL (ref 6–12)
RBC # BLD AUTO: 4.47 10*6/MM3 (ref 4.14–5.8)
TIBC SERPL-MCNC: 448 MCG/DL (ref 298–536)
TRANSFERRIN SERPL-MCNC: 301 MG/DL (ref 200–360)
WBC NRBC COR # BLD AUTO: 7.96 10*3/MM3 (ref 3.4–10.8)

## 2023-12-19 PROCEDURE — 36415 COLL VENOUS BLD VENIPUNCTURE: CPT

## 2023-12-19 PROCEDURE — 82728 ASSAY OF FERRITIN: CPT | Performed by: NURSE PRACTITIONER

## 2023-12-19 PROCEDURE — 84466 ASSAY OF TRANSFERRIN: CPT | Performed by: NURSE PRACTITIONER

## 2023-12-19 PROCEDURE — 85025 COMPLETE CBC W/AUTO DIFF WBC: CPT

## 2023-12-19 PROCEDURE — 83540 ASSAY OF IRON: CPT | Performed by: NURSE PRACTITIONER

## 2023-12-20 ENCOUNTER — OFFICE VISIT (OUTPATIENT)
Dept: WOUND CARE | Facility: HOSPITAL | Age: 83
End: 2023-12-20
Payer: MEDICARE

## 2023-12-20 DIAGNOSIS — L84 CORNS AND CALLOSITIES: ICD-10-CM

## 2023-12-20 DIAGNOSIS — E11.621 TYPE 2 DIABETES MELLITUS WITH FOOT ULCER, UNSPECIFIED WHETHER LONG TERM INSULIN USE: ICD-10-CM

## 2023-12-20 DIAGNOSIS — I87.2 VENOUS INSUFFICIENCY (CHRONIC) (PERIPHERAL): ICD-10-CM

## 2023-12-20 DIAGNOSIS — L97.509 TYPE 2 DIABETES MELLITUS WITH FOOT ULCER, UNSPECIFIED WHETHER LONG TERM INSULIN USE: ICD-10-CM

## 2023-12-20 DIAGNOSIS — I70.202 ATHEROSCLEROSIS OF NATIVE ARTERY OF LEFT LOWER EXTREMITY, WITH UNSPECIFIED PRESENCE OF CLINICAL MANIFESTATION: Primary | ICD-10-CM

## 2023-12-20 PROCEDURE — 11055 PARING/CUTG B9 HYPRKER LES 1: CPT

## 2023-12-22 ENCOUNTER — TELEPHONE (OUTPATIENT)
Dept: ONCOLOGY | Facility: CLINIC | Age: 83
End: 2023-12-22

## 2023-12-22 ENCOUNTER — TELEPHONE (OUTPATIENT)
Dept: ONCOLOGY | Facility: CLINIC | Age: 83
End: 2023-12-22
Payer: MEDICARE

## 2023-12-22 ENCOUNTER — TELEPHONE (OUTPATIENT)
Dept: ENDOCRINOLOGY | Facility: CLINIC | Age: 83
End: 2023-12-22
Payer: MEDICARE

## 2023-12-22 NOTE — TELEPHONE ENCOUNTER
"  Caller: Dalton Dallas Sr. \"LOKI\"    Relationship: Self    Best call back number: [phone]    What was the call regarding: LOKI CALLED REGARDING HIS LAB APPOINTMENT FOR 03/11. HE IS HAVING LABS DONE WITH ANOTHER  PROVIDER ON 03/05. HE IS REQUESTING TO CANCEL THE APPOINTMENT FOR 03/11, AND HE WILL JUST DO ALL HIS LABS ON 03/05.    "

## 2023-12-22 NOTE — TELEPHONE ENCOUNTER
"  Caller: Dalton Dallas Sr. \"LOKI\"    Relationship: Self    Best call back number: [phone]    Requested Prescriptions: FREESTYLE DENA 2  Requested Prescriptions      No prescriptions requested or ordered in this encounter        Pharmacy where request should be sent: [id number], [place] - [address] AT [address] [phone] [facility] [phone]      Last office visit with prescribing clinician: 7/31/2023   Last telemedicine visit with prescribing clinician: Visit date not found   Next office visit with prescribing clinician: 3/12/2024     Additional details provided by patient:     Does the patient have less than a 3 day supply:  [] Yes  [x] No    Would you like a call back once the refill request has been completed: [x] Yes [] No    If the office needs to give you a call back, can they leave a voicemail: [x] Yes [] No    Alex Grajeda Rep   12/22/23 11:05 EST       "

## 2023-12-28 ENCOUNTER — ANTICOAGULATION VISIT (OUTPATIENT)
Dept: PHARMACY | Facility: HOSPITAL | Age: 83
End: 2023-12-28
Payer: MEDICARE

## 2023-12-28 ENCOUNTER — TELEPHONE (OUTPATIENT)
Dept: ENDOCRINOLOGY | Facility: CLINIC | Age: 83
End: 2023-12-28
Payer: MEDICARE

## 2023-12-28 DIAGNOSIS — D68.59 THROMBOPHILIA: Chronic | ICD-10-CM

## 2023-12-28 DIAGNOSIS — I82.513 CHRONIC DEEP VEIN THROMBOSIS (DVT) OF FEMORAL VEIN OF BOTH LOWER EXTREMITIES: Primary | Chronic | ICD-10-CM

## 2023-12-28 LAB — INR PPP: 3.1

## 2023-12-28 NOTE — PROGRESS NOTES
Anticoagulation Clinic Progress Note    Patient's visit was held by phone today.    Anticoagulation Summary  As of 12/28/2023      INR goal:  2.0-3.0   TTR:  87.2% (4.4 y)   INR used for dosing:  3.10 (12/28/2023)   Warfarin maintenance plan:  7 mg (5 mg x 1 and 1 mg x 2) every Thu; 8 mg (5 mg x 1 and 1 mg x 3) all other days   Weekly warfarin total:  55 mg   Plan last modified:  Karen Oates RPH (12/28/2023)   Next INR check:  1/4/2024   Priority:  Maintenance   Target end date:  Indefinite    Indications    Deep vein thrombosis (DVT) [I82.409]  Thrombophilia [D68.59]                 Anticoagulation Episode Summary       INR check location:      Preferred lab:      Send INR reminders to:   [facility] ONC CBC ANTICOAG POOL    Comments:  MDINR Home maninder          Anticoagulation Care Providers       Provider Role Specialty Phone number    Soraya Cortez MD Referring Hematology and Oncology [phone]            Drug interactions: has remained unchanged.  Diet: has remained unchanged.    Clinic Interview:  No pertinent clinical findings have been reported.    INR History:      12/5/2023     9:47 AM 12/11/2023    12:00 AM 12/12/2023    12:55 PM 12/18/2023    12:00 AM 12/18/2023    11:45 AM 12/28/2023    12:00 AM 12/28/2023     9:53 AM   Anticoagulation Monitoring   INR 2.90  3.00  2.80  3.10   INR Date 12/4/2023 12/11/2023 12/18/2023 12/28/2023   INR Goal 2.0-3.0  2.0-3.0  2.0-3.0  2.0-3.0   Trend Same  Same  Same  Down   Last Week Total 56 mg  56 mg  56 mg  56 mg   Next Week Total 56 mg  56 mg  56 mg  55 mg   Sun 8 mg  8 mg  8 mg  8 mg   Mon -  8 mg  8 mg  8 mg   Tue 8 mg  8 mg  8 mg  8 mg   Wed 8 mg  8 mg  8 mg  8 mg   Thu 8 mg  8 mg  8 mg  7 mg   Fri 8 mg  8 mg  8 mg  8 mg   Sat 8 mg  8 mg  8 mg  8 mg   Historical INR  3.00      2.80      3.10            This result is from an external source.       Plan:  1. INR is Supratherapeutic today- see above in Anticoagulation Summary.   Will instruct Dalton CHAU  Burt Sr. to Change their warfarin regimen- see above in Anticoagulation Summary.  2. Follow up in 1 weeks- home test.   3.They have been instructed to call if any changes in medications, doses, concerns, etc. Patient expresses understanding and has no further questions at this time.    Karen Oates [facility]

## 2023-12-28 NOTE — TELEPHONE ENCOUNTER
Faxed [employer] form along with 11/09/2023 and 7/31/2023 OV. Will fax once provider signs.

## 2024-01-02 ENCOUNTER — ANTICOAGULATION VISIT (OUTPATIENT)
Dept: PHARMACY | Facility: HOSPITAL | Age: 84
End: 2024-01-02
Payer: MEDICARE

## 2024-01-02 DIAGNOSIS — D68.59 THROMBOPHILIA: Chronic | ICD-10-CM

## 2024-01-02 DIAGNOSIS — I82.513 CHRONIC DEEP VEIN THROMBOSIS (DVT) OF FEMORAL VEIN OF BOTH LOWER EXTREMITIES: Primary | Chronic | ICD-10-CM

## 2024-01-02 LAB — INR PPP: 3.3

## 2024-01-02 NOTE — PROGRESS NOTES
Anticoagulation Clinic Progress Note    Patient's visit was held by phone today.    Anticoagulation Summary  As of 1/2/2024      INR goal:  2.0-3.0   TTR:  86.9% (4.4 y)   INR used for dosing:  3.30 (1/2/2024)   Warfarin maintenance plan:  7 mg (5 mg x 1 and 1 mg x 2) every Thu; 8 mg (5 mg x 1 and 1 mg x 3) all other days   Weekly warfarin total:  55 mg   Plan last modified:  Karen Oates RPH (12/28/2023)   Next INR check:  1/9/2024   Priority:  Maintenance   Target end date:  Indefinite    Indications    Deep vein thrombosis (DVT) [I82.409]  Thrombophilia [D68.59]                 Anticoagulation Episode Summary       INR check location:      Preferred lab:      Send INR reminders to:   [facility] CBC ANTICOAG POOL    Comments:  MDINCANDIDO Home maninder          Anticoagulation Care Providers       Provider Role Specialty Phone number    Soraya Cortez MD Referring Hematology and Oncology [phone]            Drug interactions: has remained unchanged.  Diet: has remained unchanged.    Clinic Interview:  No pertinent clinical findings have been reported.    INR History:      12/12/2023    12:55 PM 12/18/2023    12:00 AM 12/18/2023    11:45 AM 12/28/2023    12:00 AM 12/28/2023     9:53 AM 1/2/2024    12:00 AM 1/2/2024     9:10 AM   Anticoagulation Monitoring   INR 3.00  2.80  3.10  3.30   INR Date 12/11/2023 12/18/2023 12/28/2023 1/2/2024   INR Goal 2.0-3.0  2.0-3.0  2.0-3.0  2.0-3.0   Trend Same  Same  Down  Same   Last Week Total 56 mg  56 mg  56 mg  55 mg   Next Week Total 56 mg  56 mg  55 mg  52 mg   Sun 8 mg  8 mg  8 mg  8 mg   Mon 8 mg  8 mg  8 mg  8 mg   Tue 8 mg  8 mg  8 mg  5 mg (1/2)   Wed 8 mg  8 mg  8 mg  8 mg   Thu 8 mg  8 mg  7 mg  7 mg   Fri 8 mg  8 mg  8 mg  8 mg   Sat 8 mg  8 mg  8 mg  8 mg   Historical INR  2.80      3.10      3.30            This result is from an external source.       Plan:  1. INR is Supratherapeutic today- see above in Anticoagulation Summary.   Will instruct Dalton CHAU  Burt Sr. to Change their warfarin regimen- see above in Anticoagulation Summary.  2. Follow up in 1 weeks- home test.   3.They have been instructed to call if any changes in medications, doses, concerns, etc. Patient expresses understanding and has no further questions at this time.    Karen Oates [facility]

## 2024-01-03 ENCOUNTER — TELEPHONE (OUTPATIENT)
Dept: ENDOCRINOLOGY | Facility: CLINIC | Age: 84
End: 2024-01-03

## 2024-01-03 NOTE — TELEPHONE ENCOUNTER
"    Caller: Dalton Dallas Sr. \"LOKI\"    Relationship: Self    Best call back number: [phone]     Requested Prescriptions: Insulin Syringe-Needle U-100 (BD Insulin Syringe U/F) 30G X 5/16\" 0.5 ML misc   Requested Prescriptions      No prescriptions requested or ordered in this encounter        Pharmacy where request should be sent:  [facility] - [place], [place] - [address] [phone] [facility] [phone] [facility] [place] [address], [facility] [address]  Phone: [phone]  Fax: [phone]         Last office visit with prescribing clinician: 7/31/2023   Last telemedicine visit with prescribing clinician: Visit date not found   Next office visit with prescribing clinician: 3/12/2024     Additional details provided by patient:     Does the patient have less than a 3 day supply:  [] Yes  [] No    Would you like a call back once the refill request has been completed: [] Yes [] No    If the office needs to give you a call back, can they leave a voicemail: [] Yes [] No    Alex Martinez Rep   01/03/24 10:04 EST               "

## 2024-01-04 DIAGNOSIS — E11.40 TYPE 2 DIABETES MELLITUS WITH DIABETIC NEUROPATHY, WITH LONG-TERM CURRENT USE OF INSULIN: Primary | ICD-10-CM

## 2024-01-04 DIAGNOSIS — Z79.4 TYPE 2 DIABETES MELLITUS WITH DIABETIC NEUROPATHY, WITH LONG-TERM CURRENT USE OF INSULIN: Primary | ICD-10-CM

## 2024-01-04 RX ORDER — PEN NEEDLE, DIABETIC 29 G X1/2"
NEEDLE, DISPOSABLE MISCELLANEOUS
Qty: 200 EACH | Refills: 2 | Status: SHIPPED | OUTPATIENT
Start: 2024-01-04

## 2024-01-08 ENCOUNTER — ANTICOAGULATION VISIT (OUTPATIENT)
Dept: PHARMACY | Facility: HOSPITAL | Age: 84
End: 2024-01-08
Payer: MEDICARE

## 2024-01-08 DIAGNOSIS — I82.513 CHRONIC DEEP VEIN THROMBOSIS (DVT) OF FEMORAL VEIN OF BOTH LOWER EXTREMITIES: Primary | Chronic | ICD-10-CM

## 2024-01-08 DIAGNOSIS — D68.59 THROMBOPHILIA: Chronic | ICD-10-CM

## 2024-01-08 LAB — INR PPP: 2.3

## 2024-01-08 NOTE — PROGRESS NOTES
Anticoagulation Clinic Progress Note    Patient's visit was held by phone today.    Anticoagulation Summary  As of 2024      INR goal:  2.0-3.0   TTR:  86.8% (4.4 y)   INR used for dosin.30 (2024)   Warfarin maintenance plan:  7 mg (5 mg x 1 and 1 mg x 2) every Tue, Thu; 8 mg (5 mg x 1 and 1 mg x 3) all other days   Weekly warfarin total:  54 mg   Plan last modified:  Karen Oates RPH (2024)   Next INR check:  1/15/2024   Priority:  Maintenance   Target end date:  Indefinite    Indications    Deep vein thrombosis (DVT) [I82.409]  Thrombophilia [D68.59]                 Anticoagulation Episode Summary       INR check location:      Preferred lab:      Send INR reminders to:   [facility] CBC ANTICOAG POOL    Comments:  MDINR Home maninder          Anticoagulation Care Providers       Provider Role Specialty Phone number    Soraya Cortez MD Referring Hematology and Oncology [phone]            Drug interactions: has remained unchanged.  Diet: has remained unchanged.    Clinic Interview:  No pertinent clinical findings have been reported.    INR History:      2023    11:45 AM 2023    12:00 AM 2023     9:53 AM 2024    12:00 AM 2024     9:10 AM 2024    12:00 AM 2024     9:07 AM   Anticoagulation Monitoring   INR 2.80  3.10  3.30  2.30   INR Date 2023   INR Goal 2.0-3.0  2.0-3.0  2.0-3.0  2.0-3.0   Trend Same  Down  Same  Down   Last Week Total 56 mg  56 mg  55 mg  52 mg   Next Week Total 56 mg  55 mg  52 mg  54 mg   Sun 8 mg  8 mg  8 mg  8 mg   Mon 8 mg  8 mg  8 mg  8 mg   Tue 8 mg  8 mg  5 mg ()  7 mg   Wed 8 mg  8 mg  8 mg  8 mg   Thu 8 mg  7 mg  7 mg  7 mg   Fri 8 mg  8 mg  8 mg  8 mg   Sat 8 mg  8 mg  8 mg  8 mg   Historical INR  3.10      3.30      2.30            This result is from an external source.       Plan:  1. INR is Therapeutic today- see above in Anticoagulation Summary.   Will instruct Dalton Dallas Sr.  to Change their warfarin regimen- see above in Anticoagulation Summary.  2. Follow up in 1 weeks- home test.   3.They have been instructed to call if any changes in medications, doses, concerns, etc. Patient expresses understanding and has no further questions at this time.    Karen Oates [facility]

## 2024-01-09 DIAGNOSIS — E11.42 TYPE 2 DIABETES MELLITUS WITH DIABETIC POLYNEUROPATHY, WITH LONG-TERM CURRENT USE OF INSULIN: Primary | ICD-10-CM

## 2024-01-09 DIAGNOSIS — Z79.4 TYPE 2 DIABETES MELLITUS WITH DIABETIC POLYNEUROPATHY, WITH LONG-TERM CURRENT USE OF INSULIN: Primary | ICD-10-CM

## 2024-01-09 NOTE — TELEPHONE ENCOUNTER
Caller: DEBI SHAIKH    Relationship: SELF    Best call back number: [phone]    Requested Prescriptions:   Requested Prescriptions     Pending Prescriptions Disp Refills    Pramlintide Acetate (SymlinPen 120) 2700 MCG/2.7ML solution pen-injector 10.8 mL 11     Sig: INJECT 120 MCG UNDER THE SKIN  INTO THE APPROPRIATE AREA AS  DIRECTED THREE TIMES A DAY        Pharmacy where request should be sent: [facility] - [place] [address] [facility] [phone] [facility] [phone]      Last office visit with prescribing clinician: 7/31/2023   Last telemedicine visit with prescribing clinician: Visit date not found   Next office visit with prescribing clinician: 3/12/2024     Additional details provided by patient:     Does the patient have less than a 3 day supply:  [x] Yes  [] No    Would you like a call back once the refill request has been completed: [x] Yes [] No    If the office needs to give you a call back, can they leave a voicemail: [x] Yes [] No    Alex Hawkins Rep   01/09/24 14:38 EST

## 2024-01-12 RX ORDER — ERGOCALCIFEROL 1.25 MG/1
CAPSULE ORAL
Qty: 13 CAPSULE | Refills: 3 | Status: SHIPPED | OUTPATIENT
Start: 2024-01-12

## 2024-01-12 RX ORDER — PRAMLINTIDE ACETATE 1000 UG/ML
INJECTION SUBCUTANEOUS
Qty: 10.8 ML | Refills: 5 | Status: SHIPPED | OUTPATIENT
Start: 2024-01-12

## 2024-01-15 ENCOUNTER — ANTICOAGULATION VISIT (OUTPATIENT)
Dept: PHARMACY | Facility: HOSPITAL | Age: 84
End: 2024-01-15
Payer: MEDICARE

## 2024-01-15 DIAGNOSIS — D68.59 THROMBOPHILIA: Primary | Chronic | ICD-10-CM

## 2024-01-15 LAB — INR PPP: 2.7

## 2024-01-15 NOTE — PROGRESS NOTES
Anticoagulation Clinic Progress Note    Patient's visit was held by phone today.    Anticoagulation Summary  As of 1/15/2024      INR goal:  2.0-3.0   TTR:  86.9% (4.4 y)   INR used for dosin.70 (1/15/2024)   Warfarin maintenance plan:  7 mg (5 mg x 1 and 1 mg x 2) every Tue, Thu; 8 mg (5 mg x 1 and 1 mg x 3) all other days   Weekly warfarin total:  54 mg   Plan last modified:  Karen Oates RPH (2024)   Next INR check:  2024   Priority:  Maintenance   Target end date:  Indefinite    Indications    Deep vein thrombosis (DVT) [I82.409]  Thrombophilia [D68.59]                 Anticoagulation Episode Summary       INR check location:      Preferred lab:      Send INR reminders to:   [facility] CBC ANTICOAG POOL    Comments:  MDINR Home maninder          Anticoagulation Care Providers       Provider Role Specialty Phone number    Soraya Cortez MD Referring Hematology and Oncology [phone]            Drug interactions: has remained unchanged.  Diet: has remained unchanged.    Clinic Interview:  No pertinent clinical findings have been reported.    INR History:      2023     9:53 AM 2024    12:00 AM 2024     9:10 AM 2024    12:00 AM 2024     9:07 AM 1/15/2024    12:00 AM 1/15/2024     8:55 AM   Anticoagulation Monitoring   INR 3.10  3.30  2.30  2.70   INR Date 2023  2024  2024  1/15/2024   INR Goal 2.0-3.0  2.0-3.0  2.0-3.0  2.0-3.0   Trend Down  Same  Down  Same   Last Week Total 56 mg  55 mg  52 mg  54 mg   Next Week Total 55 mg  52 mg  54 mg  54 mg   Sun 8 mg  8 mg  8 mg  8 mg   Mon 8 mg  8 mg  8 mg  8 mg   Tue 8 mg  5 mg ()  7 mg  7 mg   Wed 8 mg  8 mg  8 mg  8 mg   Thu 7 mg  7 mg  7 mg  7 mg   Fri 8 mg  8 mg  8 mg  8 mg   Sat 8 mg  8 mg  8 mg  8 mg   Historical INR  3.30      2.30      2.70            This result is from an external source.       Plan:  1. INR is Therapeutic today- see above in Anticoagulation Summary.   Will instruct Dalton Dallas Sr.  to Continue their warfarin regimen- see above in Anticoagulation Summary.  2. Follow up in 1 week--home test  3.They have been instructed to call if any changes in medications, doses, concerns, etc. Patient expresses understanding and has no further questions at this time.    Taylor Ambriz [facility]

## 2024-01-16 ENCOUNTER — TELEPHONE (OUTPATIENT)
Dept: ENDOCRINOLOGY | Facility: CLINIC | Age: 84
End: 2024-01-16
Payer: MEDICARE

## 2024-01-16 NOTE — TELEPHONE ENCOUNTER
Received fax from [employer] for CGM. Placed in provider's folder along with 11/9/2023 and 7/31/2023 OV and labs. Will fax once provider signs.

## 2024-01-22 ENCOUNTER — ANTICOAGULATION VISIT (OUTPATIENT)
Dept: PHARMACY | Facility: HOSPITAL | Age: 84
End: 2024-01-22
Payer: MEDICARE

## 2024-01-22 ENCOUNTER — TELEPHONE (OUTPATIENT)
Dept: ENDOCRINOLOGY | Facility: CLINIC | Age: 84
End: 2024-01-22
Payer: MEDICARE

## 2024-01-22 DIAGNOSIS — D68.59 THROMBOPHILIA: Chronic | ICD-10-CM

## 2024-01-22 DIAGNOSIS — I82.513 CHRONIC DEEP VEIN THROMBOSIS (DVT) OF FEMORAL VEIN OF BOTH LOWER EXTREMITIES: Primary | Chronic | ICD-10-CM

## 2024-01-22 LAB — INR PPP: 2.7

## 2024-01-22 RX ORDER — WARFARIN SODIUM 2 MG/1
TABLET ORAL
Qty: 90 TABLET | Refills: 2 | Status: SHIPPED | OUTPATIENT
Start: 2024-01-22

## 2024-01-22 NOTE — TELEPHONE ENCOUNTER
"    Caller: Dalton Dallas Sr. \"LOKI\"    Relationship to patient: Self    Best call back number: [phone]     Patient is needing: PT WOULD LIKE A CALL BACK         "

## 2024-01-22 NOTE — PROGRESS NOTES
Anticoagulation Clinic Progress Note    Patient's visit was held in office today.    Anticoagulation Summary  As of 2024      INR goal:  2.0-3.0   TTR:  86.9% (4.5 y)   INR used for dosin.70 (2024)   Warfarin maintenance plan:  7 mg (5 mg x 1 and 1 mg x 2) every Tue, Thu; 8 mg (5 mg x 1 and 1 mg x 3) all other days   Weekly warfarin total:  54 mg   No change documented:  Karen Oates RPH   Plan last modified:  Karen Oates RPH (2024)   Next INR check:  2024   Priority:  Maintenance   Target end date:  Indefinite    Indications    Deep vein thrombosis (DVT) [I82.409]  Thrombophilia [D68.59]                 Anticoagulation Episode Summary       INR check location:      Preferred lab:      Send INR reminders to:   [facility] CBC ANTICOAG POOL    Comments:  MDINR Home maninder          Anticoagulation Care Providers       Provider Role Specialty Phone number    oSraya Cortez MD Referring Hematology and Oncology [phone]            Clinic Interview:  Patient Findings     Negatives:  Signs/symptoms of thrombosis, Signs/symptoms of bleeding,   Laboratory test error suspected, Change in health, Change in alcohol use,   Change in activity, Upcoming invasive procedure, Emergency department   visit, Upcoming dental procedure, Missed doses, Extra doses, Change in   medications, Change in diet/appetite, Hospital admission, Bruising, Other   complaints      Clinical Outcomes     Negatives:  Major bleeding event, Thromboembolic event,   Anticoagulation-related hospital admission, Anticoagulation-related ED   visit, Anticoagulation-related fatality        INR History:      2024     9:10 AM 2024    12:00 AM 2024     9:07 AM 1/15/2024    12:00 AM 1/15/2024     8:55 AM 2024    12:00 AM 2024     9:44 AM   Anticoagulation Monitoring   INR 3.30  2.30  2.70  2.70   INR Date 2024  2024  1/15/2024  2024   INR Goal 2.0-3.0  2.0-3.0  2.0-3.0  2.0-3.0   Trend Same  Down   Same  Same   Last Week Total 55 mg  52 mg  54 mg  54 mg   Next Week Total 52 mg  54 mg  54 mg  54 mg   Sun 8 mg  8 mg  8 mg  8 mg   Mon 8 mg  8 mg  8 mg  8 mg   Tue 5 mg (1/2)  7 mg  7 mg  7 mg   Wed 8 mg  8 mg  8 mg  8 mg   Thu 7 mg  7 mg  7 mg  7 mg   Fri 8 mg  8 mg  8 mg  8 mg   Sat 8 mg  8 mg  8 mg  8 mg   Historical INR  2.30      2.70      2.70            This result is from an external source.       Plan:  1. INR is Therapeutic today- see above in Anticoagulation Summary.  Will instruct Dalton Dallas Sr. to Continue their warfarin regimen- see above in Anticoagulation Summary.  2. Follow up in 1 week home test  3. Patient desires warfarin refills. Requested 2 mg tablets be sent in to [facility].   4. Verbal information provided. Patient expresses understanding and has no further questions at this time.    Karen Oates [facility]

## 2024-01-23 DIAGNOSIS — E11.42 TYPE 2 DIABETES MELLITUS WITH DIABETIC POLYNEUROPATHY, WITH LONG-TERM CURRENT USE OF INSULIN: Primary | ICD-10-CM

## 2024-01-23 DIAGNOSIS — Z79.4 TYPE 2 DIABETES MELLITUS WITH DIABETIC POLYNEUROPATHY, WITH LONG-TERM CURRENT USE OF INSULIN: Primary | ICD-10-CM

## 2024-01-23 NOTE — TELEPHONE ENCOUNTER
"Called pt and he is requesting insulin syringe Needle U 30GX1/2\" 0.5ml. Informed pt Rx pending provider signature. Pt voiced understanding.   "

## 2024-01-29 ENCOUNTER — ANTICOAGULATION VISIT (OUTPATIENT)
Dept: PHARMACY | Facility: HOSPITAL | Age: 84
End: 2024-01-29
Payer: MEDICARE

## 2024-01-29 DIAGNOSIS — D68.59 THROMBOPHILIA: Chronic | ICD-10-CM

## 2024-01-29 DIAGNOSIS — I82.513 CHRONIC DEEP VEIN THROMBOSIS (DVT) OF FEMORAL VEIN OF BOTH LOWER EXTREMITIES: Primary | Chronic | ICD-10-CM

## 2024-01-29 LAB — INR PPP: 3

## 2024-01-29 NOTE — PROGRESS NOTES
Anticoagulation Clinic Progress Note    Patient's visit was held by phone today.    Anticoagulation Summary  As of 1/29/2024      INR goal:  2.0-3.0   TTR:  87.0% (4.5 y)   INR used for dosing:  3.00 (1/29/2024)   Warfarin maintenance plan:  7 mg (5 mg x 1 and 1 mg x 2) every Tue, Thu; 8 mg (5 mg x 1 and 1 mg x 3) all other days   Weekly warfarin total:  54 mg   No change documented:  Karen Oates RPH   Plan last modified:  Karen Oates RPH (1/8/2024)   Next INR check:  2/5/2024   Priority:  Maintenance   Target end date:  Indefinite    Indications    Deep vein thrombosis (DVT) [I82.409]  Thrombophilia [D68.59]                 Anticoagulation Episode Summary       INR check location:      Preferred lab:      Send INR reminders to:   [facility] CBC ANTICOAG POOL    Comments:  MDINR Home maninder          Anticoagulation Care Providers       Provider Role Specialty Phone number    Soraya Cortez MD Referring Hematology and Oncology [phone]            Drug interactions: has remained unchanged.  Diet: has remained unchanged.    Clinic Interview:  No pertinent clinical findings have been reported.    INR History:      1/8/2024     9:07 AM 1/15/2024    12:00 AM 1/15/2024     8:55 AM 1/22/2024    12:00 AM 1/22/2024     9:44 AM 1/29/2024    12:00 AM 1/29/2024     9:15 AM   Anticoagulation Monitoring   INR 2.30  2.70  2.70  3.00   INR Date 1/8/2024  1/15/2024  1/22/2024  1/29/2024   INR Goal 2.0-3.0  2.0-3.0  2.0-3.0  2.0-3.0   Trend Down  Same  Same  Same   Last Week Total 52 mg  54 mg  54 mg  54 mg   Next Week Total 54 mg  54 mg  54 mg  54 mg   Sun 8 mg  8 mg  8 mg  8 mg   Mon 8 mg  8 mg  8 mg  8 mg   Tue 7 mg  7 mg  7 mg  7 mg   Wed 8 mg  8 mg  8 mg  8 mg   Thu 7 mg  7 mg  7 mg  7 mg   Fri 8 mg  8 mg  8 mg  8 mg   Sat 8 mg  8 mg  8 mg  8 mg   Historical INR  2.70      2.70      3.00            This result is from an external source.       Plan:  1. INR is Therapeutic today- see above in Anticoagulation  Summary. Patient reports no changes in medications or diet and no issues with bleeding or bruising.   Will instruct Dalton Dallas Sr. to Continue their warfarin regimen- see above in Anticoagulation Summary. Since at upper end of range debated changing reducing dose this week, however patient is a weekly maninder so will wait and see what INR is next week before making any changes.   2. Follow up in 1 weeks- home test.   3.They have been instructed to call if any changes in medications, doses, concerns, etc. Patient expresses understanding and has no further questions at this time.    Karen Oates, [facility]

## 2024-01-30 ENCOUNTER — OFFICE VISIT (OUTPATIENT)
Dept: PODIATRY | Facility: CLINIC | Age: 84
End: 2024-01-30
Payer: MEDICARE

## 2024-01-30 VITALS — HEART RATE: 66 BPM | BODY MASS INDEX: 41.52 KG/M2 | HEIGHT: 70 IN | WEIGHT: 290 LBS

## 2024-01-30 DIAGNOSIS — E11.42 DM TYPE 2 WITH DIABETIC PERIPHERAL NEUROPATHY: Primary | ICD-10-CM

## 2024-01-30 DIAGNOSIS — L84 CALLUS OF FOOT: ICD-10-CM

## 2024-01-30 DIAGNOSIS — E11.42 DIABETIC PERIPHERAL NEUROPATHY ASSOCIATED WITH TYPE 2 DIABETES MELLITUS: ICD-10-CM

## 2024-01-30 NOTE — PROGRESS NOTES
01/30/2024  Foot and Ankle Surgery - Established Patient/Follow-up  Provider: LUCAS Ramos   Location: [facility]    Subjective:  Dalton Dallas Sr. is a 83 y.o. male.     Chief Complaint   Patient presents with    Left Foot - Follow-up     F/u left foot wound     Follow-up     LUCAS Gamboa 11/30/2023       HPI: Dalton Dallas is an established patient who is here today for follow-up ulcer to left foot.    His foot is doing okay. He reports he has not noticed any drainage on the bandage. He adds he has been using the moisturizer. His other foot is doing okay. He reports he  needs his nails trimmed every 3 to 6 months. He notes he gets shooting pains in his foot every day. He reports it makes his foot jump and carry on. He explained it is more on the top of his foot and describes the pain as achy. He reports it comes in the evening. He adds he takes 1 Aleve. He denies tingling. He would like to get another pair of insoles. He wears them in his shoes. He reports if he puts the moisturizer on the callus, it makes it sore. He denies stinging or burning.    No Known Allergies    Current Outpatient Medications on File Prior to Visit   Medication Sig Dispense Refill    Accu-Chek Irene Plus test strip USE TO CHECK BLOOD SUGAR BEFORE MEALS AND AT BEDTIME 400 each 3    amLODIPine (NORVASC) 5 MG tablet Take 1 tablet by mouth Daily. 90 tablet 3    atenolol (TENORMIN) 50 MG tablet TAKE 1 TABLET BY MOUTH DAILY 90 tablet 3    atorvastatin (LIPITOR) 80 MG tablet Take 1 tablet by mouth Every Night. Indications: High Amount of Fats in the Blood 90 tablet 3    Blood Glucose Monitoring Suppl (Accu-Chek Irene Plus) w/Device kit 1 each 4 (Four) Times a Day. 1 kit 0    clopidogrel (PLAVIX) 75 MG tablet TAKE 1 TABLET DAILY 90 tablet 3    fenofibrate (TRICOR) 54 MG tablet TAKE 1 TABLET BY MOUTH DAILY FOR HIGH AMOUNT OF TRIGLYCERIDES IN  THE BLOOD 90 tablet 3    ferrous sulfate 325 (65 FE) MG tablet Take 1 tablet  "by mouth 2 (Two) Times a Day.      gabapentin (NEURONTIN) 100 MG capsule TAKE 1 CAPSULE BY MOUTH THREE  TIMES A  capsule 3    hydroCHLOROthiazide (HYDRODIURIL) 25 MG tablet TAKE 1 TABLET BY MOUTH DAILY 90 tablet 3    Insulin Pen Needle (BD ULTRA-FINE PEN NEEDLES) 29G X 12.7MM misc Use to inject insulin twice daily. DX E11.65 200 each 2    insulin regular (HUMULIN R) 500 UNIT/ML CONCENTRATED injection DRAW TO 24 UNIT REI ON U-100 SYRINGE AND INJECT IN THE MORNING AND 35 UNIT REI IN THE EVENING  Indications: Type 2 Diabetes 60 mL 3    Insulin Syringe-Needle U-100 (BD Insulin Syringe U/F) 31G X 5/16\" 0.5 ML misc 1 each by Other route 2 (Two) Times a Day. 200 each 3    Insulin Syringe-Needle U-100 30G X 1/2\" 0.5 ML misc 1 each by Other route 2 (Two) Times a Day. 200 each 12    irbesartan (AVAPRO) 300 MG tablet TAKE 1 TABLET BY MOUTH DAILY 90 tablet 3    isosorbide mononitrate (IMDUR) 30 MG 24 hr tablet Take 1 tablet by mouth Daily. 90 tablet 3    Jardiance 25 MG tablet tablet TAKE 1 TABLET BY MOUTH DAILY 90 tablet 3    levothyroxine (SYNTHROID, LEVOTHROID) 175 MCG tablet TAKE 1 TABLET BY MOUTH DAILY FOR UNDERACTIVE THYROID 90 tablet 3    metFORMIN ER (GLUCOPHAGE-XR) 500 MG 24 hr tablet Take 4 tablets at supper  Indications: Type 2 Diabetes 360 tablet 3    O2 (OXYGEN) Inhale 1 L/min Every Night. Indications: marianne      Omega-3 Fatty Acids (FISH OIL) 1000 MG capsule capsule Take 1 capsule by mouth 2 (Two) Times a Day.      Pramlintide Acetate (SymlinPen 120) 2700 MCG/2.7ML solution pen-injector INJECT 120 MCG UNDER THE SKIN  INTO THE APPROPRIATE AREA AS  DIRECTED THREE TIMES A DAY 10.8 mL 5    urea (CARMOL) 40 % cream Apply 1 application  topically to the appropriate area as directed 2 (Two) Times a Day. Apply twice daily to calluses. Add vitamin D 5,000 units 85 each 3    vitamin B-12 (CYANOCOBALAMIN) 100 MCG tablet Take 1 tablet by mouth Daily. Indications: Inadequate Vitamin B12      vitamin D (ERGOCALCIFEROL) " "1.25 MG (27189 UT) capsule capsule TAKE 1 CAPSULE BY MOUTH ONCE  WEEKLY FOR VITAMIN D DEFICIENCY 13 capsule 3    warfarin (COUMADIN) 1 MG tablet Take 1 tablet PO every evening as directed. 30 tablet 3    warfarin (COUMADIN) 2 MG tablet TAKE 1 TABLET BY MOUTH DAILY OR  AS DIRECTED BASED ON INR 90 tablet 2    warfarin (COUMADIN) 3 MG tablet Take 1 tablet by mouth Daily. Indications: Atrial Fibrillation 90 tablet 3    warfarin (COUMADIN) 5 MG tablet TAKE 1 TABLET BY MOUTH AT NIGHT  FOR ATRIAL FIBRILLATION 90 tablet 3     No current facility-administered medications on file prior to visit.       Objective   Pulse 66   Ht 177.8 cm (70\")   Wt 132 kg (290 lb)   BMI 41.61 kg/m²     Foot/Ankle Exam  GENERAL  Appearance:  appears stated age and elderly  Orientation:  AAOx3  Affect:  appropriate  Gait:  unimpaired  Assistance:  cane use  Right shoe gear: casual shoe and sock  Left shoe gear: surgical shoe and sock    VASCULAR     Left Foot Vascularity   Dorsalis pedis:  1+  Posterior tibial:  1+  Skin temperature:  warm  Edema gradin+  CFT:  < 3 seconds  Pedal hair growth:  Absent  Varicosities:  none     NEUROLOGIC     Left Foot Neurologic   Light touch sensation: normal    MUSCULOSKELETAL     Left Foot Musculoskeletal   Ecchymosis:  none  Tenderness:  none    DERMATOLOGIC        Left Foot Dermatologic   Skin  Positive for corn and ulcer.      Left foot additional comments: 2023 : left lateral leg measures 1.5 x 2 cm x 0.2 cm. The wound bed is pink and covered with approximately 30 to 40% adherent yellow slough.  Periwound with excoriation and slight maceration.  Moderate serosanguineous drainage noted on old dressing.      Left plantar foot measures one point or 0.8 x 0.5 x 0.2 cm.  Periwound maceration.  Wound bed pink, moist, clean.  Periwound otherwise clear dry and intact.  Moderate serosanguineous drainage noted on old dressing.    Resting appears to be calcium alginate    2023  Wound to left lateral " lower extremity appears to be improved and measures 1.4 cm x 2.0 x 0.1 cm today.   Wound to left foot also appears improved and measures 0.5 cm x 0.9 cm x 0.2 cm today with slight undermining.     Assessment & Plan   Diagnoses and all orders for this visit:    1. 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 Callus of foot    3. Diabetic peripheral neuropathy associated with type 2 diabetes mellitus      1. Ulcer to left foot.  The callus has healed. He was advised to continue using the moisturizer.    2. Suspected neuropathy.  I will prescribe a topical pain cream to be applied to bilateral feet.    Explained importance of diabetic foot care, daily foot checks, and glycemic control. Patient should check both feet on a daily basis, monitor and control blood sugars, make sure that both feet and in between toes are towel dried after baths or showers. Avoid barefoot walking at all times. Check shoes before putting them on.   Patient was given information on proper foot care. Call the office at the first signs of a wound or with signs of infection.     Follow-up  The patient will follow up in 3 months.    No orders of the defined types were placed in this encounter.         Note is dictated utilizing voice recognition software. Unfortunately this leads to occasional typographical errors. I apologize in advance if the situation occurs. If questions occur please do not hesitate to call our office.     Transcribed from ambient dictation for LUCAS Villa by Mikayla Castanon.  01/30/24   14:27 EST    Patient or patient representative verbalized consent to the visit recording.  I have personally performed the services described in this document as transcribed by the above individual, and it is both accurate and complete.  LUCAS Villa  2/4/2024  10:17 EST

## 2024-02-05 ENCOUNTER — TELEPHONE (OUTPATIENT)
Dept: ENDOCRINOLOGY | Facility: CLINIC | Age: 84
End: 2024-02-05
Payer: MEDICARE

## 2024-02-05 ENCOUNTER — ANTICOAGULATION VISIT (OUTPATIENT)
Dept: PHARMACY | Facility: HOSPITAL | Age: 84
End: 2024-02-05
Payer: MEDICARE

## 2024-02-05 DIAGNOSIS — D68.59 THROMBOPHILIA: Chronic | ICD-10-CM

## 2024-02-05 DIAGNOSIS — I82.513 CHRONIC DEEP VEIN THROMBOSIS (DVT) OF FEMORAL VEIN OF BOTH LOWER EXTREMITIES: Primary | Chronic | ICD-10-CM

## 2024-02-05 LAB — INR PPP: 2.5

## 2024-02-05 NOTE — PROGRESS NOTES
Anticoagulation Clinic Progress Note    Patient's visit was held by phone today.    Anticoagulation Summary  As of 2024      INR goal:  2.0-3.0   TTR:  87.1% (4.5 y)   INR used for dosin.50 (2024)   Warfarin maintenance plan:  7 mg (5 mg x 1 and 1 mg x 2) every Tue, Thu; 8 mg (5 mg x 1 and 1 mg x 3) all other days   Weekly warfarin total:  54 mg   No change documented:  Karen Oates RPH   Plan last modified:  Karen Oates RPH (2024)   Next INR check:  2024   Priority:  Maintenance   Target end date:  Indefinite    Indications    Deep vein thrombosis (DVT) [I82.409]  Thrombophilia [D68.59]                 Anticoagulation Episode Summary       INR check location:      Preferred lab:      Send INR reminders to:   [facility] CBC ANTICOAG POOL    Comments:  MDINR Home maninder          Anticoagulation Care Providers       Provider Role Specialty Phone number    Soraya Cortez MD Referring Hematology and Oncology [phone]            Drug interactions: has remained unchanged.  Diet: has remained unchanged.    Clinic Interview:  No pertinent clinical findings have been reported.    INR History:      1/15/2024     8:55 AM 2024    12:00 AM 2024     9:44 AM 2024    12:00 AM 2024     9:15 AM 2024    12:00 AM 2024     1:43 PM   Anticoagulation Monitoring   INR 2.70  2.70  3.00  2.50   INR Date 1/15/2024  2024  2024  2024   INR Goal 2.0-3.0  2.0-3.0  2.0-3.0  2.0-3.0   Trend Same  Same  Same  Same   Last Week Total 54 mg  54 mg  54 mg  54 mg   Next Week Total 54 mg  54 mg  54 mg  54 mg   Sun 8 mg  8 mg  8 mg  -   Mon 8 mg  8 mg  8 mg  -   Tue 7 mg  7 mg  7 mg  -   Wed 8 mg  8 mg  8 mg  -   Thu 7 mg  7 mg  7 mg  -   Fri 8 mg  8 mg  8 mg  -   Sat 8 mg  8 mg  8 mg  -   Historical INR  2.70      3.00      2.50            This result is from an external source.       Plan:  1. INR is Therapeutic today- see above in Anticoagulation Summary.   Will instruct  Dalton Dallas Sr. to Continue their warfarin regimen- see above in Anticoagulation Summary.  2. Follow up in 1 weeks- home test.   3.They have been instructed to call if any changes in medications, doses, concerns, etc. Patient expresses understanding and has no further questions at this time.    Karen Oates, [facility]

## 2024-02-05 NOTE — TELEPHONE ENCOUNTER
[employer] request for most recent chart notes. Faxed 11/9/2023 and 7/31/2023 OV.

## 2024-02-06 ENCOUNTER — OFFICE VISIT (OUTPATIENT)
Dept: PULMONOLOGY | Facility: HOSPITAL | Age: 84
End: 2024-02-06
Payer: MEDICARE

## 2024-02-06 VITALS
HEART RATE: 61 BPM | OXYGEN SATURATION: 96 % | WEIGHT: 290 LBS | BODY MASS INDEX: 41.52 KG/M2 | RESPIRATION RATE: 14 BRPM | DIASTOLIC BLOOD PRESSURE: 64 MMHG | SYSTOLIC BLOOD PRESSURE: 122 MMHG | HEIGHT: 70 IN

## 2024-02-06 DIAGNOSIS — G47.33 OSA (OBSTRUCTIVE SLEEP APNEA): Primary | ICD-10-CM

## 2024-02-06 PROCEDURE — G0463 HOSPITAL OUTPT CLINIC VISIT: HCPCS

## 2024-02-06 NOTE — PROGRESS NOTES
PULMONARY/ CRITICAL CARE/ SLEEP MEDICINE OUTPATIENT CONSULT/ FOLLOW UP NOTE        Patient Name:  Dalton Dallas Sr.    :  1940    Medical Record:  [id number]    PRIMARY CARE PHYSICIAN     Joan Johns APRN    REASON FOR CONSULTATION    Dalton Dallas Sr. is a 83 y.o. male who is referred for consultation for HAN  REVIEW OF SYSTEMS    Constitutional:  Denies fever or chills   Eyes:  Denies change in visual acuity   HENT:  Denies nasal congestion or sore throat   Respiratory:  Denies cough or shortness of breath   Cardiovascular:  Denies chest pain or edema   GI:  Denies abdominal pain, nausea, vomiting, bloody stools or diarrhea   :  Denies dysuria   Musculoskeletal:  Denies back pain or joint pain   Integument:  Denies rash   Neurologic:  Denies headache, focal weakness or sensory changes   Endocrine:  Denies polyuria or polydipsia   Lymphatic:  Denies swollen glands   Psychiatric:  Denies depression or anxiety     MEDICAL HISTORY    Past Medical History:   Diagnosis Date    ACE-inhibitor cough 2005    Coronary artery disease     Diabetes mellitus, type 2     ED (erectile dysfunction)     Hx of blood clots     Blood clot left leg     Hyperlipidemia 2000    Hypertension     Hypogonadism, male     Hypothyroidism 2001    Obesity     Peripheral neuropathy     Pulmonary embolism 2014    Rectal fistula     Sleep apnea 12/10/2013    Ulcer of left foot 2022    Vitamin D deficiency         SURGICAL HISTORY    Past Surgical History:   Procedure Laterality Date    CARDIAC CATHETERIZATION      PTCA: ; PTCA: 2015 LCX stent     CARDIAC CATHETERIZATION Left 7/3/2021    Procedure: Cardiac Catheterization/Vascular Study;  Surgeon: Edwar Ackerman MD;  Location: [facility] INVASIVE LOCATION;  Service: Cardiovascular;  Laterality: Left;    CARDIOVASCULAR STRESS TEST      CATARACT EXTRACTION  2016 & 16     COLONOSCOPY N/A 2019    Procedure:  COLONOSCOPY WITH ENDOSCOPIC CLIPPING X1 OF POST POLYPECTOMY BLEED SITE;  Surgeon: Jhonny Orellana MD;  Location: [facility] ENDOSCOPY;  Service: Gastroenterology    CORONARY STENT PLACEMENT      INGUINAL HERNIA REPAIR Left     UMBILICAL HERNIA REPAIR          FAMILY HISTORY    Family History   Problem Relation Age of Onset    Heart disease Mother     Leukemia Father        SOCIAL HISTORY    Social History     Tobacco Use    Smoking status: Former     Packs/day: 1.50     Years: 20.00     Additional pack years: 0.00     Total pack years: 30.00     Types: Cigarettes     Quit date:      Years since quittin.1     Passive exposure: Past    Smokeless tobacco: Never    Tobacco comments:     quit    Substance Use Topics    Alcohol use: No        ALLERGIES    No Known Allergies      MEDICATIONS    Current Outpatient Medications on File Prior to Visit   Medication Sig Dispense Refill    Accu-Chek Irene Plus test strip USE TO CHECK BLOOD SUGAR BEFORE MEALS AND AT BEDTIME 400 each 3    amLODIPine (NORVASC) 5 MG tablet Take 1 tablet by mouth Daily. 90 tablet 3    atenolol (TENORMIN) 50 MG tablet TAKE 1 TABLET BY MOUTH DAILY 90 tablet 3    atorvastatin (LIPITOR) 80 MG tablet Take 1 tablet by mouth Every Night. Indications: High Amount of Fats in the Blood 90 tablet 3    Blood Glucose Monitoring Suppl (Accu-Chek Irene Plus) w/Device kit 1 each 4 (Four) Times a Day. 1 kit 0    clopidogrel (PLAVIX) 75 MG tablet TAKE 1 TABLET DAILY 90 tablet 3    fenofibrate (TRICOR) 54 MG tablet TAKE 1 TABLET BY MOUTH DAILY FOR HIGH AMOUNT OF TRIGLYCERIDES IN  THE BLOOD 90 tablet 3    ferrous sulfate 325 (65 FE) MG tablet Take 1 tablet by mouth 2 (Two) Times a Day.      gabapentin (NEURONTIN) 100 MG capsule TAKE 1 CAPSULE BY MOUTH THREE  TIMES A  capsule 3    hydroCHLOROthiazide (HYDRODIURIL) 25 MG tablet TAKE 1 TABLET BY MOUTH DAILY 90 tablet 3    Insulin Pen Needle (BD ULTRA-FINE PEN NEEDLES) 29G X 12.7MM misc Use to inject insulin  "twice daily. DX E11.65 200 each 2    insulin regular (HUMULIN R) 500 UNIT/ML CONCENTRATED injection DRAW TO 24 UNIT REI ON U-100 SYRINGE AND INJECT IN THE MORNING AND 35 UNIT REI IN THE EVENING  Indications: Type 2 Diabetes 60 mL 3    Insulin Syringe-Needle U-100 (BD Insulin Syringe U/F) 31G X 5/16\" 0.5 ML misc 1 each by Other route 2 (Two) Times a Day. 200 each 3    Insulin Syringe-Needle U-100 30G X 1/2\" 0.5 ML misc 1 each by Other route 2 (Two) Times a Day. 200 each 12    irbesartan (AVAPRO) 300 MG tablet TAKE 1 TABLET BY MOUTH DAILY 90 tablet 3    isosorbide mononitrate (IMDUR) 30 MG 24 hr tablet Take 1 tablet by mouth Daily. 90 tablet 3    Jardiance 25 MG tablet tablet TAKE 1 TABLET BY MOUTH DAILY 90 tablet 3    levothyroxine (SYNTHROID, LEVOTHROID) 175 MCG tablet TAKE 1 TABLET BY MOUTH DAILY FOR UNDERACTIVE THYROID 90 tablet 3    metFORMIN ER (GLUCOPHAGE-XR) 500 MG 24 hr tablet Take 4 tablets at supper  Indications: Type 2 Diabetes 360 tablet 3    O2 (OXYGEN) Inhale 1 L/min Every Night. Indications: marianne      Omega-3 Fatty Acids (FISH OIL) 1000 MG capsule capsule Take 1 capsule by mouth 2 (Two) Times a Day.      Pramlintide Acetate (SymlinPen 120) 2700 MCG/2.7ML solution pen-injector INJECT 120 MCG UNDER THE SKIN  INTO THE APPROPRIATE AREA AS  DIRECTED THREE TIMES A DAY 10.8 mL 5    urea (CARMOL) 40 % cream Apply 1 application  topically to the appropriate area as directed 2 (Two) Times a Day. Apply twice daily to calluses. Add vitamin D 5,000 units 85 each 3    vitamin B-12 (CYANOCOBALAMIN) 100 MCG tablet Take 1 tablet by mouth Daily. Indications: Inadequate Vitamin B12      vitamin D (ERGOCALCIFEROL) 1.25 MG (37299 UT) capsule capsule TAKE 1 CAPSULE BY MOUTH ONCE  WEEKLY FOR VITAMIN D DEFICIENCY 13 capsule 3    warfarin (COUMADIN) 1 MG tablet Take 1 tablet PO every evening as directed. 30 tablet 3    warfarin (COUMADIN) 2 MG tablet TAKE 1 TABLET BY MOUTH DAILY OR  AS DIRECTED BASED ON INR 90 tablet 2    " warfarin (COUMADIN) 3 MG tablet Take 1 tablet by mouth Daily. Indications: Atrial Fibrillation 90 tablet 3    warfarin (COUMADIN) 5 MG tablet TAKE 1 TABLET BY MOUTH AT NIGHT  FOR ATRIAL FIBRILLATION 90 tablet 3     No current facility-administered medications on file prior to visit.       PHYSICAL EXAM    There were no vitals filed for this visit.     Constitutional:  Well developed, well nourished, no acute distress, non-toxic appearance   Eyes:  PERRL, conjunctiva normal   HENT:  Atraumatic, external ears normal, nose normal, oropharynx moist, no pharyngeal exudates. mallampatti   Neck- normal range of motion, no tenderness, supple   Respiratory:  No respiratory distress, normal breath sounds, no rales, no wheezing   Cardiovascular:  Normal rate, normal rhythm, no murmurs, no gallops, no rubs   GI:  Soft, nondistended, normal bowel sounds, nontender, no organomegaly, no mass, no rebound, no guarding   :  No costovertebral angle tenderness   Musculoskeletal:  No edema, no tenderness, no deformities. Back- no tenderness  Integument:  Well hydrated, no rash   Lymphatic:  No lymphadenopathy noted   Neurologic:  Alert & oriented x 3, CN 2-12 normal, normal motor function, normal sensory function, no focal deficits noted   Psychiatric:  Speech and behavior appropriate     No radiology results for the last 90 days.   Results for orders placed during the hospital encounter of 08/12/21    Adult Transthoracic Echo Complete W/ Cont if Necessary Per Protocol    Interpretation Summary  · Left ventricular systolic function is normal.  · Left ventricular ejection fraction is 60 to 65%  · Left ventricular diastolic function was normal.      ASSESSMENT & PLAN:        Sleep apnea,[facility]Med  air curve 10 V auto compliance 99%, average use 6 hours 44 minutes, residual AHI 1.1  patient is compliant and benefiting from therapy     Nocturnal oxygen patient is compliant and benefiting  Discussed with patient cardiovascular consequences of  untreated sleep apnea     2D echo no evidence of pulmonary hypertension  Essential hypertension (Primary)  Mixed hyperlipidemia  Diastolic dysfunction with chronic heart failure (HCC)  Coronary artery disease involving native coronary artery of native heart without angina pectoris  Peripheral vascular disease (HCC)  Thrombophilia (HCC)  Acquired hypothyroidism  Type 2 diabetes mellitus with diabetic neuropathy, with long-term current use of insulin (HCC)          This document has been electronically signed by  Jelani Vieira MD  11:30 EST

## 2024-02-11 DIAGNOSIS — E11.40 TYPE 2 DIABETES MELLITUS WITH DIABETIC NEUROPATHY, WITH LONG-TERM CURRENT USE OF INSULIN: Chronic | ICD-10-CM

## 2024-02-11 DIAGNOSIS — Z79.4 TYPE 2 DIABETES MELLITUS WITH DIABETIC NEUROPATHY, WITH LONG-TERM CURRENT USE OF INSULIN: Chronic | ICD-10-CM

## 2024-02-13 ENCOUNTER — ANTICOAGULATION VISIT (OUTPATIENT)
Dept: PHARMACY | Facility: HOSPITAL | Age: 84
End: 2024-02-13
Payer: MEDICARE

## 2024-02-13 DIAGNOSIS — I82.513 CHRONIC DEEP VEIN THROMBOSIS (DVT) OF FEMORAL VEIN OF BOTH LOWER EXTREMITIES: Primary | Chronic | ICD-10-CM

## 2024-02-13 DIAGNOSIS — D68.59 THROMBOPHILIA: Chronic | ICD-10-CM

## 2024-02-13 LAB — INR PPP: 2.1

## 2024-02-13 RX ORDER — METFORMIN HYDROCHLORIDE 500 MG/1
TABLET, EXTENDED RELEASE ORAL
Qty: 360 TABLET | Refills: 3 | Status: SHIPPED | OUTPATIENT
Start: 2024-02-13

## 2024-02-13 RX ORDER — EMPAGLIFLOZIN 25 MG/1
TABLET, FILM COATED ORAL
Qty: 90 TABLET | Refills: 3 | Status: SHIPPED | OUTPATIENT
Start: 2024-02-13

## 2024-02-13 RX ORDER — ATENOLOL 50 MG/1
TABLET ORAL
Qty: 90 TABLET | Refills: 3 | Status: SHIPPED | OUTPATIENT
Start: 2024-02-13

## 2024-02-19 ENCOUNTER — ANTICOAGULATION VISIT (OUTPATIENT)
Dept: PHARMACY | Facility: HOSPITAL | Age: 84
End: 2024-02-19
Payer: MEDICARE

## 2024-02-19 DIAGNOSIS — D68.59 THROMBOPHILIA: Chronic | ICD-10-CM

## 2024-02-19 DIAGNOSIS — I82.513 CHRONIC DEEP VEIN THROMBOSIS (DVT) OF FEMORAL VEIN OF BOTH LOWER EXTREMITIES: Primary | Chronic | ICD-10-CM

## 2024-02-19 LAB — INR PPP: 2.3

## 2024-02-19 NOTE — PROGRESS NOTES
Anticoagulation Clinic Progress Note    Patient's visit was held by phone today.    Anticoagulation Summary  As of 2024      INR goal:  2.0-3.0   TTR:  87.2% (4.5 y)   INR used for dosin.30 (2024)   Warfarin maintenance plan:  7 mg (5 mg x 1 and 1 mg x 2) every Tue, Thu; 8 mg (5 mg x 1 and 1 mg x 3) all other days   Weekly warfarin total:  54 mg   No change documented:  Karen Oates RPH   Plan last modified:  Karen Oates RPH (2024)   Next INR check:  2024   Priority:  Maintenance   Target end date:  Indefinite    Indications    Deep vein thrombosis (DVT) [I82.409]  Thrombophilia [D68.59]                 Anticoagulation Episode Summary       INR check location:      Preferred lab:      Send INR reminders to:   [facility] CBC ANTICOAG POOL    Comments:  MDINR Home maninder          Anticoagulation Care Providers       Provider Role Specialty Phone number    Soraya Cortez MD Referring Hematology and Oncology [phone]            Drug interactions: has remained unchanged.  Diet: has remained unchanged.    Clinic Interview:  No pertinent clinical findings have been reported.    INR History:      2024     9:15 AM 2024    12:00 AM 2024     1:43 PM 2024    12:00 AM 2024     9:23 AM 2024    12:00 AM 2024     8:20 AM   Anticoagulation Monitoring   INR 3.00  2.50  2.10  2.30   INR Date 2024   INR Goal 2.0-3.0  2.0-3.0  2.0-3.0  2.0-3.0   Trend Same  Same  Same  Same   Last Week Total 54 mg  54 mg  54 mg  54 mg   Next Week Total 54 mg  54 mg  54 mg  54 mg   Sun 8 mg  -  8 mg  8 mg   Mon 8 mg  -  8 mg  8 mg   Tue 7 mg  -  7 mg  7 mg   Wed 8 mg  -  8 mg  8 mg   Thu 7 mg  -  7 mg  7 mg   Fri 8 mg  -  8 mg  8 mg   Sat 8 mg  -  8 mg  8 mg   Historical INR  2.50      2.10      2.30            This result is from an external source.       Plan:  1. INR is Therapeutic today- see above in Anticoagulation Summary.   Will instruct  Dalton Dallas Sr. to Continue their warfarin regimen- see above in Anticoagulation Summary.  2. Follow up in 1 weeks- home test.   3.They have been instructed to call if any changes in medications, doses, concerns, etc. Patient expresses understanding and has no further questions at this time.    Karen Oates, [facility]

## 2024-02-26 ENCOUNTER — ANTICOAGULATION VISIT (OUTPATIENT)
Dept: PHARMACY | Facility: HOSPITAL | Age: 84
End: 2024-02-26
Payer: MEDICARE

## 2024-02-26 DIAGNOSIS — D68.59 THROMBOPHILIA: Chronic | ICD-10-CM

## 2024-02-26 DIAGNOSIS — I82.513 CHRONIC DEEP VEIN THROMBOSIS (DVT) OF FEMORAL VEIN OF BOTH LOWER EXTREMITIES: Primary | Chronic | ICD-10-CM

## 2024-02-26 LAB — INR PPP: 2.5

## 2024-02-26 NOTE — PROGRESS NOTES
Anticoagulation Clinic Progress Note    Patient's visit was held by phone today.    Anticoagulation Summary  As of 2024      INR goal:  2.0-3.0   TTR:  87.2% (4.6 y)   INR used for dosin.50 (2024)   Warfarin maintenance plan:  7 mg (5 mg x 1 and 1 mg x 2) every Tue, Thu; 8 mg (5 mg x 1 and 1 mg x 3) all other days   Weekly warfarin total:  54 mg   No change documented:  Karen Oates RPH   Plan last modified:  Karen Oates RPH (2024)   Next INR check:  3/4/2024   Priority:  Maintenance   Target end date:  Indefinite    Indications    Deep vein thrombosis (DVT) [I82.409]  Thrombophilia [D68.59]                 Anticoagulation Episode Summary       INR check location:      Preferred lab:      Send INR reminders to:   [facility] CBC ANTICOAG POOL    Comments:  MDINR Home maninder          Anticoagulation Care Providers       Provider Role Specialty Phone number    Soraya Cortez MD Referring Hematology and Oncology [phone]            Drug interactions: has remained unchanged.  Diet: has remained unchanged.    Clinic Interview:  No pertinent clinical findings have been reported.    INR History:      2024     1:43 PM 2024    12:00 AM 2024     9:23 AM 2024    12:00 AM 2024     8:20 AM 2024    12:00 AM 2024     1:03 PM   Anticoagulation Monitoring   INR 2.50  2.10  2.30  2.50   INR Date 2024   INR Goal 2.0-3.0  2.0-3.0  2.0-3.0  2.0-3.0   Trend Same  Same  Same  Same   Last Week Total 54 mg  54 mg  54 mg  54 mg   Next Week Total 54 mg  54 mg  54 mg  54 mg   Sun -  8 mg  8 mg  8 mg   Mon -  8 mg  8 mg  8 mg   Tue -  7 mg  7 mg  7 mg   Wed -  8 mg  8 mg  8 mg   Thu -  7 mg  7 mg  7 mg   Fri -  8 mg  8 mg  8 mg   Sat -  8 mg  8 mg  8 mg   Historical INR  2.10      2.30      2.50            This result is from an external source.       Plan:  1. INR is Therapeutic today- see above in Anticoagulation Summary.   Will instruct  Dalton Dallas Sr. to Continue their warfarin regimen- see above in Anticoagulation Summary.  2. Follow up in 1 weeks- home test.   3.They have been instructed to call if any changes in medications, doses, concerns, etc. Patient expresses understanding and has no further questions at this time.    Karen Oates, [facility]

## 2024-02-29 DIAGNOSIS — E11.42 TYPE 2 DIABETES MELLITUS WITH DIABETIC POLYNEUROPATHY, WITH LONG-TERM CURRENT USE OF INSULIN: ICD-10-CM

## 2024-02-29 DIAGNOSIS — Z79.4 TYPE 2 DIABETES MELLITUS WITH DIABETIC NEUROPATHY, WITH LONG-TERM CURRENT USE OF INSULIN: ICD-10-CM

## 2024-02-29 DIAGNOSIS — E11.40 TYPE 2 DIABETES MELLITUS WITH DIABETIC NEUROPATHY, WITH LONG-TERM CURRENT USE OF INSULIN: ICD-10-CM

## 2024-02-29 DIAGNOSIS — Z79.4 TYPE 2 DIABETES MELLITUS WITH DIABETIC POLYNEUROPATHY, WITH LONG-TERM CURRENT USE OF INSULIN: ICD-10-CM

## 2024-02-29 RX ORDER — PEN NEEDLE, DIABETIC 29 G X1/2"
NEEDLE, DISPOSABLE MISCELLANEOUS
Qty: 200 EACH | Refills: 2 | Status: SHIPPED | OUTPATIENT
Start: 2024-02-29

## 2024-02-29 RX ORDER — PRAMLINTIDE ACETATE 1000 UG/ML
INJECTION SUBCUTANEOUS
Qty: 10.8 ML | Refills: 5 | Status: SHIPPED | OUTPATIENT
Start: 2024-02-29

## 2024-03-04 ENCOUNTER — ANTICOAGULATION VISIT (OUTPATIENT)
Dept: PHARMACY | Facility: HOSPITAL | Age: 84
End: 2024-03-04
Payer: MEDICARE

## 2024-03-04 DIAGNOSIS — D68.59 THROMBOPHILIA: Chronic | ICD-10-CM

## 2024-03-04 DIAGNOSIS — I82.513 CHRONIC DEEP VEIN THROMBOSIS (DVT) OF FEMORAL VEIN OF BOTH LOWER EXTREMITIES: Primary | Chronic | ICD-10-CM

## 2024-03-04 LAB — INR PPP: 2

## 2024-03-04 NOTE — PROGRESS NOTES
Anticoagulation Clinic Progress Note    Patient's visit was held by phone today.    Anticoagulation Summary  As of 3/4/2024      INR goal:  2.0-3.0   TTR:  87.3% (4.6 y)   INR used for dosin.00 (3/4/2024)   Warfarin maintenance plan:  7 mg (5 mg x 1 and 1 mg x 2) every Tue, Thu; 8 mg (5 mg x 1 and 1 mg x 3) all other days   Weekly warfarin total:  54 mg   No change documented:  Karen Oates RPH   Plan last modified:  Karen Oates RPH (2024)   Next INR check:  3/11/2024   Priority:  Maintenance   Target end date:  Indefinite    Indications    Deep vein thrombosis (DVT) [I82.409]  Thrombophilia [D68.59]                 Anticoagulation Episode Summary       INR check location:      Preferred lab:      Send INR reminders to:   [facility] CBC ANTICOAG POOL    Comments:  MDINR Home maninder          Anticoagulation Care Providers       Provider Role Specialty Phone number    Soraya Cortez MD Referring Hematology and Oncology [phone]            Drug interactions: has remained unchanged.  Diet: has remained unchanged.    Clinic Interview:  No pertinent clinical findings have been reported.    INR History:      2024     9:23 AM 2024    12:00 AM 2024     8:20 AM 2024    12:00 AM 2024     1:03 PM 3/4/2024    12:00 AM 3/4/2024     1:20 PM   Anticoagulation Monitoring   INR 2.10  2.30  2.50  2.00   INR Date 2024  2024  2024  3/4/2024   INR Goal 2.0-3.0  2.0-3.0  2.0-3.0  2.0-3.0   Trend Same  Same  Same  Same   Last Week Total 54 mg  54 mg  54 mg  54 mg   Next Week Total 54 mg  54 mg  54 mg  54 mg   Sun 8 mg  8 mg  8 mg  8 mg   Mon 8 mg  8 mg  8 mg  8 mg   Tue 7 mg  7 mg  7 mg  7 mg   Wed 8 mg  8 mg  8 mg  8 mg   Thu 7 mg  7 mg  7 mg  7 mg   Fri 8 mg  8 mg  8 mg  8 mg   Sat 8 mg  8 mg  8 mg  8 mg   Historical INR  2.30      2.50      2.00            This result is from an external source.       Plan:  1. INR is Therapeutic today- see above in Anticoagulation Summary.    Will instruct Dalton Dallas Sr. to Continue their warfarin regimen- see above in Anticoagulation Summary.  2. Follow up in 1 weeks- home test.   3.They have been instructed to call if any changes in medications, doses, concerns, etc. Patient expresses understanding and has no further questions at this time.    Karen Oates [facility]

## 2024-03-05 ENCOUNTER — LAB (OUTPATIENT)
Dept: LAB | Facility: HOSPITAL | Age: 84
End: 2024-03-05
Payer: MEDICARE

## 2024-03-05 DIAGNOSIS — Z79.4 TYPE 2 DIABETES MELLITUS WITH DIABETIC POLYNEUROPATHY, WITH LONG-TERM CURRENT USE OF INSULIN: ICD-10-CM

## 2024-03-05 DIAGNOSIS — E11.42 TYPE 2 DIABETES MELLITUS WITH DIABETIC POLYNEUROPATHY, WITH LONG-TERM CURRENT USE OF INSULIN: ICD-10-CM

## 2024-03-05 DIAGNOSIS — E78.2 MIXED HYPERLIPIDEMIA: Chronic | ICD-10-CM

## 2024-03-05 DIAGNOSIS — E03.9 HYPOTHYROIDISM, UNSPECIFIED TYPE: Chronic | ICD-10-CM

## 2024-03-05 LAB
ALBUMIN SERPL-MCNC: 4.4 G/DL (ref 3.5–5.2)
ALBUMIN/GLOB SERPL: 1.9 G/DL
ALP SERPL-CCNC: 37 U/L (ref 39–117)
ALT SERPL W P-5'-P-CCNC: 18 U/L (ref 1–41)
ANION GAP SERPL CALCULATED.3IONS-SCNC: 10 MMOL/L (ref 5–15)
AST SERPL-CCNC: 19 U/L (ref 1–40)
BILIRUB SERPL-MCNC: 0.4 MG/DL (ref 0–1.2)
BUN SERPL-MCNC: 27 MG/DL (ref 8–23)
BUN/CREAT SERPL: 18.5 (ref 7–25)
CALCIUM SPEC-SCNC: 10.1 MG/DL (ref 8.6–10.5)
CHLORIDE SERPL-SCNC: 100 MMOL/L (ref 98–107)
CO2 SERPL-SCNC: 28 MMOL/L (ref 22–29)
CREAT SERPL-MCNC: 1.46 MG/DL (ref 0.76–1.27)
EGFRCR SERPLBLD CKD-EPI 2021: 48 ML/MIN/1.73
GLOBULIN UR ELPH-MCNC: 2.3 GM/DL
GLUCOSE SERPL-MCNC: 182 MG/DL (ref 65–99)
HBA1C MFR BLD: 7.9 % (ref 4.8–5.6)
POTASSIUM SERPL-SCNC: 4.4 MMOL/L (ref 3.5–5.2)
PROT SERPL-MCNC: 6.7 G/DL (ref 6–8.5)
SODIUM SERPL-SCNC: 138 MMOL/L (ref 136–145)
T4 FREE SERPL-MCNC: 1.78 NG/DL (ref 0.93–1.7)
TSH SERPL DL<=0.05 MIU/L-ACNC: 1.89 UIU/ML (ref 0.27–4.2)

## 2024-03-05 PROCEDURE — 83036 HEMOGLOBIN GLYCOSYLATED A1C: CPT

## 2024-03-05 PROCEDURE — 80053 COMPREHEN METABOLIC PANEL: CPT

## 2024-03-05 PROCEDURE — 36415 COLL VENOUS BLD VENIPUNCTURE: CPT

## 2024-03-05 PROCEDURE — 84443 ASSAY THYROID STIM HORMONE: CPT

## 2024-03-05 PROCEDURE — 84439 ASSAY OF FREE THYROXINE: CPT

## 2024-03-11 ENCOUNTER — ANTICOAGULATION VISIT (OUTPATIENT)
Dept: PHARMACY | Facility: HOSPITAL | Age: 84
End: 2024-03-11
Payer: MEDICARE

## 2024-03-11 DIAGNOSIS — I82.513 CHRONIC DEEP VEIN THROMBOSIS (DVT) OF FEMORAL VEIN OF BOTH LOWER EXTREMITIES: Primary | Chronic | ICD-10-CM

## 2024-03-11 DIAGNOSIS — D68.59 THROMBOPHILIA: Chronic | ICD-10-CM

## 2024-03-11 LAB — INR PPP: 2.6

## 2024-03-11 NOTE — PROGRESS NOTES
Anticoagulation Clinic Progress Note    Patient's visit was held by phone today.    Anticoagulation Summary  As of 3/11/2024      INR goal:  2.0-3.0   TTR:  87.3% (4.6 y)   INR used for dosin.60 (3/11/2024)   Warfarin maintenance plan:  7 mg (5 mg x 1 and 1 mg x 2) every Tue, Thu; 8 mg (5 mg x 1 and 1 mg x 3) all other days   Weekly warfarin total:  54 mg   No change documented:  Karen Oates RPH   Plan last modified:  Karen Oates RPH (2024)   Next INR check:  3/18/2024   Priority:  Maintenance   Target end date:  Indefinite    Indications    Deep vein thrombosis (DVT) [I82.409]  Thrombophilia [D68.59]                 Anticoagulation Episode Summary       INR check location:      Preferred lab:      Send INR reminders to:   [facility] CBC ANTICOAG POOL    Comments:  MDINR Home maninder          Anticoagulation Care Providers       Provider Role Specialty Phone number    Soraya Cortez MD Referring Hematology and Oncology [phone]            Drug interactions: has remained unchanged.  Diet: has remained unchanged.    Clinic Interview:  No pertinent clinical findings have been reported.    INR History:      2024     8:20 AM 2024    12:00 AM 2024     1:03 PM 3/4/2024    12:00 AM 3/4/2024     1:20 PM 3/11/2024    12:00 AM 3/11/2024     8:53 AM   Anticoagulation Monitoring   INR 2.30  2.50  2.00  2.60   INR Date 2024  2024  3/4/2024  3/11/2024   INR Goal 2.0-3.0  2.0-3.0  2.0-3.0  2.0-3.0   Trend Same  Same  Same  Same   Last Week Total 54 mg  54 mg  54 mg  54 mg   Next Week Total 54 mg  54 mg  54 mg  54 mg   Sun 8 mg  8 mg  8 mg  8 mg   Mon 8 mg  8 mg  8 mg  8 mg   Tue 7 mg  7 mg  7 mg  7 mg   Wed 8 mg  8 mg  8 mg  8 mg   Thu 7 mg  7 mg  7 mg  7 mg   Fri 8 mg  8 mg  8 mg  8 mg   Sat 8 mg  8 mg  8 mg  8 mg   Historical INR  2.50      2.00      2.60            This result is from an external source.       Plan:  1. INR is Therapeutic today- see above in Anticoagulation  Summary.   Will instruct Dalton CHAU Burt Sr. to Continue their warfarin regimen- see above in Anticoagulation Summary.  2. Follow up in 1 weeks- home test.   3.They have been instructed to call if any changes in medications, doses, concerns, etc. Patient expresses understanding and has no further questions at this time.    Karen Oates [facility]

## 2024-03-12 ENCOUNTER — OFFICE VISIT (OUTPATIENT)
Dept: ENDOCRINOLOGY | Facility: CLINIC | Age: 84
End: 2024-03-12
Payer: MEDICARE

## 2024-03-12 VITALS
OXYGEN SATURATION: 96 % | HEART RATE: 60 BPM | BODY MASS INDEX: 40.37 KG/M2 | DIASTOLIC BLOOD PRESSURE: 58 MMHG | WEIGHT: 282 LBS | SYSTOLIC BLOOD PRESSURE: 140 MMHG | HEIGHT: 70 IN

## 2024-03-12 DIAGNOSIS — Z79.4 TYPE 2 DIABETES MELLITUS WITH DIABETIC POLYNEUROPATHY, WITH LONG-TERM CURRENT USE OF INSULIN: Primary | ICD-10-CM

## 2024-03-12 DIAGNOSIS — E55.9 VITAMIN D DEFICIENCY: ICD-10-CM

## 2024-03-12 DIAGNOSIS — E03.9 ACQUIRED HYPOTHYROIDISM: Chronic | ICD-10-CM

## 2024-03-12 DIAGNOSIS — E11.42 TYPE 2 DIABETES MELLITUS WITH DIABETIC POLYNEUROPATHY, WITH LONG-TERM CURRENT USE OF INSULIN: Primary | ICD-10-CM

## 2024-03-12 DIAGNOSIS — E78.2 MIXED HYPERLIPIDEMIA: Chronic | ICD-10-CM

## 2024-03-12 PROCEDURE — 3078F DIAST BP <80 MM HG: CPT | Performed by: INTERNAL MEDICINE

## 2024-03-12 PROCEDURE — 99214 OFFICE O/P EST MOD 30 MIN: CPT | Performed by: INTERNAL MEDICINE

## 2024-03-12 PROCEDURE — 3077F SYST BP >= 140 MM HG: CPT | Performed by: INTERNAL MEDICINE

## 2024-03-12 RX ORDER — INSULIN HUMAN 500 [IU]/ML
INJECTION, SOLUTION SUBCUTANEOUS
Start: 2024-03-12

## 2024-03-12 NOTE — PATIENT INSTRUCTIONS
Increase exercise as able. Do stretching 2 x / d  Increase U 500 insulin to 30 units before breakfast & 40 units before supper.  Continue other diabetes, thyroid & lipid meds & vit D supplements.  Call if blood sugars are running under 100 or over 200.  F/u in 6 months, with fasting labs prior.

## 2024-03-14 NOTE — PROGRESS NOTES
HEMATOLOGY ONCOLOGY FOLLOW UP        Patient name: Dalton Dallas Sr.  : 1940  MRN: [id number]  Primary Care Physician: Joan Johns APRN  Referring Physician: Joan Johns APRN  Reason For Consult:     Chief Complaint   Patient presents with    Follow-up     Deep vein thrombosis (DVT) of lower extremity, unspecified chronicity, unspecified laterality, unspecified vein       History of PE and DVT  Anticoagulation management  Anemia    History of Present Illness:  Mr. Dallas is a 83 y.o. gentleman, remote smoker, with a history of diabetes, coronary artery disease and hypertension who presented to [facility] on 14 with symptoms of shortness of breath and chest discomfort.  CT scan of the chest on 14 showed bilateral pulmonary emboli with large clot burden.  There were filling defects predominantly in the right upper lobe, and lower and middle lobe pulmonary arterial trees along with emboli in the left upper lobe and left lower lobe arterial tree as well.  Mediastinal,  hilar and subcarinal lymphadenopathy was also noted.  The largest lymph node in the subcarinal location measured 3.9 x 1.9 cm.  The patient was started on anticoagulation.  Bilateral lower extremity venous Doppler on 14 showed a filling defect within the left gastroc veins.  The patient was put on anticoagulation.  Thrombolysis was also under consideration considering the extensive clot burden, but the patient did not require it.       Patient denied any prior history of thromboembolism before this episode.  His brother may have had   a leg DVT.    · 14 - Creatinine 1.3, BUN 20.  · 14 - CBC:  WBC 14.4, hemoglobin 14.9, platelet count 241,000.   · 14 - Factor V Leiden negative.  Factor VIII level 323 (H).  Protein C activity 112.6% (N).    Protein S activity 11.3 (L).  Activated protein C resistance 2.1 (L).  Antithrombin III 83% (N).    D-dimer 2.15.    · 14 - Serum homocysteine 8  (N).  Anticardiolipin antibody negative.  Phosphatidylserine   antibody negative.  Beta-2 glycoprotein antibody negative.   · 2/19/14 - Prothrombin gene mutation negative.    · 2/20/14 - FLAKO-2 mutation test negative.    · 3/10/14 - Protein S activity 93% (N).  Homocysteine level 11 (N).    · 4/11/14 - Factor VIII level 260 (H).   · 4/11/14 - CT scan of the chest without contrast:  Complete resolution of air space opacities in   the lower lobes.  Mediastinal lymphadenopathy is overall very similar to the prior study.  It is   nonspecific.  Three vessel coronary artery atherosclerotic calcification.  Questionable lobular   contour of the liver, question cirrhosis.  AP window lymph node measures about 1.1 cm,   paratracheal lymph node measures about 1.7 cm and subcarinal lymph node measures about 1.2 cm.  · 5/2/14 - INR 4.1.   · 7/30/14 - WBC 5.7, hemoglobin 12.1, platelet count 228,000, MCV 94.0.  INR 3.0.   · 10/17/14 - CT scan of the chest:  Chronic lung changes are stable.  Several borderline to mildly   enlarged mediastinal lymph nodes are also unchanged from six months ago.  They are likely benign   reactive lymph nodes.    · 10/29/14 - Vitamin B12 185, ferritin 57, iron saturation 19%, TIBC 473 (H), serum iron 88,   creatinine 1.3, BUN 18, folate 17.7.  · 11/4/14 - Patient underwent upper GI endoscopy and colonoscopy:  Several nonbleeding diverticula   were seen in the sigmoid colon.  Diverticulosis appeared moderate severity.  Four sessile polyps   of benign appearance were found in the cecum and descending colon.  Polypectomies were performed.    Surgical pathology:  Sessile serrated polyps.  A tubular adenoma is noted in the descending   colon.    · 11/25/14 - INR 4.2.   · 11/25/14 - WBC 5.7, hemoglobin 11.4, platelet count 210,000.    · 2/24/15 - WBC 7.7, hemoglobin 12.9, platelet count 245,000, MCV 93,000.  · 2/24/15 - INR 2.6.  Patient’s Coumadin dose is 9 mg.     · 3/12/15 - Creatinine 1.3.    · 4/21/15 -  Patient admitted with chest pain and had cardiac catheterization.  He had a stent   placed.    · 5/8/15 - CT scan of the chest without contrast:  Stable appearance of mediastinal   lymphadenopathy over the past two studies going back to April 2014.  Stable pulmonary fibrotic   changes and scarring.    · 6/25/15 - WBC 5.3, hemoglobin 12.6, platelet count 187,000.     · 12/17/15 - WBC 5.1, hemoglobin 13.6, platelet count 208,000, MCV 93.1.  Iron saturation 21%,   TIBC 415, serum iron 85.    · 12/17/15 - Chest x-ray:  No acute process.     · 4/19/16 - INR 2.1.    · 5/17/16 - INR 2.2.  · 6/16/16 - WBC 5.5, hemoglobin 12.7, MCV 90.5, platelet count 205,000.  INR 2.3 (patient on   Coumadin 7.5 mg).    · 12/15/16 - WBC 5.0, hemoglobin 13.0, platelet count 174,000.  INR 1.9 (Coumadin dose 7.5).   · 6/15/17 - WBC 5.5, hemoglobin 12.5, platelet count 168,000, MCV 94.4.  INR 2.6 (Coumadin dose 8   mg).   · 11/2/17 - INR 2.    · 12/14/17 - WBC 5.04, hemoglobin 12.5, platelet count 167,000, MCV 94.    · 6/14/18 - WBC 4.9, hemoglobin 12.5, platelet count 172,000, MCV 93.5.    · 12/13/18 - INR 2.4.  WBC 4.9, hemoglobin 12.8, platelet count 180,000.    11/23/2019 INR is 2.55  11/11/2019: Colonoscopy: Cecum polypectomy shows tubular adenoma, transverse colon polypectomy shows tubular adenoma.   Patient readmitted after colonoscopy to hospital due to GI bleeding from polyp biopsy..  11/22/2019 hemoglobin 9.3 due to GI bleeding  11/23/2019 cecal ulcer with visible vessel and stigmata of recent bleeding which was clipped  Moderate left-sided diverticulosis  12/12/2019 INR 1.9  12/12/2019 WBC 4.9, hemoglobin 11.2, MCV 98.0, platelets 182, ferritin 85.2, iron 115, iron saturation 24%, TIBC 471, B12 greater than 2000, folate 13.8,  4/23/2020: ESR 12  5/6/2020: 25 hydroxy vitamin D 31.9  6/11/2020: INR 2.1, WBC 6.3, hemoglobin 13.5, platelets 204,   9/28/2020: Lower extremity venous Doppler:: Normal  11/25/2020: INR 2.8  12/31/2020: WBC  6.4, hemoglobin 13.7, platelets 212, INR 1.2,  5/3/2021: 25 hydroxy vitamin D 66.5, TSH 2.8, creatinine 1.24,  5/12/2021: INR 2.7,  6/9/2021: INR 2.5,  Subsequent INR has been stable. Now on 8mg daily    Subjective:  Continue to be on coumadin, no bleeding, no s/s of VTE        Past Medical History:   Diagnosis Date    ACE-inhibitor cough 06/2005    Coronary artery disease     Diabetes mellitus, type 2 1995    ED (erectile dysfunction)     Hx of blood clots 2014    Blood clot left leg     Hyperlipidemia 08/2000    Hypertension     Hypogonadism, male 1998    Hypothyroidism 06/2001    Obesity     Peripheral neuropathy     Pulmonary embolism 02/2014    Rectal fistula     Sleep apnea 12/10/2013    Ulcer of left foot 11/2022    Vitamin D deficiency        Past Surgical History:   Procedure Laterality Date    CARDIAC CATHETERIZATION  2008    PTCA: 2008; PTCA: 4/21/2015 LCX stent     CARDIAC CATHETERIZATION Left 7/3/2021    Procedure: Cardiac Catheterization/Vascular Study;  Surgeon: Edwar Ackerman MD;  Location: [facility] CATH INVASIVE LOCATION;  Service: Cardiovascular;  Laterality: Left;    CARDIOVASCULAR STRESS TEST  2020    CATARACT EXTRACTION  01/11/2016 1-4-16 & 1-11-16     COLONOSCOPY N/A 11/23/2019    Procedure: COLONOSCOPY WITH ENDOSCOPIC CLIPPING X1 OF POST POLYPECTOMY BLEED SITE;  Surgeon: Jhonny Orellana MD;  Location: [facility] ENDOSCOPY;  Service: Gastroenterology    CORONARY STENT PLACEMENT      INGUINAL HERNIA REPAIR Left     UMBILICAL HERNIA REPAIR           Current Outpatient Medications:     Accu-Chek Irene Plus test strip, USE TO CHECK BLOOD SUGAR BEFORE MEALS AND AT BEDTIME, Disp: 400 each, Rfl: 3    amLODIPine (NORVASC) 5 MG tablet, Take 1 tablet by mouth Daily., Disp: 90 tablet, Rfl: 3    atenolol (TENORMIN) 50 MG tablet, TAKE 1 TABLET BY MOUTH DAILY, Disp: 90 tablet, Rfl: 3    atorvastatin (LIPITOR) 80 MG tablet, Take 1 tablet by mouth Every Night. Indications: High Amount of Fats in the Blood, Disp:  "90 tablet, Rfl: 3    Blood Glucose Monitoring Suppl (Accu-Chek Irene Plus) w/Device kit, 1 each 4 (Four) Times a Day., Disp: 1 kit, Rfl: 0    clopidogrel (PLAVIX) 75 MG tablet, TAKE 1 TABLET DAILY, Disp: 90 tablet, Rfl: 3    fenofibrate (TRICOR) 54 MG tablet, TAKE 1 TABLET BY MOUTH DAILY FOR HIGH AMOUNT OF TRIGLYCERIDES IN  THE BLOOD, Disp: 90 tablet, Rfl: 3    ferrous sulfate 325 (65 FE) MG tablet, Take 1 tablet by mouth 2 (Two) Times a Day., Disp: , Rfl:     gabapentin (NEURONTIN) 100 MG capsule, TAKE 1 CAPSULE BY MOUTH THREE  TIMES A DAY, Disp: 270 capsule, Rfl: 3    hydroCHLOROthiazide (HYDRODIURIL) 25 MG tablet, TAKE 1 TABLET BY MOUTH DAILY, Disp: 90 tablet, Rfl: 3    Insulin Pen Needle (BD ULTRA-FINE PEN NEEDLES) 29G X 12.7MM misc, Use to inject insulin twice daily. DX E11.65, Disp: 200 each, Rfl: 2    insulin regular (HUMULIN R) 500 UNIT/ML CONCENTRATED injection, DRAW TO 30 UNIT REI ON U-100 SYRINGE AND INJECT IN THE MORNING AND 40 UNIT REI IN THE EVENING  Indications: Type 2 Diabetes, Disp: , Rfl:     Insulin Syringe-Needle U-100 (BD Insulin Syringe U/F) 31G X 5/16\" 0.5 ML misc, 1 each by Other route 2 (Two) Times a Day., Disp: 200 each, Rfl: 3    Insulin Syringe-Needle U-100 30G X 1/2\" 0.5 ML misc, 1 each by Other route 2 (Two) Times a Day., Disp: 200 each, Rfl: 12    irbesartan (AVAPRO) 300 MG tablet, TAKE 1 TABLET BY MOUTH DAILY, Disp: 90 tablet, Rfl: 3    isosorbide mononitrate (IMDUR) 30 MG 24 hr tablet, Take 1 tablet by mouth Daily., Disp: 90 tablet, Rfl: 3    Jardiance 25 MG tablet tablet, TAKE 1 TABLET BY MOUTH DAILY, Disp: 90 tablet, Rfl: 3    levothyroxine (SYNTHROID, LEVOTHROID) 175 MCG tablet, TAKE 1 TABLET BY MOUTH DAILY FOR UNDERACTIVE THYROID, Disp: 90 tablet, Rfl: 3    metFORMIN ER (GLUCOPHAGE-XR) 500 MG 24 hr tablet, TAKE 4 TABLETS BY MOUTH AT  SUPPER FOR TYPE 2 DIABETES, Disp: 360 tablet, Rfl: 3    O2 (OXYGEN), Inhale 1 L/min Every Night. Indications: marianne, Disp: , Rfl:     Omega-3 Fatty " "Acids (FISH OIL) 1000 MG capsule capsule, Take 1 capsule by mouth 2 (Two) Times a Day., Disp: , Rfl:     Pramlintide Acetate (SymlinPen 120) 2700 MCG/2.7ML solution pen-injector, INJECT 120 MCG UNDER THE SKIN  INTO THE APPROPRIATE AREA AS  DIRECTED THREE TIMES A DAY, Disp: 10.8 mL, Rfl: 5    urea (CARMOL) 40 % cream, Apply 1 application  topically to the appropriate area as directed 2 (Two) Times a Day. Apply twice daily to calluses. Add vitamin D 5,000 units, Disp: 85 each, Rfl: 3    vitamin B-12 (CYANOCOBALAMIN) 100 MCG tablet, Take 1 tablet by mouth Daily. Indications: Inadequate Vitamin B12, Disp: , Rfl:     vitamin D (ERGOCALCIFEROL) 1.25 MG (88189 UT) capsule capsule, TAKE 1 CAPSULE BY MOUTH ONCE  WEEKLY FOR VITAMIN D DEFICIENCY, Disp: 13 capsule, Rfl: 3    warfarin (COUMADIN) 2 MG tablet, TAKE 1 TABLET BY MOUTH DAILY OR  AS DIRECTED BASED ON INR, Disp: 90 tablet, Rfl: 2    warfarin (COUMADIN) 3 MG tablet, Take 1 tablet by mouth Daily. Indications: Atrial Fibrillation, Disp: 90 tablet, Rfl: 3    warfarin (COUMADIN) 5 MG tablet, TAKE 1 TABLET BY MOUTH AT NIGHT  FOR ATRIAL FIBRILLATION, Disp: 90 tablet, Rfl: 3    No Known Allergies    Family History   Problem Relation Age of Onset    Heart disease Mother     Leukemia Father        Cancer-related family history is not on file.    Social History     Tobacco Use    Smoking status: Former     Current packs/day: 0.00     Average packs/day: 1.5 packs/day for 20.0 years (30.0 ttl pk-yrs)     Types: Cigarettes     Start date:      Quit date:      Years since quittin.2     Passive exposure: Past    Smokeless tobacco: Never    Tobacco comments:     quit    Vaping Use    Vaping status: Never Used   Substance Use Topics    Alcohol use: No    Drug use: No       ROS:     Objective:  Vital signs:  Vitals:    24 1051   BP: 128/68   Pulse: 74   Resp: 16   Temp: 98 °F (36.7 °C)   SpO2: 94%   Weight: 126 kg (278 lb 12.8 oz)   Height: 177.8 cm (70\")   PainSc: " 0-No pain       ECOG  (1) Restricted in physically strenuous activity, ambulatory and able to do work of light nature    Physical Exam:   Physical Exam   Constitutional: He is oriented to person, place, and time. He appears well-developed. No distress.   Obese male   HENT:   Head: Normocephalic and atraumatic.   Nose: Nose normal.   Mouth/Throat: Mucous membranes are moist.   Eyes: Conjunctivae are normal. Right eye exhibits no discharge. Left eye exhibits no discharge. No scleral icterus.   Neck: No thyromegaly present.   Cardiovascular: Normal rate, regular rhythm and normal heart sounds. Exam reveals no gallop and no friction rub.   Pulmonary/Chest: Effort normal. No stridor. No respiratory distress. He has no wheezes.   Abdominal: Soft. Normal appearance and bowel sounds are normal. He exhibits no mass. There is no abdominal tenderness. There is no rebound and no guarding.   Musculoskeletal: Normal range of motion. No swelling, tenderness or signs of injury.   Lymphadenopathy:     He has no cervical adenopathy.   Neurological: He is alert and oriented to person, place, and time. He exhibits normal muscle tone.   Skin: Skin is warm. No rash noted. He is not diaphoretic. No erythema.   Psychiatric: His behavior is normal. Mood normal.     Lab Results - Last 18 Months   Lab Units 12/19/23  1048 06/08/23  0947 12/15/22  1318   WBC 10*3/mm3 7.96 8.04 9.27   HEMOGLOBIN g/dL 13.7 13.4 13.3   HEMATOCRIT % 42.6 40.6 40.3   PLATELETS 10*3/mm3 227 242 347   MCV fL 95.3 92.7 92.2     Lab Results - Last 18 Months   Lab Units 03/05/24  1021 07/24/23  0852 11/03/22  1158   SODIUM mmol/L 138 137 137   POTASSIUM mmol/L 4.4 4.9 4.7   CHLORIDE mmol/L 100 99 101   CO2 mmol/L 28.0 23.5 26.4   BUN mg/dL 27* 25* 27*   CREATININE mg/dL 1.46* 1.36* 1.45*   CALCIUM mg/dL 10.1 10.1 9.9   BILIRUBIN mg/dL 0.4 0.4 0.3   ALK PHOS U/L 37* 38* 43   ALT (SGPT) U/L 18 19 17   AST (SGOT) U/L 19 19 18   GLUCOSE mg/dL 182* 210* 181*       Lab  "Results   Component Value Date    GLUCOSE 182 (H) 03/05/2024    BUN 27 (H) 03/05/2024    CREATININE 1.46 (H) 03/05/2024    EGFRIFNONA 60 (L) 11/02/2021    BCR 18.5 03/05/2024    K 4.4 03/05/2024    CO2 28.0 03/05/2024    CALCIUM 10.1 03/05/2024    ALBUMIN 4.4 03/05/2024    LABIL2 1.7 03/28/2019    AST 19 03/05/2024    ALT 18 03/05/2024       Lab Results - Last 18 Months   Lab Units 03/18/24  0000 03/11/24  0000 03/04/24  0000   INR  2.50 2.60 2.00       Lab Results   Component Value Date    IRON 69 12/19/2023    TIBC 448 12/19/2023    FERRITIN 126.00 12/19/2023       Lab Results   Component Value Date    FOLATE 13.80 12/12/2019       No results found for: \"OCCULTBLD\"    No results found for: \"RETICCTPCT\"    Lab Results   Component Value Date    QSMZKKQN90 >2,000 (H) 12/12/2019     No results found for: \"SPEP\", \"UPEP\"  No results found for: \"LDH\", \"URICACID\"  Lab Results   Component Value Date    SEDRATE 29 (H) 11/03/2022     No results found for: \"FIBRINOGEN\", \"HAPTOGLOBIN\"  Lab Results   Component Value Date    PTT 31.5 (H) 11/22/2019    INR 2.50 03/18/2024     No results found for: \"\"  No results found for: \"CEA\"  No components found for: \"CA-19-9\"  Lab Results   Component Value Date    PSA 0.397 11/30/2023         Assessment & Plan     Assessment:  1. History of bilateral pulmonary emboli with high clot burden and DVT:  His thrombosis was   unprovoked.  Hypercoagulability testing revealed elevated factor VIII level on two occasions.  He   is on anticoagulation with Coumadin.  He also has a questionable positive family history of DVT.    he continues to be on indefinite anticoagulation. INR stable alternates 7-8 monitor INR with pharmacy.  2. History of mediastinal lymphadenopathy:  His CT scan shows stable lymphadenopathy.    These are very likely benign lymph nodes. No further intervention.   3. Mild CKD:  He has slight anemia of chronic kidney disease. Hb has been stable.  4. History of iron deficiency:  " He had a colonoscopy and that showed polyps and moderately severe   diverticulosis.  Pathology showed a tubular adenoma in the descending colon.  He is on iron   supplements p.o. BID. History of iron deficiency. Hb continues to be normal last checked in 12/2023 , repeat in 6 months  5. Vitamin B12 deficiency:  He is on p.o. B12 supplements. Hb has been normal  6. Dizziness/light-headedness: resolved  7.  History of  GI bleeding.  Due to polypectomy from colonoscopy.       Deep vein thrombosis (DVT) of lower extremity, unspecified chronicity, unspecified laterality, unspecified vein  - CBC & Differential    Orders Placed This Encounter   Procedures    CBC & Differential     Order Specific Question:   Release to patient     Answer:   Routine Release [[id number]]        Thank you very much for providing the opportunity to participate in this patient’s care. Please do not hesitate to call if there are any other questions.

## 2024-03-18 ENCOUNTER — OFFICE VISIT (OUTPATIENT)
Dept: ONCOLOGY | Facility: CLINIC | Age: 84
End: 2024-03-18
Payer: MEDICARE

## 2024-03-18 ENCOUNTER — ANTICOAGULATION VISIT (OUTPATIENT)
Dept: PHARMACY | Facility: HOSPITAL | Age: 84
End: 2024-03-18
Payer: MEDICARE

## 2024-03-18 VITALS
BODY MASS INDEX: 39.91 KG/M2 | SYSTOLIC BLOOD PRESSURE: 128 MMHG | DIASTOLIC BLOOD PRESSURE: 68 MMHG | HEART RATE: 74 BPM | WEIGHT: 278.8 LBS | RESPIRATION RATE: 16 BRPM | TEMPERATURE: 98 F | OXYGEN SATURATION: 94 % | HEIGHT: 70 IN

## 2024-03-18 DIAGNOSIS — D50.9 IRON DEFICIENCY ANEMIA, UNSPECIFIED IRON DEFICIENCY ANEMIA TYPE: ICD-10-CM

## 2024-03-18 DIAGNOSIS — I82.513 CHRONIC DEEP VEIN THROMBOSIS (DVT) OF FEMORAL VEIN OF BOTH LOWER EXTREMITIES: Primary | Chronic | ICD-10-CM

## 2024-03-18 DIAGNOSIS — D68.59 THROMBOPHILIA: Chronic | ICD-10-CM

## 2024-03-18 DIAGNOSIS — I82.409 DEEP VEIN THROMBOSIS (DVT) OF LOWER EXTREMITY, UNSPECIFIED CHRONICITY, UNSPECIFIED LATERALITY, UNSPECIFIED VEIN: Primary | ICD-10-CM

## 2024-03-18 LAB — INR PPP: 2.5

## 2024-03-18 PROCEDURE — 3078F DIAST BP <80 MM HG: CPT | Performed by: INTERNAL MEDICINE

## 2024-03-18 PROCEDURE — 99214 OFFICE O/P EST MOD 30 MIN: CPT | Performed by: INTERNAL MEDICINE

## 2024-03-18 PROCEDURE — 1126F AMNT PAIN NOTED NONE PRSNT: CPT | Performed by: INTERNAL MEDICINE

## 2024-03-18 PROCEDURE — 3074F SYST BP LT 130 MM HG: CPT | Performed by: INTERNAL MEDICINE

## 2024-03-18 NOTE — PROGRESS NOTES
Anticoagulation Clinic Progress Note    Patient's visit was held by phone today.    Anticoagulation Summary  As of 3/18/2024      INR goal:  2.0-3.0   TTR:  87.4% (4.6 y)   INR used for dosin.50 (3/18/2024)   Warfarin maintenance plan:  7 mg (5 mg x 1 and 1 mg x 2) every Tue, Thu; 8 mg (5 mg x 1 and 1 mg x 3) all other days   Weekly warfarin total:  54 mg   No change documented:  Karen Oates RPH   Plan last modified:  Karen Oates RPH (2024)   Next INR check:  3/25/2024   Priority:  Maintenance   Target end date:  Indefinite    Indications    Deep vein thrombosis (DVT) [I82.409]  Thrombophilia [D68.59]                 Anticoagulation Episode Summary       INR check location:      Preferred lab:      Send INR reminders to:   [facility] ONC CBC ANTICOAG POOL    Comments:  MDINR Home maninder          Anticoagulation Care Providers       Provider Role Specialty Phone number    Soraya Cortez MD Referring Hematology and Oncology [phone]            Drug interactions: has remained unchanged.  Diet: has remained unchanged.    Clinic Interview:  No pertinent clinical findings have been reported.    INR History:      2024     1:03 PM 3/4/2024    12:00 AM 3/4/2024     1:20 PM 3/11/2024    12:00 AM 3/11/2024     8:53 AM 3/18/2024    12:00 AM 3/18/2024     8:28 AM   Anticoagulation Monitoring   INR 2.50  2.00  2.60  2.50   INR Date 2024  3/4/2024  3/11/2024  3/18/2024   INR Goal 2.0-3.0  2.0-3.0  2.0-3.0  2.0-3.0   Trend Same  Same  Same  Same   Last Week Total 54 mg  54 mg  54 mg  54 mg   Next Week Total 54 mg  54 mg  54 mg  54 mg   Sun 8 mg  8 mg  8 mg  8 mg   Mon 8 mg  8 mg  8 mg  8 mg   Tue 7 mg  7 mg  7 mg  7 mg   Wed 8 mg  8 mg  8 mg  8 mg   Thu 7 mg  7 mg  7 mg  7 mg   Fri 8 mg  8 mg  8 mg  8 mg   Sat 8 mg  8 mg  8 mg  8 mg   Historical INR  2.00      2.60      2.50            This result is from an external source.       Plan:  1. INR is Therapeutic today- see above in Anticoagulation  Summary.   Will instruct Dalton CHAU Burt Sr. to Continue their warfarin regimen- see above in Anticoagulation Summary.  2. Follow up in 1 weeks- home test.   3.They have been instructed to call if any changes in medications, doses, concerns, etc. Patient expresses understanding and has no further questions at this time.    Karen Oates [facility]

## 2024-03-22 NOTE — PROGRESS NOTES
[facility]        Patient Care Team:  Joan Johns APRN as PCP - General (Nurse Practitioner)  Soraya Cortez MD as Consulting Physician (Hematology and Oncology)  Jelani Vieira MD as Consulting Physician (Pulmonary Disease)  Edwar Ackerman MD as Consulting Physician (Pulmonary Disease)  Virginia Shaver MD as Consulting Physician (Endocrinology)    Chief Complaint:    Chief Complaint   Patient presents with    Diabetes     Fu / DM type 2 /          Subjective   Here for diabetes f/u  Blood sugars: high 100's. Using [facility] CGMS  Pt has agreed to wear the CGM device and share readings on a regular basis with our office  Exercise program: not much    Interval History:     Patient Complaints: none  Patient Denies: hypoglycemia  History taken from: patient    Review of Systems:   Review of Systems   HENT:  Positive for hearing loss. Negative for trouble swallowing.    Eyes:  Negative for blurred vision.   Cardiovascular:  Negative for palpitations.   Gastrointestinal:  Negative for nausea.   Endocrine: Negative for polyuria.   Neurological:  Negative for tremors and headache.   Lost  10 lb since last visit    Objective     Vital Signs     Vitals:    03/12/24 1159   BP: 140/58   Pulse: 60   SpO2: 96%         Physical Exam:     General Appearance:    Alert, cooperative, in no acute distress. Obese   Head:    Normocephalic, without obvious abnormality, atraumatic   Eyes:            Lids and lashes normal, conjunctivae and sclerae normal, no   icterus, no pallor, corneas clear, PERRLA   Throat:   No oral lesions,  oral mucosa moist. Missing teeth   Neck:   No adenopathy, supple,  no thyromegaly, no carotid bruit   Lungs:     Decreased breath sounds    Heart:    Regular rhythm and normal rate   Chest Wall:    No abnormalities observed   Abdomen:     Normal bowel sounds, soft                 Extremities:   Hammer toes, no edema               Pulses:   Pulses palpable and equal bilaterally   Skin:    Dry. L foot ulcer   Neurologic:  DTR absent in ankles, absent vibratory sense in toes, able to feel the 10g monofilament in heels only ( same as 1y ago )            Results Review:    I have reviewed the patient's new clinical results, labs & imaging.    Medication Review:   Prior to Admission medications    Medication Sig Start Date End Date Taking? Authorizing Provider   Accu-Chek Irene Plus test strip USE TO CHECK BLOOD SUGAR BEFORE MEALS AND AT BEDTIME 7/31/23  Yes Virginia Shaver MD   amLODIPine (NORVASC) 5 MG tablet Take 1 tablet by mouth Daily. 4/10/23  Yes Edwar Ackerman MD   atenolol (TENORMIN) 50 MG tablet TAKE 1 TABLET BY MOUTH DAILY 2/13/24  Yes Virginia Shaver MD   atorvastatin (LIPITOR) 80 MG tablet Take 1 tablet by mouth Every Night. Indications: High Amount of Fats in the Blood 4/10/23  Yes Edwar Ackerman MD   Blood Glucose Monitoring Suppl (Accu-Chek Irene Plus) w/Device kit 1 each 4 (Four) Times a Day. 5/13/22  Yes Virginia Shaver MD   clopidogrel (PLAVIX) 75 MG tablet TAKE 1 TABLET DAILY 4/10/23  Yes Edwar Ackerman MD   fenofibrate (TRICOR) 54 MG tablet TAKE 1 TABLET BY MOUTH DAILY FOR HIGH AMOUNT OF TRIGLYCERIDES IN  THE BLOOD 12/6/23  Yes Edwar Ackerman MD   ferrous sulfate 325 (65 FE) MG tablet Take 1 tablet by mouth 2 (Two) Times a Day.   Yes ProviderLee MD   gabapentin (NEURONTIN) 100 MG capsule TAKE 1 CAPSULE BY MOUTH THREE  TIMES A DAY 6/17/23  Yes Gabino Sibley Jr., MD   hydroCHLOROthiazide (HYDRODIURIL) 25 MG tablet TAKE 1 TABLET BY MOUTH DAILY 3/20/23  Yes Edwar Ackerman MD   Insulin Pen Needle (BD ULTRA-FINE PEN NEEDLES) 29G X 12.7MM misc Use to inject insulin twice daily. DX E11.65 2/29/24  Yes Virginia Shaver MD   insulin regular (HUMULIN R) 500 UNIT/ML CONCENTRATED injection DRAW TO 30 UNIT REI ON U-100 SYRINGE AND INJECT IN THE MORNING AND 40 UNIT REI IN THE EVENING  Indications: Type 2 Diabetes 3/12/24  Yes Virginia Shaver MD   Insulin Syringe-Needle  "U-100 (BD Insulin Syringe U/F) 31G X 5/16\" 0.5 ML misc 1 each by Other route 2 (Two) Times a Day. 9/21/23  Yes Virginia Shaver MD   Insulin Syringe-Needle U-100 30G X 1/2\" 0.5 ML misc 1 each by Other route 2 (Two) Times a Day. 1/23/24  Yes Virginia Shaver MD   irbesartan (AVAPRO) 300 MG tablet TAKE 1 TABLET BY MOUTH DAILY 6/19/23  Yes Virginia Shaver MD   isosorbide mononitrate (IMDUR) 30 MG 24 hr tablet Take 1 tablet by mouth Daily. 4/10/23  Yes Edwar Ackerman MD   Jardiance 25 MG tablet tablet TAKE 1 TABLET BY MOUTH DAILY 2/13/24  Yes Virginia Shaver MD   levothyroxine (SYNTHROID, LEVOTHROID) 175 MCG tablet TAKE 1 TABLET BY MOUTH DAILY FOR UNDERACTIVE THYROID 6/19/23  Yes Virginia Shaver MD   metFORMIN ER (GLUCOPHAGE-XR) 500 MG 24 hr tablet TAKE 4 TABLETS BY MOUTH AT  SUPPER FOR TYPE 2 DIABETES 2/13/24  Yes Virginia Shaver MD   O2 (OXYGEN) Inhale 1 L/min Every Night. Indications: marianne 11/29/22  Yes Lee Ruggiero MD   Omega-3 Fatty Acids (FISH OIL) 1000 MG capsule capsule Take 1 capsule by mouth 2 (Two) Times a Day.   Yes Lee Ruggiero MD   Pramlintide Acetate (SymlinPen 120) 2700 MCG/2.7ML solution pen-injector INJECT 120 MCG UNDER THE SKIN  INTO THE APPROPRIATE AREA AS  DIRECTED THREE TIMES A DAY 2/29/24  Yes Virginia Shaver MD   urea (CARMOL) 40 % cream Apply 1 application  topically to the appropriate area as directed 2 (Two) Times a Day. Apply twice daily to calluses. Add vitamin D 5,000 units 12/6/23  Yes Triny Cee APRN   vitamin B-12 (CYANOCOBALAMIN) 100 MCG tablet Take 1 tablet by mouth Daily. Indications: Inadequate Vitamin B12 4/15/15  Yes Lee Ruggiero MD   vitamin D (ERGOCALCIFEROL) 1.25 MG (33031 UT) capsule capsule TAKE 1 CAPSULE BY MOUTH ONCE  WEEKLY FOR VITAMIN D DEFICIENCY 1/12/24  Yes Vigrinia Shaver MD   warfarin (COUMADIN) 2 MG tablet TAKE 1 TABLET BY MOUTH DAILY OR  AS DIRECTED BASED ON INR 1/22/24  Yes Soraya Cortez MD   warfarin " (COUMADIN) 3 MG tablet Take 1 tablet by mouth Daily. Indications: Atrial Fibrillation 9/21/23  Yes Soraya Cortez MD   warfarin (COUMADIN) 5 MG tablet TAKE 1 TABLET BY MOUTH AT NIGHT  FOR ATRIAL FIBRILLATION 12/18/23  Yes Soraya Cortez MD         Lab Results   Component Value Date    HGBA1C 7.90 (H) 03/05/2024    HGBA1C 6.80 (H) 07/24/2023    HGBA1C 7.9 (H) 11/03/2022      Lab Results   Component Value Date    GLUCOSE 182 (H) 03/05/2024    BUN 27 (H) 03/05/2024    CREATININE 1.46 (H) 03/05/2024    EGFRIFNONA 60 (L) 11/02/2021    BCR 18.5 03/05/2024    K 4.4 03/05/2024    CO2 28.0 03/05/2024    CALCIUM 10.1 03/05/2024    ALBUMIN 4.4 03/05/2024    LABIL2 1.7 03/28/2019    AST 19 03/05/2024    ALT 18 03/05/2024    CHOL 141 07/24/2023    LDL 75 07/24/2023    HDL 41 07/24/2023    TRIG 145 07/24/2023     Lab Results   Component Value Date    TSH 1.890 03/05/2024    FREET4 1.78 (H) 03/05/2024    AAFV11MV 54.8 07/24/2023       Assessment & Plan     Diagnoses and all orders for this visit:    1. Type 2 diabetes mellitus with diabetic polyneuropathy, with long-term current use of insulin (Primary)  -     Hemoglobin A1c; Future  -     Microalbumin / Creatinine Urine Ratio - Urine, Clean Catch; Future  -     Lipid Panel; Future  -     insulin regular (HUMULIN R) 500 UNIT/ML CONCENTRATED injection; DRAW TO 30 UNIT REI ON U-100 SYRINGE AND INJECT IN THE MORNING AND 40 UNIT REI IN THE EVENING  Indications: Type 2 Diabetes    2. Vitamin D deficiency  -     Vitamin D,25-Hydroxy; Future    3. Acquired hypothyroidism  -     TSH; Future  -     T4, Free; Future    4. Mixed hyperlipidemia  -     Comprehensive Metabolic Panel; Future  -     Lipid Panel; Future    Glucose control worse. Thyroid stable. Will check lipid & vit D status next visit.    Increase exercise as able. Do stretching 2 x / d  Increase U 500 insulin to 30 units before breakfast & 40 units before supper.  Continue other diabetes, thyroid & lipid meds & vit D  supplements.  Call if blood sugars are running under 100 or over 200        Virginia Shaver MD  03/22/24  19:18 EDT

## 2024-03-25 ENCOUNTER — ANTICOAGULATION VISIT (OUTPATIENT)
Dept: PHARMACY | Facility: HOSPITAL | Age: 84
End: 2024-03-25
Payer: MEDICARE

## 2024-03-25 DIAGNOSIS — D68.59 THROMBOPHILIA: Chronic | ICD-10-CM

## 2024-03-25 DIAGNOSIS — I82.513 CHRONIC DEEP VEIN THROMBOSIS (DVT) OF FEMORAL VEIN OF BOTH LOWER EXTREMITIES: Primary | Chronic | ICD-10-CM

## 2024-03-25 LAB — INR PPP: 2.2

## 2024-03-25 NOTE — PROGRESS NOTES
Anticoagulation Clinic Progress Note    Patient's visit was held by phone today.    Anticoagulation Summary  As of 3/25/2024      INR goal:  2.0-3.0   TTR:  87.4% (4.6 y)   INR used for dosin.20 (3/25/2024)   Warfarin maintenance plan:  7 mg (5 mg x 1 and 1 mg x 2) every Tue, Thu; 8 mg (5 mg x 1 and 1 mg x 3) all other days   Weekly warfarin total:  54 mg   No change documented:  Karen Oates RPH   Plan last modified:  Karen Oates RPH (2024)   Next INR check:  2024   Priority:  Maintenance   Target end date:  Indefinite    Indications    Deep vein thrombosis (DVT) [I82.409]  Thrombophilia [D68.59]                 Anticoagulation Episode Summary       INR check location:      Preferred lab:      Send INR reminders to:   [facility] CBC ANTICOAG POOL    Comments:  MDINR Home maninder          Anticoagulation Care Providers       Provider Role Specialty Phone number    Soraya Cortez MD Referring Hematology and Oncology [phone]            Drug interactions: has remained unchanged.  Diet: has remained unchanged.    Clinic Interview:  No pertinent clinical findings have been reported.    INR History:      3/4/2024     1:20 PM 3/11/2024    12:00 AM 3/11/2024     8:53 AM 3/18/2024    12:00 AM 3/18/2024     8:28 AM 3/25/2024    12:00 AM 3/25/2024     8:30 AM   Anticoagulation Monitoring   INR 2.00  2.60  2.50  2.20   INR Date 3/4/2024  3/11/2024  3/18/2024  3/25/2024   INR Goal 2.0-3.0  2.0-3.0  2.0-3.0  2.0-3.0   Trend Same  Same  Same  Same   Last Week Total 54 mg  54 mg  54 mg  54 mg   Next Week Total 54 mg  54 mg  54 mg  54 mg   Sun 8 mg  8 mg  8 mg  8 mg   Mon 8 mg  8 mg  8 mg  8 mg   Tue 7 mg  7 mg  7 mg  7 mg   Wed 8 mg  8 mg  8 mg  8 mg   Thu 7 mg  7 mg  7 mg  7 mg   Fri 8 mg  8 mg  8 mg  8 mg   Sat 8 mg  8 mg  8 mg  8 mg   Historical INR  2.60      2.50      2.20        Visit Report    Report Report         This result is from an external source.       Plan:  1. INR is Therapeutic today-  see above in Anticoagulation Summary.   Will instruct Dalton Dallas Sr. to Continue their warfarin regimen- see above in Anticoagulation Summary.  2. Follow up in 1 weeks- home test.   3.They have been instructed to call if any changes in medications, doses, concerns, etc. Patient expresses understanding and has no further questions at this time.    Karen Oates [facility]

## 2024-03-26 RX ORDER — ISOSORBIDE MONONITRATE 30 MG/1
30 TABLET, EXTENDED RELEASE ORAL DAILY
Qty: 90 TABLET | Refills: 3 | Status: SHIPPED | OUTPATIENT
Start: 2024-03-26

## 2024-03-26 RX ORDER — AMLODIPINE BESYLATE 5 MG/1
5 TABLET ORAL DAILY
Qty: 90 TABLET | Refills: 3 | Status: SHIPPED | OUTPATIENT
Start: 2024-03-26

## 2024-03-26 RX ORDER — CLOPIDOGREL BISULFATE 75 MG/1
75 TABLET ORAL DAILY
Qty: 90 TABLET | Refills: 3 | Status: SHIPPED | OUTPATIENT
Start: 2024-03-26

## 2024-03-26 RX ORDER — ATORVASTATIN CALCIUM 80 MG/1
80 TABLET, FILM COATED ORAL NIGHTLY
Qty: 90 TABLET | Refills: 3 | Status: SHIPPED | OUTPATIENT
Start: 2024-03-26

## 2024-04-01 ENCOUNTER — ANTICOAGULATION VISIT (OUTPATIENT)
Dept: PHARMACY | Facility: HOSPITAL | Age: 84
End: 2024-04-01
Payer: MEDICARE

## 2024-04-01 DIAGNOSIS — D68.59 THROMBOPHILIA: Chronic | ICD-10-CM

## 2024-04-01 DIAGNOSIS — I82.513 CHRONIC DEEP VEIN THROMBOSIS (DVT) OF FEMORAL VEIN OF BOTH LOWER EXTREMITIES: Primary | Chronic | ICD-10-CM

## 2024-04-01 LAB — INR PPP: 1.2

## 2024-04-01 NOTE — PROGRESS NOTES
Anticoagulation Clinic Progress Note    Patient's visit was held by phone today.    Anticoagulation Summary  As of 2024      INR goal:  2.0-3.0   TTR:  87.2% (4.7 y)   INR used for dosin.20 (2024)   Warfarin maintenance plan:  7 mg (5 mg x 1 and 1 mg x 2) every Tue, Thu; 8 mg (5 mg x 1 and 1 mg x 3) all other days   Weekly warfarin total:  54 mg   Plan last modified:  Karen Oates RPH (2024)   Next INR check:  2024   Priority:  Maintenance   Target end date:  Indefinite    Indications    Deep vein thrombosis (DVT) [I82.409]  Thrombophilia [D68.59]                 Anticoagulation Episode Summary       INR check location:      Preferred lab:      Send INR reminders to:   [facility] CBC ANTICOAG POOL    Comments:  MDINR Home maninder          Anticoagulation Care Providers       Provider Role Specialty Phone number    Soraya Cortez MD Referring Hematology and Oncology [phone]            Drug interactions: has remained unchanged.  Diet: has remained unchanged.    Clinic Interview:  No pertinent clinical findings have been reported.    INR History:      3/11/2024     8:53 AM 3/18/2024    12:00 AM 3/18/2024     8:28 AM 3/25/2024    12:00 AM 3/25/2024     8:30 AM 2024    12:00 AM 2024     9:09 AM   Anticoagulation Monitoring   INR 2.60  2.50  2.20  1.20   INR Date 3/11/2024  3/18/2024  3/25/2024  2024   INR Goal 2.0-3.0  2.0-3.0  2.0-3.0  2.0-3.0   Trend Same  Same  Same  Same   Last Week Total 54 mg  54 mg  54 mg  54 mg   Next Week Total 54 mg  54 mg  54 mg  56 mg   Sun 8 mg  8 mg  8 mg  8 mg   Mon 8 mg  8 mg  8 mg  8 mg   Tue 7 mg  7 mg  7 mg  8 mg ()   Wed 8 mg  8 mg  8 mg  8 mg   Thu 7 mg  7 mg  7 mg  8 mg ()   Fri 8 mg  8 mg  8 mg  8 mg   Sat 8 mg  8 mg  8 mg  8 mg   Historical INR  2.50      2.20      1.20        Visit Report  Report Report           This result is from an external source.       Plan:  1. INR is Subtherapeutic today- see above in Anticoagulation Summary.  Patient reports no changes in medications and no missed doses. Upon furhter interview does state that he had coleslaw yesterday which contains cabbage and could lower INR.  Will instruct Dalton Dallas Sr. to Change their warfarin regimen this week only - see above in Anticoagulation Summary.  2. Follow up in 1 weeks home test.   3.They have been instructed to call if any changes in medications, doses, concerns, etc. Patient expresses understanding and has no further questions at this time.    Karen Oates [facility]

## 2024-04-04 ENCOUNTER — HOSPITAL ENCOUNTER (OUTPATIENT)
Dept: CARDIOLOGY | Facility: HOSPITAL | Age: 84
Discharge: HOME | End: 2024-04-04
Payer: MEDICARE

## 2024-04-04 DIAGNOSIS — I73.9 PERIPHERAL VASCULAR DISEASE, UNSPECIFIED: ICD-10-CM

## 2024-04-04 LAB
BH CV LOWER ARTERIAL LEFT ABI RATIO: 1.34
BH CV LOWER ARTERIAL LEFT DORSALIS PEDIS SYS MAX: 170
BH CV LOWER ARTERIAL LEFT GREAT TOE SYS MAX: 27
BH CV LOWER ARTERIAL LEFT POST TIBIAL SYS MAX: 112
BH CV LOWER ARTERIAL LEFT TBI RATIO: 0.21
BH CV LOWER ARTERIAL RIGHT ABI RATIO: 1.14
BH CV LOWER ARTERIAL RIGHT DORSALIS PEDIS SYS MAX: 145
BH CV LOWER ARTERIAL RIGHT GREAT TOE SYS MAX: 54
BH CV LOWER ARTERIAL RIGHT POST TIBIAL SYS MAX: 119
BH CV LOWER ARTERIAL RIGHT TBI RATIO: 0.43
UPPER ARTERIAL LEFT ARM BRACHIAL SYS MAX: 107
UPPER ARTERIAL RIGHT ARM BRACHIAL SYS MAX: 127

## 2024-04-04 PROCEDURE — 93922 UPR/L XTREMITY ART 2 LEVELS: CPT

## 2024-04-08 ENCOUNTER — ANTICOAGULATION VISIT (OUTPATIENT)
Dept: PHARMACY | Facility: HOSPITAL | Age: 84
End: 2024-04-08
Payer: MEDICARE

## 2024-04-08 DIAGNOSIS — I82.513 CHRONIC DEEP VEIN THROMBOSIS (DVT) OF FEMORAL VEIN OF BOTH LOWER EXTREMITIES: Primary | Chronic | ICD-10-CM

## 2024-04-08 DIAGNOSIS — D68.59 THROMBOPHILIA: Chronic | ICD-10-CM

## 2024-04-08 LAB — INR PPP: 2.5

## 2024-04-08 NOTE — PROGRESS NOTES
Anticoagulation Clinic Progress Note    Patient's visit was held by phone today - [facility]    Anticoagulation Summary  As of 2024      INR goal:  2.0-3.0   TTR:  87.0% (4.7 y)   INR used for dosin.50 (2024)   Warfarin maintenance plan:  7 mg (5 mg x 1 and 1 mg x 2) every Tue, Thu; 8 mg (5 mg x 1 and 1 mg x 3) all other days   Weekly warfarin total:  54 mg   No change documented:  Nneka Golden [facility]   Plan last modified:  Karen Oates [facility] (2024)   Next INR check:  4/15/2024   Priority:  Maintenance   Target end date:  Indefinite    Indications    Deep vein thrombosis (DVT) [I82.409]  Thrombophilia [D68.59]                 Anticoagulation Episode Summary       INR check location:      Preferred lab:      Send INR reminders to:   [facility] ON[facility] CBC ANTICOAG POOL    Comments:  MDINR Home maninder          Anticoagulation Care Providers       Provider Role Specialty Phone number    Soraya Cortez MD Referring Hematology and Oncology [phone]            Drug interactions: has remained unchanged.  Diet: has remained unchanged.    Clinic Interview:  No pertinent clinical findings have been reported.    INR History:      3/18/2024     8:28 AM 3/25/2024    12:00 AM 3/25/2024     8:30 AM 2024    12:00 AM 2024     9:09 AM 2024    12:00 AM 2024     1:58 PM   Anticoagulation Monitoring   INR 2.50  2.20  1.20  2.50   INR Date 3/18/2024  3/25/2024  2024  2024   INR Goal 2.0-3.0  2.0-3.0  2.0-3.0  2.0-3.0   Trend Same  Same  Same  Same   Last Week Total 54 mg  54 mg  54 mg  56 mg   Next Week Total 54 mg  54 mg  56 mg  54 mg   Sun 8 mg  8 mg  8 mg  8 mg   Mon 8 mg  8 mg  8 mg  8 mg   Tue 7 mg  7 mg  8 mg ()  7 mg   Wed 8 mg  8 mg  8 mg  8 mg   Thu 7 mg  7 mg  8 mg ()  7 mg   Fri 8 mg  8 mg  8 mg  8 mg   Sat 8 mg  8 mg  8 mg  8 mg   Historical INR  2.20      1.20      2.50        Visit Report Report             This result is from an external source.       Plan:  1. INR is Therapeutic  today- see above in Anticoagulation Summary.   Will instruct Dalton CHAU Burt Sr. to Continue their warfarin regimen- see above in Anticoagulation Summary.  2. Follow up in 1 weeks  3.They have been instructed to call if any changes in medications, doses, concerns, etc. Patient expresses understanding and has no further questions at this time.    Nneka Golden, [facility]

## 2024-04-09 NOTE — PROGRESS NOTES
Confirmed with patient on phone on 4/9 that he will return to schedule of warfarin 7mg on Tues/Thurs and 8mg on all other days. He states understanding and no issues.    Agree with plan and follow up.

## 2024-04-11 ENCOUNTER — OFFICE VISIT (OUTPATIENT)
Age: 84
End: 2024-04-11
Payer: MEDICARE

## 2024-04-11 VITALS
HEIGHT: 70 IN | BODY MASS INDEX: 41.52 KG/M2 | SYSTOLIC BLOOD PRESSURE: 118 MMHG | WEIGHT: 290 LBS | HEART RATE: 73 BPM | DIASTOLIC BLOOD PRESSURE: 51 MMHG

## 2024-04-11 DIAGNOSIS — I73.9 PERIPHERAL VASCULAR DISEASE: Primary | ICD-10-CM

## 2024-04-11 DIAGNOSIS — E78.2 MIXED HYPERLIPIDEMIA: Chronic | ICD-10-CM

## 2024-04-11 DIAGNOSIS — I10 ESSENTIAL HYPERTENSION: Chronic | ICD-10-CM

## 2024-04-11 NOTE — PATIENT INSTRUCTIONS
Continue your medications  We will see you back in 1 year with repeat CHIOMA and office visit  Call our office if you have any questions or concerns

## 2024-04-11 NOTE — PROGRESS NOTES
[facility] VASCULAR SURGERY    Chief Complaint  Follow-up (1 yr f/u CHIOMA done 4/4/24)    Subjective          History of Present Illness  Dalton Dallas Sr. is a 83 y.o. male with peripheral vascular disease. He presents to the office today for follow up. He is followed by Dr. Bowman. He has not had any previous vascular interventions performed. He denies any hospitalizations or new diagnosis since we last saw him. He ambulates with a cane.  He denies any claudication, he tells me his legs fatigue easily.  He denies any rest pain.  He has no wounds currently, he reports some sensitivity along his left lateral foot where his previous wound was.  He does still follow-up with the wound care center to prevent any further wounds from occurring to his feet.  He is maintained on Plavix and a statin.  He is also maintained on warfarin for history of hypercoagulable state, he is followed closely by hematology.  He reports compliance with his medications. He is a former smoker.    Review of Systems   Cardiovascular:  Positive for leg swelling.   Musculoskeletal:  Positive for gait problem (uses cane). Negative for myalgias.   Skin:  Negative for skin lesions and wound.        Allergies: Patient has no known allergies.    Prior to Admission medications    Medication Sig Start Date End Date Taking? Authorizing Provider   Accu-Chek Irene Plus test strip USE TO CHECK BLOOD SUGAR BEFORE MEALS AND AT BEDTIME 7/31/23  Yes Virginia Shaver MD   amLODIPine (NORVASC) 5 MG tablet Take 1 tablet by mouth Daily. 3/26/24  Yes Edwar Ackerman MD   atenolol (TENORMIN) 50 MG tablet TAKE 1 TABLET BY MOUTH DAILY 2/13/24  Yes Virginia Shaver MD   atorvastatin (LIPITOR) 80 MG tablet Take 1 tablet by mouth Every Night. Indications: High Amount of Fats in the Blood 3/26/24  Yes Edwar Ackerman MD   Blood Glucose Monitoring Suppl (Accu-Chek Irene Plus) w/Device kit 1 each 4 (Four) Times a Day. 5/13/22  Yes Virginia Shaver MD  "  clopidogrel (PLAVIX) 75 MG tablet Take 1 tablet by mouth Daily. 3/26/24  Yes Edwar Ackerman MD   fenofibrate (TRICOR) 54 MG tablet TAKE 1 TABLET BY MOUTH DAILY FOR HIGH AMOUNT OF TRIGLYCERIDES IN  THE BLOOD 12/6/23  Yes Edwar Ackerman MD   ferrous sulfate 325 (65 FE) MG tablet Take 1 tablet by mouth 2 (Two) Times a Day.   Yes Lee Ruggiero MD   gabapentin (NEURONTIN) 100 MG capsule TAKE 1 CAPSULE BY MOUTH THREE  TIMES A DAY 6/17/23  Yes Gabino Sibley Jr., MD   hydroCHLOROthiazide (HYDRODIURIL) 25 MG tablet TAKE 1 TABLET BY MOUTH DAILY 3/20/23  Yes Edwar Ackerman MD   Insulin Pen Needle (BD ULTRA-FINE PEN NEEDLES) 29G X 12.7MM misc Use to inject insulin twice daily. DX E11.65 2/29/24  Yes Virginia Shaver MD   insulin regular (HUMULIN R) 500 UNIT/ML CONCENTRATED injection DRAW TO 30 UNIT REI ON U-100 SYRINGE AND INJECT IN THE MORNING AND 40 UNIT REI IN THE EVENING  Indications: Type 2 Diabetes 3/12/24  Yes Virginia Shaver MD   Insulin Syringe-Needle U-100 (BD Insulin Syringe U/F) 31G X 5/16\" 0.5 ML misc 1 each by Other route 2 (Two) Times a Day. 9/21/23  Yes Virginia Shaver MD   Insulin Syringe-Needle U-100 30G X 1/2\" 0.5 ML misc 1 each by Other route 2 (Two) Times a Day. 1/23/24  Yes Virginia Shaver MD   irbesartan (AVAPRO) 300 MG tablet TAKE 1 TABLET BY MOUTH DAILY 6/19/23  Yes Virginia Shaver MD   isosorbide mononitrate (IMDUR) 30 MG 24 hr tablet Take 1 tablet by mouth Daily. 3/26/24  Yes Edwar Ackerman MD   Jardiance 25 MG tablet tablet TAKE 1 TABLET BY MOUTH DAILY 2/13/24  Yes Virginia Shaver MD   levothyroxine (SYNTHROID, LEVOTHROID) 175 MCG tablet TAKE 1 TABLET BY MOUTH DAILY FOR UNDERACTIVE THYROID 6/19/23  Yes Virginia Shaver MD   metFORMIN ER (GLUCOPHAGE-XR) 500 MG 24 hr tablet TAKE 4 TABLETS BY MOUTH AT  SUPPER FOR TYPE 2 DIABETES 2/13/24  Yes Virginia Shaver MD   O2 (OXYGEN) Inhale 1 L/min Every Night. Indications: marianne 11/29/22  Yes Provider, MD Lee   Omega-3 " "Fatty Acids (FISH OIL) 1000 MG capsule capsule Take 1 capsule by mouth 2 (Two) Times a Day.   Yes Lee Ruggiero MD   Pramlintide Acetate (SymlinPen 120) 2700 MCG/2.7ML solution pen-injector INJECT 120 MCG UNDER THE SKIN  INTO THE APPROPRIATE AREA AS  DIRECTED THREE TIMES A DAY 2/29/24  Yes Virginia Shaver MD   urea (CARMOL) 40 % cream Apply 1 application  topically to the appropriate area as directed 2 (Two) Times a Day. Apply twice daily to calluses. Add vitamin D 5,000 units 12/6/23  Yes Triny Cee APRN   vitamin B-12 (CYANOCOBALAMIN) 100 MCG tablet Take 1 tablet by mouth Daily. Indications: Inadequate Vitamin B12 4/15/15  Yes Lee Ruggiero MD   vitamin D (ERGOCALCIFEROL) 1.25 MG (61605 UT) capsule capsule TAKE 1 CAPSULE BY MOUTH ONCE  WEEKLY FOR VITAMIN D DEFICIENCY 1/12/24  Yes Virginia Shaver MD   warfarin (COUMADIN) 2 MG tablet TAKE 1 TABLET BY MOUTH DAILY OR  AS DIRECTED BASED ON INR 1/22/24  Yes Soraya Cortez MD   warfarin (COUMADIN) 3 MG tablet Take 1 tablet by mouth Daily. Indications: Atrial Fibrillation 9/21/23  Yes Soraya Cortez MD   warfarin (COUMADIN) 5 MG tablet TAKE 1 TABLET BY MOUTH AT NIGHT  FOR ATRIAL FIBRILLATION 12/18/23  Yes Soraya Cortez MD         Objective   Vital Signs:  /51 (BP Location: Right arm, Patient Position: Sitting)   Pulse 73   Ht 177.8 cm (70\")   Wt 132 kg (290 lb)   BMI 41.61 kg/m²   Estimated body mass index is 41.61 kg/m² as calculated from the following:    Height as of this encounter: 177.8 cm (70\").    Weight as of this encounter: 132 kg (290 lb).       Physical Exam  Vitals reviewed.   Constitutional:       General: He is not in acute distress.     Appearance: He is not ill-appearing.   Cardiovascular:      Pulses:           Radial pulses are 2+ on the right side and 2+ on the left side.        Dorsalis pedis pulses are 1+ on the right side and 1+ on the left side.        Posterior tibial pulses are 1+ on the right side and 1+ " on the left side.   Pulmonary:      Effort: Pulmonary effort is normal. No respiratory distress.   Musculoskeletal:      Right lower le+ Edema present.      Left lower le+ Edema present.   Neurological:      General: No focal deficit present.      Mental Status: He is alert and oriented to person, place, and time.        Result Review :    The following data was reviewed by: LUCAS Dixon on 2024:    Doppler ankle brachial index single level CAR (2024 09:47)   Right Conclusion: The right CHIOMA is normal. Mild digital ischemia.  CHIOMA 1.14, digit pressure of 54 mmHg  Left Conclusion: The left CHIOMA is normal. Moderate digital ischemia.  CHIOMA 1.34, digit pressure of 27 mmHg    I reviewed the office note by Dr. Alvarado from 3/18/2024  I reviewed the office note by Dr. Shaver from 3/12/2024       Assessment and Plan     Diagnoses and all orders for this visit:    1. Peripheral vascular disease (Primary)  -     Doppler Ankle Brachial Index Single Level CAR; Future    2. Mixed hyperlipidemia    3. Essential hypertension    Dalton Dallas  is a 83 y.o. male with peripheral microvascular disease. He denies any lifestyle limiting claudication, rest pain, or nonhealing wounds to his legs or feet. His most recent ABIs show normal circulation in the bilateral lower extremities, right 1.14 with digit pressure of 54 mmHg, left 1.34 with digit pressure of 27 mmHg.  His study remains stable. He is maintained on appropriate antiplatelet, anticoagulation, statin, and antihypertensive medications which we recommend he continue.  I will plan to see the patient back in the office in 1 year with an CHIOMA with toe pressures.  If his next study remains stable and he is wound free then we will make his follow-up as needed at that time.  He was agreeable with this plan.  He was instructed to call our office if he had any questions or concerns.     Follow Up     Return in about 1 year (around 2025) for  CHIOMA.    Patient was given instructions and counseling regarding his condition or for health maintenance advice. Please see specific information pulled into the AVS if appropriate.     Zara Osei, APRN

## 2024-04-15 ENCOUNTER — ANTICOAGULATION VISIT (OUTPATIENT)
Dept: PHARMACY | Facility: HOSPITAL | Age: 84
End: 2024-04-15
Payer: MEDICARE

## 2024-04-15 DIAGNOSIS — D68.59 THROMBOPHILIA: Chronic | ICD-10-CM

## 2024-04-15 DIAGNOSIS — I82.513 CHRONIC DEEP VEIN THROMBOSIS (DVT) OF FEMORAL VEIN OF BOTH LOWER EXTREMITIES: Primary | Chronic | ICD-10-CM

## 2024-04-15 LAB — INR PPP: 2.4

## 2024-04-23 ENCOUNTER — ANTICOAGULATION VISIT (OUTPATIENT)
Dept: PHARMACY | Facility: HOSPITAL | Age: 84
End: 2024-04-23
Payer: MEDICARE

## 2024-04-23 DIAGNOSIS — D68.59 THROMBOPHILIA: Chronic | ICD-10-CM

## 2024-04-23 DIAGNOSIS — I82.513 CHRONIC DEEP VEIN THROMBOSIS (DVT) OF FEMORAL VEIN OF BOTH LOWER EXTREMITIES: Primary | Chronic | ICD-10-CM

## 2024-04-23 LAB — INR PPP: 2.6

## 2024-04-23 NOTE — PROGRESS NOTES
Anticoagulation Clinic Progress Note    Patient's visit was held by phone today.    Anticoagulation Summary  As of 2024      INR goal:  2.0-3.0   TTR:  87.1% (4.7 y)   INR used for dosin.60 (2024)   Warfarin maintenance plan:  7 mg (5 mg x 1 and 1 mg x 2) every Tue, Thu; 8 mg (5 mg x 1 and 1 mg x 3) all other days   Weekly warfarin total:  54 mg   No change documented:  Karen Oates RPH   Plan last modified:  Karen Oates RPH (2024)   Next INR check:  2024   Priority:  Maintenance   Target end date:  Indefinite    Indications    Deep vein thrombosis (DVT) [I82.409]  Thrombophilia [D68.59]                 Anticoagulation Episode Summary       INR check location:      Preferred lab:      Send INR reminders to:   [facility] CBC ANTICOAG POOL    Comments:  MDINR Home maninder          Anticoagulation Care Providers       Provider Role Specialty Phone number    Soraya Cortez MD Referring Hematology and Oncology [phone]            Drug interactions: has remained unchanged.  Diet: has remained unchanged.    Clinic Interview:  No pertinent clinical findings have been reported.    INR History:      2024     9:09 AM 2024    12:00 AM 2024     1:58 PM 4/15/2024    12:00 AM 4/15/2024     1:34 PM 2024    12:00 AM 2024    10:59 AM   Anticoagulation Monitoring   INR 1.20  2.50    2.60   INR Date 2024   INR Goal 2.0-3.0  2.0-3.0  2.0-3.0  2.0-3.0   Trend Same  Same    Same   Last Week Total 54 mg  56 mg    54 mg   Next Week Total 56 mg  54 mg    54 mg   Sun 8 mg  8 mg    8 mg   Mon 8 mg  8 mg    8 mg   Tue 8 mg ()  7 mg    7 mg   Wed 8 mg  8 mg    8 mg   Thu 8 mg ()  7 mg    7 mg   Fri 8 mg  8 mg    8 mg   Sat 8 mg  8 mg    8 mg   Historical INR  2.50      2.40      2.60            This result is from an external source.       Plan:  1. INR is Therapeutic today- see above in Anticoagulation Summary.   Will instruct Dalton Dallas Sr. to  Continue their warfarin regimen- see above in Anticoagulation Summary.  2. Follow up in 1 weeks- home test.   3.They have been instructed to call if any changes in medications, doses, concerns, etc. Patient expresses understanding and has no further questions at this time.    Karen Oates [facility]

## 2024-04-29 ENCOUNTER — ANTICOAGULATION VISIT (OUTPATIENT)
Dept: PHARMACY | Facility: HOSPITAL | Age: 84
End: 2024-04-29
Payer: MEDICARE

## 2024-04-29 DIAGNOSIS — I82.513 CHRONIC DEEP VEIN THROMBOSIS (DVT) OF FEMORAL VEIN OF BOTH LOWER EXTREMITIES: Primary | Chronic | ICD-10-CM

## 2024-04-29 DIAGNOSIS — D68.59 THROMBOPHILIA: Chronic | ICD-10-CM

## 2024-04-29 LAB — INR PPP: 2.9

## 2024-04-29 NOTE — PROGRESS NOTES
Anticoagulation Clinic Progress Note    Patient's visit was held by phone today.    Anticoagulation Summary  As of 2024      INR goal:  2.0-3.0   TTR:  87.1% (4.7 y)   INR used for dosin.90 (2024)   Warfarin maintenance plan:  7 mg (5 mg x 1 and 1 mg x 2) every Tue, Thu; 8 mg (5 mg x 1 and 1 mg x 3) all other days   Weekly warfarin total:  54 mg   No change documented:  Karen Oates RPH   Plan last modified:  Karen Oates RPH (2024)   Next INR check:  2024   Priority:  Maintenance   Target end date:  Indefinite    Indications    Deep vein thrombosis (DVT) [I82.409]  Thrombophilia [D68.59]                 Anticoagulation Episode Summary       INR check location:      Preferred lab:      Send INR reminders to:   [facility] CBC ANTICOAG POOL    Comments:  MDINR Home maninder          Anticoagulation Care Providers       Provider Role Specialty Phone number    Soraya Cortez MD Referring Hematology and Oncology [phone]            Drug interactions: has remained unchanged.  Diet: has remained unchanged.    Clinic Interview:  No pertinent clinical findings have been reported.    INR History:      2024     1:58 PM 4/15/2024    12:00 AM 4/15/2024     1:34 PM 2024    12:00 AM 2024    10:59 AM 2024    12:00 AM 2024    11:15 AM   Anticoagulation Monitoring   INR 2.50    2.60  2.90   INR Date 2024   INR Goal 2.0-3.0  2.0-3.0  2.0-3.0  2.0-3.0   Trend Same    Same  Same   Last Week Total 56 mg    54 mg  54 mg   Next Week Total 54 mg    54 mg  54 mg   Sun 8 mg    8 mg  8 mg   Mon 8 mg    8 mg  8 mg   Tue 7 mg    7 mg  7 mg   Wed 8 mg    8 mg  8 mg   Thu 7 mg    7 mg  7 mg   Fri 8 mg    8 mg  8 mg   Sat 8 mg    8 mg  8 mg   Historical INR  2.40      2.60      2.90            This result is from an external source.       Plan:  1. INR is Therapeutic today- see above in Anticoagulation Summary.   Will instruct Dalton Dallas Sr. to Continue  their warfarin regimen- see above in Anticoagulation Summary.  2. Follow up in 1 weeks- home test.   3.They have been instructed to call if any changes in medications, doses, concerns, etc. Patient expresses understanding and has no further questions at this time.    Karen Oates RP

## 2024-04-30 ENCOUNTER — OFFICE VISIT (OUTPATIENT)
Dept: PODIATRY | Facility: CLINIC | Age: 84
End: 2024-04-30
Payer: MEDICARE

## 2024-04-30 VITALS
BODY MASS INDEX: 41.52 KG/M2 | HEART RATE: 85 BPM | WEIGHT: 290 LBS | RESPIRATION RATE: 20 BRPM | OXYGEN SATURATION: 99 % | HEIGHT: 70 IN

## 2024-04-30 DIAGNOSIS — L60.3 ONYCHODYSTROPHY: Primary | ICD-10-CM

## 2024-04-30 DIAGNOSIS — L84 CALLUS OF FOOT: ICD-10-CM

## 2024-04-30 DIAGNOSIS — E11.42 DM TYPE 2 WITH DIABETIC PERIPHERAL NEUROPATHY: ICD-10-CM

## 2024-04-30 DIAGNOSIS — E11.42 DIABETIC PERIPHERAL NEUROPATHY ASSOCIATED WITH TYPE 2 DIABETES MELLITUS: ICD-10-CM

## 2024-04-30 NOTE — PROGRESS NOTES
04/30/2024  Foot and Ankle Surgery - Established Patient/Follow-up  Provider: Dr. Aron Holt DPM  Location: [facility]    Subjective:  Dalton Dallas Sr. is a 83 y.o. male.     Chief Complaint   Patient presents with    Left Foot - Follow-up, Diabetes, Nail Problem     Dm foot check, nails    Right Foot - Follow-up, Diabetes, Nail Problem     Dm foot check, nails    Follow-up     TARYN Johns 01/05/2024       History of Present Illness  The patient is an 83-year-old male who presents for follow-up.    The patient last consulted with Gisela three months ago. He reports abstaining from toenail trimming for the past year and believes his bilateral fifth toenail is exhibiting growth. He also mentions a spot on his left foot, which was previously treated by Gisela. He maintains a regimen of foot moisturization. He acknowledges a portion of his toenail has not fully detached. His blood glucose levels have been suboptimal. He is uncertain of his last A1c level. He suspects a fungal infection in his toenail. He expresses a desire to obtain two pairs of shoe inserts.   He is not .      No Known Allergies    Current Outpatient Medications on File Prior to Visit   Medication Sig Dispense Refill    Accu-Chek Irene Plus test strip USE TO CHECK BLOOD SUGAR BEFORE MEALS AND AT BEDTIME 400 each 3    amLODIPine (NORVASC) 5 MG tablet Take 1 tablet by mouth Daily. 90 tablet 3    atenolol (TENORMIN) 50 MG tablet TAKE 1 TABLET BY MOUTH DAILY 90 tablet 3    atorvastatin (LIPITOR) 80 MG tablet Take 1 tablet by mouth Every Night. Indications: High Amount of Fats in the Blood 90 tablet 3    Blood Glucose Monitoring Suppl (Accu-Chek Irene Plus) w/Device kit 1 each 4 (Four) Times a Day. 1 kit 0    clopidogrel (PLAVIX) 75 MG tablet Take 1 tablet by mouth Daily. 90 tablet 3    fenofibrate (TRICOR) 54 MG tablet TAKE 1 TABLET BY MOUTH DAILY FOR HIGH AMOUNT OF TRIGLYCERIDES IN  THE BLOOD 90 tablet 3    ferrous sulfate 325 (65  "FE) MG tablet Take 1 tablet by mouth 2 (Two) Times a Day.      gabapentin (NEURONTIN) 100 MG capsule TAKE 1 CAPSULE BY MOUTH THREE  TIMES A  capsule 3    hydroCHLOROthiazide (HYDRODIURIL) 25 MG tablet TAKE 1 TABLET BY MOUTH DAILY 90 tablet 3    Insulin Pen Needle (BD ULTRA-FINE PEN NEEDLES) 29G X 12.7MM misc Use to inject insulin twice daily. DX E11.65 200 each 2    insulin regular (HUMULIN R) 500 UNIT/ML CONCENTRATED injection DRAW TO 30 UNIT REI ON U-100 SYRINGE AND INJECT IN THE MORNING AND 40 UNIT REI IN THE EVENING  Indications: Type 2 Diabetes      Insulin Syringe-Needle U-100 (BD Insulin Syringe U/F) 31G X 5/16\" 0.5 ML misc 1 each by Other route 2 (Two) Times a Day. 200 each 3    Insulin Syringe-Needle U-100 30G X 1/2\" 0.5 ML misc 1 each by Other route 2 (Two) Times a Day. 200 each 12    irbesartan (AVAPRO) 300 MG tablet TAKE 1 TABLET BY MOUTH DAILY 90 tablet 3    isosorbide mononitrate (IMDUR) 30 MG 24 hr tablet Take 1 tablet by mouth Daily. 90 tablet 3    Jardiance 25 MG tablet tablet TAKE 1 TABLET BY MOUTH DAILY 90 tablet 3    levothyroxine (SYNTHROID, LEVOTHROID) 175 MCG tablet TAKE 1 TABLET BY MOUTH DAILY FOR UNDERACTIVE THYROID 90 tablet 3    metFORMIN ER (GLUCOPHAGE-XR) 500 MG 24 hr tablet TAKE 4 TABLETS BY MOUTH AT  SUPPER FOR TYPE 2 DIABETES 360 tablet 3    O2 (OXYGEN) Inhale 1 L/min Every Night. Indications: marianne      Omega-3 Fatty Acids (FISH OIL) 1000 MG capsule capsule Take 1 capsule by mouth 2 (Two) Times a Day.      Pramlintide Acetate (SymlinPen 120) 2700 MCG/2.7ML solution pen-injector INJECT 120 MCG UNDER THE SKIN  INTO THE APPROPRIATE AREA AS  DIRECTED THREE TIMES A DAY 10.8 mL 5    urea (CARMOL) 40 % cream Apply 1 application  topically to the appropriate area as directed 2 (Two) Times a Day. Apply twice daily to calluses. Add vitamin D 5,000 units 85 each 3    vitamin B-12 (CYANOCOBALAMIN) 100 MCG tablet Take 1 tablet by mouth Daily. Indications: Inadequate Vitamin B12      " "vitamin D (ERGOCALCIFEROL) 1.25 MG (60504 UT) capsule capsule TAKE 1 CAPSULE BY MOUTH ONCE  WEEKLY FOR VITAMIN D DEFICIENCY 13 capsule 3    warfarin (COUMADIN) 2 MG tablet TAKE 1 TABLET BY MOUTH DAILY OR  AS DIRECTED BASED ON INR 90 tablet 2    warfarin (COUMADIN) 3 MG tablet Take 1 tablet by mouth Daily. Indications: Atrial Fibrillation 90 tablet 3    warfarin (COUMADIN) 5 MG tablet TAKE 1 TABLET BY MOUTH AT NIGHT  FOR ATRIAL FIBRILLATION 90 tablet 3     No current facility-administered medications on file prior to visit.       Objective   Pulse 85   Resp 20   Ht 177.8 cm (70\")   Wt 132 kg (290 lb)   SpO2 99%   BMI 41.61 kg/m²     Foot/Ankle Exam    GENERAL  Appearance:  appears stated age and elderly  Orientation:  AAOx3  Affect:  appropriate  Gait:  unimpaired  Assistance:  cane use  Right shoe gear: casual shoe and sock  Left shoe gear: surgical shoe and sock    VASCULAR     Left Foot Vascularity   Dorsalis pedis:  1+  Posterior tibial:  1+  Skin temperature:  warm  Edema gradin+  CFT:  < 3 seconds  Pedal hair growth:  Absent  Varicosities:  none     NEUROLOGIC     Left Foot Neurologic   Light touch sensation: normal    MUSCULOSKELETAL     Left Foot Musculoskeletal   Ecchymosis:  none  Tenderness:  none    DERMATOLOGIC        Left Foot Dermatologic   Skin  Positive for corn and ulcer.      Left foot additional comments: 2023 : left lateral leg measures 1.5 x 2 cm x 0.2 cm. The wound bed is pink and covered with approximately 30 to 40% adherent yellow slough.  Periwound with excoriation and slight maceration.  Moderate serosanguineous drainage noted on old dressing.      Left plantar foot measures one point or 0.8 x 0.5 x 0.2 cm.  Periwound maceration.  Wound bed pink, moist, clean.  Periwound otherwise clear dry and intact.  Moderate serosanguineous drainage noted on old dressing.    Resting appears to be calcium alginate    2023  Wound to left lateral lower extremity appears to be improved " and measures 1.4 cm x 2.0 x 0.1 cm today.   Wound to left foot also appears improved and measures 0.5 cm x 0.9 cm x 0.2 cm today with slight undermining.       4/30/24: No open wounds or signs of infection involving the feet.  No progressive deformity or instability.  No pain with palpation  Physical Exam        Results  Laboratory Studies  A1c is 7.9.    Assessment & Plan   Diagnoses and all orders for this visit:    1. Onychodystrophy (Primary)    2. Callus of foot    3. DM type 2 with diabetic peripheral neuropathy    4. Diabetic peripheral neuropathy associated with type 2 diabetes mellitus      Assessment & Plan     The patient was educated about nail changes associated with aging, repetitive stress, medical conditions, and medications. He was informed that his nails can thicken and become discolored. The dystrophy is characterized by abnormal nail growth. He was advised to continue moisturizing his feet and avoid picking at the spot. The pre-ulcerative area on the plantar aspect of his foot is stable. He was advised to use a pumice stone post-shower to maintain thickness. He was also advised to work on his A1c levels. He was advised to wear appropriate shoes and inserts, avoid walking barefoot, and to avoid flip-flops.    Explained importance of diabetic foot care, daily foot checks, and glycemic control. Patient should check both feet on a daily basis, monitor and control blood sugars, make sure that both feet and in between toes are towel dried after baths or showers. Avoid barefoot walking at all times.  I discussed wearing white socks and checking shoes before wearing them. Patient was given information on proper foot care. Call the office at the first signs of a wound or with signs of infection.    Reviewed proper basic stretching and manual therapy exercises along with appropriate shoes and activity.  Discussed proper use and/or avoidance of OTC anti-inflammatories.  Patient is to call with any  additional issues or concerns.  Greater than 20 minutes was spent before, during, and after evaluation for patient care.    Follow-up  The patient is scheduled for a follow-up visit in 3 months with nurse practitioner.             Patient or patient representative verbalized consent for the use of Ambient Listening during the visit with  CHRISSY Holt DPM for chart documentation. 5/1/2024  07:01 EDT    CHRISSY Holt DPM

## 2024-05-06 ENCOUNTER — ANTICOAGULATION VISIT (OUTPATIENT)
Dept: PHARMACY | Facility: HOSPITAL | Age: 84
End: 2024-05-06
Payer: MEDICARE

## 2024-05-06 DIAGNOSIS — D68.59 THROMBOPHILIA: Primary | Chronic | ICD-10-CM

## 2024-05-06 LAB — INR PPP: 2.6

## 2024-05-07 RX ORDER — IRBESARTAN 300 MG/1
TABLET ORAL
Qty: 90 TABLET | Refills: 3 | Status: SHIPPED | OUTPATIENT
Start: 2024-05-07

## 2024-05-07 RX ORDER — LEVOTHYROXINE SODIUM 175 UG/1
TABLET ORAL
Qty: 90 TABLET | Refills: 3 | Status: SHIPPED | OUTPATIENT
Start: 2024-05-07

## 2024-05-13 ENCOUNTER — ANTICOAGULATION VISIT (OUTPATIENT)
Dept: PHARMACY | Facility: HOSPITAL | Age: 84
End: 2024-05-13
Payer: MEDICARE

## 2024-05-13 ENCOUNTER — TELEPHONE (OUTPATIENT)
Dept: PODIATRY | Facility: CLINIC | Age: 84
End: 2024-05-13
Payer: MEDICARE

## 2024-05-13 DIAGNOSIS — D68.59 THROMBOPHILIA: Chronic | ICD-10-CM

## 2024-05-13 DIAGNOSIS — I82.513 CHRONIC DEEP VEIN THROMBOSIS (DVT) OF FEMORAL VEIN OF BOTH LOWER EXTREMITIES: Primary | Chronic | ICD-10-CM

## 2024-05-13 LAB — INR PPP: 2.8

## 2024-05-13 NOTE — TELEPHONE ENCOUNTER
"  Caller: Dalton Dallas Sr. \"LOKI\"    Relationship: Self    Best call back number: [phone] (home)       What is the best time to reach you: ANYTIME BEFORE 1PM    Who are you requesting to speak with (clinical staff, provider,  specific staff member): CHERI     Do you know the name of the person who called: INCOMING PATIENT CALL    What was the call regarding: PATIENT IS CALL TO SPEAK WITH CHERI IN REGARDS TO A    MEDICATION WAS ORDERED FROM [facility] FOR THE NERVES IN THE FEET.     PATIENT WANTS HER TO REORDER THAT MEDICATION. HE CALLED FOR A REFILL BUT IT WASNT SET UP FOR THAT. THEY TOLD HIM HE WOULD HAVE TO CALL CHERI       Is it okay if the provider responds through MyChart: NA      "

## 2024-05-13 NOTE — PROGRESS NOTES
Anticoagulation Clinic Progress Note    Patient's visit was held by phone today.    Anticoagulation Summary  As of 2024      INR goal:  2.0-3.0   TTR:  87.2% (4.8 y)   INR used for dosin.80 (2024)   Warfarin maintenance plan:  7 mg (5 mg x 1 and 1 mg x 2) every Tue, Thu; 8 mg (5 mg x 1 and 1 mg x 3) all other days   Weekly warfarin total:  54 mg   No change documented:  Karen Oates RPH   Plan last modified:  Karen Oates RPH (2024)   Next INR check:  2024   Priority:  Maintenance   Target end date:  Indefinite    Indications    Deep vein thrombosis (DVT) [I82.409]  Thrombophilia [D68.59]                 Anticoagulation Episode Summary       INR check location:      Preferred lab:      Send INR reminders to:   [facility] CBC ANTICOAG POOL    Comments:  MDINR Home maninder          Anticoagulation Care Providers       Provider Role Specialty Phone number    Soraya Cortez MD Referring Hematology and Oncology [phone]            Drug interactions: has remained unchanged.  Diet: has remained unchanged.    Clinic Interview:  No pertinent clinical findings have been reported.    INR History:      2024    10:59 AM 2024    12:00 AM 2024    11:15 AM 2024    12:00 AM 2024     9:43 AM 2024    12:00 AM 2024    11:01 AM   Anticoagulation Monitoring   INR 2.60  2.90  2.60  2.80   INR Date 2024   INR Goal 2.0-3.0  2.0-3.0  2.0-3.0  2.0-3.0   Trend Same  Same  Same  Same   Last Week Total 54 mg  54 mg  54 mg  54 mg   Next Week Total 54 mg  54 mg  54 mg  54 mg   Sun 8 mg  8 mg  8 mg  8 mg   Mon 8 mg  8 mg  8 mg  8 mg   Tue 7 mg  7 mg  7 mg  7 mg   Wed 8 mg  8 mg  8 mg  8 mg   Thu 7 mg  7 mg  7 mg  7 mg   Fri 8 mg  8 mg  8 mg  8 mg   Sat 8 mg  8 mg  8 mg  8 mg   Historical INR  2.90      2.60      2.80            This result is from an external source.       Plan:  1. INR is Therapeutic today- see above in Anticoagulation  Summary.   Will instruct Dalton CHAU Burt Sr. to Continue their warfarin regimen- see above in Anticoagulation Summary.  2. Follow up in 1 weeks- home test.   3.They have been instructed to call if any changes in medications, doses, concerns, etc. Patient expresses understanding and has no further questions at this time.    Karen Oates [facility]

## 2024-05-21 ENCOUNTER — ANTICOAGULATION VISIT (OUTPATIENT)
Dept: PHARMACY | Facility: HOSPITAL | Age: 84
End: 2024-05-21
Payer: MEDICARE

## 2024-05-21 DIAGNOSIS — D68.59 THROMBOPHILIA: Chronic | ICD-10-CM

## 2024-05-21 DIAGNOSIS — I82.513 CHRONIC DEEP VEIN THROMBOSIS (DVT) OF FEMORAL VEIN OF BOTH LOWER EXTREMITIES: Primary | Chronic | ICD-10-CM

## 2024-05-21 LAB — INR PPP: 2.6

## 2024-05-21 NOTE — PROGRESS NOTES
Anticoagulation Clinic Progress Note    Patient's visit was held by phone today.    Anticoagulation Summary  As of 2024      INR goal:  2.0-3.0   TTR:  87.3% (4.8 y)   INR used for dosin.60 (2024)   Warfarin maintenance plan:  7 mg (5 mg x 1 and 1 mg x 2) every Tue, Thu; 8 mg (5 mg x 1 and 1 mg x 3) all other days   Weekly warfarin total:  54 mg   No change documented:  Karen Oates RPH   Plan last modified:  Karen Oates RPH (2024)   Next INR check:  2024   Priority:  Maintenance   Target end date:  Indefinite    Indications    Deep vein thrombosis (DVT) [I82.409]  Thrombophilia [D68.59]                 Anticoagulation Episode Summary       INR check location:      Preferred lab:      Send INR reminders to:   [facility] ON[facility] CBC ANTICOAG POOL    Comments:  MDINR Home maninder          Anticoagulation Care Providers       Provider Role Specialty Phone number    Soraya Cortez MD Referring Hematology and Oncology [phone]            Drug interactions: has remained unchanged.  Diet: has remained unchanged.    Clinic Interview:  No pertinent clinical findings have been reported.    INR History:      2024    11:15 AM 2024    12:00 AM 2024     9:43 AM 2024    12:00 AM 2024    11:01 AM 2024    12:00 AM 2024     1:27 PM   Anticoagulation Monitoring   INR 2.90  2.60  2.80  2.60   INR Date 2024   INR Goal 2.0-3.0  2.0-3.0  2.0-3.0  2.0-3.0   Trend Same  Same  Same  Same   Last Week Total 54 mg  54 mg  54 mg  54 mg   Next Week Total 54 mg  54 mg  54 mg  54 mg   Sun 8 mg  8 mg  8 mg  8 mg   Mon 8 mg  8 mg  8 mg  8 mg   Tue 7 mg  7 mg  7 mg  7 mg   Wed 8 mg  8 mg  8 mg  8 mg   Thu 7 mg  7 mg  7 mg  7 mg   Fri 8 mg  8 mg  8 mg  8 mg   Sat 8 mg  8 mg  8 mg  8 mg   Historical INR  2.60      2.80      2.60            This result is from an external source.       Plan:  1. INR is Therapeutic today- see above in Anticoagulation  Summary.   Will instruct Dalton CHAU Burt Sr. to Continue their warfarin regimen- see above in Anticoagulation Summary.  2. Follow up in 1 weeks- home test.   3.They have been instructed to call if any changes in medications, doses, concerns, etc. Left detailed voice message with the above information.     Karen Oates RP

## 2024-05-27 DIAGNOSIS — I73.9 PERIPHERAL VASCULAR DISEASE: Primary | ICD-10-CM

## 2024-05-27 LAB — INR PPP: 2.3

## 2024-05-29 ENCOUNTER — ANTICOAGULATION VISIT (OUTPATIENT)
Dept: PHARMACY | Facility: HOSPITAL | Age: 84
End: 2024-05-29
Payer: MEDICARE

## 2024-05-29 DIAGNOSIS — D68.59 THROMBOPHILIA: Chronic | ICD-10-CM

## 2024-05-29 DIAGNOSIS — I82.513 CHRONIC DEEP VEIN THROMBOSIS (DVT) OF FEMORAL VEIN OF BOTH LOWER EXTREMITIES: Primary | Chronic | ICD-10-CM

## 2024-05-31 ENCOUNTER — LAB (OUTPATIENT)
Dept: LAB | Facility: HOSPITAL | Age: 84
End: 2024-05-31
Payer: MEDICARE

## 2024-05-31 ENCOUNTER — OFFICE VISIT (OUTPATIENT)
Dept: FAMILY MEDICINE CLINIC | Facility: CLINIC | Age: 84
End: 2024-05-31
Payer: MEDICARE

## 2024-05-31 VITALS
TEMPERATURE: 97.7 F | HEIGHT: 70 IN | WEIGHT: 274.8 LBS | DIASTOLIC BLOOD PRESSURE: 72 MMHG | BODY MASS INDEX: 39.34 KG/M2 | OXYGEN SATURATION: 97 % | SYSTOLIC BLOOD PRESSURE: 138 MMHG | HEART RATE: 65 BPM | RESPIRATION RATE: 17 BRPM

## 2024-05-31 DIAGNOSIS — I73.9 PERIPHERAL VASCULAR DISEASE: Chronic | ICD-10-CM

## 2024-05-31 DIAGNOSIS — D68.59 THROMBOPHILIA: Chronic | ICD-10-CM

## 2024-05-31 DIAGNOSIS — G47.31 PRIMARY CENTRAL SLEEP APNEA: Chronic | ICD-10-CM

## 2024-05-31 DIAGNOSIS — L57.0 KERATOTIC LESION: ICD-10-CM

## 2024-05-31 DIAGNOSIS — E11.42 TYPE 2 DIABETES MELLITUS WITH DIABETIC POLYNEUROPATHY, WITH LONG-TERM CURRENT USE OF INSULIN: ICD-10-CM

## 2024-05-31 DIAGNOSIS — I10 ESSENTIAL HYPERTENSION: Chronic | ICD-10-CM

## 2024-05-31 DIAGNOSIS — E55.9 VITAMIN D DEFICIENCY: ICD-10-CM

## 2024-05-31 DIAGNOSIS — E03.9 ACQUIRED HYPOTHYROIDISM: Chronic | ICD-10-CM

## 2024-05-31 DIAGNOSIS — I25.10 CORONARY ARTERY DISEASE INVOLVING NATIVE CORONARY ARTERY OF NATIVE HEART WITHOUT ANGINA PECTORIS: Chronic | ICD-10-CM

## 2024-05-31 DIAGNOSIS — D51.8 OTHER VITAMIN B12 DEFICIENCY ANEMIA: ICD-10-CM

## 2024-05-31 DIAGNOSIS — I20.89 CHRONIC STABLE ANGINA: Chronic | ICD-10-CM

## 2024-05-31 DIAGNOSIS — I82.513 CHRONIC DEEP VEIN THROMBOSIS (DVT) OF FEMORAL VEIN OF BOTH LOWER EXTREMITIES: Chronic | ICD-10-CM

## 2024-05-31 DIAGNOSIS — L97.523 ULCER OF LEFT FOOT WITH NECROSIS OF MUSCLE: Chronic | ICD-10-CM

## 2024-05-31 DIAGNOSIS — Z79.4 TYPE 2 DIABETES MELLITUS WITH DIABETIC POLYNEUROPATHY, WITH LONG-TERM CURRENT USE OF INSULIN: ICD-10-CM

## 2024-05-31 DIAGNOSIS — E78.2 MIXED HYPERLIPIDEMIA: Chronic | ICD-10-CM

## 2024-05-31 DIAGNOSIS — Z00.00 ENCOUNTER FOR SUBSEQUENT ANNUAL WELLNESS VISIT (AWV) IN MEDICARE PATIENT: Primary | Chronic | ICD-10-CM

## 2024-05-31 DIAGNOSIS — I27.82 OTHER CHRONIC PULMONARY EMBOLISM WITHOUT ACUTE COR PULMONALE: Chronic | ICD-10-CM

## 2024-05-31 LAB
25(OH)D3 SERPL-MCNC: 50.6 NG/ML (ref 30–100)
ALBUMIN SERPL-MCNC: 4.2 G/DL (ref 3.5–5.2)
ALBUMIN UR-MCNC: <1.2 MG/DL
ALBUMIN/GLOB SERPL: 1.7 G/DL
ALP SERPL-CCNC: 41 U/L (ref 39–117)
ALT SERPL W P-5'-P-CCNC: 18 U/L (ref 1–41)
ANION GAP SERPL CALCULATED.3IONS-SCNC: 13 MMOL/L (ref 5–15)
AST SERPL-CCNC: 17 U/L (ref 1–40)
BILIRUB SERPL-MCNC: 0.3 MG/DL (ref 0–1.2)
BUN SERPL-MCNC: 28 MG/DL (ref 8–23)
BUN/CREAT SERPL: 19.4 (ref 7–25)
CALCIUM SPEC-SCNC: 9.8 MG/DL (ref 8.6–10.5)
CHLORIDE SERPL-SCNC: 101 MMOL/L (ref 98–107)
CHOLEST SERPL-MCNC: 138 MG/DL (ref 0–200)
CO2 SERPL-SCNC: 26 MMOL/L (ref 22–29)
CREAT SERPL-MCNC: 1.44 MG/DL (ref 0.76–1.27)
CREAT UR-MCNC: 69.2 MG/DL
EGFRCR SERPLBLD CKD-EPI 2021: 48.8 ML/MIN/1.73
FOLATE SERPL-MCNC: 8.25 NG/ML (ref 4.78–24.2)
GLOBULIN UR ELPH-MCNC: 2.5 GM/DL
GLUCOSE SERPL-MCNC: 213 MG/DL (ref 65–99)
HBA1C MFR BLD: 8.2 % (ref 4.8–5.6)
HDLC SERPL-MCNC: 38 MG/DL (ref 40–60)
LDLC SERPL CALC-MCNC: 63 MG/DL (ref 0–100)
LDLC/HDLC SERPL: 1.43 {RATIO}
MICROALBUMIN/CREAT UR: NORMAL MG/G{CREAT}
POTASSIUM SERPL-SCNC: 4.4 MMOL/L (ref 3.5–5.2)
PROT SERPL-MCNC: 6.7 G/DL (ref 6–8.5)
SODIUM SERPL-SCNC: 140 MMOL/L (ref 136–145)
TRIGL SERPL-MCNC: 228 MG/DL (ref 0–150)
VIT B12 BLD-MCNC: 1711 PG/ML (ref 211–946)
VLDLC SERPL-MCNC: 37 MG/DL (ref 5–40)

## 2024-05-31 PROCEDURE — 82043 UR ALBUMIN QUANTITATIVE: CPT

## 2024-05-31 PROCEDURE — 82306 VITAMIN D 25 HYDROXY: CPT

## 2024-05-31 PROCEDURE — 83036 HEMOGLOBIN GLYCOSYLATED A1C: CPT

## 2024-05-31 PROCEDURE — 82746 ASSAY OF FOLIC ACID SERUM: CPT

## 2024-05-31 PROCEDURE — 82607 VITAMIN B-12: CPT

## 2024-05-31 PROCEDURE — 80061 LIPID PANEL: CPT

## 2024-05-31 PROCEDURE — 36415 COLL VENOUS BLD VENIPUNCTURE: CPT

## 2024-05-31 PROCEDURE — 80053 COMPREHEN METABOLIC PANEL: CPT

## 2024-05-31 PROCEDURE — 82570 ASSAY OF URINE CREATININE: CPT

## 2024-05-31 RX ORDER — GABAPENTIN 100 MG/1
100 CAPSULE ORAL 3 TIMES DAILY
Qty: 270 CAPSULE | Refills: 3 | OUTPATIENT
Start: 2024-05-31

## 2024-05-31 RX ORDER — GABAPENTIN 300 MG/1
300 CAPSULE ORAL 3 TIMES DAILY
Qty: 270 CAPSULE | Refills: 1 | Status: SHIPPED | OUTPATIENT
Start: 2024-05-31

## 2024-05-31 NOTE — PATIENT INSTRUCTIONS
You should hear from dermatology office if you don't let office know.  Follow up with specialist.  Eat healthy  Walking is great exercise.  Monitor blood sugars   In fall you will need RSV vaccine.

## 2024-05-31 NOTE — PROGRESS NOTES
The ABCs of the Annual Wellness Visit  Subsequent Medicare Wellness Visit    Subjective    Dalton Dallas Sr. is a 83 y.o. male who presents for a Subsequent Medicare Wellness Visit.    The following portions of the patient's history were reviewed and   updated as appropriate: allergies, current medications, past family history, past medical history, past social history, past surgical history, and problem list.    Compared to one year ago, the patient feels his physical   health is better.    Compared to one year ago, the patient feels his mental   health is the same.    Recent Hospitalizations:  He was not admitted to the hospital during the last year.       Current Medical Providers:  Patient Care Team:  Joan Johns APRN as PCP - General (Nurse Practitioner)  Soraya Cortez MD as Consulting Physician (Hematology and Oncology)  Jelani Vieira MD as Consulting Physician (Pulmonary Disease)  Edwar Ackerman MD as Consulting Physician (Pulmonary Disease)  Virginia Shaver MD as Consulting Physician (Endocrinology)  Triny Cee APRN as Nurse Practitioner (Orthopedic Surgery)  CHRISSY Holt DPM as Consulting Physician (Podiatry)  Bill Don MD as Consulting Physician (Ophthalmology)    Outpatient Medications Prior to Visit   Medication Sig Dispense Refill    Accu-Chek Irene Plus test strip USE TO CHECK BLOOD SUGAR BEFORE MEALS AND AT BEDTIME 400 each 3    amLODIPine (NORVASC) 5 MG tablet Take 1 tablet by mouth Daily. 90 tablet 3    atenolol (TENORMIN) 50 MG tablet TAKE 1 TABLET BY MOUTH DAILY 90 tablet 3    atorvastatin (LIPITOR) 80 MG tablet Take 1 tablet by mouth Every Night. Indications: High Amount of Fats in the Blood 90 tablet 3    Blood Glucose Monitoring Suppl (Accu-Chek Irene Plus) w/Device kit 1 each 4 (Four) Times a Day. 1 kit 0    clopidogrel (PLAVIX) 75 MG tablet Take 1 tablet by mouth Daily. 90 tablet 3    fenofibrate (TRICOR) 54 MG tablet TAKE 1 TABLET BY MOUTH DAILY FOR HIGH  "AMOUNT OF TRIGLYCERIDES IN  THE BLOOD 90 tablet 3    ferrous sulfate 325 (65 FE) MG tablet Take 1 tablet by mouth 2 (Two) Times a Day.      hydroCHLOROthiazide (HYDRODIURIL) 25 MG tablet TAKE 1 TABLET BY MOUTH DAILY 90 tablet 3    Insulin Pen Needle (BD ULTRA-FINE PEN NEEDLES) 29G X 12.7MM misc Use to inject insulin twice daily. DX E11.65 200 each 2    insulin regular (HUMULIN R) 500 UNIT/ML CONCENTRATED injection DRAW TO 30 UNIT REI ON U-100 SYRINGE AND INJECT IN THE MORNING AND 40 UNIT REI IN THE EVENING  Indications: Type 2 Diabetes      Insulin Syringe-Needle U-100 (BD Insulin Syringe U/F) 31G X 5/16\" 0.5 ML misc 1 each by Other route 2 (Two) Times a Day. 200 each 3    Insulin Syringe-Needle U-100 30G X 1/2\" 0.5 ML misc 1 each by Other route 2 (Two) Times a Day. 200 each 12    irbesartan (AVAPRO) 300 MG tablet TAKE 1 TABLET BY MOUTH DAILY 90 tablet 3    isosorbide mononitrate (IMDUR) 30 MG 24 hr tablet Take 1 tablet by mouth Daily. 90 tablet 3    Jardiance 25 MG tablet tablet TAKE 1 TABLET BY MOUTH DAILY 90 tablet 3    levothyroxine (SYNTHROID, LEVOTHROID) 175 MCG tablet TAKE 1 TABLET BY MOUTH DAILY FOR UNDERACTIVE THYROID 90 tablet 3    metFORMIN ER (GLUCOPHAGE-XR) 500 MG 24 hr tablet TAKE 4 TABLETS BY MOUTH AT  SUPPER FOR TYPE 2 DIABETES 360 tablet 3    O2 (OXYGEN) Inhale 1 L/min Every Night. Indications: marianne      Omega-3 Fatty Acids (FISH OIL) 1000 MG capsule capsule Take 1 capsule by mouth 2 (Two) Times a Day.      Pramlintide Acetate (SymlinPen 120) 2700 MCG/2.7ML solution pen-injector INJECT 120 MCG UNDER THE SKIN  INTO THE APPROPRIATE AREA AS  DIRECTED THREE TIMES A DAY 10.8 mL 5    urea (CARMOL) 40 % cream Apply 1 application  topically to the appropriate area as directed 2 (Two) Times a Day. Apply twice daily to calluses. Add vitamin D 5,000 units 85 each 3    vitamin B-12 (CYANOCOBALAMIN) 100 MCG tablet Take 1 tablet by mouth Daily. Indications: Inadequate Vitamin B12      vitamin D (ERGOCALCIFEROL) " 1.25 MG (06774 UT) capsule capsule TAKE 1 CAPSULE BY MOUTH ONCE  WEEKLY FOR VITAMIN D DEFICIENCY 13 capsule 3    warfarin (COUMADIN) 2 MG tablet TAKE 1 TABLET BY MOUTH DAILY OR  AS DIRECTED BASED ON INR 90 tablet 2    warfarin (COUMADIN) 3 MG tablet Take 1 tablet by mouth Daily. Indications: Atrial Fibrillation 90 tablet 3    warfarin (COUMADIN) 5 MG tablet TAKE 1 TABLET BY MOUTH AT NIGHT  FOR ATRIAL FIBRILLATION 90 tablet 3    gabapentin (NEURONTIN) 100 MG capsule TAKE 1 CAPSULE BY MOUTH THREE  TIMES A  capsule 3     No facility-administered medications prior to visit.       No opioid medication identified on active medication list. I have reviewed chart for other potential  high risk medication/s and harmful drug interactions in the elderly.        Aspirin is not on active medication list.  Aspirin use is not indicated based on review of current medical condition/s. Risk of harm outweighs potential benefits.  .    Patient Active Problem List   Diagnosis    Coronary artery disease involving native coronary artery of native heart    Essential hypertension    Mixed hyperlipidemia    Deep vein thrombosis (DVT)    Eczema    Hypothyroidism    Sleep apnea    Vitamin D deficiency    Peripheral vascular disease    Chronic pulmonary embolism without acute cor pulmonale    Diastolic dysfunction with chronic heart failure    Pulmonary hypertension    Lumbar radiculopathy    Thrombophilia    MI, acute, non ST segment elevation    Type 2 diabetes mellitus with diabetic polyneuropathy, with long-term current use of insulin    Abnormal leg movement    Ulcer of left foot with necrosis of muscle    Chronic stable angina    Encounter for subsequent annual wellness visit (AWV) in Medicare patient     Advance Care Planning   Advance Care Planning     Advance Directive is on file.  ACP discussion was held with the patient during this visit. Patient has an advance directive in EMR which is still valid.      Objective    Vitals:  "   24 1040   BP: 138/72   BP Location: Left arm   Patient Position: Sitting   Cuff Size: Adult   Pulse: 65   Resp: 17   Temp: 97.7 °F (36.5 °C)   TempSrc: Oral   SpO2: 97%   Weight: 125 kg (274 lb 12.8 oz)   Height: 177.8 cm (70\")     Estimated body mass index is 39.43 kg/m² as calculated from the following:    Height as of this encounter: 177.8 cm (70\").    Weight as of this encounter: 125 kg (274 lb 12.8 oz).           Does the patient have evidence of cognitive impairment? No    Lab Results   Component Value Date    HGBA1C 7.90 (H) 2024        HEALTH RISK ASSESSMENT    Smoking Status:  Social History     Tobacco Use   Smoking Status Former    Current packs/day: 0.00    Average packs/day: 1.5 packs/day for 20.0 years (30.0 ttl pk-yrs)    Types: Cigarettes    Start date:     Quit date:     Years since quittin.4    Passive exposure: Past   Smokeless Tobacco Never   Tobacco Comments    quit      Alcohol Consumption:  Social History     Substance and Sexual Activity   Alcohol Use No     Fall Risk Screen:    BISMARK Fall Risk Assessment was completed, and patient is at LOW risk for falls.Assessment completed on:2024    Depression Screenin/31/2024    10:37 AM   PHQ-2/PHQ-9 Depression Screening   Little Interest or Pleasure in Doing Things 0-->not at all   Feeling Down, Depressed or Hopeless 0-->not at all   PHQ-9: Brief Depression Severity Measure Score 0       Health Habits and Functional and Cognitive Screenin/31/2024    10:38 AM   Functional & Cognitive Status   Do you have difficulty preparing food and eating? No   Do you have difficulty bathing yourself, getting dressed or grooming yourself? No   Do you have difficulty using the toilet? No   Do you have difficulty moving around from place to place? No   Do you have trouble with steps or getting out of a bed or a chair? No   Current Diet Other   Dental Exam Up to date   Eye Exam Up to date   Exercise (times per " week) 7 times per week   Current Exercises Include Walking   Do you need help using the phone?  No   Are you deaf or do you have serious difficulty hearing?  No   Do you need help to go to places out of walking distance? No   Do you need help shopping? No   Do you need help preparing meals?  No   Do you need help with housework?  No   Do you need help with laundry? No   Do you need help taking your medications? No   Do you need help managing money? No   Do you ever drive or ride in a car without wearing a seat belt? No   Have you felt unusual stress, anger or loneliness in the last month? No   Who do you live with? Child   If you need help, do you have trouble finding someone available to you? No   Have you been bothered in the last four weeks by sexual problems? No   Do you have difficulty concentrating, remembering or making decisions? No       Age-appropriate Screening Schedule:  Refer to the list below for future screening recommendations based on patient's age, sex and/or medical conditions. Orders for these recommended tests are listed in the plan section. The patient has been provided with a written plan.    Health Maintenance   Topic Date Due    RSV Vaccine - Adults (1 - 1-dose 60+ series) Never done    ZOSTER VACCINE (2 of 3) 12/20/2016    COVID-19 Vaccine (4 - 2023-24 season) 11/30/2024 (Originally 9/1/2023)    LIPID PANEL  07/24/2024    URINE MICROALBUMIN  07/24/2024    INFLUENZA VACCINE  08/01/2024    HEMOGLOBIN A1C  09/05/2024    BMI FOLLOWUP  11/30/2024    DIABETIC EYE EXAM  02/20/2025    ANNUAL WELLNESS VISIT  05/31/2025    COLORECTAL CANCER SCREENING  11/23/2029    Pneumococcal Vaccine 65+  Completed    TDAP/TD VACCINES  Discontinued                  CMS Preventative Services Quick Reference  Risk Factors Identified During Encounter  Chronic Pain: Natural history and expected course discussed. Questions answered.  The above risks/problems have been discussed with the patient.  Pertinent information  has been shared with the patient in the After Visit Summary.  An After Visit Summary and PPPS were made available to the patient.    Follow Up:   Next Medicare Wellness visit to be scheduled in 1 year.       Additional E&M Note during same encounter follows:  Patient has multiple medical problems which are significant and separately identifiable that require additional work above and beyond the Medicare Wellness Visit.      Chief Complaint  Medicare Wellness-subsequent    Subjective        HPI  Dalton CHAU Burt Sr. is also being seen today for CAD, DM, hypothyroidism. , hypertension.    Review of Systems   HENT:          Followed by dentist Dr Monroe.      Eyes:         Wears glasses eye exam 12/2023. Followed by eye associates   Neurological:  Negative for tremors and seizures.   Psychiatric/Behavioral:  Negative for dysphoric mood, hallucinations and suicidal ideas. The patient is not nervous/anxious and is not hyperactive.    DM followed by endocrinology A1C 7.9 high blood sugars are high as reported over 200. Humulin R 30 units am and 40 units PM metfomin  mg bid jardiance 25 mg daily  pramlintide acetate injections .     Hypothyroidism: followed by endocrinology TSH 3/5/2024 1.89 normal levothyroxine 175 mcg daily.     Iron def anemia: 15 low 12/19/2023 low followed by hematology checks inr's also at home. Taking iron tablet bid with vit c. Has thrombophia.     CAD followed by cardiology on imdur for chest pain has DM and hyperlipidemia. Taking plavix 75 mg daily fenofibrate 54 mg daily atrovastatin 80 mg daily.      HAN using oxygen at night has cpap with concentrator.     Peripheral neuropathy gabapenin 100 mg tid would like increase Is having increase neuropathic pain. Will increase to 300mg tid.   No open wounds he is followed by podiatry.     Chronic PE with DVT on coumadin monitors his own INR. Reviewed recent labs as normal     Vit d def taking 50,000 IU weekly managed by endocrinology    B12  "def taking 100 mcg daily     Hypertension amlodipine 5 mg daily.   hctz 25 mg daily       Objective   Vital Signs:  /72 (BP Location: Left arm, Patient Position: Sitting, Cuff Size: Adult)   Pulse 65   Temp 97.7 °F (36.5 °C) (Oral)   Resp 17   Ht 177.8 cm (70\")   Wt 125 kg (274 lb 12.8 oz)   SpO2 97%   BMI 39.43 kg/m²     Physical Exam                    Assessment and Plan   Diagnoses and all orders for this visit:    1. Encounter for subsequent annual wellness visit (AWV) in Medicare patient (Primary)  Comments:  discussed rsv vaccine upcoming fall ,    2. Coronary artery disease involving native coronary artery of native heart without angina pectoris  Comments:  stable followed by cardiology  Orders:  -     Comprehensive Metabolic Panel; Future    3. Essential hypertension  Comments:  stable  Orders:  -     Comprehensive Metabolic Panel; Future    4. Mixed hyperlipidemia  Comments:  stable  Orders:  -     Lipid Panel; Future  -     Comprehensive Metabolic Panel; Future    5. Peripheral vascular disease  Comments:  increased gabapentin to 300mg tid    6. Other chronic pulmonary embolism without acute cor pulmonale  Comments:  stable on warfarin followed by pulmonolgy    7. Chronic deep vein thrombosis (DVT) of femoral vein of both lower extremities  Comments:  stable on warfarin followed by hematology    8. Thrombophilia  Comments:  stable followed by hematology    9. Acquired hypothyroidism  Comments:  stable followed by endocrinology    10. Primary central sleep apnea  Comments:  using oxygen with cpap  Assessment & Plan:  On oxygen at night with cpap 1.5 L      11. Ulcer of left foot with necrosis of muscle  Comments:  no longer followed by pain management.    12. Chronic stable angina  Comments:  stable on imdur    13. Keratotic lesion  -     Cancel: Ambulatory Referral to Dermatology  -     Ambulatory Referral to Dermatology    14. Vitamin D deficiency  -     Vitamin D,25-Hydroxy; Future    15. " Other vitamin B12 deficiency anemia  -     Vitamin B12; Future  -     Folate; Future    16. Type 2 diabetes mellitus with diabetic polyneuropathy, with long-term current use of insulin  -     Microalbumin / Creatinine Urine Ratio - Urine, Clean Catch; Future  -     Hemoglobin A1c; Future  -     Comprehensive Metabolic Panel; Future    Other orders  -     gabapentin (NEURONTIN) 300 MG capsule; Take 1 capsule by mouth 3 (Three) Times a Day.  Dispense: 270 capsule; Refill: 1             Follow Up   No follow-ups on file.  Patient was given instructions and counseling regarding his condition or for health maintenance advice. Please see specific information pulled into the AVS if appropriate.

## 2024-06-03 ENCOUNTER — ANTICOAGULATION VISIT (OUTPATIENT)
Dept: PHARMACY | Facility: HOSPITAL | Age: 84
End: 2024-06-03
Payer: MEDICARE

## 2024-06-03 DIAGNOSIS — I82.513 CHRONIC DEEP VEIN THROMBOSIS (DVT) OF FEMORAL VEIN OF BOTH LOWER EXTREMITIES: Primary | Chronic | ICD-10-CM

## 2024-06-03 DIAGNOSIS — D68.59 THROMBOPHILIA: Chronic | ICD-10-CM

## 2024-06-03 LAB — INR PPP: 2.7

## 2024-06-03 NOTE — PROGRESS NOTES
Anticoagulation Clinic Progress Note    Patient's visit was held by phone today.    Anticoagulation Summary  As of 6/3/2024      INR goal:  2.0-3.0   TTR:  87.4% (4.8 y)   INR used for dosin.70 (6/3/2024)   Warfarin maintenance plan:  7 mg (5 mg x 1 and 1 mg x 2) every Tue, Thu; 8 mg (5 mg x 1 and 1 mg x 3) all other days   Weekly warfarin total:  54 mg   No change documented:  Karen Oates RPH   Plan last modified:  Karen Oates RPH (2024)   Next INR check:  6/10/2024   Priority:  Maintenance   Target end date:  Indefinite    Indications    Deep vein thrombosis (DVT) [I82.409]  Thrombophilia [D68.59]                 Anticoagulation Episode Summary       INR check location:      Preferred lab:      Send INR reminders to:   [facility] CBC ANTICOAG POOL    Comments:  MDINR Home maninder          Anticoagulation Care Providers       Provider Role Specialty Phone number    Soraya Cortez MD Referring Hematology and Oncology [phone]            Drug interactions: has remained unchanged.  Diet: has remained unchanged.    Clinic Interview:  No pertinent clinical findings have been reported.    INR History:      2024    11:01 AM 2024    12:00 AM 2024     1:27 PM 2024    12:00 AM 2024     2:50 PM 6/3/2024    12:00 AM 6/3/2024     3:39 PM   Anticoagulation Monitoring   INR 2.80  2.60  2.30  2.70   INR Date 2024  2024  2024  6/3/2024   INR Goal 2.0-3.0  2.0-3.0  2.0-3.0  2.0-3.0   Trend Same  Same  Same  Same   Last Week Total 54 mg  54 mg  54 mg  54 mg   Next Week Total 54 mg  54 mg  54 mg  54 mg   Sun 8 mg  8 mg  8 mg  8 mg   Mon 8 mg  8 mg  -  8 mg   Tue 7 mg  7 mg  -  7 mg   Wed 8 mg  8 mg  8 mg  8 mg   Thu 7 mg  7 mg  7 mg  7 mg   Fri 8 mg  8 mg  8 mg  8 mg   Sat 8 mg  8 mg  8 mg  8 mg   Historical INR  2.60      2.30      2.70            This result is from an external source.       Plan:  1. INR is Therapeutic today- see above in Anticoagulation Summary.    Will instruct Dalton Dallas Sr. to Continue their warfarin regimen- see above in Anticoagulation Summary.  2. Follow up in 1 weeks- home test.   3.They have been instructed to call if any changes in medications, doses, concerns, etc. Patient expresses understanding and has no further questions at this time.    Karen Oates [facility]

## 2024-06-07 DIAGNOSIS — Z79.4 TYPE 2 DIABETES MELLITUS WITH DIABETIC POLYNEUROPATHY, WITH LONG-TERM CURRENT USE OF INSULIN: ICD-10-CM

## 2024-06-07 DIAGNOSIS — E11.42 TYPE 2 DIABETES MELLITUS WITH DIABETIC POLYNEUROPATHY, WITH LONG-TERM CURRENT USE OF INSULIN: ICD-10-CM

## 2024-06-10 ENCOUNTER — ANTICOAGULATION VISIT (OUTPATIENT)
Dept: ONCOLOGY | Facility: HOSPITAL | Age: 84
End: 2024-06-10
Payer: MEDICARE

## 2024-06-10 DIAGNOSIS — I82.513 CHRONIC DEEP VEIN THROMBOSIS (DVT) OF FEMORAL VEIN OF BOTH LOWER EXTREMITIES: Primary | Chronic | ICD-10-CM

## 2024-06-10 DIAGNOSIS — D68.59 THROMBOPHILIA: Chronic | ICD-10-CM

## 2024-06-10 LAB — INR PPP: 2.5

## 2024-06-10 RX ORDER — PRAMLINTIDE ACETATE 1000 UG/ML
INJECTION SUBCUTANEOUS
Qty: 10.8 ML | Refills: 11 | Status: SHIPPED | OUTPATIENT
Start: 2024-06-10

## 2024-06-10 NOTE — PROGRESS NOTES
Anticoagulation Clinic Progress Note    Patient's visit was held by phone today.    Anticoagulation Summary  As of 6/10/2024      INR goal:  2.0-3.0   TTR:  87.4% (4.9 y)   INR used for dosin.50 (6/10/2024)   Warfarin maintenance plan:  7 mg (5 mg x 1 and 1 mg x 2) every Tue, Thu; 8 mg (5 mg x 1 and 1 mg x 3) all other days   Weekly warfarin total:  54 mg   No change documented:  Karen Oates RPH   Plan last modified:  Karen Oates RPH (2024)   Next INR check:  2024   Priority:  Maintenance   Target end date:  Indefinite    Indications    Deep vein thrombosis (DVT) [I82.409]  Thrombophilia [D68.59]                 Anticoagulation Episode Summary       INR check location:      Preferred lab:      Send INR reminders to:   [facility] CBC ANTICOAG POOL    Comments:  MDINR Home maninder          Anticoagulation Care Providers       Provider Role Specialty Phone number    Soraya Cortez MD Referring Hematology and Oncology [phone]            Drug interactions: has remained unchanged.  Diet: has remained unchanged.    Clinic Interview:  No pertinent clinical findings have been reported.    INR History:      2024     1:27 PM 2024    12:00 AM 2024     2:50 PM 6/3/2024    12:00 AM 6/3/2024     3:39 PM 6/10/2024    12:00 AM 6/10/2024    10:10 AM   Anticoagulation Monitoring   INR 2.60  2.30  2.70  2.50   INR Date 2024  2024  6/3/2024  6/10/2024   INR Goal 2.0-3.0  2.0-3.0  2.0-3.0  2.0-3.0   Trend Same  Same  Same  Same   Last Week Total 54 mg  54 mg  54 mg  54 mg   Next Week Total 54 mg  54 mg  54 mg  54 mg   Sun 8 mg  8 mg  8 mg  8 mg   Mon 8 mg  -  8 mg  8 mg   Tue 7 mg  -  7 mg  7 mg   Wed 8 mg  8 mg  8 mg  8 mg   Thu 7 mg  7 mg  7 mg  7 mg   Fri 8 mg  8 mg  8 mg  8 mg   Sat 8 mg  8 mg  8 mg  8 mg   Historical INR  2.30      2.70      2.50            This result is from an external source.       Plan:  1. INR is Therapeutic today- see above in Anticoagulation Summary.    Will instruct Dalton Dallas Sr. to Continue their warfarin regimen- see above in Anticoagulation Summary.  2. Follow up in 1 weeks- home test.   3.They have been instructed to call if any changes in medications, doses, concerns, etc. Left detailed voice message with the above information.     Karen Oates [facility]

## 2024-06-17 ENCOUNTER — ANTICOAGULATION VISIT (OUTPATIENT)
Dept: ONCOLOGY | Facility: HOSPITAL | Age: 84
End: 2024-06-17
Payer: MEDICARE

## 2024-06-17 DIAGNOSIS — D68.59 THROMBOPHILIA: Chronic | ICD-10-CM

## 2024-06-17 DIAGNOSIS — I82.513 CHRONIC DEEP VEIN THROMBOSIS (DVT) OF FEMORAL VEIN OF BOTH LOWER EXTREMITIES: Primary | Chronic | ICD-10-CM

## 2024-06-17 LAB — INR PPP: 2.5

## 2024-06-17 NOTE — PROGRESS NOTES
Anticoagulation Clinic Progress Note    Patient's visit was held by phone today.    Anticoagulation Summary  As of 2024      INR goal:  2.0-3.0   TTR:  87.5% (4.9 y)   INR used for dosin.50 (2024)   Warfarin maintenance plan:  7 mg (5 mg x 1 and 1 mg x 2) every Tue, Thu; 8 mg (5 mg x 1 and 1 mg x 3) all other days   Weekly warfarin total:  54 mg   No change documented:  Karen Oates RPH   Plan last modified:  Karen Oates RPH (2024)   Next INR check:  2024   Priority:  Maintenance   Target end date:  Indefinite    Indications    Deep vein thrombosis (DVT) [I82.409]  Thrombophilia [D68.59]                 Anticoagulation Episode Summary       INR check location:      Preferred lab:      Send INR reminders to:   [facility] CBC ANTICOAG POOL    Comments:  MDINR Home maninder          Anticoagulation Care Providers       Provider Role Specialty Phone number    Soraya Cortez MD Referring Hematology and Oncology [phone]            Drug interactions: has remained unchanged.  Diet: has remained unchanged.    Clinic Interview:  No pertinent clinical findings have been reported.    INR History:      2024     2:50 PM 6/3/2024    12:00 AM 6/3/2024     3:39 PM 6/10/2024    12:00 AM 6/10/2024    10:10 AM 2024    12:00 AM 2024     9:52 AM   Anticoagulation Monitoring   INR 2.30  2.70  2.50  2.50   INR Date 2024  6/3/2024  6/10/2024  2024   INR Goal 2.0-3.0  2.0-3.0  2.0-3.0  2.0-3.0   Trend Same  Same  Same  Same   Last Week Total 54 mg  54 mg  54 mg  54 mg   Next Week Total 54 mg  54 mg  54 mg  54 mg   Sun 8 mg  8 mg  8 mg  8 mg   Mon -  8 mg  8 mg  8 mg   Tue -  7 mg  7 mg  7 mg   Wed 8 mg  8 mg  8 mg  8 mg   Thu 7 mg  7 mg  7 mg  7 mg   Fri 8 mg  8 mg  8 mg  8 mg   Sat 8 mg  8 mg  8 mg  8 mg   Historical INR  2.70      2.50      2.50            This result is from an external source.       Plan:  1. INR is Therapeutic today- see above in Anticoagulation Summary.    Will instruct Dalton Dallas Sr. to Continue their warfarin regimen- see above in Anticoagulation Summary.  2. Follow up in 1 weeks- home test.   3.They have been instructed to call if any changes in medications, doses, concerns, etc. Patient expresses understanding and has no further questions at this time.    Karen Oates [facility]

## 2024-06-19 ENCOUNTER — TELEPHONE (OUTPATIENT)
Dept: ENDOCRINOLOGY | Facility: CLINIC | Age: 84
End: 2024-06-19
Payer: MEDICARE

## 2024-06-21 ENCOUNTER — TELEPHONE (OUTPATIENT)
Dept: ENDOCRINOLOGY | Facility: CLINIC | Age: 84
End: 2024-06-21
Payer: MEDICARE

## 2024-06-24 LAB — INR PPP: 2.4

## 2024-06-25 ENCOUNTER — ANTICOAGULATION VISIT (OUTPATIENT)
Dept: PHARMACY | Facility: HOSPITAL | Age: 84
End: 2024-06-25
Payer: MEDICARE

## 2024-06-25 DIAGNOSIS — D68.59 THROMBOPHILIA: Chronic | ICD-10-CM

## 2024-06-25 DIAGNOSIS — I82.513 CHRONIC DEEP VEIN THROMBOSIS (DVT) OF FEMORAL VEIN OF BOTH LOWER EXTREMITIES: Primary | Chronic | ICD-10-CM

## 2024-06-25 NOTE — PROGRESS NOTES
Anticoagulation Clinic Progress Note    Patient's visit was held by phone today.    Anticoagulation Summary  As of 2024      INR goal:  2.0-3.0   TTR:  87.5% (4.9 y)   INR used for dosin.40 (2024)   Warfarin maintenance plan:  7 mg (5 mg x 1 and 1 mg x 2) every Tue; 8 mg (5 mg x 1 and 1 mg x 3) all other days   Weekly warfarin total:  55 mg   Plan last modified:  Karen Oates RPH (2024)   Next INR check:  2024   Priority:  Maintenance   Target end date:  Indefinite    Indications    Deep vein thrombosis (DVT) [I82.409]  Thrombophilia [D68.59]                 Anticoagulation Episode Summary       INR check location:      Preferred lab:      Send INR reminders to:   [facility] CBC ANTICOAG POOL    Comments:  MDINR Home maninder          Anticoagulation Care Providers       Provider Role Specialty Phone number    Soraya Cortez MD Referring Hematology and Oncology [phone]            Drug interactions: has remained unchanged.  Diet: has remained unchanged.    Clinic Interview:  No pertinent clinical findings have been reported.    INR History:      6/3/2024     3:39 PM 6/10/2024    12:00 AM 6/10/2024    10:10 AM 2024    12:00 AM 2024     9:52 AM 2024    12:00 AM 2024     3:23 PM   Anticoagulation Monitoring   INR 2.70  2.50  2.50  2.40   INR Date 6/3/2024  6/10/2024  2024  2024   INR Goal 2.0-3.0  2.0-3.0  2.0-3.0  2.0-3.0   Trend Same  Same  Same  Up   Last Week Total 54 mg  54 mg  54 mg  54 mg   Next Week Total 54 mg  54 mg  54 mg  55 mg   Sun 8 mg  8 mg  8 mg  8 mg   Mon 8 mg  8 mg  8 mg  8 mg   Tue 7 mg  7 mg  7 mg  7 mg   Wed 8 mg  8 mg  8 mg  8 mg   Thu 7 mg  7 mg  7 mg  8 mg   Fri 8 mg  8 mg  8 mg  8 mg   Sat 8 mg  8 mg  8 mg  8 mg   Historical INR  2.50      2.50      2.40            This result is from an external source.       Plan:  1. INR is Therapeutic today- see above in Anticoagulation Summary.   Will instruct Dalton Dallas Sr. to Continue  their warfarin regimen- see above in Anticoagulation Summary. Per patient he has been taking 7 mg one day per week not twice. Updated dosing accordingly.   2. Follow up in 1 weeks- home test.   3.They have been instructed to call if any changes in medications, doses, concerns, etc. Patient expresses understanding and has no further questions at this time.    Karen Oates [facility]

## 2024-07-01 ENCOUNTER — ANTICOAGULATION VISIT (OUTPATIENT)
Dept: ONCOLOGY | Facility: HOSPITAL | Age: 84
End: 2024-07-01
Payer: MEDICARE

## 2024-07-01 DIAGNOSIS — D68.59 THROMBOPHILIA: Chronic | ICD-10-CM

## 2024-07-01 DIAGNOSIS — I82.513 CHRONIC DEEP VEIN THROMBOSIS (DVT) OF FEMORAL VEIN OF BOTH LOWER EXTREMITIES: Primary | Chronic | ICD-10-CM

## 2024-07-01 LAB — INR PPP: 2.7

## 2024-07-01 NOTE — PROGRESS NOTES
Anticoagulation Clinic Progress Note    Patient's visit was held by phone today.    Anticoagulation Summary  As of 2024      INR goal:  2.0-3.0   TTR:  87.6% (4.9 y)   INR used for dosin.70 (2024)   Warfarin maintenance plan:  7 mg (5 mg x 1 and 1 mg x 2) every Thu; 8 mg (5 mg x 1 and 1 mg x 3) all other days   Weekly warfarin total:  55 mg   Plan last modified:  Karen Oates RPH (2024)   Next INR check:  2024   Priority:  Maintenance   Target end date:  Indefinite    Indications    Deep vein thrombosis (DVT) [I82.409]  Thrombophilia [D68.59]                 Anticoagulation Episode Summary       INR check location:      Preferred lab:      Send INR reminders to:   [facility] CBC ANTICOAG POOL    Comments:  [facility] Home maninder          Anticoagulation Care Providers       Provider Role Specialty Phone number    Soraya Cortez MD Referring Hematology and Oncology [phone]            Drug interactions: has remained unchanged.  Diet: has remained unchanged.    Clinic Interview:  No pertinent clinical findings have been reported.    INR History:      6/10/2024    10:10 AM 2024    12:00 AM 2024     9:52 AM 2024    12:00 AM 2024     3:23 PM 2024    12:00 AM 2024     8:37 AM   Anticoagulation Monitoring   INR 2.50  2.50  2.40  2.70   INR Date 6/10/2024  2024  2024  2024   INR Goal 2.0-3.0  2.0-3.0  2.0-3.0  2.0-3.0   Trend Same  Same  Up  Same   Last Week Total 54 mg  54 mg  54 mg  55 mg   Next Week Total 54 mg  54 mg  55 mg  55 mg   Sun 8 mg  8 mg  8 mg  8 mg   Mon 8 mg  8 mg  8 mg  8 mg   Tue 7 mg  7 mg  7 mg  8 mg   Wed 8 mg  8 mg  8 mg  8 mg   Thu 7 mg  7 mg  8 mg  7 mg   Fri 8 mg  8 mg  8 mg  8 mg   Sat 8 mg  8 mg  8 mg  8 mg   Historical INR  2.50      2.40      2.70            This result is from an external source.       Plan:  1. INR is Therapeutic today- see above in Anticoagulation Summary.   Will instruct Dalton Dallas Sr. to Continue  their warfarin regimen- see above in Anticoagulation Summary.  2. Follow up in 1 weeks- home test  3.They have been instructed to call if any changes in medications, doses, concerns, etc. Patient expresses understanding and has no further questions at this time.    Karen Oates RP

## 2024-07-08 ENCOUNTER — ANTICOAGULATION VISIT (OUTPATIENT)
Dept: ONCOLOGY | Facility: HOSPITAL | Age: 84
End: 2024-07-08
Payer: MEDICARE

## 2024-07-08 DIAGNOSIS — I82.513 CHRONIC DEEP VEIN THROMBOSIS (DVT) OF FEMORAL VEIN OF BOTH LOWER EXTREMITIES: Primary | Chronic | ICD-10-CM

## 2024-07-08 DIAGNOSIS — D68.59 THROMBOPHILIA: Chronic | ICD-10-CM

## 2024-07-08 LAB — INR PPP: 2.3

## 2024-07-08 NOTE — PROGRESS NOTES
Anticoagulation Clinic Progress Note    Patient's visit was held by phone today.    Anticoagulation Summary  As of 2024      INR goal:  2.0-3.0   TTR:  87.6% (4.9 y)   INR used for dosin.30 (2024)   Warfarin maintenance plan:  7 mg (5 mg x 1 and 1 mg x 2) every Thu; 8 mg (5 mg x 1 and 1 mg x 3) all other days   Weekly warfarin total:  55 mg   No change documented:  Karen Oates RPH   Plan last modified:  Karen Oates RPH (2024)   Next INR check:  7/15/2024   Priority:  Maintenance   Target end date:  Indefinite    Indications    Deep vein thrombosis (DVT) [I82.409]  Thrombophilia [D68.59]                 Anticoagulation Episode Summary       INR check location:      Preferred lab:      Send INR reminders to:  [facility] CBC ANTICOAG POOL    Comments:  [facility] Home manidner          Anticoagulation Care Providers       Provider Role Specialty Phone number    Soraya Cortez MD Referring Hematology and Oncology [phone]            Drug interactions: has remained unchanged.  Diet: has remained unchanged.    Clinic Interview:  No pertinent clinical findings have been reported.    INR History:      2024     9:52 AM 2024    12:00 AM 2024     3:23 PM 2024    12:00 AM 2024     8:37 AM 2024    12:00 AM 2024     3:37 PM   Anticoagulation Monitoring   INR 2.50  2.40  2.70  2.30   INR Date 2024   INR Goal 2.0-3.0  2.0-3.0  2.0-3.0  2.0-3.0   Trend Same  Up  Same  Same   Last Week Total 54 mg  54 mg  55 mg  55 mg   Next Week Total 54 mg  55 mg  55 mg  55 mg   Sun 8 mg  8 mg  8 mg  8 mg   Mon 8 mg  8 mg  8 mg  8 mg   Tue 7 mg  7 mg  8 mg  8 mg   Wed 8 mg  8 mg  8 mg  8 mg   Thu 7 mg  8 mg  7 mg  7 mg   Fri 8 mg  8 mg  8 mg  8 mg   Sat 8 mg  8 mg  8 mg  8 mg   Historical INR  2.40      2.70      2.30            This result is from an external source.       Plan:  1. INR is Therapeutic today- see above in Anticoagulation Summary.   Will  instruct Dalton Dallas Sr. to Continue their warfarin regimen- see above in Anticoagulation Summary.  2. Follow up in 1 weeks-home test.   3.They have been instructed to call if any changes in medications, doses, concerns, etc. Patient expresses understanding and has no further questions at this time.    Karen Oates [facility]

## 2024-07-15 ENCOUNTER — ANTICOAGULATION VISIT (OUTPATIENT)
Dept: ONCOLOGY | Facility: HOSPITAL | Age: 84
End: 2024-07-15
Payer: MEDICARE

## 2024-07-15 DIAGNOSIS — D68.59 THROMBOPHILIA: Chronic | ICD-10-CM

## 2024-07-15 DIAGNOSIS — I82.513 CHRONIC DEEP VEIN THROMBOSIS (DVT) OF FEMORAL VEIN OF BOTH LOWER EXTREMITIES: Primary | Chronic | ICD-10-CM

## 2024-07-15 LAB — INR PPP: 3

## 2024-07-15 NOTE — PROGRESS NOTES
Anticoagulation Clinic Progress Note    Patient's visit was held by phone today.    Anticoagulation Summary  As of 7/15/2024      INR goal:  2.0-3.0   TTR:  87.7% (4.9 y)   INR used for dosing:  3.00 (7/15/2024)   Warfarin maintenance plan:  7 mg (5 mg x 1 and 1 mg x 2) every Thu; 8 mg (5 mg x 1 and 1 mg x 3) all other days   Weekly warfarin total:  55 mg   No change documented:  Karen Oates RPH   Plan last modified:  Karen Oates RPH (7/1/2024)   Next INR check:  7/22/2024   Priority:  Maintenance   Target end date:  Indefinite    Indications    Deep vein thrombosis (DVT) [I82.409]  Thrombophilia [D68.59]                 Anticoagulation Episode Summary       INR check location:      Preferred lab:      Send INR reminders to:  [facility] CBC ANTICOAG POOL    Comments:  [facility] Home maninder          Anticoagulation Care Providers       Provider Role Specialty Phone number    Soraya Cortez MD Referring Hematology and Oncology [phone]            Drug interactions: has remained unchanged.  Diet: has remained unchanged.    Clinic Interview:  No pertinent clinical findings have been reported.    INR History:      6/25/2024     3:23 PM 7/1/2024    12:00 AM 7/1/2024     8:37 AM 7/8/2024    12:00 AM 7/8/2024     3:37 PM 7/15/2024    12:00 AM 7/15/2024     2:58 PM   Anticoagulation Monitoring   INR 2.40  2.70  2.30  3.00   INR Date 6/24/2024  7/1/2024  7/8/2024  7/15/2024   INR Goal 2.0-3.0  2.0-3.0  2.0-3.0  2.0-3.0   Trend Up  Same  Same  Same   Last Week Total 54 mg  55 mg  55 mg  55 mg   Next Week Total 55 mg  55 mg  55 mg  55 mg   Sun 8 mg  8 mg  8 mg  8 mg   Mon 8 mg  8 mg  8 mg  8 mg   Tue 7 mg  8 mg  8 mg  8 mg   Wed 8 mg  8 mg  8 mg  8 mg   Thu 8 mg  7 mg  7 mg  7 mg   Fri 8 mg  8 mg  8 mg  8 mg   Sat 8 mg  8 mg  8 mg  8 mg   Historical INR  2.70      2.30      3.00            This result is from an external source.       Plan:  1. INR is Therapeutic today- see above in Anticoagulation Summary.   Will  instruct Dalton Dixonogast Sr. to Continue their warfarin regimen- see above in Anticoagulation Summary.  2. Follow up in 1 weeks  3.They have been instructed to call if any changes in medications, doses, concerns, etc.Left detailed voice message with the above information.     Karen Oates RP

## 2024-07-23 ENCOUNTER — ANTICOAGULATION VISIT (OUTPATIENT)
Dept: ONCOLOGY | Facility: HOSPITAL | Age: 84
End: 2024-07-23
Payer: MEDICARE

## 2024-07-23 DIAGNOSIS — I82.513 CHRONIC DEEP VEIN THROMBOSIS (DVT) OF FEMORAL VEIN OF BOTH LOWER EXTREMITIES: Primary | Chronic | ICD-10-CM

## 2024-07-23 DIAGNOSIS — D68.59 THROMBOPHILIA: Chronic | ICD-10-CM

## 2024-07-23 LAB — INR PPP: 3.2

## 2024-07-25 NOTE — PROGRESS NOTES
Anticoagulation Clinic Progress Note    Patient's visit was held by phone today.    Anticoagulation Summary  As of 7/23/2024      INR goal:  2.0-3.0   TTR:  87.3% (5 y)   INR used for dosing:  3.20 (7/23/2024)   Warfarin maintenance plan:  7 mg (5 mg x 1 and 1 mg x 2) every Tue, Thu; 8 mg (5 mg x 1 and 1 mg x 3) all other days   Weekly warfarin total:  54 mg   Plan last modified:  Karen Oates RPH (7/25/2024)   Next INR check:  7/30/2024   Priority:  Maintenance   Target end date:  Indefinite    Indications    Deep vein thrombosis (DVT) [I82.409]  Thrombophilia [D68.59]                 Anticoagulation Episode Summary       INR check location:      Preferred lab:      Send INR reminders to:   [facility] CBC ANTICOAG POOL    Comments:  [facility] Home maninder          Anticoagulation Care Providers       Provider Role Specialty Phone number    Soraya Cortez MD Referring Hematology and Oncology [phone]            Drug interactions: has remained unchanged.  Diet: has remained unchanged.    Clinic Interview:  No pertinent clinical findings have been reported.    INR History:      7/1/2024     8:37 AM 7/8/2024    12:00 AM 7/8/2024     3:37 PM 7/15/2024    12:00 AM 7/15/2024     2:58 PM 7/23/2024    12:00 AM 7/23/2024     2:40 PM   Anticoagulation Monitoring   INR 2.70  2.30  3.00  3.20   INR Date 7/1/2024  7/8/2024  7/15/2024  7/23/2024   INR Goal 2.0-3.0  2.0-3.0  2.0-3.0  2.0-3.0   Trend Same  Same  Same  Down   Last Week Total 55 mg  55 mg  55 mg  55 mg   Next Week Total 55 mg  55 mg  55 mg  54 mg   Sun 8 mg  8 mg  8 mg  8 mg   Mon 8 mg  8 mg  8 mg  8 mg   Tue 8 mg  8 mg  8 mg  7 mg (7/23)   Wed 8 mg  8 mg  8 mg  8 mg   Thu 7 mg  7 mg  7 mg  7 mg   Fri 8 mg  8 mg  8 mg  8 mg   Sat 8 mg  8 mg  8 mg  8 mg   Historical INR  2.30      3.00      3.20            This result is from an external source.       Plan:  1. INR is Slightly Supratherapeutic tuesday- see above in Anticoagulation Summary. Per patient no signs of  bleeding or bruising. No identified changes.   Will instruct Dalton Dallas Sr. to Change their warfarin regimen- see above in Anticoagulation Summary.  2. Follow up in 1 weeks- home test.   3.They have been instructed to call if any changes in medications, doses, concerns, etc. Patient expresses understanding and has no further questions at this time.    Karen Oates [facility]

## 2024-07-29 LAB — INR PPP: 2.9

## 2024-07-30 ENCOUNTER — OFFICE VISIT (OUTPATIENT)
Dept: PODIATRY | Facility: CLINIC | Age: 84
End: 2024-07-30
Payer: MEDICARE

## 2024-07-30 VITALS
WEIGHT: 274 LBS | HEART RATE: 62 BPM | BODY MASS INDEX: 39.22 KG/M2 | RESPIRATION RATE: 20 BRPM | OXYGEN SATURATION: 94 % | HEIGHT: 70 IN

## 2024-07-30 DIAGNOSIS — E11.42 DIABETIC PERIPHERAL NEUROPATHY ASSOCIATED WITH TYPE 2 DIABETES MELLITUS: ICD-10-CM

## 2024-07-30 DIAGNOSIS — M79.674 PAIN IN TOES OF BOTH FEET: ICD-10-CM

## 2024-07-30 DIAGNOSIS — L60.3 ONYCHODYSTROPHY: Primary | ICD-10-CM

## 2024-07-30 DIAGNOSIS — R26.81 UNSTEADINESS ON FEET: ICD-10-CM

## 2024-07-30 DIAGNOSIS — M79.675 PAIN IN TOES OF BOTH FEET: ICD-10-CM

## 2024-07-30 DIAGNOSIS — L84 CALLUS OF FOOT: ICD-10-CM

## 2024-07-30 DIAGNOSIS — E11.42 DM TYPE 2 WITH DIABETIC PERIPHERAL NEUROPATHY: ICD-10-CM

## 2024-07-30 NOTE — PROGRESS NOTES
07/30/2024  Foot and Ankle Surgery - Established Patient/Follow-up  Provider: LUCAS Ramos   Location: [facility]    Subjective:  Dalton Dallas Sr. is a 84 y.o. male.     Chief Complaint   Patient presents with    Left Foot - Follow-up, Diabetes     F/u left foot wound     Right Foot - Follow-up, Diabetes    Follow-up     TARYN Johns last pcp visit 05/31/2024       History of Present Illness  The patient is an 84-year-old male who presents for evaluation of his feet.    The patient reports overall good health, however, he expresses a desire to have his nails trimmed. He maintains self-care of his feet, however, he has not received any assistance. He acknowledges that his blood glucose levels have been suboptimal, which he attributes to his insulin regimen. His diabetes is managed by Dr. Fernando. His insulin dosage was increased during his last visit, which resulted in an increase in his blood glucose levels.       No Known Allergies    Current Outpatient Medications on File Prior to Visit   Medication Sig Dispense Refill    Accu-Chek Irene Plus test strip USE TO CHECK BLOOD SUGAR BEFORE MEALS AND AT BEDTIME 400 each 3    amLODIPine (NORVASC) 5 MG tablet Take 1 tablet by mouth Daily. 90 tablet 3    atenolol (TENORMIN) 50 MG tablet TAKE 1 TABLET BY MOUTH DAILY 90 tablet 3    atorvastatin (LIPITOR) 80 MG tablet Take 1 tablet by mouth Every Night. Indications: High Amount of Fats in the Blood 90 tablet 3    Blood Glucose Monitoring Suppl (Accu-Chek Irene Plus) w/Device kit 1 each 4 (Four) Times a Day. 1 kit 0    clopidogrel (PLAVIX) 75 MG tablet Take 1 tablet by mouth Daily. 90 tablet 3    fenofibrate (TRICOR) 54 MG tablet TAKE 1 TABLET BY MOUTH DAILY FOR HIGH AMOUNT OF TRIGLYCERIDES IN  THE BLOOD 90 tablet 3    ferrous sulfate 325 (65 FE) MG tablet Take 1 tablet by mouth 2 (Two) Times a Day.      gabapentin (NEURONTIN) 300 MG capsule Take 1 capsule by mouth 3 (Three) Times a Day. 270 capsule 1  "   hydroCHLOROthiazide (HYDRODIURIL) 25 MG tablet TAKE 1 TABLET BY MOUTH DAILY 90 tablet 3    Insulin Pen Needle (BD ULTRA-FINE PEN NEEDLES) 29G X 12.7MM misc Use to inject insulin twice daily. DX E11.65 200 each 2    insulin regular (HUMULIN R) 500 UNIT/ML CONCENTRATED injection DRAW TO 30 UNIT REI ON U-100 SYRINGE AND INJECT IN THE MORNING AND 40 UNIT REI IN THE EVENING  Indications: Type 2 Diabetes      Insulin Syringe-Needle U-100 (BD Insulin Syringe U/F) 31G X 5/16\" 0.5 ML misc 1 each by Other route 2 (Two) Times a Day. 200 each 3    Insulin Syringe-Needle U-100 30G X 1/2\" 0.5 ML misc 1 each by Other route 2 (Two) Times a Day. 200 each 12    irbesartan (AVAPRO) 300 MG tablet TAKE 1 TABLET BY MOUTH DAILY 90 tablet 3    isosorbide mononitrate (IMDUR) 30 MG 24 hr tablet Take 1 tablet by mouth Daily. 90 tablet 3    Jardiance 25 MG tablet tablet TAKE 1 TABLET BY MOUTH DAILY 90 tablet 3    levothyroxine (SYNTHROID, LEVOTHROID) 175 MCG tablet TAKE 1 TABLET BY MOUTH DAILY FOR UNDERACTIVE THYROID 90 tablet 3    metFORMIN ER (GLUCOPHAGE-XR) 500 MG 24 hr tablet TAKE 4 TABLETS BY MOUTH AT  SUPPER FOR TYPE 2 DIABETES 360 tablet 3    O2 (OXYGEN) Inhale 1 L/min Every Night. Indications: marianne      Omega-3 Fatty Acids (FISH OIL) 1000 MG capsule capsule Take 1 capsule by mouth 2 (Two) Times a Day.      Pramlintide Acetate (SymlinPen 120) 2700 MCG/2.7ML solution pen-injector INJECT 120 MCG SUBCUTANEOUSLY  INTO THE APPROPRIATE AREA AS  DIRECTED 3 TIMES DAILY 10.8 mL 11    urea (CARMOL) 40 % cream Apply 1 application  topically to the appropriate area as directed 2 (Two) Times a Day. Apply twice daily to calluses. Add vitamin D 5,000 units 85 each 3    vitamin B-12 (CYANOCOBALAMIN) 100 MCG tablet Take 1 tablet by mouth Daily. Indications: Inadequate Vitamin B12      vitamin D (ERGOCALCIFEROL) 1.25 MG (07825 UT) capsule capsule TAKE 1 CAPSULE BY MOUTH ONCE  WEEKLY FOR VITAMIN D DEFICIENCY 13 capsule 3    warfarin (COUMADIN) 2 MG " "tablet TAKE 1 TABLET BY MOUTH DAILY OR  AS DIRECTED BASED ON INR 90 tablet 2    warfarin (COUMADIN) 3 MG tablet Take 1 tablet by mouth Daily. Indications: Atrial Fibrillation 90 tablet 3    warfarin (COUMADIN) 5 MG tablet TAKE 1 TABLET BY MOUTH AT NIGHT  FOR ATRIAL FIBRILLATION 90 tablet 3     No current facility-administered medications on file prior to visit.       Objective   Pulse 62   Resp 20   Ht 177.8 cm (70\")   Wt 124 kg (274 lb)   SpO2 94%   BMI 39.31 kg/m²     Foot/Ankle Exam    GENERAL  Diabetic foot exam performed    Appearance:  appears stated age and elderly  Orientation:  AAOx3  Affect:  appropriate  Gait:  unimpaired  Assistance:  cane use  Right shoe gear: casual shoe  Left shoe gear: casual shoe    VASCULAR     Right Foot Vascularity   Dorsalis pedis:  1+  Skin temperature:  warm  Edema grading:  Trace  CFT:  < 3 seconds  Pedal hair growth:  Absent  Varicosities:  mild varicosities     Left Foot Vascularity   Dorsalis pedis:  1+  Skin temperature:  warm  Edema grading:  Trace  CFT:  < 3 seconds  Pedal hair growth:  Absent  Varicosities:  mild varicosities     NEUROLOGIC     Right Foot Neurologic   Light touch sensation: diminished  Protective Sensation using [place] Monofilament:   Sites tested: 10     Left Foot Neurologic   Light touch sensation: diminished  Protective Sensation using [place] Monofilament:   Sites tested: 10    MUSCULOSKELETAL     Right Foot Musculoskeletal   Ecchymosis:  none  Tenderness:  toenail problem       Left Foot Musculoskeletal   Ecchymosis:  none  Tenderness:  toenail problem    DERMATOLOGIC      Right Foot Dermatologic   Skin  Positive for dryness and skin changes.   Nails  1.  Positive for elongated, onychomycosis, abnormal thickness and dystrophic nail.  2.  Positive for elongated, onychomycosis, abnormal thickness and dystrophic nail.  3.  Positive for abnormal thickness and dystrophic nail.  4.  Positive for abnormal thickness.  5.  Positive " for elongated, onychomycosis, abnormal thickness and dystrophic nail.     Left Foot Dermatologic   Skin  Positive for corn, dryness and skin changes.   Nails  1.  Positive for onychomycosis, abnormal thickness and dystrophic nail.  2.  Positive for elongated, onychomycosis, abnormal thickness and dystrophic nail.  3.  Positive for abnormal thickness and dystrophic nail.  4.  Positive for abnormally thick.  5.  Positive for elongated, onychomycosis, abnormally thick and dystrophic nail.  Physical Exam         Results       Assessment & Plan   Diagnoses and all orders for this visit:    1. Onychodystrophy (Primary)    2. Callus of foot    3. DM type 2 with diabetic peripheral neuropathy    4. Diabetic peripheral neuropathy associated with type 2 diabetes mellitus    5. Pain in toes of both feet    6. Unsteadiness on feet      Assessment & Plan  1. Diabetic foot care.  The patient's bilateral feet are stable, with no open wounds or signs of active acute infection. Nails were debrided x4 today. The patient is at a moderate risk for pedal complications related to diabetes.    Explained importance of diabetic foot care, daily foot checks, and glycemic control. Patient should check both feet on a daily basis, monitor and control blood sugars, make sure that both feet and in between toes are towel dried after baths or showers. Avoid barefoot walking at all times.  I discussed wearing white socks and checking shoes before wearing them. Patient was given information on proper foot care. Call the office at the first signs of a wound or with signs of infection.     Nail debridement: Both feet x4    Consent and time out was performed before proceeding with the procedure. Nails were debrided with a nail nipper without complication.  No anesthesia was required.  Indications for procedure were thickened, dystrophic, and fungal appearing nails which are difficult to trim.  Proper self-care and technique was discussed with patient.   Patient was stable after procedure.         Follow-up  A follow-up appointment is scheduled for 3 months from now.       No orders of the defined types were placed in this encounter.         Patient or patient representative verbalized consent for the use of Ambient Listening during the visit with  LUCAS Villa for chart documentation. 7/30/2024  11:30 EDT

## 2024-07-31 ENCOUNTER — TELEPHONE (OUTPATIENT)
Dept: ENDOCRINOLOGY | Facility: CLINIC | Age: 84
End: 2024-07-31
Payer: MEDICARE

## 2024-07-31 NOTE — TELEPHONE ENCOUNTER
Pt stated that every since his office visit and his changed in insulin his BS are running 200 and above. Please advise.

## 2024-08-01 ENCOUNTER — ANTICOAGULATION VISIT (OUTPATIENT)
Dept: ONCOLOGY | Facility: HOSPITAL | Age: 84
End: 2024-08-01
Payer: MEDICARE

## 2024-08-01 DIAGNOSIS — I73.9 PERIPHERAL VASCULAR DISEASE, UNSPECIFIED: Primary | ICD-10-CM

## 2024-08-01 DIAGNOSIS — D68.59 THROMBOPHILIA: Chronic | ICD-10-CM

## 2024-08-01 DIAGNOSIS — I82.513 CHRONIC DEEP VEIN THROMBOSIS (DVT) OF FEMORAL VEIN OF BOTH LOWER EXTREMITIES: Primary | Chronic | ICD-10-CM

## 2024-08-01 NOTE — PROGRESS NOTES
Anticoagulation Clinic Progress Note    Patient's visit was held by phone today.    Anticoagulation Summary  As of 2024      INR goal:  2.0-3.0   TTR:  87.1% (5 y)   INR used for dosin.90 (2024)   Warfarin maintenance plan:  7 mg (5 mg x 1 and 1 mg x 2) every Tue, Thu; 8 mg (5 mg x 1 and 1 mg x 3) all other days   Weekly warfarin total:  54 mg   No change documented:  Karen Oates RPH   Plan last modified:  Karen Oates RPH (2024)   Next INR check:  2024   Priority:  Maintenance   Target end date:  Indefinite    Indications    Deep vein thrombosis (DVT) [I82.409]  Thrombophilia [D68.59]                 Anticoagulation Episode Summary       INR check location:      Preferred lab:      Send INR reminders to:   [facility] CBC ANTICOAG POOL    Comments:  MDINR Home maninder          Anticoagulation Care Providers       Provider Role Specialty Phone number    Soraya Cortez MD Referring Hematology and Oncology [phone]            Drug interactions: has remained unchanged.  Diet: has remained unchanged.    Clinic Interview:  No pertinent clinical findings have been reported.    INR History:      2024     3:37 PM 7/15/2024    12:00 AM 7/15/2024     2:58 PM 2024    12:00 AM 2024     2:40 PM 2024    12:00 AM 2024    10:20 AM   Anticoagulation Monitoring   INR 2.30  3.00  3.20  2.90   INR Date 2024  7/15/2024  2024  2024   INR Goal 2.0-3.0  2.0-3.0  2.0-3.0  2.0-3.0   Trend Same  Same  Down  Same   Last Week Total 55 mg  55 mg  55 mg  54 mg   Next Week Total 55 mg  55 mg  54 mg  54 mg   Sun 8 mg  8 mg  8 mg  8 mg   Mon 8 mg  8 mg  8 mg  -   Tue 8 mg  8 mg  7 mg ()  -   Wed 8 mg  8 mg  8 mg  -   Thu 7 mg  7 mg  7 mg  7 mg   Fri 8 mg  8 mg  8 mg  8 mg   Sat 8 mg  8 mg  8 mg  8 mg   Historical INR  3.00      3.20      2.90            This result is from an external source.       Plan:  1. INR was Therapeutic Monday- see above in Anticoagulation Summary.    Will instruct Dalton Dallas Sr. to Continue their warfarin regimen- see above in Anticoagulation Summary.  2. Follow up in 1 weeks- home test.   3.They have been instructed to call if any changes in medications, doses, concerns, etc. Patient expresses understanding and has no further questions at this time.    Karen Oates [facility]

## 2024-08-01 NOTE — TELEPHONE ENCOUNTER
He uses the [facility], so please get me the print out to review.  I know what I told him to take, but I need to know what doses he is taking exactly & when.  I saw him in March & the A1C had gone up already. Any reason why he waited this long ( almost to his next appt ) to let us know his sugars were worse?  What else has changed since the March visit? New meds? Stress? Infections?

## 2024-08-05 ENCOUNTER — ANTICOAGULATION VISIT (OUTPATIENT)
Dept: ONCOLOGY | Facility: HOSPITAL | Age: 84
End: 2024-08-05
Payer: MEDICARE

## 2024-08-05 DIAGNOSIS — I82.513 CHRONIC DEEP VEIN THROMBOSIS (DVT) OF FEMORAL VEIN OF BOTH LOWER EXTREMITIES: Primary | Chronic | ICD-10-CM

## 2024-08-05 DIAGNOSIS — D68.59 THROMBOPHILIA: Chronic | ICD-10-CM

## 2024-08-05 LAB — INR PPP: 2.6

## 2024-08-05 NOTE — PROGRESS NOTES
Anticoagulation Clinic Progress Note    Patient's visit was held by phone today.    Anticoagulation Summary  As of 2024      INR goal:  2.0-3.0   TTR:  87.1% (5 y)   INR used for dosin.60 (2024)   Warfarin maintenance plan:  7 mg (5 mg x 1 and 1 mg x 2) every Tue, Thu; 8 mg (5 mg x 1 and 1 mg x 3) all other days   Weekly warfarin total:  54 mg   No change documented:  Karen Oates RPH   Plan last modified:  Karen Oates RPH (2024)   Next INR check:  2024   Priority:  Maintenance   Target end date:  Indefinite    Indications    Deep vein thrombosis (DVT) [I82.409]  Thrombophilia [D68.59]                 Anticoagulation Episode Summary       INR check location:      Preferred lab:      Send INR reminders to:   [facility] CBC ANTICOAG POOL    Comments:  MDINR Home maninder          Anticoagulation Care Providers       Provider Role Specialty Phone number    Soraya Cortez MD Referring Hematology and Oncology [phone]            Drug interactions: has remained unchanged.  Diet: has remained unchanged.    Clinic Interview:  No pertinent clinical findings have been reported.    INR History:      7/15/2024     2:58 PM 2024    12:00 AM 2024     2:40 PM 2024    12:00 AM 2024    10:20 AM 2024    12:00 AM 2024    10:07 AM   Anticoagulation Monitoring   INR 3.00  3.20  2.90  2.60   INR Date 7/15/2024  2024  2024  2024   INR Goal 2.0-3.0  2.0-3.0  2.0-3.0  2.0-3.0   Trend Same  Down  Same  Same   Last Week Total 55 mg  55 mg  54 mg  54 mg   Next Week Total 55 mg  54 mg  54 mg  54 mg   Sun 8 mg  8 mg  8 mg  8 mg   Mon 8 mg  8 mg  -  8 mg   Tue 8 mg  7 mg ()  -  7 mg   Wed 8 mg  8 mg  -  8 mg   Thu 7 mg  7 mg  7 mg  7 mg   Fri 8 mg  8 mg  8 mg  8 mg   Sat 8 mg  8 mg  8 mg  8 mg   Historical INR  3.20      2.90      2.60            This result is from an external source.       Plan:  1. INR is Therapeutic today- see above in Anticoagulation Summary.    Will instruct Dalton Dallas Sr. to Continue their warfarin regimen- see above in Anticoagulation Summary.  2. Follow up in 1 weeks- home test.   3.They have been instructed to call if any changes in medications, doses, concerns, etc. Patient expresses understanding and has no further questions at this time.    Karen Oates [facility]

## 2024-08-12 ENCOUNTER — ANTICOAGULATION VISIT (OUTPATIENT)
Dept: ONCOLOGY | Facility: HOSPITAL | Age: 84
End: 2024-08-12
Payer: MEDICARE

## 2024-08-12 DIAGNOSIS — D68.59 THROMBOPHILIA: Chronic | ICD-10-CM

## 2024-08-12 DIAGNOSIS — I82.513 CHRONIC DEEP VEIN THROMBOSIS (DVT) OF FEMORAL VEIN OF BOTH LOWER EXTREMITIES: Primary | Chronic | ICD-10-CM

## 2024-08-12 LAB — INR PPP: 3

## 2024-08-12 NOTE — PROGRESS NOTES
Anticoagulation Clinic Progress Note    Patient's visit was held by phone today.    Anticoagulation Summary  As of 8/12/2024      INR goal:  2.0-3.0   TTR:  87.2% (5 y)   INR used for dosing:  3.00 (8/12/2024)   Warfarin maintenance plan:  7 mg (5 mg x 1 and 1 mg x 2) every Tue, Thu; 8 mg (5 mg x 1 and 1 mg x 3) all other days   Weekly warfarin total:  54 mg   No change documented:  Karen Oates RPH   Plan last modified:  Karen Oates RPH (7/25/2024)   Next INR check:  8/19/2024   Priority:  Maintenance   Target end date:  Indefinite    Indications    Deep vein thrombosis (DVT) [I82.409]  Thrombophilia [D68.59]                 Anticoagulation Episode Summary       INR check location:      Preferred lab:      Send INR reminders to:   [facility] CBC ANTICOAG POOL    Comments:  MDINR Home maninder          Anticoagulation Care Providers       Provider Role Specialty Phone number    Soraya Cortez MD Referring Hematology and Oncology [phone]            Drug interactions: has remained unchanged.  Diet: has remained unchanged.    Clinic Interview:  No pertinent clinical findings have been reported.    INR History:      7/23/2024     2:40 PM 7/29/2024    12:00 AM 8/1/2024    10:20 AM 8/5/2024    12:00 AM 8/5/2024    10:07 AM 8/12/2024    12:00 AM 8/12/2024     2:47 PM   Anticoagulation Monitoring   INR 3.20  2.90  2.60  3.00   INR Date 7/23/2024 7/29/2024 8/5/2024 8/12/2024   INR Goal 2.0-3.0  2.0-3.0  2.0-3.0  2.0-3.0   Trend Down  Same  Same  Same   Last Week Total 55 mg  54 mg  54 mg  54 mg   Next Week Total 54 mg  54 mg  54 mg  54 mg   Sun 8 mg  8 mg  8 mg  8 mg   Mon 8 mg  -  8 mg  8 mg   Tue 7 mg (7/23)  -  7 mg  7 mg   Wed 8 mg  -  8 mg  8 mg   Thu 7 mg  7 mg  7 mg  7 mg   Fri 8 mg  8 mg  8 mg  8 mg   Sat 8 mg  8 mg  8 mg  8 mg   Historical INR  2.90      2.60      3.00            This result is from an external source.       Plan:  1. INR is Therapeutic today- see above in Anticoagulation Summary.    Will instruct Dalton Dallas Sr. to Continue their warfarin regimen- see above in Anticoagulation Summary.  2. Follow up in 1 weeks- home test   3.They have been instructed to call if any changes in medications, doses, concerns, etc. Left detailed voice message with above information.     Karen Oates RPH

## 2024-08-13 ENCOUNTER — OFFICE VISIT (OUTPATIENT)
Dept: PULMONOLOGY | Facility: HOSPITAL | Age: 84
End: 2024-08-13
Payer: MEDICARE

## 2024-08-13 VITALS
WEIGHT: 277 LBS | HEIGHT: 70 IN | SYSTOLIC BLOOD PRESSURE: 119 MMHG | BODY MASS INDEX: 39.65 KG/M2 | DIASTOLIC BLOOD PRESSURE: 62 MMHG | OXYGEN SATURATION: 97 % | HEART RATE: 68 BPM | RESPIRATION RATE: 16 BRPM

## 2024-08-13 DIAGNOSIS — G47.33 OSA (OBSTRUCTIVE SLEEP APNEA): Primary | ICD-10-CM

## 2024-08-13 PROCEDURE — G0463 HOSPITAL OUTPT CLINIC VISIT: HCPCS

## 2024-08-13 NOTE — PROGRESS NOTES
PULMONARY/ CRITICAL CARE/ SLEEP MEDICINE OUTPATIENT CONSULT/ FOLLOW UP NOTE        Patient Name:  Dalton Dallas Sr.    :  1940    Medical Record:  [id number]    PRIMARY CARE PHYSICIAN     Joan Johns APRN    REASON FOR CONSULTATION    Dalton Dallas Sr. is a 84 y.o. male who is referred for consultation for marianne  REVIEW OF SYSTEMS    Constitutional:  Denies fever or chills   Eyes:  Denies change in visual acuity   HENT:  Denies nasal congestion or sore throat   Respiratory:  Denies cough or shortness of breath   Cardiovascular:  Denies chest pain or edema   GI:  Denies abdominal pain, nausea, vomiting, bloody stools or diarrhea   :  Denies dysuria   Musculoskeletal:  Denies back pain or joint pain   Integument:  Denies rash   Neurologic:  Denies headache, focal weakness or sensory changes   Endocrine:  Denies polyuria or polydipsia   Lymphatic:  Denies swollen glands   Psychiatric:  Denies depression or anxiety     MEDICAL HISTORY    Past Medical History:   Diagnosis Date    ACE-inhibitor cough 2005    Coronary artery disease     Diabetes mellitus, type 2     ED (erectile dysfunction)     Hx of blood clots     Blood clot left leg     Hyperlipidemia 2000    Hypertension     Hypogonadism, male     Hypothyroidism 2001    Obesity     Peripheral neuropathy     Pulmonary embolism 2014    Rectal fistula     Sleep apnea 12/10/2013    Ulcer of left foot 2022    Vitamin D deficiency         SURGICAL HISTORY    Past Surgical History:   Procedure Laterality Date    CARDIAC CATHETERIZATION      PTCA: ; PTCA: 2015 LCX stent     CARDIAC CATHETERIZATION Left 7/3/2021    Procedure: Cardiac Catheterization/Vascular Study;  Surgeon: Edwar Ackerman MD;  Location: [facility] INVASIVE LOCATION;  Service: Cardiovascular;  Laterality: Left;    CARDIOVASCULAR STRESS TEST      CATARACT EXTRACTION  2016 & 16     COLONOSCOPY N/A 2019    Procedure:  COLONOSCOPY WITH ENDOSCOPIC CLIPPING X1 OF POST POLYPECTOMY BLEED SITE;  Surgeon: Jhonny Orellana MD;  Location: [facility] ENDOSCOPY;  Service: Gastroenterology    CORONARY STENT PLACEMENT      INGUINAL HERNIA REPAIR Left     UMBILICAL HERNIA REPAIR          FAMILY HISTORY    Family History   Problem Relation Age of Onset    Heart disease Mother     Leukemia Father        SOCIAL HISTORY    Social History     Tobacco Use    Smoking status: Former     Current packs/day: 0.00     Average packs/day: 1.5 packs/day for 20.0 years (30.0 ttl pk-yrs)     Types: Cigarettes     Start date:      Quit date:      Years since quittin.6     Passive exposure: Past    Smokeless tobacco: Never    Tobacco comments:     quit    Substance Use Topics    Alcohol use: No        ALLERGIES    No Known Allergies      MEDICATIONS    Current Outpatient Medications on File Prior to Visit   Medication Sig Dispense Refill    Accu-Chek Irene Plus test strip USE TO CHECK BLOOD SUGAR BEFORE MEALS AND AT BEDTIME 400 each 3    amLODIPine (NORVASC) 5 MG tablet Take 1 tablet by mouth Daily. 90 tablet 3    atenolol (TENORMIN) 50 MG tablet TAKE 1 TABLET BY MOUTH DAILY 90 tablet 3    atorvastatin (LIPITOR) 80 MG tablet Take 1 tablet by mouth Every Night. Indications: High Amount of Fats in the Blood 90 tablet 3    Blood Glucose Monitoring Suppl (Accu-Chek Irene Plus) w/Device kit 1 each 4 (Four) Times a Day. 1 kit 0    clopidogrel (PLAVIX) 75 MG tablet Take 1 tablet by mouth Daily. 90 tablet 3    fenofibrate (TRICOR) 54 MG tablet TAKE 1 TABLET BY MOUTH DAILY FOR HIGH AMOUNT OF TRIGLYCERIDES IN  THE BLOOD 90 tablet 3    ferrous sulfate 325 (65 FE) MG tablet Take 1 tablet by mouth 2 (Two) Times a Day.      gabapentin (NEURONTIN) 300 MG capsule Take 1 capsule by mouth 3 (Three) Times a Day. 270 capsule 1    hydroCHLOROthiazide (HYDRODIURIL) 25 MG tablet TAKE 1 TABLET BY MOUTH DAILY 90 tablet 3    Insulin Pen Needle (BD ULTRA-FINE PEN NEEDLES) 29G  "X 12.7MM misc Use to inject insulin twice daily. DX E11.65 200 each 2    insulin regular (HUMULIN R) 500 UNIT/ML CONCENTRATED injection DRAW TO 30 UNIT REI ON U-100 SYRINGE AND INJECT IN THE MORNING AND 40 UNIT REI IN THE EVENING  Indications: Type 2 Diabetes      Insulin Syringe-Needle U-100 (BD Insulin Syringe U/F) 31G X 5/16\" 0.5 ML misc 1 each by Other route 2 (Two) Times a Day. 200 each 3    Insulin Syringe-Needle U-100 30G X 1/2\" 0.5 ML misc 1 each by Other route 2 (Two) Times a Day. 200 each 12    irbesartan (AVAPRO) 300 MG tablet TAKE 1 TABLET BY MOUTH DAILY 90 tablet 3    isosorbide mononitrate (IMDUR) 30 MG 24 hr tablet Take 1 tablet by mouth Daily. 90 tablet 3    Jardiance 25 MG tablet tablet TAKE 1 TABLET BY MOUTH DAILY 90 tablet 3    levothyroxine (SYNTHROID, LEVOTHROID) 175 MCG tablet TAKE 1 TABLET BY MOUTH DAILY FOR UNDERACTIVE THYROID 90 tablet 3    metFORMIN ER (GLUCOPHAGE-XR) 500 MG 24 hr tablet TAKE 4 TABLETS BY MOUTH AT  SUPPER FOR TYPE 2 DIABETES 360 tablet 3    O2 (OXYGEN) Inhale 1 L/min Every Night. Indications: marianne      Omega-3 Fatty Acids (FISH OIL) 1000 MG capsule capsule Take 1 capsule by mouth 2 (Two) Times a Day.      Pramlintide Acetate (SymlinPen 120) 2700 MCG/2.7ML solution pen-injector INJECT 120 MCG SUBCUTANEOUSLY  INTO THE APPROPRIATE AREA AS  DIRECTED 3 TIMES DAILY 10.8 mL 11    urea (CARMOL) 40 % cream Apply 1 application  topically to the appropriate area as directed 2 (Two) Times a Day. Apply twice daily to calluses. Add vitamin D 5,000 units 85 each 3    vitamin B-12 (CYANOCOBALAMIN) 100 MCG tablet Take 1 tablet by mouth Daily. Indications: Inadequate Vitamin B12      vitamin D (ERGOCALCIFEROL) 1.25 MG (21678 UT) capsule capsule TAKE 1 CAPSULE BY MOUTH ONCE  WEEKLY FOR VITAMIN D DEFICIENCY 13 capsule 3    warfarin (COUMADIN) 2 MG tablet TAKE 1 TABLET BY MOUTH DAILY OR  AS DIRECTED BASED ON INR 90 tablet 2    warfarin (COUMADIN) 3 MG tablet Take 1 tablet by mouth Daily. " "Indications: Atrial Fibrillation 90 tablet 3    warfarin (COUMADIN) 5 MG tablet TAKE 1 TABLET BY MOUTH AT NIGHT  FOR ATRIAL FIBRILLATION 90 tablet 3     No current facility-administered medications on file prior to visit.       PHYSICAL EXAM    Vitals:    08/13/24 1152   BP: 119/62   BP Location: Left arm   Patient Position: Sitting   Cuff Size: Adult   Pulse: 68   Resp: 16   SpO2: 97%   Weight: 126 kg (277 lb)   Height: 177.8 cm (70\")        Constitutional:  Well developed, well nourished, no acute distress, non-toxic appearance   Eyes:  PERRL, conjunctiva normal   HENT:  Atraumatic, external ears normal, nose normal, oropharynx moist, no pharyngeal exudates. mallampatti   Neck- normal range of motion, no tenderness, supple   Respiratory:  No respiratory distress, normal breath sounds, no rales, no wheezing   Cardiovascular:  Normal rate, normal rhythm, no murmurs, no gallops, no rubs   GI:  Soft, nondistended, normal bowel sounds, nontender, no organomegaly, no mass, no rebound, no guarding   :  No costovertebral angle tenderness   Musculoskeletal:  No edema, no tenderness, no deformities. Back- no tenderness  Integument:  Well hydrated, no rash   Lymphatic:  No lymphadenopathy noted   Neurologic:  Alert & oriented x 3, CN 2-12 normal, normal motor function, normal sensory function, no focal deficits noted   Psychiatric:  Speech and behavior appropriate     No radiology results for the last 90 days.   Results for orders placed during the hospital encounter of 08/12/21    Adult Transthoracic Echo Complete W/ Cont if Necessary Per Protocol    Interpretation Summary  · Left ventricular systolic function is normal.  · Left ventricular ejection fraction is 60 to 65%  · Left ventricular diastolic function was normal.      ASSESSMENT & PLAN:        Sleep apnea,[facility]  air curve 10 V auto compliance 100%, average use 6 hours 44 minutes, residual AHI 0.3  patient is compliant and benefiting from therapy     Nocturnal " oxygen patient is compliant and benefiting  Discussed with patient cardiovascular consequences of untreated sleep apnea     2D echo no evidence of pulmonary hypertension  Essential hypertension (Primary)  Mixed hyperlipidemia  Diastolic dysfunction with chronic heart failure (HCC)  Coronary artery disease involving native coronary artery of native heart without angina pectoris  Peripheral vascular disease (HCC)  Thrombophilia (HCC)  Acquired hypothyroidism  Type 2 diabetes mellitus with diabetic neuropathy, with long-term current use of insulin (HCC)          This document has been electronically signed by  Jelani Vieira MD  12:13 EDT

## 2024-08-16 RX ORDER — WARFARIN SODIUM 2 MG/1
TABLET ORAL
Qty: 90 TABLET | Refills: 3 | Status: SHIPPED | OUTPATIENT
Start: 2024-08-16

## 2024-08-19 ENCOUNTER — ANTICOAGULATION VISIT (OUTPATIENT)
Dept: ONCOLOGY | Facility: HOSPITAL | Age: 84
End: 2024-08-19
Payer: MEDICARE

## 2024-08-19 DIAGNOSIS — I82.513 CHRONIC DEEP VEIN THROMBOSIS (DVT) OF FEMORAL VEIN OF BOTH LOWER EXTREMITIES: Primary | Chronic | ICD-10-CM

## 2024-08-19 DIAGNOSIS — D68.59 THROMBOPHILIA: Chronic | ICD-10-CM

## 2024-08-19 LAB — INR PPP: 2.6

## 2024-08-19 NOTE — PROGRESS NOTES
Anticoagulation Clinic Progress Note    Patient's visit was held by phone today.    Anticoagulation Summary  As of 2024      INR goal:  2.0-3.0   TTR:  87.2% (5 y)   INR used for dosin.60 (2024)   Warfarin maintenance plan:  7 mg (5 mg x 1 and 1 mg x 2) every Tue, Thu; 8 mg (5 mg x 1 and 1 mg x 3) all other days   Weekly warfarin total:  54 mg   No change documented:  Karen Oates RPH   Plan last modified:  Karen Oates RPH (2024)   Next INR check:  2024   Priority:  Maintenance   Target end date:  Indefinite    Indications    Deep vein thrombosis (DVT) [I82.409]  Thrombophilia [D68.59]                 Anticoagulation Episode Summary       INR check location:      Preferred lab:      Send INR reminders to:   [facility] CBC ANTICOAG POOL    Comments:  MDINR Home maninder          Anticoagulation Care Providers       Provider Role Specialty Phone number    Soraya Cortez MD Referring Hematology and Oncology [phone]            Drug interactions: has remained unchanged.  Diet: has remained unchanged.    Clinic Interview:  No pertinent clinical findings have been reported.    INR History:      2024    10:20 AM 2024    12:00 AM 2024    10:07 AM 2024    12:00 AM 2024     2:47 PM 2024    12:00 AM 2024    12:48 PM   Anticoagulation Monitoring   INR 2.90  2.60  3.00  2.60   INR Date 2024   INR Goal 2.0-3.0  2.0-3.0  2.0-3.0  2.0-3.0   Trend Same  Same  Same  Same   Last Week Total 54 mg  54 mg  54 mg  54 mg   Next Week Total 54 mg  54 mg  54 mg  54 mg   Sun 8 mg  8 mg  8 mg  8 mg   Mon -  8 mg  8 mg  8 mg   Tue -  7 mg  7 mg  7 mg   Wed -  8 mg  8 mg  8 mg   Thu 7 mg  7 mg  7 mg  7 mg   Fri 8 mg  8 mg  8 mg  8 mg   Sat 8 mg  8 mg  8 mg  8 mg   Historical INR  2.60      3.00      2.60            This result is from an external source.       Plan:  1. INR is Therapeutic today- see above in Anticoagulation Summary.   Will  instruct Dalton Dallas Sr. to Continue their warfarin regimen- see above in Anticoagulation Summary.  2. Follow up in 1 weeks- home test.   3.They have been instructed to call if any changes in medications, doses, concerns, etc. Patient expresses understanding and has no further questions at this time.    Karen Oates [facility]

## 2024-08-26 ENCOUNTER — ANTICOAGULATION VISIT (OUTPATIENT)
Dept: ONCOLOGY | Facility: HOSPITAL | Age: 84
End: 2024-08-26
Payer: MEDICARE

## 2024-08-26 DIAGNOSIS — D68.59 THROMBOPHILIA: Chronic | ICD-10-CM

## 2024-08-26 DIAGNOSIS — I82.513 CHRONIC DEEP VEIN THROMBOSIS (DVT) OF FEMORAL VEIN OF BOTH LOWER EXTREMITIES: Primary | Chronic | ICD-10-CM

## 2024-08-26 LAB — INR PPP: 2.5

## 2024-08-26 NOTE — PROGRESS NOTES
Anticoagulation Clinic Progress Note    Patient's visit was held by phone today.    Anticoagulation Summary  As of 2024      INR goal:  2.0-3.0   TTR:  87.3% (5.1 y)   INR used for dosin.50 (2024)   Warfarin maintenance plan:  7 mg (5 mg x 1 and 1 mg x 2) every Tue, Thu; 8 mg (5 mg x 1 and 1 mg x 3) all other days   Weekly warfarin total:  54 mg   No change documented:  Karen Oates RPH   Plan last modified:  Karen Oates RPH (2024)   Next INR check:  2024   Priority:  Maintenance   Target end date:  Indefinite    Indications    Deep vein thrombosis (DVT) [I82.409]  Thrombophilia [D68.59]                 Anticoagulation Episode Summary       INR check location:      Preferred lab:      Send INR reminders to:   [facility] CBC ANTICOAG POOL    Comments:  MDINR Home maninder          Anticoagulation Care Providers       Provider Role Specialty Phone number    Soraya Cortez MD Referring Hematology and Oncology [phone]            Drug interactions: has remained unchanged.  Diet: has remained unchanged.    Clinic Interview:  No pertinent clinical findings have been reported.    INR History:      2024    10:07 AM 2024    12:00 AM 2024     2:47 PM 2024    12:00 AM 2024    12:48 PM 2024    12:00 AM 2024     8:30 AM   Anticoagulation Monitoring   INR 2.60  3.00  2.60  2.50   INR Date 2024   INR Goal 2.0-3.0  2.0-3.0  2.0-3.0  2.0-3.0   Trend Same  Same  Same  Same   Last Week Total 54 mg  54 mg  54 mg  54 mg   Next Week Total 54 mg  54 mg  54 mg  54 mg   Sun 8 mg  8 mg  8 mg  8 mg   Mon 8 mg  8 mg  8 mg  8 mg   Tue 7 mg  7 mg  7 mg  7 mg   Wed 8 mg  8 mg  8 mg  8 mg   Thu 7 mg  7 mg  7 mg  7 mg   Fri 8 mg  8 mg  8 mg  8 mg   Sat 8 mg  8 mg  8 mg  8 mg   Historical INR  3.00      2.60      2.50            This result is from an external source.       Plan:  1. INR is Therapeutic today- see above in Anticoagulation  Summary.   Will instruct Dalton CHAU Burt Sr. to Continue their warfarin regimen- see above in Anticoagulation Summary.  2. Follow up in 1 weeks- home test.   3.They have been instructed to call if any changes in medications, doses, concerns, etc. Patient expresses understanding and has no further questions at this time.    Karen Oates [facility]   does not want

## 2024-08-27 ENCOUNTER — OFFICE VISIT (OUTPATIENT)
Dept: CARDIOLOGY | Facility: CLINIC | Age: 84
End: 2024-08-27
Payer: MEDICARE

## 2024-08-27 VITALS
RESPIRATION RATE: 18 BRPM | HEIGHT: 70 IN | WEIGHT: 273 LBS | DIASTOLIC BLOOD PRESSURE: 68 MMHG | SYSTOLIC BLOOD PRESSURE: 116 MMHG | BODY MASS INDEX: 39.08 KG/M2 | HEART RATE: 65 BPM | OXYGEN SATURATION: 96 %

## 2024-08-27 DIAGNOSIS — I25.10 CORONARY ARTERY DISEASE INVOLVING NATIVE CORONARY ARTERY OF NATIVE HEART WITHOUT ANGINA PECTORIS: Primary | Chronic | ICD-10-CM

## 2024-08-27 DIAGNOSIS — E78.2 MIXED HYPERLIPIDEMIA: Chronic | ICD-10-CM

## 2024-08-27 DIAGNOSIS — I10 ESSENTIAL HYPERTENSION: Chronic | ICD-10-CM

## 2024-08-27 PROCEDURE — 3078F DIAST BP <80 MM HG: CPT | Performed by: NURSE PRACTITIONER

## 2024-08-27 PROCEDURE — 1159F MED LIST DOCD IN RCRD: CPT | Performed by: NURSE PRACTITIONER

## 2024-08-27 PROCEDURE — 93000 ELECTROCARDIOGRAM COMPLETE: CPT | Performed by: NURSE PRACTITIONER

## 2024-08-27 PROCEDURE — 3074F SYST BP LT 130 MM HG: CPT | Performed by: NURSE PRACTITIONER

## 2024-08-27 PROCEDURE — 1160F RVW MEDS BY RX/DR IN RCRD: CPT | Performed by: NURSE PRACTITIONER

## 2024-08-27 PROCEDURE — 99213 OFFICE O/P EST LOW 20 MIN: CPT | Performed by: NURSE PRACTITIONER

## 2024-08-27 NOTE — PROGRESS NOTES
Cardiology Office Follow Up Visit      Primary Care Provider:  Joan Johns APRN    Reason for f/u:     Coronary artery disease  Hypertension  Dyslipidemia      Subjective     CC:    Denies chest pain or dyspnea    History of Present Illness       Dalton Dallas Sr. is a 84 y.o. male.  Patient is a very pleasant 84-year-old male who is routinely followed by Dr. Ackerman.  He is known to have coronary artery disease requiring previous PCI, hypertension, dyslipidemia, diabetes, history of DVT.    In 2008 the patient presented for evaluation with symptoms of shortness of breath.  Stress test was completed and was abnormal.  He went on to have cardiac catheterization which demonstrated LAD free of disease, RCA with 50% stenosis in LCX had 80% stenosis.  Patient underwent PCI to the left circumflex/OM.    In July 2021 patient was admitted to [facility] with symptoms of unstable angina.  He underwent cardiac catheterization patient had patent stents in the left circumflex and OM.  The distal left circumflex had 70 to 80% stenosis but it was a small caliber vessel.  EF was 55 to 60%.  There was diffuse disease in a small caliber but will PLV branch of the RCA that was less than 1 mm and not in amenable to intervention.  RCA had 40% plaquing in the proximal segment LAD had diffuse disease proximally and mid segment of 25% distal segment 35%    In August 2021 patient had echocardiogram that demonstrated EF to be 60 to 65% with no significant valvular flow abnormalities detected.    Patient denies any current or recent chest pain.  He is not complaining of worsening shortness of breath.  He denies PND, orthopnea, palpitations, near-syncope, worsening lower extremity edema or feelings of his heart racing.  He reports compliance with prescribed medical therapy.          ASSESSMENT/PLAN:      Diagnoses and all orders for this visit:    1. Coronary artery disease involving native coronary artery of native heart  without angina pectoris (Primary)    2. Essential hypertension    3. Mixed hyperlipidemia            MEDICAL DECISION MAKING:    Patient reports overall his cardiac status appears stable.  He is not having chest pain.  His breathing is at baseline.  He has multiple orthopedic complaints with back hip and knee pain he is ambulating with a cane.  He reports he tries his best to stay active.  He does complain of some chronic lower extremity edema that is worse in the summer.  Is typically relieved with elevating his legs.  He reports that he is trying to avoid sodium intake.    EKG shows sinus rhythm rate of 65 with no ST or T wave segment abnormalities.  Blood pressure stable.    He is anticoagulated with warfarin which is managed by his PCP.    I made no changes in his medical therapy.  Will continue the current treatment.  Will plan on seeing him back for scheduled follow-up in 6 months.  If there is any new or worsening problems of asked him to contact the office.        Past Medical History:   Diagnosis Date    ACE-inhibitor cough 06/2005    Coronary artery disease     Diabetes mellitus, type 2 1995    ED (erectile dysfunction)     Hx of blood clots 2014    Blood clot left leg     Hyperlipidemia 08/2000    Hypertension     Hypogonadism, male 1998    Hypothyroidism 06/2001    Obesity     Peripheral neuropathy     Pulmonary embolism 02/2014    Rectal fistula     Sleep apnea 12/10/2013    Ulcer of left foot 11/2022    Vitamin D deficiency        Past Surgical History:   Procedure Laterality Date    CARDIAC CATHETERIZATION  2008    PTCA: 2008; PTCA: 4/21/2015 LCX stent     CARDIAC CATHETERIZATION Left 7/3/2021    Procedure: Cardiac Catheterization/Vascular Study;  Surgeon: Edwar Ackermna MD;  Location: [facility] CATH INVASIVE LOCATION;  Service: Cardiovascular;  Laterality: Left;    CARDIOVASCULAR STRESS TEST  2020    CATARACT EXTRACTION  01/11/2016 1-4-16 & 1-11-16     COLONOSCOPY N/A 11/23/2019    Procedure:  "COLONOSCOPY WITH ENDOSCOPIC CLIPPING X1 OF POST POLYPECTOMY BLEED SITE;  Surgeon: Jhonny Orellana MD;  Location: [facility] ENDOSCOPY;  Service: Gastroenterology    CORONARY STENT PLACEMENT      INGUINAL HERNIA REPAIR Left     UMBILICAL HERNIA REPAIR           Current Outpatient Medications:     Accu-Chek Irene Plus test strip, USE TO CHECK BLOOD SUGAR BEFORE MEALS AND AT BEDTIME, Disp: 400 each, Rfl: 3    amLODIPine (NORVASC) 5 MG tablet, Take 1 tablet by mouth Daily., Disp: 90 tablet, Rfl: 3    atenolol (TENORMIN) 50 MG tablet, TAKE 1 TABLET BY MOUTH DAILY, Disp: 90 tablet, Rfl: 3    atorvastatin (LIPITOR) 80 MG tablet, Take 1 tablet by mouth Every Night. Indications: High Amount of Fats in the Blood, Disp: 90 tablet, Rfl: 3    Blood Glucose Monitoring Suppl (Accu-Chek Irene Plus) w/Device kit, 1 each 4 (Four) Times a Day., Disp: 1 kit, Rfl: 0    clopidogrel (PLAVIX) 75 MG tablet, Take 1 tablet by mouth Daily., Disp: 90 tablet, Rfl: 3    fenofibrate (TRICOR) 54 MG tablet, TAKE 1 TABLET BY MOUTH DAILY FOR HIGH AMOUNT OF TRIGLYCERIDES IN  THE BLOOD, Disp: 90 tablet, Rfl: 3    ferrous sulfate 325 (65 FE) MG tablet, Take 1 tablet by mouth 2 (Two) Times a Day., Disp: , Rfl:     gabapentin (NEURONTIN) 300 MG capsule, Take 1 capsule by mouth 3 (Three) Times a Day., Disp: 270 capsule, Rfl: 1    hydroCHLOROthiazide (HYDRODIURIL) 25 MG tablet, TAKE 1 TABLET BY MOUTH DAILY, Disp: 90 tablet, Rfl: 3    Insulin Pen Needle (BD ULTRA-FINE PEN NEEDLES) 29G X 12.7MM misc, Use to inject insulin twice daily. DX E11.65, Disp: 200 each, Rfl: 2    insulin regular (HUMULIN R) 500 UNIT/ML CONCENTRATED injection, DRAW TO 30 UNIT REI ON U-100 SYRINGE AND INJECT IN THE MORNING AND 40 UNIT REI IN THE EVENING  Indications: Type 2 Diabetes, Disp: , Rfl:     Insulin Syringe-Needle U-100 (BD Insulin Syringe U/F) 31G X 5/16\" 0.5 ML misc, 1 each by Other route 2 (Two) Times a Day., Disp: 200 each, Rfl: 3    Insulin Syringe-Needle U-100 30G X 1/2\" " 0.5 ML misc, 1 each by Other route 2 (Two) Times a Day., Disp: 200 each, Rfl: 12    irbesartan (AVAPRO) 300 MG tablet, TAKE 1 TABLET BY MOUTH DAILY, Disp: 90 tablet, Rfl: 3    isosorbide mononitrate (IMDUR) 30 MG 24 hr tablet, Take 1 tablet by mouth Daily., Disp: 90 tablet, Rfl: 3    Jardiance 25 MG tablet tablet, TAKE 1 TABLET BY MOUTH DAILY, Disp: 90 tablet, Rfl: 3    levothyroxine (SYNTHROID, LEVOTHROID) 175 MCG tablet, TAKE 1 TABLET BY MOUTH DAILY FOR UNDERACTIVE THYROID, Disp: 90 tablet, Rfl: 3    metFORMIN ER (GLUCOPHAGE-XR) 500 MG 24 hr tablet, TAKE 4 TABLETS BY MOUTH AT  SUPPER FOR TYPE 2 DIABETES, Disp: 360 tablet, Rfl: 3    Methylcobalamin (B12-ACTIVE PO), Take  by mouth., Disp: , Rfl:     O2 (OXYGEN), Inhale 1 L/min Every Night. Indications: marianne, Disp: , Rfl:     Omega-3 Fatty Acids (FISH OIL) 1000 MG capsule capsule, Take 1 capsule by mouth 2 (Two) Times a Day., Disp: , Rfl:     Pramlintide Acetate (SymlinPen 120) 2700 MCG/2.7ML solution pen-injector, INJECT 120 MCG SUBCUTANEOUSLY  INTO THE APPROPRIATE AREA AS  DIRECTED 3 TIMES DAILY, Disp: 10.8 mL, Rfl: 11    urea (CARMOL) 40 % cream, Apply 1 application  topically to the appropriate area as directed 2 (Two) Times a Day. Apply twice daily to calluses. Add vitamin D 5,000 units, Disp: 85 each, Rfl: 3    vitamin B-12 (CYANOCOBALAMIN) 100 MCG tablet, Take 1 tablet by mouth Daily. Indications: Inadequate Vitamin B12, Disp: , Rfl:     vitamin D (ERGOCALCIFEROL) 1.25 MG (00934 UT) capsule capsule, TAKE 1 CAPSULE BY MOUTH ONCE  WEEKLY FOR VITAMIN D DEFICIENCY, Disp: 13 capsule, Rfl: 3    warfarin (COUMADIN) 2 MG tablet, TAKE 1 TABLET BY MOUTH DAILY OR  AS DIRECTED BASED ON INR (Patient taking differently: Take 1 tablet by mouth As Needed. TAKE 1 TABLET BY MOUTH DAILY OR  AS DIRECTED BASED ON INR Tuesday and Thursdays only), Disp: 90 tablet, Rfl: 3    warfarin (COUMADIN) 3 MG tablet, Take 1 tablet by mouth Daily. Indications: Atrial Fibrillation, Disp: 90  "tablet, Rfl: 3    warfarin (COUMADIN) 5 MG tablet, TAKE 1 TABLET BY MOUTH AT NIGHT  FOR ATRIAL FIBRILLATION, Disp: 90 tablet, Rfl: 3    Social History     Socioeconomic History    Marital status:    Tobacco Use    Smoking status: Former     Current packs/day: 0.00     Average packs/day: 1.5 packs/day for 20.0 years (30.0 ttl pk-yrs)     Types: Cigarettes     Start date:      Quit date:      Years since quittin.6     Passive exposure: Past    Smokeless tobacco: Never    Tobacco comments:     quit    Vaping Use    Vaping status: Never Used   Substance and Sexual Activity    Alcohol use: No    Drug use: No    Sexual activity: Defer       Family History   Problem Relation Age of Onset    Heart disease Mother     Leukemia Father        The following portions of the patient's history were reviewed and updated as appropriate: allergies, current medications, past family history, past medical history, past social history, past surgical history and problem list.    Review of Systems   Cardiovascular:  Positive for leg swelling.   Musculoskeletal:  Positive for arthritis and joint pain.   Neurological:  Positive for weakness.   All other systems reviewed and are negative.      Pertinent items are noted in HPI, all other systems reviewed and negative    /68 (BP Location: Right arm, Patient Position: Sitting, Cuff Size: Large Adult)   Pulse 65   Resp 18   Ht 177.8 cm (70\")   Wt 124 kg (273 lb)   SpO2 96%   BMI 39.17 kg/m² .  Objective     Vitals reviewed.   Constitutional:       General: Not in acute distress.     Appearance: Normal appearance. Well-developed.   Eyes:      Pupils: Pupils are equal, round, and reactive to light.   HENT:      Head: Normocephalic and atraumatic.   Neck:      Vascular: No JVD.   Pulmonary:      Effort: Pulmonary effort is normal.      Breath sounds: Normal breath sounds.   Cardiovascular:      Normal rate. Regular rhythm.   Edema:     Pretibial: bilateral trace " edema of the pretibial area.     Ankle: edema of the left ankle and trace edema of the right ankle.  Abdominal:      General: There is no distension.      Palpations: Abdomen is soft.      Tenderness: There is no abdominal tenderness.   Musculoskeletal: Normal range of motion.      Cervical back: Normal range of motion and neck supple. Skin:     General: Skin is warm and dry.   Neurological:      Mental Status: Alert and oriented to person, place, and time.             ECG 12 Lead    Date/Time: 8/27/2024 11:23 AM  Performed by: Corine Mitchell APRN    Authorized by: Corine Mitchell APRN  Comparison: compared with previous ECG   Rhythm: sinus rhythm  Rate: normal  BPM: 65  Conduction: conduction normal  ST Segments: ST segments normal  T Waves: T waves normal  QRS axis: normal  Other findings: low voltage    Clinical impression: non-specific ECG          EKG ordered by and reviewed by me in office         .

## 2024-08-29 DIAGNOSIS — D50.9 IRON DEFICIENCY ANEMIA, UNSPECIFIED IRON DEFICIENCY ANEMIA TYPE: Primary | ICD-10-CM

## 2024-08-29 DIAGNOSIS — I82.513 CHRONIC DEEP VEIN THROMBOSIS (DVT) OF FEMORAL VEIN OF BOTH LOWER EXTREMITIES: ICD-10-CM

## 2024-08-29 DIAGNOSIS — D68.59 THROMBOPHILIA: ICD-10-CM

## 2024-09-03 ENCOUNTER — LAB (OUTPATIENT)
Dept: LAB | Facility: HOSPITAL | Age: 84
End: 2024-09-03
Payer: MEDICARE

## 2024-09-03 ENCOUNTER — ANTICOAGULATION VISIT (OUTPATIENT)
Dept: ONCOLOGY | Facility: HOSPITAL | Age: 84
End: 2024-09-03
Payer: MEDICARE

## 2024-09-03 DIAGNOSIS — D68.59 THROMBOPHILIA: ICD-10-CM

## 2024-09-03 DIAGNOSIS — I82.513 CHRONIC DEEP VEIN THROMBOSIS (DVT) OF FEMORAL VEIN OF BOTH LOWER EXTREMITIES: Primary | Chronic | ICD-10-CM

## 2024-09-03 DIAGNOSIS — I82.513 CHRONIC DEEP VEIN THROMBOSIS (DVT) OF FEMORAL VEIN OF BOTH LOWER EXTREMITIES: ICD-10-CM

## 2024-09-03 DIAGNOSIS — D50.9 IRON DEFICIENCY ANEMIA, UNSPECIFIED IRON DEFICIENCY ANEMIA TYPE: ICD-10-CM

## 2024-09-03 DIAGNOSIS — D68.59 THROMBOPHILIA: Chronic | ICD-10-CM

## 2024-09-03 LAB
BASOPHILS # BLD AUTO: 0.03 10*3/MM3 (ref 0–0.2)
BASOPHILS NFR BLD AUTO: 0.3 % (ref 0–1.5)
DEPRECATED RDW RBC AUTO: 46.8 FL (ref 37–54)
EOSINOPHIL # BLD AUTO: 0.36 10*3/MM3 (ref 0–0.4)
EOSINOPHIL NFR BLD AUTO: 3.9 % (ref 0.3–6.2)
ERYTHROCYTE [DISTWIDTH] IN BLOOD BY AUTOMATED COUNT: 14.1 % (ref 12.3–15.4)
FERRITIN SERPL-MCNC: 119 NG/ML (ref 30–400)
HCT VFR BLD AUTO: 42.9 % (ref 37.5–51)
HGB BLD-MCNC: 13.9 G/DL (ref 13–17.7)
INR PPP: 2.6
IRON 24H UR-MRATE: 88 MCG/DL (ref 59–158)
IRON SATN MFR SERPL: 21 % (ref 20–50)
LYMPHOCYTES # BLD AUTO: 2.6 10*3/MM3 (ref 0.7–3.1)
LYMPHOCYTES NFR BLD AUTO: 28.2 % (ref 19.6–45.3)
MCH RBC QN AUTO: 30.7 PG (ref 26.6–33)
MCHC RBC AUTO-ENTMCNC: 32.4 G/DL (ref 31.5–35.7)
MCV RBC AUTO: 94.7 FL (ref 79–97)
MONOCYTES # BLD AUTO: 0.76 10*3/MM3 (ref 0.1–0.9)
MONOCYTES NFR BLD AUTO: 8.3 % (ref 5–12)
NEUTROPHILS NFR BLD AUTO: 5.46 10*3/MM3 (ref 1.7–7)
NEUTROPHILS NFR BLD AUTO: 59.3 % (ref 42.7–76)
PLATELET # BLD AUTO: 218 10*3/MM3 (ref 140–450)
PMV BLD AUTO: 10.5 FL (ref 6–12)
RBC # BLD AUTO: 4.53 10*6/MM3 (ref 4.14–5.8)
TIBC SERPL-MCNC: 417 MCG/DL (ref 298–536)
TRANSFERRIN SERPL-MCNC: 280 MG/DL (ref 200–360)
VIT B12 BLD-MCNC: 715 PG/ML (ref 211–946)
WBC NRBC COR # BLD AUTO: 9.21 10*3/MM3 (ref 3.4–10.8)

## 2024-09-03 PROCEDURE — 82728 ASSAY OF FERRITIN: CPT | Performed by: PHYSICIAN ASSISTANT

## 2024-09-03 PROCEDURE — 82607 VITAMIN B-12: CPT | Performed by: PHYSICIAN ASSISTANT

## 2024-09-03 PROCEDURE — 84466 ASSAY OF TRANSFERRIN: CPT | Performed by: PHYSICIAN ASSISTANT

## 2024-09-03 PROCEDURE — 83540 ASSAY OF IRON: CPT | Performed by: PHYSICIAN ASSISTANT

## 2024-09-03 PROCEDURE — 85025 COMPLETE CBC W/AUTO DIFF WBC: CPT

## 2024-09-03 PROCEDURE — 36415 COLL VENOUS BLD VENIPUNCTURE: CPT

## 2024-09-03 NOTE — PROGRESS NOTES
Anticoagulation Clinic Progress Note    Patient's visit was held by phone today.    Anticoagulation Summary  As of 9/3/2024      INR goal:  2.0-3.0   TTR:  87.3% (5.1 y)   INR used for dosin.60 (9/3/2024)   Warfarin maintenance plan:  7 mg (5 mg x 1 and 1 mg x 2) every Tue, Thu; 8 mg (5 mg x 1 and 1 mg x 3) all other days   Weekly warfarin total:  54 mg   No change documented:  Karen Oates RPH   Plan last modified:  Karen Oates RPH (2024)   Next INR check:  9/10/2024   Priority:  Maintenance   Target end date:  Indefinite    Indications    Deep vein thrombosis (DVT) [I82.409]  Thrombophilia [D68.59]                 Anticoagulation Episode Summary       INR check location:      Preferred lab:      Send INR reminders to:   [facility] ON[facility] CBC ANTICOAG POOL    Comments:  MDINR Home maninder          Anticoagulation Care Providers       Provider Role Specialty Phone number    Soraya Cortez MD Referring Hematology and Oncology [phone]            Drug interactions: has remained unchanged.  Diet: has remained unchanged.    Clinic Interview:  No pertinent clinical findings have been reported.    INR History:      2024     2:47 PM 2024    12:00 AM 2024    12:48 PM 2024    12:00 AM 2024     8:30 AM 9/3/2024    12:00 AM 9/3/2024     1:27 PM   Anticoagulation Monitoring   INR 3.00  2.60  2.50  2.60   INR Date 2024  2024  2024  9/3/2024   INR Goal 2.0-3.0  2.0-3.0  2.0-3.0  2.0-3.0   Trend Same  Same  Same  Same   Last Week Total 54 mg  54 mg  54 mg  54 mg   Next Week Total 54 mg  54 mg  54 mg  54 mg   Sun 8 mg  8 mg  8 mg  8 mg   Mon 8 mg  8 mg  8 mg  8 mg   Tue 7 mg  7 mg  7 mg  7 mg   Wed 8 mg  8 mg  8 mg  8 mg   Thu 7 mg  7 mg  7 mg  7 mg   Fri 8 mg  8 mg  8 mg  8 mg   Sat 8 mg  8 mg  8 mg  8 mg   Historical INR  2.60      2.50      2.60            This result is from an external source.       Plan:  1. INR is Therapeutic today- see above in Anticoagulation  Summary.   Will instruct Dalton CHAU Burt Sr. to Continue their warfarin regimen- see above in Anticoagulation Summary.  2. Follow up in 1 weeks- home test.   3.They have been instructed to call if any changes in medications, doses, concerns, etc. Patient expresses understanding and has no further questions at this time.    Karen Oates [facility]

## 2024-09-09 ENCOUNTER — ANTICOAGULATION VISIT (OUTPATIENT)
Dept: ONCOLOGY | Facility: HOSPITAL | Age: 84
End: 2024-09-09
Payer: MEDICARE

## 2024-09-09 DIAGNOSIS — I82.409 DEEP VEIN THROMBOSIS (DVT) OF LOWER EXTREMITY, UNSPECIFIED CHRONICITY, UNSPECIFIED LATERALITY, UNSPECIFIED VEIN: ICD-10-CM

## 2024-09-09 DIAGNOSIS — D68.59 THROMBOPHILIA: Chronic | ICD-10-CM

## 2024-09-09 DIAGNOSIS — Z79.01 LONG TERM (CURRENT) USE OF ANTICOAGULANTS: ICD-10-CM

## 2024-09-09 DIAGNOSIS — I82.513 CHRONIC DEEP VEIN THROMBOSIS (DVT) OF FEMORAL VEIN OF BOTH LOWER EXTREMITIES: Primary | Chronic | ICD-10-CM

## 2024-09-09 LAB — INR PPP: 2.9

## 2024-09-09 RX ORDER — WARFARIN SODIUM 3 MG/1
TABLET ORAL
Qty: 90 TABLET | Refills: 1 | Status: SHIPPED | OUTPATIENT
Start: 2024-09-09

## 2024-09-09 RX ORDER — FENOFIBRATE 54 MG/1
TABLET ORAL
Qty: 90 TABLET | Refills: 3 | Status: SHIPPED | OUTPATIENT
Start: 2024-09-09

## 2024-09-09 NOTE — PROGRESS NOTES
HEMATOLOGY ONCOLOGY FOLLOW UP        Patient name: Dalton Dallas Sr.  : 1940  MRN: [id number]  Primary Care Physician: Joan Johns APRN  Referring Physician: Joan Johns APRN  Reason For Consult:     Chief Complaint   Patient presents with    Follow-up     Deep vein thrombosis (DVT) of lower extremity, unspecified chronicity, unspecified laterality, unspecified vein   History of PE and DVT  Anticoagulation management  Anemia    History of Present Illness:  Mr. Dallas is a 84 y.o. gentleman, remote smoker, with a history of diabetes, coronary artery disease and hypertension who presented to [facility] on 14 with symptoms of shortness of breath and chest discomfort.  CT scan of the chest on 14 showed bilateral pulmonary emboli with large clot burden.  There were filling defects predominantly in the right upper lobe, and lower and middle lobe pulmonary arterial trees along with emboli in the left upper lobe and left lower lobe arterial tree as well.  Mediastinal,  hilar and subcarinal lymphadenopathy was also noted.  The largest lymph node in the subcarinal location measured 3.9 x 1.9 cm.  The patient was started on anticoagulation.  Bilateral lower extremity venous Doppler on 14 showed a filling defect within the left gastroc veins.  The patient was put on anticoagulation.  Thrombolysis was also under consideration considering the extensive clot burden, but the patient did not require it.       Patient denied any prior history of thromboembolism before this episode.  His brother may have had   a leg DVT.    · 14 - Creatinine 1.3, BUN 20.  · 14 - CBC:  WBC 14.4, hemoglobin 14.9, platelet count 241,000.   · 14 - Factor V Leiden negative.  Factor VIII level 323 (H).  Protein C activity 112.6% (N).    Protein S activity 11.3 (L).  Activated protein C resistance 2.1 (L).  Antithrombin III 83% (N).    D-dimer 2.15.    · 14 - Serum homocysteine 8 (N).   Anticardiolipin antibody negative.  Phosphatidylserine   antibody negative.  Beta-2 glycoprotein antibody negative.   · 2/19/14 - Prothrombin gene mutation negative.    · 2/20/14 - FLAKO-2 mutation test negative.    · 3/10/14 - Protein S activity 93% (N).  Homocysteine level 11 (N).    · 4/11/14 - Factor VIII level 260 (H).   · 4/11/14 - CT scan of the chest without contrast:  Complete resolution of air space opacities in   the lower lobes.  Mediastinal lymphadenopathy is overall very similar to the prior study.  It is   nonspecific.  Three vessel coronary artery atherosclerotic calcification.  Questionable lobular   contour of the liver, question cirrhosis.  AP window lymph node measures about 1.1 cm,   paratracheal lymph node measures about 1.7 cm and subcarinal lymph node measures about 1.2 cm.  · 5/2/14 - INR 4.1.   · 7/30/14 - WBC 5.7, hemoglobin 12.1, platelet count 228,000, MCV 94.0.  INR 3.0.   · 10/17/14 - CT scan of the chest:  Chronic lung changes are stable.  Several borderline to mildly   enlarged mediastinal lymph nodes are also unchanged from six months ago.  They are likely benign   reactive lymph nodes.    · 10/29/14 - Vitamin B12 185, ferritin 57, iron saturation 19%, TIBC 473 (H), serum iron 88,   creatinine 1.3, BUN 18, folate 17.7.  · 11/4/14 - Patient underwent upper GI endoscopy and colonoscopy:  Several nonbleeding diverticula   were seen in the sigmoid colon.  Diverticulosis appeared moderate severity.  Four sessile polyps   of benign appearance were found in the cecum and descending colon.  Polypectomies were performed.    Surgical pathology:  Sessile serrated polyps.  A tubular adenoma is noted in the descending   colon.    · 11/25/14 - INR 4.2.   · 11/25/14 - WBC 5.7, hemoglobin 11.4, platelet count 210,000.    · 2/24/15 - WBC 7.7, hemoglobin 12.9, platelet count 245,000, MCV 93,000.  · 2/24/15 - INR 2.6.  Patient’s Coumadin dose is 9 mg.     · 3/12/15 - Creatinine 1.3.    · 4/21/15 -  Patient admitted with chest pain and had cardiac catheterization.  He had a stent   placed.    · 5/8/15 - CT scan of the chest without contrast:  Stable appearance of mediastinal   lymphadenopathy over the past two studies going back to April 2014.  Stable pulmonary fibrotic   changes and scarring.    · 6/25/15 - WBC 5.3, hemoglobin 12.6, platelet count 187,000.     · 12/17/15 - WBC 5.1, hemoglobin 13.6, platelet count 208,000, MCV 93.1.  Iron saturation 21%,   TIBC 415, serum iron 85.    · 12/17/15 - Chest x-ray:  No acute process.     · 4/19/16 - INR 2.1.    · 5/17/16 - INR 2.2.  · 6/16/16 - WBC 5.5, hemoglobin 12.7, MCV 90.5, platelet count 205,000.  INR 2.3 (patient on   Coumadin 7.5 mg).    · 12/15/16 - WBC 5.0, hemoglobin 13.0, platelet count 174,000.  INR 1.9 (Coumadin dose 7.5).   · 6/15/17 - WBC 5.5, hemoglobin 12.5, platelet count 168,000, MCV 94.4.  INR 2.6 (Coumadin dose 8   mg).   · 11/2/17 - INR 2.    · 12/14/17 - WBC 5.04, hemoglobin 12.5, platelet count 167,000, MCV 94.    · 6/14/18 - WBC 4.9, hemoglobin 12.5, platelet count 172,000, MCV 93.5.    · 12/13/18 - INR 2.4.  WBC 4.9, hemoglobin 12.8, platelet count 180,000.    11/23/2019 INR is 2.55  11/11/2019: Colonoscopy: Cecum polypectomy shows tubular adenoma, transverse colon polypectomy shows tubular adenoma.   Patient readmitted after colonoscopy to hospital due to GI bleeding from polyp biopsy..  11/22/2019 hemoglobin 9.3 due to GI bleeding  11/23/2019 cecal ulcer with visible vessel and stigmata of recent bleeding which was clipped  Moderate left-sided diverticulosis  12/12/2019 INR 1.9  12/12/2019 WBC 4.9, hemoglobin 11.2, MCV 98.0, platelets 182, ferritin 85.2, iron 115, iron saturation 24%, TIBC 471, B12 greater than 2000, folate 13.8,  4/23/2020: ESR 12  5/6/2020: 25 hydroxy vitamin D 31.9  6/11/2020: INR 2.1, WBC 6.3, hemoglobin 13.5, platelets 204,   9/28/2020: Lower extremity venous Doppler:: Normal  11/25/2020: INR 2.8  12/31/2020: WBC  6.4, hemoglobin 13.7, platelets 212, INR 1.2,  5/3/2021: 25 hydroxy vitamin D 66.5, TSH 2.8, creatinine 1.24,  5/12/2021: INR 2.7,  6/9/2021: INR 2.5,  Subsequent INR has been stable.   Continues to follow with anticoagulation clinic.  Current dosing 7 mg every Tuesday and Thursday, 8 mg all other days with INR goal between 2 and 3.  9/3/2024 -WBC 9.21, hemoglobin 13.9, platelet count 218; iron profile, ferritin and vitamin B12 within normal range.    Subjective:  9/10/2024: Francisco is here today for routine follow-up.  He states he has chronic back, hip and knee pain for which she is seeing his PCP today.  No bleeding signs or symptoms.  No new concerns today.        Past Medical History:   Diagnosis Date    ACE-inhibitor cough 06/2005    Coronary artery disease     Diabetes mellitus, type 2 1995    ED (erectile dysfunction)     Hx of blood clots 2014    Blood clot left leg     Hyperlipidemia 08/2000    Hypertension     Hypogonadism, male 1998    Hypothyroidism 06/2001    Obesity     Peripheral neuropathy     Pulmonary embolism 02/2014    Rectal fistula     Sleep apnea 12/10/2013    Ulcer of left foot 11/2022    Vitamin D deficiency        Past Surgical History:   Procedure Laterality Date    CARDIAC CATHETERIZATION  2008    PTCA: 2008; PTCA: 4/21/2015 LCX stent     CARDIAC CATHETERIZATION Left 7/3/2021    Procedure: Cardiac Catheterization/Vascular Study;  Surgeon: Edwar Ackerman MD;  Location: [facility] CATH INVASIVE LOCATION;  Service: Cardiovascular;  Laterality: Left;    CARDIOVASCULAR STRESS TEST  2020    CATARACT EXTRACTION  01/11/2016 1-4-16 & 1-11-16     COLONOSCOPY N/A 11/23/2019    Procedure: COLONOSCOPY WITH ENDOSCOPIC CLIPPING X1 OF POST POLYPECTOMY BLEED SITE;  Surgeon: Jhonny Orellana MD;  Location: [facility] ENDOSCOPY;  Service: Gastroenterology    CORONARY STENT PLACEMENT      INGUINAL HERNIA REPAIR Left     UMBILICAL HERNIA REPAIR           Current Outpatient Medications:     Accu-Chek Irene Plus test  "strip, USE TO CHECK BLOOD SUGAR BEFORE MEALS AND AT BEDTIME, Disp: 400 each, Rfl: 3    amLODIPine (NORVASC) 5 MG tablet, Take 1 tablet by mouth Daily., Disp: 90 tablet, Rfl: 3    atenolol (TENORMIN) 50 MG tablet, TAKE 1 TABLET BY MOUTH DAILY, Disp: 90 tablet, Rfl: 3    atorvastatin (LIPITOR) 80 MG tablet, Take 1 tablet by mouth Every Night. Indications: High Amount of Fats in the Blood, Disp: 90 tablet, Rfl: 3    Blood Glucose Monitoring Suppl (Accu-Chek Irene Plus) w/Device kit, 1 each 4 (Four) Times a Day., Disp: 1 kit, Rfl: 0    clopidogrel (PLAVIX) 75 MG tablet, Take 1 tablet by mouth Daily., Disp: 90 tablet, Rfl: 3    fenofibrate (TRICOR) 54 MG tablet, TAKE 1 TABLET BY MOUTH DAILY FOR HIGH AMOUNT OF TRIGLYCERIDES IN  THE BLOOD, Disp: 90 tablet, Rfl: 3    ferrous sulfate 325 (65 FE) MG tablet, Take 1 tablet by mouth 2 (Two) Times a Day., Disp: , Rfl:     gabapentin (NEURONTIN) 300 MG capsule, Take 1 capsule by mouth 3 (Three) Times a Day., Disp: 270 capsule, Rfl: 1    hydroCHLOROthiazide (HYDRODIURIL) 25 MG tablet, TAKE 1 TABLET BY MOUTH DAILY, Disp: 90 tablet, Rfl: 3    Insulin Pen Needle (BD ULTRA-FINE PEN NEEDLES) 29G X 12.7MM misc, Use to inject insulin twice daily. DX E11.65, Disp: 200 each, Rfl: 2    insulin regular (HUMULIN R) 500 UNIT/ML CONCENTRATED injection, DRAW TO 30 UNIT REI ON U-100 SYRINGE AND INJECT IN THE MORNING AND 40 UNIT REI IN THE EVENING  Indications: Type 2 Diabetes, Disp: , Rfl:     Insulin Syringe-Needle U-100 (BD Insulin Syringe U/F) 31G X 5/16\" 0.5 ML misc, 1 each by Other route 2 (Two) Times a Day., Disp: 200 each, Rfl: 3    Insulin Syringe-Needle U-100 30G X 1/2\" 0.5 ML misc, 1 each by Other route 2 (Two) Times a Day., Disp: 200 each, Rfl: 12    irbesartan (AVAPRO) 300 MG tablet, TAKE 1 TABLET BY MOUTH DAILY, Disp: 90 tablet, Rfl: 3    isosorbide mononitrate (IMDUR) 30 MG 24 hr tablet, Take 1 tablet by mouth Daily., Disp: 90 tablet, Rfl: 3    Jardiance 25 MG tablet tablet, TAKE 1 " TABLET BY MOUTH DAILY, Disp: 90 tablet, Rfl: 3    levothyroxine (SYNTHROID, LEVOTHROID) 175 MCG tablet, TAKE 1 TABLET BY MOUTH DAILY FOR UNDERACTIVE THYROID, Disp: 90 tablet, Rfl: 3    metFORMIN ER (GLUCOPHAGE-XR) 500 MG 24 hr tablet, TAKE 4 TABLETS BY MOUTH AT  SUPPER FOR TYPE 2 DIABETES, Disp: 360 tablet, Rfl: 3    Methylcobalamin (B12-ACTIVE PO), Take  by mouth., Disp: , Rfl:     O2 (OXYGEN), Inhale 1 L/min Every Night. Indications: marianne, Disp: , Rfl:     Omega-3 Fatty Acids (FISH OIL) 1000 MG capsule capsule, Take 1 capsule by mouth 2 (Two) Times a Day., Disp: , Rfl:     Pramlintide Acetate (SymlinPen 120) 2700 MCG/2.7ML solution pen-injector, INJECT 120 MCG SUBCUTANEOUSLY  INTO THE APPROPRIATE AREA AS  DIRECTED 3 TIMES DAILY, Disp: 10.8 mL, Rfl: 11    urea (CARMOL) 40 % cream, Apply 1 application  topically to the appropriate area as directed 2 (Two) Times a Day. Apply twice daily to calluses. Add vitamin D 5,000 units, Disp: 85 each, Rfl: 3    vitamin B-12 (CYANOCOBALAMIN) 100 MCG tablet, Take 1 tablet by mouth Daily. Indications: Inadequate Vitamin B12, Disp: , Rfl:     vitamin D (ERGOCALCIFEROL) 1.25 MG (11712 UT) capsule capsule, TAKE 1 CAPSULE BY MOUTH ONCE  WEEKLY FOR VITAMIN D DEFICIENCY, Disp: 13 capsule, Rfl: 3    warfarin (COUMADIN) 2 MG tablet, TAKE 1 TABLET BY MOUTH DAILY OR  AS DIRECTED BASED ON INR (Patient taking differently: Take 1 tablet by mouth As Needed. TAKE 1 TABLET BY MOUTH DAILY OR  AS DIRECTED BASED ON INR Tuesday and Thursdays only), Disp: 90 tablet, Rfl: 3    warfarin (COUMADIN) 3 MG tablet, Take 1 tablet by mouth daily or as directed by medication management clinic.  Indications: Atrial Fibrillation, Disp: 90 tablet, Rfl: 1    warfarin (COUMADIN) 5 MG tablet, TAKE 1 TABLET BY MOUTH AT NIGHT  FOR ATRIAL FIBRILLATION, Disp: 90 tablet, Rfl: 3    No Known Allergies    Family History   Problem Relation Age of Onset    Heart disease Mother     Leukemia Father        Cancer-related family  "history is not on file.    Social History     Tobacco Use    Smoking status: Former     Current packs/day: 0.00     Average packs/day: 1.5 packs/day for 20.0 years (30.0 ttl pk-yrs)     Types: Cigarettes     Start date:      Quit date:      Years since quittin.7     Passive exposure: Past    Smokeless tobacco: Never    Tobacco comments:     quit    Vaping Use    Vaping status: Never Used   Substance Use Topics    Alcohol use: No    Drug use: No       ROS:   Review of Systems   Constitutional:  Positive for fatigue. Negative for chills and fever.   HENT:  Negative for ear pain, mouth sores, nosebleeds and sore throat.    Eyes:  Negative for photophobia and visual disturbance.   Respiratory:  Negative for wheezing and stridor.    Cardiovascular:  Positive for leg swelling (Chronic. Improvement with elevation). Negative for chest pain and palpitations.   Gastrointestinal:  Negative for abdominal pain, diarrhea, nausea and vomiting.   Endocrine: Negative for cold intolerance and heat intolerance.   Genitourinary:  Negative for dysuria and hematuria.   Musculoskeletal:  Positive for back pain. Negative for joint swelling and neck stiffness.   Skin:  Negative for color change and rash.   Neurological:  Negative for seizures and syncope.   Hematological:  Negative for adenopathy.        No obvious bleeding   Psychiatric/Behavioral:  Negative for agitation, confusion and hallucinations.        Objective:  Vital signs:  Vitals:    09/10/24 1116   BP: 109/60   Pulse: 56   Temp: 98.2 °F (36.8 °C)   SpO2: 97%   Weight: 124 kg (273 lb)   Height: 177.8 cm (70\")   PainSc: 0-No pain         ECOG  (2) Ambulatory and capable of self care, unable to carry out work activity, up and about > 50% or waking hours      Physical Exam:   Physical Exam   Constitutional: He is oriented to person, place, and time. No distress.   HENT:   Head: Normocephalic.   Eyes: Pupils are equal, round, and reactive to light. No scleral " "icterus.   Cardiovascular: Normal rate and regular rhythm.   Pulmonary/Chest: Effort normal and breath sounds normal. No respiratory distress. He has no wheezes.   Abdominal: Soft.   Neurological: He is oriented to person, place, and time.   Skin: Bruising noted. No rash noted. No jaundice.   Psychiatric: His behavior is normal. Mood, judgment and thought content normal.   Vitals reviewed.    Lab Results - Last 18 Months   Lab Units 09/03/24  1017 12/19/23  1048 06/08/23  0947   WBC 10*3/mm3 9.21 7.96 8.04   HEMOGLOBIN g/dL 13.9 13.7 13.4   HEMATOCRIT % 42.9 42.6 40.6   PLATELETS 10*3/mm3 218 227 242   MCV fL 94.7 95.3 92.7     Lab Results - Last 18 Months   Lab Units 05/31/24  1410 03/05/24  1021 07/24/23  0852   SODIUM mmol/L 140 138 137   POTASSIUM mmol/L 4.4 4.4 4.9   CHLORIDE mmol/L 101 100 99   CO2 mmol/L 26.0 28.0 23.5   BUN mg/dL 28* 27* 25*   CREATININE mg/dL 1.44* 1.46* 1.36*   CALCIUM mg/dL 9.8 10.1 10.1   BILIRUBIN mg/dL 0.3 0.4 0.4   ALK PHOS U/L 41 37* 38*   ALT (SGPT) U/L 18 18 19   AST (SGOT) U/L 17 19 19   GLUCOSE mg/dL 213* 182* 210*       Lab Results   Component Value Date    GLUCOSE 213 (H) 05/31/2024    BUN 28 (H) 05/31/2024    CREATININE 1.44 (H) 05/31/2024    EGFRIFNONA 60 (L) 11/02/2021    BCR 19.4 05/31/2024    K 4.4 05/31/2024    CO2 26.0 05/31/2024    CALCIUM 9.8 05/31/2024    ALBUMIN 4.2 05/31/2024    LABIL2 1.7 03/28/2019    AST 17 05/31/2024    ALT 18 05/31/2024       Lab Results - Last 18 Months   Lab Units 09/09/24  0000 09/03/24  0000 08/26/24  0000   INR  2.90 2.60 2.50       Lab Results   Component Value Date    IRON 88 09/03/2024    TIBC 417 09/03/2024    FERRITIN 119.00 09/03/2024       Lab Results   Component Value Date    FOLATE 8.25 05/31/2024       No results found for: \"OCCULTBLD\"    No results found for: \"RETICCTPCT\"    Lab Results   Component Value Date    HJNPAIDV84 715 09/03/2024     No results found for: \"SPEP\", \"UPEP\"  No results found for: \"LDH\", \"URICACID\"  Lab " "Results   Component Value Date    SEDRATE 29 (H) 11/03/2022     No results found for: \"FIBRINOGEN\", \"HAPTOGLOBIN\"  Lab Results   Component Value Date    PTT 31.5 (H) 11/22/2019    INR 2.90 09/09/2024     No results found for: \"\"  No results found for: \"CEA\"  No components found for: \"CA-19-9\"  Lab Results   Component Value Date    PSA 0.397 11/30/2023         Assessment & Plan     Assessment:  1. History of bilateral pulmonary emboli with high clot burden and DVT:  His thrombosis was   unprovoked.  Hypercoagulability testing revealed elevated factor VIII level on two occasions.  He   is on anticoagulation with Coumadin.  He also has a questionable positive family history of DVT.    he continues to be on indefinite anticoagulation. INR stable. Continue to monitor with anticoagulation clinic.   2. History of mediastinal lymphadenopathy:  His previous CT scan showed stable lymphadenopathy.    These are very likely benign lymph nodes. No further intervention.   3. Mild CKD:  He has slight anemia of chronic kidney disease. Hgb has remained stable  4. History of iron deficiency:  He had a colonoscopy and that showed polyps and moderately severe   diverticulosis.  Pathology showed a tubular adenoma in the descending colon.  He is on iron   supplements p.o. BID. History of iron deficiency. Hb continues to be normal. Iron profile normal   5. Vitamin B12 deficiency:  He is on p.o. B12 supplements. Hb has been normal  6. Dizziness/light-headedness: resolved  7.  History of  GI bleeding.  Due to polypectomy from colonoscopy.      Continue to monitor INR  Follow up in 6 months with repeat labs, sooner if condition indicates  Continue to follow with PCP for routine medical management    Electronically signed by Anu Palmer PA-C         Deep vein thrombosis (DVT) of lower extremity, unspecified chronicity, unspecified laterality, unspecified vein  - CBC & Differential  - Comprehensive Metabolic Panel  - Iron " Profile  - Ferritin  - Vitamin B12  - Folate    Iron deficiency anemia, unspecified iron deficiency anemia type  - CBC & Differential  - Comprehensive Metabolic Panel  - Iron Profile  - Ferritin  - Vitamin B12  - Folate      Orders Placed This Encounter   Procedures    Comprehensive Metabolic Panel     Standing Status:   Future     Standing Expiration Date:   9/10/2025     Order Specific Question:   Release to patient     Answer:   Routine Release [[id number]]    Iron Profile     Standing Status:   Future     Standing Expiration Date:   9/10/2025     Order Specific Question:   Release to patient     Answer:   Routine Release [[id number]]    Ferritin     Standing Status:   Future     Standing Expiration Date:   9/10/2025     Order Specific Question:   Release to patient     Answer:   Routine Release [[id number]]    Vitamin B12     Standing Status:   Future     Standing Expiration Date:   9/10/2025     Order Specific Question:   Release to patient     Answer:   Routine Release [[id number]]    Folate     Standing Status:   Future     Standing Expiration Date:   9/10/2025     Order Specific Question:   Release to patient     Answer:   Routine Release [[id number]]    CBC & Differential     Standing Status:   Future     Standing Expiration Date:   9/10/2025     Order Specific Question:   Manual Differential     Answer:   No     Order Specific Question:   Release to patient     Answer:   Routine Release [[id number]]        Thank you very much for providing the opportunity to participate in this patient’s care. Please do not hesitate to call if there are any other questions.    Time spent on encounter including record review, history taking, exam, discussion, counseling and documentation at: 30 minutes

## 2024-09-09 NOTE — PROGRESS NOTES
Anticoagulation Clinic Progress Note    Patient's visit was held by phone today.    Anticoagulation Summary  As of 2024      INR goal:  2.0-3.0   TTR:  87.4% (5.1 y)   INR used for dosin.90 (2024)   Warfarin maintenance plan:  7 mg (5 mg x 1 and 1 mg x 2) every Tue, Thu; 8 mg (5 mg x 1 and 1 mg x 3) all other days   Weekly warfarin total:  54 mg   Plan last modified:  Karen Oates RPH (2024)   Next INR check:  2024   Priority:  Maintenance   Target end date:  Indefinite    Indications    Deep vein thrombosis (DVT) [I82.409]  Thrombophilia [D68.59]                 Anticoagulation Episode Summary       INR check location:      Preferred lab:      Send INR reminders to:   [facility] ONC CBC ANTICOAG POOL    Comments:  MDINR Home maninder          Anticoagulation Care Providers       Provider Role Specialty Phone number    Soraya Cortez MD Referring Hematology and Oncology [phone]            Drug interactions: has remained unchanged.  Diet: has remained unchanged.    Clinic Interview:  No pertinent clinical findings have been reported.    INR History:      2024    12:48 PM 2024    12:00 AM 2024     8:30 AM 9/3/2024    12:00 AM 9/3/2024     1:27 PM 2024    12:00 AM 2024     9:59 AM   Anticoagulation Monitoring   INR 2.60  2.50  2.60  2.90   INR Date 2024  2024  9/3/2024  2024   INR Goal 2.0-3.0  2.0-3.0  2.0-3.0  2.0-3.0   Trend Same  Same  Same  Same   Last Week Total 54 mg  54 mg  54 mg  54 mg   Next Week Total 54 mg  54 mg  54 mg  54 mg   Sun 8 mg  8 mg  8 mg  8 mg   Mon 8 mg  8 mg  8 mg  8 mg   Tue 7 mg  7 mg  7 mg  7 mg   Wed 8 mg  8 mg  8 mg  8 mg   Thu 7 mg  7 mg  7 mg  7 mg   Fri 8 mg  8 mg  8 mg  8 mg   Sat 8 mg  8 mg  8 mg  8 mg   Historical INR  2.50      2.60      2.90            This result is from an external source.       Plan:  1. INR is Therapeutic today- see above in Anticoagulation Summary.   Will instruct Dalton Dallas Sr. to  Continue their warfarin regimen- see above in Anticoagulation Summary.  2. Follow up in 1 weeks- home test.   3.They have been instructed to call if any changes in medications, doses, concerns, etc. Patient expresses understanding and has no further questions at this time.    Karen Oates [facility]

## 2024-09-10 ENCOUNTER — OFFICE VISIT (OUTPATIENT)
Dept: ONCOLOGY | Facility: CLINIC | Age: 84
End: 2024-09-10
Payer: MEDICARE

## 2024-09-10 ENCOUNTER — OFFICE VISIT (OUTPATIENT)
Dept: FAMILY MEDICINE CLINIC | Facility: CLINIC | Age: 84
End: 2024-09-10
Payer: MEDICARE

## 2024-09-10 VITALS
WEIGHT: 273 LBS | BODY MASS INDEX: 39.08 KG/M2 | HEIGHT: 70 IN | HEART RATE: 56 BPM | DIASTOLIC BLOOD PRESSURE: 60 MMHG | SYSTOLIC BLOOD PRESSURE: 109 MMHG | OXYGEN SATURATION: 97 % | TEMPERATURE: 98.2 F

## 2024-09-10 VITALS
RESPIRATION RATE: 16 BRPM | DIASTOLIC BLOOD PRESSURE: 57 MMHG | OXYGEN SATURATION: 97 % | BODY MASS INDEX: 39.08 KG/M2 | SYSTOLIC BLOOD PRESSURE: 104 MMHG | HEART RATE: 60 BPM | WEIGHT: 273 LBS | TEMPERATURE: 97.4 F | HEIGHT: 70 IN

## 2024-09-10 DIAGNOSIS — M54.16 LUMBAR RADICULOPATHY: Primary | ICD-10-CM

## 2024-09-10 DIAGNOSIS — M25.561 CHRONIC PAIN OF BOTH KNEES: Chronic | ICD-10-CM

## 2024-09-10 DIAGNOSIS — E11.649 TYPE 2 DIABETES MELLITUS WITH HYPOGLYCEMIA WITHOUT COMA, WITHOUT LONG-TERM CURRENT USE OF INSULIN: ICD-10-CM

## 2024-09-10 DIAGNOSIS — G89.29 CHRONIC PAIN OF BOTH KNEES: Chronic | ICD-10-CM

## 2024-09-10 DIAGNOSIS — D50.9 IRON DEFICIENCY ANEMIA, UNSPECIFIED IRON DEFICIENCY ANEMIA TYPE: ICD-10-CM

## 2024-09-10 DIAGNOSIS — I82.409 DEEP VEIN THROMBOSIS (DVT) OF LOWER EXTREMITY, UNSPECIFIED CHRONICITY, UNSPECIFIED LATERALITY, UNSPECIFIED VEIN: Primary | ICD-10-CM

## 2024-09-10 DIAGNOSIS — M25.562 CHRONIC PAIN OF BOTH KNEES: Chronic | ICD-10-CM

## 2024-09-10 PROCEDURE — 99214 OFFICE O/P EST MOD 30 MIN: CPT | Performed by: NURSE PRACTITIONER

## 2024-09-10 PROCEDURE — 1160F RVW MEDS BY RX/DR IN RCRD: CPT | Performed by: PHYSICIAN ASSISTANT

## 2024-09-10 PROCEDURE — 3074F SYST BP LT 130 MM HG: CPT | Performed by: PHYSICIAN ASSISTANT

## 2024-09-10 PROCEDURE — 3074F SYST BP LT 130 MM HG: CPT | Performed by: NURSE PRACTITIONER

## 2024-09-10 PROCEDURE — 1159F MED LIST DOCD IN RCRD: CPT | Performed by: PHYSICIAN ASSISTANT

## 2024-09-10 PROCEDURE — 3078F DIAST BP <80 MM HG: CPT | Performed by: PHYSICIAN ASSISTANT

## 2024-09-10 PROCEDURE — G2211 COMPLEX E/M VISIT ADD ON: HCPCS | Performed by: PHYSICIAN ASSISTANT

## 2024-09-10 PROCEDURE — 1126F AMNT PAIN NOTED NONE PRSNT: CPT | Performed by: PHYSICIAN ASSISTANT

## 2024-09-10 PROCEDURE — 1125F AMNT PAIN NOTED PAIN PRSNT: CPT | Performed by: NURSE PRACTITIONER

## 2024-09-10 PROCEDURE — 3078F DIAST BP <80 MM HG: CPT | Performed by: NURSE PRACTITIONER

## 2024-09-10 PROCEDURE — 99214 OFFICE O/P EST MOD 30 MIN: CPT | Performed by: PHYSICIAN ASSISTANT

## 2024-09-10 RX ORDER — GABAPENTIN 600 MG/1
600 TABLET ORAL 3 TIMES DAILY
Qty: 270 TABLET | Refills: 0 | Status: SHIPPED | OUTPATIENT
Start: 2024-09-10

## 2024-09-10 NOTE — PATIENT INSTRUCTIONS
Go eat food check blood sugar doing finger stick  Talk to endocrin about your monitoring device.  Take 600 gabapentin 3 times a day  Pt will contact you.

## 2024-09-10 NOTE — PROGRESS NOTES
Subjective        Dalton Dallas Zoie is a 84 y.o. male.     Chief Complaint   Patient presents with    Back Pain     Started 5-7 days ago    Leg Pain     Started 5-7 days ago       History of Present Illness  Patient is here for back pain and leg pain . This is new in that he has be in wheelchair. He usually just uses his cane.   This started 5-7 days ago he said he bought a silly machine to exercise his legs that's when it started.   He said he just sits and does it. Not using every day.   Never went over 15 min never did it multiple times day this is newest thing he is doing.   He did stop doing it. Last week did not use machine.  Blood sugar is 58 when he checked it in the office.  he has not had lunch .   Rechecked in office BG 73. Gave him glucose 15 in office . His device 15 min post showed 58 rechecked finger and BS was 86 . Patient alert and oriented.   He does get out of house and walks around some until his back and legs started acting up. Not doing anything anymore  He lives with this son.   Son is with him today . He said he does go thru house but not going out any more.   Sitting no pain to knees walking causes pain.  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   He said the gabapentin helps with the pain during day .   Does not help the other pain.  He completed labs one week ago and his CBC and iron levels and B12 were all normal.       I reviewed his notes from cardiology 8/27/2024 and he was complaining of same pain symptoms then.       The following portions of the patient's history were reviewed and updated as appropriate: allergies, current medications, past family history, past medical history, past social history, past surgical history and problem list.      Current Outpatient Medications:     Accu-Chek Irene Plus test strip, USE TO CHECK BLOOD SUGAR BEFORE MEALS AND AT BEDTIME, Disp: 400 each, Rfl: 3    amLODIPine (NORVASC) 5 MG tablet, Take 1 tablet  "by mouth Daily., Disp: 90 tablet, Rfl: 3    atenolol (TENORMIN) 50 MG tablet, TAKE 1 TABLET BY MOUTH DAILY, Disp: 90 tablet, Rfl: 3    atorvastatin (LIPITOR) 80 MG tablet, Take 1 tablet by mouth Every Night. Indications: High Amount of Fats in the Blood, Disp: 90 tablet, Rfl: 3    Blood Glucose Monitoring Suppl (Accu-Chek Irene Plus) w/Device kit, 1 each 4 (Four) Times a Day., Disp: 1 kit, Rfl: 0    clopidogrel (PLAVIX) 75 MG tablet, Take 1 tablet by mouth Daily., Disp: 90 tablet, Rfl: 3    fenofibrate (TRICOR) 54 MG tablet, TAKE 1 TABLET BY MOUTH DAILY FOR HIGH AMOUNT OF TRIGLYCERIDES IN  THE BLOOD, Disp: 90 tablet, Rfl: 3    ferrous sulfate 325 (65 FE) MG tablet, Take 1 tablet by mouth 2 (Two) Times a Day., Disp: , Rfl:     gabapentin (NEURONTIN) 300 MG capsule, Take 1 capsule by mouth 3 (Three) Times a Day., Disp: 270 capsule, Rfl: 1    hydroCHLOROthiazide (HYDRODIURIL) 25 MG tablet, TAKE 1 TABLET BY MOUTH DAILY, Disp: 90 tablet, Rfl: 3    Insulin Pen Needle (BD ULTRA-FINE PEN NEEDLES) 29G X 12.7MM misc, Use to inject insulin twice daily. DX E11.65, Disp: 200 each, Rfl: 2    insulin regular (HUMULIN R) 500 UNIT/ML CONCENTRATED injection, DRAW TO 30 UNIT REI ON U-100 SYRINGE AND INJECT IN THE MORNING AND 40 UNIT REI IN THE EVENING  Indications: Type 2 Diabetes, Disp: , Rfl:     Insulin Syringe-Needle U-100 (BD Insulin Syringe U/F) 31G X 5/16\" 0.5 ML misc, 1 each by Other route 2 (Two) Times a Day., Disp: 200 each, Rfl: 3    Insulin Syringe-Needle U-100 30G X 1/2\" 0.5 ML misc, 1 each by Other route 2 (Two) Times a Day., Disp: 200 each, Rfl: 12    irbesartan (AVAPRO) 300 MG tablet, TAKE 1 TABLET BY MOUTH DAILY, Disp: 90 tablet, Rfl: 3    isosorbide mononitrate (IMDUR) 30 MG 24 hr tablet, Take 1 tablet by mouth Daily., Disp: 90 tablet, Rfl: 3    Jardiance 25 MG tablet tablet, TAKE 1 TABLET BY MOUTH DAILY, Disp: 90 tablet, Rfl: 3    levothyroxine (SYNTHROID, LEVOTHROID) 175 MCG tablet, TAKE 1 TABLET BY MOUTH DAILY " FOR UNDERACTIVE THYROID, Disp: 90 tablet, Rfl: 3    metFORMIN ER (GLUCOPHAGE-XR) 500 MG 24 hr tablet, TAKE 4 TABLETS BY MOUTH AT  SUPPER FOR TYPE 2 DIABETES, Disp: 360 tablet, Rfl: 3    Methylcobalamin (B12-ACTIVE PO), Take  by mouth., Disp: , Rfl:     O2 (OXYGEN), Inhale 1 L/min Every Night. Indications: marianne, Disp: , Rfl:     Omega-3 Fatty Acids (FISH OIL) 1000 MG capsule capsule, Take 1 capsule by mouth 2 (Two) Times a Day., Disp: , Rfl:     Pramlintide Acetate (SymlinPen 120) 2700 MCG/2.7ML solution pen-injector, INJECT 120 MCG SUBCUTANEOUSLY  INTO THE APPROPRIATE AREA AS  DIRECTED 3 TIMES DAILY, Disp: 10.8 mL, Rfl: 11    urea (CARMOL) 40 % cream, Apply 1 application  topically to the appropriate area as directed 2 (Two) Times a Day. Apply twice daily to calluses. Add vitamin D 5,000 units, Disp: 85 each, Rfl: 3    vitamin B-12 (CYANOCOBALAMIN) 100 MCG tablet, Take 1 tablet by mouth Daily. Indications: Inadequate Vitamin B12, Disp: , Rfl:     vitamin D (ERGOCALCIFEROL) 1.25 MG (16491 UT) capsule capsule, TAKE 1 CAPSULE BY MOUTH ONCE  WEEKLY FOR VITAMIN D DEFICIENCY, Disp: 13 capsule, Rfl: 3    warfarin (COUMADIN) 2 MG tablet, TAKE 1 TABLET BY MOUTH DAILY OR  AS DIRECTED BASED ON INR (Patient taking differently: Take 1 tablet by mouth As Needed. TAKE 1 TABLET BY MOUTH DAILY OR  AS DIRECTED BASED ON INR Tuesday and Thursdays only), Disp: 90 tablet, Rfl: 3    warfarin (COUMADIN) 3 MG tablet, Take 1 tablet by mouth daily or as directed by medication management clinic.  Indications: Atrial Fibrillation, Disp: 90 tablet, Rfl: 1    warfarin (COUMADIN) 5 MG tablet, TAKE 1 TABLET BY MOUTH AT NIGHT  FOR ATRIAL FIBRILLATION, Disp: 90 tablet, Rfl: 3    gabapentin (NEURONTIN) 600 MG tablet, Take 1 tablet by mouth 3 (Three) Times a Day., Disp: 270 tablet, Rfl: 0    Recent Results (from the past 4032 hour(s))   Protime-INR    Collection Time: 04/01/24 12:00 AM    Specimen: Blood   Result Value Ref Range    INR 1.20    Doppler  ankle brachial index single level CAR    Collection Time: 04/04/24  9:47 AM   Result Value Ref Range    Upper arterial right arm brachial sys max 127     Upper arterial left arm brachial sys max 107     RIGHT POST TIBIAL SYS      LEFT POST TIBIAL SYS      RIGHT DORSALIS PEDIS SYS      LEFT DORSALIS PEDIS SYS      RIGHT GREAT TOE SYS MAX 54     LEFT GREAT TOE SYS MAX 27     RIGHT CHIOMA RATIO 1.14     LEFT CHIOMA RATIO 1.34     RIGHT TBI RATIO 0.43     LEFT TBI RATIO 0.21    Protime-INR    Collection Time: 04/08/24 12:00 AM    Specimen: Blood   Result Value Ref Range    INR 2.50    Protime-INR    Collection Time: 04/15/24 12:00 AM    Specimen: Blood   Result Value Ref Range    INR 2.40    Protime-INR    Collection Time: 04/23/24 12:00 AM    Specimen: Blood   Result Value Ref Range    INR 2.60    Protime-INR    Collection Time: 04/29/24 12:00 AM    Specimen: Blood   Result Value Ref Range    INR 2.90    Protime-INR    Collection Time: 05/06/24 12:00 AM    Specimen: Blood   Result Value Ref Range    INR 2.60    Protime-INR    Collection Time: 05/13/24 12:00 AM    Specimen: Blood   Result Value Ref Range    INR 2.80    Protime-INR    Collection Time: 05/21/24 12:00 AM    Specimen: Blood   Result Value Ref Range    INR 2.60    Protime-INR    Collection Time: 05/27/24 12:00 AM    Specimen: Blood   Result Value Ref Range    INR 2.30    Lipid Panel    Collection Time: 05/31/24  2:10 PM    Specimen: Blood   Result Value Ref Range    Total Cholesterol 138 0 - 200 mg/dL    Triglycerides 228 (H) 0 - 150 mg/dL    HDL Cholesterol 38 (L) 40 - 60 mg/dL    LDL Cholesterol  63 0 - 100 mg/dL    VLDL Cholesterol 37 5 - 40 mg/dL    LDL/HDL Ratio 1.43    Microalbumin / Creatinine Urine Ratio - Urine, Clean Catch    Collection Time: 05/31/24  2:10 PM    Specimen: Urine, Clean Catch   Result Value Ref Range    Microalbumin/Creatinine Ratio      Creatinine, Urine 69.2 mg/dL    Microalbumin, Urine <1.2 mg/dL    Hemoglobin A1c    Collection Time: 05/31/24  2:10 PM    Specimen: Blood   Result Value Ref Range    Hemoglobin A1C 8.20 (H) 4.80 - 5.60 %   Vitamin B12    Collection Time: 05/31/24  2:10 PM    Specimen: Blood   Result Value Ref Range    Vitamin B-12 1,711 (H) 211 - 946 pg/mL   Folate    Collection Time: 05/31/24  2:10 PM    Specimen: Blood   Result Value Ref Range    Folate 8.25 4.78 - 24.20 ng/mL   Vitamin D,25-Hydroxy    Collection Time: 05/31/24  2:10 PM    Specimen: Blood   Result Value Ref Range    25 Hydroxy, Vitamin D 50.6 30.0 - 100.0 ng/ml   Comprehensive Metabolic Panel    Collection Time: 05/31/24  2:10 PM    Specimen: Blood   Result Value Ref Range    Glucose 213 (H) 65 - 99 mg/dL    BUN 28 (H) 8 - 23 mg/dL    Creatinine 1.44 (H) 0.76 - 1.27 mg/dL    Sodium 140 136 - 145 mmol/L    Potassium 4.4 3.5 - 5.2 mmol/L    Chloride 101 98 - 107 mmol/L    CO2 26.0 22.0 - 29.0 mmol/L    Calcium 9.8 8.6 - 10.5 mg/dL    Total Protein 6.7 6.0 - 8.5 g/dL    Albumin 4.2 3.5 - 5.2 g/dL    ALT (SGPT) 18 1 - 41 U/L    AST (SGOT) 17 1 - 40 U/L    Alkaline Phosphatase 41 39 - 117 U/L    Total Bilirubin 0.3 0.0 - 1.2 mg/dL    Globulin 2.5 gm/dL    A/G Ratio 1.7 g/dL    BUN/Creatinine Ratio 19.4 7.0 - 25.0    Anion Gap 13.0 5.0 - 15.0 mmol/L    eGFR 48.8 (L) >60.0 mL/min/1.73   Protime-INR    Collection Time: 06/03/24 12:00 AM    Specimen: Blood   Result Value Ref Range    INR 2.70    Protime-INR    Collection Time: 06/10/24 12:00 AM    Specimen: Blood   Result Value Ref Range    INR 2.50    Protime-INR    Collection Time: 06/17/24 12:00 AM    Specimen: Blood   Result Value Ref Range    INR 2.50    Protime-INR    Collection Time: 06/24/24 12:00 AM    Specimen: Blood   Result Value Ref Range    INR 2.40    Protime-INR    Collection Time: 07/01/24 12:00 AM    Specimen: Blood   Result Value Ref Range    INR 2.70    Protime-INR    Collection Time: 07/08/24 12:00 AM    Specimen: Blood   Result Value Ref Range    INR 2.30     Protime-INR    Collection Time: 07/15/24 12:00 AM    Specimen: Blood   Result Value Ref Range    INR 3.00    Protime-INR    Collection Time: 07/23/24 12:00 AM    Specimen: Blood   Result Value Ref Range    INR 3.20    Protime-INR    Collection Time: 07/29/24 12:00 AM    Specimen: Blood   Result Value Ref Range    INR 2.90    Protime-INR    Collection Time: 08/05/24 12:00 AM    Specimen: Blood   Result Value Ref Range    INR 2.60    Protime-INR    Collection Time: 08/12/24 12:00 AM    Specimen: Blood   Result Value Ref Range    INR 3.00    Protime-INR    Collection Time: 08/19/24 12:00 AM    Specimen: Blood   Result Value Ref Range    INR 2.60    Protime-INR    Collection Time: 08/26/24 12:00 AM    Specimen: Blood   Result Value Ref Range    INR 2.50    Protime-INR    Collection Time: 09/03/24 12:00 AM    Specimen: Blood   Result Value Ref Range    INR 2.60    Iron Profile    Collection Time: 09/03/24 10:17 AM    Specimen: Blood   Result Value Ref Range    Iron 88 59 - 158 mcg/dL    Iron Saturation (TSAT) 21 20 - 50 %    Transferrin 280 200 - 360 mg/dL    TIBC 417 298 - 536 mcg/dL   Ferritin    Collection Time: 09/03/24 10:17 AM    Specimen: Blood   Result Value Ref Range    Ferritin 119.00 30.00 - 400.00 ng/mL   Vitamin B12    Collection Time: 09/03/24 10:17 AM    Specimen: Blood   Result Value Ref Range    Vitamin B-12 715 211 - 946 pg/mL   CBC Auto Differential    Collection Time: 09/03/24 10:17 AM    Specimen: Blood   Result Value Ref Range    WBC 9.21 3.40 - 10.80 10*3/mm3    RBC 4.53 4.14 - 5.80 10*6/mm3    Hemoglobin 13.9 13.0 - 17.7 g/dL    Hematocrit 42.9 37.5 - 51.0 %    MCV 94.7 79.0 - 97.0 fL    MCH 30.7 26.6 - 33.0 pg    MCHC 32.4 31.5 - 35.7 g/dL    RDW 14.1 12.3 - 15.4 %    RDW-SD 46.8 37.0 - 54.0 fl    MPV 10.5 6.0 - 12.0 fL    Platelets 218 140 - 450 10*3/mm3    Neutrophil % 59.3 42.7 - 76.0 %    Lymphocyte % 28.2 19.6 - 45.3 %    Monocyte % 8.3 5.0 - 12.0 %    Eosinophil % 3.9 0.3 - 6.2 %     "Basophil % 0.3 0.0 - 1.5 %    Neutrophils, Absolute 5.46 1.70 - 7.00 10*3/mm3    Lymphocytes, Absolute 2.60 0.70 - 3.10 10*3/mm3    Monocytes, Absolute 0.76 0.10 - 0.90 10*3/mm3    Eosinophils, Absolute 0.36 0.00 - 0.40 10*3/mm3    Basophils, Absolute 0.03 0.00 - 0.20 10*3/mm3   Protime-INR    Collection Time: 09/09/24 12:00 AM    Specimen: Blood   Result Value Ref Range    INR 2.90          Review of Systems    Objective     /57 (BP Location: Right arm, Patient Position: Sitting, Cuff Size: Large Adult)   Pulse 60   Temp 97.4 °F (36.3 °C) (Oral)   Resp 16   Ht 177.8 cm (70\")   Wt 124 kg (273 lb)   SpO2 97%   BMI 39.17 kg/m²     Physical Exam  Vitals and nursing note reviewed.   Constitutional:       Appearance: He is ill-appearing.      Comments: Patient appears slow with thoughts blood sugar checked in office was 73 given glucose with recheck in 15 min was 88.   His device still showing 56 .   He was sweaty but this improved  Drinking water in office.   Alert now son will transport home.    HENT:      Head: Normocephalic.      Right Ear: Tympanic membrane normal.      Left Ear: Tympanic membrane normal.      Nose: Nose normal.      Mouth/Throat:      Mouth: Mucous membranes are moist.   Cardiovascular:      Rate and Rhythm: Regular rhythm.      Pulses: Normal pulses.      Heart sounds: Normal heart sounds.   Pulmonary:      Effort: Pulmonary effort is normal.      Breath sounds: Normal breath sounds.   Abdominal:      Palpations: Abdomen is soft.   Neurological:      General: No focal deficit present.   Psychiatric:         Mood and Affect: Mood normal.         Speech: Speech normal.         Behavior: Behavior is slowed.         Judgment: Judgment normal.         Result Review :                Assessment & Plan    Diagnoses and all orders for this visit:    1. Lumbar radiculopathy (Primary)  Comments:  increeased gabapentin  Orders:  -     Ambulatory Referral to Physical Therapy for Evaluation & " Treatment  -     Ambulatory Referral to Orthopedic Surgery    2. Chronic pain of both knees  Comments:  referred to ortho for furhter treatment  Orders:  -     Ambulatory Referral to Physical Therapy for Evaluation & Treatment  -     Ambulatory Referral to Orthopedic Surgery    3. Type 2 diabetes mellitus with hypoglycemia without coma, without long-term current use of insulin  Comments:  low blood sugar in office treated with glucose 15 improved from 73 to 86 his meter showing 57. he is stable to discharge with his son to go eat.    Other orders  -     gabapentin (NEURONTIN) 600 MG tablet; Take 1 tablet by mouth 3 (Three) Times a Day.  Dispense: 270 tablet; Refill: 0      Patient Instructions   Go eat food check blood sugar doing finger stick  Talk to endocrin about your monitoring device.  Take 600 gabapentin 3 times a day  Pt will contact you.     Follow Up   Return in about 1 month (around 10/10/2024).    Patient was given instructions and counseling regarding his condition or for health maintenance advice. Please see specific information pulled into the AVS if appropriate.     Joan Johns, LUCAS    09/10/24

## 2024-09-13 ENCOUNTER — TELEPHONE (OUTPATIENT)
Dept: FAMILY MEDICINE CLINIC | Facility: CLINIC | Age: 84
End: 2024-09-13
Payer: MEDICARE

## 2024-09-13 NOTE — TELEPHONE ENCOUNTER
"Caller: Dalton Dallas Sr. \"LOKI\"    Relationship: Self    Best call back number: [phone]    What is the best time to reach you: ANY    Who are you requesting to speak with (clinical staff, provider,  specific staff member): PCP    Do you know the name of the person who called:     What was the call regarding: PATIENT WAS IN THE OFFICE ON 9/10/24 AND HIS SON TOLD HIM THAT PCP SAID TO STOP TAKING A MEDICATION. HE CAN NOT REMEMBER WHICH ONE AND WHY.    Is it okay if the provider responds through MyChart:     "

## 2024-09-13 NOTE — TELEPHONE ENCOUNTER
Patient called back in and I let him know that he is to stop 300 mg Gabapentin 3 times a day and start 600 mg Gabapentin 3 times a day.   He acknowledged and had no further questions.

## 2024-09-16 LAB — INR PPP: 3.1

## 2024-09-17 ENCOUNTER — ANTICOAGULATION VISIT (OUTPATIENT)
Dept: ONCOLOGY | Facility: HOSPITAL | Age: 84
End: 2024-09-17
Payer: MEDICARE

## 2024-09-17 DIAGNOSIS — D68.59 THROMBOPHILIA: Chronic | ICD-10-CM

## 2024-09-17 DIAGNOSIS — I82.513 CHRONIC DEEP VEIN THROMBOSIS (DVT) OF FEMORAL VEIN OF BOTH LOWER EXTREMITIES: Primary | Chronic | ICD-10-CM

## 2024-09-23 ENCOUNTER — ANTICOAGULATION VISIT (OUTPATIENT)
Dept: ONCOLOGY | Facility: HOSPITAL | Age: 84
End: 2024-09-23
Payer: MEDICARE

## 2024-09-23 DIAGNOSIS — I82.513 CHRONIC DEEP VEIN THROMBOSIS (DVT) OF FEMORAL VEIN OF BOTH LOWER EXTREMITIES: Primary | Chronic | ICD-10-CM

## 2024-09-23 DIAGNOSIS — D68.59 THROMBOPHILIA: Chronic | ICD-10-CM

## 2024-09-23 LAB — INR PPP: 1.9

## 2024-09-27 ENCOUNTER — OFFICE VISIT (OUTPATIENT)
Dept: ORTHOPEDIC SURGERY | Facility: CLINIC | Age: 84
End: 2024-09-27
Payer: MEDICARE

## 2024-09-27 ENCOUNTER — TELEPHONE (OUTPATIENT)
Dept: ENDOCRINOLOGY | Facility: CLINIC | Age: 84
End: 2024-09-27
Payer: MEDICARE

## 2024-09-27 VITALS — BODY MASS INDEX: 39.08 KG/M2 | WEIGHT: 273 LBS | OXYGEN SATURATION: 97 % | HEIGHT: 70 IN | RESPIRATION RATE: 20 BRPM

## 2024-09-27 DIAGNOSIS — G89.29 CHRONIC PAIN OF LEFT KNEE: ICD-10-CM

## 2024-09-27 DIAGNOSIS — G89.29 CHRONIC PAIN OF RIGHT KNEE: Primary | ICD-10-CM

## 2024-09-27 DIAGNOSIS — M17.0 PRIMARY OSTEOARTHRITIS OF BOTH KNEES: ICD-10-CM

## 2024-09-27 DIAGNOSIS — M25.562 CHRONIC PAIN OF LEFT KNEE: ICD-10-CM

## 2024-09-27 DIAGNOSIS — M25.561 CHRONIC PAIN OF RIGHT KNEE: Primary | ICD-10-CM

## 2024-09-27 DIAGNOSIS — Z79.4 TYPE 2 DIABETES MELLITUS WITH DIABETIC POLYNEUROPATHY, WITH LONG-TERM CURRENT USE OF INSULIN: Primary | ICD-10-CM

## 2024-09-27 DIAGNOSIS — E11.42 TYPE 2 DIABETES MELLITUS WITH DIABETIC POLYNEUROPATHY, WITH LONG-TERM CURRENT USE OF INSULIN: Primary | ICD-10-CM

## 2024-09-30 LAB — INR PPP: 2.4

## 2024-10-01 ENCOUNTER — ANTICOAGULATION VISIT (OUTPATIENT)
Dept: ONCOLOGY | Facility: HOSPITAL | Age: 84
End: 2024-10-01
Payer: MEDICARE

## 2024-10-01 ENCOUNTER — TELEPHONE (OUTPATIENT)
Dept: ENDOCRINOLOGY | Facility: CLINIC | Age: 84
End: 2024-10-01
Payer: MEDICARE

## 2024-10-01 ENCOUNTER — LAB (OUTPATIENT)
Dept: LAB | Facility: HOSPITAL | Age: 84
End: 2024-10-01
Payer: MEDICARE

## 2024-10-01 DIAGNOSIS — I82.513 CHRONIC DEEP VEIN THROMBOSIS (DVT) OF FEMORAL VEIN OF BOTH LOWER EXTREMITIES: Primary | Chronic | ICD-10-CM

## 2024-10-01 DIAGNOSIS — Z79.4 TYPE 2 DIABETES MELLITUS WITH DIABETIC POLYNEUROPATHY, WITH LONG-TERM CURRENT USE OF INSULIN: ICD-10-CM

## 2024-10-01 DIAGNOSIS — E11.42 TYPE 2 DIABETES MELLITUS WITH DIABETIC POLYNEUROPATHY, WITH LONG-TERM CURRENT USE OF INSULIN: ICD-10-CM

## 2024-10-01 DIAGNOSIS — D68.59 THROMBOPHILIA: Chronic | ICD-10-CM

## 2024-10-01 LAB — HBA1C MFR BLD: 7.85 % (ref 4.8–5.6)

## 2024-10-01 PROCEDURE — 83036 HEMOGLOBIN GLYCOSYLATED A1C: CPT

## 2024-10-01 PROCEDURE — 36415 COLL VENOUS BLD VENIPUNCTURE: CPT

## 2024-10-01 NOTE — TELEPHONE ENCOUNTER
Pt came by office for labs and was asking for a renewal of his [employer] supplies. Called pt back and informed him that due to canceling his 9/25/2024 appt. he will need to go back to finger stick. Pt states that he does have supplies at this time and will call for a future refill.

## 2024-10-03 ENCOUNTER — OFFICE VISIT (OUTPATIENT)
Dept: ORTHOPEDIC SURGERY | Facility: CLINIC | Age: 84
End: 2024-10-03
Payer: MEDICARE

## 2024-10-03 VITALS — HEART RATE: 66 BPM | OXYGEN SATURATION: 98 % | BODY MASS INDEX: 39.08 KG/M2 | HEIGHT: 70 IN | WEIGHT: 273 LBS

## 2024-10-03 DIAGNOSIS — M17.11 PRIMARY OSTEOARTHRITIS OF RIGHT KNEE: Primary | ICD-10-CM

## 2024-10-07 ENCOUNTER — ANTICOAGULATION VISIT (OUTPATIENT)
Dept: ONCOLOGY | Facility: HOSPITAL | Age: 84
End: 2024-10-07
Payer: MEDICARE

## 2024-10-07 DIAGNOSIS — E11.42 TYPE 2 DIABETES MELLITUS WITH DIABETIC POLYNEUROPATHY, WITH LONG-TERM CURRENT USE OF INSULIN: ICD-10-CM

## 2024-10-07 DIAGNOSIS — I82.513 CHRONIC DEEP VEIN THROMBOSIS (DVT) OF FEMORAL VEIN OF BOTH LOWER EXTREMITIES: Primary | Chronic | ICD-10-CM

## 2024-10-07 DIAGNOSIS — I82.409 DEEP VEIN THROMBOSIS (DVT) OF LOWER EXTREMITY, UNSPECIFIED CHRONICITY, UNSPECIFIED LATERALITY, UNSPECIFIED VEIN: ICD-10-CM

## 2024-10-07 DIAGNOSIS — D68.59 THROMBOPHILIA: Chronic | ICD-10-CM

## 2024-10-07 DIAGNOSIS — Z79.4 TYPE 2 DIABETES MELLITUS WITH DIABETIC POLYNEUROPATHY, WITH LONG-TERM CURRENT USE OF INSULIN: ICD-10-CM

## 2024-10-07 DIAGNOSIS — Z79.01 LONG TERM (CURRENT) USE OF ANTICOAGULANTS: ICD-10-CM

## 2024-10-07 LAB — INR PPP: 2.8

## 2024-10-07 RX ORDER — WARFARIN SODIUM 5 MG/1
TABLET ORAL
Qty: 90 TABLET | Refills: 3 | Status: SHIPPED | OUTPATIENT
Start: 2024-10-07

## 2024-10-07 RX ORDER — BLOOD SUGAR DIAGNOSTIC
STRIP MISCELLANEOUS
Qty: 400 EACH | Refills: 3 | Status: SHIPPED | OUTPATIENT
Start: 2024-10-07

## 2024-10-10 ENCOUNTER — OFFICE VISIT (OUTPATIENT)
Dept: FAMILY MEDICINE CLINIC | Facility: CLINIC | Age: 84
End: 2024-10-10
Payer: MEDICARE

## 2024-10-10 VITALS
SYSTOLIC BLOOD PRESSURE: 109 MMHG | HEIGHT: 70 IN | BODY MASS INDEX: 39.17 KG/M2 | TEMPERATURE: 97.7 F | HEART RATE: 59 BPM | RESPIRATION RATE: 15 BRPM | OXYGEN SATURATION: 97 % | DIASTOLIC BLOOD PRESSURE: 63 MMHG

## 2024-10-10 DIAGNOSIS — M25.562 CHRONIC PAIN OF BOTH KNEES: Chronic | ICD-10-CM

## 2024-10-10 DIAGNOSIS — M25.561 CHRONIC PAIN OF BOTH KNEES: Chronic | ICD-10-CM

## 2024-10-10 DIAGNOSIS — M54.16 LUMBAR RADICULOPATHY: Primary | ICD-10-CM

## 2024-10-10 DIAGNOSIS — I73.9 PERIPHERAL VASCULAR DISEASE: Chronic | ICD-10-CM

## 2024-10-10 DIAGNOSIS — G89.29 CHRONIC PAIN OF BOTH KNEES: Chronic | ICD-10-CM

## 2024-10-10 PROCEDURE — 99213 OFFICE O/P EST LOW 20 MIN: CPT | Performed by: NURSE PRACTITIONER

## 2024-10-10 PROCEDURE — 3078F DIAST BP <80 MM HG: CPT | Performed by: NURSE PRACTITIONER

## 2024-10-10 PROCEDURE — 1125F AMNT PAIN NOTED PAIN PRSNT: CPT | Performed by: NURSE PRACTITIONER

## 2024-10-10 PROCEDURE — 3074F SYST BP LT 130 MM HG: CPT | Performed by: NURSE PRACTITIONER

## 2024-10-10 NOTE — PATIENT INSTRUCTIONS
If you don't hear from PT in next week let me know.  Get up and move every day.  Eat healthy and exercise is important.

## 2024-10-10 NOTE — PROGRESS NOTES
Subjective        Dalton Dallas Sr. is a 84 y.o. male.     Chief Complaint   Patient presents with    Back Pain     1 month f/u       Back Pain      Patient is here for management of his chronic back pain he has derick knee pain.if he gets up knees and back hurt.   Sitting with no pressure he will not have pain.  His BMI 39 . He is not very active.   Muscles feel real tight in legs.   Reviewed notes from Dr Young orthopedist      The following portions of the patient's history were reviewed and updated as appropriate: allergies, current medications, past family history, past medical history, past social history, past surgical history and problem list.      Current Outpatient Medications:     Accu-Chek Irene Plus test strip, USE TO CHECK BLOOD SUGAR BEFORE MEALS AND AT BEDTIME, Disp: 400 each, Rfl: 3    amLODIPine (NORVASC) 5 MG tablet, Take 1 tablet by mouth Daily., Disp: 90 tablet, Rfl: 3    atenolol (TENORMIN) 50 MG tablet, TAKE 1 TABLET BY MOUTH DAILY, Disp: 90 tablet, Rfl: 3    atorvastatin (LIPITOR) 80 MG tablet, Take 1 tablet by mouth Every Night. Indications: High Amount of Fats in the Blood, Disp: 90 tablet, Rfl: 3    Blood Glucose Monitoring Suppl (Accu-Chek Irene Plus) w/Device kit, 1 each 4 (Four) Times a Day., Disp: 1 kit, Rfl: 0    clopidogrel (PLAVIX) 75 MG tablet, Take 1 tablet by mouth Daily., Disp: 90 tablet, Rfl: 3    fenofibrate (TRICOR) 54 MG tablet, TAKE 1 TABLET BY MOUTH DAILY FOR HIGH AMOUNT OF TRIGLYCERIDES IN  THE BLOOD, Disp: 90 tablet, Rfl: 3    ferrous sulfate 325 (65 FE) MG tablet, Take 1 tablet by mouth 2 (Two) Times a Day., Disp: , Rfl:     gabapentin (NEURONTIN) 300 MG capsule, Take 1 capsule by mouth 3 (Three) Times a Day., Disp: 270 capsule, Rfl: 1    gabapentin (NEURONTIN) 600 MG tablet, Take 1 tablet by mouth 3 (Three) Times a Day., Disp: 270 tablet, Rfl: 0    hydroCHLOROthiazide (HYDRODIURIL) 25 MG tablet, TAKE 1 TABLET BY MOUTH DAILY, Disp: 90 tablet, Rfl: 3    Ibuprofen 3 %,  "Gabapentin 10 %, Baclofen 2 %, lidocaine 4 %, Apply 1-2 g topically to the appropriate area as directed 3 (Three) to 4 (Four) times daily., Disp: 90 g, Rfl: 3    Insulin Pen Needle (BD ULTRA-FINE PEN NEEDLES) 29G X 12.7MM misc, Use to inject insulin twice daily. DX E11.65, Disp: 200 each, Rfl: 2    insulin regular (HUMULIN R) 500 UNIT/ML CONCENTRATED injection, DRAW TO 30 UNIT REI ON U-100 SYRINGE AND INJECT IN THE MORNING AND 40 UNIT REI IN THE EVENING  Indications: Type 2 Diabetes, Disp: , Rfl:     Insulin Syringe-Needle U-100 (BD Insulin Syringe U/F) 31G X 5/16\" 0.5 ML misc, 1 each by Other route 2 (Two) Times a Day., Disp: 200 each, Rfl: 3    Insulin Syringe-Needle U-100 30G X 1/2\" 0.5 ML misc, 1 each by Other route 2 (Two) Times a Day., Disp: 200 each, Rfl: 12    irbesartan (AVAPRO) 300 MG tablet, TAKE 1 TABLET BY MOUTH DAILY, Disp: 90 tablet, Rfl: 3    isosorbide mononitrate (IMDUR) 30 MG 24 hr tablet, Take 1 tablet by mouth Daily., Disp: 90 tablet, Rfl: 3    Jardiance 25 MG tablet tablet, TAKE 1 TABLET BY MOUTH DAILY, Disp: 90 tablet, Rfl: 3    levothyroxine (SYNTHROID, LEVOTHROID) 175 MCG tablet, TAKE 1 TABLET BY MOUTH DAILY FOR UNDERACTIVE THYROID, Disp: 90 tablet, Rfl: 3    metFORMIN ER (GLUCOPHAGE-XR) 500 MG 24 hr tablet, TAKE 4 TABLETS BY MOUTH AT  SUPPER FOR TYPE 2 DIABETES, Disp: 360 tablet, Rfl: 3    Methylcobalamin (B12-ACTIVE PO), Take  by mouth., Disp: , Rfl:     O2 (OXYGEN), Inhale 1 L/min Every Night. Indications: marianne, Disp: , Rfl:     Omega-3 Fatty Acids (FISH OIL) 1000 MG capsule capsule, Take 1 capsule by mouth 2 (Two) Times a Day., Disp: , Rfl:     Pramlintide Acetate (SymlinPen 120) 2700 MCG/2.7ML solution pen-injector, INJECT 120 MCG SUBCUTANEOUSLY  INTO THE APPROPRIATE AREA AS  DIRECTED 3 TIMES DAILY, Disp: 10.8 mL, Rfl: 11    urea (CARMOL) 40 % cream, Apply 1 application  topically to the appropriate area as directed 2 (Two) Times a Day. Apply twice daily to calluses. Add vitamin D " 5,000 units, Disp: 85 each, Rfl: 3    vitamin B-12 (CYANOCOBALAMIN) 100 MCG tablet, Take 1 tablet by mouth Daily. Indications: Inadequate Vitamin B12, Disp: , Rfl:     vitamin D (ERGOCALCIFEROL) 1.25 MG (94152 UT) capsule capsule, TAKE 1 CAPSULE BY MOUTH ONCE  WEEKLY FOR VITAMIN D DEFICIENCY, Disp: 13 capsule, Rfl: 3    warfarin (COUMADIN) 2 MG tablet, TAKE 1 TABLET BY MOUTH DAILY OR  AS DIRECTED BASED ON INR (Patient taking differently: Take 1 tablet by mouth As Needed. TAKE 1 TABLET BY MOUTH DAILY OR  AS DIRECTED BASED ON INR Tuesday and Thursdays only), Disp: 90 tablet, Rfl: 3    warfarin (COUMADIN) 3 MG tablet, Take 1 tablet by mouth daily or as directed by medication management clinic.  Indications: Atrial Fibrillation, Disp: 90 tablet, Rfl: 1    warfarin (COUMADIN) 5 MG tablet, 7 mg (5 mg x 1 and 1 mg x 2) every Tue, Thu; 8 mg (5 mg x 1 and 1 mg x 3) all other days, Disp: 90 tablet, Rfl: 3    Recent Results (from the past 4032 hour(s))   Protime-INR    Collection Time: 04/29/24 12:00 AM    Specimen: Blood   Result Value Ref Range    INR 2.90    Protime-INR    Collection Time: 05/06/24 12:00 AM    Specimen: Blood   Result Value Ref Range    INR 2.60    Protime-INR    Collection Time: 05/13/24 12:00 AM    Specimen: Blood   Result Value Ref Range    INR 2.80    Protime-INR    Collection Time: 05/21/24 12:00 AM    Specimen: Blood   Result Value Ref Range    INR 2.60    Protime-INR    Collection Time: 05/27/24 12:00 AM    Specimen: Blood   Result Value Ref Range    INR 2.30    Lipid Panel    Collection Time: 05/31/24  2:10 PM    Specimen: Blood   Result Value Ref Range    Total Cholesterol 138 0 - 200 mg/dL    Triglycerides 228 (H) 0 - 150 mg/dL    HDL Cholesterol 38 (L) 40 - 60 mg/dL    LDL Cholesterol  63 0 - 100 mg/dL    VLDL Cholesterol 37 5 - 40 mg/dL    LDL/HDL Ratio 1.43    Microalbumin / Creatinine Urine Ratio - Urine, Clean Catch    Collection Time: 05/31/24  2:10 PM    Specimen: Urine, Clean Catch    Result Value Ref Range    Microalbumin/Creatinine Ratio      Creatinine, Urine 69.2 mg/dL    Microalbumin, Urine <1.2 mg/dL   Hemoglobin A1c    Collection Time: 05/31/24  2:10 PM    Specimen: Blood   Result Value Ref Range    Hemoglobin A1C 8.20 (H) 4.80 - 5.60 %   Vitamin B12    Collection Time: 05/31/24  2:10 PM    Specimen: Blood   Result Value Ref Range    Vitamin B-12 1,711 (H) 211 - 946 pg/mL   Folate    Collection Time: 05/31/24  2:10 PM    Specimen: Blood   Result Value Ref Range    Folate 8.25 4.78 - 24.20 ng/mL   Vitamin D,25-Hydroxy    Collection Time: 05/31/24  2:10 PM    Specimen: Blood   Result Value Ref Range    25 Hydroxy, Vitamin D 50.6 30.0 - 100.0 ng/ml   Comprehensive Metabolic Panel    Collection Time: 05/31/24  2:10 PM    Specimen: Blood   Result Value Ref Range    Glucose 213 (H) 65 - 99 mg/dL    BUN 28 (H) 8 - 23 mg/dL    Creatinine 1.44 (H) 0.76 - 1.27 mg/dL    Sodium 140 136 - 145 mmol/L    Potassium 4.4 3.5 - 5.2 mmol/L    Chloride 101 98 - 107 mmol/L    CO2 26.0 22.0 - 29.0 mmol/L    Calcium 9.8 8.6 - 10.5 mg/dL    Total Protein 6.7 6.0 - 8.5 g/dL    Albumin 4.2 3.5 - 5.2 g/dL    ALT (SGPT) 18 1 - 41 U/L    AST (SGOT) 17 1 - 40 U/L    Alkaline Phosphatase 41 39 - 117 U/L    Total Bilirubin 0.3 0.0 - 1.2 mg/dL    Globulin 2.5 gm/dL    A/G Ratio 1.7 g/dL    BUN/Creatinine Ratio 19.4 7.0 - 25.0    Anion Gap 13.0 5.0 - 15.0 mmol/L    eGFR 48.8 (L) >60.0 mL/min/1.73   Protime-INR    Collection Time: 06/03/24 12:00 AM    Specimen: Blood   Result Value Ref Range    INR 2.70    Protime-INR    Collection Time: 06/10/24 12:00 AM    Specimen: Blood   Result Value Ref Range    INR 2.50    Protime-INR    Collection Time: 06/17/24 12:00 AM    Specimen: Blood   Result Value Ref Range    INR 2.50    Protime-INR    Collection Time: 06/24/24 12:00 AM    Specimen: Blood   Result Value Ref Range    INR 2.40    Protime-INR    Collection Time: 07/01/24 12:00 AM    Specimen: Blood   Result Value Ref Range     INR 2.70    Protime-INR    Collection Time: 07/08/24 12:00 AM    Specimen: Blood   Result Value Ref Range    INR 2.30    Protime-INR    Collection Time: 07/15/24 12:00 AM    Specimen: Blood   Result Value Ref Range    INR 3.00    Protime-INR    Collection Time: 07/23/24 12:00 AM    Specimen: Blood   Result Value Ref Range    INR 3.20    Protime-INR    Collection Time: 07/29/24 12:00 AM    Specimen: Blood   Result Value Ref Range    INR 2.90    Protime-INR    Collection Time: 08/05/24 12:00 AM    Specimen: Blood   Result Value Ref Range    INR 2.60    Protime-INR    Collection Time: 08/12/24 12:00 AM    Specimen: Blood   Result Value Ref Range    INR 3.00    Protime-INR    Collection Time: 08/19/24 12:00 AM    Specimen: Blood   Result Value Ref Range    INR 2.60    Protime-INR    Collection Time: 08/26/24 12:00 AM    Specimen: Blood   Result Value Ref Range    INR 2.50    Protime-INR    Collection Time: 09/03/24 12:00 AM    Specimen: Blood   Result Value Ref Range    INR 2.60    Iron Profile    Collection Time: 09/03/24 10:17 AM    Specimen: Blood   Result Value Ref Range    Iron 88 59 - 158 mcg/dL    Iron Saturation (TSAT) 21 20 - 50 %    Transferrin 280 200 - 360 mg/dL    TIBC 417 298 - 536 mcg/dL   Ferritin    Collection Time: 09/03/24 10:17 AM    Specimen: Blood   Result Value Ref Range    Ferritin 119.00 30.00 - 400.00 ng/mL   Vitamin B12    Collection Time: 09/03/24 10:17 AM    Specimen: Blood   Result Value Ref Range    Vitamin B-12 715 211 - 946 pg/mL   CBC Auto Differential    Collection Time: 09/03/24 10:17 AM    Specimen: Blood   Result Value Ref Range    WBC 9.21 3.40 - 10.80 10*3/mm3    RBC 4.53 4.14 - 5.80 10*6/mm3    Hemoglobin 13.9 13.0 - 17.7 g/dL    Hematocrit 42.9 37.5 - 51.0 %    MCV 94.7 79.0 - 97.0 fL    MCH 30.7 26.6 - 33.0 pg    MCHC 32.4 31.5 - 35.7 g/dL    RDW 14.1 12.3 - 15.4 %    RDW-SD 46.8 37.0 - 54.0 fl    MPV 10.5 6.0 - 12.0 fL    Platelets 218 140 - 450 10*3/mm3    Neutrophil % 59.3  "42.7 - 76.0 %    Lymphocyte % 28.2 19.6 - 45.3 %    Monocyte % 8.3 5.0 - 12.0 %    Eosinophil % 3.9 0.3 - 6.2 %    Basophil % 0.3 0.0 - 1.5 %    Neutrophils, Absolute 5.46 1.70 - 7.00 10*3/mm3    Lymphocytes, Absolute 2.60 0.70 - 3.10 10*3/mm3    Monocytes, Absolute 0.76 0.10 - 0.90 10*3/mm3    Eosinophils, Absolute 0.36 0.00 - 0.40 10*3/mm3    Basophils, Absolute 0.03 0.00 - 0.20 10*3/mm3   Protime-INR    Collection Time: 09/09/24 12:00 AM    Specimen: Blood   Result Value Ref Range    INR 2.90    Protime-INR    Collection Time: 09/16/24 12:00 AM    Specimen: Blood   Result Value Ref Range    INR 3.10    Protime-INR    Collection Time: 09/23/24 12:00 AM    Specimen: Blood   Result Value Ref Range    INR 1.90    Protime-INR    Collection Time: 09/30/24 12:00 AM    Specimen: Blood   Result Value Ref Range    INR 2.40    Hemoglobin A1c    Collection Time: 10/01/24 11:16 AM    Specimen: Blood   Result Value Ref Range    Hemoglobin A1C 7.85 (H) 4.80 - 5.60 %   Protime-INR    Collection Time: 10/07/24 12:00 AM    Specimen: Blood   Result Value Ref Range    INR 2.80          Review of Systems   Musculoskeletal:  Positive for back pain.       Objective     /63 (BP Location: Left arm, Patient Position: Sitting, Cuff Size: Large Adult)   Pulse 59   Temp 97.7 °F (36.5 °C) (Oral)   Resp 15   Ht 177.8 cm (70\")   SpO2 97%   BMI 39.17 kg/m²     Physical Exam  Vitals and nursing note reviewed.   Constitutional:       Appearance: He is obese.   HENT:      Head: Normocephalic.      Mouth/Throat:      Mouth: Mucous membranes are moist.   Eyes:      Pupils: Pupils are equal, round, and reactive to light.   Cardiovascular:      Rate and Rhythm: Normal rate and regular rhythm.   Abdominal:      Palpations: Abdomen is soft.   Skin:     General: Skin is warm.   Neurological:      General: No focal deficit present.      Mental Status: He is alert and oriented to person, place, and time.   Psychiatric:         Mood and Affect: " Mood normal.         Behavior: Behavior normal.         Result Review :                Assessment & Plan    Diagnoses and all orders for this visit:    1. Lumbar radiculopathy (Primary)  Comments:  referred for PT sitting too much not much exercise. needs strenghening.  Orders:  -     Ambulatory Referral to Physical Therapy for Evaluation & Treatment    2. Chronic pain of both knees  Comments:  talk with dr price about your options. start PT  Orders:  -     Ambulatory Referral to Physical Therapy for Evaluation & Treatment    3. Peripheral vascular disease  Comments:  stable referred to Physical therapy  Orders:  -     Ambulatory Referral to Physical Therapy for Evaluation & Treatment      Patient Instructions   If you don't hear from PT in next week let me know.  Get up and move every day.  Eat healthy and exercise is important.     Follow Up   Return in about 3 months (around 1/10/2025).    Patient was given instructions and counseling regarding his condition or for health maintenance advice. Please see specific information pulled into the AVS if appropriate.     Joan Johns, APRN    10/10/24

## 2024-10-11 ENCOUNTER — PATIENT ROUNDING (BHMG ONLY) (OUTPATIENT)
Dept: FAMILY MEDICINE CLINIC | Facility: CLINIC | Age: 84
End: 2024-10-11
Payer: MEDICARE

## 2024-10-11 NOTE — PROGRESS NOTES
October 11, 2024    Hello, may I speak with Dalton Dlalas Sr.?    My name is Vivien      I am  with [facility]  [address] [place] [address].    Before we get started may I verify your date of birth? 1940    I am calling to officially welcome you to our practice and ask about your recent visit. Is this a good time to talk? yes    Tell me about your visit with us. What things went well?  My visit was great as usual.       We're always looking for ways to make our patients' experiences even better. Do you have recommendations on ways we may improve?  no    Overall were you satisfied with your visit to our practice? yes       I appreciate you taking the time to speak with me today. Is there anything else I can do for you? no      Thank you, and have a great day.

## 2024-10-14 ENCOUNTER — ANTICOAGULATION VISIT (OUTPATIENT)
Dept: ONCOLOGY | Facility: HOSPITAL | Age: 84
End: 2024-10-14
Payer: MEDICARE

## 2024-10-14 DIAGNOSIS — I82.513 CHRONIC DEEP VEIN THROMBOSIS (DVT) OF FEMORAL VEIN OF BOTH LOWER EXTREMITIES: Primary | Chronic | ICD-10-CM

## 2024-10-14 DIAGNOSIS — D68.59 THROMBOPHILIA: Chronic | ICD-10-CM

## 2024-10-14 LAB — INR PPP: 3.1

## 2024-10-14 NOTE — PROGRESS NOTES
Anticoagulation Clinic Progress Note    Patient's visit was held by phone today.    Anticoagulation Summary  As of 10/14/2024      INR goal:  2.0-3.0   TTR:  87.2% (5.2 y)   INR used for dosing:  3.10 (10/14/2024)   Warfarin maintenance plan:  7 mg (5 mg x 1 and 1 mg x 2) every Tue, Thu; 8 mg (5 mg x 1 and 1 mg x 3) all other days   Weekly warfarin total:  54 mg   No change documented:  Karen Oates RPH   Plan last modified:  Karen Oates RPH (7/25/2024)   Next INR check:  10/21/2024   Priority:  Maintenance   Target end date:  Indefinite    Indications    Deep vein thrombosis (DVT) [I82.409]  Thrombophilia [D68.59]                 Anticoagulation Episode Summary       INR check location:  --    Preferred lab:  --    Send INR reminders to:   [facility] CBC ANTICOAG POOL    Comments:  MDINR Home maninder          Anticoagulation Care Providers       Provider Role Specialty Phone number    Soraya Cortez MD Referring Hematology and Oncology [phone]            Drug interactions: has remained unchanged.  Diet: has remained unchanged.    Clinic Interview:  No pertinent clinical findings have been reported.    INR History:      9/23/2024     4:26 PM 9/30/2024    12:00 AM 10/1/2024     1:43 PM 10/7/2024    12:00 AM 10/7/2024     3:47 PM 10/14/2024    12:00 AM 10/14/2024     1:31 PM   Anticoagulation Monitoring   INR 1.90  2.40  2.80  3.10   INR Date 9/23/2024  9/30/2024  10/7/2024  10/14/2024   INR Goal 2.0-3.0  2.0-3.0  2.0-3.0  2.0-3.0   Trend Same  Same  Same  Same   Last Week Total 54 mg  54 mg  54 mg  54 mg   Next Week Total 54 mg  54 mg  54 mg  54 mg   Sun 8 mg  8 mg  8 mg  8 mg   Mon 8 mg  -  8 mg  8 mg   Tue 7 mg  7 mg  7 mg  7 mg   Wed 8 mg  8 mg  8 mg  8 mg   Thu 7 mg  7 mg  7 mg  7 mg   Fri 8 mg  8 mg  8 mg  8 mg   Sat 8 mg  8 mg  8 mg  8 mg   Historical INR  2.40      2.80      3.10            This result is from an external source.       Plan:  1. INR is Slightly Supratherapeutic today- see above  in Anticoagulation Summary.     Per patient no changes in medications or diet.     Will instruct Dalton Dallas Sr. to Continue their warfarin regimen- see above in Anticoagulation Summary. Patient's INR has been mostly therapeutic on this dose with occasional slightly low or slightly high INR. Would assume most likely related to dietary fluctuations from week to week.     2. Follow up in 1 weeks via home testing.     3.They have been instructed to call if any changes in medications, doses, concerns, etc. Patient expresses understanding and has no further questions at this time.    Karen Oates RP

## 2024-10-16 ENCOUNTER — TELEPHONE (OUTPATIENT)
Dept: FAMILY MEDICINE CLINIC | Facility: CLINIC | Age: 84
End: 2024-10-16

## 2024-10-16 NOTE — TELEPHONE ENCOUNTER
Caller: VIVIAN    Relationship:     Best call back number: [phone]     What orders are you requesting (i.e. lab or imaging): ROLLING WALKER    In what timeframe would the patient need to come in: ASAP    Where will you receive your lab/imaging services: [facility]    Additional notes:          THANKS

## 2024-10-21 ENCOUNTER — ANTICOAGULATION VISIT (OUTPATIENT)
Dept: ONCOLOGY | Facility: HOSPITAL | Age: 84
End: 2024-10-21
Payer: MEDICARE

## 2024-10-21 DIAGNOSIS — I82.513 CHRONIC DEEP VEIN THROMBOSIS (DVT) OF FEMORAL VEIN OF BOTH LOWER EXTREMITIES: Primary | Chronic | ICD-10-CM

## 2024-10-21 DIAGNOSIS — D68.59 THROMBOPHILIA: Chronic | ICD-10-CM

## 2024-10-21 LAB — INR PPP: 2.3

## 2024-10-21 NOTE — PROGRESS NOTES
Anticoagulation Clinic Progress Note    Patient's visit was held by phone today.    Anticoagulation Summary  As of 10/21/2024      INR goal:  2.0-3.0   TTR:  87.2% (5.2 y)   INR used for dosin.30 (10/21/2024)   Warfarin maintenance plan:  7 mg (5 mg x 1 and 1 mg x 2) every Tue, Thu; 8 mg (5 mg x 1 and 1 mg x 3) all other days   Weekly warfarin total:  54 mg   No change documented:  Karen Oates RPH   Plan last modified:  Karen Oates RPH (2024)   Next INR check:  10/28/2024   Priority:  Maintenance   Target end date:  Indefinite    Indications    Deep vein thrombosis (DVT) [I82.409]  Thrombophilia [D68.59]                 Anticoagulation Episode Summary       INR check location:  --    Preferred lab:  --    Send INR reminders to:   [facility] CBC ANTICOAG POOL    Comments:  MDINR Home maninder          Anticoagulation Care Providers       Provider Role Specialty Phone number    Soraya Cortez MD Referring Hematology and Oncology [phone]            Drug interactions: has remained unchanged.  Diet: has remained unchanged.    Clinic Interview:  No pertinent clinical findings have been reported.    INR History:      10/1/2024     1:43 PM 10/7/2024    12:00 AM 10/7/2024     3:47 PM 10/14/2024    12:00 AM 10/14/2024     1:31 PM 10/21/2024    12:00 AM 10/21/2024     7:58 AM   Anticoagulation Monitoring   INR 2.40  2.80  3.10  2.30   INR Date 2024  10/7/2024  10/14/2024  10/21/2024   INR Goal 2.0-3.0  2.0-3.0  2.0-3.0  2.0-3.0   Trend Same  Same  Same  Same   Last Week Total 54 mg  54 mg  54 mg  54 mg   Next Week Total 54 mg  54 mg  54 mg  54 mg   Sun 8 mg  8 mg  8 mg  8 mg   Mon -  8 mg  8 mg  8 mg   Tue 7 mg  7 mg  7 mg  7 mg   Wed 8 mg  8 mg  8 mg  8 mg   Thu 7 mg  7 mg  7 mg  7 mg   Fri 8 mg  8 mg  8 mg  8 mg   Sat 8 mg  8 mg  8 mg  8 mg   Historical INR  2.80      3.10      2.30            This result is from an external source.       Plan:  1. INR is Therapeutic today- see above in  Anticoagulation Summary.   Will instruct Dalton Dallas Sr. to Continue their warfarin regimen- see above in Anticoagulation Summary.  2. Follow up in 1 weeks- home test.   3.They have been instructed to call if any changes in medications, doses, concerns, etc. Patient expresses understanding and has no further questions at this time.    Karen Oates [facility]

## 2024-10-28 ENCOUNTER — ANTICOAGULATION VISIT (OUTPATIENT)
Dept: PHARMACY | Facility: HOSPITAL | Age: 84
End: 2024-10-28
Payer: MEDICARE

## 2024-10-28 DIAGNOSIS — D68.59 THROMBOPHILIA: Primary | Chronic | ICD-10-CM

## 2024-10-28 LAB — INR PPP: 2.4 (ref 0.9–1.1)

## 2024-10-28 NOTE — PROGRESS NOTES
Anticoagulation Clinic Progress Note    Patient's visit was held by phone today.    Anticoagulation Summary  As of 10/28/2024      INR goal:  2.0-3.0   TTR:  87.2% (5.2 y)   INR used for dosin.40 (10/28/2024)   Warfarin maintenance plan:  7 mg (5 mg x 1 and 1 mg x 2) every Tue, Thu; 8 mg (5 mg x 1 and 1 mg x 3) all other days   Weekly warfarin total:  54 mg   Plan last modified:  Karen Oates RPH (2024)   Next INR check:  2024   Priority:  Maintenance   Target end date:  Indefinite    Indications    Deep vein thrombosis (DVT) [I82.409]  Thrombophilia [D68.59]                 Anticoagulation Episode Summary       INR check location:  --    Preferred lab:  --    Send INR reminders to:   [facility] CBC ANTICOAG POOL    Comments:  MDINCANDIDO Home maninder          Anticoagulation Care Providers       Provider Role Specialty Phone number    Soraya Cortez MD Referring Hematology and Oncology [phone]            Drug interactions: has remained unchanged.  Diet: has remained unchanged.    Clinic Interview:  No pertinent clinical findings have been reported.    INR History:      10/7/2024    12:00 AM 10/7/2024     3:47 PM 10/14/2024    12:00 AM 10/14/2024     1:31 PM 10/21/2024    12:00 AM 10/21/2024     7:58 AM 10/28/2024     8:34 AM   Anticoagulation Monitoring   INR  2.80  3.10  2.30 2.40   INR Date  10/7/2024  10/14/2024  10/21/2024 10/28/2024   INR Goal  2.0-3.0  2.0-3.0  2.0-3.0 2.0-3.0   Trend  Same  Same  Same Same   Last Week Total  54 mg  54 mg  54 mg 54 mg   Next Week Total  54 mg  54 mg  54 mg 54 mg   Sun  8 mg  8 mg  8 mg 8 mg   Mon  8 mg  8 mg  8 mg 8 mg   Tue  7 mg  7 mg  7 mg 7 mg   Wed  8 mg  8 mg  8 mg 8 mg   Thu  7 mg  7 mg  7 mg 7 mg   Fri  8 mg  8 mg  8 mg 8 mg   Sat  8 mg  8 mg  8 mg 8 mg   Historical INR 2.80      3.10      2.30             This result is from an external source.       Plan:  1. INR is Therapeutic today- see above in Anticoagulation Summary.   Will instruct Dalton CHAU  Burt Sr. to continue their warfarin regimen- see above in Anticoagulation Summary. Patient reports no changes in diet and no signs/symptoms of bleeding.  2. Follow up in 1 weeks  3.They have been instructed to call if any changes in medications, doses, concerns, etc. Patient expresses understanding and has no further questions at this time.    Gloria Siddiqui, [facility]

## 2024-10-29 DIAGNOSIS — E11.42 TYPE 2 DIABETES MELLITUS WITH DIABETIC POLYNEUROPATHY, WITH LONG-TERM CURRENT USE OF INSULIN: ICD-10-CM

## 2024-10-29 DIAGNOSIS — Z79.4 TYPE 2 DIABETES MELLITUS WITH DIABETIC POLYNEUROPATHY, WITH LONG-TERM CURRENT USE OF INSULIN: ICD-10-CM

## 2024-10-31 RX ORDER — INSULIN HUMAN 500 [IU]/ML
INJECTION, SOLUTION SUBCUTANEOUS
Qty: 60 ML | Refills: 3 | Status: SHIPPED | OUTPATIENT
Start: 2024-10-31

## 2024-11-04 ENCOUNTER — ANTICOAGULATION VISIT (OUTPATIENT)
Dept: PHARMACY | Facility: HOSPITAL | Age: 84
End: 2024-11-04
Payer: MEDICARE

## 2024-11-04 DIAGNOSIS — I82.513 CHRONIC DEEP VEIN THROMBOSIS (DVT) OF FEMORAL VEIN OF BOTH LOWER EXTREMITIES: Primary | Chronic | ICD-10-CM

## 2024-11-04 DIAGNOSIS — D68.59 THROMBOPHILIA: Chronic | ICD-10-CM

## 2024-11-04 LAB — INR PPP: 1.9

## 2024-11-11 ENCOUNTER — ANTICOAGULATION VISIT (OUTPATIENT)
Dept: ONCOLOGY | Facility: HOSPITAL | Age: 84
End: 2024-11-11
Payer: MEDICARE

## 2024-11-11 DIAGNOSIS — D68.59 THROMBOPHILIA: Chronic | ICD-10-CM

## 2024-11-11 DIAGNOSIS — I82.513 CHRONIC DEEP VEIN THROMBOSIS (DVT) OF FEMORAL VEIN OF BOTH LOWER EXTREMITIES: Primary | Chronic | ICD-10-CM

## 2024-11-11 LAB — INR PPP: 2.6

## 2024-11-11 RX ORDER — ERGOCALCIFEROL 1.25 MG/1
CAPSULE, LIQUID FILLED ORAL
Qty: 13 CAPSULE | Refills: 3 | Status: SHIPPED | OUTPATIENT
Start: 2024-11-11

## 2024-11-11 RX ORDER — HYDROCHLOROTHIAZIDE 25 MG/1
25 TABLET ORAL DAILY
Qty: 90 TABLET | Refills: 3 | Status: SHIPPED | OUTPATIENT
Start: 2024-11-11

## 2024-11-11 NOTE — PROGRESS NOTES
Anticoagulation Clinic Progress Note    Patient's visit was held by phone today.    Anticoagulation Summary  As of 2024      INR goal:  2.0-3.0   TTR:  87.2% (5.3 y)   INR used for dosin.60 (2024)   Warfarin maintenance plan:  7 mg (5 mg x 1 and 1 mg x 2) every Tue, Thu; 8 mg (5 mg x 1 and 1 mg x 3) all other days   Weekly warfarin total:  54 mg   No change documented:  Karen Oates RPH   Plan last modified:  Karen Oates RPH (2024)   Next INR check:  2024   Priority:  Maintenance   Target end date:  Indefinite    Indications    Deep vein thrombosis (DVT) [I82.409]  Thrombophilia [D68.59]                 Anticoagulation Episode Summary       INR check location:  --    Preferred lab:  --    Send INR reminders to:   [facility] CBC ANTICOAG POOL    Comments:  MDINR Home maninder          Anticoagulation Care Providers       Provider Role Specialty Phone number    Soraya Cortez MD Referring Hematology and Oncology [phone]            Drug interactions: has remained unchanged.  Diet: has remained unchanged.    Clinic Interview:  No pertinent clinical findings have been reported.    INR History:      10/21/2024    12:00 AM 10/21/2024     7:58 AM 10/28/2024     8:34 AM 2024    12:00 AM 2024     8:28 AM 2024    12:00 AM 2024     8:10 AM   Anticoagulation Monitoring   INR  2.30 2.40  1.90  2.60   INR Date  10/21/2024 10/28/2024  2024  2024   INR Goal  2.0-3.0 2.0-3.0  2.0-3.0  2.0-3.0   Trend  Same Same  Same  Same   Last Week Total  54 mg 54 mg  54 mg  54 mg   Next Week Total  54 mg 54 mg  54 mg  54 mg   Sun  8 mg 8 mg  8 mg  8 mg   Mon  8 mg 8 mg  8 mg  8 mg   Tue  7 mg 7 mg  7 mg  7 mg   Wed  8 mg 8 mg  8 mg  8 mg   Thu  7 mg 7 mg  7 mg  7 mg   Fri  8 mg 8 mg  8 mg  8 mg   Sat  8 mg 8 mg  8 mg  8 mg   Historical INR 2.30       1.90      2.60            This result is from an external source.       Plan:  1. INR is Therapeutic today- see above in  Anticoagulation Summary.   Will instruct Dalton Dallas Sr. to Continue their warfarin regimen- see above in Anticoagulation Summary.  2. Follow up in 1 weeks via home testing.   3.They have been instructed to call if any changes in medications, doses, concerns, etc. Patient expresses understanding and has no further questions at this time.    Karen Oates [facility]

## 2024-11-11 NOTE — TELEPHONE ENCOUNTER
Caller: LOKI    Relationship: SELF    Best call back number: [phone]    Requested Prescriptions:   Requested Prescriptions     Pending Prescriptions Disp Refills    hydroCHLOROthiazide 25 MG tablet 90 tablet 3     Sig: Take 1 tablet by mouth Daily.        Pharmacy where request should be sent: [facility] [address] [phone] [facility] [phone]      Last office visit with prescribing clinician: 8/9/2023   Last telemedicine visit with prescribing clinician: Visit date not found   Next office visit with prescribing clinician: 3/12/2025     Additional details provided by patient: WEEK LEFT    Does the patient have less than a 3 day supply:  [] Yes  [x] No    Would you like a call back once the refill request has been completed: [] Yes [] No    If the office needs to give you a call back, can they leave a voicemail: [] Yes [] No    Alex Luo Rep   11/11/24 09:36 EST

## 2024-11-12 ENCOUNTER — TELEPHONE (OUTPATIENT)
Dept: ENDOCRINOLOGY | Facility: CLINIC | Age: 84
End: 2024-11-12
Payer: MEDICARE

## 2024-11-16 RX ORDER — GABAPENTIN 600 MG/1
600 TABLET ORAL 3 TIMES DAILY
Qty: 270 TABLET | Refills: 3 | Status: SHIPPED | OUTPATIENT
Start: 2024-11-16

## 2024-11-18 ENCOUNTER — ANTICOAGULATION VISIT (OUTPATIENT)
Dept: ONCOLOGY | Facility: HOSPITAL | Age: 84
End: 2024-11-18
Payer: MEDICARE

## 2024-11-18 DIAGNOSIS — I82.513 CHRONIC DEEP VEIN THROMBOSIS (DVT) OF FEMORAL VEIN OF BOTH LOWER EXTREMITIES: Primary | Chronic | ICD-10-CM

## 2024-11-18 DIAGNOSIS — D68.59 THROMBOPHILIA: Chronic | ICD-10-CM

## 2024-11-18 LAB — INR PPP: 3

## 2024-11-18 NOTE — PROGRESS NOTES
Anticoagulation Clinic Progress Note    Patient's visit was held by phone today.    Anticoagulation Summary  As of 11/18/2024      INR goal:  2.0-3.0   TTR:  87.2% (5.3 y)   INR used for dosing:  3.00 (11/18/2024)   Warfarin maintenance plan:  7 mg (5 mg x 1 and 1 mg x 2) every Tue, Thu; 8 mg (5 mg x 1 and 1 mg x 3) all other days   Weekly warfarin total:  54 mg   No change documented:  Karen Oates RPH   Plan last modified:  Karen Oates RPH (7/25/2024)   Next INR check:  11/25/2024   Priority:  Maintenance   Target end date:  Indefinite    Indications    Deep vein thrombosis (DVT) [I82.409]  Thrombophilia [D68.59]                 Anticoagulation Episode Summary       INR check location:  --    Preferred lab:  --    Send INR reminders to:   [facility] CBC ANTICOAG POOL    Comments:  MDINR Home maninder          Anticoagulation Care Providers       Provider Role Specialty Phone number    Soraya Cortez MD Referring Hematology and Oncology [phone]            Drug interactions: has remained unchanged.  Diet: has remained unchanged.    Clinic Interview:  No pertinent clinical findings have been reported.    INR History:      10/28/2024     8:34 AM 11/4/2024    12:00 AM 11/4/2024     8:28 AM 11/11/2024    12:00 AM 11/11/2024     8:10 AM 11/18/2024    12:00 AM 11/18/2024    11:52 AM   Anticoagulation Monitoring   INR 2.40  1.90  2.60  3.00   INR Date 10/28/2024  11/4/2024  11/11/2024  11/18/2024   INR Goal 2.0-3.0  2.0-3.0  2.0-3.0  2.0-3.0   Trend Same  Same  Same  Same   Last Week Total 54 mg  54 mg  54 mg  54 mg   Next Week Total 54 mg  54 mg  54 mg  54 mg   Sun 8 mg  8 mg  8 mg  8 mg   Mon 8 mg  8 mg  8 mg  8 mg   Tue 7 mg  7 mg  7 mg  7 mg   Wed 8 mg  8 mg  8 mg  8 mg   Thu 7 mg  7 mg  7 mg  7 mg   Fri 8 mg  8 mg  8 mg  8 mg   Sat 8 mg  8 mg  8 mg  8 mg   Historical INR  1.90      2.60      3.00            This result is from an external source.       Plan:  1. INR is Therapeutic today- see above in  Anticoagulation Summary.   Will instruct Dalton Dallas Sr. to Continue their warfarin regimen- see above in Anticoagulation Summary.  2. Follow up in 1 weeks via home testing.   3.They have been instructed to call if any changes in medications, doses, concerns, etc. Patient expresses understanding and has no further questions at this time.    Karen Oates [facility]

## 2024-11-21 ENCOUNTER — OFFICE VISIT (OUTPATIENT)
Dept: ORTHOPEDIC SURGERY | Facility: CLINIC | Age: 84
End: 2024-11-21
Payer: MEDICARE

## 2024-11-21 VITALS — OXYGEN SATURATION: 96 % | HEART RATE: 67 BPM | WEIGHT: 273 LBS | HEIGHT: 70 IN | BODY MASS INDEX: 39.08 KG/M2

## 2024-11-21 DIAGNOSIS — M17.11 PRIMARY OSTEOARTHRITIS OF RIGHT KNEE: Primary | ICD-10-CM

## 2024-11-21 DIAGNOSIS — M17.0 PRIMARY OSTEOARTHRITIS OF BOTH KNEES: ICD-10-CM

## 2024-11-21 DIAGNOSIS — G89.29 CHRONIC PAIN OF RIGHT KNEE: ICD-10-CM

## 2024-11-21 DIAGNOSIS — M25.562 CHRONIC PAIN OF LEFT KNEE: ICD-10-CM

## 2024-11-21 DIAGNOSIS — M25.561 CHRONIC PAIN OF RIGHT KNEE: ICD-10-CM

## 2024-11-21 DIAGNOSIS — G89.29 CHRONIC PAIN OF LEFT KNEE: ICD-10-CM

## 2024-11-21 NOTE — PROGRESS NOTES
"Subjective:     Patient ID: Dalton Dallas Sr. is a 84 y.o. male.    Chief Complaint:    History of Present Illness  Dalton Dallas Sr. returns to clinic today for evaluation of R>L knee pain.  The patient saw me 8 weeks ago and underwent right knee injection.  He states that they did not help for even an hour.  He states they did \"nothing\".  The patient is hopeful to be considered for total knee arthroplasty.  He states he cannot walk because he cannot stand the pain.  The patient is in a wheelchair again today.     Social History     Occupational History    Not on file   Tobacco Use    Smoking status: Former     Current packs/day: 0.00     Average packs/day: 1.5 packs/day for 20.0 years (30.0 ttl pk-yrs)     Types: Cigarettes     Start date:      Quit date:      Years since quittin.9     Passive exposure: Past    Smokeless tobacco: Never    Tobacco comments:     quit    Vaping Use    Vaping status: Never Used   Substance and Sexual Activity    Alcohol use: No    Drug use: No    Sexual activity: Defer      Past Medical History:   Diagnosis Date    ACE-inhibitor cough 2005    Coronary artery disease     Diabetes mellitus, type 2     ED (erectile dysfunction)     Hx of blood clots     Blood clot left leg     Hyperlipidemia 2000    Hypertension     Hypogonadism, male     Hypothyroidism 2001    Obesity     Peripheral neuropathy     Pulmonary embolism 2014    Rectal fistula     Sleep apnea 12/10/2013    Ulcer of left foot 2022    Vitamin D deficiency      Past Surgical History:   Procedure Laterality Date    CARDIAC CATHETERIZATION      PTCA: ; PTCA: 2015 LCX stent     CARDIAC CATHETERIZATION Left 7/3/2021    Procedure: Cardiac Catheterization/Vascular Study;  Surgeon: Edwar Ackerman MD;  Location: [facility] INVASIVE LOCATION;  Service: Cardiovascular;  Laterality: Left;    CARDIOVASCULAR STRESS TEST      CATARACT EXTRACTION  2016 & " "1-11-16     COLONOSCOPY N/A 11/23/2019    Procedure: COLONOSCOPY WITH ENDOSCOPIC CLIPPING X1 OF POST POLYPECTOMY BLEED SITE;  Surgeon: Jhonny Orellana MD;  Location: [facility] ENDOSCOPY;  Service: Gastroenterology    CORONARY STENT PLACEMENT      INGUINAL HERNIA REPAIR Left     UMBILICAL HERNIA REPAIR         Family History   Problem Relation Age of Onset    Heart disease Mother     Leukemia Father                  Objective:  Vitals:    11/21/24 1051   Pulse: 67   SpO2: 96%   Weight: 124 kg (273 lb)   Height: 177.8 cm (70\")         11/21/24  1051   Weight: 124 kg (273 lb)     Body mass index is 39.17 kg/m².           Ortho Exam     Right Knee Exam      Tenderness   The patient is experiencing tenderness in the medial retinaculum and medial joint line.     Range of Motion   Extension:  5   Flexion:  120      Tests   Meryl:  Medial - positive Lateral - negative  Varus: negative Valgus: negative  Lachman:  Anterior - negative    Posterior - negative  Drawer:  Anterior - negative    Posterior - negative     Other   Erythema: absent  Scars: absent  Sensation: normal  Pulse: present  Swelling: mild  Effusion: no effusion present  Significant 2+ pitting edema with significant venous stasis changes and weeping of his lower extremity.    Assessment:        1. Primary osteoarthritis of right knee    2. Chronic pain of left knee    3. Primary osteoarthritis of both knees    4. Chronic pain of right knee           Plan:          Discussed treatment options at length with patient at today's visit.  I discussed with the patient that unfortunately we do not have many great options for him.  He has severe right knee osteoarthritis but he is a poorly controlled diabetic with significant swelling venous stasis and weeping of his bilateral lower extremities.  I discussed with him that I would not recommend total knee arthroplasty because I think that the risk of infection and poor outcome outweighs the potential benefit.  I " discussed with the patient that I am happy to refer him to another orthopedic surgeon for another opinion and see if someone would consider operating on him.  I discussed with him that I think the best course of action at this point would be referral to pain management for potential genicular nerve blocks.  Referral was placed today.  I discussed with him that I like him to go to pain management.  We could consider gel injections down the road if he would like  Follow up: As needed      Dalton Dallas Sr. was in agreement with plan and had all questions answered.     Medications:  No orders of the defined types were placed in this encounter.      Followup:  No follow-ups on file.    Diagnoses and all orders for this visit:    1. Primary osteoarthritis of right knee (Primary)  -     Ambulatory Referral to Pain Management Clinic    2. Chronic pain of left knee  -     Ambulatory Referral to Pain Management Clinic    3. Primary osteoarthritis of both knees  -     Ambulatory Referral to Pain Management Clinic    4. Chronic pain of right knee  -     Ambulatory Referral to Pain Management Clinic          Dictated utilizing Dragon dictation

## 2024-11-25 ENCOUNTER — ANTICOAGULATION VISIT (OUTPATIENT)
Dept: ONCOLOGY | Facility: HOSPITAL | Age: 84
End: 2024-11-25
Payer: MEDICARE

## 2024-11-25 DIAGNOSIS — I82.513 CHRONIC DEEP VEIN THROMBOSIS (DVT) OF FEMORAL VEIN OF BOTH LOWER EXTREMITIES: Primary | Chronic | ICD-10-CM

## 2024-11-25 DIAGNOSIS — D68.59 THROMBOPHILIA: Chronic | ICD-10-CM

## 2024-11-25 LAB — INR PPP: 2.3

## 2024-11-25 NOTE — PROGRESS NOTES
Anticoagulation Clinic Progress Note    Patient's visit was held by phone today.    Anticoagulation Summary  As of 2024      INR goal:  2.0-3.0   TTR:  87.3% (5.3 y)   INR used for dosin.30 (2024)   Warfarin maintenance plan:  7 mg (5 mg x 1 and 1 mg x 2) every Tue, Thu; 8 mg (5 mg x 1 and 1 mg x 3) all other days   Weekly warfarin total:  54 mg   No change documented:  Karen Oates RPH   Plan last modified:  Karen Oates RPH (2024)   Next INR check:  --   Priority:  Maintenance   Target end date:  Indefinite    Indications    Deep vein thrombosis (DVT) [I82.409]  Thrombophilia [D68.59]                 Anticoagulation Episode Summary       INR check location:  --    Preferred lab:  --    Send INR reminders to:   [facility] CBC ANTICOAG POOL    Comments:  MDINCANDIDO Home maninder          Anticoagulation Care Providers       Provider Role Specialty Phone number    Soraya Cortez MD Referring Hematology and Oncology [phone]            Drug interactions: has remained unchanged.  Diet: has remained unchanged.    Clinic Interview:  No pertinent clinical findings have been reported.    INR History:      2024     8:28 AM 2024    12:00 AM 2024     8:10 AM 2024    12:00 AM 2024    11:52 AM 2024    12:00 AM 2024     2:51 PM   Anticoagulation Monitoring   INR 1.90  2.60  3.00  2.30   INR Date 2024   INR Goal 2.0-3.0  2.0-3.0  2.0-3.0  2.0-3.0   Trend Same  Same  Same  Same   Last Week Total 54 mg  54 mg  54 mg  54 mg   Next Week Total 54 mg  54 mg  54 mg  54 mg   Sun 8 mg  8 mg  8 mg  8 mg   Mon 8 mg  8 mg  8 mg  8 mg   Tue 7 mg  7 mg  7 mg  7 mg   Wed 8 mg  8 mg  8 mg  8 mg   Thu 7 mg  7 mg  7 mg  7 mg   Fri 8 mg  8 mg  8 mg  8 mg   Sat 8 mg  8 mg  8 mg  8 mg   Historical INR  2.60      3.00      2.30            This result is from an external source.       Plan:  1. INR is Therapeutic today- see above in  Anticoagulation Summary.   Will instruct Dalton Dallas Sr. to Continue their warfarin regimen- see above in Anticoagulation Summary.  2. Follow up in 1 weeks via home testing   3.They have been instructed to call if any changes in medications, doses, concerns, etc. Patient expresses understanding and has no further questions at this time.    aKren Oates [facility]

## 2024-12-01 DIAGNOSIS — Z79.4 TYPE 2 DIABETES MELLITUS WITH DIABETIC NEUROPATHY, WITH LONG-TERM CURRENT USE OF INSULIN: Chronic | ICD-10-CM

## 2024-12-01 DIAGNOSIS — E11.40 TYPE 2 DIABETES MELLITUS WITH DIABETIC NEUROPATHY, WITH LONG-TERM CURRENT USE OF INSULIN: Chronic | ICD-10-CM

## 2024-12-02 ENCOUNTER — ANTICOAGULATION VISIT (OUTPATIENT)
Dept: PHARMACY | Facility: HOSPITAL | Age: 84
End: 2024-12-02
Payer: MEDICARE

## 2024-12-02 DIAGNOSIS — D68.59 THROMBOPHILIA: Primary | Chronic | ICD-10-CM

## 2024-12-02 LAB — INR PPP: 3

## 2024-12-02 RX ORDER — ATENOLOL 50 MG/1
50 TABLET ORAL DAILY
Qty: 90 TABLET | Refills: 3 | Status: SHIPPED | OUTPATIENT
Start: 2024-12-02

## 2024-12-02 RX ORDER — EMPAGLIFLOZIN 25 MG/1
25 TABLET, FILM COATED ORAL DAILY
Qty: 90 TABLET | Refills: 3 | Status: SHIPPED | OUTPATIENT
Start: 2024-12-02

## 2024-12-02 RX ORDER — METFORMIN HYDROCHLORIDE 500 MG/1
TABLET, EXTENDED RELEASE ORAL
Qty: 360 TABLET | Refills: 3 | Status: SHIPPED | OUTPATIENT
Start: 2024-12-02

## 2024-12-02 NOTE — PROGRESS NOTES
Anticoagulation Clinic Progress Note    Patient's visit was held by phone today.    Anticoagulation Summary  As of 12/2/2024      INR goal:  2.0-3.0   TTR:  87.3% (5.3 y)   INR used for dosing:  3.00 (12/2/2024)   Warfarin maintenance plan:  7 mg (5 mg x 1 and 1 mg x 2) every Tue, Thu; 8 mg (5 mg x 1 and 1 mg x 3) all other days   Weekly warfarin total:  54 mg   No change documented:  Taylor Ambriz RP   Plan last modified:  Karen Oates [facility] (7/25/2024)   Next INR check:  12/9/2024   Priority:  Maintenance   Target end date:  Indefinite    Indications    Deep vein thrombosis (DVT) [I82.409]  Thrombophilia [D68.59]                 Anticoagulation Episode Summary       INR check location:  --    Preferred lab:  --    Send INR reminders to:   [facility] CBC ANTICOAG POOL    Comments:  MDINR Home maninder          Anticoagulation Care Providers       Provider Role Specialty Phone number    Soraya Cortez MD Referring Hematology and Oncology [phone]            Drug interactions: has remained unchanged.  Diet: has remained unchanged.    Clinic Interview:  No pertinent clinical findings have been reported.    INR History:      11/11/2024     8:10 AM 11/18/2024    12:00 AM 11/18/2024    11:52 AM 11/25/2024    12:00 AM 11/25/2024     2:51 PM 12/2/2024    12:00 AM 12/2/2024     9:59 AM   Anticoagulation Monitoring   INR 2.60  3.00  2.30  3.00   INR Date 11/11/2024 11/18/2024 11/25/2024 12/2/2024   INR Goal 2.0-3.0  2.0-3.0  2.0-3.0  2.0-3.0   Trend Same  Same  Same  Same   Last Week Total 54 mg  54 mg  54 mg  54 mg   Next Week Total 54 mg  54 mg  54 mg  54 mg   Sun 8 mg  8 mg  8 mg  8 mg   Mon 8 mg  8 mg  8 mg  8 mg   Tue 7 mg  7 mg  7 mg  7 mg   Wed 8 mg  8 mg  8 mg  8 mg   Thu 7 mg  7 mg  7 mg  7 mg   Fri 8 mg  8 mg  8 mg  8 mg   Sat 8 mg  8 mg  8 mg  8 mg   Historical INR  3.00      2.30      3.00            This result is from an external source.       Plan:  1. INR is Therapeutic today- see above in  Anticoagulation Summary.   Will instruct Dalton Dixonogast Sr. to Continue their warfarin regimen- see above in Anticoagulation Summary.  2. Follow up in 1 weeks  3.They have been instructed to call if any changes in medications, doses, concerns, etc. Patient expresses understanding and has no further questions at this time.    Taylor Ambriz [facility]

## 2024-12-04 ENCOUNTER — LAB (OUTPATIENT)
Dept: LAB | Facility: HOSPITAL | Age: 84
End: 2024-12-04
Payer: MEDICARE

## 2024-12-04 DIAGNOSIS — Z79.4 TYPE 2 DIABETES MELLITUS WITH DIABETIC POLYNEUROPATHY, WITH LONG-TERM CURRENT USE OF INSULIN: ICD-10-CM

## 2024-12-04 DIAGNOSIS — E55.9 VITAMIN D DEFICIENCY: ICD-10-CM

## 2024-12-04 DIAGNOSIS — E11.42 TYPE 2 DIABETES MELLITUS WITH DIABETIC POLYNEUROPATHY, WITH LONG-TERM CURRENT USE OF INSULIN: ICD-10-CM

## 2024-12-04 DIAGNOSIS — E78.2 MIXED HYPERLIPIDEMIA: Chronic | ICD-10-CM

## 2024-12-04 DIAGNOSIS — E03.9 ACQUIRED HYPOTHYROIDISM: Chronic | ICD-10-CM

## 2024-12-04 LAB
25(OH)D3 SERPL-MCNC: 48.7 NG/ML (ref 30–100)
ALBUMIN SERPL-MCNC: 4.1 G/DL (ref 3.5–5.2)
ALBUMIN UR-MCNC: <1.2 MG/DL
ALBUMIN/GLOB SERPL: 1.5 G/DL
ALP SERPL-CCNC: 39 U/L (ref 39–117)
ALT SERPL W P-5'-P-CCNC: 18 U/L (ref 1–41)
ANION GAP SERPL CALCULATED.3IONS-SCNC: 15 MMOL/L (ref 5–15)
AST SERPL-CCNC: 19 U/L (ref 1–40)
BILIRUB SERPL-MCNC: 0.4 MG/DL (ref 0–1.2)
BUN SERPL-MCNC: 26 MG/DL (ref 8–23)
BUN/CREAT SERPL: 21.1 (ref 7–25)
CALCIUM SPEC-SCNC: 9.8 MG/DL (ref 8.6–10.5)
CHLORIDE SERPL-SCNC: 96 MMOL/L (ref 98–107)
CHOLEST SERPL-MCNC: 139 MG/DL (ref 0–200)
CO2 SERPL-SCNC: 26 MMOL/L (ref 22–29)
CREAT SERPL-MCNC: 1.23 MG/DL (ref 0.76–1.27)
CREAT UR-MCNC: 18.9 MG/DL
EGFRCR SERPLBLD CKD-EPI 2021: 58.6 ML/MIN/1.73
GLOBULIN UR ELPH-MCNC: 2.7 GM/DL
GLUCOSE SERPL-MCNC: 160 MG/DL (ref 65–99)
HBA1C MFR BLD: 7.36 % (ref 4.8–5.6)
HDLC SERPL-MCNC: 43 MG/DL (ref 40–60)
LDLC SERPL CALC-MCNC: 70 MG/DL (ref 0–100)
LDLC/HDLC SERPL: 1.54 {RATIO}
MICROALBUMIN/CREAT UR: NORMAL MG/G{CREAT}
POTASSIUM SERPL-SCNC: 4.6 MMOL/L (ref 3.5–5.2)
PROT SERPL-MCNC: 6.8 G/DL (ref 6–8.5)
SODIUM SERPL-SCNC: 137 MMOL/L (ref 136–145)
T4 FREE SERPL-MCNC: 1.82 NG/DL (ref 0.92–1.68)
TRIGL SERPL-MCNC: 149 MG/DL (ref 0–150)
TSH SERPL DL<=0.05 MIU/L-ACNC: 2.76 UIU/ML (ref 0.27–4.2)
VLDLC SERPL-MCNC: 26 MG/DL (ref 5–40)

## 2024-12-04 PROCEDURE — 84439 ASSAY OF FREE THYROXINE: CPT

## 2024-12-04 PROCEDURE — 82570 ASSAY OF URINE CREATININE: CPT

## 2024-12-04 PROCEDURE — 84443 ASSAY THYROID STIM HORMONE: CPT

## 2024-12-04 PROCEDURE — 83036 HEMOGLOBIN GLYCOSYLATED A1C: CPT

## 2024-12-04 PROCEDURE — 82043 UR ALBUMIN QUANTITATIVE: CPT

## 2024-12-04 PROCEDURE — 80061 LIPID PANEL: CPT

## 2024-12-04 PROCEDURE — 36415 COLL VENOUS BLD VENIPUNCTURE: CPT

## 2024-12-04 PROCEDURE — 82306 VITAMIN D 25 HYDROXY: CPT

## 2024-12-04 PROCEDURE — 80053 COMPREHEN METABOLIC PANEL: CPT

## 2024-12-05 NOTE — TELEPHONE ENCOUNTER
Tried to call patient regarding upcoming lab appt. Our lab will be closed at that time. So asking to go to [facility] or to contact our office and let know where the orders need to be sent.   Lab appt with our office is canceled.   
20

## 2024-12-09 ENCOUNTER — ANTICOAGULATION VISIT (OUTPATIENT)
Dept: ONCOLOGY | Facility: HOSPITAL | Age: 84
End: 2024-12-09
Payer: MEDICARE

## 2024-12-09 ENCOUNTER — OFFICE VISIT (OUTPATIENT)
Dept: FAMILY MEDICINE CLINIC | Facility: CLINIC | Age: 84
End: 2024-12-09
Payer: MEDICARE

## 2024-12-09 VITALS
SYSTOLIC BLOOD PRESSURE: 110 MMHG | RESPIRATION RATE: 18 BRPM | DIASTOLIC BLOOD PRESSURE: 67 MMHG | HEIGHT: 70 IN | BODY MASS INDEX: 41.66 KG/M2 | TEMPERATURE: 98.5 F | WEIGHT: 291 LBS | HEART RATE: 72 BPM | OXYGEN SATURATION: 94 %

## 2024-12-09 DIAGNOSIS — I27.82 OTHER CHRONIC PULMONARY EMBOLISM WITHOUT ACUTE COR PULMONALE: Chronic | ICD-10-CM

## 2024-12-09 DIAGNOSIS — D68.59 THROMBOPHILIA: Chronic | ICD-10-CM

## 2024-12-09 DIAGNOSIS — I82.513 CHRONIC DEEP VEIN THROMBOSIS (DVT) OF FEMORAL VEIN OF BOTH LOWER EXTREMITIES: Primary | Chronic | ICD-10-CM

## 2024-12-09 DIAGNOSIS — G47.31 PRIMARY CENTRAL SLEEP APNEA: Chronic | ICD-10-CM

## 2024-12-09 DIAGNOSIS — E78.2 MIXED HYPERLIPIDEMIA: Chronic | ICD-10-CM

## 2024-12-09 DIAGNOSIS — I25.10 CORONARY ARTERY DISEASE INVOLVING NATIVE CORONARY ARTERY OF NATIVE HEART WITHOUT ANGINA PECTORIS: Chronic | ICD-10-CM

## 2024-12-09 DIAGNOSIS — M54.16 LUMBAR RADICULOPATHY: Chronic | ICD-10-CM

## 2024-12-09 DIAGNOSIS — I73.9 PERIPHERAL VASCULAR DISEASE: Chronic | ICD-10-CM

## 2024-12-09 DIAGNOSIS — M25.562 CHRONIC PAIN OF BOTH KNEES: Chronic | ICD-10-CM

## 2024-12-09 DIAGNOSIS — M25.561 CHRONIC PAIN OF BOTH KNEES: Chronic | ICD-10-CM

## 2024-12-09 DIAGNOSIS — E11.649 TYPE 2 DIABETES MELLITUS WITH HYPOGLYCEMIA WITHOUT COMA, WITHOUT LONG-TERM CURRENT USE OF INSULIN: Chronic | ICD-10-CM

## 2024-12-09 DIAGNOSIS — I10 ESSENTIAL HYPERTENSION: Chronic | ICD-10-CM

## 2024-12-09 DIAGNOSIS — G89.29 CHRONIC PAIN OF BOTH KNEES: Chronic | ICD-10-CM

## 2024-12-09 DIAGNOSIS — E03.9 ACQUIRED HYPOTHYROIDISM: Chronic | ICD-10-CM

## 2024-12-09 PROBLEM — I50.32 DIASTOLIC DYSFUNCTION WITH CHRONIC HEART FAILURE: Chronic | Status: RESOLVED | Noted: 2019-11-04 | Resolved: 2024-12-09

## 2024-12-09 PROBLEM — I27.20 PULMONARY HYPERTENSION: Chronic | Status: RESOLVED | Noted: 2019-11-04 | Resolved: 2024-12-09

## 2024-12-09 LAB — INR PPP: 2.5

## 2024-12-09 PROCEDURE — 99214 OFFICE O/P EST MOD 30 MIN: CPT | Performed by: NURSE PRACTITIONER

## 2024-12-09 PROCEDURE — 1125F AMNT PAIN NOTED PAIN PRSNT: CPT | Performed by: NURSE PRACTITIONER

## 2024-12-09 PROCEDURE — 3078F DIAST BP <80 MM HG: CPT | Performed by: NURSE PRACTITIONER

## 2024-12-09 PROCEDURE — G2211 COMPLEX E/M VISIT ADD ON: HCPCS | Performed by: NURSE PRACTITIONER

## 2024-12-09 PROCEDURE — 3074F SYST BP LT 130 MM HG: CPT | Performed by: NURSE PRACTITIONER

## 2024-12-09 NOTE — PATIENT INSTRUCTIONS
Bleed stop  Eat like diabetic.   Look at feet and legs daily monitor for skin changes.   RSV vaccine recommended  You need see dermatolgy  [facility] will call you about appointment.

## 2024-12-09 NOTE — PROGRESS NOTES
Subjective        Dalton Dallas . is a 84 y.o. male.     Chief Complaint   Patient presents with    Diabetes     6 month fl/u       Diabetes      Patient is here for management of his chronic medical problems: Diabetes, CAD, hypertension, hyperlipidemia, thrombophilia, hypothyroidism, he has chronic knee pain,lumbar radiculopathy , wheelchair dependent, sleep apnea.     Chronic knee pain wheelchair dependent lives with son. Followed by pain management has chronic lumbar radiculopathy.   Reviewed notes from orthopedics 11/21/2024   He we do not have many great options for him. He has severe right knee osteoarthritis but he is a poorly controlled diabetic with significant swelling venous stasis and weeping of his bilateral lower extremities. I discussed with him that I would not recommend total knee arthroplasty because I think that the risk of infection and poor outcome outweighs the potential benefit. I discussed with the patient that I am happy to refer him to another orthopedic surgeon for another opinion and see if someone would consider operating on him.   He discussed the option  getting genicular nerve blocks.   He went to arthritic knee pain in [facility] and they are pursuing his insurance to start treatments.    Diabetes followed by endocrinology. Recent A1C improved down to 7.36. microalbumin is normal. BUN 26 high creatine 1.23 down from 1.44 EGFR up to 58.6 from 48.8. followed by  podiatry for foot care.     PVD no vascular following will refer today. He denies any open wounds.     Hypertension stable.    B12 def stable on B12  9/2024 normal level 715 was too high recommend taking once week.     Vit d def stable on Vit d. 50,000 IU weekly.     Hyperlipidemia: TC normal LDL 70 normal. Atorvastatin 80 mg daily.     Mole on left cheek. Hits with razor. Bleeds off and on for several months.   Has not seen dermatology. Referred today to call and be seen. Discussed bleed stop today.     Neuropathy  taking gabapentin 600 mg tid worse at night fulls feet at night.     CAD stable on imdur , irbastartan 300 mg daily. Oxygen as needed. , cpap as at night.       The following portions of the patient's history were reviewed and updated as appropriate: allergies, current medications, past family history, past medical history, past social history, past surgical history and problem list.      Current Outpatient Medications:     Accu-Chek Irene Plus test strip, USE TO CHECK BLOOD SUGAR BEFORE MEALS AND AT BEDTIME, Disp: 400 each, Rfl: 3    amLODIPine (NORVASC) 5 MG tablet, Take 1 tablet by mouth Daily., Disp: 90 tablet, Rfl: 3    atenolol (TENORMIN) 50 MG tablet, TAKE 1 TABLET BY MOUTH DAILY, Disp: 90 tablet, Rfl: 3    atorvastatin (LIPITOR) 80 MG tablet, Take 1 tablet by mouth Every Night. Indications: High Amount of Fats in the Blood, Disp: 90 tablet, Rfl: 3    Blood Glucose Monitoring Suppl (Accu-Chek Irene Plus) w/Device kit, 1 each 4 (Four) Times a Day., Disp: 1 kit, Rfl: 0    clopidogrel (PLAVIX) 75 MG tablet, Take 1 tablet by mouth Daily., Disp: 90 tablet, Rfl: 3    fenofibrate (TRICOR) 54 MG tablet, TAKE 1 TABLET BY MOUTH DAILY FOR HIGH AMOUNT OF TRIGLYCERIDES IN  THE BLOOD, Disp: 90 tablet, Rfl: 3    ferrous sulfate 325 (65 FE) MG tablet, Take 1 tablet by mouth 2 (Two) Times a Day., Disp: , Rfl:     gabapentin (NEURONTIN) 600 MG tablet, TAKE 1 TABLET BY MOUTH 3 TIMES  DAILY, Disp: 270 tablet, Rfl: 3    hydroCHLOROthiazide 25 MG tablet, Take 1 tablet by mouth Daily., Disp: 90 tablet, Rfl: 3    Ibuprofen 3 %, Gabapentin 10 %, Baclofen 2 %, lidocaine 4 %, Apply 1-2 g topically to the appropriate area as directed 3 (Three) to 4 (Four) times daily., Disp: 90 g, Rfl: 3    Insulin Pen Needle (BD ULTRA-FINE PEN NEEDLES) 29G X 12.7MM misc, Use to inject insulin twice daily. DX E11.65, Disp: 200 each, Rfl: 2    insulin regular (HUMULIN R) 500 UNIT/ML CONCENTRATED injection, DRAW TO 30 UNIT REI ON U-100 SYRINGE AND INJECT  "IN THE MORNING AND 40 UNIT REI IN THE EVENING Indications: Type 2 Diabetes, Disp: 60 mL, Rfl: 3    Insulin Syringe-Needle U-100 (BD Insulin Syringe U/F) 31G X 5/16\" 0.5 ML misc, 1 each by Other route 2 (Two) Times a Day., Disp: 200 each, Rfl: 3    Insulin Syringe-Needle U-100 30G X 1/2\" 0.5 ML misc, 1 each by Other route 2 (Two) Times a Day., Disp: 200 each, Rfl: 12    irbesartan (AVAPRO) 300 MG tablet, TAKE 1 TABLET BY MOUTH DAILY, Disp: 90 tablet, Rfl: 3    isosorbide mononitrate (IMDUR) 30 MG 24 hr tablet, Take 1 tablet by mouth Daily., Disp: 90 tablet, Rfl: 3    Jardiance 25 MG tablet tablet, TAKE 1 TABLET BY MOUTH DAILY, Disp: 90 tablet, Rfl: 3    levothyroxine (SYNTHROID, LEVOTHROID) 175 MCG tablet, TAKE 1 TABLET BY MOUTH DAILY FOR UNDERACTIVE THYROID, Disp: 90 tablet, Rfl: 3    metFORMIN ER (GLUCOPHAGE-XR) 500 MG 24 hr tablet, TAKE 4 TABLETS BY MOUTH AT  SUPPER FOR TYPE 2 DIABETES, Disp: 360 tablet, Rfl: 3    Methylcobalamin (B12-ACTIVE PO), Take  by mouth., Disp: , Rfl:     O2 (OXYGEN), Inhale 1 L/min Every Night. Indications: marianne, Disp: , Rfl:     Omega-3 Fatty Acids (FISH OIL) 1000 MG capsule capsule, Take 1 capsule by mouth 2 (Two) Times a Day., Disp: , Rfl:     Pramlintide Acetate (SymlinPen 120) 2700 MCG/2.7ML solution pen-injector, INJECT 120 MCG SUBCUTANEOUSLY  INTO THE APPROPRIATE AREA AS  DIRECTED 3 TIMES DAILY, Disp: 10.8 mL, Rfl: 11    urea (CARMOL) 40 % cream, Apply 1 application  topically to the appropriate area as directed 2 (Two) Times a Day. Apply twice daily to calluses. Add vitamin D 5,000 units, Disp: 85 each, Rfl: 3    vitamin B-12 (CYANOCOBALAMIN) 100 MCG tablet, Take 1 tablet by mouth Daily. Indications: Inadequate Vitamin B12, Disp: , Rfl:     vitamin D (ERGOCALCIFEROL) 1.25 MG (53304 UT) capsule capsule, TAKE 1 CAPSULE BY MOUTH ONCE  WEEKLY FOR VITAMIN D DEFICIENCY, Disp: 13 capsule, Rfl: 3    warfarin (COUMADIN) 2 MG tablet, TAKE 1 TABLET BY MOUTH DAILY OR  AS DIRECTED BASED ON INR " (Patient taking differently: Take 1 tablet by mouth As Needed. TAKE 1 TABLET BY MOUTH DAILY OR  AS DIRECTED BASED ON INR Tuesday and Thursdays only), Disp: 90 tablet, Rfl: 3    warfarin (COUMADIN) 3 MG tablet, Take 1 tablet by mouth daily or as directed by medication management clinic.  Indications: Atrial Fibrillation, Disp: 90 tablet, Rfl: 1    warfarin (COUMADIN) 5 MG tablet, 7 mg (5 mg x 1 and 1 mg x 2) every Tue, Thu; 8 mg (5 mg x 1 and 1 mg x 3) all other days, Disp: 90 tablet, Rfl: 3    gabapentin (NEURONTIN) 300 MG capsule, Take 1 capsule by mouth 3 (Three) Times a Day. (Patient not taking: Reported on 12/9/2024), Disp: 270 capsule, Rfl: 1    Recent Results (from the past 24 weeks)   Protime-INR    Collection Time: 07/01/24 12:00 AM    Specimen: Blood   Result Value Ref Range    INR 2.70    Protime-INR    Collection Time: 07/08/24 12:00 AM    Specimen: Blood   Result Value Ref Range    INR 2.30    Protime-INR    Collection Time: 07/15/24 12:00 AM    Specimen: Blood   Result Value Ref Range    INR 3.00    Protime-INR    Collection Time: 07/23/24 12:00 AM    Specimen: Blood   Result Value Ref Range    INR 3.20    Protime-INR    Collection Time: 07/29/24 12:00 AM    Specimen: Blood   Result Value Ref Range    INR 2.90    Protime-INR    Collection Time: 08/05/24 12:00 AM    Specimen: Blood   Result Value Ref Range    INR 2.60    Protime-INR    Collection Time: 08/12/24 12:00 AM    Specimen: Blood   Result Value Ref Range    INR 3.00    Protime-INR    Collection Time: 08/19/24 12:00 AM    Specimen: Blood   Result Value Ref Range    INR 2.60    Protime-INR    Collection Time: 08/26/24 12:00 AM    Specimen: Blood   Result Value Ref Range    INR 2.50    Protime-INR    Collection Time: 09/03/24 12:00 AM    Specimen: Blood   Result Value Ref Range    INR 2.60    Iron Profile    Collection Time: 09/03/24 10:17 AM    Specimen: Blood   Result Value Ref Range    Iron 88 59 - 158 mcg/dL    Iron Saturation (TSAT) 21 20 - 50  %    Transferrin 280 200 - 360 mg/dL    TIBC 417 298 - 536 mcg/dL   Ferritin    Collection Time: 09/03/24 10:17 AM    Specimen: Blood   Result Value Ref Range    Ferritin 119.00 30.00 - 400.00 ng/mL   Vitamin B12    Collection Time: 09/03/24 10:17 AM    Specimen: Blood   Result Value Ref Range    Vitamin B-12 715 211 - 946 pg/mL   CBC Auto Differential    Collection Time: 09/03/24 10:17 AM    Specimen: Blood   Result Value Ref Range    WBC 9.21 3.40 - 10.80 10*3/mm3    RBC 4.53 4.14 - 5.80 10*6/mm3    Hemoglobin 13.9 13.0 - 17.7 g/dL    Hematocrit 42.9 37.5 - 51.0 %    MCV 94.7 79.0 - 97.0 fL    MCH 30.7 26.6 - 33.0 pg    MCHC 32.4 31.5 - 35.7 g/dL    RDW 14.1 12.3 - 15.4 %    RDW-SD 46.8 37.0 - 54.0 fl    MPV 10.5 6.0 - 12.0 fL    Platelets 218 140 - 450 10*3/mm3    Neutrophil % 59.3 42.7 - 76.0 %    Lymphocyte % 28.2 19.6 - 45.3 %    Monocyte % 8.3 5.0 - 12.0 %    Eosinophil % 3.9 0.3 - 6.2 %    Basophil % 0.3 0.0 - 1.5 %    Neutrophils, Absolute 5.46 1.70 - 7.00 10*3/mm3    Lymphocytes, Absolute 2.60 0.70 - 3.10 10*3/mm3    Monocytes, Absolute 0.76 0.10 - 0.90 10*3/mm3    Eosinophils, Absolute 0.36 0.00 - 0.40 10*3/mm3    Basophils, Absolute 0.03 0.00 - 0.20 10*3/mm3   Protime-INR    Collection Time: 09/09/24 12:00 AM    Specimen: Blood   Result Value Ref Range    INR 2.90    Protime-INR    Collection Time: 09/16/24 12:00 AM    Specimen: Blood   Result Value Ref Range    INR 3.10    Protime-INR    Collection Time: 09/23/24 12:00 AM    Specimen: Blood   Result Value Ref Range    INR 1.90    Protime-INR    Collection Time: 09/30/24 12:00 AM    Specimen: Blood   Result Value Ref Range    INR 2.40    Hemoglobin A1c    Collection Time: 10/01/24 11:16 AM    Specimen: Blood   Result Value Ref Range    Hemoglobin A1C 7.85 (H) 4.80 - 5.60 %   Protime-INR    Collection Time: 10/07/24 12:00 AM    Specimen: Blood   Result Value Ref Range    INR 2.80    Protime-INR    Collection Time: 10/14/24 12:00 AM    Specimen: Blood    Result Value Ref Range    INR 3.10    Protime-INR    Collection Time: 10/21/24 12:00 AM    Specimen: Blood   Result Value Ref Range    INR 2.30    POC INR    Collection Time: 10/28/24  8:30 AM    Specimen: Blood   Result Value Ref Range    INR 2.40 (A) 0.90 - 1.10   Protime-INR    Collection Time: 11/04/24 12:00 AM    Specimen: Blood   Result Value Ref Range    INR 1.90    Protime-INR    Collection Time: 11/11/24 12:00 AM    Specimen: Blood   Result Value Ref Range    INR 2.60    Protime-INR    Collection Time: 11/18/24 12:00 AM    Specimen: Blood   Result Value Ref Range    INR 3.00    Protime-INR    Collection Time: 11/25/24 12:00 AM    Specimen: Blood   Result Value Ref Range    INR 2.30    Protime-INR    Collection Time: 12/02/24 12:00 AM    Specimen: Blood   Result Value Ref Range    INR 3.00    Vitamin D,25-Hydroxy    Collection Time: 12/04/24 10:02 AM    Specimen: Blood   Result Value Ref Range    25 Hydroxy, Vitamin D 48.7 30.0 - 100.0 ng/ml   Hemoglobin A1c    Collection Time: 12/04/24 10:02 AM    Specimen: Blood   Result Value Ref Range    Hemoglobin A1C 7.36 (H) 4.80 - 5.60 %   Microalbumin / Creatinine Urine Ratio - Urine, Clean Catch    Collection Time: 12/04/24 10:02 AM    Specimen: Urine, Clean Catch   Result Value Ref Range    Microalbumin/Creatinine Ratio      Creatinine, Urine 18.9 mg/dL    Microalbumin, Urine <1.2 mg/dL   Comprehensive Metabolic Panel    Collection Time: 12/04/24 10:02 AM    Specimen: Blood   Result Value Ref Range    Glucose 160 (H) 65 - 99 mg/dL    BUN 26 (H) 8 - 23 mg/dL    Creatinine 1.23 0.76 - 1.27 mg/dL    Sodium 137 136 - 145 mmol/L    Potassium 4.6 3.5 - 5.2 mmol/L    Chloride 96 (L) 98 - 107 mmol/L    CO2 26.0 22.0 - 29.0 mmol/L    Calcium 9.8 8.6 - 10.5 mg/dL    Total Protein 6.8 6.0 - 8.5 g/dL    Albumin 4.1 3.5 - 5.2 g/dL    ALT (SGPT) 18 1 - 41 U/L    AST (SGOT) 19 1 - 40 U/L    Alkaline Phosphatase 39 39 - 117 U/L    Total Bilirubin 0.4 0.0 - 1.2 mg/dL    Globulin  "2.7 gm/dL    A/G Ratio 1.5 g/dL    BUN/Creatinine Ratio 21.1 7.0 - 25.0    Anion Gap 15.0 5.0 - 15.0 mmol/L    eGFR 58.6 (L) >60.0 mL/min/1.73   Lipid Panel    Collection Time: 24 10:02 AM    Specimen: Blood   Result Value Ref Range    Total Cholesterol 139 0 - 200 mg/dL    Triglycerides 149 0 - 150 mg/dL    HDL Cholesterol 43 40 - 60 mg/dL    LDL Cholesterol  70 0 - 100 mg/dL    VLDL Cholesterol 26 5 - 40 mg/dL    LDL/HDL Ratio 1.54    TSH    Collection Time: 24 10:02 AM    Specimen: Blood   Result Value Ref Range    TSH 2.760 0.270 - 4.200 uIU/mL   T4, Free    Collection Time: 24 10:02 AM    Specimen: Blood   Result Value Ref Range    Free T4 1.82 (H) 0.92 - 1.68 ng/dL   Protime-INR    Collection Time: 24 12:00 AM    Specimen: Blood   Result Value Ref Range    INR 2.50          Review of Systems    Objective     /67 (BP Location: Left arm, Patient Position: Sitting, Cuff Size: Adult)   Pulse 72   Temp 98.5 °F (36.9 °C) (Oral)   Resp 18   Ht 177.8 cm (70\")   Wt 132 kg (291 lb)   SpO2 94%   BMI 41.75 kg/m²     Physical Exam  Vitals and nursing note reviewed.   HENT:      Head: Normocephalic.      Right Ear: Tympanic membrane normal.      Left Ear: Tympanic membrane normal.      Mouth/Throat:      Mouth: Mucous membranes are moist.   Cardiovascular:      Rate and Rhythm: Normal rate and regular rhythm.      Comments: Holostystolic murmur    Skin lower ext purple, warm, hard firm.     Pulmonary:      Effort: Pulmonary effort is normal.      Breath sounds: Normal breath sounds.   Abdominal:      Palpations: Abdomen is soft.   Musculoskeletal:      Right lower le+      Left lower le+   Skin:     General: Skin is warm.             Comments: Small mole with mild bleeding  Started after shaving.    Neurological:      General: No focal deficit present.      Mental Status: He is oriented to person, place, and time.   Psychiatric:         Mood and Affect: Mood normal.         " Thought Content: Thought content normal.         Result Review :                Assessment & Plan    Diagnoses and all orders for this visit:    1. Acquired hypothyroidism  Comments:  stable labs reviewed followed by endocrinology    2. Thrombophilia  Comments:  on coumadin followed by hematology  Orders:  -     Ambulatory Referral to Vascular Surgery    3. Type 2 diabetes mellitus with hypoglycemia without coma, without long-term current use of insulin  Comments:  stabel improved labs  Orders:  -     Ambulatory Referral to Vascular Surgery    4. Chronic pain of both knees  Comments:  monitored by pain managment, eathing healthy walking as tolerated    5. Lumbar radiculopathy  Comments:  stble in wheelchair to assist with ambulation    6. Primary central sleep apnea  Comments:  stable    7. Coronary artery disease involving native coronary artery of native heart without angina pectoris  Comments:  followed by cardiology.  Orders:  -     Ambulatory Referral to Vascular Surgery    8. Essential hypertension  Comments:  stable  Orders:  -     Ambulatory Referral to Vascular Surgery    9. Mixed hyperlipidemia  Comments:  stable  Orders:  -     Ambulatory Referral to Vascular Surgery    10. Peripheral vascular disease  Comments:  he is elevating feet.no open wounds now.  chronic problem . referred to vascular  Orders:  -     Ambulatory Referral to Vascular Surgery    11. Other chronic pulmonary embolism without acute cor pulmonale  Comments:  stable on coumadin followed by pulmonology  Orders:  -     Ambulatory Referral to Vascular Surgery      Patient Instructions   Bleed stop  Eat like diabetic.   Look at feet and legs daily monitor for skin changes.   RSV vaccine recommended  You need see dermatolgy  Vascular will call you about appointment.     Follow Up   Return in about 6 months (around 6/9/2025).    Patient was given instructions and counseling regarding his condition or for health maintenance advice. Please see  specific information pulled into the AVS if appropriate.     Joan Johns, APRN    12/09/24

## 2024-12-09 NOTE — PROGRESS NOTES
Patient left voice mail reporting INR of 2.5 this morning. Attempted to return call x 3 however phone number gave busy signal. No changes to dosing and patient tests INR weekly with home testing. Will continue to follow.     Karen Oates PharmD, BCPS, BCCCP  15:52 EST

## 2024-12-11 ENCOUNTER — OFFICE VISIT (OUTPATIENT)
Dept: ENDOCRINOLOGY | Facility: CLINIC | Age: 84
End: 2024-12-11
Payer: MEDICARE

## 2024-12-11 VITALS
HEIGHT: 70 IN | BODY MASS INDEX: 41.09 KG/M2 | WEIGHT: 287 LBS | SYSTOLIC BLOOD PRESSURE: 108 MMHG | DIASTOLIC BLOOD PRESSURE: 58 MMHG | HEART RATE: 67 BPM | OXYGEN SATURATION: 93 %

## 2024-12-11 DIAGNOSIS — E78.2 MIXED HYPERLIPIDEMIA: ICD-10-CM

## 2024-12-11 DIAGNOSIS — E03.9 ACQUIRED HYPOTHYROIDISM: ICD-10-CM

## 2024-12-11 DIAGNOSIS — Z79.4 TYPE 2 DIABETES MELLITUS WITH DIABETIC POLYNEUROPATHY, WITH LONG-TERM CURRENT USE OF INSULIN: Primary | ICD-10-CM

## 2024-12-11 DIAGNOSIS — E55.9 VITAMIN D DEFICIENCY: ICD-10-CM

## 2024-12-11 DIAGNOSIS — E11.42 TYPE 2 DIABETES MELLITUS WITH DIABETIC POLYNEUROPATHY, WITH LONG-TERM CURRENT USE OF INSULIN: Primary | ICD-10-CM

## 2024-12-11 LAB — GLUCOSE BLDC GLUCOMTR-MCNC: 252 MG/DL (ref 70–105)

## 2024-12-11 PROCEDURE — 82948 REAGENT STRIP/BLOOD GLUCOSE: CPT | Performed by: INTERNAL MEDICINE

## 2024-12-11 NOTE — PATIENT INSTRUCTIONS
Keep up the good work!  Do PT exercises.  See eye doctor as scheduled.  Continue diabetes, thyroid & lipid meds & vit D supplements.  Call if blood sugars are running under 100 or over 200.  F/u in 6 months, with labs prior.

## 2024-12-12 NOTE — PROGRESS NOTES
[facility]        Patient Care Team:  Joan Johns APRN as PCP - General (Nurse Practitioner)  Jelani Vieira MD as Consulting Physician (Pulmonary Disease)  Edwar Ackerman MD as Consulting Physician (Pulmonary Disease)  Virginia Shaver MD as Consulting Physician (Endocrinology)  Tirny Cee APRN as Nurse Practitioner (Orthopedic Surgery)  CHRISSY Holt DPM as Consulting Physician (Podiatry)  Bill Don MD as Consulting Physician (Ophthalmology)  Anu Palmer PA-C as Physician Assistant (Physician Assistant)    Chief Complaint:    Chief Complaint   Patient presents with    Diabetes     FU / DM type 2 /          Subjective   Here for diabetes f/u  Blood sugars: mid 100's  Not using Esau. Does not want to restart  Exercise program: using wheel chair, PT 2 d / wk  Taking vit D  Taking U500 insulin 26 units in am, 35 units ac supper    Interval History:     Patient Complaints:  will get gel injection, both knees  Patient Denies: hypoglycemia  History taken from: patient    Review of Systems:   Review of Systems   HENT:  Positive for hearing loss.    Eyes:  Negative for blurred vision.   Gastrointestinal:  Negative for nausea.   Endocrine: Negative for polyuria.   Musculoskeletal:  Positive for gait problem.        Knee pain   Neurological:  Negative for headache.     Gained 5 lb since last visit    Objective     Vital Signs     Vitals:    12/11/24 1232   BP: 108/58   Pulse: 67   SpO2: 93%         Physical Exam:     General Appearance:    Alert, cooperative, in no acute distress. Obese   Head:    Normocephalic, without obvious abnormality, atraumatic   Eyes:            Lids and lashes normal, conjunctivae and sclerae normal, no   icterus, no pallor, corneas clear, PERRLA   Throat:   No oral lesions,  oral mucosa moist. Poor dentition   Neck:   No adenopathy, supple,  no thyromegaly, no carotid bruit   Lungs:     Decreased breath sounds    Heart:    Regular  rhythm and normal rate   Chest Wall:    No abnormalities observed   Abdomen:     Normal bowel sounds, soft                 Extremities:   Moves all extremities well, no edema               Pulses:   Pulses palpable and equal bilaterally   Skin:   Dry. Toe nail onychomycosis   Neurologic:  DTR absent, able to feel the 10g monofilament in heels only          Results Review:    I have reviewed the patient's new clinical results, labs & imaging.    Medication Review:   Prior to Admission medications    Medication Sig Start Date End Date Taking? Authorizing Provider   Accu-Chek Irene Plus test strip USE TO CHECK BLOOD SUGAR BEFORE MEALS AND AT BEDTIME 10/7/24  Yes Virginia Shaver MD   amLODIPine (NORVASC) 5 MG tablet Take 1 tablet by mouth Daily. 3/26/24  Yes Edwar Ackerman MD   atenolol (TENORMIN) 50 MG tablet TAKE 1 TABLET BY MOUTH DAILY 12/2/24  Yes Virginia Shaver MD   atorvastatin (LIPITOR) 80 MG tablet Take 1 tablet by mouth Every Night. Indications: High Amount of Fats in the Blood 3/26/24  Yes Edwar Ackerman MD   Blood Glucose Monitoring Suppl (Accu-Chek Irene Plus) w/Device kit 1 each 4 (Four) Times a Day. 5/13/22  Yes Virginia Shaver MD   clopidogrel (PLAVIX) 75 MG tablet Take 1 tablet by mouth Daily. 3/26/24  Yes Edwar Ackerman MD   fenofibrate (TRICOR) 54 MG tablet TAKE 1 TABLET BY MOUTH DAILY FOR HIGH AMOUNT OF TRIGLYCERIDES IN  THE BLOOD 9/9/24  Yes Edwar Ackerman MD   ferrous sulfate 325 (65 FE) MG tablet Take 1 tablet by mouth 2 (Two) Times a Day.   Yes Lee Ruggiero MD   gabapentin (NEURONTIN) 600 MG tablet TAKE 1 TABLET BY MOUTH 3 TIMES  DAILY 11/16/24  Yes Joan Johns APRN   hydroCHLOROthiazide 25 MG tablet Take 1 tablet by mouth Daily. 11/11/24  Yes Edwar Ackerman MD   Ibuprofen 3 %, Gabapentin 10 %, Baclofen 2 %, lidocaine 4 % Apply 1-2 g topically to the appropriate area as directed 3 (Three) to 4 (Four) times daily. 10/3/24 10/3/25 Yes Freddy Roe MD   Insulin Pen Needle (BD  "ULTRA-FINE PEN NEEDLES) 29G X 12.7MM misc Use to inject insulin twice daily. DX E11.65 2/29/24  Yes Virginia Shaver MD   insulin regular (HUMULIN R) 500 UNIT/ML CONCENTRATED injection DRAW TO 30 UNIT REI ON U-100 SYRINGE AND INJECT IN THE MORNING AND 40 UNIT REI IN THE EVENING Indications: Type 2 Diabetes 10/31/24  Yes Virginia Shaver MD   Insulin Syringe-Needle U-100 (BD Insulin Syringe U/F) 31G X 5/16\" 0.5 ML misc 1 each by Other route 2 (Two) Times a Day. 9/21/23  Yes Virginia Shaver MD   Insulin Syringe-Needle U-100 30G X 1/2\" 0.5 ML misc 1 each by Other route 2 (Two) Times a Day. 1/23/24  Yes Virginia Shaver MD   irbesartan (AVAPRO) 300 MG tablet TAKE 1 TABLET BY MOUTH DAILY 5/7/24  Yes Virginia Shaver MD   isosorbide mononitrate (IMDUR) 30 MG 24 hr tablet Take 1 tablet by mouth Daily. 3/26/24  Yes Edwar Ackerman MD   Jardiance 25 MG tablet tablet TAKE 1 TABLET BY MOUTH DAILY 12/2/24  Yes Virginia Shaver MD   levothyroxine (SYNTHROID, LEVOTHROID) 175 MCG tablet TAKE 1 TABLET BY MOUTH DAILY FOR UNDERACTIVE THYROID 5/7/24  Yes Virginia Shaver MD   metFORMIN ER (GLUCOPHAGE-XR) 500 MG 24 hr tablet TAKE 4 TABLETS BY MOUTH AT  SUPPER FOR TYPE 2 DIABETES 12/2/24  Yes Virginia Shaver MD   Methylcobalamin (B12-ACTIVE PO) Take  by mouth.   Yes Lee Ruggiero MD   O2 (OXYGEN) Inhale 1 L/min Every Night. Indications: marianne 11/29/22  Yes Lee Ruggiero MD   Omega-3 Fatty Acids (FISH OIL) 1000 MG capsule capsule Take 1 capsule by mouth 2 (Two) Times a Day.   Yes Lee Ruggiero MD   Pramlintide Acetate (SymlinPen 120) 2700 MCG/2.7ML solution pen-injector INJECT 120 MCG SUBCUTANEOUSLY  INTO THE APPROPRIATE AREA AS  DIRECTED 3 TIMES DAILY 6/10/24  Yes Virginia Shaver MD   urea (CARMOL) 40 % cream Apply 1 application  topically to the appropriate area as directed 2 (Two) Times a Day. Apply twice daily to calluses. Add vitamin D 5,000 units 12/6/23  Yes Triny Cee APRN "   vitamin B-12 (CYANOCOBALAMIN) 100 MCG tablet Take 1 tablet by mouth Daily. Indications: Inadequate Vitamin B12 4/15/15  Yes Provider, MD Lee   vitamin D (ERGOCALCIFEROL) 1.25 MG (28718 UT) capsule capsule TAKE 1 CAPSULE BY MOUTH ONCE  WEEKLY FOR VITAMIN D DEFICIENCY 11/11/24  Yes Virginia Shaver MD   warfarin (COUMADIN) 2 MG tablet TAKE 1 TABLET BY MOUTH DAILY OR  AS DIRECTED BASED ON INR  Patient taking differently: Take 1 tablet by mouth As Needed. TAKE 1 TABLET BY MOUTH DAILY OR  AS DIRECTED BASED ON INR Tuesday and Thursdays only 8/16/24  Yes Anu Palmer PA-C   warfarin (COUMADIN) 3 MG tablet Take 1 tablet by mouth daily or as directed by medication management clinic.  Indications: Atrial Fibrillation 9/9/24  Yes Anu Palmer PA-C   warfarin (COUMADIN) 5 MG tablet 7 mg (5 mg x 1 and 1 mg x 2) every Tue, Thu; 8 mg (5 mg x 1 and 1 mg x 3) all other days 10/7/24  Yes Anu Palmer PA-C       Lab Results (most recent)       Procedure Component Value Units Date/Time    POC Glucose [[id number]]  (Abnormal) Collected: 12/11/24 1237    Specimen: Blood Updated: 12/11/24 1239     Glucose 252 mg/dL      Comment: Serial Number: [id number]:  [id number]          Lab Results   Component Value Date    HGBA1C 7.36 (H) 12/04/2024    HGBA1C 7.85 (H) 10/01/2024    HGBA1C 8.20 (H) 05/31/2024      Lab Results   Component Value Date    GLUCOSE 160 (H) 12/04/2024    BUN 26 (H) 12/04/2024    CREATININE 1.23 12/04/2024    EGFRIFNONA 60 (L) 11/02/2021    BCR 21.1 12/04/2024    K 4.6 12/04/2024    CO2 26.0 12/04/2024    CALCIUM 9.8 12/04/2024    ALBUMIN 4.1 12/04/2024    LABIL2 1.7 03/28/2019    AST 19 12/04/2024    ALT 18 12/04/2024    CHOL 139 12/04/2024    LDL 70 12/04/2024    HDL 43 12/04/2024    TRIG 149 12/04/2024     Lab Results   Component Value Date    TSH 2.760 12/04/2024    FREET4 1.82 (H) 12/04/2024    XNSF71XH 48.7 12/04/2024     Microalb/cr ratio <1    Assessment & Plan      Diagnoses and all orders for this visit:    1. Type 2 diabetes mellitus with diabetic polyneuropathy, with long-term current use of insulin (Primary)  -     Hemoglobin A1c; Future    2. Vitamin D deficiency  -     Comprehensive Metabolic Panel; Future    3. Acquired hypothyroidism  -     T4, Free; Future  -     TSH; Future    4. Mixed hyperlipidemia    Other orders  -     POC Glucose    Glucose control improved. Lipids, thyroid & vit D stable.    Do PT exercises.  See eye doctor as scheduled.  Continue diabetes, thyroid & lipid meds & vit D supplements.  Call if blood sugars are running under 100 or over 200.        Virginia Shaver MD  12/12/24  15:51 EST

## 2024-12-16 ENCOUNTER — ANTICOAGULATION VISIT (OUTPATIENT)
Dept: ONCOLOGY | Facility: HOSPITAL | Age: 84
End: 2024-12-16
Payer: MEDICARE

## 2024-12-16 DIAGNOSIS — D68.59 THROMBOPHILIA: Chronic | ICD-10-CM

## 2024-12-16 DIAGNOSIS — I82.513 CHRONIC DEEP VEIN THROMBOSIS (DVT) OF FEMORAL VEIN OF BOTH LOWER EXTREMITIES: Primary | Chronic | ICD-10-CM

## 2024-12-16 LAB — INR PPP: 2.7

## 2024-12-23 ENCOUNTER — ANTICOAGULATION VISIT (OUTPATIENT)
Dept: PHARMACY | Facility: HOSPITAL | Age: 84
End: 2024-12-23
Payer: MEDICARE

## 2024-12-23 DIAGNOSIS — D68.59 THROMBOPHILIA: Primary | Chronic | ICD-10-CM

## 2024-12-23 LAB — INR PPP: 3.6

## 2024-12-23 NOTE — PROGRESS NOTES
Anticoagulation Clinic Progress Note    Patient's visit was held by phone today.    Anticoagulation Summary  As of 12/23/2024      INR goal:  2.0-3.0   TTR:  87.2% (5.4 y)   INR used for dosing:  3.60 (12/23/2024)   Warfarin maintenance plan:  7 mg (5 mg x 1 and 1 mg x 2) every Tue, Thu; 8 mg (5 mg x 1 and 1 mg x 3) all other days   Weekly warfarin total:  54 mg   Plan last modified:  Karen Oates RPH (7/25/2024)   Next INR check:  12/30/2024   Priority:  Maintenance   Target end date:  Indefinite    Indications    Deep vein thrombosis (DVT) [I82.409]  Thrombophilia [D68.59]                 Anticoagulation Episode Summary       INR check location:  --    Preferred lab:  --    Send INR reminders to:   [facility] CBC ANTICOAG POOL    Comments:  MDINCANDIDO Home maninder          Anticoagulation Care Providers       Provider Role Specialty Phone number    Soraya Cortez MD Referring Hematology and Oncology [phone]            Clinic Interview:  Patient reports less vegetable intake. He prefers to eat some vegetables and reduce warfarin dose to 5 mg today.    INR History:      12/2/2024     9:59 AM 12/9/2024    12:00 AM 12/9/2024    10:52 AM 12/16/2024    12:00 AM 12/16/2024     2:11 PM 12/23/2024    12:00 AM 12/23/2024     7:59 AM   Anticoagulation Monitoring   INR 3.00  2.50  2.70  3.60   INR Date 12/2/2024 12/9/2024 12/16/2024 12/23/2024   INR Goal 2.0-3.0  2.0-3.0  2.0-3.0  2.0-3.0   Trend Same  Same  Same  Same   Last Week Total 54 mg  54 mg  54 mg  54 mg   Next Week Total 54 mg  54 mg  54 mg  51 mg   Sun 8 mg  8 mg  8 mg  8 mg   Mon 8 mg  8 mg  8 mg  5 mg (12/23)   Tue 7 mg  7 mg  7 mg  7 mg   Wed 8 mg  8 mg  8 mg  8 mg   Thu 7 mg  7 mg  7 mg  7 mg   Fri 8 mg  8 mg  8 mg  8 mg   Sat 8 mg  8 mg  8 mg  8 mg   Historical INR  2.50      2.70      3.60        Visit Report  Report Report           This result is from an external source.       Plan:  1. INR is Supratherapeutic today- see above in Anticoagulation  Summary.   Will instruct Dalton CHAU Burt Sr. to take warfarin 5 mg today only, then continue their warfarin regimen of 7 mg on Tuesday and Thursday and 8 mg all other days - see above in Anticoagulation Summary.  2. Follow up in 1 week  3.They have been instructed to call if any changes in medications, doses, concerns, etc. Patient expresses understanding and has no further questions at this time.    Franci Jordan RP

## 2024-12-30 ENCOUNTER — ANTICOAGULATION VISIT (OUTPATIENT)
Dept: ONCOLOGY | Facility: HOSPITAL | Age: 84
End: 2024-12-30
Payer: MEDICARE

## 2024-12-30 DIAGNOSIS — I82.513 CHRONIC DEEP VEIN THROMBOSIS (DVT) OF FEMORAL VEIN OF BOTH LOWER EXTREMITIES: Primary | Chronic | ICD-10-CM

## 2024-12-30 DIAGNOSIS — D68.59 THROMBOPHILIA: Chronic | ICD-10-CM

## 2024-12-30 LAB — INR PPP: 2.8

## 2024-12-30 NOTE — PROGRESS NOTES
Anticoagulation Clinic Progress Note    Patient's visit was held by phone today.    Anticoagulation Summary  As of 2024      INR goal:  2.0-3.0   TTR:  87.0% (5.4 y)   INR used for dosin.80 (2024)   Warfarin maintenance plan:  7 mg (5 mg x 1 and 1 mg x 2) every Tue, Thu; 8 mg (5 mg x 1 and 1 mg x 3) all other days   Weekly warfarin total:  54 mg   No change documented:  Karen Oates RPH   Plan last modified:  Karen Oates RPH (2024)   Next INR check:  2025   Priority:  Maintenance   Target end date:  Indefinite    Indications    Deep vein thrombosis (DVT) [I82.409]  Thrombophilia [D68.59]                 Anticoagulation Episode Summary       INR check location:  --    Preferred lab:  --    Send INR reminders to:   [facility] CBC ANTICOAG POOL    Comments:  MDINR Home maninder          Anticoagulation Care Providers       Provider Role Specialty Phone number    Soraya Cortez MD Referring Hematology and Oncology [phone]            Drug interactions: has remained unchanged.  Diet: has remained unchanged.    Clinic Interview:  No pertinent clinical findings have been reported.    INR History:      2024    10:52 AM 2024    12:00 AM 2024     2:11 PM 2024    12:00 AM 2024     7:59 AM 2024    12:00 AM 2024     3:34 PM   Anticoagulation Monitoring   INR 2.50  2.70  3.60  2.80   INR Date 2024   INR Goal 2.0-3.0  2.0-3.0  2.0-3.0  2.0-3.0   Trend Same  Same  Same  Same   Last Week Total 54 mg  54 mg  54 mg  51 mg   Next Week Total 54 mg  54 mg  51 mg  54 mg   Sun 8 mg  8 mg  8 mg  8 mg   Mon 8 mg  8 mg  5 mg ()  8 mg   Tue 7 mg  7 mg  7 mg  7 mg   Wed 8 mg  8 mg  8 mg  8 mg   Thu 7 mg  7 mg  7 mg  7 mg   Fri 8 mg  8 mg  8 mg  8 mg   Sat 8 mg  8 mg  8 mg  8 mg   Historical INR  2.70      3.60      2.80  C       Visit Report Report            C Corrected result    This result is from an external source.        Plan:  1. INR is Therapeutic today- see above in Anticoagulation Summary.   Will instruct Dalton Dallas Sr. to Continue their warfarin regimen- see above in Anticoagulation Summary.  2. Follow up in 1 weeks via home test.   3.They have been instructed to call if any changes in medications, doses, concerns, etc. Patient expresses understanding and has no further questions at this time.    Karen Oates [facility]

## 2024-12-31 ENCOUNTER — OFFICE VISIT (OUTPATIENT)
Age: 84
End: 2024-12-31
Payer: MEDICARE

## 2024-12-31 VITALS — OXYGEN SATURATION: 95 % | HEART RATE: 74 BPM | BODY MASS INDEX: 41.09 KG/M2 | HEIGHT: 70 IN | WEIGHT: 287 LBS

## 2024-12-31 DIAGNOSIS — R26.81 UNSTEADINESS ON FEET: ICD-10-CM

## 2024-12-31 DIAGNOSIS — M79.675 PAIN IN TOES OF BOTH FEET: ICD-10-CM

## 2024-12-31 DIAGNOSIS — L60.3 ONYCHODYSTROPHY: ICD-10-CM

## 2024-12-31 DIAGNOSIS — E11.42 DM TYPE 2 WITH DIABETIC PERIPHERAL NEUROPATHY: Primary | ICD-10-CM

## 2024-12-31 DIAGNOSIS — E11.42 DIABETIC PERIPHERAL NEUROPATHY ASSOCIATED WITH TYPE 2 DIABETES MELLITUS: ICD-10-CM

## 2024-12-31 DIAGNOSIS — I87.2 VENOUS INSUFFICIENCY: ICD-10-CM

## 2024-12-31 DIAGNOSIS — M79.674 PAIN IN TOES OF BOTH FEET: ICD-10-CM

## 2025-01-01 ENCOUNTER — APPOINTMENT (OUTPATIENT)
Dept: ULTRASOUND IMAGING | Facility: HOSPITAL | Age: 85
DRG: 871 | End: 2025-01-01
Payer: MEDICARE

## 2025-01-01 ENCOUNTER — APPOINTMENT (OUTPATIENT)
Dept: GENERAL RADIOLOGY | Facility: HOSPITAL | Age: 85
DRG: 871 | End: 2025-01-01
Payer: MEDICARE

## 2025-01-01 ENCOUNTER — HOSPITAL ENCOUNTER (INPATIENT)
Facility: HOSPITAL | Age: 85
LOS: 5 days | DRG: 871 | End: 2025-01-18
Attending: EMERGENCY MEDICINE | Admitting: INTERNAL MEDICINE
Payer: MEDICARE

## 2025-01-01 ENCOUNTER — APPOINTMENT (OUTPATIENT)
Dept: CT IMAGING | Facility: HOSPITAL | Age: 85
DRG: 871 | End: 2025-01-01
Payer: MEDICARE

## 2025-01-01 ENCOUNTER — APPOINTMENT (OUTPATIENT)
Dept: CARDIOLOGY | Facility: HOSPITAL | Age: 85
DRG: 871 | End: 2025-01-01
Payer: MEDICARE

## 2025-01-01 VITALS
SYSTOLIC BLOOD PRESSURE: 71 MMHG | OXYGEN SATURATION: 69 % | TEMPERATURE: 99.5 F | RESPIRATION RATE: 32 BRPM | HEIGHT: 69 IN | BODY MASS INDEX: 40.29 KG/M2 | WEIGHT: 272.05 LBS | DIASTOLIC BLOOD PRESSURE: 38 MMHG | HEART RATE: 65 BPM

## 2025-01-01 DIAGNOSIS — R59.0 MEDIASTINAL LYMPHADENOPATHY: ICD-10-CM

## 2025-01-01 DIAGNOSIS — J84.10 PULMONARY FIBROSIS: ICD-10-CM

## 2025-01-01 DIAGNOSIS — J18.9 MULTIFOCAL PNEUMONIA: ICD-10-CM

## 2025-01-01 DIAGNOSIS — R09.02 HYPOXEMIA: Primary | ICD-10-CM

## 2025-01-01 DIAGNOSIS — J96.01 ACUTE RESPIRATORY FAILURE WITH HYPOXIA: ICD-10-CM

## 2025-01-01 DIAGNOSIS — R06.03 RESPIRATORY DISTRESS: ICD-10-CM

## 2025-01-01 LAB
ALBUMIN SERPL-MCNC: 2.9 G/DL (ref 3.5–5.2)
ALBUMIN SERPL-MCNC: 3.2 G/DL (ref 3.5–5.2)
ALBUMIN SERPL-MCNC: 3.6 G/DL (ref 3.5–5.2)
ALBUMIN SERPL-MCNC: 3.6 G/DL (ref 3.5–5.2)
ALBUMIN SERPL-MCNC: 3.8 G/DL (ref 3.5–5.2)
ALBUMIN SERPL-MCNC: 3.8 G/DL (ref 3.5–5.2)
ALBUMIN SERPL-MCNC: 3.9 G/DL (ref 3.5–5.2)
ALBUMIN/GLOB SERPL: 0.9 G/DL
ALBUMIN/GLOB SERPL: 1 G/DL
ALBUMIN/GLOB SERPL: 1.2 G/DL
ALP SERPL-CCNC: 103 U/L (ref 39–117)
ALP SERPL-CCNC: 105 U/L (ref 39–117)
ALP SERPL-CCNC: 111 U/L (ref 39–117)
ALP SERPL-CCNC: 117 U/L (ref 39–117)
ALP SERPL-CCNC: 123 U/L (ref 39–117)
ALP SERPL-CCNC: 144 U/L (ref 39–117)
ALP SERPL-CCNC: 80 U/L (ref 39–117)
ALT SERPL W P-5'-P-CCNC: 22 U/L (ref 1–41)
ALT SERPL W P-5'-P-CCNC: 23 U/L (ref 1–41)
ALT SERPL W P-5'-P-CCNC: 23 U/L (ref 1–41)
ALT SERPL W P-5'-P-CCNC: 24 U/L (ref 1–41)
ALT SERPL W P-5'-P-CCNC: 25 U/L (ref 1–41)
ALT SERPL W P-5'-P-CCNC: 26 U/L (ref 1–41)
ALT SERPL W P-5'-P-CCNC: 28 U/L (ref 1–41)
ANA SER QL IF: NEGATIVE
ANION GAP SERPL CALCULATED.3IONS-SCNC: 11.5 MMOL/L (ref 5–15)
ANION GAP SERPL CALCULATED.3IONS-SCNC: 12.7 MMOL/L (ref 5–15)
ANION GAP SERPL CALCULATED.3IONS-SCNC: 15.9 MMOL/L (ref 5–15)
ANION GAP SERPL CALCULATED.3IONS-SCNC: 16.8 MMOL/L (ref 5–15)
ANION GAP SERPL CALCULATED.3IONS-SCNC: 17.8 MMOL/L (ref 5–15)
ANION GAP SERPL CALCULATED.3IONS-SCNC: 18.8 MMOL/L (ref 5–15)
ANION GAP SERPL CALCULATED.3IONS-SCNC: 19.9 MMOL/L (ref 5–15)
ANION GAP SERPL CALCULATED.3IONS-SCNC: 23.8 MMOL/L (ref 5–15)
ANION GAP SERPL CALCULATED.3IONS-SCNC: 24.6 MMOL/L (ref 5–15)
AORTIC DIMENSIONLESS INDEX: 0.51 (DI)
ARTERIAL PATENCY WRIST A: POSITIVE
AST SERPL-CCNC: 32 U/L (ref 1–40)
AST SERPL-CCNC: 34 U/L (ref 1–40)
AST SERPL-CCNC: 34 U/L (ref 1–40)
AST SERPL-CCNC: 39 U/L (ref 1–40)
AST SERPL-CCNC: 39 U/L (ref 1–40)
AST SERPL-CCNC: 42 U/L (ref 1–40)
AST SERPL-CCNC: 42 U/L (ref 1–40)
ATMOSPHERIC PRESS: ABNORMAL MM[HG]
ATMOSPHERIC PRESS: NORMAL MM[HG]
AV MEAN PRESS GRAD SYS DOP V1V2: 7 MMHG
AV VMAX SYS DOP: 174 CM/SEC
B PARAPERT DNA SPEC QL NAA+PROBE: NOT DETECTED
B PERT DNA SPEC QL NAA+PROBE: NOT DETECTED
B-OH-BUTYR SERPL-SCNC: 0.28 MMOL/L (ref 0.02–0.27)
BACTERIA SPEC AEROBE CULT: NORMAL
BACTERIA SPEC AEROBE CULT: NORMAL
BASE EXCESS BLDA CALC-SCNC: -3.7 MMOL/L (ref 0–3)
BASE EXCESS BLDA CALC-SCNC: -5.2 MMOL/L (ref 0–3)
BASE EXCESS BLDA CALC-SCNC: -6.2 MMOL/L (ref 0–3)
BASE EXCESS BLDA CALC-SCNC: 0.3 MMOL/L (ref 0–3)
BASE EXCESS BLDA CALC-SCNC: 0.6 MMOL/L (ref 0–3)
BASE EXCESS BLDA CALC-SCNC: 4.6 MMOL/L (ref 0–3)
BASE EXCESS BLDA CALC-SCNC: 4.7 MMOL/L (ref 0–3)
BASE EXCESS BLDV CALC-SCNC: -4.5 MMOL/L (ref -2–2)
BASOPHILS # BLD AUTO: 0.01 10*3/MM3 (ref 0–0.2)
BASOPHILS # BLD AUTO: 0.02 10*3/MM3 (ref 0–0.2)
BASOPHILS # BLD AUTO: 0.05 10*3/MM3 (ref 0–0.2)
BASOPHILS # BLD AUTO: 0.06 10*3/MM3 (ref 0–0.2)
BASOPHILS NFR BLD AUTO: 0.1 % (ref 0–1.5)
BASOPHILS NFR BLD AUTO: 0.3 % (ref 0–1.5)
BASOPHILS NFR BLD AUTO: 0.3 % (ref 0–1.5)
BDY SITE: ABNORMAL
BDY SITE: NORMAL
BH CV ECHO MEAS - ACS: 1.3 CM
BH CV ECHO MEAS - AO MAX PG: 12.1 MMHG
BH CV ECHO MEAS - AO V2 VTI: 41.2 CM
BH CV ECHO MEAS - AVA(I,D): 1.72 CM2
BH CV ECHO MEAS - EDV(CUBED): 68.9 ML
BH CV ECHO MEAS - EDV(MOD-SP4): 96.5 ML
BH CV ECHO MEAS - EF(MOD-SP4): 69.5 %
BH CV ECHO MEAS - ESV(CUBED): 22 ML
BH CV ECHO MEAS - ESV(MOD-SP4): 29.4 ML
BH CV ECHO MEAS - FS: 31.7 %
BH CV ECHO MEAS - IVS/LVPW: 1 CM
BH CV ECHO MEAS - IVSD: 1.3 CM
BH CV ECHO MEAS - LA DIMENSION: 4.6 CM
BH CV ECHO MEAS - LAT PEAK E' VEL: 5.9 CM/SEC
BH CV ECHO MEAS - LV DIASTOLIC VOL/BSA (35-75): 41 CM2
BH CV ECHO MEAS - LV MASS(C)D: 193.5 GRAMS
BH CV ECHO MEAS - LV MAX PG: 3.2 MMHG
BH CV ECHO MEAS - LV MEAN PG: 2 MMHG
BH CV ECHO MEAS - LV SYSTOLIC VOL/BSA (12-30): 12.5 CM2
BH CV ECHO MEAS - LV V1 MAX: 89.5 CM/SEC
BH CV ECHO MEAS - LV V1 VTI: 20.5 CM
BH CV ECHO MEAS - LVIDD: 4.1 CM
BH CV ECHO MEAS - LVIDS: 2.8 CM
BH CV ECHO MEAS - LVOT AREA: 3.5 CM2
BH CV ECHO MEAS - LVOT DIAM: 2.1 CM
BH CV ECHO MEAS - LVPWD: 1.3 CM
BH CV ECHO MEAS - MED PEAK E' VEL: 4.6 CM/SEC
BH CV ECHO MEAS - MV A MAX VEL: 81.8 CM/SEC
BH CV ECHO MEAS - MV DEC SLOPE: 194 CM/SEC2
BH CV ECHO MEAS - MV DEC TIME: 0.28 SEC
BH CV ECHO MEAS - MV E MAX VEL: 63.4 CM/SEC
BH CV ECHO MEAS - MV E/A: 0.78
BH CV ECHO MEAS - MV MAX PG: 2.9 MMHG
BH CV ECHO MEAS - MV MEAN PG: 1 MMHG
BH CV ECHO MEAS - MV P1/2T: 114.1 MSEC
BH CV ECHO MEAS - MV V2 VTI: 28.2 CM
BH CV ECHO MEAS - MVA(P1/2T): 1.93 CM2
BH CV ECHO MEAS - MVA(VTI): 2.5 CM2
BH CV ECHO MEAS - PA ACC TIME: 0.07 SEC
BH CV ECHO MEAS - PA V2 MAX: 76.1 CM/SEC
BH CV ECHO MEAS - RAP SYSTOLE: 8 MMHG
BH CV ECHO MEAS - RV MAX PG: 2.25 MMHG
BH CV ECHO MEAS - RV V1 MAX: 75 CM/SEC
BH CV ECHO MEAS - RV V1 VTI: 15 CM
BH CV ECHO MEAS - RVDD: 5 CM
BH CV ECHO MEAS - RVSP: 61.3 MMHG
BH CV ECHO MEAS - SV(LVOT): 71 ML
BH CV ECHO MEAS - SV(MOD-SP4): 67.1 ML
BH CV ECHO MEAS - SVI(LVOT): 30.2 ML/M2
BH CV ECHO MEAS - SVI(MOD-SP4): 28.5 ML/M2
BH CV ECHO MEAS - TAPSE (>1.6): 1.41 CM
BH CV ECHO MEAS - TR MAX PG: 53.3 MMHG
BH CV ECHO MEAS - TR MAX VEL: 365 CM/SEC
BH CV ECHO MEASUREMENTS AVERAGE E/E' RATIO: 12.08
BH CV XLRA - TDI S': 8.6 CM/SEC
BILIRUB SERPL-MCNC: 0.5 MG/DL (ref 0–1.2)
BILIRUB SERPL-MCNC: 0.6 MG/DL (ref 0–1.2)
BILIRUB SERPL-MCNC: 0.7 MG/DL (ref 0–1.2)
BILIRUB SERPL-MCNC: 0.7 MG/DL (ref 0–1.2)
BILIRUB SERPL-MCNC: 0.9 MG/DL (ref 0–1.2)
BILIRUB UR QL STRIP: NEGATIVE
BUN SERPL-MCNC: 38 MG/DL (ref 8–23)
BUN SERPL-MCNC: 45 MG/DL (ref 8–23)
BUN SERPL-MCNC: 49 MG/DL (ref 8–23)
BUN SERPL-MCNC: 55 MG/DL (ref 8–23)
BUN SERPL-MCNC: 56 MG/DL (ref 8–23)
BUN SERPL-MCNC: 58 MG/DL (ref 8–23)
BUN SERPL-MCNC: 58 MG/DL (ref 8–23)
BUN SERPL-MCNC: 59 MG/DL (ref 8–23)
BUN SERPL-MCNC: 61 MG/DL (ref 8–23)
BUN/CREAT SERPL: 21.5 (ref 7–25)
BUN/CREAT SERPL: 30 (ref 7–25)
BUN/CREAT SERPL: 35.5 (ref 7–25)
BUN/CREAT SERPL: 36.7 (ref 7–25)
BUN/CREAT SERPL: 38.1 (ref 7–25)
BUN/CREAT SERPL: 38.1 (ref 7–25)
BUN/CREAT SERPL: 40.9 (ref 7–25)
BUN/CREAT SERPL: 42.6 (ref 7–25)
BUN/CREAT SERPL: 43.3 (ref 7–25)
C PNEUM DNA NPH QL NAA+NON-PROBE: NOT DETECTED
C-ANCA TITR SER IF: NORMAL TITER
CALCIUM SPEC-SCNC: 8.3 MG/DL (ref 8.6–10.5)
CALCIUM SPEC-SCNC: 8.9 MG/DL (ref 8.6–10.5)
CALCIUM SPEC-SCNC: 9.2 MG/DL (ref 8.6–10.5)
CALCIUM SPEC-SCNC: 9.2 MG/DL (ref 8.6–10.5)
CALCIUM SPEC-SCNC: 9.3 MG/DL (ref 8.6–10.5)
CALCIUM SPEC-SCNC: 9.5 MG/DL (ref 8.6–10.5)
CALCIUM SPEC-SCNC: 9.6 MG/DL (ref 8.6–10.5)
CALCIUM SPEC-SCNC: 9.6 MG/DL (ref 8.6–10.5)
CALCIUM SPEC-SCNC: 9.7 MG/DL (ref 8.6–10.5)
CHLORIDE SERPL-SCNC: 102 MMOL/L (ref 98–107)
CHLORIDE SERPL-SCNC: 103 MMOL/L (ref 98–107)
CHLORIDE SERPL-SCNC: 104 MMOL/L (ref 98–107)
CHLORIDE SERPL-SCNC: 95 MMOL/L (ref 98–107)
CHLORIDE SERPL-SCNC: 97 MMOL/L (ref 98–107)
CHLORIDE SERPL-SCNC: 97 MMOL/L (ref 98–107)
CHLORIDE SERPL-SCNC: 98 MMOL/L (ref 98–107)
CHLORIDE SERPL-SCNC: 99 MMOL/L (ref 98–107)
CHLORIDE SERPL-SCNC: 99 MMOL/L (ref 98–107)
CLARITY UR: CLEAR
CO2 BLDA-SCNC: 17.7 MMOL/L (ref 22–29)
CO2 BLDA-SCNC: 18.8 MMOL/L (ref 22–29)
CO2 BLDA-SCNC: 20 MMOL/L (ref 22–29)
CO2 BLDA-SCNC: 21.2 MMOL/L (ref 22–29)
CO2 BLDA-SCNC: 23.7 MMOL/L (ref 22–29)
CO2 BLDA-SCNC: 26.9 MMOL/L (ref 22–29)
CO2 BLDA-SCNC: 30.2 MMOL/L (ref 22–29)
CO2 BLDA-SCNC: 30.3 MMOL/L (ref 22–29)
CO2 SERPL-SCNC: 17.2 MMOL/L (ref 22–29)
CO2 SERPL-SCNC: 17.4 MMOL/L (ref 22–29)
CO2 SERPL-SCNC: 18.1 MMOL/L (ref 22–29)
CO2 SERPL-SCNC: 20.2 MMOL/L (ref 22–29)
CO2 SERPL-SCNC: 22.2 MMOL/L (ref 22–29)
CO2 SERPL-SCNC: 24.5 MMOL/L (ref 22–29)
CO2 SERPL-SCNC: 27.2 MMOL/L (ref 22–29)
CO2 SERPL-SCNC: 28.1 MMOL/L (ref 22–29)
CO2 SERPL-SCNC: 28.3 MMOL/L (ref 22–29)
COLOR UR: YELLOW
CREAT SERPL-MCNC: 1.29 MG/DL (ref 0.76–1.27)
CREAT SERPL-MCNC: 1.34 MG/DL (ref 0.76–1.27)
CREAT SERPL-MCNC: 1.37 MG/DL (ref 0.76–1.27)
CREAT SERPL-MCNC: 1.38 MG/DL (ref 0.76–1.27)
CREAT SERPL-MCNC: 1.5 MG/DL (ref 0.76–1.27)
CREAT SERPL-MCNC: 1.55 MG/DL (ref 0.76–1.27)
CREAT SERPL-MCNC: 1.58 MG/DL (ref 0.76–1.27)
CREAT SERPL-MCNC: 1.6 MG/DL (ref 0.76–1.27)
CREAT SERPL-MCNC: 1.77 MG/DL (ref 0.76–1.27)
CREAT UR-MCNC: 25.6 MG/DL
CREAT UR-MCNC: 49.7 MG/DL
D-LACTATE SERPL-SCNC: 2.2 MMOL/L (ref 0.2–2)
D-LACTATE SERPL-SCNC: 2.2 MMOL/L (ref 0.5–2)
D-LACTATE SERPL-SCNC: 2.4 MMOL/L (ref 0.5–2)
D-LACTATE SERPL-SCNC: 2.6 MMOL/L (ref 0.5–2)
D-LACTATE SERPL-SCNC: 2.9 MMOL/L (ref 0.5–2)
D-LACTATE SERPL-SCNC: 3 MMOL/L (ref 0.5–2)
D-LACTATE SERPL-SCNC: 3.3 MMOL/L (ref 0.5–2)
D-LACTATE SERPL-SCNC: 4.8 MMOL/L (ref 0.3–2)
DEPRECATED RDW RBC AUTO: 44.2 FL (ref 37–54)
DEPRECATED RDW RBC AUTO: 44.3 FL (ref 37–54)
DEPRECATED RDW RBC AUTO: 44.6 FL (ref 37–54)
DEPRECATED RDW RBC AUTO: 45.7 FL (ref 37–54)
DEPRECATED RDW RBC AUTO: 45.8 FL (ref 37–54)
DEPRECATED RDW RBC AUTO: 46.5 FL (ref 37–54)
EGFRCR SERPLBLD CKD-EPI 2021: 37.9 ML/MIN/1.73
EGFRCR SERPLBLD CKD-EPI 2021: 42.8 ML/MIN/1.73
EGFRCR SERPLBLD CKD-EPI 2021: 43.4 ML/MIN/1.73
EGFRCR SERPLBLD CKD-EPI 2021: 44.4 ML/MIN/1.73
EGFRCR SERPLBLD CKD-EPI 2021: 46.2 ML/MIN/1.73
EGFRCR SERPLBLD CKD-EPI 2021: 51.1 ML/MIN/1.73
EGFRCR SERPLBLD CKD-EPI 2021: 51.5 ML/MIN/1.73
EGFRCR SERPLBLD CKD-EPI 2021: 52.9 ML/MIN/1.73
EGFRCR SERPLBLD CKD-EPI 2021: 55.4 ML/MIN/1.73
EOSINOPHIL # BLD AUTO: 0 10*3/MM3 (ref 0–0.4)
EOSINOPHIL # BLD AUTO: 0.01 10*3/MM3 (ref 0–0.4)
EOSINOPHIL # BLD AUTO: 0.01 10*3/MM3 (ref 0–0.4)
EOSINOPHIL # BLD AUTO: 0.04 10*3/MM3 (ref 0–0.4)
EOSINOPHIL NFR BLD AUTO: 0 % (ref 0.3–6.2)
EOSINOPHIL NFR BLD AUTO: 0.1 % (ref 0.3–6.2)
EOSINOPHIL NFR BLD AUTO: 0.3 % (ref 0.3–6.2)
ERYTHROCYTE [DISTWIDTH] IN BLOOD BY AUTOMATED COUNT: 13.2 % (ref 12.3–15.4)
ERYTHROCYTE [DISTWIDTH] IN BLOOD BY AUTOMATED COUNT: 13.3 % (ref 12.3–15.4)
ERYTHROCYTE [DISTWIDTH] IN BLOOD BY AUTOMATED COUNT: 13.5 % (ref 12.3–15.4)
ERYTHROCYTE [DISTWIDTH] IN BLOOD BY AUTOMATED COUNT: 13.6 % (ref 12.3–15.4)
FLUAV SUBTYP SPEC NAA+PROBE: NOT DETECTED
FLUBV RNA ISLT QL NAA+PROBE: NOT DETECTED
GEN 5 1HR TROPONIN T REFLEX: 101 NG/L
GLOBULIN UR ELPH-MCNC: 3.2 GM/DL
GLOBULIN UR ELPH-MCNC: 3.3 GM/DL
GLOBULIN UR ELPH-MCNC: 3.4 GM/DL
GLOBULIN UR ELPH-MCNC: 3.5 GM/DL
GLOBULIN UR ELPH-MCNC: 3.7 GM/DL
GLOBULIN UR ELPH-MCNC: 4 GM/DL
GLOBULIN UR ELPH-MCNC: 4.1 GM/DL
GLUCOSE BLDC GLUCOMTR-MCNC: 114 MG/DL (ref 70–105)
GLUCOSE BLDC GLUCOMTR-MCNC: 115 MG/DL (ref 70–105)
GLUCOSE BLDC GLUCOMTR-MCNC: 119 MG/DL (ref 70–105)
GLUCOSE BLDC GLUCOMTR-MCNC: 122 MG/DL (ref 70–105)
GLUCOSE BLDC GLUCOMTR-MCNC: 125 MG/DL (ref 70–105)
GLUCOSE BLDC GLUCOMTR-MCNC: 126 MG/DL (ref 70–105)
GLUCOSE BLDC GLUCOMTR-MCNC: 150 MG/DL (ref 70–105)
GLUCOSE BLDC GLUCOMTR-MCNC: 154 MG/DL (ref 70–105)
GLUCOSE BLDC GLUCOMTR-MCNC: 157 MG/DL (ref 70–105)
GLUCOSE BLDC GLUCOMTR-MCNC: 158 MG/DL (ref 70–105)
GLUCOSE BLDC GLUCOMTR-MCNC: 161 MG/DL (ref 70–105)
GLUCOSE BLDC GLUCOMTR-MCNC: 163 MG/DL (ref 70–105)
GLUCOSE BLDC GLUCOMTR-MCNC: 164 MG/DL (ref 70–105)
GLUCOSE BLDC GLUCOMTR-MCNC: 165 MG/DL (ref 70–105)
GLUCOSE BLDC GLUCOMTR-MCNC: 165 MG/DL (ref 70–105)
GLUCOSE BLDC GLUCOMTR-MCNC: 167 MG/DL (ref 70–105)
GLUCOSE BLDC GLUCOMTR-MCNC: 191 MG/DL (ref 70–105)
GLUCOSE BLDC GLUCOMTR-MCNC: 200 MG/DL (ref 70–105)
GLUCOSE BLDC GLUCOMTR-MCNC: 201 MG/DL (ref 70–105)
GLUCOSE BLDC GLUCOMTR-MCNC: 212 MG/DL (ref 70–105)
GLUCOSE BLDC GLUCOMTR-MCNC: 227 MG/DL (ref 70–105)
GLUCOSE BLDC GLUCOMTR-MCNC: 228 MG/DL (ref 70–105)
GLUCOSE BLDC GLUCOMTR-MCNC: 244 MG/DL (ref 70–105)
GLUCOSE BLDC GLUCOMTR-MCNC: 248 MG/DL (ref 70–105)
GLUCOSE BLDC GLUCOMTR-MCNC: 276 MG/DL (ref 70–105)
GLUCOSE BLDC GLUCOMTR-MCNC: 279 MG/DL (ref 70–105)
GLUCOSE BLDC GLUCOMTR-MCNC: 279 MG/DL (ref 70–105)
GLUCOSE BLDC GLUCOMTR-MCNC: 288 MG/DL (ref 70–105)
GLUCOSE BLDC GLUCOMTR-MCNC: 300 MG/DL (ref 70–105)
GLUCOSE BLDC GLUCOMTR-MCNC: 307 MG/DL (ref 74–100)
GLUCOSE BLDC GLUCOMTR-MCNC: 311 MG/DL (ref 74–100)
GLUCOSE BLDC GLUCOMTR-MCNC: 315 MG/DL (ref 70–105)
GLUCOSE BLDC GLUCOMTR-MCNC: 315 MG/DL (ref 74–100)
GLUCOSE BLDC GLUCOMTR-MCNC: 322 MG/DL (ref 70–105)
GLUCOSE BLDC GLUCOMTR-MCNC: 327 MG/DL (ref 70–105)
GLUCOSE BLDC GLUCOMTR-MCNC: 331 MG/DL (ref 70–105)
GLUCOSE BLDC GLUCOMTR-MCNC: 347 MG/DL (ref 70–105)
GLUCOSE BLDC GLUCOMTR-MCNC: 356 MG/DL (ref 70–105)
GLUCOSE BLDC GLUCOMTR-MCNC: 363 MG/DL (ref 70–105)
GLUCOSE BLDC GLUCOMTR-MCNC: 402 MG/DL (ref 70–105)
GLUCOSE SERPL-MCNC: 124 MG/DL (ref 65–99)
GLUCOSE SERPL-MCNC: 154 MG/DL (ref 65–99)
GLUCOSE SERPL-MCNC: 221 MG/DL (ref 65–99)
GLUCOSE SERPL-MCNC: 240 MG/DL (ref 65–99)
GLUCOSE SERPL-MCNC: 284 MG/DL (ref 65–99)
GLUCOSE SERPL-MCNC: 293 MG/DL (ref 65–99)
GLUCOSE SERPL-MCNC: 305 MG/DL (ref 65–99)
GLUCOSE SERPL-MCNC: 325 MG/DL (ref 65–99)
GLUCOSE SERPL-MCNC: 373 MG/DL (ref 65–99)
GLUCOSE UR STRIP-MCNC: ABNORMAL MG/DL
HADV DNA SPEC NAA+PROBE: NOT DETECTED
HBA1C MFR BLD: 7.3 % (ref 4.8–5.6)
HCO3 BLDA-SCNC: 16.9 MMOL/L (ref 21–28)
HCO3 BLDA-SCNC: 18 MMOL/L (ref 21–28)
HCO3 BLDA-SCNC: 20.2 MMOL/L (ref 21–28)
HCO3 BLDA-SCNC: 22.8 MMOL/L (ref 21–28)
HCO3 BLDA-SCNC: 25.6 MMOL/L (ref 21–28)
HCO3 BLDA-SCNC: 28.9 MMOL/L (ref 21–28)
HCO3 BLDA-SCNC: 29.1 MMOL/L (ref 21–28)
HCO3 BLDV-SCNC: 19.1 MMOL/L (ref 22–26)
HCOV 229E RNA SPEC QL NAA+PROBE: NOT DETECTED
HCOV HKU1 RNA SPEC QL NAA+PROBE: NOT DETECTED
HCOV NL63 RNA SPEC QL NAA+PROBE: NOT DETECTED
HCOV OC43 RNA SPEC QL NAA+PROBE: NOT DETECTED
HCT VFR BLD AUTO: 40.2 % (ref 37.5–51)
HCT VFR BLD AUTO: 40.3 % (ref 37.5–51)
HCT VFR BLD AUTO: 44.1 % (ref 37.5–51)
HCT VFR BLD AUTO: 44.5 % (ref 37.5–51)
HCT VFR BLD AUTO: 44.9 % (ref 37.5–51)
HCT VFR BLD AUTO: 46.7 % (ref 37.5–51)
HEMODILUTION: NO
HGB BLD-MCNC: 13.1 G/DL (ref 13–17.7)
HGB BLD-MCNC: 13.3 G/DL (ref 13–17.7)
HGB BLD-MCNC: 14.2 G/DL (ref 13–17.7)
HGB BLD-MCNC: 14.2 G/DL (ref 13–17.7)
HGB BLD-MCNC: 14.4 G/DL (ref 13–17.7)
HGB BLD-MCNC: 14.5 G/DL (ref 13–17.7)
HGB UR QL STRIP.AUTO: NEGATIVE
HMPV RNA NPH QL NAA+NON-PROBE: NOT DETECTED
HOLD SPECIMEN: NORMAL
HPIV1 RNA ISLT QL NAA+PROBE: NOT DETECTED
HPIV2 RNA SPEC QL NAA+PROBE: NOT DETECTED
HPIV3 RNA NPH QL NAA+PROBE: NOT DETECTED
HPIV4 P GENE NPH QL NAA+PROBE: NOT DETECTED
IMM GRANULOCYTES # BLD AUTO: 0.1 10*3/MM3 (ref 0–0.05)
IMM GRANULOCYTES # BLD AUTO: 0.1 10*3/MM3 (ref 0–0.05)
IMM GRANULOCYTES # BLD AUTO: 0.11 10*3/MM3 (ref 0–0.05)
IMM GRANULOCYTES # BLD AUTO: 0.14 10*3/MM3 (ref 0–0.05)
IMM GRANULOCYTES # BLD AUTO: 0.14 10*3/MM3 (ref 0–0.05)
IMM GRANULOCYTES # BLD AUTO: 0.29 10*3/MM3 (ref 0–0.05)
IMM GRANULOCYTES NFR BLD AUTO: 0.7 % (ref 0–0.5)
IMM GRANULOCYTES NFR BLD AUTO: 0.7 % (ref 0–0.5)
IMM GRANULOCYTES NFR BLD AUTO: 0.8 % (ref 0–0.5)
IMM GRANULOCYTES NFR BLD AUTO: 1.2 % (ref 0–0.5)
INHALED O2 CONCENTRATION: 100 %
INHALED O2 CONCENTRATION: 76 %
INHALED O2 CONCENTRATION: 77 %
INHALED O2 CONCENTRATION: 90 %
INHALED O2 CONCENTRATION: 90 %
INR PPP: 1.73 (ref 2–3)
INR PPP: 1.87 (ref 2–3)
INR PPP: 2.68 (ref 2–3)
INR PPP: 2.8 (ref 2–3)
INR PPP: 3.44 (ref 2–3)
INR PPP: 3.53 (ref 2–3)
KETONES UR QL STRIP: ABNORMAL
L PNEUMO1 AG UR QL IA: NEGATIVE
LABORATORY COMMENT REPORT: NORMAL
LEFT ATRIUM VOLUME INDEX: 20.6 ML/M2
LEUKOCYTE ESTERASE UR QL STRIP.AUTO: NEGATIVE
LV EF BIPLANE MOD: 70 %
LYMPHOCYTES # BLD AUTO: 1.11 10*3/MM3 (ref 0.7–3.1)
LYMPHOCYTES # BLD AUTO: 1.12 10*3/MM3 (ref 0.7–3.1)
LYMPHOCYTES # BLD AUTO: 1.37 10*3/MM3 (ref 0.7–3.1)
LYMPHOCYTES # BLD AUTO: 1.53 10*3/MM3 (ref 0.7–3.1)
LYMPHOCYTES # BLD AUTO: 1.57 10*3/MM3 (ref 0.7–3.1)
LYMPHOCYTES # BLD AUTO: 1.62 10*3/MM3 (ref 0.7–3.1)
LYMPHOCYTES NFR BLD AUTO: 10.6 % (ref 19.6–45.3)
LYMPHOCYTES NFR BLD AUTO: 11.1 % (ref 19.6–45.3)
LYMPHOCYTES NFR BLD AUTO: 6.2 % (ref 19.6–45.3)
LYMPHOCYTES NFR BLD AUTO: 6.6 % (ref 19.6–45.3)
LYMPHOCYTES NFR BLD AUTO: 7.5 % (ref 19.6–45.3)
LYMPHOCYTES NFR BLD AUTO: 7.5 % (ref 19.6–45.3)
Lab: ABNORMAL
M PNEUMO IGG SER IA-ACNC: NOT DETECTED
MAGNESIUM SERPL-MCNC: 2.6 MG/DL (ref 1.6–2.4)
MAGNESIUM SERPL-MCNC: 2.9 MG/DL (ref 1.6–2.4)
MCH RBC QN AUTO: 29.5 PG (ref 26.6–33)
MCH RBC QN AUTO: 29.5 PG (ref 26.6–33)
MCH RBC QN AUTO: 29.6 PG (ref 26.6–33)
MCH RBC QN AUTO: 29.6 PG (ref 26.6–33)
MCH RBC QN AUTO: 29.8 PG (ref 26.6–33)
MCH RBC QN AUTO: 30 PG (ref 26.6–33)
MCHC RBC AUTO-ENTMCNC: 31 G/DL (ref 31.5–35.7)
MCHC RBC AUTO-ENTMCNC: 31.6 G/DL (ref 31.5–35.7)
MCHC RBC AUTO-ENTMCNC: 32.2 G/DL (ref 31.5–35.7)
MCHC RBC AUTO-ENTMCNC: 32.4 G/DL (ref 31.5–35.7)
MCHC RBC AUTO-ENTMCNC: 32.5 G/DL (ref 31.5–35.7)
MCHC RBC AUTO-ENTMCNC: 33.1 G/DL (ref 31.5–35.7)
MCV RBC AUTO: 90.1 FL (ref 79–97)
MCV RBC AUTO: 91.2 FL (ref 79–97)
MCV RBC AUTO: 91.7 FL (ref 79–97)
MCV RBC AUTO: 92.7 FL (ref 79–97)
MCV RBC AUTO: 93.5 FL (ref 79–97)
MCV RBC AUTO: 95.1 FL (ref 79–97)
MODALITY: ABNORMAL
MODALITY: NORMAL
MONOCYTES # BLD AUTO: 0.55 10*3/MM3 (ref 0.1–0.9)
MONOCYTES # BLD AUTO: 0.66 10*3/MM3 (ref 0.1–0.9)
MONOCYTES # BLD AUTO: 0.67 10*3/MM3 (ref 0.1–0.9)
MONOCYTES # BLD AUTO: 0.95 10*3/MM3 (ref 0.1–0.9)
MONOCYTES # BLD AUTO: 1.02 10*3/MM3 (ref 0.1–0.9)
MONOCYTES # BLD AUTO: 1.15 10*3/MM3 (ref 0.1–0.9)
MONOCYTES NFR BLD AUTO: 3.6 % (ref 5–12)
MONOCYTES NFR BLD AUTO: 3.8 % (ref 5–12)
MONOCYTES NFR BLD AUTO: 4.4 % (ref 5–12)
MONOCYTES NFR BLD AUTO: 4.8 % (ref 5–12)
MONOCYTES NFR BLD AUTO: 5.3 % (ref 5–12)
MONOCYTES NFR BLD AUTO: 7 % (ref 5–12)
MRSA DNA SPEC QL NAA+PROBE: ABNORMAL
MYELOPEROXIDASE AB SER IA-ACNC: <0.2 UNITS (ref 0–0.9)
NEUTROPHILS NFR BLD AUTO: 11.77 10*3/MM3 (ref 1.7–7)
NEUTROPHILS NFR BLD AUTO: 12.26 10*3/MM3 (ref 1.7–7)
NEUTROPHILS NFR BLD AUTO: 13.1 10*3/MM3 (ref 1.7–7)
NEUTROPHILS NFR BLD AUTO: 15.71 10*3/MM3 (ref 1.7–7)
NEUTROPHILS NFR BLD AUTO: 16.18 10*3/MM3 (ref 1.7–7)
NEUTROPHILS NFR BLD AUTO: 20.78 10*3/MM3 (ref 1.7–7)
NEUTROPHILS NFR BLD AUTO: 81.2 % (ref 42.7–76)
NEUTROPHILS NFR BLD AUTO: 84 % (ref 42.7–76)
NEUTROPHILS NFR BLD AUTO: 87.1 % (ref 42.7–76)
NEUTROPHILS NFR BLD AUTO: 87.3 % (ref 42.7–76)
NEUTROPHILS NFR BLD AUTO: 87.6 % (ref 42.7–76)
NEUTROPHILS NFR BLD AUTO: 88 % (ref 42.7–76)
NITRITE UR QL STRIP: NEGATIVE
NOTIFIED WHO: ABNORMAL
NRBC BLD AUTO-RTO: 0 /100 WBC (ref 0–0.2)
NRBC BLD AUTO-RTO: 0.1 /100 WBC (ref 0–0.2)
NT-PROBNP SERPL-MCNC: 2644 PG/ML (ref 0–1800)
NT-PROBNP SERPL-MCNC: 4115 PG/ML (ref 0–1800)
P-ANCA ATYPICAL TITR SER IF: NORMAL TITER
P-ANCA TITR SER IF: NORMAL TITER
PCO2 BLDA: 26.6 MM HG (ref 35–48)
PCO2 BLDA: 28.4 MM HG (ref 35–48)
PCO2 BLDA: 29.3 MM HG (ref 35–48)
PCO2 BLDA: 32.9 MM HG (ref 35–48)
PCO2 BLDA: 40.6 MM HG (ref 35–48)
PCO2 BLDA: 41.4 MM HG (ref 35–48)
PCO2 BLDA: 42.4 MM HG (ref 35–48)
PCO2 BLDV: 30.6 MM HG (ref 42–51)
PEEP RESPIRATORY: 7 CM[H2O]
PH BLDA: 7.39 PH UNITS (ref 7.35–7.45)
PH BLDA: 7.4 PH UNITS (ref 7.35–7.45)
PH BLDA: 7.41 PH UNITS (ref 7.35–7.45)
PH BLDA: 7.41 PH UNITS (ref 7.35–7.45)
PH BLDA: 7.46 PH UNITS (ref 7.35–7.45)
PH BLDA: 7.46 PH UNITS (ref 7.35–7.45)
PH BLDA: 7.5 PH UNITS (ref 7.35–7.45)
PH BLDV: 7.4 PH UNITS (ref 7.32–7.43)
PH UR STRIP.AUTO: <=5 [PH] (ref 5–8)
PHOSPHATE SERPL-MCNC: 4.4 MG/DL (ref 2.5–4.5)
PLATELET # BLD AUTO: 175 10*3/MM3 (ref 140–450)
PLATELET # BLD AUTO: 188 10*3/MM3 (ref 140–450)
PLATELET # BLD AUTO: 198 10*3/MM3 (ref 140–450)
PLATELET # BLD AUTO: 259 10*3/MM3 (ref 140–450)
PLATELET # BLD AUTO: 309 10*3/MM3 (ref 140–450)
PLATELET # BLD AUTO: 405 10*3/MM3 (ref 140–450)
PMV BLD AUTO: 10.1 FL (ref 6–12)
PMV BLD AUTO: 10.2 FL (ref 6–12)
PMV BLD AUTO: 10.2 FL (ref 6–12)
PMV BLD AUTO: 10.6 FL (ref 6–12)
PMV BLD AUTO: 11.6 FL (ref 6–12)
PMV BLD AUTO: 11.7 FL (ref 6–12)
PO2 BLD: 102 MM[HG] (ref 0–500)
PO2 BLD: 118 MM[HG] (ref 0–500)
PO2 BLD: 58 MM[HG] (ref 0–500)
PO2 BLD: 73 MM[HG] (ref 0–500)
PO2 BLD: 75 MM[HG] (ref 0–500)
PO2 BLD: 93 MM[HG] (ref 0–500)
PO2 BLDA: 117.5 MM HG (ref 83–108)
PO2 BLDA: 41.3 MM HG (ref 83–108)
PO2 BLDA: 56.4 MM HG (ref 83–108)
PO2 BLDA: 57.8 MM HG (ref 83–108)
PO2 BLDA: 67.6 MM HG (ref 83–108)
PO2 BLDA: 91.7 MM HG (ref 83–108)
PO2 BLDA: 93 MM HG (ref 83–108)
PO2 BLDV: 52.7 MM HG (ref 40–42)
POTASSIUM SERPL-SCNC: 3.4 MMOL/L (ref 3.5–5.2)
POTASSIUM SERPL-SCNC: 3.7 MMOL/L (ref 3.5–5.2)
POTASSIUM SERPL-SCNC: 4 MMOL/L (ref 3.5–5.2)
POTASSIUM SERPL-SCNC: 4.1 MMOL/L (ref 3.5–5.2)
POTASSIUM SERPL-SCNC: 4.2 MMOL/L (ref 3.5–5.2)
POTASSIUM SERPL-SCNC: 4.5 MMOL/L (ref 3.5–5.2)
POTASSIUM SERPL-SCNC: 4.6 MMOL/L (ref 3.5–5.2)
POTASSIUM SERPL-SCNC: 4.7 MMOL/L (ref 3.5–5.2)
POTASSIUM SERPL-SCNC: 5 MMOL/L (ref 3.5–5.2)
POTASSIUM SERPL-SCNC: 5.7 MMOL/L (ref 3.5–5.2)
PROCALCITONIN SERPL-MCNC: 0.39 NG/ML (ref 0–0.25)
PROCALCITONIN SERPL-MCNC: 0.47 NG/ML (ref 0–0.25)
PROT ?TM UR-MCNC: 8.1 MG/DL
PROT ?TM UR-MCNC: <4 MG/DL
PROT SERPL-MCNC: 6.1 G/DL (ref 6–8.5)
PROT SERPL-MCNC: 6.6 G/DL (ref 6–8.5)
PROT SERPL-MCNC: 7.1 G/DL (ref 6–8.5)
PROT SERPL-MCNC: 7.2 G/DL (ref 6–8.5)
PROT SERPL-MCNC: 7.5 G/DL (ref 6–8.5)
PROT SERPL-MCNC: 7.7 G/DL (ref 6–8.5)
PROT SERPL-MCNC: 7.8 G/DL (ref 6–8.5)
PROT UR QL STRIP: NEGATIVE
PROT/CREAT UR: NORMAL MG/G{CREAT}
PROTEINASE3 AB SER IA-ACNC: <0.2 UNITS (ref 0–0.9)
PROTHROMBIN TIME: 20.4 SECONDS (ref 19.4–28.5)
PROTHROMBIN TIME: 21.6 SECONDS (ref 19.4–28.5)
PROTHROMBIN TIME: 28.5 SECONDS (ref 19.4–28.5)
PROTHROMBIN TIME: 29.6 SECONDS (ref 19.4–28.5)
PROTHROMBIN TIME: 34.6 SECONDS (ref 19.4–28.5)
PROTHROMBIN TIME: 35.4 SECONDS (ref 19.4–28.5)
QT INTERVAL: 298 MS
QT INTERVAL: 332 MS
QT INTERVAL: 333 MS
QT INTERVAL: 336 MS
QT INTERVAL: 390 MS
QT INTERVAL: 400 MS
QTC INTERVAL: 391 MS
QTC INTERVAL: 407 MS
QTC INTERVAL: 408 MS
QTC INTERVAL: 427 MS
QTC INTERVAL: 470 MS
QTC INTERVAL: 471 MS
RBC # BLD AUTO: 4.42 10*6/MM3 (ref 4.14–5.8)
RBC # BLD AUTO: 4.46 10*6/MM3 (ref 4.14–5.8)
RBC # BLD AUTO: 4.8 10*6/MM3 (ref 4.14–5.8)
RBC # BLD AUTO: 4.8 10*6/MM3 (ref 4.14–5.8)
RBC # BLD AUTO: 4.81 10*6/MM3 (ref 4.14–5.8)
RBC # BLD AUTO: 4.91 10*6/MM3 (ref 4.14–5.8)
READ BACK: ABNORMAL
RESPIRATORY RATE: 20
RHINOVIRUS RNA SPEC NAA+PROBE: NOT DETECTED
RSV RNA NPH QL NAA+NON-PROBE: NOT DETECTED
S PNEUM AG SPEC QL LA: NEGATIVE
SAO2 % BLDCOA: 77.1 % (ref 94–98)
SAO2 % BLDCOA: 89.9 % (ref 94–98)
SAO2 % BLDCOA: 92.4 % (ref 94–98)
SAO2 % BLDCOA: 94 % (ref 94–98)
SAO2 % BLDCOA: 97.1 % (ref 94–98)
SAO2 % BLDCOA: 97.5 % (ref 94–98)
SAO2 % BLDCOA: 98.7 % (ref 94–98)
SAO2 % BLDCOV: 87.5 % (ref 45–75)
SARS-COV-2 RNA RESP QL NAA+PROBE: NOT DETECTED
SINUS: 3.5 CM
SODIUM SERPL-SCNC: 136 MMOL/L (ref 136–145)
SODIUM SERPL-SCNC: 137 MMOL/L (ref 136–145)
SODIUM SERPL-SCNC: 139 MMOL/L (ref 136–145)
SODIUM SERPL-SCNC: 139 MMOL/L (ref 136–145)
SODIUM SERPL-SCNC: 140 MMOL/L (ref 136–145)
SODIUM SERPL-SCNC: 141 MMOL/L (ref 136–145)
SODIUM SERPL-SCNC: 145 MMOL/L (ref 136–145)
SODIUM UR-SCNC: 31 MMOL/L
SP GR UR STRIP: 1.04 (ref 1–1.03)
TROPONIN T % DELTA: 102 %
TROPONIN T NUMERIC DELTA: 51 NG/L
TROPONIN T SERPL HS-MCNC: 128 NG/L
TROPONIN T SERPL HS-MCNC: 50 NG/L
TROPONIN T SERPL HS-MCNC: 54 NG/L
UROBILINOGEN UR QL STRIP: ABNORMAL
VANCOMYCIN PEAK SERPL-MCNC: 26 MCG/ML (ref 20–40)
VANCOMYCIN TROUGH SERPL-MCNC: 9.4 MCG/ML (ref 5–20)
VENTILATOR MODE: ABNORMAL
VENTILATOR MODE: ABNORMAL
VT ON VENT VENT: 500 ML
WBC NRBC COR # BLD AUTO: 14.49 10*3/MM3 (ref 3.4–10.8)
WBC NRBC COR # BLD AUTO: 14.58 10*3/MM3 (ref 3.4–10.8)
WBC NRBC COR # BLD AUTO: 15 10*3/MM3 (ref 3.4–10.8)
WBC NRBC COR # BLD AUTO: 17.93 10*3/MM3 (ref 3.4–10.8)
WBC NRBC COR # BLD AUTO: 18.38 10*3/MM3 (ref 3.4–10.8)
WBC NRBC COR # BLD AUTO: 23.86 10*3/MM3 (ref 3.4–10.8)

## 2025-01-01 PROCEDURE — 25010000002 VANCOMYCIN 2-0.9 GM/500ML-% SOLUTION: Performed by: EMERGENCY MEDICINE

## 2025-01-01 PROCEDURE — 94660 CPAP INITIATION&MGMT: CPT

## 2025-01-01 PROCEDURE — 80053 COMPREHEN METABOLIC PANEL: CPT | Performed by: INTERNAL MEDICINE

## 2025-01-01 PROCEDURE — 25010000002 ENOXAPARIN PER 10 MG: Performed by: EMERGENCY MEDICINE

## 2025-01-01 PROCEDURE — 63710000001 INSULIN LISPRO (HUMAN) PER 5 UNITS: Performed by: EMERGENCY MEDICINE

## 2025-01-01 PROCEDURE — 85025 COMPLETE CBC W/AUTO DIFF WBC: CPT | Performed by: EMERGENCY MEDICINE

## 2025-01-01 PROCEDURE — 83880 ASSAY OF NATRIURETIC PEPTIDE: CPT | Performed by: INTERNAL MEDICINE

## 2025-01-01 PROCEDURE — 25010000002 BUMETANIDE PER 0.5 MG: Performed by: NURSE PRACTITIONER

## 2025-01-01 PROCEDURE — 85025 COMPLETE CBC W/AUTO DIFF WBC: CPT | Performed by: NURSE PRACTITIONER

## 2025-01-01 PROCEDURE — 25010000002 BUMETANIDE PER 0.5 MG: Performed by: INTERNAL MEDICINE

## 2025-01-01 PROCEDURE — 94761 N-INVAS EAR/PLS OXIMETRY MLT: CPT

## 2025-01-01 PROCEDURE — 94799 UNLISTED PULMONARY SVC/PX: CPT

## 2025-01-01 PROCEDURE — 85610 PROTHROMBIN TIME: CPT

## 2025-01-01 PROCEDURE — 86037 ANCA TITER EACH ANTIBODY: CPT | Performed by: INTERNAL MEDICINE

## 2025-01-01 PROCEDURE — 93005 ELECTROCARDIOGRAM TRACING: CPT

## 2025-01-01 PROCEDURE — 93005 ELECTROCARDIOGRAM TRACING: CPT | Performed by: EMERGENCY MEDICINE

## 2025-01-01 PROCEDURE — 71045 X-RAY EXAM CHEST 1 VIEW: CPT

## 2025-01-01 PROCEDURE — 82948 REAGENT STRIP/BLOOD GLUCOSE: CPT

## 2025-01-01 PROCEDURE — 63710000001 INSULIN REGULAR HUMAN PER 5 UNITS: Performed by: INTERNAL MEDICINE

## 2025-01-01 PROCEDURE — 25010000002 SULFUR HEXAFLUORIDE MICROSPH 60.7-25 MG RECONSTITUTED SUSPENSION: Performed by: INTERNAL MEDICINE

## 2025-01-01 PROCEDURE — 25010000002 CEFEPIME PER 500 MG: Performed by: NURSE PRACTITIONER

## 2025-01-01 PROCEDURE — 82803 BLOOD GASES ANY COMBINATION: CPT | Performed by: INTERNAL MEDICINE

## 2025-01-01 PROCEDURE — 5A09357 ASSISTANCE WITH RESPIRATORY VENTILATION, LESS THAN 24 CONSECUTIVE HOURS, CONTINUOUS POSITIVE AIRWAY PRESSURE: ICD-10-PCS

## 2025-01-01 PROCEDURE — 94664 DEMO&/EVAL PT USE INHALER: CPT

## 2025-01-01 PROCEDURE — 85025 COMPLETE CBC W/AUTO DIFF WBC: CPT | Performed by: INTERNAL MEDICINE

## 2025-01-01 PROCEDURE — 82948 REAGENT STRIP/BLOOD GLUCOSE: CPT | Performed by: EMERGENCY MEDICINE

## 2025-01-01 PROCEDURE — 25010000002 AMIODARONE IN DEXTROSE 5% 150-4.21 MG/100ML-% SOLUTION: Performed by: INTERNAL MEDICINE

## 2025-01-01 PROCEDURE — 80053 COMPREHEN METABOLIC PANEL: CPT | Performed by: EMERGENCY MEDICINE

## 2025-01-01 PROCEDURE — 63710000001 INSULIN LISPRO (HUMAN) PER 5 UNITS: Performed by: INTERNAL MEDICINE

## 2025-01-01 PROCEDURE — 86038 ANTINUCLEAR ANTIBODIES: CPT | Performed by: INTERNAL MEDICINE

## 2025-01-01 PROCEDURE — 85610 PROTHROMBIN TIME: CPT | Performed by: EMERGENCY MEDICINE

## 2025-01-01 PROCEDURE — 82948 REAGENT STRIP/BLOOD GLUCOSE: CPT | Performed by: NURSE PRACTITIONER

## 2025-01-01 PROCEDURE — 83735 ASSAY OF MAGNESIUM: CPT | Performed by: NURSE PRACTITIONER

## 2025-01-01 PROCEDURE — 25010000002 METHYLPREDNISOLONE PER 40 MG: Performed by: NURSE PRACTITIONER

## 2025-01-01 PROCEDURE — 25010000002 VANCOMYCIN 1.75-0.9 GM/500ML-% SOLUTION: Performed by: EMERGENCY MEDICINE

## 2025-01-01 PROCEDURE — 80048 BASIC METABOLIC PNL TOTAL CA: CPT | Performed by: NURSE PRACTITIONER

## 2025-01-01 PROCEDURE — 25010000002 HYDROCORTISONE SOD SUC (PF) 100 MG RECONSTITUTED SOLUTION: Performed by: EMERGENCY MEDICINE

## 2025-01-01 PROCEDURE — 87899 AGENT NOS ASSAY W/OPTIC: CPT | Performed by: NURSE PRACTITIONER

## 2025-01-01 PROCEDURE — 83516 IMMUNOASSAY NONANTIBODY: CPT | Performed by: INTERNAL MEDICINE

## 2025-01-01 PROCEDURE — 80202 ASSAY OF VANCOMYCIN: CPT | Performed by: EMERGENCY MEDICINE

## 2025-01-01 PROCEDURE — 99291 CRITICAL CARE FIRST HOUR: CPT

## 2025-01-01 PROCEDURE — 0202U NFCT DS 22 TRGT SARS-COV-2: CPT | Performed by: EMERGENCY MEDICINE

## 2025-01-01 PROCEDURE — 87040 BLOOD CULTURE FOR BACTERIA: CPT | Performed by: EMERGENCY MEDICINE

## 2025-01-01 PROCEDURE — 36600 WITHDRAWAL OF ARTERIAL BLOOD: CPT | Performed by: INTERNAL MEDICINE

## 2025-01-01 PROCEDURE — 83605 ASSAY OF LACTIC ACID: CPT

## 2025-01-01 PROCEDURE — 25010000002 PHYTONADIONE 10 MG/ML SOLUTION: Performed by: NURSE PRACTITIONER

## 2025-01-01 PROCEDURE — 94640 AIRWAY INHALATION TREATMENT: CPT

## 2025-01-01 PROCEDURE — 76705 ECHO EXAM OF ABDOMEN: CPT

## 2025-01-01 PROCEDURE — 25010000002 VANCOMYCIN 1.5-0.9 GM/500ML-% SOLUTION: Performed by: EMERGENCY MEDICINE

## 2025-01-01 PROCEDURE — 25010000002 DIGOXIN PER 500 MCG: Performed by: NURSE PRACTITIONER

## 2025-01-01 PROCEDURE — 83735 ASSAY OF MAGNESIUM: CPT | Performed by: INTERNAL MEDICINE

## 2025-01-01 PROCEDURE — 36415 COLL VENOUS BLD VENIPUNCTURE: CPT

## 2025-01-01 PROCEDURE — 82803 BLOOD GASES ANY COMBINATION: CPT | Performed by: EMERGENCY MEDICINE

## 2025-01-01 PROCEDURE — 93005 ELECTROCARDIOGRAM TRACING: CPT | Performed by: INTERNAL MEDICINE

## 2025-01-01 PROCEDURE — 25010000002 AMIODARONE IN DEXTROSE 5% 360-4.14 MG/200ML-% SOLUTION: Performed by: EMERGENCY MEDICINE

## 2025-01-01 PROCEDURE — 97162 PT EVAL MOD COMPLEX 30 MIN: CPT

## 2025-01-01 PROCEDURE — 81003 URINALYSIS AUTO W/O SCOPE: CPT | Performed by: INTERNAL MEDICINE

## 2025-01-01 PROCEDURE — 25810000003 LACTATED RINGERS SOLUTION: Performed by: EMERGENCY MEDICINE

## 2025-01-01 PROCEDURE — 63710000001 INSULIN REGULAR HUMAN (CONC) 500 UNIT/ML SOLUTION PEN-INJECTOR 3 ML PEN: Performed by: EMERGENCY MEDICINE

## 2025-01-01 PROCEDURE — 63710000001 INSULIN LISPRO (HUMAN) PER 5 UNITS: Performed by: NURSE PRACTITIONER

## 2025-01-01 PROCEDURE — 82803 BLOOD GASES ANY COMBINATION: CPT

## 2025-01-01 PROCEDURE — 84300 ASSAY OF URINE SODIUM: CPT | Performed by: INTERNAL MEDICINE

## 2025-01-01 PROCEDURE — 84145 PROCALCITONIN (PCT): CPT | Performed by: NURSE PRACTITIONER

## 2025-01-01 PROCEDURE — 99222 1ST HOSP IP/OBS MODERATE 55: CPT | Performed by: INTERNAL MEDICINE

## 2025-01-01 PROCEDURE — 82010 KETONE BODYS QUAN: CPT | Performed by: EMERGENCY MEDICINE

## 2025-01-01 PROCEDURE — 84484 ASSAY OF TROPONIN QUANT: CPT | Performed by: INTERNAL MEDICINE

## 2025-01-01 PROCEDURE — 25010000002 POTASSIUM CHLORIDE 10 MEQ/100ML SOLUTION

## 2025-01-01 PROCEDURE — 84484 ASSAY OF TROPONIN QUANT: CPT | Performed by: NURSE PRACTITIONER

## 2025-01-01 PROCEDURE — 82570 ASSAY OF URINE CREATININE: CPT | Performed by: INTERNAL MEDICINE

## 2025-01-01 PROCEDURE — 25510000001 IOPAMIDOL PER 1 ML: Performed by: EMERGENCY MEDICINE

## 2025-01-01 PROCEDURE — 63710000001 INSULIN REGULAR HUMAN (CONC) 500 UNIT/ML SOLUTION PEN-INJECTOR 3 ML PEN: Performed by: INTERNAL MEDICINE

## 2025-01-01 PROCEDURE — 25810000003 SODIUM CHLORIDE 0.9 % SOLUTION: Performed by: EMERGENCY MEDICINE

## 2025-01-01 PROCEDURE — 84100 ASSAY OF PHOSPHORUS: CPT | Performed by: INTERNAL MEDICINE

## 2025-01-01 PROCEDURE — 93306 TTE W/DOPPLER COMPLETE: CPT

## 2025-01-01 PROCEDURE — 36600 WITHDRAWAL OF ARTERIAL BLOOD: CPT | Performed by: EMERGENCY MEDICINE

## 2025-01-01 PROCEDURE — 84484 ASSAY OF TROPONIN QUANT: CPT | Performed by: EMERGENCY MEDICINE

## 2025-01-01 PROCEDURE — 87641 MR-STAPH DNA AMP PROBE: CPT | Performed by: NURSE PRACTITIONER

## 2025-01-01 PROCEDURE — 99231 SBSQ HOSP IP/OBS SF/LOW 25: CPT | Performed by: INTERNAL MEDICINE

## 2025-01-01 PROCEDURE — 83605 ASSAY OF LACTIC ACID: CPT | Performed by: EMERGENCY MEDICINE

## 2025-01-01 PROCEDURE — 83880 ASSAY OF NATRIURETIC PEPTIDE: CPT | Performed by: EMERGENCY MEDICINE

## 2025-01-01 PROCEDURE — 93306 TTE W/DOPPLER COMPLETE: CPT | Performed by: INTERNAL MEDICINE

## 2025-01-01 PROCEDURE — 63710000001 INSULIN REGULAR HUMAN PER 5 UNITS: Performed by: EMERGENCY MEDICINE

## 2025-01-01 PROCEDURE — 25010000002 METHYLPREDNISOLONE PER 125 MG: Performed by: EMERGENCY MEDICINE

## 2025-01-01 PROCEDURE — 97166 OT EVAL MOD COMPLEX 45 MIN: CPT

## 2025-01-01 PROCEDURE — 84132 ASSAY OF SERUM POTASSIUM: CPT

## 2025-01-01 PROCEDURE — 36600 WITHDRAWAL OF ARTERIAL BLOOD: CPT

## 2025-01-01 PROCEDURE — 82948 REAGENT STRIP/BLOOD GLUCOSE: CPT | Performed by: INTERNAL MEDICINE

## 2025-01-01 PROCEDURE — 76775 US EXAM ABDO BACK WALL LIM: CPT

## 2025-01-01 PROCEDURE — 84156 ASSAY OF PROTEIN URINE: CPT | Performed by: INTERNAL MEDICINE

## 2025-01-01 PROCEDURE — 63710000001 INSULIN GLARGINE PER 5 UNITS: Performed by: INTERNAL MEDICINE

## 2025-01-01 PROCEDURE — 80048 BASIC METABOLIC PNL TOTAL CA: CPT | Performed by: INTERNAL MEDICINE

## 2025-01-01 PROCEDURE — 25010000002 AMIODARONE IN DEXTROSE 5% 360-4.14 MG/200ML-% SOLUTION: Performed by: NURSE PRACTITIONER

## 2025-01-01 PROCEDURE — 25010000002 CEFEPIME PER 500 MG: Performed by: EMERGENCY MEDICINE

## 2025-01-01 PROCEDURE — 87449 NOS EACH ORGANISM AG IA: CPT | Performed by: NURSE PRACTITIONER

## 2025-01-01 PROCEDURE — 83036 HEMOGLOBIN GLYCOSYLATED A1C: CPT | Performed by: NURSE PRACTITIONER

## 2025-01-01 PROCEDURE — 71275 CT ANGIOGRAPHY CHEST: CPT

## 2025-01-01 PROCEDURE — 25010000002 AMIODARONE IN DEXTROSE 5% 360-4.14 MG/200ML-% SOLUTION: Performed by: INTERNAL MEDICINE

## 2025-01-01 PROCEDURE — 84145 PROCALCITONIN (PCT): CPT | Performed by: EMERGENCY MEDICINE

## 2025-01-01 RX ORDER — MIDAZOLAM HYDROCHLORIDE 1 MG/ML
4 INJECTION, SOLUTION INTRAMUSCULAR; INTRAVENOUS ONCE
Status: DISCONTINUED | OUTPATIENT
Start: 2025-01-01 | End: 2025-01-01

## 2025-01-01 RX ORDER — METHYLPREDNISOLONE SODIUM SUCCINATE 40 MG/ML
40 INJECTION, POWDER, LYOPHILIZED, FOR SOLUTION INTRAMUSCULAR; INTRAVENOUS EVERY 8 HOURS
Status: DISCONTINUED | OUTPATIENT
Start: 2025-01-01 | End: 2025-01-01

## 2025-01-01 RX ORDER — VANCOMYCIN/0.9 % SOD CHLORIDE 1.5G/250ML
1500 PLASTIC BAG, INJECTION (ML) INTRAVENOUS EVERY 24 HOURS
Status: DISCONTINUED | OUTPATIENT
Start: 2025-01-01 | End: 2025-01-01

## 2025-01-01 RX ORDER — BUMETANIDE 0.25 MG/ML
1 INJECTION, SOLUTION INTRAMUSCULAR; INTRAVENOUS ONCE
Status: COMPLETED | OUTPATIENT
Start: 2025-01-01 | End: 2025-01-01

## 2025-01-01 RX ORDER — INSULIN LISPRO 100 [IU]/ML
3-14 INJECTION, SOLUTION INTRAVENOUS; SUBCUTANEOUS EVERY 6 HOURS SCHEDULED
Status: DISCONTINUED | OUTPATIENT
Start: 2025-01-01 | End: 2025-01-01

## 2025-01-01 RX ORDER — SODIUM CHLORIDE 9 MG/ML
40 INJECTION, SOLUTION INTRAVENOUS AS NEEDED
Status: DISCONTINUED | OUTPATIENT
Start: 2025-01-01 | End: 2025-01-01 | Stop reason: HOSPADM

## 2025-01-01 RX ORDER — BUMETANIDE 0.25 MG/ML
1 INJECTION, SOLUTION INTRAMUSCULAR; INTRAVENOUS EVERY 6 HOURS SCHEDULED
Status: DISCONTINUED | OUTPATIENT
Start: 2025-01-01 | End: 2025-01-01

## 2025-01-01 RX ORDER — BUDESONIDE 0.5 MG/2ML
0.5 INHALANT ORAL
Status: DISCONTINUED | OUTPATIENT
Start: 2025-01-01 | End: 2025-01-01 | Stop reason: HOSPADM

## 2025-01-01 RX ORDER — IPRATROPIUM BROMIDE AND ALBUTEROL SULFATE 2.5; .5 MG/3ML; MG/3ML
3 SOLUTION RESPIRATORY (INHALATION) ONCE
Status: COMPLETED | OUTPATIENT
Start: 2025-01-01 | End: 2025-01-01

## 2025-01-01 RX ORDER — POLYETHYLENE GLYCOL 3350 17 G/17G
17 POWDER, FOR SOLUTION ORAL DAILY PRN
Status: DISCONTINUED | OUTPATIENT
Start: 2025-01-01 | End: 2025-01-01 | Stop reason: HOSPADM

## 2025-01-01 RX ORDER — ALBUMIN (HUMAN) 12.5 G/50ML
112.5 SOLUTION INTRAVENOUS ONCE
Status: DISCONTINUED | OUTPATIENT
Start: 2025-01-01 | End: 2025-01-01

## 2025-01-01 RX ORDER — METOPROLOL TARTRATE 1 MG/ML
5 INJECTION, SOLUTION INTRAVENOUS EVERY 6 HOURS PRN
Status: DISCONTINUED | OUTPATIENT
Start: 2025-01-01 | End: 2025-01-01 | Stop reason: HOSPADM

## 2025-01-01 RX ORDER — DEXTROSE MONOHYDRATE 25 G/50ML
10-50 INJECTION, SOLUTION INTRAVENOUS
Status: DISCONTINUED | OUTPATIENT
Start: 2025-01-01 | End: 2025-01-01

## 2025-01-01 RX ORDER — FENTANYL CITRATE 50 UG/ML
100 INJECTION, SOLUTION INTRAMUSCULAR; INTRAVENOUS ONCE
Status: DISCONTINUED | OUTPATIENT
Start: 2025-01-01 | End: 2025-01-01

## 2025-01-01 RX ORDER — ATORVASTATIN CALCIUM 80 MG/1
TABLET, FILM COATED ORAL
Qty: 90 TABLET | Refills: 3 | Status: SHIPPED | OUTPATIENT
Start: 2025-01-01 | End: 2025-01-20

## 2025-01-01 RX ORDER — METHYLPREDNISOLONE SODIUM SUCCINATE 125 MG/2ML
125 INJECTION, POWDER, LYOPHILIZED, FOR SOLUTION INTRAMUSCULAR; INTRAVENOUS ONCE
Status: COMPLETED | OUTPATIENT
Start: 2025-01-01 | End: 2025-01-01

## 2025-01-01 RX ORDER — NICOTINE POLACRILEX 4 MG
15 LOZENGE BUCCAL
Status: DISCONTINUED | OUTPATIENT
Start: 2025-01-01 | End: 2025-01-01

## 2025-01-01 RX ORDER — IBUPROFEN 600 MG/1
1 TABLET ORAL
Status: DISCONTINUED | OUTPATIENT
Start: 2025-01-01 | End: 2025-01-01

## 2025-01-01 RX ORDER — IPRATROPIUM BROMIDE AND ALBUTEROL SULFATE 2.5; .5 MG/3ML; MG/3ML
3 SOLUTION RESPIRATORY (INHALATION)
Status: DISCONTINUED | OUTPATIENT
Start: 2025-01-01 | End: 2025-01-01 | Stop reason: HOSPADM

## 2025-01-01 RX ORDER — ALUMINA, MAGNESIA, AND SIMETHICONE 2400; 2400; 240 MG/30ML; MG/30ML; MG/30ML
15 SUSPENSION ORAL EVERY 6 HOURS PRN
Status: DISCONTINUED | OUTPATIENT
Start: 2025-01-01 | End: 2025-01-01 | Stop reason: HOSPADM

## 2025-01-01 RX ORDER — DEXMEDETOMIDINE HYDROCHLORIDE 4 UG/ML
.2-1.5 INJECTION, SOLUTION INTRAVENOUS
Status: DISCONTINUED | OUTPATIENT
Start: 2025-01-01 | End: 2025-01-01

## 2025-01-01 RX ORDER — INSULIN LISPRO 100 [IU]/ML
1-200 INJECTION, SOLUTION INTRAVENOUS; SUBCUTANEOUS AS NEEDED
Status: DISCONTINUED | OUTPATIENT
Start: 2025-01-01 | End: 2025-01-01

## 2025-01-01 RX ORDER — INSULIN LISPRO 100 [IU]/ML
3-14 INJECTION, SOLUTION INTRAVENOUS; SUBCUTANEOUS
Status: DISCONTINUED | OUTPATIENT
Start: 2025-01-01 | End: 2025-01-01 | Stop reason: HOSPADM

## 2025-01-01 RX ORDER — ENOXAPARIN SODIUM 150 MG/ML
1 INJECTION SUBCUTANEOUS EVERY 12 HOURS SCHEDULED
Status: DISCONTINUED | OUTPATIENT
Start: 2025-01-01 | End: 2025-01-01 | Stop reason: HOSPADM

## 2025-01-01 RX ORDER — ATENOLOL 50 MG/1
50 TABLET ORAL DAILY
Status: DISCONTINUED | OUTPATIENT
Start: 2025-01-01 | End: 2025-01-01

## 2025-01-01 RX ORDER — INSULIN LISPRO 100 [IU]/ML
1-200 INJECTION, SOLUTION INTRAVENOUS; SUBCUTANEOUS EVERY 6 HOURS SCHEDULED
Status: DISCONTINUED | OUTPATIENT
Start: 2025-01-01 | End: 2025-01-01

## 2025-01-01 RX ORDER — PHYTONADIONE 2 MG/ML
2.5 INJECTION, EMULSION INTRAMUSCULAR; INTRAVENOUS; SUBCUTANEOUS ONCE
Status: COMPLETED | OUTPATIENT
Start: 2025-01-01 | End: 2025-01-01

## 2025-01-01 RX ORDER — VANCOMYCIN 2 GRAM/500 ML IN 0.9 % SODIUM CHLORIDE INTRAVENOUS
2000 ONCE
Status: COMPLETED | OUTPATIENT
Start: 2025-01-01 | End: 2025-01-01

## 2025-01-01 RX ORDER — DEXTROSE MONOHYDRATE 25 G/50ML
25 INJECTION, SOLUTION INTRAVENOUS
Status: DISCONTINUED | OUTPATIENT
Start: 2025-01-01 | End: 2025-01-01

## 2025-01-01 RX ORDER — GABAPENTIN 300 MG/1
600 CAPSULE ORAL EVERY 8 HOURS SCHEDULED
Status: DISCONTINUED | OUTPATIENT
Start: 2025-01-01 | End: 2025-01-01 | Stop reason: HOSPADM

## 2025-01-01 RX ORDER — IOPAMIDOL 755 MG/ML
100 INJECTION, SOLUTION INTRAVASCULAR
Status: COMPLETED | OUTPATIENT
Start: 2025-01-01 | End: 2025-01-01

## 2025-01-01 RX ORDER — ISOSORBIDE MONONITRATE 30 MG/1
30 TABLET, EXTENDED RELEASE ORAL DAILY
Status: DISCONTINUED | OUTPATIENT
Start: 2025-01-01 | End: 2025-01-01

## 2025-01-01 RX ORDER — VANCOMYCIN 1.75 GRAM/500 ML IN 0.9 % SODIUM CHLORIDE INTRAVENOUS
1750 EVERY 24 HOURS
Status: DISCONTINUED | OUTPATIENT
Start: 2025-01-01 | End: 2025-01-01 | Stop reason: HOSPADM

## 2025-01-01 RX ORDER — ALBUMIN (HUMAN) 12.5 G/50ML
75 SOLUTION INTRAVENOUS ONCE
Status: DISCONTINUED | OUTPATIENT
Start: 2025-01-01 | End: 2025-01-01

## 2025-01-01 RX ORDER — ETOMIDATE 2 MG/ML
30 INJECTION INTRAVENOUS ONCE
Status: DISCONTINUED | OUTPATIENT
Start: 2025-01-01 | End: 2025-01-01

## 2025-01-01 RX ORDER — METOPROLOL TARTRATE 25 MG/1
25 TABLET, FILM COATED ORAL EVERY 8 HOURS
Status: DISCONTINUED | OUTPATIENT
Start: 2025-01-01 | End: 2025-01-01 | Stop reason: HOSPADM

## 2025-01-01 RX ORDER — BISACODYL 5 MG/1
5 TABLET, DELAYED RELEASE ORAL DAILY PRN
Status: DISCONTINUED | OUTPATIENT
Start: 2025-01-01 | End: 2025-01-01 | Stop reason: HOSPADM

## 2025-01-01 RX ORDER — PANTOPRAZOLE SODIUM 40 MG/10ML
40 INJECTION, POWDER, LYOPHILIZED, FOR SOLUTION INTRAVENOUS
Status: DISCONTINUED | OUTPATIENT
Start: 2025-01-01 | End: 2025-01-01 | Stop reason: HOSPADM

## 2025-01-01 RX ORDER — BISACODYL 10 MG
10 SUPPOSITORY, RECTAL RECTAL DAILY PRN
Status: DISCONTINUED | OUTPATIENT
Start: 2025-01-01 | End: 2025-01-01 | Stop reason: HOSPADM

## 2025-01-01 RX ORDER — ALBUMIN (HUMAN) 12.5 G/50ML
37.5 SOLUTION INTRAVENOUS ONCE
Status: DISCONTINUED | OUTPATIENT
Start: 2025-01-01 | End: 2025-01-01

## 2025-01-01 RX ORDER — CHLORHEXIDINE GLUCONATE ORAL RINSE 1.2 MG/ML
15 SOLUTION DENTAL EVERY 12 HOURS SCHEDULED
Status: DISCONTINUED | OUTPATIENT
Start: 2025-01-01 | End: 2025-01-01

## 2025-01-01 RX ORDER — ONDANSETRON 4 MG/1
4 TABLET, ORALLY DISINTEGRATING ORAL EVERY 6 HOURS PRN
Status: DISCONTINUED | OUTPATIENT
Start: 2025-01-01 | End: 2025-01-01 | Stop reason: HOSPADM

## 2025-01-01 RX ORDER — FENOFIBRATE 48 MG/1
48 TABLET, COATED ORAL DAILY
Status: DISCONTINUED | OUTPATIENT
Start: 2025-01-01 | End: 2025-01-01 | Stop reason: HOSPADM

## 2025-01-01 RX ORDER — LOSARTAN POTASSIUM 50 MG/1
100 TABLET ORAL
Status: DISCONTINUED | OUTPATIENT
Start: 2025-01-01 | End: 2025-01-01 | Stop reason: HOSPADM

## 2025-01-01 RX ORDER — SODIUM CHLORIDE 0.9 % (FLUSH) 0.9 %
10 SYRINGE (ML) INJECTION AS NEEDED
Status: DISCONTINUED | OUTPATIENT
Start: 2025-01-01 | End: 2025-01-01

## 2025-01-01 RX ORDER — ALBUMIN (HUMAN) 12.5 G/50ML
50 SOLUTION INTRAVENOUS ONCE
Status: DISCONTINUED | OUTPATIENT
Start: 2025-01-01 | End: 2025-01-01

## 2025-01-01 RX ORDER — ISOSORBIDE MONONITRATE 30 MG/1
30 TABLET, EXTENDED RELEASE ORAL DAILY
Qty: 90 TABLET | Refills: 3 | Status: SHIPPED | OUTPATIENT
Start: 2025-01-01 | End: 2025-01-20

## 2025-01-01 RX ORDER — PANTOPRAZOLE SODIUM 40 MG/1
40 TABLET, DELAYED RELEASE ORAL
Status: DISCONTINUED | OUTPATIENT
Start: 2025-01-01 | End: 2025-01-01

## 2025-01-01 RX ORDER — AMLODIPINE BESYLATE 5 MG/1
5 TABLET ORAL DAILY
Qty: 90 TABLET | Refills: 3 | Status: SHIPPED | OUTPATIENT
Start: 2025-01-01 | End: 2025-01-20

## 2025-01-01 RX ORDER — FENTANYL CITRATE-0.9 % NACL/PF 10 MCG/ML
50-300 PLASTIC BAG, INJECTION (ML) INTRAVENOUS
Status: DISCONTINUED | OUTPATIENT
Start: 2025-01-01 | End: 2025-01-01

## 2025-01-01 RX ORDER — ALBUMIN (HUMAN) 12.5 G/50ML
100 SOLUTION INTRAVENOUS ONCE
Status: DISCONTINUED | OUTPATIENT
Start: 2025-01-01 | End: 2025-01-01

## 2025-01-01 RX ORDER — NITROGLYCERIN 0.4 MG/1
0.4 TABLET SUBLINGUAL ONCE
Status: COMPLETED | OUTPATIENT
Start: 2025-01-01 | End: 2025-01-01

## 2025-01-01 RX ORDER — ATORVASTATIN CALCIUM 40 MG/1
80 TABLET, FILM COATED ORAL NIGHTLY
Status: DISCONTINUED | OUTPATIENT
Start: 2025-01-01 | End: 2025-01-01 | Stop reason: HOSPADM

## 2025-01-01 RX ORDER — MORPHINE SULFATE 2 MG/ML
1 INJECTION, SOLUTION INTRAMUSCULAR; INTRAVENOUS ONCE
Status: DISCONTINUED | OUTPATIENT
Start: 2025-01-01 | End: 2025-01-01 | Stop reason: HOSPADM

## 2025-01-01 RX ORDER — DIGOXIN 0.25 MG/ML
250 INJECTION INTRAMUSCULAR; INTRAVENOUS ONCE
Status: COMPLETED | OUTPATIENT
Start: 2025-01-01 | End: 2025-01-01

## 2025-01-01 RX ORDER — ALBUMIN (HUMAN) 12.5 G/50ML
87.5 SOLUTION INTRAVENOUS ONCE
Status: DISCONTINUED | OUTPATIENT
Start: 2025-01-01 | End: 2025-01-01

## 2025-01-01 RX ORDER — AMOXICILLIN 250 MG
2 CAPSULE ORAL 2 TIMES DAILY PRN
Status: DISCONTINUED | OUTPATIENT
Start: 2025-01-01 | End: 2025-01-01 | Stop reason: HOSPADM

## 2025-01-01 RX ORDER — ISOSORBIDE MONONITRATE 60 MG/1
60 TABLET, EXTENDED RELEASE ORAL DAILY
Status: DISCONTINUED | OUTPATIENT
Start: 2025-01-01 | End: 2025-01-01 | Stop reason: HOSPADM

## 2025-01-01 RX ORDER — SODIUM CHLORIDE 0.9 % (FLUSH) 0.9 %
10 SYRINGE (ML) INJECTION EVERY 12 HOURS SCHEDULED
Status: DISCONTINUED | OUTPATIENT
Start: 2025-01-01 | End: 2025-01-01

## 2025-01-01 RX ORDER — AMLODIPINE BESYLATE 5 MG/1
5 TABLET ORAL DAILY
Status: DISCONTINUED | OUTPATIENT
Start: 2025-01-01 | End: 2025-01-01 | Stop reason: HOSPADM

## 2025-01-01 RX ORDER — SODIUM CHLORIDE 0.9 % (FLUSH) 0.9 %
10 SYRINGE (ML) INJECTION AS NEEDED
Status: DISCONTINUED | OUTPATIENT
Start: 2025-01-01 | End: 2025-01-01 | Stop reason: HOSPADM

## 2025-01-01 RX ORDER — SODIUM CHLORIDE 0.9 % (FLUSH) 0.9 %
10 SYRINGE (ML) INJECTION EVERY 12 HOURS SCHEDULED
Status: DISCONTINUED | OUTPATIENT
Start: 2025-01-01 | End: 2025-01-01 | Stop reason: HOSPADM

## 2025-01-01 RX ORDER — ONDANSETRON 2 MG/ML
4 INJECTION INTRAMUSCULAR; INTRAVENOUS EVERY 6 HOURS PRN
Status: DISCONTINUED | OUTPATIENT
Start: 2025-01-01 | End: 2025-01-01 | Stop reason: HOSPADM

## 2025-01-01 RX ORDER — SODIUM CHLORIDE 9 MG/ML
40 INJECTION, SOLUTION INTRAVENOUS AS NEEDED
Status: DISCONTINUED | OUTPATIENT
Start: 2025-01-01 | End: 2025-01-01

## 2025-01-01 RX ORDER — FERROUS SULFATE 325(65) MG
325 TABLET ORAL 2 TIMES DAILY
Status: DISCONTINUED | OUTPATIENT
Start: 2025-01-01 | End: 2025-01-01 | Stop reason: HOSPADM

## 2025-01-01 RX ORDER — ALBUMIN (HUMAN) 12.5 G/50ML
62.5 SOLUTION INTRAVENOUS ONCE
Status: DISCONTINUED | OUTPATIENT
Start: 2025-01-01 | End: 2025-01-01

## 2025-01-01 RX ORDER — POTASSIUM CHLORIDE 7.45 MG/ML
10 INJECTION INTRAVENOUS
Status: COMPLETED | OUTPATIENT
Start: 2025-01-01 | End: 2025-01-01

## 2025-01-01 RX ORDER — MULTIVITAMIN WITH IRON
250 TABLET ORAL DAILY
Status: DISCONTINUED | OUTPATIENT
Start: 2025-01-01 | End: 2025-01-01 | Stop reason: HOSPADM

## 2025-01-01 RX ORDER — MIDODRINE HYDROCHLORIDE 5 MG/1
10 TABLET ORAL EVERY 8 HOURS SCHEDULED
Status: DISCONTINUED | OUTPATIENT
Start: 2025-01-01 | End: 2025-01-01 | Stop reason: HOSPADM

## 2025-01-01 RX ORDER — HYDROCORTISONE SODIUM SUCCINATE 100 MG/2ML
50 INJECTION INTRAMUSCULAR; INTRAVENOUS EVERY 6 HOURS
Status: DISCONTINUED | OUTPATIENT
Start: 2025-01-01 | End: 2025-01-01 | Stop reason: HOSPADM

## 2025-01-01 RX ORDER — CLOPIDOGREL BISULFATE 75 MG/1
75 TABLET ORAL DAILY
Status: DISCONTINUED | OUTPATIENT
Start: 2025-01-01 | End: 2025-01-01 | Stop reason: HOSPADM

## 2025-01-01 RX ADMIN — POTASSIUM CHLORIDE 10 MEQ: 7.46 INJECTION, SOLUTION INTRAVENOUS at 06:12

## 2025-01-01 RX ADMIN — GABAPENTIN 600 MG: 300 CAPSULE ORAL at 20:33

## 2025-01-01 RX ADMIN — METHYLPREDNISOLONE SODIUM SUCCINATE 40 MG: 40 INJECTION, POWDER, FOR SOLUTION INTRAMUSCULAR; INTRAVENOUS at 13:00

## 2025-01-01 RX ADMIN — BUDESONIDE 0.5 MG: 0.5 INHALANT RESPIRATORY (INHALATION) at 20:19

## 2025-01-01 RX ADMIN — HYDROCORTISONE SODIUM SUCCINATE 50 MG: 100 INJECTION, POWDER, FOR SOLUTION INTRAMUSCULAR; INTRAVENOUS at 10:21

## 2025-01-01 RX ADMIN — CEFEPIME 2000 MG: 2 INJECTION, POWDER, FOR SOLUTION INTRAVENOUS at 16:48

## 2025-01-01 RX ADMIN — CEFEPIME 2000 MG: 2 INJECTION, POWDER, FOR SOLUTION INTRAVENOUS at 14:00

## 2025-01-01 RX ADMIN — IPRATROPIUM BROMIDE AND ALBUTEROL SULFATE 3 ML: .5; 3 SOLUTION RESPIRATORY (INHALATION) at 11:20

## 2025-01-01 RX ADMIN — INSULIN LISPRO 8 UNITS: 100 INJECTION, SOLUTION INTRAVENOUS; SUBCUTANEOUS at 17:34

## 2025-01-01 RX ADMIN — Medication 250 MCG: at 08:51

## 2025-01-01 RX ADMIN — HYDROCORTISONE SODIUM SUCCINATE 50 MG: 100 INJECTION, POWDER, FOR SOLUTION INTRAMUSCULAR; INTRAVENOUS at 16:43

## 2025-01-01 RX ADMIN — INSULIN LISPRO 3 UNITS: 100 INJECTION, SOLUTION INTRAVENOUS; SUBCUTANEOUS at 15:18

## 2025-01-01 RX ADMIN — ATORVASTATIN CALCIUM 80 MG: 40 TABLET, FILM COATED ORAL at 21:07

## 2025-01-01 RX ADMIN — METHYLPREDNISOLONE SODIUM SUCCINATE 40 MG: 40 INJECTION, POWDER, FOR SOLUTION INTRAMUSCULAR; INTRAVENOUS at 21:07

## 2025-01-01 RX ADMIN — METOPROLOL TARTRATE 25 MG: 25 TABLET, FILM COATED ORAL at 12:01

## 2025-01-01 RX ADMIN — ATORVASTATIN CALCIUM 80 MG: 40 TABLET, FILM COATED ORAL at 20:33

## 2025-01-01 RX ADMIN — INSULIN LISPRO 8 UNITS: 100 INJECTION, SOLUTION INTRAVENOUS; SUBCUTANEOUS at 08:25

## 2025-01-01 RX ADMIN — IPRATROPIUM BROMIDE AND ALBUTEROL SULFATE 3 ML: .5; 3 SOLUTION RESPIRATORY (INHALATION) at 07:25

## 2025-01-01 RX ADMIN — IPRATROPIUM BROMIDE AND ALBUTEROL SULFATE 3 ML: .5; 3 SOLUTION RESPIRATORY (INHALATION) at 19:57

## 2025-01-01 RX ADMIN — SODIUM CHLORIDE 1000 ML: 9 INJECTION, SOLUTION INTRAVENOUS at 15:29

## 2025-01-01 RX ADMIN — INSULIN HUMAN 4 UNITS: 100 INJECTION, SOLUTION PARENTERAL at 11:22

## 2025-01-01 RX ADMIN — CEFEPIME 2000 MG: 2 INJECTION, POWDER, FOR SOLUTION INTRAVENOUS at 15:33

## 2025-01-01 RX ADMIN — METOPROLOL TARTRATE 25 MG: 25 TABLET, FILM COATED ORAL at 03:33

## 2025-01-01 RX ADMIN — CEFEPIME 2000 MG: 2 INJECTION, POWDER, FOR SOLUTION INTRAVENOUS at 02:59

## 2025-01-01 RX ADMIN — ATORVASTATIN CALCIUM 80 MG: 40 TABLET, FILM COATED ORAL at 21:21

## 2025-01-01 RX ADMIN — BUDESONIDE 0.5 MG: 0.5 INHALANT RESPIRATORY (INHALATION) at 06:43

## 2025-01-01 RX ADMIN — METHYLPREDNISOLONE SODIUM SUCCINATE 40 MG: 40 INJECTION, POWDER, FOR SOLUTION INTRAMUSCULAR; INTRAVENOUS at 11:22

## 2025-01-01 RX ADMIN — INSULIN LISPRO 3 UNITS: 100 INJECTION, SOLUTION INTRAVENOUS; SUBCUTANEOUS at 17:34

## 2025-01-01 RX ADMIN — Medication 10 ML: at 21:46

## 2025-01-01 RX ADMIN — Medication 10 ML: at 21:22

## 2025-01-01 RX ADMIN — GABAPENTIN 600 MG: 300 CAPSULE ORAL at 13:57

## 2025-01-01 RX ADMIN — INSULIN HUMAN 60 UNITS: 500 INJECTION, SOLUTION SUBCUTANEOUS at 08:27

## 2025-01-01 RX ADMIN — IPRATROPIUM BROMIDE AND ALBUTEROL SULFATE 3 ML: .5; 3 SOLUTION RESPIRATORY (INHALATION) at 15:45

## 2025-01-01 RX ADMIN — Medication 10 ML: at 20:34

## 2025-01-01 RX ADMIN — INSULIN LISPRO 8 UNITS: 100 INJECTION, SOLUTION INTRAVENOUS; SUBCUTANEOUS at 11:22

## 2025-01-01 RX ADMIN — BUDESONIDE 0.5 MG: 0.5 INHALANT RESPIRATORY (INHALATION) at 08:13

## 2025-01-01 RX ADMIN — Medication 10 ML: at 21:30

## 2025-01-01 RX ADMIN — GABAPENTIN 600 MG: 300 CAPSULE ORAL at 06:26

## 2025-01-01 RX ADMIN — PANTOPRAZOLE SODIUM 40 MG: 40 INJECTION, POWDER, FOR SOLUTION INTRAVENOUS at 05:04

## 2025-01-01 RX ADMIN — ISOSORBIDE MONONITRATE 30 MG: 30 TABLET, EXTENDED RELEASE ORAL at 08:26

## 2025-01-01 RX ADMIN — IPRATROPIUM BROMIDE AND ALBUTEROL SULFATE 3 ML: .5; 3 SOLUTION RESPIRATORY (INHALATION) at 12:07

## 2025-01-01 RX ADMIN — Medication 325 MG: at 21:25

## 2025-01-01 RX ADMIN — BUDESONIDE 0.5 MG: 0.5 INHALANT RESPIRATORY (INHALATION) at 08:49

## 2025-01-01 RX ADMIN — GABAPENTIN 600 MG: 300 CAPSULE ORAL at 06:12

## 2025-01-01 RX ADMIN — Medication 10 ML: at 08:30

## 2025-01-01 RX ADMIN — FENOFIBRATE 48 MG: 48 TABLET ORAL at 08:28

## 2025-01-01 RX ADMIN — METOPROLOL TARTRATE 25 MG: 25 TABLET, FILM COATED ORAL at 05:05

## 2025-01-01 RX ADMIN — HYDROCORTISONE SODIUM SUCCINATE 50 MG: 100 INJECTION, POWDER, FOR SOLUTION INTRAMUSCULAR; INTRAVENOUS at 22:44

## 2025-01-01 RX ADMIN — AMLODIPINE BESYLATE 5 MG: 5 TABLET ORAL at 08:26

## 2025-01-01 RX ADMIN — IPRATROPIUM BROMIDE AND ALBUTEROL SULFATE 3 ML: .5; 3 SOLUTION RESPIRATORY (INHALATION) at 23:58

## 2025-01-01 RX ADMIN — METHYLPREDNISOLONE SODIUM SUCCINATE 40 MG: 40 INJECTION, POWDER, FOR SOLUTION INTRAMUSCULAR; INTRAVENOUS at 03:32

## 2025-01-01 RX ADMIN — IPRATROPIUM BROMIDE AND ALBUTEROL SULFATE 3 ML: .5; 3 SOLUTION RESPIRATORY (INHALATION) at 12:06

## 2025-01-01 RX ADMIN — LEVOTHYROXINE SODIUM 175 MCG: 0.03 TABLET ORAL at 05:04

## 2025-01-01 RX ADMIN — IPRATROPIUM BROMIDE AND ALBUTEROL SULFATE 3 ML: .5; 3 SOLUTION RESPIRATORY (INHALATION) at 08:13

## 2025-01-01 RX ADMIN — DIGOXIN 250 MCG: 0.25 INJECTION INTRAMUSCULAR; INTRAVENOUS at 11:35

## 2025-01-01 RX ADMIN — METHYLPREDNISOLONE SODIUM SUCCINATE 125 MG: 125 INJECTION, POWDER, FOR SOLUTION INTRAMUSCULAR; INTRAVENOUS at 15:14

## 2025-01-01 RX ADMIN — NITROGLYCERIN 0.4 MG: 0.4 TABLET SUBLINGUAL at 15:18

## 2025-01-01 RX ADMIN — AMIODARONE HYDROCHLORIDE 150 MG: 1.5 INJECTION, SOLUTION INTRAVENOUS at 06:32

## 2025-01-01 RX ADMIN — INSULIN HUMAN 60 UNITS: 500 INJECTION, SOLUTION SUBCUTANEOUS at 11:22

## 2025-01-01 RX ADMIN — GABAPENTIN 600 MG: 300 CAPSULE ORAL at 21:25

## 2025-01-01 RX ADMIN — Medication 325 MG: at 21:21

## 2025-01-01 RX ADMIN — HYDROCORTISONE SODIUM SUCCINATE 50 MG: 100 INJECTION, POWDER, FOR SOLUTION INTRAMUSCULAR; INTRAVENOUS at 05:05

## 2025-01-01 RX ADMIN — IPRATROPIUM BROMIDE AND ALBUTEROL SULFATE 3 ML: .5; 3 SOLUTION RESPIRATORY (INHALATION) at 08:49

## 2025-01-01 RX ADMIN — PANTOPRAZOLE SODIUM 40 MG: 40 TABLET, DELAYED RELEASE ORAL at 05:47

## 2025-01-01 RX ADMIN — Medication 2000 MG: at 18:15

## 2025-01-01 RX ADMIN — PHYTONADIONE 2.5 MG: 10 INJECTION, EMULSION INTRAMUSCULAR; INTRAVENOUS; SUBCUTANEOUS at 08:51

## 2025-01-01 RX ADMIN — ISOSORBIDE MONONITRATE 60 MG: 60 TABLET, EXTENDED RELEASE ORAL at 08:51

## 2025-01-01 RX ADMIN — IOPAMIDOL 100 ML: 755 INJECTION, SOLUTION INTRAVENOUS at 16:45

## 2025-01-01 RX ADMIN — SODIUM CHLORIDE, POTASSIUM CHLORIDE, SODIUM LACTATE AND CALCIUM CHLORIDE 1000 ML: 600; 310; 30; 20 INJECTION, SOLUTION INTRAVENOUS at 16:48

## 2025-01-01 RX ADMIN — CEFEPIME 2000 MG: 2 INJECTION, POWDER, FOR SOLUTION INTRAVENOUS at 03:22

## 2025-01-01 RX ADMIN — HYDROCORTISONE SODIUM SUCCINATE 50 MG: 100 INJECTION, POWDER, FOR SOLUTION INTRAMUSCULAR; INTRAVENOUS at 06:12

## 2025-01-01 RX ADMIN — IPRATROPIUM BROMIDE AND ALBUTEROL SULFATE 3 ML: .5; 3 SOLUTION RESPIRATORY (INHALATION) at 03:05

## 2025-01-01 RX ADMIN — MIDODRINE HYDROCHLORIDE 10 MG: 5 TABLET ORAL at 05:04

## 2025-01-01 RX ADMIN — FENOFIBRATE 48 MG: 48 TABLET ORAL at 08:51

## 2025-01-01 RX ADMIN — Medication 250 MCG: at 08:28

## 2025-01-01 RX ADMIN — ISOSORBIDE MONONITRATE 30 MG: 30 TABLET, EXTENDED RELEASE ORAL at 08:28

## 2025-01-01 RX ADMIN — BUDESONIDE 0.5 MG: 0.5 INHALANT RESPIRATORY (INHALATION) at 08:16

## 2025-01-01 RX ADMIN — INSULIN HUMAN 8 UNITS: 100 INJECTION, SOLUTION PARENTERAL at 05:04

## 2025-01-01 RX ADMIN — CLOPIDOGREL BISULFATE 75 MG: 75 TABLET ORAL at 08:51

## 2025-01-01 RX ADMIN — SULFUR HEXAFLUORIDE 3 ML: KIT at 01:22

## 2025-01-01 RX ADMIN — Medication 325 MG: at 08:51

## 2025-01-01 RX ADMIN — INSULIN LISPRO 12 UNITS: 100 INJECTION, SOLUTION INTRAVENOUS; SUBCUTANEOUS at 12:59

## 2025-01-01 RX ADMIN — INSULIN LISPRO 5 UNITS: 100 INJECTION, SOLUTION INTRAVENOUS; SUBCUTANEOUS at 21:25

## 2025-01-01 RX ADMIN — CEFEPIME 2000 MG: 2 INJECTION, POWDER, FOR SOLUTION INTRAVENOUS at 15:18

## 2025-01-01 RX ADMIN — BUMETANIDE 1 MG: 0.25 INJECTION INTRAMUSCULAR; INTRAVENOUS at 08:28

## 2025-01-01 RX ADMIN — LEVOTHYROXINE SODIUM 175 MCG: 0.03 TABLET ORAL at 06:12

## 2025-01-01 RX ADMIN — LEVOTHYROXINE SODIUM 175 MCG: 0.03 TABLET ORAL at 05:47

## 2025-01-01 RX ADMIN — INSULIN GLARGINE 30 UNITS: 100 INJECTION, SOLUTION SUBCUTANEOUS at 08:24

## 2025-01-01 RX ADMIN — Medication 10 ML: at 08:51

## 2025-01-01 RX ADMIN — IPRATROPIUM BROMIDE AND ALBUTEROL SULFATE 3 ML: .5; 3 SOLUTION RESPIRATORY (INHALATION) at 23:39

## 2025-01-01 RX ADMIN — IPRATROPIUM BROMIDE AND ALBUTEROL SULFATE 3 ML: .5; 3 SOLUTION RESPIRATORY (INHALATION) at 18:45

## 2025-01-01 RX ADMIN — POTASSIUM CHLORIDE 10 MEQ: 7.46 INJECTION, SOLUTION INTRAVENOUS at 08:50

## 2025-01-01 RX ADMIN — HYDROCORTISONE SODIUM SUCCINATE 50 MG: 100 INJECTION, POWDER, FOR SOLUTION INTRAMUSCULAR; INTRAVENOUS at 17:33

## 2025-01-01 RX ADMIN — Medication 1500 MG: at 17:31

## 2025-01-01 RX ADMIN — IPRATROPIUM BROMIDE AND ALBUTEROL SULFATE 3 ML: .5; 3 SOLUTION RESPIRATORY (INHALATION) at 14:10

## 2025-01-01 RX ADMIN — BUDESONIDE 0.5 MG: 0.5 INHALANT RESPIRATORY (INHALATION) at 19:30

## 2025-01-01 RX ADMIN — IPRATROPIUM BROMIDE AND ALBUTEROL SULFATE 3 ML: .5; 3 SOLUTION RESPIRATORY (INHALATION) at 11:15

## 2025-01-01 RX ADMIN — INSULIN LISPRO 10 UNITS: 100 INJECTION, SOLUTION INTRAVENOUS; SUBCUTANEOUS at 06:08

## 2025-01-01 RX ADMIN — GABAPENTIN 600 MG: 300 CAPSULE ORAL at 05:05

## 2025-01-01 RX ADMIN — INSULIN HUMAN 4.4 UNITS/HR: 1 INJECTION, SOLUTION INTRAVENOUS at 17:09

## 2025-01-01 RX ADMIN — BUDESONIDE 0.5 MG: 0.5 INHALANT RESPIRATORY (INHALATION) at 07:30

## 2025-01-01 RX ADMIN — Medication 10 ML: at 08:27

## 2025-01-01 RX ADMIN — CEFEPIME 2000 MG: 2 INJECTION, POWDER, FOR SOLUTION INTRAVENOUS at 14:25

## 2025-01-01 RX ADMIN — Medication 325 MG: at 08:26

## 2025-01-01 RX ADMIN — Medication 10 ML: at 14:40

## 2025-01-01 RX ADMIN — ATORVASTATIN CALCIUM 80 MG: 40 TABLET, FILM COATED ORAL at 21:25

## 2025-01-01 RX ADMIN — CLOPIDOGREL BISULFATE 75 MG: 75 TABLET ORAL at 08:26

## 2025-01-01 RX ADMIN — HYDROCORTISONE SODIUM SUCCINATE 50 MG: 100 INJECTION, POWDER, FOR SOLUTION INTRAMUSCULAR; INTRAVENOUS at 14:39

## 2025-01-01 RX ADMIN — PANTOPRAZOLE SODIUM 40 MG: 40 TABLET, DELAYED RELEASE ORAL at 06:12

## 2025-01-01 RX ADMIN — PANTOPRAZOLE SODIUM 40 MG: 40 TABLET, DELAYED RELEASE ORAL at 10:49

## 2025-01-01 RX ADMIN — AMIODARONE HYDROCHLORIDE 0.5 MG/MIN: 1.8 INJECTION, SOLUTION INTRAVENOUS at 19:42

## 2025-01-01 RX ADMIN — IPRATROPIUM BROMIDE AND ALBUTEROL SULFATE 3 ML: .5; 3 SOLUTION RESPIRATORY (INHALATION) at 19:30

## 2025-01-01 RX ADMIN — BUDESONIDE 0.5 MG: 0.5 INHALANT RESPIRATORY (INHALATION) at 18:45

## 2025-01-01 RX ADMIN — IPRATROPIUM BROMIDE AND ALBUTEROL SULFATE 3 ML: .5; 3 SOLUTION RESPIRATORY (INHALATION) at 23:17

## 2025-01-01 RX ADMIN — IPRATROPIUM BROMIDE AND ALBUTEROL SULFATE 3 ML: .5; 3 SOLUTION RESPIRATORY (INHALATION) at 03:56

## 2025-01-01 RX ADMIN — Medication 1750 MG: at 19:42

## 2025-01-01 RX ADMIN — ENOXAPARIN SODIUM 120 MG: 150 INJECTION SUBCUTANEOUS at 17:34

## 2025-01-01 RX ADMIN — HYDROCORTISONE SODIUM SUCCINATE 50 MG: 100 INJECTION, POWDER, FOR SOLUTION INTRAMUSCULAR; INTRAVENOUS at 23:40

## 2025-01-01 RX ADMIN — INSULIN LISPRO 10 UNITS: 100 INJECTION, SOLUTION INTRAVENOUS; SUBCUTANEOUS at 17:03

## 2025-01-01 RX ADMIN — METOPROLOL TARTRATE 25 MG: 25 TABLET, FILM COATED ORAL at 19:47

## 2025-01-01 RX ADMIN — GABAPENTIN 600 MG: 300 CAPSULE ORAL at 21:06

## 2025-01-01 RX ADMIN — IPRATROPIUM BROMIDE AND ALBUTEROL SULFATE 3 ML: .5; 3 SOLUTION RESPIRATORY (INHALATION) at 20:19

## 2025-01-01 RX ADMIN — AMIODARONE HYDROCHLORIDE 0.5 MG/MIN: 1.8 INJECTION, SOLUTION INTRAVENOUS at 06:47

## 2025-01-01 RX ADMIN — Medication 10 ML: at 01:04

## 2025-01-01 RX ADMIN — CEFEPIME 2000 MG: 2 INJECTION, POWDER, FOR SOLUTION INTRAVENOUS at 02:52

## 2025-01-01 RX ADMIN — IPRATROPIUM BROMIDE AND ALBUTEROL SULFATE 3 ML: .5; 3 SOLUTION RESPIRATORY (INHALATION) at 03:28

## 2025-01-01 RX ADMIN — PANTOPRAZOLE SODIUM 40 MG: 40 TABLET, DELAYED RELEASE ORAL at 05:05

## 2025-01-01 RX ADMIN — IPRATROPIUM BROMIDE AND ALBUTEROL SULFATE 3 ML: .5; 3 SOLUTION RESPIRATORY (INHALATION) at 06:39

## 2025-01-01 RX ADMIN — INSULIN LISPRO 12 UNITS: 100 INJECTION, SOLUTION INTRAVENOUS; SUBCUTANEOUS at 01:05

## 2025-01-01 RX ADMIN — BUMETANIDE 1 MG: 0.25 INJECTION INTRAMUSCULAR; INTRAVENOUS at 08:25

## 2025-01-01 RX ADMIN — Medication 325 MG: at 08:28

## 2025-01-01 RX ADMIN — Medication 325 MG: at 20:32

## 2025-01-01 RX ADMIN — INSULIN LISPRO 10 UNITS: 100 INJECTION, SOLUTION INTRAVENOUS; SUBCUTANEOUS at 20:32

## 2025-01-01 RX ADMIN — INSULIN GLARGINE 5 UNITS: 100 INJECTION, SOLUTION SUBCUTANEOUS at 10:49

## 2025-01-01 RX ADMIN — EMPAGLIFLOZIN 25 MG: 25 TABLET, FILM COATED ORAL at 08:26

## 2025-01-01 RX ADMIN — Medication 5 MG: at 21:06

## 2025-01-01 RX ADMIN — INSULIN HUMAN 4 UNITS: 100 INJECTION, SOLUTION PARENTERAL at 21:06

## 2025-01-01 RX ADMIN — AMIODARONE HYDROCHLORIDE 1 MG/MIN: 1.8 INJECTION, SOLUTION INTRAVENOUS at 19:10

## 2025-01-01 RX ADMIN — METHYLPREDNISOLONE SODIUM SUCCINATE 40 MG: 40 INJECTION, POWDER, FOR SOLUTION INTRAMUSCULAR; INTRAVENOUS at 03:22

## 2025-01-01 RX ADMIN — IPRATROPIUM BROMIDE AND ALBUTEROL SULFATE 3 ML: .5; 3 SOLUTION RESPIRATORY (INHALATION) at 18:53

## 2025-01-01 RX ADMIN — CLOPIDOGREL BISULFATE 75 MG: 75 TABLET ORAL at 08:28

## 2025-01-01 RX ADMIN — IPRATROPIUM BROMIDE AND ALBUTEROL SULFATE 3 ML: .5; 3 SOLUTION RESPIRATORY (INHALATION) at 03:06

## 2025-01-01 RX ADMIN — INSULIN LISPRO 5 UNITS: 100 INJECTION, SOLUTION INTRAVENOUS; SUBCUTANEOUS at 01:00

## 2025-01-01 RX ADMIN — PHYTONADIONE 2.5 MG: 10 INJECTION, EMULSION INTRAMUSCULAR; INTRAVENOUS; SUBCUTANEOUS at 17:26

## 2025-01-01 RX ADMIN — AMLODIPINE BESYLATE 5 MG: 5 TABLET ORAL at 08:51

## 2025-01-01 RX ADMIN — INSULIN HUMAN 60 UNITS: 500 INJECTION, SOLUTION SUBCUTANEOUS at 17:31

## 2025-01-01 RX ADMIN — IPRATROPIUM BROMIDE AND ALBUTEROL SULFATE 3 ML: .5; 3 SOLUTION RESPIRATORY (INHALATION) at 23:00

## 2025-01-01 RX ADMIN — INSULIN LISPRO 3 UNITS: 100 INJECTION, SOLUTION INTRAVENOUS; SUBCUTANEOUS at 06:26

## 2025-01-01 RX ADMIN — INSULIN HUMAN 60 UNITS: 500 INJECTION, SOLUTION SUBCUTANEOUS at 18:23

## 2025-01-01 RX ADMIN — IPRATROPIUM BROMIDE AND ALBUTEROL SULFATE 3 ML: .5; 3 SOLUTION RESPIRATORY (INHALATION) at 23:04

## 2025-01-01 RX ADMIN — MIDODRINE HYDROCHLORIDE 10 MG: 5 TABLET ORAL at 20:33

## 2025-01-01 RX ADMIN — HYDROCORTISONE SODIUM SUCCINATE 50 MG: 100 INJECTION, POWDER, FOR SOLUTION INTRAMUSCULAR; INTRAVENOUS at 22:30

## 2025-01-01 RX ADMIN — BUMETANIDE 1 MG: 0.25 INJECTION INTRAMUSCULAR; INTRAVENOUS at 12:59

## 2025-01-01 RX ADMIN — GABAPENTIN 600 MG: 300 CAPSULE ORAL at 21:21

## 2025-01-01 RX ADMIN — CEFEPIME 2000 MG: 2 INJECTION, POWDER, FOR SOLUTION INTRAVENOUS at 03:32

## 2025-01-01 RX ADMIN — BUMETANIDE 1 MG/HR: 0.25 INJECTION INTRAMUSCULAR; INTRAVENOUS at 11:22

## 2025-01-01 RX ADMIN — ATENOLOL 50 MG: 50 TABLET ORAL at 08:26

## 2025-01-01 RX ADMIN — EMPAGLIFLOZIN 25 MG: 25 TABLET, FILM COATED ORAL at 08:28

## 2025-01-01 RX ADMIN — HYDROCORTISONE SODIUM SUCCINATE 50 MG: 100 INJECTION, POWDER, FOR SOLUTION INTRAMUSCULAR; INTRAVENOUS at 16:23

## 2025-01-01 RX ADMIN — POTASSIUM CHLORIDE 10 MEQ: 7.46 INJECTION, SOLUTION INTRAVENOUS at 11:29

## 2025-01-01 RX ADMIN — FENOFIBRATE 48 MG: 48 TABLET ORAL at 08:45

## 2025-01-01 RX ADMIN — CEFEPIME 2000 MG: 2 INJECTION, POWDER, FOR SOLUTION INTRAVENOUS at 03:33

## 2025-01-01 RX ADMIN — GABAPENTIN 600 MG: 300 CAPSULE ORAL at 05:47

## 2025-01-01 RX ADMIN — AMIODARONE HYDROCHLORIDE 1 MG/MIN: 1.8 INJECTION, SOLUTION INTRAVENOUS at 06:43

## 2025-01-01 RX ADMIN — IPRATROPIUM BROMIDE AND ALBUTEROL SULFATE 3 ML: .5; 3 SOLUTION RESPIRATORY (INHALATION) at 08:15

## 2025-01-01 RX ADMIN — HYDROCORTISONE SODIUM SUCCINATE 50 MG: 100 INJECTION, POWDER, FOR SOLUTION INTRAMUSCULAR; INTRAVENOUS at 05:04

## 2025-01-01 RX ADMIN — IPRATROPIUM BROMIDE AND ALBUTEROL SULFATE 3 ML: .5; 3 SOLUTION RESPIRATORY (INHALATION) at 15:43

## 2025-01-01 RX ADMIN — Medication 250 MCG: at 08:25

## 2025-01-01 RX ADMIN — BUDESONIDE 0.5 MG: 0.5 INHALANT RESPIRATORY (INHALATION) at 18:53

## 2025-01-01 RX ADMIN — POTASSIUM CHLORIDE 10 MEQ: 7.46 INJECTION, SOLUTION INTRAVENOUS at 10:17

## 2025-01-01 RX ADMIN — BUMETANIDE 1 MG: 0.25 INJECTION INTRAMUSCULAR; INTRAVENOUS at 17:03

## 2025-01-01 RX ADMIN — BUMETANIDE 1 MG: 0.25 INJECTION INTRAMUSCULAR; INTRAVENOUS at 03:31

## 2025-01-01 RX ADMIN — AMIODARONE HYDROCHLORIDE 1 MG/MIN: 1.8 INJECTION, SOLUTION INTRAVENOUS at 14:25

## 2025-01-01 RX ADMIN — Medication 325 MG: at 21:07

## 2025-01-01 RX ADMIN — INSULIN HUMAN 8 UNITS: 100 INJECTION, SOLUTION PARENTERAL at 22:44

## 2025-01-01 RX ADMIN — ATENOLOL 50 MG: 50 TABLET ORAL at 08:51

## 2025-01-01 RX ADMIN — LEVOTHYROXINE SODIUM 175 MCG: 0.03 TABLET ORAL at 06:26

## 2025-01-01 RX ADMIN — GABAPENTIN 600 MG: 300 CAPSULE ORAL at 14:25

## 2025-01-02 ENCOUNTER — TELEPHONE (OUTPATIENT)
Dept: ENDOCRINOLOGY | Facility: CLINIC | Age: 85
End: 2025-01-02
Payer: MEDICARE

## 2025-01-06 NOTE — PROGRESS NOTES
12/31/2024  Foot and Ankle Surgery - Established Patient/Follow-up  Provider: LUCAS Ramos   Location: [facility]    Subjective:  Dalton Dallas Sr. is a 84 y.o. male.     Chief Complaint   Patient presents with    Left Foot - Follow-up     Routine DFC    Right Foot - Follow-up     Routine DFC    Follow-up     Joan Johns APRN PCP - 12/9/24       History of Present Illness  The patient is an 84-year-old male who presents for an established patient exam.    He reports a decline in his knee health, necessitating the use of a wheelchair due to mobility issues. He has sought consultation at an arthritis center and was referred to Dr. Roe, who unfortunately could not provide any further treatment options.    He experiences edema in his legs, which typically subsides during the night. His leg has been evaluated on several occasions, with the most recent assessment indicating satisfactory blood flow.    He also reports symptoms of neuropathy, including burning and tingling sensations in his feet. He does not go barefoot.    His blood glucose levels have been well-controlled.       No Known Allergies    Current Outpatient Medications on File Prior to Visit   Medication Sig Dispense Refill    Accu-Chek Irene Plus test strip USE TO CHECK BLOOD SUGAR BEFORE MEALS AND AT BEDTIME 400 each 3    amLODIPine (NORVASC) 5 MG tablet Take 1 tablet by mouth Daily. 90 tablet 3    atenolol (TENORMIN) 50 MG tablet TAKE 1 TABLET BY MOUTH DAILY 90 tablet 3    atorvastatin (LIPITOR) 80 MG tablet Take 1 tablet by mouth Every Night. Indications: High Amount of Fats in the Blood 90 tablet 3    Blood Glucose Monitoring Suppl (Accu-Chek Irene Plus) w/Device kit 1 each 4 (Four) Times a Day. 1 kit 0    clopidogrel (PLAVIX) 75 MG tablet Take 1 tablet by mouth Daily. 90 tablet 3    fenofibrate (TRICOR) 54 MG tablet TAKE 1 TABLET BY MOUTH DAILY FOR HIGH AMOUNT OF TRIGLYCERIDES IN  THE BLOOD 90 tablet 3    ferrous sulfate  "325 (65 FE) MG tablet Take 1 tablet by mouth 2 (Two) Times a Day.      gabapentin (NEURONTIN) 600 MG tablet TAKE 1 TABLET BY MOUTH 3 TIMES  DAILY 270 tablet 3    hydroCHLOROthiazide 25 MG tablet Take 1 tablet by mouth Daily. 90 tablet 3    Ibuprofen 3 %, Gabapentin 10 %, Baclofen 2 %, lidocaine 4 % Apply 1-2 g topically to the appropriate area as directed 3 (Three) to 4 (Four) times daily. 90 g 3    Insulin Pen Needle (BD ULTRA-FINE PEN NEEDLES) 29G X 12.7MM misc Use to inject insulin twice daily. DX E11.65 200 each 2    insulin regular (HUMULIN R) 500 UNIT/ML CONCENTRATED injection DRAW TO 30 UNIT REI ON U-100 SYRINGE AND INJECT IN THE MORNING AND 40 UNIT REI IN THE EVENING Indications: Type 2 Diabetes 60 mL 3    Insulin Syringe-Needle U-100 (BD Insulin Syringe U/F) 31G X 5/16\" 0.5 ML misc 1 each by Other route 2 (Two) Times a Day. 200 each 3    Insulin Syringe-Needle U-100 30G X 1/2\" 0.5 ML misc 1 each by Other route 2 (Two) Times a Day. 200 each 12    irbesartan (AVAPRO) 300 MG tablet TAKE 1 TABLET BY MOUTH DAILY 90 tablet 3    isosorbide mononitrate (IMDUR) 30 MG 24 hr tablet Take 1 tablet by mouth Daily. 90 tablet 3    Jardiance 25 MG tablet tablet TAKE 1 TABLET BY MOUTH DAILY 90 tablet 3    levothyroxine (SYNTHROID, LEVOTHROID) 175 MCG tablet TAKE 1 TABLET BY MOUTH DAILY FOR UNDERACTIVE THYROID 90 tablet 3    metFORMIN ER (GLUCOPHAGE-XR) 500 MG 24 hr tablet TAKE 4 TABLETS BY MOUTH AT  SUPPER FOR TYPE 2 DIABETES 360 tablet 3    Methylcobalamin (B12-ACTIVE PO) Take  by mouth.      O2 (OXYGEN) Inhale 1 L/min Every Night. Indications: marianne      Omega-3 Fatty Acids (FISH OIL) 1000 MG capsule capsule Take 1 capsule by mouth 2 (Two) Times a Day.      Pramlintide Acetate (SymlinPen 120) 2700 MCG/2.7ML solution pen-injector INJECT 120 MCG SUBCUTANEOUSLY  INTO THE APPROPRIATE AREA AS  DIRECTED 3 TIMES DAILY 10.8 mL 11    urea (CARMOL) 40 % cream Apply 1 application  topically to the appropriate area as directed 2 (Two) " "Times a Day. Apply twice daily to calluses. Add vitamin D 5,000 units 85 each 3    vitamin B-12 (CYANOCOBALAMIN) 100 MCG tablet Take 1 tablet by mouth Daily. Indications: Inadequate Vitamin B12      vitamin D (ERGOCALCIFEROL) 1.25 MG (66522 UT) capsule capsule TAKE 1 CAPSULE BY MOUTH ONCE  WEEKLY FOR VITAMIN D DEFICIENCY 13 capsule 3    warfarin (COUMADIN) 2 MG tablet TAKE 1 TABLET BY MOUTH DAILY OR  AS DIRECTED BASED ON INR (Patient taking differently: Take 1 tablet by mouth As Needed. TAKE 1 TABLET BY MOUTH DAILY OR  AS DIRECTED BASED ON INR Tuesday and Thursdays only) 90 tablet 3    warfarin (COUMADIN) 3 MG tablet Take 1 tablet by mouth daily or as directed by medication management clinic.  Indications: Atrial Fibrillation 90 tablet 1    warfarin (COUMADIN) 5 MG tablet 7 mg (5 mg x 1 and 1 mg x 2) every Tue, Thu; 8 mg (5 mg x 1 and 1 mg x 3) all other days 90 tablet 3     No current facility-administered medications on file prior to visit.       Objective   Pulse 74   Ht 177.8 cm (70\")   Wt 130 kg (287 lb)   SpO2 95%   BMI 41.18 kg/m²     Foot/Ankle Exam    GENERAL  Diabetic foot exam performed    Appearance:  appears stated age and elderly  Orientation:  AAOx3  Affect:  appropriate  Gait:  unimpaired  Assistance:  wheelchair  Right shoe gear: casual shoe  Left shoe gear: casual shoe     VASCULAR      Right Foot Vascularity   Dorsalis pedis:  1+  Skin temperature:  warm  Edema grading:  Trace  CFT:  < 3 seconds  Pedal hair growth:  Absent  Varicosities:  mild varicosities      Left Foot Vascularity   Dorsalis pedis:  1+  Skin temperature:  warm  Edema grading:  Trace  CFT:  < 3 seconds  Pedal hair growth:  Absent  Varicosities:  mild varicosities     NEUROLOGIC      Right Foot Neurologic   Light touch sensation: diminished  Protective Sensation using [place] Monofilament:   Sites tested: 10      Left Foot Neurologic   Light touch sensation: diminished  Protective Sensation using [place] " Monofilament:   Sites tested: 10     MUSCULOSKELETAL      Right Foot Musculoskeletal   Ecchymosis:  none  Tenderness:  toenail problem        Left Foot Musculoskeletal   Ecchymosis:  none  Tenderness:  toenail problem     DERMATOLOGIC       Right Foot Dermatologic   Skin  Positive for dryness and skin changes.   Nails  1.  Positive for elongated, onychomycosis, abnormal thickness and dystrophic nail.  2.  Positive for elongated, onychomycosis, abnormal thickness and dystrophic nail.  3.  Positive for abnormal thickness and dystrophic nail.  4.  Positive for abnormal thickness.  5.  Positive for elongated, onychomycosis, abnormal thickness and dystrophic nail.      Left Foot Dermatologic   Skin  Positive for corn, dryness and skin changes.   Nails  1.  Positive for onychomycosis, abnormal thickness and dystrophic nail.  2.  Positive for elongated, onychomycosis, abnormal thickness and dystrophic nail.  3.  Positive for abnormal thickness and dystrophic nail.  4.  Positive for abnormally thick.  5.  Positive for elongated, onychomycosis, abnormally thick and dystrophic nail.  Physical Exam         Results       Assessment & Plan   Diagnoses and all orders for this visit:    1. 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 Diabetic peripheral neuropathy associated with type 2 diabetes mellitus    3. Pain in toes of both feet    4. Unsteadiness on feet    5. Onychodystrophy    6. Venous insufficiency      Assessment & Plan  On exam bilateral feet are stable. No open wounds or s/s of infection or inflammation. Pt has baseline LE edema. Discussed pathophysiology of venous insufficiency. Recommend leg elevation and light compression. Pt is at moderate risk for pedal complications related to diabetes. Explained importance of diabetic foot care, daily foot checks, and glycemic control. Patient should check both feet on a daily basis, monitor and control blood sugars, make sure that both feet and in between  toes are towel dried after baths or showers. Avoid barefoot walking at all times.  I discussed wearing white socks and checking shoes before wearing them. Patient was given information on proper foot care. Call the office at the first signs of a wound or with signs of infection.     Nail debridement: Both feet x10    Consent and time out was performed before proceeding with the procedure. Nails were debrided with a nail nipper without complication.  No anesthesia was required.  Indications for procedure were thickened, dystrophic, and fungal appearing nails which are difficult to trim.  Proper self-care and technique was discussed with patient.  Patient was stable after procedure.     Follow-up  The patient will follow up in 6 months.       No orders of the defined types were placed in this encounter.         Patient or patient representative verbalized consent for the use of Ambient Listening during the visit with  LUCAS Villa for chart documentation. 1/6/2025  16:50 EST

## 2025-01-07 ENCOUNTER — ANTICOAGULATION VISIT (OUTPATIENT)
Dept: ONCOLOGY | Facility: HOSPITAL | Age: 85
End: 2025-01-07
Payer: MEDICARE

## 2025-01-07 DIAGNOSIS — I82.513 CHRONIC DEEP VEIN THROMBOSIS (DVT) OF FEMORAL VEIN OF BOTH LOWER EXTREMITIES: Primary | Chronic | ICD-10-CM

## 2025-01-07 DIAGNOSIS — D68.59 THROMBOPHILIA: Chronic | ICD-10-CM

## 2025-01-07 LAB — INR PPP: 2.2

## 2025-01-07 NOTE — PROGRESS NOTES
Anticoagulation Clinic Progress Note    Patient's visit was held by phone today.    Anticoagulation Summary  As of 2025      INR goal:  2.0-3.0   TTR:  87.1% (5.4 y)   INR used for dosin.20 (2025)   Warfarin maintenance plan:  7 mg (5 mg x 1 and 1 mg x 2) every Tue, Thu; 8 mg (5 mg x 1 and 1 mg x 3) all other days   Weekly warfarin total:  54 mg   No change documented:  Karen Oates RPH   Plan last modified:  Karen Oates RPH (2024)   Next INR check:  2025   Priority:  Maintenance   Target end date:  Indefinite    Indications    Deep vein thrombosis (DVT) [I82.409]  Thrombophilia [D68.59]                 Anticoagulation Episode Summary       INR check location:  --    Preferred lab:  --    Send INR reminders to:   [facility] ONC CBC ANTICOAG POOL    Comments:  MDINR Home maninder          Anticoagulation Care Providers       Provider Role Specialty Phone number    Soraya Cortez MD Referring Hematology and Oncology [phone]            Drug interactions: has remained unchanged.  Diet: has remained unchanged.    Clinic Interview:  No pertinent clinical findings have been reported.    INR History:      2024     2:11 PM 2024    12:00 AM 2024     7:59 AM 2024    12:00 AM 2024     3:34 PM 2025    12:00 AM 2025    10:24 AM   Anticoagulation Monitoring   INR 2.70  3.60  2.80  2.20   INR Date 2024   INR Goal 2.0-3.0  2.0-3.0  2.0-3.0  2.0-3.0   Trend Same  Same  Same  Same   Last Week Total 54 mg  54 mg  51 mg  54 mg   Next Week Total 54 mg  51 mg  54 mg  54 mg   Sun 8 mg  8 mg  8 mg  8 mg   Mon 8 mg  5 mg ()  8 mg  8 mg   Tue 7 mg  7 mg  7 mg  7 mg   Wed 8 mg  8 mg  8 mg  8 mg   Thu 7 mg  7 mg  7 mg  7 mg   Fri 8 mg  8 mg  8 mg  8 mg   Sat 8 mg  8 mg  8 mg  8 mg   Historical INR  3.60      2.80  C     2.20           C Corrected result    This result is from an external source.       Plan:  1. INR is Therapeutic  today- see above in Anticoagulation Summary.   Will instruct Dalton Dallas Sr. to Continue their warfarin regimen- see above in Anticoagulation Summary.  2. Follow up in 1 weeks via home test.   3.They have been instructed to call if any changes in medications, doses, concerns, etc. Left detailed voice message with the above information.     Karen Oates RP

## 2025-01-13 PROBLEM — J18.9 MULTIFOCAL PNEUMONIA: Status: ACTIVE | Noted: 2025-01-01

## 2025-01-14 ENCOUNTER — INPATIENT HOSPITAL (AMBULATORY)
Dept: URBAN - METROPOLITAN AREA HOSPITAL 84 | Facility: HOSPITAL | Age: 85
End: 2025-01-14

## 2025-01-14 DIAGNOSIS — Z79.02 LONG TERM (CURRENT) USE OF ANTITHROMBOTICS/ANTIPLATELETS: ICD-10-CM

## 2025-01-14 DIAGNOSIS — R19.00 INTRA-ABDOMINAL AND PELVIC SWELLING, MASS AND LUMP, UNSPECIF: ICD-10-CM

## 2025-01-14 PROCEDURE — 99221 1ST HOSP IP/OBS SF/LOW 40: CPT

## 2025-01-14 NOTE — PROGRESS NOTES
[facility] MEDICINE SERVICE  DAILY PROGRESS NOTE    NAME: Dalton Dallas Sr.  : 1940  MRN: [id number]      LOS: 1 day     PROVIDER OF SERVICE: Tiffany Martell MD    Chief Complaint: Multifocal pneumonia    Subjective:     Interval History:    Patient seen and evaluated at bedside.   H&P, labs and imaging reviewed.  Patient complaining of shortness of breath and not feeling well.  Denies chest pain nausea vomiting or abdominal pain  Treatment plan discussed with patient. All questions addressed.     Review of Systems:   All 21 ROS were negative except mentioned above.    Objective:     Vital Signs  Temp:  [98 °F (36.7 °C)-98.4 °F (36.9 °C)] 98.4 °F (36.9 °C)  Heart Rate:  [] 82  Resp:  [14-30] 25  BP: (106-184)/(60-89) 128/65  Flow (L/min) (Oxygen Therapy):  [6-60] 60   Body mass index is 40.17 kg/m².    Physical Exam   General: Positive respiratory distress, appears stated age  Neuro: Awake and alert, oriented x3, no focal deficits appreciated  Head: Atraumatic, normocephalic  HEENT: EOMI, anicteric, normal sclerae and conjunctivae, moist mucus membranes  Neck: supple, no lymphadenopathy  CV: RRR, soft heart sounds, no murmurs appreciated, 1+ peripheral edema  Pulm: Decreased breath sounds, + increased work of breathing, positive crackles  Abd: Soft, nontender, distended  Skin: Warm, dry and intact  Psych: Appropriate mood and affect    Scheduled Meds   amLODIPine, 5 mg, Oral, Daily  atenolol, 50 mg, Oral, Daily  atorvastatin, 80 mg, Oral, Nightly  budesonide, 0.5 mg, Nebulization, BID - RT  cefepime, 2,000 mg, Intravenous, Q12H  clopidogrel, 75 mg, Oral, Daily  empagliflozin, 25 mg, Oral, Daily  fenofibrate, 48 mg, Oral, Daily  ferrous sulfate, 325 mg, Oral, BID  gabapentin, 600 mg, Oral, Q8H  insulin glargine, 30 Units, Subcutaneous, Daily  insulin glargine, 40 Units, Subcutaneous, Nightly  insulin lispro, 3-14 Units, Subcutaneous, Q6H  ipratropium-albuterol, 3 mL, Nebulization, Q4H -  RT  isosorbide mononitrate, 30 mg, Oral, Daily  levothyroxine, 175 mcg, Oral, Q AM  methylPREDNISolone sodium succinate, 40 mg, Intravenous, Q8H  sodium chloride, 10 mL, Intravenous, Q12H  vitamin B-12, 250 mcg, Oral, Daily  warfarin, 7 mg, Oral, Once per day on Tuesday Thursday  [START ON 1/15/2025] warfarin, 8 mg, Oral, Once per day on Sunday Monday Wednesday Friday Saturday       PRN Meds     aluminum-magnesium hydroxide-simethicone    senna-docusate sodium **AND** polyethylene glycol **AND** bisacodyl **AND** bisacodyl    Calcium Replacement - Follow Nurse / BPA Driven Protocol    dextrose    dextrose    glucagon (human recombinant)    Magnesium Standard Dose Replacement - Follow Nurse / BPA Driven Protocol    melatonin    ondansetron ODT **OR** ondansetron    Phosphorus Replacement - Follow Nurse / BPA Driven Protocol    Potassium Replacement - Follow Nurse / BPA Driven Protocol    sodium chloride    sodium chloride   Infusions         Diagnostic Data    Results from last 7 days   Lab Units 01/14/25  0610 01/13/25  1505   WBC 10*3/mm3 15.00* 14.49*   HEMOGLOBIN g/dL 13.1 14.4   HEMATOCRIT % 40.3 44.5   PLATELETS 10*3/mm3 309 405   GLUCOSE mg/dL 305* 325*   CREATININE mg/dL 1.50* 1.77*   BUN mg/dL 45* 38*   SODIUM mmol/L 140 136   POTASSIUM mmol/L 4.7 5.7*   AST (SGOT) U/L  --  34   ALT (SGPT) U/L  --  22   ALK PHOS U/L  --  80   BILIRUBIN mg/dL  --  0.7   ANION GAP mmol/L 19.9* 23.8*       CT Angiogram Chest Pulmonary Embolism    Result Date: 1/13/2025  Impression: 1.No evidence of acute pulmonary embolism. 2.Extensive patchy opacities and groundglass opacities noted throughout the lungs bilaterally, left greater than right. There is relative sparing of the right upper lobe. There is superimposed reticulations, interstitial thickening and traction bronchiectasis noted within the peripheral aspect of the lungs. These findings are highly concerning for an infectious/inflammatory process superimposed upon chronic  interstitial lung disease. Please consider follow-up with chest CT in 3 months after resolution of symptoms to exclude underlying pulmonary lesions 3.Multiple enlarged mediastinal and perihilar lymph nodes, likely reactive. 4.Mild enlargement of the central pulmonary arteries, a finding which can be seen in the setting of pulmonary hypertension. Electronically Signed: Malcolmana Moss DO  1/13/2025 5:01 PM EST  Workstation ID: [id number]     Interval results reviewed.    Assessment/Plan:   Acute hypoxic respiratory failure  Multifocal pneumonia  Sepsis  Acute HFpEF exacerbation  NSTEMI type II  CASIE on CKD 3  Elevated anion gap  Uncontrolled diabetes mellitus with hyperglycemia  Hypertension  Pulmonary hypertension  CAD status post PCI  PAD  Thrombophilia  Hyperlipidemia  History of DVT - on warfarin  Hypothyroidism  Obstructive sleep apnea  Morbid obesity    Patient with complex and multiple medical problems.  Continue Airvo and awaiting pulmonary consult  Continue cefepime 2 g IV every 12 hours, blood cultures are pending, MRSA screen positive.  Due to patient's CASIE we will hold off on vancomycin.  If white blood count continues to go up we will start patient on vancomycin.  Bumex 1 mg IV given for CHF exacerbation, diuresis as per renal.  CASIE on CKD 3 due to cardiorenal syndrome plus diabetic glumerelo nephropathy, hypertensive nephrosclerosis plus ATN caused by hypoxia.  Monitor renal function  Consult Endo for uncontrolled diabetes mellitus and elevated anion gap.  Monitor blood sugar and increase Lantus.  Waiting cardiology consult for CHF exacerbation and NSTEMI most likely type II due to increased oxygen demand and volume overload plus CASIE.  repeat [facility]   Pharmacy to monitor PT/INR and Coumadin dose    Overall guarded prognosis of this patient    Treatment plan discussed with RN.   VTE Prophylaxis:  Pharmacologic VTE prophylaxis orders are present.         Code status is   Code Status and Medical  Interventions: CPR (Attempt to Resuscitate); Full Support   Ordered at: 01/13/25 2304     Level Of Support Discussed With:    Patient     Code Status (Patient has no pulse and is not breathing):    CPR (Attempt to Resuscitate)     Medical Interventions (Patient has pulse or is breathing):    Full Support       Plan for disposition:     Barriers to Discharge: Acute hypoxic respiratory failure  Anticipated Date of Discharge: 1/16/2025  Place of Discharge: Home versus rehab      Time: 40 minutes     Signature: Electronically signed by Tiffany Martell MD, 01/14/25, 09:58 EST.  [facility] Hospitalist Team    Detail Level: Detailed Detail Level: Generalized Detail Level: Zone

## 2025-01-14 NOTE — NURSING NOTE
Pt de'sat's whenever Airvo is attempted to be titrated down. Pt currently on 60/75% to maintain sat >91%.

## 2025-01-14 NOTE — CONSULTS
Cardiology [place]        Subjective:     Encounter Date:01/13/2025      Patient ID: Dalton Dallas Sr. is a 84 y.o. male.    Chief Complaint: weakness, shortness of breath, cough    Cardiology consult: elevated troponin elevated BNP    Referring Physician: Arlet Vela APRN     HPI:  Dalton Dallas Sr. is a 84 y.o. male who presents with shortness of breath, cough. Mr. Dallas is a patient of Dr. Ackerman. Pmh includes coronary artery disease requiring previous PCI, hypertension, dyslipidemia, diabetes, history of DVT on warfarin, thrombophilia, PVD, HAN uses Bipap at night.    Per prior records:  In 2008 the patient presented for evaluation with symptoms of shortness of breath.  Stress test was completed and was abnormal.  He went on to have cardiac catheterization which demonstrated LAD free of disease, RCA with 50% stenosis in LCX had 80% stenosis.  Patient underwent PCI to the left circumflex/OM.     In July 2021 patient was admitted to [facility] with symptoms of unstable angina.  He underwent cardiac catheterization patient had patent stents in the left circumflex and OM.  The distal left circumflex had 70 to 80% stenosis but it was a small caliber vessel.  EF was 55 to 60%.  There was diffuse disease in a small caliber but will PLV branch of the RCA that was less than 1 mm and not in amenable to intervention.  RCA had 40% plaquing in the proximal segment LAD had diffuse disease proximally and mid segment of 25% distal segment 35%     In August 2021 patient had echocardiogram that demonstrated EF to be 60 to 65% with no significant valvular flow abnormalities detected.       Mr. Dallas presents with cough, shortness of breath, malaise, chills. He states symptoms have worsened over the last week or so. He denies chest pain.   Work up revealed hypoxia, he is on Airvo,   WBC 14.49, lactate 4.8 with repeat 3.3 and another repeat 2.2. BUN 38, creatinine 1.77, K 5.7, proBNP 2644, HS  troponin 50 with repeat 101, INR therapeutic 2.68. CT PE protocol showed no PE, extensive patchy opacities and groundglass opacities noted throughout the lungs bilaterally, left greater than right. There is relative sparing of the right upper lobe. There is superimposed reticulations, interstitial thickening and traction bronchiectasis noted within the peripheral aspect of the lungs. These findings are highly concerning for an infectious/inflammatory process superimposed upon chronic interstitial lung disease. Please consider follow-up with chest CT in 3 months after   resolution of symptoms to exclude underlying pulmonary lesions. Multiple enlarged mediastinal and perihilar lymph nodes, likely reactive. Mild enlargement of the central pulmonary arteries, a finding which can be seen in the setting of pulmonary hypertension.       Past Medical History:   Diagnosis Date    ACE-inhibitor cough 06/2005    Coronary artery disease     Diabetes mellitus, type 2 1995    ED (erectile dysfunction)     Hx of blood clots 2014    Blood clot left leg     Hyperlipidemia 08/2000    Hypertension     Hypogonadism, male 1998    Hypothyroidism 06/2001    Obesity     Peripheral neuropathy     Pulmonary embolism 02/2014    Rectal fistula     Sleep apnea 12/10/2013    Ulcer of left foot 11/2022    Vitamin D deficiency        Past Surgical History:   Procedure Laterality Date    CARDIAC CATHETERIZATION  2008    PTCA: 2008; PTCA: 4/21/2015 LCX stent     CARDIAC CATHETERIZATION Left 7/3/2021    Procedure: Cardiac Catheterization/Vascular Study;  Surgeon: Edwar Ackerman MD;  Location: [facility] CATH INVASIVE LOCATION;  Service: Cardiovascular;  Laterality: Left;    CARDIOVASCULAR STRESS TEST  2020    CATARACT EXTRACTION  01/11/2016 1-4-16 & 1-11-16     COLONOSCOPY N/A 11/23/2019    Procedure: COLONOSCOPY WITH ENDOSCOPIC CLIPPING X1 OF POST POLYPECTOMY BLEED SITE;  Surgeon: Jhonny Orellana MD;  Location: [facility] ENDOSCOPY;  Service:  Gastroenterology    CORONARY STENT PLACEMENT      INGUINAL HERNIA REPAIR Left     UMBILICAL HERNIA REPAIR         Family History   Problem Relation Age of Onset    Heart disease Mother     Leukemia Father        Social History     Socioeconomic History    Marital status:    Tobacco Use    Smoking status: Former     Current packs/day: 0.00     Average packs/day: 1.5 packs/day for 20.0 years (30.0 ttl pk-yrs)     Types: Cigarettes     Start date:      Quit date:      Years since quittin.0     Passive exposure: Past    Smokeless tobacco: Never    Tobacco comments:     quit    Vaping Use    Vaping status: Never Used   Substance and Sexual Activity    Alcohol use: No    Drug use: No    Sexual activity: Defer         No Known Allergies    Current Medications:   Scheduled Meds:amLODIPine, 5 mg, Oral, Daily  atenolol, 50 mg, Oral, Daily  atorvastatin, 80 mg, Oral, Nightly  budesonide, 0.5 mg, Nebulization, BID - RT  bumetanide, 1 mg, Intravenous, Q6H  cefepime, 2,000 mg, Intravenous, Q12H  clopidogrel, 75 mg, Oral, Daily  empagliflozin, 25 mg, Oral, Daily  fenofibrate, 48 mg, Oral, Daily  ferrous sulfate, 325 mg, Oral, BID  gabapentin, 600 mg, Oral, Q8H  [START ON 1/15/2025] insulin glargine, 35 Units, Subcutaneous, Daily  insulin glargine, 40 Units, Subcutaneous, Nightly  insulin lispro, 3-14 Units, Subcutaneous, Q6H  insulin regular, 4 Units, Subcutaneous, Q8H  ipratropium-albuterol, 3 mL, Nebulization, Q4H - RT  isosorbide mononitrate, 30 mg, Oral, Daily  levothyroxine, 175 mcg, Oral, Q AM  methylPREDNISolone sodium succinate, 40 mg, Intravenous, Q8H  pantoprazole, 40 mg, Oral, Q AM  sodium chloride, 10 mL, Intravenous, Q12H  vitamin B-12, 250 mcg, Oral, Daily  [Held by provider] warfarin, 7 mg, Oral, Once per day on   [Held by provider] warfarin, 8 mg, Oral, Once per day on       Continuous Infusions:Pharmacy Consult - Steroid Insulin  "Protocol,   Pharmacy to dose warfarin,         Review of Systems   Constitutional: Negative for chills and fever.   HENT:  Negative for ear discharge and nosebleeds.    Eyes:  Negative for discharge and redness.   Cardiovascular:  Positive for dyspnea on exertion and leg swelling. Negative for chest pain, orthopnea, palpitations, paroxysmal nocturnal dyspnea and syncope.   Respiratory:  Positive for cough and shortness of breath. Negative for wheezing.    Endocrine: Negative for heat intolerance.   Skin:  Negative for rash.   Musculoskeletal:  Negative for arthritis and myalgias.   Gastrointestinal:  Negative for abdominal pain, melena, nausea and vomiting.   Genitourinary:  Negative for dysuria and hematuria.   Neurological:  Negative for dizziness, light-headedness, numbness and tremors.   Psychiatric/Behavioral:  Negative for depression. The patient is not nervous/anxious.           Objective:         /57 (BP Location: Left arm, Patient Position: Sitting)   Pulse 77   Temp 97.6 °F (36.4 °C) (Oral)   Resp 22   Ht 175.3 cm (69\")   Wt 123 kg (272 lb 0.8 oz)   SpO2 95%   BMI 40.17 kg/m²     Physical Exam:  General Appearance:    Alert, cooperative, in no acute distress                                Head: Atraumatic, normocephalic, PERRLA               Neck:   supple, no JVD   Lungs:     Diminished bilateral bases, air    Heart:    Regular rhythm and normal rate, normal S1 and S2   Abdomen:     Normal bowel sounds, no masses, no organomegaly, soft  nontender, nondistended, no guarding, no rebound  tenderness   Extremities:   Moves all extremities well, no edema, stuart lower extremities   Pulses:   Pulses palpable and equal bilaterally   Skin:   No bleeding, bruising or rash   Neurologic:   Awake, alert, oriented x3                 ASCVD Risk Score::  The ASCVD Risk score (Damien DK, et al., 2019) failed to calculate for the following reasons:    The 2019 ASCVD risk score is only valid for ages 40 to 79   " " Risk score cannot be calculated because patient has a medical history suggesting prior/existing ASCVD      Lab Review:     Results from last 7 days   Lab Units 01/14/25  1238 01/14/25  0610 01/13/25  1505   SODIUM mmol/L 137 140 136   POTASSIUM mmol/L 4.6 4.7 5.7*   CHLORIDE mmol/L 98 102 95*   CO2 mmol/L 20.2* 18.1* 17.2*   BUN mg/dL 49* 45* 38*   CREATININE mg/dL 1.38* 1.50* 1.77*   GLUCOSE mg/dL 373* 305* 325*   CALCIUM mg/dL 9.2 9.3 9.6   AST (SGOT) U/L  --   --  34   ALT (SGPT) U/L  --   --  22     Results from last 7 days   Lab Units 01/14/25  0058 01/13/25  1727 01/13/25  1535   HSTROP T ng/L 128* 101* 50*     Results from last 7 days   Lab Units 01/14/25  0610 01/13/25  1505   WBC 10*3/mm3 15.00* 14.49*   HEMOGLOBIN g/dL 13.1 14.4   HEMATOCRIT % 40.3 44.5   PLATELETS 10*3/mm3 309 405     Results from last 7 days   Lab Units 01/14/25  0610 01/13/25  1505   INR  3.44* 2.68               Invalid input(s): \"LDLCALC\"  Results from last 7 days   Lab Units 01/14/25  0610 01/13/25  1505   PROBNP pg/mL 4,115.0* 2,644.0*           Recent Radiology:  Imaging Results (Most Recent)       Procedure Component Value Units Date/Time    XR Chest 1 View [[id number]] Collected: 01/14/25 1043     Updated: 01/14/25 1052    Narrative:      XR CHEST 1 VW    Date of Exam: 1/14/2025 10:12 AM EST    Indication: Shortness of breath.    Comparison: 7/1/2021.    Findings:  The heart is enlarged. There is a mixed interstitial/airspace disease which could be due to multifocal pneumonia or pulmonary edema. Trace pleural effusions are also suspected. There is no pneumothorax. There are chronic age-related changes involving the   bony thorax and thoracic aorta.      Impression:      Impression:  1.Cardiomegaly.  2.Mixed interstitial/airspace disease which could be due to multifocal pneumonia or pulmonary edema. Trace pleural effusions are also suspected.        Electronically Signed: Justus Goldsmith MD    1/14/2025 10:50 AM EST    Workstation ID: " [id number]    CT Angiogram Chest Pulmonary Embolism [[id number]] Collected: 01/13/25 1646     Updated: 01/13/25 1704    Narrative:      CT ANGIOGRAM CHEST PULMONARY EMBOLISM    Date of Exam: 1/13/2025 4:44 PM EST    Indication: Chest pain with hypoxemia history of DVT.    Comparison: CT chest 5/8/2015    Technique: Axial CT images were obtained of the chest after the uneventful intravenous administration of iodinated contrast utilizing pulmonary embolism protocol.  Sagittal and coronal reconstructions were performed.  Automated exposure control and   iterative reconstruction methods were used.      Findings:    Pulmonary arteries:Adequate opacification of the pulmonary arteries. No evidence of acute pulmonary embolism. Mild enlargement of the central pulmonary arteries.    Aorta: Vascular calcifications noted throughout the thoracic aorta. The ascending thoracic aorta is ectatic measuring up to 4.2 cm. The descending thoracic aorta is ectatic measuring up to 3.4 cm.    Lungs and Pleura: Extensive patchy opacities and groundglass opacities noted throughout the lungs bilaterally, left greater than right. There is relative sparing of the right upper lobe.  There is superimposed reticulations, interstitial thickening and traction bronchiectasis noted within the peripheral aspect of the lungs. The central airways are patent.  No pleural effusion or pneumothorax    Mediastinum/Mary: Multiple enlarged mediastinal and perihilar lymph nodes measuring up to 1.9 cm in length on the short axis.    Heart: Normal in size. No pericardial effusion. Normal RV/LV ratio.    Soft Tissue: Unremarkable.      Upper Abdomen: Unremarkable.    Bones: No acute osseous abnormality. Degenerative changes noted throughout the visualized spine, most significantly within the visualized cervical spine.        Impression:      Impression:  1.No evidence of acute pulmonary embolism.  2.Extensive patchy opacities and groundglass opacities noted throughout  the lungs bilaterally, left greater than right. There is relative sparing of the right upper lobe. There is superimposed reticulations, interstitial thickening and traction   bronchiectasis noted within the peripheral aspect of the lungs. These findings are highly concerning for an infectious/inflammatory process superimposed upon chronic interstitial lung disease. Please consider follow-up with chest CT in 3 months after   resolution of symptoms to exclude underlying pulmonary lesions  3.Multiple enlarged mediastinal and perihilar lymph nodes, likely reactive.  4.Mild enlargement of the central pulmonary arteries, a finding which can be seen in the setting of pulmonary hypertension.        Electronically Signed: Malcolm Moss DO    1/13/2025 5:01 PM EST    Workstation ID: [id number]              ECHOCARDIOGRAM:    Results for orders placed during the hospital encounter of 08/12/21    Adult Transthoracic Echo Complete W/ Cont if Necessary Per Protocol    Interpretation Summary  · Left ventricular systolic function is normal.  · Left ventricular ejection fraction is 60 to 65%  · Left ventricular diastolic function was normal.            Assessment:         Active Hospital Problems    Diagnosis  POA    **Multifocal pneumonia [J18.9]  Yes     Acute hypoxic respiratory failure / Shortness of breath    Multifocal pneumonia, on antibiotics, pulmonary consulted    3.   CAD s/p prior PCI  Elevated troponin, likely demand ischemia in setting of hypoxia and CASIE  No chest pain    4. CASIE on CKD    5. Obesity    6. HTN    7. HLD    8. PVD    9. H/o DVT , thrombophilia on coumadin       Plan:   Patient with pulm. Consult, on Airvo, tx for PNA  Received 1 x dose IV Lasix  Will ask nephrology to see  Troponin elevated with no chest pain, in setting of hypoxia and CASIE likely demand ischemia  Once patient improves from a respiratory standpoint / tx of PNA etc will consider ischemic evaluation with stress test  Will check 2D  ECHO    Patient is seen and examined and findings are verified.  All data is reviewed by me personally.  Assessment and plan formulated by APC was done after discussion with attending.  I spent more than 50% of time in taking care of the patient.    Patient is on high flow oxygen.  Patient is short of breath.  Leg edema noted.    Normal S1 and S2.  Decreased breath sound at the bases expiratory rhonchi.  Bilateral leg edema noted.    Patient is presented with respiratory failure and possibly due to underlying pneumonia.  Patient had elevated troponin but it could be due to increased demand as well as renal dysfunction.  I would recommend observation.  Patient is not having active chest pain.  Patient may need ischemic evaluation in future.  Current treatment would be continued.    Patient definitely has volume overload recommend diuresis but patient is on high-dose diuretics monitor electrolytes and creatinine and decrease the Bumex tomorrow.    Electronically signed by Edwar Ackerman MD, 01/14/25, 2:03 PM EST.           Edwar Ackerman MD  01/14/25  14:03 EST

## 2025-01-14 NOTE — PLAN OF CARE
Goal Outcome Evaluation:  Plan of Care Reviewed With: patient           Outcome Evaluation: Pt is a 84 y.o. year old male admitted to [facility] 1/13/25 with complaints of weakness for about 1 week, shortness of breath, nonproductive cough, and chills. Pt admitted for further treatment of acute respiratory failure with hypoxia, multifocal pneumonia, pulmonary fibrosis, mediastinal lymphadenopathy. Pt currently requiring Airvo 60L 75% to maintain O2 sats. Pmhx significant for CAD, HFpEF, HTN, HLD, PVD, DM type II. At baseline, pt reports he uses a bipap with 2L O2 at night. He uses a w/c for mobility, to which he transfers independently. He is also IND with ADLs from w/c level. 2 [place] his home, which he does navigate though slowly per report. This date, pt requires SBA for bed mobility. At EOB, pt able to attain figure 4 position and don socks with setup. Pt stood with CGA, and transferred to chair with Min A. OT will follow as he is slightly below baseline. Recommending home with HHOT at [place].    Anticipated Discharge Disposition (OT): home with home health

## 2025-01-14 NOTE — CONSULTS
GI CONSULT  NOTE:    Referring Provider:  Dr Martell    Chief complaint: SOA    Subjective .     History of present illness: Dalton Dallas Sr. is a 84 y.o. male with history of CAD s/p PCI on Plavix, HFpEF, PVD, DVT, thrombophilia on warfarin, hypothyroidism, HAN, CKD, HLD, HTN presented to the hospital yesterday with respiratory distress, weakness and cough.  Patient reports he has been having difficulty with breathing and weakness for about 1 week.  He feels that respiratory status is about the same at this time.  He is currently on Airvo 60 L, 60% FiO2.  He denies any abdominal pain, nausea or vomiting.  He does not feel that his abdomen is distended.  He typically has pretty regular bowel movements, but reports having an episode of diarrhea yesterday.  He denies any rectal bleeding or melena.  He denies fever or chills in the last 24 hours.  Reports that urine output has been very good since being diuresed.      Endo History:  2019 colonoscopy (Reyna)-post polypectomy bleed  2019 colonoscopy (Johny)-SSA/TA polyps, diverticulosis    Past Medical History:  Past Medical History:   Diagnosis Date    ACE-inhibitor cough 06/2005    Coronary artery disease     Diabetes mellitus, type 2 1995    ED (erectile dysfunction)     Hx of blood clots 2014    Blood clot left leg     Hyperlipidemia 08/2000    Hypertension     Hypogonadism, male 1998    Hypothyroidism 06/2001    Obesity     Peripheral neuropathy     Pulmonary embolism 02/2014    Rectal fistula     Sleep apnea 12/10/2013    Ulcer of left foot 11/2022    Vitamin D deficiency        Past Surgical History:  Past Surgical History:   Procedure Laterality Date    CARDIAC CATHETERIZATION  2008    PTCA: 2008; PTCA: 4/21/2015 LCX stent     CARDIAC CATHETERIZATION Left 7/3/2021    Procedure: Cardiac Catheterization/Vascular Study;  Surgeon: Edwar Ackerman MD;  Location: [facility] CATH INVASIVE LOCATION;  Service: Cardiovascular;  Laterality: Left;    CARDIOVASCULAR  STRESS TEST      CATARACT EXTRACTION  2016 & 16     COLONOSCOPY N/A 2019    Procedure: COLONOSCOPY WITH ENDOSCOPIC CLIPPING X1 OF POST POLYPECTOMY BLEED SITE;  Surgeon: Jhonny Orellana MD;  Location: [facility] ENDOSCOPY;  Service: Gastroenterology    CORONARY STENT PLACEMENT      INGUINAL HERNIA REPAIR Left     UMBILICAL HERNIA REPAIR         Social History:  Social History     Tobacco Use    Smoking status: Former     Current packs/day: 0.00     Average packs/day: 1.5 packs/day for 20.0 years (30.0 ttl pk-yrs)     Types: Cigarettes     Start date:      Quit date:      Years since quittin.0     Passive exposure: Past    Smokeless tobacco: Never    Tobacco comments:     quit    Vaping Use    Vaping status: Never Used   Substance Use Topics    Alcohol use: No    Drug use: No       Family History:  Family History   Problem Relation Age of Onset    Heart disease Mother     Leukemia Father        Medications:  Medications Prior to Admission   Medication Sig Dispense Refill Last Dose/Taking    Accu-Chek Irene Plus test strip USE TO CHECK BLOOD SUGAR BEFORE MEALS AND AT BEDTIME 400 each 3     amLODIPine (NORVASC) 5 MG tablet TAKE 1 TABLET BY MOUTH DAILY 90 tablet 3     atenolol (TENORMIN) 50 MG tablet TAKE 1 TABLET BY MOUTH DAILY 90 tablet 3     atorvastatin (LIPITOR) 80 MG tablet TAKE 1 TABLET BY MOUTH AT NIGHT  FOR HIGH AMOUNT OF FATS IN THE  BLOOD 90 tablet 3     Blood Glucose Monitoring Suppl (Accu-Chek Irene Plus) w/Device kit 1 each 4 (Four) Times a Day. 1 kit 0     clopidogrel (PLAVIX) 75 MG tablet Take 1 tablet by mouth Daily. 90 tablet 3     fenofibrate (TRICOR) 54 MG tablet TAKE 1 TABLET BY MOUTH DAILY FOR HIGH AMOUNT OF TRIGLYCERIDES IN  THE BLOOD 90 tablet 3     ferrous sulfate 325 (65 FE) MG tablet Take 1 tablet by mouth 2 (Two) Times a Day.       gabapentin (NEURONTIN) 600 MG tablet TAKE 1 TABLET BY MOUTH 3 TIMES  DAILY 270 tablet 3     hydroCHLOROthiazide 25 MG  "tablet Take 1 tablet by mouth Daily. 90 tablet 3     Ibuprofen 3 %, Gabapentin 10 %, Baclofen 2 %, lidocaine 4 % Apply 1-2 g topically to the appropriate area as directed 3 (Three) to 4 (Four) times daily. 90 g 3     Insulin Pen Needle (BD ULTRA-FINE PEN NEEDLES) 29G X 12.7MM misc Use to inject insulin twice daily. DX E11.65 200 each 2     insulin regular (HUMULIN R) 500 UNIT/ML CONCENTRATED injection DRAW TO 30 UNIT REI ON U-100 SYRINGE AND INJECT IN THE MORNING AND 40 UNIT REI IN THE EVENING Indications: Type 2 Diabetes 60 mL 3     Insulin Syringe-Needle U-100 (BD Insulin Syringe U/F) 31G X 5/16\" 0.5 ML misc 1 each by Other route 2 (Two) Times a Day. 200 each 3     Insulin Syringe-Needle U-100 30G X 1/2\" 0.5 ML misc 1 each by Other route 2 (Two) Times a Day. 200 each 12     irbesartan (AVAPRO) 300 MG tablet TAKE 1 TABLET BY MOUTH DAILY 90 tablet 3     isosorbide mononitrate (IMDUR) 30 MG 24 hr tablet TAKE 1 TABLET BY MOUTH DAILY 90 tablet 3     Jardiance 25 MG tablet tablet TAKE 1 TABLET BY MOUTH DAILY 90 tablet 3     levothyroxine (SYNTHROID, LEVOTHROID) 175 MCG tablet TAKE 1 TABLET BY MOUTH DAILY FOR UNDERACTIVE THYROID 90 tablet 3     metFORMIN ER (GLUCOPHAGE-XR) 500 MG 24 hr tablet TAKE 4 TABLETS BY MOUTH AT  SUPPER FOR TYPE 2 DIABETES 360 tablet 3     Methylcobalamin (B12-ACTIVE PO) Take  by mouth.       O2 (OXYGEN) Inhale 1 L/min Every Night. Indications: marianne       Omega-3 Fatty Acids (FISH OIL) 1000 MG capsule capsule Take 1 capsule by mouth 2 (Two) Times a Day.       Pramlintide Acetate (SymlinPen 120) 2700 MCG/2.7ML solution pen-injector INJECT 120 MCG SUBCUTANEOUSLY  INTO THE APPROPRIATE AREA AS  DIRECTED 3 TIMES DAILY (Patient taking differently: Inject 120 mcg under the skin into the appropriate area as directed 2 (Two) Times a Day. INJECT 120 MCG SUBCUTANEOUSLY  INTO THE APPROPRIATE AREA AS  DIRECTED 2 TIMES DAILY) 10.8 mL 11     urea (CARMOL) 40 % cream Apply 1 application  topically to the " appropriate area as directed 2 (Two) Times a Day. Apply twice daily to calluses. Add vitamin D 5,000 units 85 each 3     vitamin B-12 (CYANOCOBALAMIN) 100 MCG tablet Take 1 tablet by mouth Daily. Indications: Inadequate Vitamin B12       vitamin D (ERGOCALCIFEROL) 1.25 MG (46621 UT) capsule capsule TAKE 1 CAPSULE BY MOUTH ONCE  WEEKLY FOR VITAMIN D DEFICIENCY 13 capsule 3     warfarin (COUMADIN) 2 MG tablet TAKE 1 TABLET BY MOUTH DAILY OR  AS DIRECTED BASED ON INR (Patient taking differently: Take 1 tablet by mouth As Needed. TAKE 1 TABLET BY MOUTH DAILY OR  AS DIRECTED BASED ON INR Tuesday and Thursdays only) 90 tablet 3     warfarin (COUMADIN) 5 MG tablet 7 mg (5 mg x 1 and 1 mg x 2) every Tue, Thu; 8 mg (5 mg x 1 and 1 mg x 3) all other days 90 tablet 3        Scheduled Meds:amLODIPine, 5 mg, Oral, Daily  atenolol, 50 mg, Oral, Daily  atorvastatin, 80 mg, Oral, Nightly  budesonide, 0.5 mg, Nebulization, BID - RT  bumetanide, 1 mg, Intravenous, Q6H  cefepime, 2,000 mg, Intravenous, Q12H  clopidogrel, 75 mg, Oral, Daily  empagliflozin, 25 mg, Oral, Daily  fenofibrate, 48 mg, Oral, Daily  ferrous sulfate, 325 mg, Oral, BID  gabapentin, 600 mg, Oral, Q8H  [START ON 1/15/2025] insulin glargine, 35 Units, Subcutaneous, Daily  insulin glargine, 40 Units, Subcutaneous, Nightly  insulin lispro, 3-14 Units, Subcutaneous, Q6H  insulin regular, 4 Units, Subcutaneous, Q8H  ipratropium-albuterol, 3 mL, Nebulization, Q4H - RT  isosorbide mononitrate, 30 mg, Oral, Daily  levothyroxine, 175 mcg, Oral, Q AM  methylPREDNISolone sodium succinate, 40 mg, Intravenous, Q8H  pantoprazole, 40 mg, Oral, Q AM  sodium chloride, 10 mL, Intravenous, Q12H  vitamin B-12, 250 mcg, Oral, Daily  [Held by provider] warfarin, 7 mg, Oral, Once per day on Tuesday Thursday  [Held by provider] warfarin, 8 mg, Oral, Once per day on Sunday Monday Wednesday Friday Saturday      Continuous Infusions:Pharmacy Consult - Steroid Insulin Protocol,   Pharmacy to  dose warfarin,       PRN Meds:.  aluminum-magnesium hydroxide-simethicone    senna-docusate sodium **AND** polyethylene glycol **AND** bisacodyl **AND** bisacodyl    Calcium Replacement - Follow Nurse / BPA Driven Protocol    dextrose    dextrose    glucagon (human recombinant)    Magnesium Standard Dose Replacement - Follow Nurse / BPA Driven Protocol    melatonin    ondansetron ODT **OR** ondansetron    Pharmacy Consult - Steroid Insulin Protocol    Pharmacy to dose warfarin    Phosphorus Replacement - Follow Nurse / BPA Driven Protocol    Potassium Replacement - Follow Nurse / BPA Driven Protocol    sodium chloride    sodium chloride    ALLERGIES:  Patient has no known allergies.    ROS:  The following systems were reviewed and negative;   Constitution:  No fevers, chills, no unintentional weight loss  Skin: no rash, no jaundice  Eyes:  No blurry vision, no eye pain  HENT:  No change in hearing or smell  Resp:  No dyspnea or cough  CV:  No chest pain or palpitations  :  No dysuria, hematuria  Musculoskeletal:  No leg cramps or arthralgias  Neuro:  No tremor, no numbness  Psych:  No depression or confusion    Objective     Vital Signs:   Vitals:    01/14/25 0826 01/14/25 1206 01/14/25 1210 01/14/25 1223   BP:    133/57   BP Location:    Left arm   Patient Position:    Sitting   Pulse: 82 80 76 77   Resp: 25 14 14 22   Temp:    97.6 °F (36.4 °C)   TempSrc:    Oral   SpO2: 92% 95% 98% 95%   Weight:       Height:           Physical Exam:     General Appearance:    Awake and alert, in no acute distress   Head:    Normocephalic, without obvious abnormality, atraumatic   Throat:   No oral lesions, no thrush, oral mucosa moist   Lungs:     Respirations regular, even and unlabored   Chest Wall:    No abnormalities observed   Abdomen:   Soft, mildly distended, nontender   Rectal:     Deferred   Extremities:   Moves all extremities, stuart lower extremities       Skin:   No rash, no jaundice, normal palpation        Neurologic:   Cranial nerves 2 - 12 grossly intact, no asterixis       Results Review:   I reviewed the patient's labs and imaging.  Results from last 7 days   Lab Units 01/14/25  0610 01/13/25  1505   WBC 10*3/mm3 15.00* 14.49*   HEMOGLOBIN g/dL 13.1 14.4   PLATELETS 10*3/mm3 309 405     Results from last 7 days   Lab Units 01/14/25  1238 01/14/25  0610 01/13/25  1505   SODIUM mmol/L 137 140 136   POTASSIUM mmol/L 4.6 4.7 5.7*   CHLORIDE mmol/L 98 102 95*   CO2 mmol/L 20.2* 18.1* 17.2*   BUN mg/dL 49* 45* 38*   CREATININE mg/dL 1.38* 1.50* 1.77*   GLUCOSE mg/dL 373* 305* 325*   ALBUMIN g/dL  --   --  3.9   BILIRUBIN mg/dL  --   --  0.7   ALK PHOS U/L  --   --  80   AST (SGOT) U/L  --   --  34   ALT (SGPT) U/L  --   --  22   INR   --  3.44* 2.68     Estimated Creatinine Clearance: 53.5 mL/min (A) (by C-G formula based on SCr of 1.38 mg/dL (H)).  Lab Results   Component Value Date    HGBA1C 7.30 (H) 01/13/2025    HGBA1C 7.36 (H) 12/04/2024    HGBA1C 7.85 (H) 10/01/2024     Results from last 7 days   Lab Units 01/14/25  0058 01/13/25  1727 01/13/25  1535   HSTROP T ng/L 128* 101* 50*     Infection   Results from last 7 days   Lab Units 01/13/25  1727   PROCALCITONIN ng/mL 0.39*     UA    Results from last 7 days   Lab Units 01/14/25  0842   NITRITE UA  Negative     Microbiology Results (last 10 days)       Procedure Component Value - Date/Time    Legionella Antigen, Urine - Urine, Urine, Clean Catch [[id number]]  (Normal) Collected: 01/14/25 0842    Lab Status: Final result Specimen: Urine, Clean Catch Updated: 01/14/25 0926     LEGIONELLA ANTIGEN, URINE Negative    S. Pneumo Ag Urine or CSF - Urine, Urine, Clean Catch [[id number]]  (Normal) Collected: 01/14/25 0842    Lab Status: Final result Specimen: Urine, Clean Catch Updated: 01/14/25 0927     Strep Pneumo Ag Negative    MRSA Screen, PCR (Inpatient) - Swab, Nares [[id number]]  (Abnormal) Collected: 01/14/25 0613    Lab Status: Final result Specimen: Swab from  Carl Updated: 01/14/25 0719     MRSA PCR MRSA Detected    Narrative:      The negative predictive value of this diagnostic test is high and should only be used to consider de-escalating anti-MRSA therapy. A positive result may indicate colonization with MRSA and must be correlated clinically.    Respiratory Panel PCR w/COVID-19(SARS-CoV-2) SARAHY/ALTHEA/CLARY/PAD/COR/ISHAN In-House, NP Swab in UTM/VTM, 2 HR TAT - Swab, Nasopharynx [[id number]]  (Normal) Collected: 01/13/25 1505    Lab Status: Final result Specimen: Swab from Nasopharynx Updated: 01/13/25 1601     ADENOVIRUS, PCR Not Detected     Coronavirus 229E Not Detected     Coronavirus HKU1 Not Detected     Coronavirus NL63 Not Detected     Coronavirus OC43 Not Detected     COVID19 Not Detected     Human Metapneumovirus Not Detected     Human Rhinovirus/Enterovirus Not Detected     Influenza A PCR Not Detected     Influenza B PCR Not Detected     Parainfluenza Virus 1 Not Detected     Parainfluenza Virus 2 Not Detected     Parainfluenza Virus 3 Not Detected     Parainfluenza Virus 4 Not Detected     RSV, PCR Not Detected     Bordetella pertussis pcr Not Detected     Bordetella parapertussis PCR Not Detected     Chlamydophila pneumoniae PCR Not Detected     Mycoplasma pneumo by PCR Not Detected    Narrative:      In the setting of a positive respiratory panel with a viral infection PLUS a negative procalcitonin without other underlying concern for bacterial infection, consider observing off antibiotics or discontinuation of antibiotics and continue supportive care. If the respiratory panel is positive for atypical bacterial infection (Bordetella pertussis, Chlamydophila pneumoniae, or Mycoplasma pneumoniae), consider antibiotic de-escalation to target atypical bacterial infection.          Imaging Results (Last 72 Hours)       Procedure Component Value Units Date/Time    XR Chest 1 View [[id number]] Collected: 01/14/25 1043     Updated: 01/14/25 1052    Narrative:      XR  CHEST 1 VW    Date of Exam: 1/14/2025 10:12 AM EST    Indication: Shortness of breath.    Comparison: 7/1/2021.    Findings:  The heart is enlarged. There is a mixed interstitial/airspace disease which could be due to multifocal pneumonia or pulmonary edema. Trace pleural effusions are also suspected. There is no pneumothorax. There are chronic age-related changes involving the   bony thorax and thoracic aorta.      Impression:      Impression:  1.Cardiomegaly.  2.Mixed interstitial/airspace disease which could be due to multifocal pneumonia or pulmonary edema. Trace pleural effusions are also suspected.        Electronically Signed: Justus Goldsmith MD    1/14/2025 10:50 AM EST    Workstation ID: [id number]    CT Angiogram Chest Pulmonary Embolism [[id number]] Collected: 01/13/25 1646     Updated: 01/13/25 1704    Narrative:      CT ANGIOGRAM CHEST PULMONARY EMBOLISM    Date of Exam: 1/13/2025 4:44 PM EST    Indication: Chest pain with hypoxemia history of DVT.    Comparison: CT chest 5/8/2015    Technique: Axial CT images were obtained of the chest after the uneventful intravenous administration of iodinated contrast utilizing pulmonary embolism protocol.  Sagittal and coronal reconstructions were performed.  Automated exposure control and   iterative reconstruction methods were used.      Findings:    Pulmonary arteries:Adequate opacification of the pulmonary arteries. No evidence of acute pulmonary embolism. Mild enlargement of the central pulmonary arteries.    Aorta: Vascular calcifications noted throughout the thoracic aorta. The ascending thoracic aorta is ectatic measuring up to 4.2 cm. The descending thoracic aorta is ectatic measuring up to 3.4 cm.    Lungs and Pleura: Extensive patchy opacities and groundglass opacities noted throughout the lungs bilaterally, left greater than right. There is relative sparing of the right upper lobe.  There is superimposed reticulations, interstitial thickening and traction  bronchiectasis noted within the peripheral aspect of the lungs. The central airways are patent.  No pleural effusion or pneumothorax    Mediastinum/Mary: Multiple enlarged mediastinal and perihilar lymph nodes measuring up to 1.9 cm in length on the short axis.    Heart: Normal in size. No pericardial effusion. Normal RV/LV ratio.    Soft Tissue: Unremarkable.      Upper Abdomen: Unremarkable.    Bones: No acute osseous abnormality. Degenerative changes noted throughout the visualized spine, most significantly within the visualized cervical spine.        Impression:      Impression:  1.No evidence of acute pulmonary embolism.  2.Extensive patchy opacities and groundglass opacities noted throughout the lungs bilaterally, left greater than right. There is relative sparing of the right upper lobe. There is superimposed reticulations, interstitial thickening and traction   bronchiectasis noted within the peripheral aspect of the lungs. These findings are highly concerning for an infectious/inflammatory process superimposed upon chronic interstitial lung disease. Please consider follow-up with chest CT in 3 months after   resolution of symptoms to exclude underlying pulmonary lesions  3.Multiple enlarged mediastinal and perihilar lymph nodes, likely reactive.  4.Mild enlargement of the central pulmonary arteries, a finding which can be seen in the setting of pulmonary hypertension.        Electronically Signed: Malcolm Moss DO    1/13/2025 5:01 PM EST    Workstation ID: [id number]            ASSESSMENT AND PLAN:    84-year-old male admitted to the hospital with acute hypoxic respiratory failure and multifocal pneumonia.  GI is consulted for possible paracentesis.    -Multifocal pneumonia  -Acute hypoxic respiratory failure  -CASIE on CKD  -CAD s/p PCI on Plavix  -Thrombophilia, history of DVT on warfarin  -Leukocytosis  -CHF  -DMII    Plan  No obvious ascites noted on exam.  His abdomen is obese and borderline  distended.  Will check ultrasound for further evaluation and plan paracentesis with fluid studies if needed.  Given cardiac history, elevated BNP, fluid overload, cardiac ascites is in the differential. Liver enzymes normal on admission.  Continue diuresis per cardiology/renal.  Continue respiratory support.  Currently on Airvo at 60 L, 60% FiO2.  Antibiotics per primary for pneumonia.  Cardiology potentially planning for stress test once acute respiratory issues improve.  Continue supportive care.    I discussed the patients findings and my recommendations with the patient.    We appreciate the referral    Electronically signed by LUCAS Batres, 01/14/25, 1:44 PM EST.

## 2025-01-14 NOTE — PROGRESS NOTES
"Pharmacy dosing service  Anticoagulant  Warfarin     Subjective:    Dalton Dallas Sr. is a 84 y.o.male being continued on warfarin for Other hx of DVT, hemophilia .    INR Goal: 2 - 3  Home medication?: warfarin 8 mg PO daily, except warfarin 7 mg PO on Tuesdays and Thursdays.   Bridge Therapy Present?:  No  Interacting Medications Evaluation (New/Present/Discontinued): solu-medrol (may increase INR)  Additional Contributing Factors: fluid overload (on bumex 1 mg q6h per nephrology)      Assessment/Plan:    Patient is normally on warfarin 8 mg daily except warfarin 7 mg on Tuesday and Thursday. INR today is supratherapeutic, likely due to fluid overload and acute illness. Will hold tonight's dose. Of note, GI is consulted for possible paracentesis. Will follow along for plans and need to continue holding warfarin.     Continue to monitor and adjust based on INR.         Date 1/14           INR 3.44           Dose Hold               Objective:  [Ht: 175.3 cm (69\"); Wt: 123 kg (272 lb 0.8 oz); BMI: Body mass index is 40.17 kg/m².]    Lab Results   Component Value Date    ALBUMIN 3.9 01/13/2025     Lab Results   Component Value Date    INR 3.44 (H) 01/14/2025    INR 2.68 01/13/2025    INR 2.20 01/07/2025    PROTIME 34.6 (H) 01/14/2025    PROTIME 28.5 01/13/2025    PROTIME 22.7 06/25/2022     Lab Results   Component Value Date    HGB 13.1 01/14/2025    HGB 14.4 01/13/2025    HGB 13.9 09/03/2024     Lab Results   Component Value Date    HCT 40.3 01/14/2025    HCT 44.5 01/13/2025    HCT 42.9 09/03/2024       Amber Benoit, PharmKARAN  01/14/25 12:34 EST     "

## 2025-01-14 NOTE — PLAN OF CARE
Goal Outcome Evaluation:   Pt continues Airvo. Cardiology and nephrology consulted due to fluid overload. Endo consulted due to pt having uncontrolled diabetes. Insulin adjustments made.

## 2025-01-14 NOTE — H&P
[facility] Medicine Services    Hospitalist History and Physical     Dalton CHAU Burt Barr. : 1940 MRN:[id number] LOS:0 ROOM: ED/     Reason for admission: Multifocal pneumonia     Assessment / Plan     Acute respiratory failure with hypoxia  Multifocal pneumonia  -  hypoxic in the 70s upon arrival per ED report. Placed on bipap but was unable to tolerate, currently stable on 60% high flow  - CT PE protocol: no PE, extensive patchy opacities and groundglass opacities noted throughout the lungs bilaterally, left greater than right. There is relative sparing of the right upper lobe. There is superimposed reticulations, interstitial thickening and traction bronchiectasis noted within the peripheral aspect of the lungs. These findings are highly concerning for an infectious/inflammatory process superimposed upon chronic interstitial lung disease. Please consider follow-up with chest CT in 3 months after   resolution of symptoms to exclude underlying pulmonary lesions. Multiple enlarged mediastinal and perihilar lymph nodes, likely reactive. Mild enlargement of the central pulmonary arteries, a finding which can be seen in the setting of pulmonary hypertension.   - WBC 14.49, lactate 4.8 with repeat 3.3 and another repeat 2.2  - RVP negative  - blood cultures pending  - started on IV cefepime in the ER and will continue  - given only 1L IVFs in the ER due to underlying CHF  - duonebs, pulmicort, IV solumedrol   - recheck ABG in am  - check procalcitonin, urine antigens  - at risk for decompensation, pt is a full code   - continuous pulse oximetry   - consult pulmonary for further recommendations     Elevated proBNP  Elevated troponin  - proBNP 2644 (no previous to compare)  - HS troponin 50 with repeat 101 (no previous to compare)  - EKG: sinus rhythm rate 87  - no chest pain  - likely from demand ischemia and underlying CHF  - repeat troponin    CASIE on CKD  - BUN 38, creatinine 1.77 (compared to Bun  26, creatinine 1.23 on 12/4/2024)   - repeat BMP in am, may need nephrology consult     DM type II w/ hyperglycemia, neuropathy  - glucose 300s  - given steroids which will contribute  - resume home insulin and add SSI q6 hours  - cont home neurontin when home meds verified     CAD s/p PCI  HFpEF  Hypertension  Hyperlipidemia  PVD  - cont home atenolol, irbesartan, hctz, norvasc, plavix, fenofibrate, statin when home meds verified     H/o DVT  Thrombophilia  - on warfarin, INR 2.68  - monitor daily INR    Hypothyroidism  - cont home synthroid     Chronic pain of bilateral knees    HAN  - wears cpap qhs with 2L O2 per patient report     Nutrition:   NPO Diet NPO Type: Sips with Meds, Ice Chips     VTE Prophylaxis:  No VTE prophylaxis order currently exists.         History of Present illness     Dalton Dallas  is a 84 y.o. male with PMH of CAD, HFpEF, HTN, HLD, PVD, DM type II, thrombophilia, hypothyroidism, chronic pain of both knees, HAN who presented to [facility] ED 1/13/2025 with complaints of weakness for about 1 week, shortness of breath, nonproductive cough, and chills. He stated he he uses a bipap with 2L O2 at night.     In the ED the patient was hypoxic in the 70s per ER report and placed on a nonrebreather. He improved and was placed to a high flow nasal cannula then bipap. He could not tolerate bipap and was switched to 60% high flow O2 with improvement. He had WBC 14.49, lactate 4.8 with repeat 3.3 and another repeat 2.2. BUN 38, creatinine 1.77, K 5.7, proBNP 2644, HS troponin 50 with repeat 101, INR therapeutic 2.68. CT PE protocol showed no PE, extensive patchy opacities and groundglass opacities noted throughout the lungs bilaterally, left greater than right. There is relative sparing of the right upper lobe. There is superimposed reticulations, interstitial thickening and traction bronchiectasis noted within the peripheral aspect of the lungs. These findings are highly concerning for an  infectious/inflammatory process superimposed upon chronic interstitial lung disease. Please consider follow-up with chest CT in 3 months after   resolution of symptoms to exclude underlying pulmonary lesions. Multiple enlarged mediastinal and perihilar lymph nodes, likely reactive. Mild enlargement of the central pulmonary arteries, a finding which can be seen in the setting of pulmonary hypertension. He was given duonebs, IV solumedrol, duonebs. He was only given 1L IVFs for sepsis due to underlying CHF and elevated BNP. He was admitted for further treatment of acute respiratory failure with hypoxia, multifocal pneumonia, pulmonary fibrosis, mediastinal lymphadenopathy.     Subjective / Review of systems     Review of Systems   Constitutional:  Positive for chills and fatigue.   HENT: Negative.     Eyes: Negative.    Respiratory:  Positive for cough, shortness of breath and wheezing.    Cardiovascular:  Positive for leg swelling. Negative for chest pain.   Gastrointestinal: Negative.    Genitourinary: Negative.    Musculoskeletal: Negative.    Skin: Negative.    Neurological: Negative.    Psychiatric/Behavioral: Negative.          Past Medical/Surgical/Social/Family History & Allergies     Past Medical History:   Diagnosis Date    ACE-inhibitor cough 06/2005    Coronary artery disease     Diabetes mellitus, type 2 1995    ED (erectile dysfunction)     Hx of blood clots 2014    Blood clot left leg     Hyperlipidemia 08/2000    Hypertension     Hypogonadism, male 1998    Hypothyroidism 06/2001    Obesity     Peripheral neuropathy     Pulmonary embolism 02/2014    Rectal fistula     Sleep apnea 12/10/2013    Ulcer of left foot 11/2022    Vitamin D deficiency       Past Surgical History:   Procedure Laterality Date    CARDIAC CATHETERIZATION  2008    PTCA: 2008; PTCA: 4/21/2015 LCX stent     CARDIAC CATHETERIZATION Left 7/3/2021    Procedure: Cardiac Catheterization/Vascular Study;  Surgeon: Edwra Ackerman MD;   Location: [facility] CATH INVASIVE LOCATION;  Service: Cardiovascular;  Laterality: Left;    CARDIOVASCULAR STRESS TEST      CATARACT EXTRACTION  2016 & 16     COLONOSCOPY N/A 2019    Procedure: COLONOSCOPY WITH ENDOSCOPIC CLIPPING X1 OF POST POLYPECTOMY BLEED SITE;  Surgeon: Jhonny Orellana MD;  Location: [facility] ENDOSCOPY;  Service: Gastroenterology    CORONARY STENT PLACEMENT      INGUINAL HERNIA REPAIR Left     UMBILICAL HERNIA REPAIR        Social History     Socioeconomic History    Marital status:    Tobacco Use    Smoking status: Former     Current packs/day: 0.00     Average packs/day: 1.5 packs/day for 20.0 years (30.0 ttl pk-yrs)     Types: Cigarettes     Start date:      Quit date:      Years since quittin.0     Passive exposure: Past    Smokeless tobacco: Never    Tobacco comments:     quit    Vaping Use    Vaping status: Never Used   Substance and Sexual Activity    Alcohol use: No    Drug use: No    Sexual activity: Defer      Family History   Problem Relation Age of Onset    Heart disease Mother     Leukemia Father       No Known Allergies   Social Drivers of Health     Tobacco Use: Medium Risk (2025)    Patient History     Smoking Tobacco Use: Former     Smokeless Tobacco Use: Never     Passive Exposure: Past   Alcohol Use: Not At Risk (2025)    AUDIT-C     Frequency of Alcohol Consumption: Never     Average Number of Drinks: Patient does not drink     Frequency of Binge Drinking: Never   Financial Resource Strain: Low Risk  (10/11/2022)    Overall Financial Resource Strain (CARDIA)     Difficulty of Paying Living Expenses: Not hard at all   Food Insecurity: No Food Insecurity (10/11/2022)    Hunger Vital Sign     Worried About Running Out of Food in the Last Year: Never true     Ran Out of Food in the Last Year: Never true   Transportation Needs: No Transportation Needs (2023)    OASIS : Transportation     Lack of Transportation  (Medical): No     Lack of Transportation (Non-Medical): No     Patient Unable or Declines to Respond: No   Physical Activity: Not on file   Stress: Not on file   Social Connections: Feeling Socially Integrated (1/20/2023)    OASIS : Social Isolation     Frequency of experiencing loneliness or isolation: Rarely   Interpersonal Safety: Not At Risk (1/13/2025)    Abuse Screen     Unsafe at Home or Work/School: no     Feels Threatened by Someone?: no     Does Anyone Keep You from Contacting Others or Doint Things Outside the Home?: no     Physical Sign of Abuse Present: no   Depression: Not at risk (5/31/2024)    PHQ-2     PHQ-2 Score: 0   Housing Stability: Unknown (1/13/2025)    Housing Stability     Current Living Arrangements: home     Potentially Unsafe Housing Conditions: Not on file   Utilities: Not on file   Health Literacy: Adequate Health Literacy (1/20/2023)    OASIS : Health Literacy     Frequency of needing help to read materials from doctor or pharmacy: Never   Employment: Not on file   Disabilities: Not on file        Home Medications     Prior to Admission medications    Medication Sig Start Date End Date Taking? Authorizing Provider   Accu-Chek Irene Plus test strip USE TO CHECK BLOOD SUGAR BEFORE MEALS AND AT BEDTIME 10/7/24   Virginia Shaver MD   amLODIPine (NORVASC) 5 MG tablet TAKE 1 TABLET BY MOUTH DAILY 1/13/25   Edwar Ackerman MD   atenolol (TENORMIN) 50 MG tablet TAKE 1 TABLET BY MOUTH DAILY 12/2/24   Virginia Shaver MD   atorvastatin (LIPITOR) 80 MG tablet TAKE 1 TABLET BY MOUTH AT NIGHT  FOR HIGH AMOUNT OF FATS IN THE  BLOOD 1/13/25   Edwar Ackerman MD   Blood Glucose Monitoring Suppl (Accu-Chek Irene Plus) w/Device kit 1 each 4 (Four) Times a Day. 5/13/22   Virginia Shaver MD   clopidogrel (PLAVIX) 75 MG tablet Take 1 tablet by mouth Daily. 3/26/24   Edwar Ackerman MD   fenofibrate (TRICOR) 54 MG tablet TAKE 1 TABLET BY MOUTH DAILY FOR HIGH AMOUNT OF TRIGLYCERIDES IN  THE  "BLOOD 9/9/24   Edwar Ackerman MD   ferrous sulfate 325 (65 FE) MG tablet Take 1 tablet by mouth 2 (Two) Times a Day.    Provider, MD Lee   gabapentin (NEURONTIN) 600 MG tablet TAKE 1 TABLET BY MOUTH 3 TIMES  DAILY 11/16/24   Joan Johns APRN   hydroCHLOROthiazide 25 MG tablet Take 1 tablet by mouth Daily. 11/11/24   Edwar Ackerman MD   Ibuprofen 3 %, Gabapentin 10 %, Baclofen 2 %, lidocaine 4 % Apply 1-2 g topically to the appropriate area as directed 3 (Three) to 4 (Four) times daily. 10/3/24 10/3/25  Freddy Roe MD   Insulin Pen Needle (BD ULTRA-FINE PEN NEEDLES) 29G X 12.7MM misc Use to inject insulin twice daily. DX E11.65 2/29/24   Virginia Shaver MD   insulin regular (HUMULIN R) 500 UNIT/ML CONCENTRATED injection DRAW TO 30 UNIT REI ON U-100 SYRINGE AND INJECT IN THE MORNING AND 40 UNIT REI IN THE EVENING Indications: Type 2 Diabetes 10/31/24   Virginia Shaver MD   Insulin Syringe-Needle U-100 (BD Insulin Syringe U/F) 31G X 5/16\" 0.5 ML misc 1 each by Other route 2 (Two) Times a Day. 9/21/23   Virginia Shaver MD   Insulin Syringe-Needle U-100 30G X 1/2\" 0.5 ML misc 1 each by Other route 2 (Two) Times a Day. 1/23/24   Virginia Shaver MD   irbesartan (AVAPRO) 300 MG tablet TAKE 1 TABLET BY MOUTH DAILY 5/7/24   Virginia Shaver MD   isosorbide mononitrate (IMDUR) 30 MG 24 hr tablet TAKE 1 TABLET BY MOUTH DAILY 1/13/25   Edwar Ackerman MD   Jardiance 25 MG tablet tablet TAKE 1 TABLET BY MOUTH DAILY 12/2/24   Virginia Shaver MD   levothyroxine (SYNTHROID, LEVOTHROID) 175 MCG tablet TAKE 1 TABLET BY MOUTH DAILY FOR UNDERACTIVE THYROID 5/7/24   Virginia Shaver MD   metFORMIN ER (GLUCOPHAGE-XR) 500 MG 24 hr tablet TAKE 4 TABLETS BY MOUTH AT  SUPPER FOR TYPE 2 DIABETES 12/2/24   Virginia Shaver MD   Methylcobalamin (B12-ACTIVE PO) Take  by mouth.    Provider, MD Lee   O2 (OXYGEN) Inhale 1 L/min Every Night. Indications: marianne 11/29/22   Provider, MD Lee   Omega-3 " Fatty Acids (FISH OIL) 1000 MG capsule capsule Take 1 capsule by mouth 2 (Two) Times a Day.    ProviderLee MD   Pramlintide Acetate (SymlinPen 120) 2700 MCG/2.7ML solution pen-injector INJECT 120 MCG SUBCUTANEOUSLY  INTO THE APPROPRIATE AREA AS  DIRECTED 3 TIMES DAILY 6/10/24   Virginia Shaver MD   urea (CARMOL) 40 % cream Apply 1 application  topically to the appropriate area as directed 2 (Two) Times a Day. Apply twice daily to calluses. Add vitamin D 5,000 units 12/6/23   Triny Cee APRN   vitamin B-12 (CYANOCOBALAMIN) 100 MCG tablet Take 1 tablet by mouth Daily. Indications: Inadequate Vitamin B12 4/15/15   ProviderLee MD   vitamin D (ERGOCALCIFEROL) 1.25 MG (74845 UT) capsule capsule TAKE 1 CAPSULE BY MOUTH ONCE  WEEKLY FOR VITAMIN D DEFICIENCY 11/11/24   Virginia Shaver MD   warfarin (COUMADIN) 2 MG tablet TAKE 1 TABLET BY MOUTH DAILY OR  AS DIRECTED BASED ON INR  Patient taking differently: Take 1 tablet by mouth As Needed. TAKE 1 TABLET BY MOUTH DAILY OR  AS DIRECTED BASED ON INR Tuesday and Thursdays only 8/16/24   Anu Palmer PA-C   warfarin (COUMADIN) 3 MG tablet Take 1 tablet by mouth daily or as directed by medication management clinic.  Indications: Atrial Fibrillation 9/9/24   Anu Palmer PA-C   warfarin (COUMADIN) 5 MG tablet 7 mg (5 mg x 1 and 1 mg x 2) every Tue, Thu; 8 mg (5 mg x 1 and 1 mg x 3) all other days 10/7/24   Anu Palmer PA-C   amLODIPine (NORVASC) 5 MG tablet Take 1 tablet by mouth Daily. 3/26/24 1/13/25  Edwar Ackerman MD   atorvastatin (LIPITOR) 80 MG tablet Take 1 tablet by mouth Every Night. Indications: High Amount of Fats in the Blood 3/26/24 1/13/25  Edwar Ackerman MD   isosorbide mononitrate (IMDUR) 30 MG 24 hr tablet Take 1 tablet by mouth Daily. 3/26/24 1/13/25  Edwar Ackerman MD        Objective / Physical Exam     Vital signs:  Temp: 98.2 °F (36.8 °C)  BP: 134/60  Heart Rate: 86  Resp: 18  SpO2: 99 %  Weight:  124 kg (274 lb 7.6 oz)    Admission Weight: Weight: 124 kg (274 lb 7.6 oz)    Physical Exam  Vitals and nursing note reviewed.   Constitutional:       Appearance: He is obese.   HENT:      Head: Normocephalic and atraumatic.   Eyes:      Extraocular Movements: Extraocular movements intact.      Pupils: Pupils are equal, round, and reactive to light.   Cardiovascular:      Rate and Rhythm: Normal rate and regular rhythm.      Pulses: Normal pulses.      Heart sounds: Normal heart sounds.   Pulmonary:      Effort: Tachypnea present. No respiratory distress.      Breath sounds: Examination of the right-upper field reveals decreased breath sounds and wheezing. Examination of the left-upper field reveals decreased breath sounds and wheezing. Examination of the right-lower field reveals decreased breath sounds. Examination of the left-lower field reveals decreased breath sounds. Decreased breath sounds present.   Abdominal:      General: Bowel sounds are normal.      Palpations: Abdomen is soft.      Tenderness: There is no abdominal tenderness.   Musculoskeletal:         General: Normal range of motion.      Right lower leg: Edema present.      Left lower leg: Edema present.   Skin:     General: Skin is warm and dry.   Neurological:      Mental Status: He is alert and oriented to person, place, and time.   Psychiatric:         Mood and Affect: Mood normal.         Behavior: Behavior normal.          Labs     Results from last 7 days   Lab Units 01/13/25  1505   WBC 10*3/mm3 14.49*   HEMOGLOBIN g/dL 14.4   HEMATOCRIT % 44.5   PLATELETS 10*3/mm3 405      Results from last 7 days   Lab Units 01/13/25  1505   ALK PHOS U/L 80   AST (SGOT) U/L 34   ALT (SGPT) U/L 22      Results from last 7 days   Lab Units 01/13/25  1505 01/07/25  0000   PROTIME Seconds 28.5  --    INR  2.68 2.20      Results from last 7 days   Lab Units 01/13/25  1505   SODIUM mmol/L 136   POTASSIUM mmol/L 5.7*   CHLORIDE mmol/L 95*   CO2 mmol/L 17.2*   BUN  mg/dL 38*   CREATININE mg/dL 1.77*   GLUCOSE mg/dL 325*        Imaging     CT Angiogram Chest Pulmonary Embolism    Result Date: 1/13/2025  CT ANGIOGRAM CHEST PULMONARY EMBOLISM Date of Exam: 1/13/2025 4:44 PM EST Indication: Chest pain with hypoxemia history of DVT. Comparison: CT chest 5/8/2015 Technique: Axial CT images were obtained of the chest after the uneventful intravenous administration of iodinated contrast utilizing pulmonary embolism protocol.  Sagittal and coronal reconstructions were performed.  Automated exposure control and iterative reconstruction methods were used. Findings: Pulmonary arteries:Adequate opacification of the pulmonary arteries. No evidence of acute pulmonary embolism. Mild enlargement of the central pulmonary arteries. Aorta: Vascular calcifications noted throughout the thoracic aorta. The ascending thoracic aorta is ectatic measuring up to 4.2 cm. The descending thoracic aorta is ectatic measuring up to 3.4 cm. Lungs and Pleura: Extensive patchy opacities and groundglass opacities noted throughout the lungs bilaterally, left greater than right. There is relative sparing of the right upper lobe. There is superimposed reticulations, interstitial thickening and traction bronchiectasis noted within the peripheral aspect of the lungs. The central airways are patent. No pleural effusion or pneumothorax Mediastinum/Mary: Multiple enlarged mediastinal and perihilar lymph nodes measuring up to 1.9 cm in length on the short axis. Heart: Normal in size. No pericardial effusion. Normal RV/LV ratio. Soft Tissue: Unremarkable. Upper Abdomen: Unremarkable. Bones: No acute osseous abnormality. Degenerative changes noted throughout the visualized spine, most significantly within the visualized cervical spine.     Impression: 1.No evidence of acute pulmonary embolism. 2.Extensive patchy opacities and groundglass opacities noted throughout the lungs bilaterally, left greater than right. There is  relative sparing of the right upper lobe. There is superimposed reticulations, interstitial thickening and traction bronchiectasis noted within the peripheral aspect of the lungs. These findings are highly concerning for an infectious/inflammatory process superimposed upon chronic interstitial lung disease. Please consider follow-up with chest CT in 3 months after resolution of symptoms to exclude underlying pulmonary lesions 3.Multiple enlarged mediastinal and perihilar lymph nodes, likely reactive. 4.Mild enlargement of the central pulmonary arteries, a finding which can be seen in the setting of pulmonary hypertension. Electronically Signed: Malcolm Moss DO  1/13/2025 5:01 PM EST  Workstation ID: [id number]          Current Medications     Scheduled Meds:  budesonide, 0.5 mg, Nebulization, BID - RT  [START ON 1/14/2025] cefepime, 2,000 mg, Intravenous, Q12H  [START ON 1/14/2025] insulin lispro, 3-14 Units, Subcutaneous, Q6H  ipratropium-albuterol, 3 mL, Nebulization, Q4H - RT  [START ON 1/14/2025] methylPREDNISolone sodium succinate, 40 mg, Intravenous, Q8H  sodium chloride, 10 mL, Intravenous, Q12H        Ar[facility]elaWest[facility]  01/13/25   23:28 EST

## 2025-01-14 NOTE — PROGRESS NOTES
Dalton ISAC Dallas Sr. is a 84 y.o. male admitted with respiratory distress.     Recent Labs     01/13/25  1505   CREATININE 1.77*   BUN 38*      K 5.7*   CL 95*   CO2 17.2*     Estimated Creatinine Clearance: 42.7 mL/min (A) (by C-G formula based on SCr of 1.77 mg/dL (H)).    Assessment/Plan  cefepime renally adjusted to 2000mg IV q12h per the Adult Renal Dosing of Medication policy for estCrCl 30-59 mL/min    Ld Mendez  1/13/2025

## 2025-01-14 NOTE — CONSULTS
"Diabetes Education  Assessment/Teaching    Patient Name:  Dalton Dallas Sr.  YOB: 1940  MRN: [id number]  Admit Date:  1/13/2025      Assessment Date:  1/14/2025  Flowsheet Row Most Recent Value   General Information     Referral From: MD order  [Consult received due to bs >200. Adm bs 325. BS this am [id number] and at 1223 356. A1c this adm 7.3%.]   Height 175.3 cm (69\")   Height Method Stated   Weight 123 kg (272 lb 0.8 oz)   Weight Method Bed scale   Pregnancy Assessment    Diabetes History    What type of diabetes do you have? Type 2   Length of Diabetes Diagnosis --  [Pt with long hx.]   Do you test your blood sugar at home? yes   Frequency of checks 3 times/day   Meter type Accuchek Irene, about 2 years old, has current testing supplies   Who performs the test? self   Have you had low blood sugar? (<70mg/dl) yes   How often do you have low blood sugar? occasionally   Education Preferences    Nutrition Information    Assessment Topics    DM Goals             Flowsheet Row Most Recent Value   DM Education Needs    Meter Has own   Meter Type Accuchek   Frequency of Testing 3 times a day  [Encouraged pt to report bs readings to endocinologist if bs running outside healthy range.]   Blood Glucose Target Range Discussed A1c result of 7.3%. Discussed healthy bs range and healthy A1c target. Discussed importance of bs control. Explained to pt that A1c in 7% range would be in healthy range for pt's age.   Medication Insulin, Oral, Vial, Other injectables  [Pt taking Humulin R U-500 using vial/syringe and draws to 25 unit marking in am and 35 unit marking in the pm. Pt also taking jardiance 25 mg daiy, Metformin  mg 4 pills at supper, and Symlin 120 mcg bid.]   Problem Solving Hypoglycemia, Hyperglycemia, Signs, Symptoms, Treatment  [Discussed importance of always keeping low bs tx available.]   Reducing Risks A1C testing  [Gave A1c info sheet.]   Healthy Eating --  [Pt usually eating 2 " meals/day.]   Motivation Engaged   Teaching Method Explanation, Discussion, Handouts   Patient Response Verbalized understanding              Other Comments:  Met with pt at bedside. [facility] being on 12/11/2024. Pt has follow up scheduled for 10/1/2025. Pt states has all insulin/meds and monitoring supplies at home. BS this adm running high. Endocrinology has been consulted. Educator has messaged endocrinology to give info about current insulin that is ordered and that pt takes Humulin R U-500 insulin at home.     Pt states not needing education at this time. No further follow up needed.       Electronically signed by:  Salena Hall RN  01/14/25 12:52 EST

## 2025-01-14 NOTE — THERAPY EVALUATION
Patient Name: Dalton Dallas .  : 1940    MRN: [id number]                              Today's Date: 2025       Admit Date: 2025    Visit Dx:     ICD-10-CM ICD-9-CM   1. Hypoxemia  R09.02 799.02   2. Respiratory distress  R06.03 786.09   3. Multifocal pneumonia  J18.9 486   4. Pulmonary fibrosis  J84.10 515   5. Mediastinal lymphadenopathy  R59.0 785.6     Patient Active Problem List   Diagnosis    Coronary artery disease involving native coronary artery of native heart    Essential hypertension    Mixed hyperlipidemia    Deep vein thrombosis (DVT)    Eczema    Hypothyroidism    Sleep apnea    Vitamin D deficiency    Peripheral vascular disease    Chronic pulmonary embolism without acute cor pulmonale    Lumbar radiculopathy    Thrombophilia    MI, acute, non ST segment elevation    Type 2 diabetes mellitus with hypoglycemia without coma, without long-term current use of insulin    Abnormal leg movement    Ulcer of left foot with necrosis of muscle    Chronic stable angina    Encounter for subsequent annual wellness visit (AWV) in Medicare patient    Chronic pain of both knees    Multifocal pneumonia     Past Medical History:   Diagnosis Date    ACE-inhibitor cough 2005    Coronary artery disease     Diabetes mellitus, type 2     ED (erectile dysfunction)     Hx of blood clots     Blood clot left leg     Hyperlipidemia 2000    Hypertension     Hypogonadism, male     Hypothyroidism 2001    Obesity     Peripheral neuropathy     Pulmonary embolism 2014    Rectal fistula     Sleep apnea 12/10/2013    Ulcer of left foot 2022    Vitamin D deficiency      Past Surgical History:   Procedure Laterality Date    CARDIAC CATHETERIZATION      PTCA: ; PTCA: 2015 LCX stent     CARDIAC CATHETERIZATION Left 7/3/2021    Procedure: Cardiac Catheterization/Vascular Study;  Surgeon: Edwar Ackerman MD;  Location: [facility] INVASIVE LOCATION;  Service: Cardiovascular;   Laterality: Left;    CARDIOVASCULAR STRESS TEST  2020    CATARACT EXTRACTION  01/11/2016 1-4-16 & 1-11-16     COLONOSCOPY N/A 11/23/2019    Procedure: COLONOSCOPY WITH ENDOSCOPIC CLIPPING X1 OF POST POLYPECTOMY BLEED SITE;  Surgeon: Jhonny Orellana MD;  Location: [facility] ENDOSCOPY;  Service: Gastroenterology    CORONARY STENT PLACEMENT      INGUINAL HERNIA REPAIR Left     UMBILICAL HERNIA REPAIR        General Information       Row Name 01/14/25 1253          Physical Therapy Time and Intention    Document Type evaluation  -     Mode of Treatment physical therapy  -       Row Name 01/14/25 1253          General Information    Patient Profile Reviewed yes  -SS     Prior Level of Function independent:;w/c or scooter  functions at w/c level, performs own ADLs, son lives with him and helps with things if needed  -     Existing Precautions/Restrictions oxygen therapy device and L/min  currently on 60L 75%FiO2 airvo  -     Barriers to Rehab previous functional deficit  -       Row Name 01/14/25 1253          Living Environment    People in Home child(kacie), adult  son lives with pt  -       Row Name 01/14/25 1253          Home Main Entrance    Number of Stairs, Main Entrance two  patient climbs 2 steps  -     Stair Railings, Main Entrance railings safe and in good condition  -       Row Name 01/14/25 1253          Stairs Within Home, Primary    Number of Stairs, Within Home, Primary none  -       Row Name 01/14/25 1253          Cognition    Orientation Status (Cognition) oriented x 4  -       Row Name 01/14/25 1253          Safety Issues/Impairments Affecting Functional Mobility    Impairments Affecting Function (Mobility) endurance/activity tolerance;strength  -               User Key  (r) = Recorded By, (t) = Taken By, (c) = Cosigned By      Initials Name Provider Type     Karen Olivarez, PT Physical Therapist                   Mobility       Row Name 01/14/25 1256          Bed Mobility     Bed Mobility bed mobility (all) activities  -     All Activities, [place] (Bed Mobility) standby assist  -       Row Name 01/14/25 1256          Bed-Chair Transfer    Bed-Chair [place] (Transfers) minimum assist (75% patient effort)  -       Row Name 01/14/25 1256          Sit-Stand Transfer    Sit-Stand [place] (Transfers) contact guard  -       Row Name 01/14/25 1256          Gait/Stairs (Locomotion)    [place] Level (Gait) contact guard;minimum assist (75% patient effort)  hands placed on bed and recliner  -     Distance in Feet (Gait) 3  -SS     Comment, (Gait/Stairs) patient avoids ambulation at baseline due to knee buckling, chronic knee arthritis  -               User Key  (r) = Recorded By, (t) = Taken By, (c) = Cosigned By      Initials Name Provider Type     Karen Olivarez PT Physical Therapist                   Obj/Interventions       Row Name 01/14/25 1257          Range of Motion Comprehensive    General Range of Motion no range of motion deficits identified  -     Comment, General Range of Motion able to don socks independently with figure 4 technique  -       Row Name 01/14/25 1257          Strength Comprehensive (MMT)    Comment, General Manual Muscle Testing (MMT) Assessment B LE >3/5  -       Row Name 01/14/25 1257          Balance    Balance Assessment sitting static balance;standing static balance  -SS     Static Sitting Balance independent  -SS     Static Standing Balance contact guard  -       Row Name 01/14/25 1257          Sensory Assessment (Somatosensory)    Sensory Assessment (Somatosensory) not tested  -               User Key  (r) = Recorded By, (t) = Taken By, (c) = Cosigned By      Initials Name Provider Type     Karen Olivarez PT Physical Therapist                   Goals/Plan       Row Name 01/14/25 1303          Bed Mobility Goal 1 (PT)    Activity/Assistive Device (Bed Mobility Goal 1, PT) bed mobility activities, all  -      [place] Level/Cues Needed (Bed Mobility Goal 1, PT) modified independence  -SS     Time Frame (Bed Mobility Goal 1, PT) long term goal (LTG);2 weeks  -       Row Name 01/14/25 1303          Transfer Goal 1 (PT)    Activity/Assistive Device (Transfer Goal 1, PT) transfers, all  -SS     [place] Level/Cues Needed (Transfer Goal 1, PT) modified independence  -SS     Time Frame (Transfer Goal 1, PT) long term goal (LTG);2 weeks  -       Row Name 01/14/25 1303          Gait Training Goal 1 (PT)    Distance (Gait Training Goal 1, PT) 75'  -       Row Name 01/14/25 1303          Therapy Assessment/Plan (PT)    Planned Therapy Interventions (PT) balance training;bed mobility training;home exercise program;transfer training;strengthening;patient/family education  -               User Key  (r) = Recorded By, (t) = Taken By, (c) = Cosigned By      Initials Name Provider Type     Karen Olivarez, PT Physical Therapist                   Clinical Impression       Row Name 01/14/25 1252          Pain    Additional Documentation Pain Scale: FACES Pre/Post-Treatment (Group)  -       Row Name 01/14/25 1259          Pain Scale: FACES Pre/Post-Treatment    Pain: FACES Scale, Pretreatment 2-->hurts little bit  -     Posttreatment Pain Rating 2-->hurts little bit  -       Row Name 01/14/25 1256          Plan of Care Review    Plan of Care Reviewed With patient  -     Outcome Evaluation 83 y/o M who presented with the onset of increasing respiratory distress for 3 days, diagnosed with Multifocal pneumonia. Patient lives with son in a one level home with 2 [place] with rail. Pt functions at w/c level primarily due to chronic arthritic knees and knee buckling. Patient is typically mod I for ADLs and w/c mobility. He is currently on 60lpm at 75% FiO2 on airvo. Patient able to complete bed mobility with SBA, transfers with CGA to min A to recliner. Pt mobility wise is near his baseline function and anticipate he  will be safe to return home at d/c however will keep him on caseload as he is at risk for deconditioning. Will recommend HHPT at d/c.  -       Row Name 01/14/25 1258          Therapy Assessment/Plan (PT)    Rehab Potential (PT) good  -     Criteria for Skilled Interventions Met (PT) yes;meets criteria  -     Therapy Frequency (PT) 3 times/wk  -     Predicted Duration of Therapy Intervention (PT) until d/c  -       Row Name 01/14/25 1258          Positioning and Restraints    Pre-Treatment Position in bed  -SS     Post Treatment Position chair  -SS     In Chair exit alarm on  -               User Key  (r) = Recorded By, (t) = Taken By, (c) = Cosigned By      Initials Name Provider Type     Karen Olivarez, PT Physical Therapist                   Outcome Measures       Row Name 01/14/25 0739 01/14/25 0122       How much help from another person do you currently need...    Turning from your back to your side while in flat bed without using bedrails? 3  -MC 3  -LC    Moving from lying on back to sitting on the side of a flat bed without bedrails? 3  -MC 3  -LC    Moving to and from a bed to a chair (including a wheelchair)? 2  -MC 2  -LC    Standing up from a chair using your arms (e.g., wheelchair, bedside chair)? 2  -MC 2  -LC    Climbing 3-5 steps with a railing? 1  -MC 1  -LC    To walk in hospital room? 2  -MC 2  -LC    AM-PAC 6 Clicks Score (PT) 13  - 13  -LC              User Key  (r) = Recorded By, (t) = Taken By, (c) = Cosigned By      Initials Name Provider Type    Marline Pires, RN Registered Nurse    Konstantin Blakely, RN Registered Nurse                                 Physical Therapy Education       Title: PT OT SLP Therapies (Done)       Topic: Physical Therapy (Done)       Point: Mobility training (Done)       Learning Progress Summary            Patient Acceptance, E, VU by  at 1/14/2025 1304                                      User Key       Initials Effective Dates Name  Provider Type Discipline     06/16/21 -  Karen Olivarez PT Physical Therapist PT                  PT Recommendation and Plan  Planned Therapy Interventions (PT): balance training, bed mobility training, home exercise program, transfer training, strengthening, patient/family education  Outcome Evaluation: 85 y/o M who presented with the onset of increasing respiratory distress for 3 days, diagnosed with Multifocal pneumonia. Patient lives with son in a one level home with 2 [place] with rail. Pt functions at w/c level primarily due to chronic arthritic knees and knee buckling. Patient is typically mod I for ADLs and w/c mobility. He is currently on 60lpm at 75% FiO2 on airvo. Patient able to complete bed mobility with SBA, transfers with CGA to min A to recliner. Pt mobility wise is near his baseline function and anticipate he will be safe to return home at d/c however will keep him on caseload as he is at risk for deconditioning. Will recommend HHPT at d/c.     Time Calculation:   PT Evaluation Complexity  History, PT Evaluation Complexity: 3 or more personal factors and/or comorbidities  Examination of Body Systems (PT Eval Complexity): total of 3 or more elements  Clinical Presentation (PT Evaluation Complexity): evolving  Clinical Decision Making (PT Evaluation Complexity): moderate complexity  Overall Complexity (PT Evaluation Complexity): moderate complexity     PT Charges       Row Name 01/14/25 1304             Time Calculation    Start Time 1010  -      Stop Time 1036  -      Time Calculation (min) 26 min  -      PT Received On 01/14/25  -      PT - Next Appointment 01/15/25  -      PT Goal Re-Cert Due Date 01/28/25  -         Time Calculation- PT    Total Timed Code Minutes- PT 0 minute(s)  -                User Key  (r) = Recorded By, (t) = Taken By, (c) = Cosigned By      Initials Name Provider Type     Karen Olivarez, PT Physical Therapist                  Therapy Charges for  Today       Code Description Service Date Service Provider Modifiers Qty    46172491823 HC PT EVAL MOD COMPLEXITY 4 1/14/2025 Karen Olivarez, PT GP 1            PT G-Codes  AM-PAC 6 Clicks Score (PT): 13  PT Discharge Summary  Anticipated Discharge Disposition (PT): home with home health    Karen Olivarez, PT  1/14/2025

## 2025-01-14 NOTE — ED PROVIDER NOTES
Subjective   History of Present Illness  84-year-old male with the onset of increasing respiratory distress for the last 3 days.  The patient has had fever as well as chills had a cough productive of green sputum.  He reports no hemoptysis.  He states he has had severe dyspnea on exertion and has had some pain when he takes a deep breath.  He states that he has been told in the past that he had blood clots in his lungs and may have COPD or emphysema.  He reports no leg pain or swelling.  The patient is unsure if he has seen pulmonology in the past he states that he does not think he has been nauseated over the last several days  States he has had some pleuritic chest discomfort today    Review of Systems   Respiratory:  Positive for cough, chest tightness, shortness of breath and wheezing.    Cardiovascular:  Positive for chest pain. Negative for palpitations and leg swelling.       Past Medical History:   Diagnosis Date    ACE-inhibitor cough 06/2005    Coronary artery disease     Diabetes mellitus, type 2 1995    ED (erectile dysfunction)     Hx of blood clots 2014    Blood clot left leg     Hyperlipidemia 08/2000    Hypertension     Hypogonadism, male 1998    Hypothyroidism 06/2001    Obesity     Peripheral neuropathy     Pulmonary embolism 02/2014    Rectal fistula     Sleep apnea 12/10/2013    Ulcer of left foot 11/2022    Vitamin D deficiency        No Known Allergies    Past Surgical History:   Procedure Laterality Date    CARDIAC CATHETERIZATION  2008    PTCA: 2008; PTCA: 4/21/2015 LCX stent     CARDIAC CATHETERIZATION Left 7/3/2021    Procedure: Cardiac Catheterization/Vascular Study;  Surgeon: Edwar Ackerman MD;  Location: [facility] INVASIVE LOCATION;  Service: Cardiovascular;  Laterality: Left;    CARDIOVASCULAR STRESS TEST  2020    CATARACT EXTRACTION  01/11/2016 1-4-16 & 1-11-16     COLONOSCOPY N/A 11/23/2019    Procedure: COLONOSCOPY WITH ENDOSCOPIC CLIPPING X1 OF POST POLYPECTOMY BLEED SITE;   Surgeon: Jhonny Orellana MD;  Location: [facility] ENDOSCOPY;  Service: Gastroenterology    CORONARY STENT PLACEMENT      INGUINAL HERNIA REPAIR Left     UMBILICAL HERNIA REPAIR         Family History   Problem Relation Age of Onset    Heart disease Mother     Leukemia Father        Social History     Socioeconomic History    Marital status:    Tobacco Use    Smoking status: Former     Current packs/day: 0.00     Average packs/day: 1.5 packs/day for 20.0 years (30.0 ttl pk-yrs)     Types: Cigarettes     Start date:      Quit date:      Years since quittin.0     Passive exposure: Past    Smokeless tobacco: Never    Tobacco comments:     quit    Vaping Use    Vaping status: Never Used   Substance and Sexual Activity    Alcohol use: No    Drug use: No    Sexual activity: Defer           Objective   Physical Exam  Alert Mary Ann Coma Scale 15 O2 saturation of 78% at triage with respiratory distress noted   HEENT: Pupils equal and reactive to light. Conjunctivae are not injected. Normal tympanic membranes. Oropharynx and nares are normal.   Neck: Supple. Midline trachea. No JVD. No goiter.   Chest: Extensive Rales are noted bilaterally as well as scattered rhonchi and expiratory wheezes and equal breath sounds bilaterally, regular rate and rhythm without murmur or rub.   Abdomen: Positive bowel sounds, nontender, nondistended. No rebound or peritoneal signs. No CVA tenderness.   Extremities no clubbing. cyanosis or edema. Motor sensory exam is normal. The full range of motion is intact   Skin: Warm and dry, no rashes or petechia.   Lymphatic: No regional lymphadenopathy. No calf pain, swelling or Homans sign    Procedures           ED Course  ED Course as of 25 ED overflow/overcrowding condition [TH]      ED Course User Index  [TH] Colby Quach MD                                           Labs Reviewed   COMPREHENSIVE METABOLIC PANEL - Abnormal; Notable for  the following components:       Result Value    Glucose 325 (*)     BUN 38 (*)     Creatinine 1.77 (*)     Potassium 5.7 (*)     Chloride 95 (*)     CO2 17.2 (*)     Anion Gap 23.8 (*)     eGFR 37.9 (*)     All other components within normal limits    Narrative:     GFR Categories in Chronic Kidney Disease (CKD)      GFR Category          GFR (mL/min/1.73)    Interpretation  G1                     90 or greater         Normal or high (1)  G2                      60-89                Mild decrease (1)  G3a                   45-59                Mild to moderate decrease  G3b                   30-44                Moderate to severe decrease  G4                    15-29                Severe decrease  G5                    14 or less           Kidney failure          (1)In the absence of evidence of kidney disease, neither GFR category G1 or G2 fulfill the criteria for CKD.    eGFR calculation 2021 CKD-EPI creatinine equation, which does not include race as a factor   BLOOD GAS, ARTERIAL - Abnormal; Notable for the following components:    pCO2, Arterial 28.4 (*)     pO2, Arterial 117.5 (*)     HCO3, Arterial 18.0 (*)     Base Excess, Arterial -5.2 (*)     O2 Saturation, Arterial 98.7 (*)     CO2 Content 18.8 (*)     All other components within normal limits   BNP (IN-HOUSE) - Abnormal; Notable for the following components:    proBNP 2,644.0 (*)     All other components within normal limits    Narrative:     This assay is used as an aid in the diagnosis of individuals suspected of having heart failure. It can be used as an aid in the diagnosis of acute decompensated heart failure (ADHF) in patients presenting with signs and symptoms of ADHF to the emergency department (ED). In addition, NT-proBNP of <300 pg/mL indicates ADHF is not likely.    Age Range Result Interpretation  NT-proBNP Concentration (pg/mL:      <50             Positive            >450                   Gray                 300-450                     Negative             <300    50-75           Positive            >900                  Gray                300-900                  Negative            <300      >75             Positive            >1800                  Gray                300-1800                  Negative            <300   TROPONIN - Abnormal; Notable for the following components:    HS Troponin T 50 (*)     All other components within normal limits    Narrative:     High Sensitive Troponin T Reference Range:  <14.0 ng/L- Negative Female for AMI  <22.0 ng/L- Negative Male for AMI  >=14 - Abnormal Female indicating possible myocardial injury.  >=22 - Abnormal Male indicating possible myocardial injury.   Clinicians would have to utilize clinical acumen, EKG, Troponin, and serial changes to determine if it is an Acute Myocardial Infarction or myocardial injury due to an underlying chronic condition.        CBC WITH AUTO DIFFERENTIAL - Abnormal; Notable for the following components:    WBC 14.49 (*)     Neutrophil % 81.2 (*)     Lymphocyte % 10.6 (*)     Eosinophil % 0.1 (*)     Immature Grans % 0.8 (*)     Neutrophils, Absolute 11.77 (*)     Monocytes, Absolute 1.02 (*)     Immature Grans, Absolute 0.11 (*)     All other components within normal limits   LACTIC ACID, REFLEX - Abnormal; Notable for the following components:    Lactate 3.3 (*)     All other components within normal limits   HIGH SENSITIVITIY TROPONIN T 1HR - Abnormal; Notable for the following components:    HS Troponin T 101 (*)     Troponin T Numeric Delta 51 (*)     Troponin T % Delta 102 (*)     All other components within normal limits    Narrative:     High Sensitive Troponin T Reference Range:  <14.0 ng/L- Negative Female for AMI  <22.0 ng/L- Negative Male for AMI  >=14 - Abnormal Female indicating possible myocardial injury.  >=22 - Abnormal Male indicating possible myocardial injury.   Clinicians would have to utilize clinical acumen, EKG, Troponin, and serial changes to  determine if it is an Acute Myocardial Infarction or myocardial injury due to an underlying chronic condition.        POC LACTATE - Abnormal; Notable for the following components:    Lactate 4.8 (*)     All other components within normal limits   RESPIRATORY PANEL PCR W/ COVID-19 (SARS-COV-2), NP SWAB IN UTM/VTP, 2 HR TAT - Normal    Narrative:     In the setting of a positive respiratory panel with a viral infection PLUS a negative procalcitonin without other underlying concern for bacterial infection, consider observing off antibiotics or discontinuation of antibiotics and continue supportive care. If the respiratory panel is positive for atypical bacterial infection (Bordetella pertussis, Chlamydophila pneumoniae, or Mycoplasma pneumoniae), consider antibiotic de-escalation to target atypical bacterial infection.   PROTIME-INR - Normal   BLOOD CULTURE   BLOOD CULTURE   BLOOD GAS, ARTERIAL   LACTIC ACID, REFLEX   POC LACTATE   CBC AND DIFFERENTIAL    Narrative:     The following orders were created for panel order CBC & Differential.  Procedure                               Abnormality         Status                     ---------                               -----------         ------                     CBC Auto Differential[[id number]]        Abnormal            Final result                 Please view results for these tests on the individual orders.   EXTRA TUBES    Narrative:     The following orders were created for panel order Extra Tubes.  Procedure                               Abnormality         Status                     ---------                               -----------         ------                     Gold Top - SST[[id number]]                                   Final result                 Please view results for these tests on the individual orders.   GOLD TOP - SST     Medications   ipratropium-albuterol (DUO-NEB) nebulizer solution 3 mL (has no administration in time range)   methylPREDNISolone  sodium succinate (SOLU-Medrol) injection 125 mg (125 mg Intravenous Given 1/13/25 1514)   sodium chloride 0.9 % bolus 1,000 mL (0 mL Intravenous Stopped 1/13/25 1730)   cefepime 2000 mg IVPB in 100 mL NS (MBP) (0 mg Intravenous Stopped 1/13/25 1646)   iopamidol (ISOVUE-370) 76 % injection 100 mL (100 mL Intravenous Given 1/13/25 1645)     CT Angiogram Chest Pulmonary Embolism    Result Date: 1/13/2025  Impression: 1.No evidence of acute pulmonary embolism. 2.Extensive patchy opacities and groundglass opacities noted throughout the lungs bilaterally, left greater than right. There is relative sparing of the right upper lobe. There is superimposed reticulations, interstitial thickening and traction bronchiectasis noted within the peripheral aspect of the lungs. These findings are highly concerning for an infectious/inflammatory process superimposed upon chronic interstitial lung disease. Please consider follow-up with chest CT in 3 months after resolution of symptoms to exclude underlying pulmonary lesions 3.Multiple enlarged mediastinal and perihilar lymph nodes, likely reactive. 4.Mild enlargement of the central pulmonary arteries, a finding which can be seen in the setting of pulmonary hypertension. Electronically Signed: Malcolm Moss DO  1/13/2025 5:01 PM EST  Workstation ID: [id number]    SEPTIC SHOCK FOCUSED EXAM    *Must be completed by a licensed independent Practitioner within 6 hours of diagnosis for the following conditions -- Septic Shock or Severe Sepsis with Lactate >/= 4.    Fluid bolus must be completed prior to assessment.      01/13/25  19:56 EST SEPTIC SHOCK FOCUSED EXAM ATTESTATION    I attest that I have reassessed tissue perfusion after the fluid bolus given.  The patient was only given a liter of isotonic fluids due to evidence of underlying congestive heart failure and elevated BNP.  No hypotension.    Colby Quach MD  01/13/25  19:56 EST                 Medical Decision Making  Patient  had sequential ABGs in the ER that did not show progressive respiratory acidosis or CO2 retention.  The patient was able to tolerate a vest with some improvement.  The was given steroids and started on IV cefepime.  Patient case was discussed with intensivist practitioner and the patient will be admitted to PCU.  He was agreeable to this plan of treatment overall stated he felt better with ER therapy    Problems Addressed:  Hypoxemia: complicated acute illness or injury  Mediastinal lymphadenopathy: complicated acute illness or injury  Multifocal pneumonia: complicated acute illness or injury  Pulmonary fibrosis: complicated acute illness or injury  Respiratory distress: complicated acute illness or injury    Amount and/or Complexity of Data Reviewed  Labs: ordered.  Radiology: ordered.  ECG/medicine tests: ordered.    Risk  OTC drugs.  Prescription drug management.  Decision regarding hospitalization.    Critical Care  Total time providing critical care: 34 minutes (Please note that 34 minutes were spent with care and stabilization this patient for critical care time is exclusive of any time spent on any procedures performed)        Final diagnoses:   Hypoxemia   Respiratory distress   Multifocal pneumonia   Pulmonary fibrosis   Mediastinal lymphadenopathy       ED Disposition  ED Disposition       ED Disposition   Decision to Admit    Condition   --    Comment   Level of Care: Telemetry [5]   Diagnosis: Multifocal pneumonia [[id number]]   Admitting Physician: JEREMY LAU [[id number]]   Attending Physician: JEREMY LAU [[id number]]   Isolate for COVID?: No [0]   Bed Request Comments: pcu   Certification: I Certify That Inpatient Hospital Services Are Medically Necessary For Greater Than 2 Midnights                 No follow-up provider specified.       Medication List      No changes were made to your prescriptions during this visit.            Colby Quach MD  01/13/25 1956

## 2025-01-14 NOTE — CONSULTS
"        Patient Name: Dalton Dallas Sr.  : 1940  MRN: [id number]  Primary Care Physician: Joan Johns APRN  Date of admission: 2025    Patient Care Team:  Joan Johns APRN as PCP - General (Nurse Practitioner)  Jelani Vieira MD as Consulting Physician (Pulmonary Disease)  Edwar Ackerman MD as Consulting Physician (Pulmonary Disease)  Virginia Shaver MD as Consulting Physician (Endocrinology)  Triny Cee APRN as Nurse Practitioner (Orthopedic Surgery)  CHRISSY Holt DPM as Consulting Physician (Podiatry)  Bill Don MD as Consulting Physician (Ophthalmology)  Anu Palmer PA-C as Physician Assistant (Physician Assistant)        Reason for Consult:       CASIE    Subjective   History of Present Illness:   Chief Complaint:   Chief Complaint   Patient presents with    Shortness of Breath     HISTORY:  Dalton Dallas Sr. is a 84 y.o. male with PMH significant for CAD s/p PCI, HFpEF (most recent ECHO showed EF 61-65%), HTN, HLD, PVD, DM type II, thrombophilia, hypothyroidism, chronic pain of both knees, HAN who presented to [facility] ED 2025 with complaints of weakness for about 1 week, shortness of breath, nonproductive cough, and chills. He stated he he uses a bipap with 2L O2 at night.      In the ED the patient was hypoxic in the 70s per ER report and placed on a nonrebreather. He improved and was placed to a high flow nasal cannula then bipap. He could not tolerate bipap and was switched to 60% high flow O2 with improvement. Labs in the ED were pertinent for WBC 14.49, lactate 4.8 with repeat 3.3 and another repeat 2.2. BUN 38, creatinine 1.77, K 5.7, proBNP 2644, HS troponin 50 with repeat 101, INR therapeutic 2.68. CT PE protocol showed no PE, \"extensive patchy opacities and groundglass opacities noted throughout the lungs bilaterally, left greater than right. There is relative sparing of the right upper lobe. There is superimposed reticulations, " "interstitial thickening and traction bronchiectasis noted within the peripheral aspect of the lungs. These findings are highly concerning for an infectious/inflammatory process superimposed upon chronic interstitial lung disease. Please consider follow-up with chest CT in 3 months after resolution of symptoms to exclude underlying pulmonary lesions. Multiple enlarged mediastinal and perihilar lymph nodes, likely reactive. Mild enlargement of the central pulmonary arteries, a finding which can be seen in the setting of pulmonary hypertension.\"  He was given duonebs, IV solumedrol, duonebs. He did receive 1 L IVFs for sepsis given his underlying CHF and elevated BNP. He was admitted for further treatment of acute respiratory failure with hypoxia, multifocal pneumonia, pulmonary fibrosis, mediastinal lymphadenopathy.   Started on IV Cefepime, also received 1X dose of Bumex IV this morning. Creatinine has improved, down to 1.3 most recently this afternoon. Baseline seems to be around 1.2-1.4 mg/dL. Did have recent contrast as above, no recent NSAID use. Home medications pertinent for Irbesartan, HCTZ, both on hold, as well as Jardiance which he is continuing at this time. UA was negative for proteinuria or hematuria, urine sodium 31. CXR from today showed cardiomegaly, and mixed interstitial/airspace disease which could be from multifocal pneumonia or pulmonary edema. Nephrology services requested for CASIE on CKD management.       Review of systems:  +SOA      Personal History:     Past Medical History:   Past Medical History:   Diagnosis Date    ACE-inhibitor cough 06/2005    Coronary artery disease     Diabetes mellitus, type 2 1995    ED (erectile dysfunction)     Hx of blood clots 2014    Blood clot left leg     Hyperlipidemia 08/2000    Hypertension     Hypogonadism, male 1998    Hypothyroidism 06/2001    Obesity     Peripheral neuropathy     Pulmonary embolism 02/2014    Rectal fistula     Sleep apnea 12/10/2013    " Ulcer of left foot 11/2022    Vitamin D deficiency        Surgical History:      Past Surgical History:   Procedure Laterality Date    CARDIAC CATHETERIZATION  2008    PTCA: 2008; PTCA: 4/21/2015 LCX stent     CARDIAC CATHETERIZATION Left 7/3/2021    Procedure: Cardiac Catheterization/Vascular Study;  Surgeon: Edwar Ackerman MD;  Location: [facility] CATH INVASIVE LOCATION;  Service: Cardiovascular;  Laterality: Left;    CARDIOVASCULAR STRESS TEST  2020    CATARACT EXTRACTION  01/11/2016 1-4-16 & 1-11-16     COLONOSCOPY N/A 11/23/2019    Procedure: COLONOSCOPY WITH ENDOSCOPIC CLIPPING X1 OF POST POLYPECTOMY BLEED SITE;  Surgeon: Jhonny Orellana MD;  Location: [facility] ENDOSCOPY;  Service: Gastroenterology    CORONARY STENT PLACEMENT      INGUINAL HERNIA REPAIR Left     UMBILICAL HERNIA REPAIR         Family History: family history includes Heart disease in his mother; Leukemia in his father. Otherwise pertinent FHx was reviewed and unremarkable.     Social History:  reports that he quit smoking about 48 years ago. His smoking use included cigarettes. He started smoking about 68 years ago. He has a 30 pack-year smoking history. He has been exposed to tobacco smoke. He has never used smokeless tobacco. He reports that he does not drink alcohol and does not use drugs.    Medications:  Prior to Admission medications    Medication Sig Start Date End Date Taking? Authorizing Provider   Accu-Chek Irene Plus test strip USE TO CHECK BLOOD SUGAR BEFORE MEALS AND AT BEDTIME 10/7/24   Virginia Shaver MD   amLODIPine (NORVASC) 5 MG tablet TAKE 1 TABLET BY MOUTH DAILY 1/13/25   Edwar Ackerman MD   atenolol (TENORMIN) 50 MG tablet TAKE 1 TABLET BY MOUTH DAILY 12/2/24   Virginia Shaver MD   atorvastatin (LIPITOR) 80 MG tablet TAKE 1 TABLET BY MOUTH AT NIGHT  FOR HIGH AMOUNT OF FATS IN THE  BLOOD 1/13/25   Edwar Ackerman MD   Blood Glucose Monitoring Suppl (Accu-Chek Irene Plus) w/Device kit 1 each 4 (Four) Times a Day. 5/13/22    "Virginia Shaver MD   clopidogrel (PLAVIX) 75 MG tablet Take 1 tablet by mouth Daily. 3/26/24   Edwar Ackerman MD   fenofibrate (TRICOR) 54 MG tablet TAKE 1 TABLET BY MOUTH DAILY FOR HIGH AMOUNT OF TRIGLYCERIDES IN  THE BLOOD 9/9/24   Edwar Ackerman MD   ferrous sulfate 325 (65 FE) MG tablet Take 1 tablet by mouth 2 (Two) Times a Day.    ProviderLee MD   gabapentin (NEURONTIN) 600 MG tablet TAKE 1 TABLET BY MOUTH 3 TIMES  DAILY 11/16/24   Joan Johns APRN   hydroCHLOROthiazide 25 MG tablet Take 1 tablet by mouth Daily. 11/11/24   Edwar Ackerman MD   Ibuprofen 3 %, Gabapentin 10 %, Baclofen 2 %, lidocaine 4 % Apply 1-2 g topically to the appropriate area as directed 3 (Three) to 4 (Four) times daily. 10/3/24 10/3/25  Freddy Roe MD   Insulin Pen Needle (BD ULTRA-FINE PEN NEEDLES) 29G X 12.7MM misc Use to inject insulin twice daily. DX E11.65 2/29/24   Virginia Shaver MD   insulin regular (HUMULIN R) 500 UNIT/ML CONCENTRATED injection DRAW TO 30 UNIT ERI ON U-100 SYRINGE AND INJECT IN THE MORNING AND 40 UNIT REI IN THE EVENING Indications: Type 2 Diabetes 10/31/24   Virginia Shaver MD   Insulin Syringe-Needle U-100 (BD Insulin Syringe U/F) 31G X 5/16\" 0.5 ML misc 1 each by Other route 2 (Two) Times a Day. 9/21/23   Virginia Shaver MD   Insulin Syringe-Needle U-100 30G X 1/2\" 0.5 ML misc 1 each by Other route 2 (Two) Times a Day. 1/23/24   Virginia Shaver MD   irbesartan (AVAPRO) 300 MG tablet TAKE 1 TABLET BY MOUTH DAILY 5/7/24   Virginia Shaver MD   isosorbide mononitrate (IMDUR) 30 MG 24 hr tablet TAKE 1 TABLET BY MOUTH DAILY 1/13/25   Edwar Ackerman MD   Jardiance 25 MG tablet tablet TAKE 1 TABLET BY MOUTH DAILY 12/2/24   Virginia Shaver MD   levothyroxine (SYNTHROID, LEVOTHROID) 175 MCG tablet TAKE 1 TABLET BY MOUTH DAILY FOR UNDERACTIVE THYROID 5/7/24   Virginia Shaver MD   metFORMIN ER (GLUCOPHAGE-XR) 500 MG 24 hr tablet TAKE 4 TABLETS BY MOUTH AT  SUPPER FOR TYPE 2 " DIABETES 12/2/24   Virginia Shaver MD   Methylcobalamin (B12-ACTIVE PO) Take  by mouth.    Lee Ruggiero MD   O2 (OXYGEN) Inhale 1 L/min Every Night. Indications: marianne 11/29/22   Lee Ruggiero MD   Omega-3 Fatty Acids (FISH OIL) 1000 MG capsule capsule Take 1 capsule by mouth 2 (Two) Times a Day.    Lee Ruggiero MD   Pramlintide Acetate (SymlinPen 120) 2700 MCG/2.7ML solution pen-injector INJECT 120 MCG SUBCUTANEOUSLY  INTO THE APPROPRIATE AREA AS  DIRECTED 3 TIMES DAILY  Patient taking differently: Inject 120 mcg under the skin into the appropriate area as directed 2 (Two) Times a Day. INJECT 120 MCG SUBCUTANEOUSLY  INTO THE APPROPRIATE AREA AS  DIRECTED 2 TIMES DAILY 6/10/24   Virginia Shaver MD   urea (CARMOL) 40 % cream Apply 1 application  topically to the appropriate area as directed 2 (Two) Times a Day. Apply twice daily to calluses. Add vitamin D 5,000 units 12/6/23   Triny Cee APRN   vitamin B-12 (CYANOCOBALAMIN) 100 MCG tablet Take 1 tablet by mouth Daily. Indications: Inadequate Vitamin B12 4/15/15   Lee Ruggiero MD   vitamin D (ERGOCALCIFEROL) 1.25 MG (13515 UT) capsule capsule TAKE 1 CAPSULE BY MOUTH ONCE  WEEKLY FOR VITAMIN D DEFICIENCY 11/11/24   Virginia Shaver MD   warfarin (COUMADIN) 2 MG tablet TAKE 1 TABLET BY MOUTH DAILY OR  AS DIRECTED BASED ON INR  Patient taking differently: Take 1 tablet by mouth As Needed. TAKE 1 TABLET BY MOUTH DAILY OR  AS DIRECTED BASED ON INR Tuesday and Thursdays only 8/16/24   Anu Palmer PA-C   warfarin (COUMADIN) 5 MG tablet 7 mg (5 mg x 1 and 1 mg x 2) every Tue, Thu; 8 mg (5 mg x 1 and 1 mg x 3) all other days 10/7/24   Anu Palmer PA-C   warfarin (COUMADIN) 3 MG tablet Take 1 tablet by mouth daily or as directed by medication management clinic.  Indications: Atrial Fibrillation 9/9/24 1/14/25  Anu Palmer PA-C     Scheduled Meds:amLODIPine, 5 mg, Oral, Daily  atenolol, 50 mg,  Oral, Daily  atorvastatin, 80 mg, Oral, Nightly  budesonide, 0.5 mg, Nebulization, BID - RT  bumetanide, 1 mg, Intravenous, Q6H  cefepime, 2,000 mg, Intravenous, Q12H  clopidogrel, 75 mg, Oral, Daily  empagliflozin, 25 mg, Oral, Daily  fenofibrate, 48 mg, Oral, Daily  ferrous sulfate, 325 mg, Oral, BID  gabapentin, 600 mg, Oral, Q8H  [START ON 1/15/2025] insulin glargine, 35 Units, Subcutaneous, Daily  insulin glargine, 40 Units, Subcutaneous, Nightly  insulin lispro, 3-14 Units, Subcutaneous, Q6H  insulin regular, 4 Units, Subcutaneous, Q8H  ipratropium-albuterol, 3 mL, Nebulization, Q4H - RT  isosorbide mononitrate, 30 mg, Oral, Daily  levothyroxine, 175 mcg, Oral, Q AM  methylPREDNISolone sodium succinate, 40 mg, Intravenous, Q8H  pantoprazole, 40 mg, Oral, Q AM  sodium chloride, 10 mL, Intravenous, Q12H  vitamin B-12, 250 mcg, Oral, Daily  [Held by provider] warfarin, 7 mg, Oral, Once per day on Tuesday Thursday  [Held by provider] warfarin, 8 mg, Oral, Once per day on Sunday Monday Wednesday Friday Saturday      Continuous Infusions:Pharmacy Consult - Steroid Insulin Protocol,   Pharmacy to dose warfarin,       PRN Meds:  aluminum-magnesium hydroxide-simethicone    senna-docusate sodium **AND** polyethylene glycol **AND** bisacodyl **AND** bisacodyl    Calcium Replacement - Follow Nurse / BPA Driven Protocol    dextrose    dextrose    glucagon (human recombinant)    Magnesium Standard Dose Replacement - Follow Nurse / BPA Driven Protocol    melatonin    ondansetron ODT **OR** ondansetron    Pharmacy Consult - Steroid Insulin Protocol    Pharmacy to dose warfarin    Phosphorus Replacement - Follow Nurse / BPA Driven Protocol    Potassium Replacement - Follow Nurse / BPA Driven Protocol    sodium chloride    sodium chloride  Allergies:  No Known Allergies    Objective   Exam:     Vital Signs  Temp:  [97.6 °F (36.4 °C)-98.4 °F (36.9 °C)] 97.6 °F (36.4 °C)  Heart Rate:  [76-90] 77  Resp:  [14-30] 22  BP:  (117-184)/(57-89) 133/57  SpO2:  [87 %-99 %] 95 %  on  Flow (L/min) (Oxygen Therapy):  [12-60] 60;   Device (Oxygen Therapy): heated;high-flow nasal cannula;humidified  Body mass index is 40.17 kg/m².  EXAM  General:   male in no acute distress.    Head:      Normocephalic and atraumatic.    Eyes:      PERRL/EOM intact, conjunctivae and sclerae clear without nystagmus.    Neck:      No masses, thyromegaly,  trachea central   Lungs:    Clear bilaterally to auscultation.    Heart:      Regular rate and rhythm, no murmur no gallop  Abd:        Soft, nontender, not distended, bowel sounds positive, no shifting dullness.  Msk:        No deformity or scoliosis noted of thoracic or lumbar spine.    Pulses:   Pulses normal in all 4 extremities.    Extremities:        No cyanosis or clubbing--+edema.    Neuro:    No focal deficits.   alert oriented x3  Skin:       Intact without lesions or rashes.    Psych:    Alert and cooperative; normal mood and affect; normal attention span       Results Review:  I have personally reviewed most recent Data :  [facility]  CBC    Results from last 7 days   Lab Units 01/14/25  0610 01/13/25  1505   WBC 10*3/mm3 15.00* 14.49*   HEMOGLOBIN g/dL 13.1 14.4   PLATELETS 10*3/mm3 309 405     CMP   Results from last 7 days   Lab Units 01/14/25  1238 01/14/25  0610 01/13/25  1505   SODIUM mmol/L 137 140 136   POTASSIUM mmol/L 4.6 4.7 5.7*   CHLORIDE mmol/L 98 102 95*   CO2 mmol/L 20.2* 18.1* 17.2*   BUN mg/dL 49* 45* 38*   CREATININE mg/dL 1.38* 1.50* 1.77*   GLUCOSE mg/dL 373* 305* 325*   ALBUMIN g/dL  --   --  3.9   BILIRUBIN mg/dL  --   --  0.7   ALK PHOS U/L  --   --  80   AST (SGOT) U/L  --   --  34   ALT (SGPT) U/L  --   --  22     ABG    Results from last 7 days   Lab Units 01/13/25  2227 01/13/25  1845 01/13/25  1509   PH, ARTERIAL pH units 7.410 7.396 7.408   PCO2, ARTERIAL mm Hg 26.6* 32.9* 28.4*   PO2 ART mm Hg 56.4* 41.3* 117.5*   O2 SATURATION ART % 89.9* 77.1* 98.7*    BASE EXCESS ART mmol/L -6.2* -3.7* -5.2*     XR Chest 1 View    Result Date: 1/14/2025  Impression: 1.Cardiomegaly. 2.Mixed interstitial/airspace disease which could be due to multifocal pneumonia or pulmonary edema. Trace pleural effusions are also suspected. Electronically Signed: Justus Goldsmith MD  1/14/2025 10:50 AM EST  Workstation ID: [id number]    CT Angiogram Chest Pulmonary Embolism    Result Date: 1/13/2025  Impression: 1.No evidence of acute pulmonary embolism. 2.Extensive patchy opacities and groundglass opacities noted throughout the lungs bilaterally, left greater than right. There is relative sparing of the right upper lobe. There is superimposed reticulations, interstitial thickening and traction bronchiectasis noted within the peripheral aspect of the lungs. These findings are highly concerning for an infectious/inflammatory process superimposed upon chronic interstitial lung disease. Please consider follow-up with chest CT in 3 months after resolution of symptoms to exclude underlying pulmonary lesions 3.Multiple enlarged mediastinal and perihilar lymph nodes, likely reactive. 4.Mild enlargement of the central pulmonary arteries, a finding which can be seen in the setting of pulmonary hypertension. Electronically Signed: Malcolm Moss DO  1/13/2025 5:01 PM EST  Workstation ID: [id number]     Results for orders placed during the hospital encounter of 08/12/21    Adult Transthoracic Echo Complete W/ Cont if Necessary Per Protocol    Interpretation Summary  · Left ventricular systolic function is normal.  · Left ventricular ejection fraction is 60 to 65%  · Left ventricular diastolic function was normal.        Assessment & Plan   Assessment and Plan:         Multifocal pneumonia    ASSESSMENT:  CASIE  CKD with baseline around 1.2-1.4 mg/dL likely secondary to HTN, DM, generalized atherosclerotic changes   Acute respiratory failure with hypoxia, multifocal pneumonia   CHF with most recent EF 60-65%  CAD  s/p PCI  HTN  DM  H/o DVT  Elevated Troponin           PLAN :     Patient with CASIE, possibly some CRS, improving with diuretics. Did have recent contrast exposure so risk for worsening CKD  Volume, expanded, started Bumex IV 1mg q6, continue. Check AM BMP  UA was negative for protein or hematuria, urine sodium 31.Check UPCR and Renal US   Elevated troponin, no chest pain, followed by cardio, believe elevation likely demand ischemia from hypoxia and CASIE   Plan for repeat ECHO, follow-up with echo results once stabilized possible stress test   Continue to hold ARB, blood pressure stable will consider starting angiotensin receptor blocker in 1 to 2 days  Electrolytes stable  Pulm input noted, recommend bronch, also ordered WATSON/ANCA. Continue broad spectrum IV abx   Hgb stable   Daily labs   Avoid nephrotoxins, monitor I/O   Thank you for this consultation, we will gladly follow     Miguel Gonzalez MD.  [facility]  1/14/2025  14:57 EST

## 2025-01-14 NOTE — PLAN OF CARE
Goal Outcome Evaluation:  Plan of Care Reviewed With: patient           Outcome Evaluation: 85 y/o M who presented with the onset of increasing respiratory distress for 3 days, diagnosed with Multifocal pneumonia. Patient lives with son in a one level home with 2 [place] with rail. Pt functions at w/c level primarily due to chronic arthritic knees and knee buckling. Patient is typically mod I for ADLs and w/c mobility. He is currently on 60lpm at 75% FiO2 on airvo. Patient able to complete bed mobility with SBA, transfers with CGA to min A to recliner. Pt mobility wise is near his baseline function and anticipate he will be safe to return home at d/c however will keep him on caseload as he is at risk for deconditioning. Will recommend HHPT at d/c.    Anticipated Discharge Disposition (PT): home with home health

## 2025-01-14 NOTE — THERAPY EVALUATION
Patient Name: Dalton Dallas .  : 1940    MRN: [id number]                              Today's Date: 2025       Admit Date: 2025    Visit Dx:     ICD-10-CM ICD-9-CM   1. Hypoxemia  R09.02 799.02   2. Respiratory distress  R06.03 786.09   3. Multifocal pneumonia  J18.9 486   4. Pulmonary fibrosis  J84.10 515   5. Mediastinal lymphadenopathy  R59.0 785.6     Patient Active Problem List   Diagnosis    Coronary artery disease involving native coronary artery of native heart    Essential hypertension    Mixed hyperlipidemia    Deep vein thrombosis (DVT)    Eczema    Hypothyroidism    Sleep apnea    Vitamin D deficiency    Peripheral vascular disease    Chronic pulmonary embolism without acute cor pulmonale    Lumbar radiculopathy    Thrombophilia    MI, acute, non ST segment elevation    Type 2 diabetes mellitus with hypoglycemia without coma, without long-term current use of insulin    Abnormal leg movement    Ulcer of left foot with necrosis of muscle    Chronic stable angina    Encounter for subsequent annual wellness visit (AWV) in Medicare patient    Chronic pain of both knees    Multifocal pneumonia     Past Medical History:   Diagnosis Date    ACE-inhibitor cough 2005    Coronary artery disease     Diabetes mellitus, type 2     ED (erectile dysfunction)     Hx of blood clots     Blood clot left leg     Hyperlipidemia 2000    Hypertension     Hypogonadism, male     Hypothyroidism 2001    Obesity     Peripheral neuropathy     Pulmonary embolism 2014    Rectal fistula     Sleep apnea 12/10/2013    Ulcer of left foot 2022    Vitamin D deficiency      Past Surgical History:   Procedure Laterality Date    CARDIAC CATHETERIZATION      PTCA: ; PTCA: 2015 LCX stent     CARDIAC CATHETERIZATION Left 7/3/2021    Procedure: Cardiac Catheterization/Vascular Study;  Surgeon: Edwar Ackerman MD;  Location: [facility] INVASIVE LOCATION;  Service: Cardiovascular;   Laterality: Left;    CARDIOVASCULAR STRESS TEST  2020    CATARACT EXTRACTION  01/11/2016 1-4-16 & 1-11-16     COLONOSCOPY N/A 11/23/2019    Procedure: COLONOSCOPY WITH ENDOSCOPIC CLIPPING X1 OF POST POLYPECTOMY BLEED SITE;  Surgeon: Jhonny Orellana MD;  Location: [facility] ENDOSCOPY;  Service: Gastroenterology    CORONARY STENT PLACEMENT      INGUINAL HERNIA REPAIR Left     UMBILICAL HERNIA REPAIR        General Information       Row Name 01/14/25 1553          OT Time and Intention    Document Type evaluation  -LS     Mode of Treatment occupational therapy  -LS       Row Name 01/14/25 1553          General Information    Patient Profile Reviewed yes  -LS     Prior Level of Function independent:;ADL's;all household mobility;transfer;w/c or scooter  -LS     Existing Precautions/Restrictions oxygen therapy device and L/min  Airvo  -LS     Barriers to Rehab previous functional deficit  -LS       Row Name 01/14/25 1553          Living Environment    People in Home child(kacie), adult  -LS       Row Name 01/14/25 1553          Home Main Entrance    Number of Stairs, Main Entrance two  -LS     Stair Railings, Main Entrance railings safe and in good condition  -LS       Row Name 01/14/25 1553          Stairs Within Home, Primary    Number of Stairs, Within Home, Primary none  -LS       Row Name 01/14/25 1553          Cognition    Orientation Status (Cognition) oriented x 4  -LS       Row Name 01/14/25 155          Safety Issues/Impairments Affecting Functional Mobility    Impairments Affecting Function (Mobility) endurance/activity tolerance  -LS               User Key  (r) = Recorded By, (t) = Taken By, (c) = Cosigned By      Initials Name Provider Type    LS Willem Ya OT Occupational Therapist                     Mobility/ADL's       Row Name 01/14/25 1550          Bed Mobility    Bed Mobility bed mobility (all) activities  -LS     All Activities, [place] (Bed Mobility) standby assist  -LS       Row Name  01/14/25 1554          Transfers    Transfers sit-stand transfer;bed-chair transfer  -       Row Name 01/14/25 1554          Bed-Chair Transfer    Bed-Chair [place] (Transfers) minimum assist (75% patient effort)  -       Row Name 01/14/25 1554          Sit-Stand Transfer    Sit-Stand [place] (Transfers) contact guard  -       Row Name 01/14/25 1554          Functional Mobility    Patient was able to Ambulate no, other medical factors prevent ambulation  -     Reason Patient was unable to Ambulate Non-Ambulatory at Baseline  -       Row Name 01/14/25 1554          Activities of Daily Living    BADL Assessment/Intervention lower body dressing  -       Row Name 01/14/25 1554          Lower Body Dressing Assessment/Training    [place] Level (Lower Body Dressing) lower body dressing skills;doff;don;socks;set up  -               User Key  (r) = Recorded By, (t) = Taken By, (c) = Cosigned By      Initials Name Provider Type     Willem Ya OT Occupational Therapist                   Obj/Interventions       Row Name 01/14/25 1555          Sensory Assessment (Somatosensory)    Sensory Assessment (Somatosensory) sensation intact  -       Row Name 01/14/25 1559          Vision Assessment/Intervention    Visual Impairment/Limitations corrective lenses full-time  -       Row Name 01/14/25 1555          Range of Motion Comprehensive    General Range of Motion no range of motion deficits identified  -       Row Name 01/14/25 1555          Strength Comprehensive (MMT)    Comment, General Manual Muscle Testing (MMT) Assessment BUE grossly 4-/5  -       Row Name 01/14/25 1555          Balance    Balance Assessment sitting static balance;sitting dynamic balance;standing static balance;standing dynamic balance  -     Static Sitting Balance independent  -     Dynamic Sitting Balance independent  -     Position, Sitting Balance unsupported;sitting edge of bed  -     Static Standing  Balance standby assist  -LS     Dynamic Standing Balance contact guard  -LS               User Key  (r) = Recorded By, (t) = Taken By, (c) = Cosigned By      Initials Name Provider Type    Willem Garcia OT Occupational Therapist                   Goals/Plan       Row Name 01/14/25 1601          Bed Mobility Goal 1 (OT)    Activity/Assistive Device (Bed Mobility Goal 1, OT) bed mobility activities, all  -LS     [place] Level/Cues Needed (Bed Mobility Goal 1, OT) modified independence  -LS     Time Frame (Bed Mobility Goal 1, OT) long term goal (LTG);2 weeks  -LS       Row Name 01/14/25 1601          Transfer Goal 1 (OT)    Activity/Assistive Device (Transfer Goal 1, OT) transfers, all  -LS     [place] Level/Cues Needed (Transfer Goal 1, OT) modified independence  -LS     Time Frame (Transfer Goal 1, OT) long term goal (LTG);2 weeks  -LS       Row Name 01/14/25 1601          Dressing Goal 1 (OT)    Activity/Device (Dressing Goal 1, OT) dressing skills, all  -LS     [place]/Cues Needed (Dressing Goal 1, OT) modified independence  -LS     Time Frame (Dressing Goal 1, OT) long term goal (LTG);2 weeks  -LS       Row Name 01/14/25 1601          Toileting Goal 1 (OT)    Activity/Device (Toileting Goal 1, OT) toileting skills, all  -LS     [place] Level/Cues Needed (Toileting Goal 1, OT) modified independence  -LS     Time Frame (Toileting Goal 1, OT) long term goal (LTG);2 weeks  -       Row Name 01/14/25 1601          Therapy Assessment/Plan (OT)    Planned Therapy Interventions (OT) activity tolerance training;BADL retraining;functional balance retraining;IADL retraining;occupation/activity based interventions;ROM/therapeutic exercise;strengthening exercise;transfer/mobility retraining;patient/caregiver education/training  -LS               User Key  (r) = Recorded By, (t) = Taken By, (c) = Cosigned By      Initials Name Provider Type    Willem Garcia OT Occupational Therapist                    Clinical Impression       Row Name 01/14/25 1557          Pain Assessment    Pretreatment Pain Rating 0/10 - no pain  -LS     Posttreatment Pain Rating 0/10 - no pain  -LS       Row Name 01/14/25 1550          Plan of Care Review    Plan of Care Reviewed With patient  -     Outcome Evaluation Pt is a 84 y.o. year old male admitted to [facility] 1/13/25 with complaints of weakness for about 1 week, shortness of breath, nonproductive cough, and chills. Pt admitted for further treatment of acute respiratory failure with hypoxia, multifocal pneumonia, pulmonary fibrosis, mediastinal lymphadenopathy. Pt currently requiring Airvo 60L 75% to maintain O2 sats. Pmhx significant for CAD, HFpEF, HTN, HLD, PVD, DM type II. At baseline, pt reports he uses a bipap with 2L O2 at night. He uses a w/c for mobility, to which he transfers independently. He is also IND with ADLs from w/c level. 2 [place] his home, which he does navigate though slowly per report. This date, pt requires SBA for bed mobility. At EOB, pt able to attain figure 4 position and don socks with setup. Pt stood with CGA, and transferred to chair with Min A. OT will follow as he is slightly below baseline. Recommending home with HHOT at [place].  -       Row Name 01/14/25 9662          Therapy Assessment/Plan (OT)    Rehab Potential (OT) good  -     Criteria for Skilled Therapeutic Interventions Met (OT) yes;skilled treatment is necessary  -     Therapy Frequency (OT) 3 times/wk  -     Predicted Duration of Therapy Intervention (OT) until dc  -       Row Name 01/14/25 1294          Therapy Plan Review/Discharge Plan (OT)    Anticipated Discharge Disposition (OT) home with home health  -       Row Name 01/14/25 5430          Vital Signs    Pre SpO2 (%) 93  -LS     O2 Delivery Pre Treatment hi-flow  -LS     Intra SpO2 (%) 88  -LS     O2 Delivery Intra Treatment hi-flow  -LS     Post SpO2 (%) 92  -LS     O2 Delivery Post Treatment hi-flow  -LS     Pre Patient  Position Supine  -LS     Intra Patient Position Standing  -LS     Post Patient Position Sitting  -LS       Row Name 01/14/25 1555          Positioning and Restraints    Pre-Treatment Position in bed  -LS     Post Treatment Position chair  -LS     In Chair notified nsg;exit alarm on  -LS               User Key  (r) = Recorded By, (t) = Taken By, (c) = Cosigned By      Initials Name Provider Type    Willem Garcia OT Occupational Therapist                   Outcome Measures       Row Name 01/14/25 1601          How much help from another is currently needed...    Putting on and taking off regular lower body clothing? 3  -LS     Bathing (including washing, rinsing, and drying) 3  -LS     Toileting (which includes using toilet bed pan or urinal) 3  -LS     Putting on and taking off regular upper body clothing 3  -LS     Taking care of personal grooming (such as brushing teeth) 4  -LS     Eating meals 4  -LS     AM-PAC 6 Clicks Score (OT) 20  -LS       Row Name 01/14/25 0739          How much help from another person do you currently need...    Turning from your back to your side while in flat bed without using bedrails? 3  -MC     Moving from lying on back to sitting on the side of a flat bed without bedrails? 3  -MC     Moving to and from a bed to a chair (including a wheelchair)? 2  -MC     Standing up from a chair using your arms (e.g., wheelchair, bedside chair)? 2  -MC     Climbing 3-5 steps with a railing? 1  -MC     To walk in hospital room? 2  -MC     AM-PAC 6 Clicks Score (PT) 13  -MC       Row Name 01/14/25 1601          Functional Assessment    Outcome Measure Options AM-PAC 6 Clicks Daily Activity (OT)  -LS               User Key  (r) = Recorded By, (t) = Taken By, (c) = Cosigned By      Initials Name Provider Type    Willem Garcia OT Occupational Therapist    Konstantin Blakely, RN Registered Nurse                    Occupational Therapy Education       Title: PT OT SLP Therapies (Done)       Topic:  Occupational Therapy (Done)       Point: ADL training (Done)       Description:   Instruct learner(s) on proper safety adaptation and remediation techniques during self care or transfers.   Instruct in proper use of assistive devices.                  Learning Progress Summary            Patient Acceptance, E,TB, VU by  at 1/14/2025 1601                      Point: Precautions (Done)       Description:   Instruct learner(s) on prescribed precautions during self-care and functional transfers.                  Learning Progress Summary            Patient Acceptance, E,TB, VU by  at 1/14/2025 1601                      Point: Body mechanics (Done)       Description:   Instruct learner(s) on proper positioning and spine alignment during self-care, functional mobility activities and/or exercises.                  Learning Progress Summary            Patient Acceptance, E,TB, VU by  at 1/14/2025 1601                                      User Key       Initials Effective Dates Name Provider Type Discipline    ELIZABET 09/22/22 -  Willem Ya OT Occupational Therapist OT                  OT Recommendation and Plan  Planned Therapy Interventions (OT): activity tolerance training, BADL retraining, functional balance retraining, IADL retraining, occupation/activity based interventions, ROM/therapeutic exercise, strengthening exercise, transfer/mobility retraining, patient/caregiver education/training  Therapy Frequency (OT): 3 times/wk  Plan of Care Review  Plan of Care Reviewed With: patient  Outcome Evaluation: Pt is a 84 y.o. year old male admitted to [facility] 1/13/25 with complaints of weakness for about 1 week, shortness of breath, nonproductive cough, and chills. Pt admitted for further treatment of acute respiratory failure with hypoxia, multifocal pneumonia, pulmonary fibrosis, mediastinal lymphadenopathy. Pt currently requiring Airvo 60L 75% to maintain O2 sats. Pmhx significant for CAD, HFpEF, HTN, HLD, PVD, DM type II.  At baseline, pt reports he uses a bipap with 2L O2 at night. He uses a w/c for mobility, to which he transfers independently. He is also IND with ADLs from w/c level. 2 [place] his home, which he does navigate though slowly per report. This date, pt requires SBA for bed mobility. At EOB, pt able to attain figure 4 position and don socks with setup. Pt stood with CGA, and transferred to chair with Min A. OT will follow as he is slightly below baseline. Recommending home with HHOT at [place].     Time Calculation:         Time Calculation- OT       Row Name 01/14/25 1602             Time Calculation- OT    OT Start Time 1012  -      OT Stop Time 1039  -      OT Time Calculation (min) 27 min  -LS      OT Received On 01/14/25  -      OT - Next Appointment 01/16/25  -      OT Goal Re-Cert Due Date 01/28/25  -         Untimed Charges    OT Eval/Re-eval Minutes 27  -LS         Total Minutes    Untimed Charges Total Minutes 27  -LS       Total Minutes 27  -LS                User Key  (r) = Recorded By, (t) = Taken By, (c) = Cosigned By      Initials Name Provider Type     Willem Ya OT Occupational Therapist                  Therapy Charges for Today       Code Description Service Date Service Provider Modifiers Qty    [id number] HC OT EVAL MOD COMPLEXITY 4 1/14/2025 Willem Ya OT GO 1                 Willem Ya OT  1/14/2025

## 2025-01-14 NOTE — PLAN OF CARE
Goal Outcome Evaluation:           Progress: no change  Outcome Evaluation: Pt admitted with multifocal PNA. Pt currently on Airvo at 60/73%. Pt has been tolerating well at this time. No C/O voiced per pt at this time. Pt resting abed with call light within reach. Plan of care remains ongoing.

## 2025-01-14 NOTE — CONSULTS
Group: [facility]         CONSULT NOTE    Patient Identification:  Dalton Dallas Sr.  84 y.o.  male  1940  [id number]            Requesting physician: Attending physician    Reason for Consultation: COPD        History of Present Illness:    84 y.o. male who presented to [facility] ED 1/13/2025 with complaints of weakness for about 1 week, shortness of breath, nonproductive cough, and chills. He stated he he uses a bipap with 2L O2 at night.     PMH of CAD, HFpEF, HTN, HLD, PVD, DM type II, thrombophilia, hypothyroidism, chronic pain of both knees, HAN       Assessment:    Acute hypoxic respiratory insufficiency     Extensive multiple groundglass opacities   Respiratory panel negative  No PE    sleep apnea,ResMed  air curve 10 V auto compliance 100%, average use 6 hours 44 minutes, residual AHI 0.3  patient is compliant and benefiting from therapy     2D echo no evidence of pulmonary hypertension  Essential hypertension (Primary)  Mixed hyperlipidemia  Diastolic dysfunction with chronic heart failure (HCC)  Coronary artery disease involving native coronary artery of native heart without angina pectoris  Peripheral vascular disease (HCC)  Thrombophilia (HCC)  Acquired hypothyroidism  Type 2 diabetes mellitus with diabetic neuropathy, with long-term current use of insulin (HCC)    Recommendations:     Bronchoscopy will be beneficial however patient currently on 60 L Oxygen titration currently requiring 60 L  as well as INR 3.44     Check WATSON and ANCA    Antibiotics on broad-spectrum cefepime     Steroids currently on IV Solu-Medrol 40 mg Q 8   Bronchodilators   Diuresis on Bumex 1 mg IV q.6     On warfarin and Plavix          Review of Sytems:  Review of Systems   Respiratory:  Positive for cough and shortness of breath. Negative for wheezing and stridor.    Cardiovascular:  Negative for chest pain, palpitations and leg swelling.       Past Medical History:  Past Medical History:   Diagnosis Date     ACE-inhibitor cough 06/2005    Coronary artery disease     Diabetes mellitus, type 2 1995    ED (erectile dysfunction)     Hx of blood clots 2014    Blood clot left leg     Hyperlipidemia 08/2000    Hypertension     Hypogonadism, male 1998    Hypothyroidism 06/2001    Obesity     Peripheral neuropathy     Pulmonary embolism 02/2014    Rectal fistula     Sleep apnea 12/10/2013    Ulcer of left foot 11/2022    Vitamin D deficiency        Past Surgical History:  Past Surgical History:   Procedure Laterality Date    CARDIAC CATHETERIZATION  2008    PTCA: 2008; PTCA: 4/21/2015 LCX stent     CARDIAC CATHETERIZATION Left 7/3/2021    Procedure: Cardiac Catheterization/Vascular Study;  Surgeon: Edwar Ackerman MD;  Location: [facility] CATH INVASIVE LOCATION;  Service: Cardiovascular;  Laterality: Left;    CARDIOVASCULAR STRESS TEST  2020    CATARACT EXTRACTION  01/11/2016 1-4-16 & 1-11-16     COLONOSCOPY N/A 11/23/2019    Procedure: COLONOSCOPY WITH ENDOSCOPIC CLIPPING X1 OF POST POLYPECTOMY BLEED SITE;  Surgeon: Jhonny Orellana MD;  Location: [facility] ENDOSCOPY;  Service: Gastroenterology    CORONARY STENT PLACEMENT      INGUINAL HERNIA REPAIR Left     UMBILICAL HERNIA REPAIR          Home Meds:  Medications Prior to Admission   Medication Sig Dispense Refill Last Dose/Taking    Accu-Chek Irene Plus test strip USE TO CHECK BLOOD SUGAR BEFORE MEALS AND AT BEDTIME 400 each 3     amLODIPine (NORVASC) 5 MG tablet TAKE 1 TABLET BY MOUTH DAILY 90 tablet 3     atenolol (TENORMIN) 50 MG tablet TAKE 1 TABLET BY MOUTH DAILY 90 tablet 3     atorvastatin (LIPITOR) 80 MG tablet TAKE 1 TABLET BY MOUTH AT NIGHT  FOR HIGH AMOUNT OF FATS IN THE  BLOOD 90 tablet 3     Blood Glucose Monitoring Suppl (Accu-Chek Irene Plus) w/Device kit 1 each 4 (Four) Times a Day. 1 kit 0     clopidogrel (PLAVIX) 75 MG tablet Take 1 tablet by mouth Daily. 90 tablet 3     fenofibrate (TRICOR) 54 MG tablet TAKE 1 TABLET BY MOUTH DAILY FOR HIGH AMOUNT OF  "TRIGLYCERIDES IN  THE BLOOD 90 tablet 3     ferrous sulfate 325 (65 FE) MG tablet Take 1 tablet by mouth 2 (Two) Times a Day.       gabapentin (NEURONTIN) 600 MG tablet TAKE 1 TABLET BY MOUTH 3 TIMES  DAILY 270 tablet 3     hydroCHLOROthiazide 25 MG tablet Take 1 tablet by mouth Daily. 90 tablet 3     Ibuprofen 3 %, Gabapentin 10 %, Baclofen 2 %, lidocaine 4 % Apply 1-2 g topically to the appropriate area as directed 3 (Three) to 4 (Four) times daily. 90 g 3     Insulin Pen Needle (BD ULTRA-FINE PEN NEEDLES) 29G X 12.7MM misc Use to inject insulin twice daily. DX E11.65 200 each 2     insulin regular (HUMULIN R) 500 UNIT/ML CONCENTRATED injection DRAW TO 30 UNIT REI ON U-100 SYRINGE AND INJECT IN THE MORNING AND 40 UNIT REI IN THE EVENING Indications: Type 2 Diabetes 60 mL 3     Insulin Syringe-Needle U-100 (BD Insulin Syringe U/F) 31G X 5/16\" 0.5 ML misc 1 each by Other route 2 (Two) Times a Day. 200 each 3     Insulin Syringe-Needle U-100 30G X 1/2\" 0.5 ML misc 1 each by Other route 2 (Two) Times a Day. 200 each 12     irbesartan (AVAPRO) 300 MG tablet TAKE 1 TABLET BY MOUTH DAILY 90 tablet 3     isosorbide mononitrate (IMDUR) 30 MG 24 hr tablet TAKE 1 TABLET BY MOUTH DAILY 90 tablet 3     Jardiance 25 MG tablet tablet TAKE 1 TABLET BY MOUTH DAILY 90 tablet 3     levothyroxine (SYNTHROID, LEVOTHROID) 175 MCG tablet TAKE 1 TABLET BY MOUTH DAILY FOR UNDERACTIVE THYROID 90 tablet 3     metFORMIN ER (GLUCOPHAGE-XR) 500 MG 24 hr tablet TAKE 4 TABLETS BY MOUTH AT  SUPPER FOR TYPE 2 DIABETES 360 tablet 3     Methylcobalamin (B12-ACTIVE PO) Take  by mouth.       O2 (OXYGEN) Inhale 1 L/min Every Night. Indications: marianne       Omega-3 Fatty Acids (FISH OIL) 1000 MG capsule capsule Take 1 capsule by mouth 2 (Two) Times a Day.       Pramlintide Acetate (SymlinPen 120) 2700 MCG/2.7ML solution pen-injector INJECT 120 MCG SUBCUTANEOUSLY  INTO THE APPROPRIATE AREA AS  DIRECTED 3 TIMES DAILY (Patient taking differently: Inject " "120 mcg under the skin into the appropriate area as directed 2 (Two) Times a Day. INJECT 120 MCG SUBCUTANEOUSLY  INTO THE APPROPRIATE AREA AS  DIRECTED 2 TIMES DAILY) 10.8 mL 11     urea (CARMOL) 40 % cream Apply 1 application  topically to the appropriate area as directed 2 (Two) Times a Day. Apply twice daily to calluses. Add vitamin D 5,000 units 85 each 3     vitamin B-12 (CYANOCOBALAMIN) 100 MCG tablet Take 1 tablet by mouth Daily. Indications: Inadequate Vitamin B12       vitamin D (ERGOCALCIFEROL) 1.25 MG (18627 UT) capsule capsule TAKE 1 CAPSULE BY MOUTH ONCE  WEEKLY FOR VITAMIN D DEFICIENCY 13 capsule 3     warfarin (COUMADIN) 2 MG tablet TAKE 1 TABLET BY MOUTH DAILY OR  AS DIRECTED BASED ON INR (Patient taking differently: Take 1 tablet by mouth As Needed. TAKE 1 TABLET BY MOUTH DAILY OR  AS DIRECTED BASED ON INR Tuesday and  only) 90 tablet 3     warfarin (COUMADIN) 5 MG tablet 7 mg (5 mg x 1 and 1 mg x 2) every ; 8 mg (5 mg x 1 and 1 mg x 3) all other days 90 tablet 3        Allergies:  No Known Allergies    Social History:   Social History     Socioeconomic History    Marital status:    Tobacco Use    Smoking status: Former     Current packs/day: 0.00     Average packs/day: 1.5 packs/day for 20.0 years (30.0 ttl pk-yrs)     Types: Cigarettes     Start date:      Quit date:      Years since quittin.0     Passive exposure: Past    Smokeless tobacco: Never    Tobacco comments:     quit    Vaping Use    Vaping status: Never Used   Substance and Sexual Activity    Alcohol use: No    Drug use: No    Sexual activity: Defer       Family History:  Family History   Problem Relation Age of Onset    Heart disease Mother     Leukemia Father        Physical Exam:  /65 (BP Location: Right arm, Patient Position: Sitting)   Pulse 82   Temp 98.4 °F (36.9 °C) (Oral)   Resp 25   Ht 175.3 cm (69\")   Wt 123 kg (272 lb 0.8 oz)   SpO2 92%   BMI 40.17 kg/m²  Body mass index " is 40.17 kg/m². 92% 123 kg (272 lb 0.8 oz)  Physical Exam  Cardiovascular:      Heart sounds: Murmur heard.      No gallop.   Pulmonary:      Effort: No respiratory distress.      Breath sounds: No stridor. Rhonchi and rales present. No wheezing.   Chest:      Chest wall: No tenderness.         LABS:  Lab Results   Component Value Date    CALCIUM 9.3 01/14/2025     Results from last 7 days   Lab Units 01/14/25  0610 01/13/25  1727 01/13/25  1505   SODIUM mmol/L 140  --  136   POTASSIUM mmol/L 4.7  --  5.7*   CHLORIDE mmol/L 102  --  95*   CO2 mmol/L 18.1*  --  17.2*   BUN mg/dL 45*  --  38*   CREATININE mg/dL 1.50*  --  1.77*   GLUCOSE mg/dL 305*  --  325*   CALCIUM mg/dL 9.3  --  9.6   WBC 10*3/mm3 15.00*  --  14.49*   HEMOGLOBIN g/dL 13.1  --  14.4   PLATELETS 10*3/mm3 309  --  405   ALT (SGPT) U/L  --   --  22   AST (SGOT) U/L  --   --  34   PROBNP pg/mL  --   --  2,644.0*   PROCALCITONIN ng/mL  --  0.39*  --      Lab Results   Component Value Date    CKTOTAL 173 07/02/2021    TROPONINT 128 (C) 01/14/2025     Results from last 7 days   Lab Units 01/14/25  0058 01/13/25  1727 01/13/25  1535   HSTROP T ng/L 128* 101* 50*         Results from last 7 days   Lab Units 01/14/25  0610 01/14/25  0058 01/13/25  2306 01/13/25 2227 01/13/25  1837 01/13/25  1727 01/13/25  1509   PROCALCITONIN ng/mL  --   --   --   --   --  0.39*  --    LACTATE mmol/L 2.2* 2.9* 3.0* 2.2* 3.3*  --  4.8*     Results from last 7 days   Lab Units 01/14/25  0604 01/13/25 2227 01/13/25  1845 01/13/25  1509   PH, ARTERIAL pH units  --  7.410 7.396 7.408   PCO2, ARTERIAL mm Hg  --  26.6* 32.9* 28.4*   PO2 ART mm Hg  --  56.4* 41.3* 117.5*   O2 SATURATION ART %  --  89.9* 77.1* 98.7*   MODALITY  HFNC HFNC HFNC NRB     Results from last 7 days   Lab Units 01/13/25  1505   ADENOVIRUS DETECTION BY PCR  Not Detected   CORONAVIRUS 229E  Not Detected   CORONAVIRUS HKU1  Not Detected   CORONAVIRUS NL63  Not Detected   CORONAVIRUS OC43  Not Detected   HUMAN  METAPNEUMOVIRUS  Not Detected   HUMAN RHINOVIRUS/ENTEROVIRUS  Not Detected   INFLUENZA B PCR  Not Detected   PARAINFLUENZA 1  Not Detected   PARAINFLUENZA VIRUS 2  Not Detected   PARAINFLUENZA VIRUS 3  Not Detected   PARAINFLUENZA VIRUS 4  Not Detected   BORDETELLA PERTUSSIS PCR  Not Detected   CHLAMYDOPHILA PNEUMONIAE PCR  Not Detected   MYCOPLAMA PNEUMO PCR  Not Detected   INFLUENZA A PCR  Not Detected   RSV, PCR  Not Detected     Results from last 7 days   Lab Units 01/14/25  0610 01/13/25  1505   INR  3.44* 2.68         Lab Results   Component Value Date    TSH 2.760 12/04/2024     Estimated Creatinine Clearance: 49.2 mL/min (A) (by C-G formula based on SCr of 1.5 mg/dL (H)).         Imaging:  Imaging Results (Last 24 Hours)       Procedure Component Value Units Date/Time    CT Angiogram Chest Pulmonary Embolism [[id number]] Collected: 01/13/25 1646     Updated: 01/13/25 1704    Narrative:      CT ANGIOGRAM CHEST PULMONARY EMBOLISM    Date of Exam: 1/13/2025 4:44 PM EST    Indication: Chest pain with hypoxemia history of DVT.    Comparison: CT chest 5/8/2015    Technique: Axial CT images were obtained of the chest after the uneventful intravenous administration of iodinated contrast utilizing pulmonary embolism protocol.  Sagittal and coronal reconstructions were performed.  Automated exposure control and   iterative reconstruction methods were used.      Findings:    Pulmonary arteries:Adequate opacification of the pulmonary arteries. No evidence of acute pulmonary embolism. Mild enlargement of the central pulmonary arteries.    Aorta: Vascular calcifications noted throughout the thoracic aorta. The ascending thoracic aorta is ectatic measuring up to 4.2 cm. The descending thoracic aorta is ectatic measuring up to 3.4 cm.    Lungs and Pleura: Extensive patchy opacities and groundglass opacities noted throughout the lungs bilaterally, left greater than right. There is relative sparing of the right upper  lobe.  There is superimposed reticulations, interstitial thickening and traction bronchiectasis noted within the peripheral aspect of the lungs. The central airways are patent.  No pleural effusion or pneumothorax    Mediastinum/Mary: Multiple enlarged mediastinal and perihilar lymph nodes measuring up to 1.9 cm in length on the short axis.    Heart: Normal in size. No pericardial effusion. Normal RV/LV ratio.    Soft Tissue: Unremarkable.      Upper Abdomen: Unremarkable.    Bones: No acute osseous abnormality. Degenerative changes noted throughout the visualized spine, most significantly within the visualized cervical spine.        Impression:      Impression:  1.No evidence of acute pulmonary embolism.  2.Extensive patchy opacities and groundglass opacities noted throughout the lungs bilaterally, left greater than right. There is relative sparing of the right upper lobe. There is superimposed reticulations, interstitial thickening and traction   bronchiectasis noted within the peripheral aspect of the lungs. These findings are highly concerning for an infectious/inflammatory process superimposed upon chronic interstitial lung disease. Please consider follow-up with chest CT in 3 months after   resolution of symptoms to exclude underlying pulmonary lesions  3.Multiple enlarged mediastinal and perihilar lymph nodes, likely reactive.  4.Mild enlargement of the central pulmonary arteries, a finding which can be seen in the setting of pulmonary hypertension.        Electronically Signed: Malcolm Moss DO    1/13/2025 5:01 PM EST    Workstation ID: [id number]              Current Meds:   SCHEDULE  amLODIPine, 5 mg, Oral, Daily  atenolol, 50 mg, Oral, Daily  atorvastatin, 80 mg, Oral, Nightly  budesonide, 0.5 mg, Nebulization, BID - RT  cefepime, 2,000 mg, Intravenous, Q12H  clopidogrel, 75 mg, Oral, Daily  empagliflozin, 25 mg, Oral, Daily  fenofibrate, 48 mg, Oral, Daily  ferrous sulfate, 325 mg, Oral,  BID  gabapentin, 600 mg, Oral, Q8H  insulin glargine, 30 Units, Subcutaneous, Daily  insulin glargine, 40 Units, Subcutaneous, Nightly  insulin lispro, 3-14 Units, Subcutaneous, Q6H  ipratropium-albuterol, 3 mL, Nebulization, Q4H - RT  isosorbide mononitrate, 30 mg, Oral, Daily  levothyroxine, 175 mcg, Oral, Q AM  methylPREDNISolone sodium succinate, 40 mg, Intravenous, Q8H  sodium chloride, 10 mL, Intravenous, Q12H  vitamin B-12, 250 mcg, Oral, Daily  warfarin, 7 mg, Oral, Once per day on Tuesday Thursday  [START ON 1/15/2025] warfarin, 8 mg, Oral, Once per day on Sunday Monday Wednesday Friday Saturday      Infusions     PRNs    aluminum-magnesium hydroxide-simethicone    senna-docusate sodium **AND** polyethylene glycol **AND** bisacodyl **AND** bisacodyl    Calcium Replacement - Follow Nurse / BPA Driven Protocol    dextrose    dextrose    glucagon (human recombinant)    Magnesium Standard Dose Replacement - Follow Nurse / BPA Driven Protocol    melatonin    ondansetron ODT **OR** ondansetron    Phosphorus Replacement - Follow Nurse / BPA Driven Protocol    Potassium Replacement - Follow Nurse / BPA Driven Protocol    sodium chloride    sodium chloride        Jelani Vieira MD  1/14/2025  08:30 EST      Much of this encounter note is an electronic transcription/translation of spoken language to printed text using Dragon Software.

## 2025-01-15 NOTE — PROGRESS NOTES
"Pharmacy Antimicrobial Dosing Service    Subjective:  Dalton Dallas Sr. is a 84 y.o.male admitted with SOB. Pharmacy has been consulted to dose Vancomycin for possible pneumonia.    PMH: CAD, HFpEF, HTN, HLD, PVD, DM type II, thrombophilia, hypothyroidism, chronic pain of both knees, HAN      Assessment/Plan    1. Day #1 Vancomycin: Goal -600 mcg*h/mL. Will load patient with 2000 mg (22.2 mg/kg AdjBW) x1 now followed by 1500 mg q24h for an estimated AUC of 543. Will obtain vancomycin peak 1/16 2100 and trough 1/17 1600 to assess dosing.    2. Day #3 Cefepime: 2000 mg IV q12h for estCrCl 30-59 mL/min.    Will continue to monitor drug levels, renal function, culture and sensitivities, and patient clinical status.       Objective:  Relevant clinical data and objective history reviewed:  175.3 cm (69\")   119 kg (263 lb 0.1 oz)   Ideal body weight: 70.7 kg (155 lb 13.8 oz)  Adjusted ideal body weight: 90.1 kg (198 lb 11.6 oz)  Body mass index is 38.84 kg/m².        Results from last 7 days   Lab Units 01/15/25  1511 01/15/25  0604 01/14/25  1238   CREATININE mg/dL 1.60* 1.58* 1.38*     Estimated Creatinine Clearance: 45.3 mL/min (A) (by C-G formula based on SCr of 1.6 mg/dL (H)).  I/O last 3 completed shifts:  In: 1010 [P.O.:1010]  Out: 4750 [Urine:4750]    Results from last 7 days   Lab Units 01/15/25  0604 01/14/25  0610 01/13/25  1505   WBC 10*3/mm3 18.38* 15.00* 14.49*     Temperature    01/15/25 0752 01/15/25 1115 01/15/25 1539   Temp: 97.6 °F (36.4 °C) 97.3 °F (36.3 °C) 97.7 °F (36.5 °C)     Baseline culture/source/susceptibility:  Microbiology Results (last 10 days)       Procedure Component Value - Date/Time    Legionella Antigen, Urine - Urine, Urine, Clean Catch [[id number]]  (Normal) Collected: 01/14/25 0842    Lab Status: Final result Specimen: Urine, Clean Catch Updated: 01/14/25 0926     LEGIONELLA ANTIGEN, URINE Negative    S. Pneumo Ag Urine or CSF - Urine, Urine, Clean Catch [[id number]]  " (Normal) Collected: 01/14/25 0842    Lab Status: Final result Specimen: Urine, Clean Catch Updated: 01/14/25 0927     Strep Pneumo Ag Negative    MRSA Screen, PCR (Inpatient) - Swab, Nares [[id number]]  (Abnormal) Collected: 01/14/25 0613    Lab Status: Final result Specimen: Swab from Nares Updated: 01/14/25 0719     MRSA PCR MRSA Detected    Narrative:      The negative predictive value of this diagnostic test is high and should only be used to consider de-escalating anti-MRSA therapy. A positive result may indicate colonization with MRSA and must be correlated clinically.    Blood Culture - Blood, Arm, Right [[id number]]  (Normal) Collected: 01/13/25 1521    Lab Status: Preliminary result Specimen: Blood from Arm, Right Updated: 01/15/25 1531     Blood Culture No growth at 2 days    Narrative:      Less than seven (7) mL's of blood was collected.  Insufficient quantity may yield false negative results.    Respiratory Panel PCR w/COVID-19(SARS-CoV-2) SARAHY/ALTHEA/CLARY/PAD/COR/ISHAN In-House, NP Swab in UTM/VTM, 2 HR TAT - Swab, Nasopharynx [[id number]]  (Normal) Collected: 01/13/25 1505    Lab Status: Final result Specimen: Swab from Nasopharynx Updated: 01/13/25 1601     ADENOVIRUS, PCR Not Detected     Coronavirus 229E Not Detected     Coronavirus HKU1 Not Detected     Coronavirus NL63 Not Detected     Coronavirus OC43 Not Detected     COVID19 Not Detected     Human Metapneumovirus Not Detected     Human Rhinovirus/Enterovirus Not Detected     Influenza A PCR Not Detected     Influenza B PCR Not Detected     Parainfluenza Virus 1 Not Detected     Parainfluenza Virus 2 Not Detected     Parainfluenza Virus 3 Not Detected     Parainfluenza Virus 4 Not Detected     RSV, PCR Not Detected     Bordetella pertussis pcr Not Detected     Bordetella parapertussis PCR Not Detected     Chlamydophila pneumoniae PCR Not Detected     Mycoplasma pneumo by PCR Not Detected    Narrative:      In the setting of a positive respiratory panel  with a viral infection PLUS a negative procalcitonin without other underlying concern for bacterial infection, consider observing off antibiotics or discontinuation of antibiotics and continue supportive care. If the respiratory panel is positive for atypical bacterial infection (Bordetella pertussis, Chlamydophila pneumoniae, or Mycoplasma pneumoniae), consider antibiotic de-escalation to target atypical bacterial infection.    Blood Culture - Blood, Arm, Left [[id number]]  (Normal) Collected: 01/13/25 1500    Lab Status: Preliminary result Specimen: Blood from Arm, Left Updated: 01/15/25 1513     Blood Culture No growth at 2 days            Karen Ghotra, Gloria  01/15/25 16:26 EST

## 2025-01-15 NOTE — CONSULTS
[facility]    Referring Provider: LUCAS Dela Cruz  Reason for Consultation: Diabetes evaluation & management.    Patient Care Team:  Joan Johns APRN as PCP - General (Nurse Practitioner)  Jelani Vieira MD as Consulting Physician (Pulmonary Disease)  Edwar Ackerman MD as Consulting Physician (Pulmonary Disease)  Virginia Shaver MD as Consulting Physician (Endocrinology)  Triny Cee APRN as Nurse Practitioner (Orthopedic Surgery)  CHRISSY Holt DPM as Consulting Physician (Podiatry)  Bill Don MD as Consulting Physician (Ophthalmology)  Anu Palmer PA-C as Physician Assistant (Physician Assistant)    Chief complaint Shortness of Breath      Subjective .     History of present illness:    This is a  84 y.o. male with type 2 Diabetes on U500 insulin 125 units in am, 150 units @ supper. Uses insulin vials. Also on Symlin & Jardiance.  Admitted in acute respiratory failure due to multifocal pneumonia.  On steroids.    Review of Systems  Review of Systems   Eyes:  Negative for blurred vision.   Gastrointestinal:  Negative for nausea.   Endocrine: Negative for polyuria.   Neurological:  Negative for headache.       History  Past Medical History:   Diagnosis Date    ACE-inhibitor cough 06/2005    Coronary artery disease     Diabetes mellitus, type 2 1995    ED (erectile dysfunction)     Hx of blood clots 2014    Blood clot left leg     Hyperlipidemia 08/2000    Hypertension     Hypogonadism, male 1998    Hypothyroidism 06/2001    Obesity     Peripheral neuropathy     Pulmonary embolism 02/2014    Rectal fistula     Sleep apnea 12/10/2013    Ulcer of left foot 11/2022    Vitamin D deficiency      Past Surgical History:   Procedure Laterality Date    CARDIAC CATHETERIZATION  2008    PTCA: 2008; PTCA: 4/21/2015 LCX stent     CARDIAC CATHETERIZATION Left 7/3/2021    Procedure: Cardiac Catheterization/Vascular Study;  Surgeon: Edwar Ackerman MD;  Location:  [facility] CATH INVASIVE LOCATION;  Service: Cardiovascular;  Laterality: Left;    CARDIOVASCULAR STRESS TEST      CATARACT EXTRACTION  2016 & 16     COLONOSCOPY N/A 2019    Procedure: COLONOSCOPY WITH ENDOSCOPIC CLIPPING X1 OF POST POLYPECTOMY BLEED SITE;  Surgeon: Jhonny Orellana MD;  Location: [facility] ENDOSCOPY;  Service: Gastroenterology    CORONARY STENT PLACEMENT      INGUINAL HERNIA REPAIR Left     UMBILICAL HERNIA REPAIR       Family History   Problem Relation Age of Onset    Heart disease Mother     Leukemia Father      Social History     Tobacco Use    Smoking status: Former     Current packs/day: 0.00     Average packs/day: 1.5 packs/day for 20.0 years (30.0 ttl pk-yrs)     Types: Cigarettes     Start date:      Quit date:      Years since quittin.0     Passive exposure: Past    Smokeless tobacco: Never    Tobacco comments:     quit    Vaping Use    Vaping status: Never Used   Substance Use Topics    Alcohol use: No    Drug use: No     Medications Prior to Admission   Medication Sig Dispense Refill Last Dose/Taking    Accu-Chek Irene Plus test strip USE TO CHECK BLOOD SUGAR BEFORE MEALS AND AT BEDTIME 400 each 3     amLODIPine (NORVASC) 5 MG tablet TAKE 1 TABLET BY MOUTH DAILY 90 tablet 3     atenolol (TENORMIN) 50 MG tablet TAKE 1 TABLET BY MOUTH DAILY 90 tablet 3     atorvastatin (LIPITOR) 80 MG tablet TAKE 1 TABLET BY MOUTH AT NIGHT  FOR HIGH AMOUNT OF FATS IN THE  BLOOD 90 tablet 3     Blood Glucose Monitoring Suppl (Accu-Chek Irene Plus) w/Device kit 1 each 4 (Four) Times a Day. 1 kit 0     clopidogrel (PLAVIX) 75 MG tablet Take 1 tablet by mouth Daily. 90 tablet 3     fenofibrate (TRICOR) 54 MG tablet TAKE 1 TABLET BY MOUTH DAILY FOR HIGH AMOUNT OF TRIGLYCERIDES IN  THE BLOOD 90 tablet 3     ferrous sulfate 325 (65 FE) MG tablet Take 1 tablet by mouth 2 (Two) Times a Day.       gabapentin (NEURONTIN) 600 MG tablet TAKE 1 TABLET BY MOUTH 3 TIMES  DAILY 270  "tablet 3     hydroCHLOROthiazide 25 MG tablet Take 1 tablet by mouth Daily. 90 tablet 3     Ibuprofen 3 %, Gabapentin 10 %, Baclofen 2 %, lidocaine 4 % Apply 1-2 g topically to the appropriate area as directed 3 (Three) to 4 (Four) times daily. 90 g 3     Insulin Pen Needle (BD ULTRA-FINE PEN NEEDLES) 29G X 12.7MM misc Use to inject insulin twice daily. DX E11.65 200 each 2     insulin regular (HUMULIN R) 500 UNIT/ML CONCENTRATED injection DRAW TO 30 UNIT REI ON U-100 SYRINGE AND INJECT IN THE MORNING AND 40 UNIT REI IN THE EVENING Indications: Type 2 Diabetes 60 mL 3     Insulin Syringe-Needle U-100 (BD Insulin Syringe U/F) 31G X 5/16\" 0.5 ML misc 1 each by Other route 2 (Two) Times a Day. 200 each 3     Insulin Syringe-Needle U-100 30G X 1/2\" 0.5 ML misc 1 each by Other route 2 (Two) Times a Day. 200 each 12     irbesartan (AVAPRO) 300 MG tablet TAKE 1 TABLET BY MOUTH DAILY 90 tablet 3     isosorbide mononitrate (IMDUR) 30 MG 24 hr tablet TAKE 1 TABLET BY MOUTH DAILY 90 tablet 3     Jardiance 25 MG tablet tablet TAKE 1 TABLET BY MOUTH DAILY 90 tablet 3     levothyroxine (SYNTHROID, LEVOTHROID) 175 MCG tablet TAKE 1 TABLET BY MOUTH DAILY FOR UNDERACTIVE THYROID 90 tablet 3     metFORMIN ER (GLUCOPHAGE-XR) 500 MG 24 hr tablet TAKE 4 TABLETS BY MOUTH AT  SUPPER FOR TYPE 2 DIABETES 360 tablet 3     Methylcobalamin (B12-ACTIVE PO) Take  by mouth.       O2 (OXYGEN) Inhale 1 L/min Every Night. Indications: marianne       Omega-3 Fatty Acids (FISH OIL) 1000 MG capsule capsule Take 1 capsule by mouth 2 (Two) Times a Day.       Pramlintide Acetate (SymlinPen 120) 2700 MCG/2.7ML solution pen-injector INJECT 120 MCG SUBCUTANEOUSLY  INTO THE APPROPRIATE AREA AS  DIRECTED 3 TIMES DAILY (Patient taking differently: Inject 120 mcg under the skin into the appropriate area as directed 2 (Two) Times a Day. INJECT 120 MCG SUBCUTANEOUSLY  INTO THE APPROPRIATE AREA AS  DIRECTED 2 TIMES DAILY) 10.8 mL 11     urea (CARMOL) 40 % cream Apply " 1 application  topically to the appropriate area as directed 2 (Two) Times a Day. Apply twice daily to calluses. Add vitamin D 5,000 units 85 each 3     vitamin B-12 (CYANOCOBALAMIN) 100 MCG tablet Take 1 tablet by mouth Daily. Indications: Inadequate Vitamin B12       vitamin D (ERGOCALCIFEROL) 1.25 MG (37940 UT) capsule capsule TAKE 1 CAPSULE BY MOUTH ONCE  WEEKLY FOR VITAMIN D DEFICIENCY 13 capsule 3     warfarin (COUMADIN) 2 MG tablet TAKE 1 TABLET BY MOUTH DAILY OR  AS DIRECTED BASED ON INR (Patient taking differently: Take 1 tablet by mouth As Needed. TAKE 1 TABLET BY MOUTH DAILY OR  AS DIRECTED BASED ON INR Tuesday and Thursdays only) 90 tablet 3     warfarin (COUMADIN) 5 MG tablet 7 mg (5 mg x 1 and 1 mg x 2) every Tue, Thu; 8 mg (5 mg x 1 and 1 mg x 3) all other days 90 tablet 3      Scheduled Meds:  amLODIPine, 5 mg, Oral, Daily  atenolol, 50 mg, Oral, Daily  atorvastatin, 80 mg, Oral, Nightly  budesonide, 0.5 mg, Nebulization, BID - RT  bumetanide, 1 mg, Intravenous, Q6H  cefepime, 2,000 mg, Intravenous, Q12H  clopidogrel, 75 mg, Oral, Daily  empagliflozin, 25 mg, Oral, Daily  fenofibrate, 48 mg, Oral, Daily  ferrous sulfate, 325 mg, Oral, BID  gabapentin, 600 mg, Oral, Q8H  insulin lispro, 3-14 Units, Subcutaneous, Q6H  Insulin Regular Human (Conc), 60 Units, Subcutaneous, TID AC  insulin regular, 4 Units, Subcutaneous, Q8H  ipratropium-albuterol, 3 mL, Nebulization, Q4H - RT  isosorbide mononitrate, 30 mg, Oral, Daily  levothyroxine, 175 mcg, Oral, Q AM  methylPREDNISolone sodium succinate, 40 mg, Intravenous, Q8H  pantoprazole, 40 mg, Oral, Q AM  sodium chloride, 10 mL, Intravenous, Q12H  vitamin B-12, 250 mcg, Oral, Daily  [Held by provider] warfarin, 7 mg, Oral, Once per day on Tuesday Thursday  [Held by provider] warfarin, 8 mg, Oral, Once per day on Sunday Monday Wednesday Friday Saturday      Continuous Infusions:  Pharmacy Consult - Steroid Insulin Protocol,   Pharmacy to dose warfarin,       PRN  Meds:    aluminum-magnesium hydroxide-simethicone    senna-docusate sodium **AND** polyethylene glycol **AND** bisacodyl **AND** bisacodyl    Calcium Replacement - Follow Nurse / BPA Driven Protocol    dextrose    dextrose    glucagon (human recombinant)    Magnesium Standard Dose Replacement - Follow Nurse / BPA Driven Protocol    melatonin    ondansetron ODT **OR** ondansetron    Pharmacy Consult - Steroid Insulin Protocol    Pharmacy to dose warfarin    Phosphorus Replacement - Follow Nurse / BPA Driven Protocol    Potassium Replacement - Follow Nurse / BPA Driven Protocol    sodium chloride    sodium chloride  Allergies:  Patient has no known allergies.    Objective     Vital Signs   Temp:  [97.6 °F (36.4 °C)-98.4 °F (36.9 °C)] 98 °F (36.7 °C)  Heart Rate:  [71-86] 74  Resp:  [14-30] 18  BP: (117-133)/(57-66) 132/66    Physical Exam:     General Appearance:    Alert, cooperative, in mild distress. Obese   Head:    Normocephalic, without obvious abnormality, atraumatic   Eyes:            Lids and lashes normal, conjunctivae and sclerae normal, no   icterus, no pallor, corneas clear, PERRLA   Throat:   No oral lesions,  oral mucosa moist   Neck:   No adenopathy, supple,  no thyromegaly, no carotid bruit   Lungs:     Decreased breath sounds    Heart:    Regular rhythm and normal rate   Chest Wall:    No abnormalities observed   Abdomen:     Normal bowel sounds, soft                 Extremities:   Moves all extremities well, 1+ edema               Pulses:   Pulses palpable and equal bilaterally   Skin:   Dry   Neurologic:  DTR absent, able to feel the 10g monofilament       Results Review  I have reviewed the patient's new clinical results, labs & imaging.    Lab Results (last 24 hours)       Procedure Component Value Units Date/Time    POC Glucose Once [[id number]]  (Abnormal) Collected: 01/14/25 2056    Specimen: Blood Updated: 01/14/25 2057     Glucose 402 mg/dL      Comment: Serial Number: [id number]:   [id number]       Protein / Creatinine Ratio, Urine - Urine, Clean Catch [[id number]] Collected: 01/14/25 1658    Specimen: Urine, Clean Catch Updated: 01/14/25 2036     Protein/Creatinine Ratio, Urine --     Comment: Unable to calculate        Creatinine, Urine 25.6 mg/dL      Total Protein, Urine <4.0 mg/dL     POC Glucose Once [[id number]]  (Abnormal) Collected: 01/14/25 1656    Specimen: Blood Updated: 01/14/25 1658     Glucose 327 mg/dL      Comment: Serial Number: [id number]:  [id number]       Creatinine Urine Random (kidney function) GFR component - Urine, Clean Catch [[id number]] Collected: 01/14/25 0842    Specimen: Urine, Clean Catch Updated: 01/14/25 1616     Creatinine, Urine 49.7 mg/dL     Narrative:      Reference intervals for random urine have not been established.  Clinical usage is dependent upon physician's interpretation in combination with other laboratory tests.       Blood Culture - Blood, Arm, Right [[id number]]  (Normal) Collected: 01/13/25 1521    Specimen: Blood from Arm, Right Updated: 01/14/25 1531     Blood Culture No growth at 24 hours    Narrative:      Less than seven (7) mL's of blood was collected.  Insufficient quantity may yield false negative results.    Blood Culture - Blood, Arm, Left [[id number]]  (Normal) Collected: 01/13/25 1505    Specimen: Blood from Arm, Left Updated: 01/14/25 1515     Blood Culture No growth at 24 hours    Basic Metabolic Panel [[id number]]  (Abnormal) Collected: 01/14/25 1238    Specimen: Blood from Arm, Right Updated: 01/14/25 1310     Glucose 373 mg/dL      BUN 49 mg/dL      Creatinine 1.38 mg/dL      Sodium 137 mmol/L      Potassium 4.6 mmol/L      Chloride 98 mmol/L      CO2 20.2 mmol/L      Calcium 9.2 mg/dL      BUN/Creatinine Ratio 35.5     Anion Gap 18.8 mmol/L      eGFR 51.1 mL/min/1.73     Narrative:      GFR Categories in Chronic Kidney Disease (CKD)      GFR Category          GFR (mL/min/1.73)    Interpretation  G1                     90 or  greater         Normal or high (1)  G2                      60-89                Mild decrease (1)  G3a                   45-59                Mild to moderate decrease  G3b                   30-44                Moderate to severe decrease  G4                    15-29                Severe decrease  G5                    14 or less           Kidney failure          (1)In the absence of evidence of kidney disease, neither GFR category G1 or G2 fulfill the criteria for CKD.    eGFR calculation 2021 CKD-EPI creatinine equation, which does not include race as a factor    WATSON by IFA, Reflex 9-biomarkers profile [[id number]] Collected: 01/14/25 1238    Specimen: Blood from Arm, Right Updated: 01/14/25 1242    ANCA Panel [[id number]] Collected: 01/14/25 1238    Specimen: Blood from Arm, Right Updated: 01/14/25 1241    POC Glucose Q6H [[id number]]  (Abnormal) Collected: 01/14/25 1223    Specimen: Blood Updated: 01/14/25 1224     Glucose 356 mg/dL      Comment: Serial Number: [id number]:  [id number]       STAT Lactic Acid, Reflex [[id number]]  (Abnormal) Collected: 01/14/25 1106    Specimen: Blood from Arm, Left Updated: 01/14/25 1205     Lactate 2.6 mmol/L     BNP [[id number]]  (Abnormal) Collected: 01/14/25 0610    Specimen: Blood from Arm, Right Updated: 01/14/25 1157     proBNP 4,115.0 pg/mL     Narrative:      This assay is used as an aid in the diagnosis of individuals suspected of having heart failure. It can be used as an aid in the diagnosis of acute decompensated heart failure (ADHF) in patients presenting with signs and symptoms of ADHF to the emergency department (ED). In addition, NT-proBNP of <300 pg/mL indicates ADHF is not likely.    Age Range Result Interpretation  NT-proBNP Concentration (pg/mL:      <50             Positive            >450                   Gray                 300-450                    Negative             <300    50-75           Positive            >900                  Street                 300-900                  Negative            <300      >75             Positive            >1800                  Gray                300-1800                  Negative            <300    Protein, Urine, Random - Urine, Clean Catch [[id number]] Collected: 01/14/25 0842    Specimen: Urine, Clean Catch Updated: 01/14/25 1010     Total Protein, Urine 8.1 mg/dL     Narrative:      Reference intervals for random urine have not been established.  Clinical usage is dependent upon physician's interpretation in combination with other laboratory tests.       Urinalysis With Microscopic If Indicated (No Culture) - Urine, Clean Catch [[id number]]  (Abnormal) Collected: 01/14/25 0842    Specimen: Urine, Clean Catch Updated: 01/14/25 1009     Color, UA Yellow     Appearance, UA Clear     pH, UA <=5.0     Specific Gravity, UA 1.036     Glucose, UA >=1000 mg/dL (3+)     Ketones, UA 15 mg/dL (1+)     Bilirubin, UA Negative     Blood, UA Negative     Protein, UA Negative     Leuk Esterase, UA Negative     Nitrite, UA Negative     Urobilinogen, UA 0.2 E.U./dL    Narrative:      Urine microscopic not indicated.    S. Pneumo Ag Urine or CSF - Urine, Urine, Clean Catch [[id number]]  (Normal) Collected: 01/14/25 0842    Specimen: Urine, Clean Catch Updated: 01/14/25 0927     Strep Pneumo Ag Negative    Legionella Antigen, Urine - Urine, Urine, Clean Catch [[id number]]  (Normal) Collected: 01/14/25 0842    Specimen: Urine, Clean Catch Updated: 01/14/25 0926     LEGIONELLA ANTIGEN, URINE Negative    Sodium, Urine, Random - Urine, Clean Catch [[id number]] Collected: 01/14/25 0842    Specimen: Urine, Clean Catch Updated: 01/14/25 0910     Sodium, Urine 31 mmol/L     Narrative:      Reference intervals for random urine have not been established.  Clinical usage is dependent upon physician's interpretation in combination with other laboratory tests.       POC Glucose Once [[id number]]  (Abnormal) Collected: 01/14/25 0802    Specimen: Blood  Updated: 01/14/25 0803     Glucose 276 mg/dL      Comment: Serial Number: [id number]:  [id number]       Protime-INR [[id number]]  (Abnormal) Collected: 01/14/25 0610    Specimen: Blood from Arm, Right Updated: 01/14/25 0720     Protime 34.6 Seconds      INR 3.44    MRSA Screen, PCR (Inpatient) - Swab, Nares [[id number]]  (Abnormal) Collected: 01/14/25 0613    Specimen: Swab from Nares Updated: 01/14/25 0719     MRSA PCR MRSA Detected    Narrative:      The negative predictive value of this diagnostic test is high and should only be used to consider de-escalating anti-MRSA therapy. A positive result may indicate colonization with MRSA and must be correlated clinically.    Basic Metabolic Panel [[id number]]  (Abnormal) Collected: 01/14/25 0610    Specimen: Blood from Arm, Right Updated: 01/14/25 0641     Glucose 305 mg/dL      BUN 45 mg/dL      Creatinine 1.50 mg/dL      Sodium 140 mmol/L      Potassium 4.7 mmol/L      Chloride 102 mmol/L      CO2 18.1 mmol/L      Calcium 9.3 mg/dL      BUN/Creatinine Ratio 30.0     Anion Gap 19.9 mmol/L      eGFR 46.2 mL/min/1.73     Narrative:      GFR Categories in Chronic Kidney Disease (CKD)      GFR Category          GFR (mL/min/1.73)    Interpretation  G1                     90 or greater         Normal or high (1)  G2                      60-89                Mild decrease (1)  G3a                   45-59                Mild to moderate decrease  G3b                   30-44                Moderate to severe decrease  G4                    15-29                Severe decrease  G5                    14 or less           Kidney failure          (1)In the absence of evidence of kidney disease, neither GFR category G1 or G2 fulfill the criteria for CKD.    eGFR calculation 2021 CKD-EPI creatinine equation, which does not include race as a factor    STAT Lactic Acid, Reflex [[id number]]  (Abnormal) Collected: 01/14/25 0610    Specimen: Blood from Arm, Right Updated: 01/14/25 0639      Lactate 2.2 mmol/L     CBC Auto Differential [[id number]]  (Abnormal) Collected: 01/14/25 0610    Specimen: Blood from Arm, Right Updated: 01/14/25 0618     WBC 15.00 10*3/mm3      RBC 4.42 10*6/mm3      Hemoglobin 13.1 g/dL      Hematocrit 40.3 %      MCV 91.2 fL      MCH 29.6 pg      MCHC 32.5 g/dL      RDW 13.2 %      RDW-SD 44.3 fl      MPV 10.2 fL      Platelets 309 10*3/mm3      Neutrophil % 87.3 %      Lymphocyte % 7.5 %      Monocyte % 4.4 %      Eosinophil % 0.0 %      Basophil % 0.1 %      Immature Grans % 0.7 %      Neutrophils, Absolute 13.10 10*3/mm3      Lymphocytes, Absolute 1.12 10*3/mm3      Monocytes, Absolute 0.66 10*3/mm3      Eosinophils, Absolute 0.00 10*3/mm3      Basophils, Absolute 0.02 10*3/mm3      Immature Grans, Absolute 0.10 10*3/mm3      nRBC 0.0 /100 WBC     POC Glucose Q6H [[id number]]  (Abnormal) Collected: 01/14/25 0604    Specimen: Blood Updated: 01/14/25 0608     Glucose 315 mg/dL      Comment: Serial Number: [id number]:  [id number]       Blood Gas, Venous - [[id number]]  (Abnormal) Collected: 01/14/25 0604    Specimen: Venous Blood Updated: 01/14/25 0608     Site PA     pH, Venous 7.403 pH Units      pCO2, Venous 30.6 mm Hg      pO2, Venous 52.7 mm Hg      HCO3, Venous 19.1 mmol/L      Base Excess, Venous -4.5 mmol/L      Comment: Serial Number: [id number]:  [id number]        O2 Saturation, Venous 87.5 %      CO2 Content 20.0 mmol/L      Barometric Pressure for Blood Gas --     Comment: N/A        Modality HFNC     FIO2 76 %     POC Glucose Once [[id number]]  (Abnormal) Collected: 01/14/25 0524    Specimen: Blood Updated: 01/14/25 0525     Glucose 315 mg/dL      Comment: Serial Number: [id number]:  [id number]       Hemoglobin A1c [[id number]]  (Abnormal) Collected: 01/13/25 1505    Specimen: Blood Updated: 01/14/25 0224     Hemoglobin A1C 7.30 %     High Sensitivity Troponin T [[id number]]  (Abnormal) Collected: 01/14/25 0058    Specimen: Blood from Arm, Right Updated:  "01/14/25 0137     HS Troponin T 128 ng/L     Narrative:      High Sensitive Troponin T Reference Range:  <14.0 ng/L- Negative Female for AMI  <22.0 ng/L- Negative Male for AMI  >=14 - Abnormal Female indicating possible myocardial injury.  >=22 - Abnormal Male indicating possible myocardial injury.   Clinicians would have to utilize clinical acumen, EKG, Troponin, and serial changes to determine if it is an Acute Myocardial Infarction or myocardial injury due to an underlying chronic condition.         STAT Lactic Acid, Reflex [[id number]]  (Abnormal) Collected: 01/14/25 0058    Specimen: Blood from Arm, Right Updated: 01/14/25 0137     Lactate 2.9 mmol/L     POC Glucose Q6H [[id number]]  (Abnormal) Collected: 01/14/25 0010    Specimen: Blood Updated: 01/14/25 0011     Glucose 347 mg/dL      Comment: Serial Number: [id number]:  [id number]       Procalcitonin [[id number]]  (Abnormal) Collected: 01/13/25 1727    Specimen: Blood Updated: 01/13/25 2339     Procalcitonin 0.39 ng/mL     Narrative:      As a Marker for Sepsis (Non-Neonates):    1. <0.5 ng/mL represents a low risk of severe sepsis and/or septic shock.  2. >2 ng/mL represents a high risk of severe sepsis and/or septic shock.    As a Marker for Lower Respiratory Tract Infections that require antibiotic therapy:    PCT on Admission    Antibiotic Therapy       6-12 Hrs later    >0.5                Strongly Recommended  >0.25 - <0.5        Recommended   0.1 - 0.25          Discouraged              Remeasure/reassess PCT  <0.1                Strongly Discouraged     Remeasure/reassess PCT    As 28 day mortality risk marker: \"Change in Procalcitonin Result\" (>80% or <=80%) if Day 0 (or Day 1) and Day 4 values are available. Refer to http://[email or web address]-pct-[email or web address]    Change in PCT <=80%  A decrease of PCT levels below or equal to 80% defines a positive change in PCT test result representing a higher risk for 28-day all-cause mortality of patients diagnosed " with severe sepsis for septic shock.    Change in PCT >80%  A decrease of PCT levels of more than 80% defines a negative change in PCT result representing a lower risk for 28-day all-cause mortality of patients diagnosed with severe sepsis or septic shock.       STAT Lactic Acid, Reflex [[id number]]  (Abnormal) Collected: 01/13/25 2306    Specimen: Blood Updated: 01/13/25 2339     Lactate 3.0 mmol/L           Lab Results   Component Value Date    HGBA1C 7.30 (H) 01/13/2025     Lab Results   Component Value Date    TSH 2.760 12/04/2024       Assessment & Plan     Diabetes type 2, with hyperglycemia  Multifocal pneumonia    Switched to U 500 insulin.  Added steroid protocol.  Will follow with you.  Thank you for the consult.  I discussed the patients findings and my recommendations with patient    Virginia Shaver MD  01/14/25  23:28 EST

## 2025-01-15 NOTE — PROGRESS NOTES
Patient Name: Dalton Dallas Sr.  : 1940  MRN: [id number]  Primary Care Physician: Joan Johns APRN  Date of admission: 2025    Patient Care Team:  Joan Johns APRN as PCP - General (Nurse Practitioner)  Jelani Vieira MD as Consulting Physician (Pulmonary Disease)  Edwar Ackerman MD as Consulting Physician (Pulmonary Disease)  Virginia Shaver MD as Consulting Physician (Endocrinology)  Triny Cee APRN as Nurse Practitioner (Orthopedic Surgery)  CHRISSY Holt DPM as Consulting Physician (Podiatry)  Bill Don MD as Consulting Physician (Ophthalmology)  Anu Palmer PA-C as Physician Assistant (Physician Assistant)        Subjective   Subjective:     Patient still very short of breath was on high flow oxygen supplement but now on BiPAP  Review of systems:  All other review of system unremarkable      Allergies:  No Known Allergies    Objective   Exam:     Vital Signs  Temp:  [97.3 °F (36.3 °C)-98 °F (36.7 °C)] 97.7 °F (36.5 °C)  Heart Rate:  [65-85] 79  Resp:  [16-30] 28  BP: (118-148)/(55-80) 148/80  SpO2:  [86 %-99 %] 99 %  on  Flow (L/min) (Oxygen Therapy):  [60] 60;   Device (Oxygen Therapy): NPPV/NIV  Body mass index is 38.84 kg/m².    General: Elderly white male in n very short of breath  Head:      Normocephalic and atraumatic.    Eyes:      PERRL/EOM intact, conjunctivae and sclerae clear without nystagmus.    Neck:      No masses, thyromegaly,  trachea central with normal respiratory effort   Lungs:    Bilateral crackles.    Heart:      Irregular rate and rhythm, no murmur no gallop  Abd:        Soft, nontender, not distended, bowel sounds positive, no shifting dullness   Pulses:   Pulses palpable  Extr:        No cyanosis or clubbing--mild edema.    Neuro:    No focal deficits.   Open I  Skin:       Intact without lesions or rashes.    Psych:    Very drowsy.      Results Review:  I have personally reviewed most recent Data :  CBC     Results from last 7 days   Lab Units 01/15/25  0604 01/14/25  0610 01/13/25  1505   WBC 10*3/mm3 18.38* 15.00* 14.49*   HEMOGLOBIN g/dL 14.5 13.1 14.4   PLATELETS 10*3/mm3 259 309 405     CMP   Results from last 7 days   Lab Units 01/15/25  1756 01/15/25  1511 01/15/25  0604 01/14/25  1238 01/14/25  0610 01/13/25  1505   SODIUM mmol/L 141 141 139 137 140 136   POTASSIUM mmol/L 4.2 3.7 4.1 4.6 4.7 5.7*   CHLORIDE mmol/L 97* 97* 99 98 102 95*   CO2 mmol/L 28.1 27.2 22.2 20.2* 18.1* 17.2*   BUN mg/dL 59* 61* 58* 49* 45* 38*   CREATININE mg/dL 1.55* 1.60* 1.58* 1.38* 1.50* 1.77*   GLUCOSE mg/dL 240* 293* 284* 373* 305* 325*   ALBUMIN g/dL 3.8 3.8 3.6  --   --  3.9   BILIRUBIN mg/dL 0.5 0.5 0.5  --   --  0.7   ALK PHOS U/L 117 123* 105  --   --  80   AST (SGOT) U/L 42* 39 34  --   --  34   ALT (SGPT) U/L 28 26 24  --   --  22     ABG    Results from last 7 days   Lab Units 01/15/25  1514 01/15/25  0757 01/13/25  2227 01/13/25  1845 01/13/25  1509   PH, ARTERIAL pH units 7.461* 7.388 7.410 7.396 7.408   PCO2, ARTERIAL mm Hg 40.6 42.4 26.6* 32.9* 28.4*   PO2 ART mm Hg 91.7 93.0 56.4* 41.3* 117.5*   O2 SATURATION ART % 97.5 97.1 89.9* 77.1* 98.7*   BASE EXCESS ART mmol/L 4.6* 0.3 -6.2* -3.7* -5.2*     XR Chest 1 View    Result Date: 1/15/2025  Impression: 1.Cardiomegaly with pulmonary vascular congestion. 2.No interval change in diffuse bilateral reticular airspace disease could represent pulm edema or pneumonia. Electronically Signed: Dwayne Curtis MD  1/15/2025 8:11 AM EST  Workstation ID: [id number]    US Renal Bilateral    Result Date: 1/14/2025  1. Limited study without evidence of hydronephrosis 2. Bladder not visualized Electronically Signed: Alberto Alna MD  1/14/2025 3:54 PM EST  Workstation ID: [id number]    US Abdomen Limited    Result Date: 1/14/2025  Impression: No sonographic evidence of free fluid in the abdomen. Electronically Signed: Cesar Issa MD  1/14/2025 3:52 PM EST  Workstation ID:  [id number]    XR Chest 1 View    Result Date: 1/14/2025  Impression: 1.Cardiomegaly. 2.Mixed interstitial/airspace disease which could be due to multifocal pneumonia or pulmonary edema. Trace pleural effusions are also suspected. Electronically Signed: Justus Goldsmith MD  1/14/2025 10:50 AM EST  Workstation ID: [id number]     Results for orders placed during the hospital encounter of 01/13/25    Adult Transthoracic Echo Complete W/ Cont if Necessary Per Protocol    Interpretation Summary    Left ventricular systolic function is normal. Left ventricular ejection fraction appears to be 61 - 65%.    Left ventricular diastolic function is consistent with (grade I) impaired relaxation.    There is calcification of the aortic valve.    Estimated right ventricular systolic pressure from tricuspid regurgitation is markedly elevated (>55 mmHg).    Scheduled Meds:amLODIPine, 5 mg, Oral, Daily  atenolol, 50 mg, Oral, Daily  atorvastatin, 80 mg, Oral, Nightly  budesonide, 0.5 mg, Nebulization, BID - RT  cefepime, 2,000 mg, Intravenous, Q12H  clopidogrel, 75 mg, Oral, Daily  [Held by provider] empagliflozin, 25 mg, Oral, Daily  fenofibrate, 48 mg, Oral, Daily  ferrous sulfate, 325 mg, Oral, BID  gabapentin, 600 mg, Oral, Q8H  hydrocortisone sodium succinate, 50 mg, Intravenous, Q6H  ipratropium-albuterol, 3 mL, Nebulization, Q4H - RT  [START ON 1/16/2025] isosorbide mononitrate, 60 mg, Oral, Daily  levothyroxine, 175 mcg, Oral, Q AM  [Held by provider] losartan, 100 mg, Oral, Q24H  pantoprazole, 40 mg, Oral, Q AM  sodium chloride, 10 mL, Intravenous, Q12H  sodium chloride, 10 mL, Intravenous, Q12H  [START ON 1/16/2025] vancomycin, 1,500 mg, Intravenous, Q24H  vancomycin, 2,000 mg, Intravenous, Once  vitamin B-12, 250 mcg, Oral, Daily  [Held by provider] warfarin, 7 mg, Oral, Once per day on Tuesday Thursday  [Held by provider] warfarin, 8 mg, Oral, Once per day on Sunday Monday Wednesday Friday Saturday      Continuous  Infusions:insulin, 0-100 Units/hr, Last Rate: 3.4 Units/hr (01/15/25 1812)  Pharmacy to dose vancomycin,       PRN Meds:  aluminum-magnesium hydroxide-simethicone    senna-docusate sodium **AND** polyethylene glycol **AND** bisacodyl **AND** bisacodyl    Calcium Replacement - Follow Nurse / BPA Driven Protocol    dextrose    dextrose    glucagon (human recombinant)    Magnesium Standard Dose Replacement - Follow Nurse / BPA Driven Protocol    melatonin    ondansetron ODT **OR** ondansetron    Pharmacy to dose vancomycin    Phosphorus Replacement - Follow Nurse / BPA Driven Protocol    Potassium Replacement - Follow Nurse / BPA Driven Protocol    sodium chloride    sodium chloride    sodium chloride    sodium chloride    Assessment & Plan   Assessment and Plan:         Multifocal pneumonia    ASSESSMENT:  CASIE  CKD with baseline around 1.2-1.4 mg/dL likely secondary to HTN, DM, generalized atherosclerotic changes   Acute respiratory failure with hypoxia, multifocal pneumonia and pulmonary edema  CHF with most recent EF 60-65%  CAD s/p PCI  HTN  DM  Congestive heart failure with grade 1 diastolic dysfunction  Elevated Troponin               PLAN :      Worsening respiratory condition worsening chest x-ray with some improvement in the urine output  Bumex drip was started but later discontinued-patient will need intermittent Bumex to improve volume condition  Chest x-ray with pulmonary edema but IVC was flat Bumex drip was discontinued  Worsening respiratory condition likely secondary to underlying infection  I tried to call pulmonologist but unable to reach him  UA was negative for protein or hematuria, urine sodium 31.Check UPCR and Renal US   Repeat echo result appreciated grade 1 diastolic dysfunction  Continue to hold ARB, blood pressure stable will consider starting angiotensin receptor blocker in 1 to 2 days  Electrolytes stable  Pulm input noted, recommend bronch, also ordered WATSON/ANCA. Continue broad spectrum IV  abx   Hgb stable   Daily labs   Avoid nephrotoxins, monitor I/O   Thank you for this consultation, we will gladly follow           Electronically signed by Miguel Gonzalez MD,   [facility] kidney consultant  [phone]  1/15/2025  18:42 EST

## 2025-01-15 NOTE — PROGRESS NOTES
"Pharmacy dosing service  Anticoagulant  Warfarin     Subjective:    Dalton Dallas Sr. is a 84 y.o.male being continued on warfarin for Other hx of DVT, hemophilia .    INR Goal: 2 - 3  Home medication?: warfarin 8 mg PO daily, except warfarin 7 mg PO on Tuesdays and Thursdays.   Bridge Therapy Present?:  No  Interacting Medications Evaluation (New/Present/Discontinued): solu-medrol (may increase INR)  Additional Contributing Factors: fluid overload (on bumex 1 mg q6h per nephrology)      Assessment/Plan:    Patient is normally on warfarin 8 mg daily except warfarin 7 mg on Tuesday and Thursday. INR today is supratherapeutic, likely due to fluid overload and acute illness. Will hold tonight's dose. Possible bronch planned in the future.    Continue to monitor and adjust based on INR.         Date 1/14 1/15          INR 3.44 3.53          Dose Hold Hold              Objective:  [Ht: 175.3 cm (69\"); Wt: 123 kg (272 lb); BMI: Body mass index is 40.17 kg/m².]    Lab Results   Component Value Date    ALBUMIN 3.6 01/15/2025     Lab Results   Component Value Date    INR 3.53 (H) 01/15/2025    INR 3.44 (H) 01/14/2025    INR 2.68 01/13/2025    PROTIME 35.4 (H) 01/15/2025    PROTIME 34.6 (H) 01/14/2025    PROTIME 28.5 01/13/2025     Lab Results   Component Value Date    HGB 14.5 01/15/2025    HGB 13.1 01/14/2025    HGB 14.4 01/13/2025     Lab Results   Component Value Date    HCT 46.7 01/15/2025    HCT 40.3 01/14/2025    HCT 44.5 01/13/2025       Karen Ghotra, PharmD  01/15/25 12:26 EST   "

## 2025-01-15 NOTE — CASE MANAGEMENT/SOCIAL WORK
Discharge Planning Assessment   Shawn     Patient Name: Dalton Dallas Sr.  MRN: [id number]  Today's Date: 1/14/2025    Admit Date: 1/13/2025    Plan: Return home with family. Home O2 @ [facility]   Discharge Needs Assessment       Row Name 01/14/25 2224       Living Environment    People in Home child(kacie), adult    Name(s) of People in Home sandra Ramirez    Current Living Arrangements home    Potentially Unsafe Housing Conditions none    In the past 12 months has the electric, gas, oil, or water company threatened to shut off services in your home? No    Primary Care Provided by self    Provides Primary Care For no one    Family Caregiver if Needed child(kacie), adult    Family Caregiver Names Dtr/POELIAS Mcfarlane, Son James    Quality of Family Relationships helpful;involved;supportive    Able to Return to Prior Arrangements yes       Resource/Environmental Concerns    Resource/Environmental Concerns none    Transportation Concerns none       Transportation Needs    In the past 12 months, has lack of transportation kept you from medical appointments or from getting medications? no    In the past 12 months, has lack of transportation kept you from meetings, work, or from getting things needed for daily living? No       Food Insecurity    Within the past 12 months, you worried that your food would run out before you got the money to buy more. Never true    Within the past 12 months, the food you bought just didn't last and you didn't have money to get more. Never true       Transition Planning    Patient/Family Anticipates Transition to home with family    Patient/Family Anticipated Services at Transition none    Transportation Anticipated family or friend will provide       Discharge Needs Assessment    Readmission Within the Last 30 Days no previous admission in last 30 days    Equipment Currently Used at Home wheelchair;crutches;glucometer;oxygen;shower chair;walker, rolling;bp cuff;cpap    Concerns to be Addressed  care coordination/care conferences;discharge planning    Do you want help finding or keeping work or a job? Patient declined    Do you want help with school or training? For example, starting or completing job training or getting a high school diploma, GED or equivalent Patient declined    Anticipated Changes Related to Illness none    Equipment Needed After Discharge nebulizer  nebulizer if discharged home with medications that require it    Outpatient/Agency/Support Group Needs --    Discharge Facility/Level of Care Needs home with home health    Offered/Gave Vendor List yes                   Discharge Plan       Row Name 01/14/25 222       Plan    Plan Return home with family. Home O2 @ [facility]    Plan Comments CM met with patient at bedside to discuss discharge planning. Patient is alert and oriented with intermittent confusion. Daughter/EMMA Dobbs and son James present in room and assist with assessment. Son James lives in home with patient and daughter Rinku states she has POA and is bringing current documents tomorrow to make sure we have them correct in file. Patient does not drive and states he recently finished going to OP PT at [facility]. Family provides all transportation. He is independent with ADLs at home. He has a glucometer, w/c, rolling walker, shower chair, Cpap, and Home O2 2L at night only through [place]. PCP and pharmacy ([facility]) confirmed, agreeable to [facility]. Denies any issues affording medications, utilities, and/or food. No current HHC. Son will provide transportation at discharge. CM briefly discussed HHC vs SNF. Patient/Dtr request f/u at a later time once closer to discharge. Patient has Consults for GI, neph, endocrinology, cardio, pulm, and diabetes educator and is on Airvo currently.                  Continued Care and Services - Admitted Since 1/13/2025    No active coordination exists for this encounter.          Demographic Summary       Row Name 01/14/25 0430       General  Information    Admission Type inpatient    Arrived From emergency department    Referral Source admission list    Reason for Consult care coordination/care conference;discharge planning    Preferred Language English       Contact Information    Permission Granted to Share Info With                    Functional Status       Row Name 01/14/25 2224       Functional Status    Usual Activity Tolerance moderate    Current Activity Tolerance moderate       Functional Status, IADL    Medications independent    Meal Preparation assistive person    Housekeeping assistive person    Laundry assistive person    Shopping assistive equipment and person       Mental Status Summary    Recent Changes in Mental Status/Cognitive Functioning no changes             Mariana Deluca RN      [facility]  Office: [phone]  Cell: [phone]  Fax # [phone]

## 2025-01-15 NOTE — CASE MANAGEMENT/SOCIAL WORK
Continued Stay Note  [facility]     Patient Name: Dalton Dallas Sr.  MRN: [id number]  Today's Date: 1/15/2025    Admit Date: 1/13/2025    Plan: Return home with family. Home O2 @ [facility] [facility]   Discharge Plan       Row Name 01/15/25 1404       Plan    Plan Comments DC Barriers: Airvo, bumex gtt, IV abt, Cr 1.58-monitoring I&Os, GI, neph, endocrinology, cardio, pulm, and diabetes educator following             Mariana Deluca RN      [facility]  Office: [phone]  Cell: [phone]  Fax # [phone]

## 2025-01-15 NOTE — SIGNIFICANT NOTE
01/15/25 1300   Rehab Time/Intention   Session Not Performed   (holding PT due to rapid response this AM)   Recommendation   PT - Next Appointment 01/16/25

## 2025-01-15 NOTE — CONSULTS
Critical Care Consult Note   Dalton CHAU Burt Barr. : 1940 MRN:[id number] LOS:2 ROOM: 2308/1     Reason for admission: Multifocal pneumonia     Assessment / Plan   Acute hypoxic respiratory failure  Multifocal pneumonia  -Patient has acute hypoxic respiratory failure with evidence of bilateral infiltrates concerning for multifocal pneumonia.  Although his CXR read is pulmonary edema, on my bedside lung ultrasound the patient had no B-lines and his IVC was flat making me less suspicious for pulmonary edema.  Will hold further Bumex at this time.  -On AVAPS at 90%  -ABG from this afternoon was largely benign   -Duonebs q4  -solumedrol 40 q8- changed to hydrocort 50 q6 for CAPE COD PNA dosing  -CT PE done with no PE but diffuse GGO  -Strep/legionella negative  -MRSA nares positive, start vancomycin  -Respiratory PCR negative  -On cefepime  -procal elevated at .47   -ANCA and WATSON panel in process    CASIE  History of CKD  -I suspect his CASIE is 2/2 hypovolemia/overdiuresis  -Macario placed this morning for strict I/o  -he was on a bumex gtt which is now off  -Flat IVC (even on NIPPV), patient likely is volume down   -Will give 1L of LR      Intake/Output Summary (Last 24 hours) at 1/15/2025 1616  Last data filed at 1/15/2025 1539  Gross per 24 hour   Intake 590 ml   Output 4760 ml   Net -4170 ml         Heart failure with preserved EF  Chest pain  -Echo from 1/15 showing EF 61-65% with mild diastolic dysfunction  -Troponin 53 which is downtrending from yesterday  -ECG without ST segment elevation or significant depression, likely NSTEMI if anything    DM2  Hyperglycemia  -insulin 60 TID with SSI- HOLD  -will start insulin gtt to better control glucose for now  -AG of 17  -f/u lactic and BHB  -On jardiance      Hypothyroidism  -home levothyroxine 175 daily    CAD  -on plavix  -atorvustatin    HTN  -home atenolol 50  -home amlodipine 5  -imdur 60    DVT  Thrombophilia  -on warfarin  -INR 3.5, continue to hold  -dosed  by pharmacy    Level Of Support Discussed With: Patient  Code Status (Patient has no pulse and is not breathing): CPR (Attempt to Resuscitate)  Medical Interventions (Patient has pulse or is breathing): Full Support       Nutrition: Diet: Cardiac, Diabetic, Renal, Fluid Restriction (240 mL/tray); Low Sodium (2g); Consistent Carbohydrate; Low Phosphorus; 1000 mL/day; Texture: Regular (IDDSI 7); Fluid Consistency: Thin (IDDSI 0) Patient isn't on Tube Feeding     VTE Prophylaxis:  Pharmacologic VTE prophylaxis orders are present.         History of Present illness     A 84 y.o. old male patient with PMH of coronary artery disease, heart failure preserved EF, hypertension, hyperlipidemia, peripheral vascular disease, type 2 diabetes, hypothyroidism, chronic pain, sleep apnea who presented to the hospital on 1/13 for weakness for the past week.  Patient also was short of breath with a worsening cough and chills.    In the ED the patient was hypoxemic and was placed on high flow nasal cannula/BiPAP with improvement of his oxygenation.  He had a CT PE which showed no PE but had patchy extensive groundglass opacities in his bilateral lungs..  Patient was treated with cefepime and admitted to the hospitalist service.    On 1/15 the patient had worsening hypoxic respiratory failure requiring AVAPS maxed at 100%.  He had a repeat chest x-ray which showed bilateral haziness which could represent pulmonary edema or worsening multifocal pneumonia.  The patient was started on a Bumex drip however patient became hypotensive after the drip and so was discontinued.  Critical care was consulted for further management of the patient's hypoxemic respiratory failure.    ACP: patient is full code and his daughter is his medical POA    Patient was seen and examined on 01/15/25 at 16:16 EST .    Past Medical/Surgical/Social/Family History & Allergies     Past Medical History:   Diagnosis Date    ACE-inhibitor cough 06/2005    Coronary  artery disease     Diabetes mellitus, type 2     ED (erectile dysfunction)     Hx of blood clots     Blood clot left leg     Hyperlipidemia 2000    Hypertension     Hypogonadism, male     Hypothyroidism 2001    Obesity     Peripheral neuropathy     Pulmonary embolism 2014    Rectal fistula     Sleep apnea 12/10/2013    Ulcer of left foot 2022    Vitamin D deficiency       Past Surgical History:   Procedure Laterality Date    CARDIAC CATHETERIZATION      PTCA: ; PTCA: 2015 LCX stent     CARDIAC CATHETERIZATION Left 7/3/2021    Procedure: Cardiac Catheterization/Vascular Study;  Surgeon: Edwar Ackerman MD;  Location: [facility] CATH INVASIVE LOCATION;  Service: Cardiovascular;  Laterality: Left;    CARDIOVASCULAR STRESS TEST      CATARACT EXTRACTION  2016 & 16     COLONOSCOPY N/A 2019    Procedure: COLONOSCOPY WITH ENDOSCOPIC CLIPPING X1 OF POST POLYPECTOMY BLEED SITE;  Surgeon: Jhonny Orellana MD;  Location: [facility] ENDOSCOPY;  Service: Gastroenterology    CORONARY STENT PLACEMENT      INGUINAL HERNIA REPAIR Left     UMBILICAL HERNIA REPAIR        Social History     Socioeconomic History    Marital status:    Tobacco Use    Smoking status: Former     Current packs/day: 0.00     Average packs/day: 1.5 packs/day for 20.0 years (30.0 ttl pk-yrs)     Types: Cigarettes     Start date:      Quit date:      Years since quittin.0     Passive exposure: Past    Smokeless tobacco: Never    Tobacco comments:     quit    Vaping Use    Vaping status: Never Used   Substance and Sexual Activity    Alcohol use: No    Drug use: No    Sexual activity: Defer      Family History   Problem Relation Age of Onset    Heart disease Mother     Leukemia Father       No Known Allergies     Home Medications     Prior to Admission medications    Medication Sig Start Date End Date Taking? Authorizing Provider   Accu-Chek Irene Plus test strip USE TO CHECK BLOOD  "SUGAR BEFORE MEALS AND AT BEDTIME 10/7/24   Virginia Shaver MD   amLODIPine (NORVASC) 5 MG tablet TAKE 1 TABLET BY MOUTH DAILY 1/13/25   Edwar Ackerman MD   atenolol (TENORMIN) 50 MG tablet TAKE 1 TABLET BY MOUTH DAILY 12/2/24   Virginia Shaver MD   atorvastatin (LIPITOR) 80 MG tablet TAKE 1 TABLET BY MOUTH AT NIGHT  FOR HIGH AMOUNT OF FATS IN THE  BLOOD 1/13/25   Edwar Ackerman MD   Blood Glucose Monitoring Suppl (Accu-Chek Irene Plus) w/Device kit 1 each 4 (Four) Times a Day. 5/13/22   Virginia Shaver MD   clopidogrel (PLAVIX) 75 MG tablet Take 1 tablet by mouth Daily. 3/26/24   Edwar Ackerman MD   fenofibrate (TRICOR) 54 MG tablet TAKE 1 TABLET BY MOUTH DAILY FOR HIGH AMOUNT OF TRIGLYCERIDES IN  THE BLOOD 9/9/24   Edwar Ackerman MD   ferrous sulfate 325 (65 FE) MG tablet Take 1 tablet by mouth 2 (Two) Times a Day.    ProviderLee MD   gabapentin (NEURONTIN) 600 MG tablet TAKE 1 TABLET BY MOUTH 3 TIMES  DAILY 11/16/24   Joan Johns APRN   hydroCHLOROthiazide 25 MG tablet Take 1 tablet by mouth Daily. 11/11/24   Edwar Ackerman MD   Ibuprofen 3 %, Gabapentin 10 %, Baclofen 2 %, lidocaine 4 % Apply 1-2 g topically to the appropriate area as directed 3 (Three) to 4 (Four) times daily. 10/3/24 10/3/25  Freddy Roe MD   Insulin Pen Needle (BD ULTRA-FINE PEN NEEDLES) 29G X 12.7MM misc Use to inject insulin twice daily. DX E11.65 2/29/24   Virginia Shaver MD   insulin regular (HUMULIN R) 500 UNIT/ML CONCENTRATED injection DRAW TO 30 UNIT REI ON U-100 SYRINGE AND INJECT IN THE MORNING AND 40 UNIT REI IN THE EVENING Indications: Type 2 Diabetes 10/31/24   Virginia Shaver MD   Insulin Syringe-Needle U-100 (BD Insulin Syringe U/F) 31G X 5/16\" 0.5 ML misc 1 each by Other route 2 (Two) Times a Day. 9/21/23   Virginia Shaver MD   Insulin Syringe-Needle U-100 30G X 1/2\" 0.5 ML misc 1 each by Other route 2 (Two) Times a Day. 1/23/24   Virginia Shaver MD   irbesartan (AVAPRO) 300 MG tablet " TAKE 1 TABLET BY MOUTH DAILY 5/7/24   Virginia Shaver MD   isosorbide mononitrate (IMDUR) 30 MG 24 hr tablet TAKE 1 TABLET BY MOUTH DAILY 1/13/25   Edwar Ackerman MD   Jardiance 25 MG tablet tablet TAKE 1 TABLET BY MOUTH DAILY 12/2/24   Virginia Shaver MD   levothyroxine (SYNTHROID, LEVOTHROID) 175 MCG tablet TAKE 1 TABLET BY MOUTH DAILY FOR UNDERACTIVE THYROID 5/7/24   Virginia Shaver MD   metFORMIN ER (GLUCOPHAGE-XR) 500 MG 24 hr tablet TAKE 4 TABLETS BY MOUTH AT  SUPPER FOR TYPE 2 DIABETES 12/2/24   Virginia Shaver MD   Methylcobalamin (B12-ACTIVE PO) Take  by mouth.    Lee Ruggiero MD   O2 (OXYGEN) Inhale 1 L/min Every Night. Indications: marianne 11/29/22   Lee Ruggiero MD   Omega-3 Fatty Acids (FISH OIL) 1000 MG capsule capsule Take 1 capsule by mouth 2 (Two) Times a Day.    Lee Ruggiero MD   Pramlintide Acetate (SymlinPen 120) 2700 MCG/2.7ML solution pen-injector INJECT 120 MCG SUBCUTANEOUSLY  INTO THE APPROPRIATE AREA AS  DIRECTED 3 TIMES DAILY  Patient taking differently: Inject 120 mcg under the skin into the appropriate area as directed 2 (Two) Times a Day. INJECT 120 MCG SUBCUTANEOUSLY  INTO THE APPROPRIATE AREA AS  DIRECTED 2 TIMES DAILY 6/10/24   Virginia Shaver MD   urea (CARMOL) 40 % cream Apply 1 application  topically to the appropriate area as directed 2 (Two) Times a Day. Apply twice daily to calluses. Add vitamin D 5,000 units 12/6/23   Triny Cee APRN   vitamin B-12 (CYANOCOBALAMIN) 100 MCG tablet Take 1 tablet by mouth Daily. Indications: Inadequate Vitamin B12 4/15/15   Lee Ruggiero MD   vitamin D (ERGOCALCIFEROL) 1.25 MG (33596 UT) capsule capsule TAKE 1 CAPSULE BY MOUTH ONCE  WEEKLY FOR VITAMIN D DEFICIENCY 11/11/24   Virginia Shaver MD   warfarin (COUMADIN) 2 MG tablet TAKE 1 TABLET BY MOUTH DAILY OR  AS DIRECTED BASED ON INR  Patient taking differently: Take 1 tablet by mouth As Needed. TAKE 1 TABLET BY MOUTH DAILY OR  AS  DIRECTED BASED ON INR Tuesday and Thursdays only 8/16/24   Anu Palmer PA-C   warfarin (COUMADIN) 5 MG tablet 7 mg (5 mg x 1 and 1 mg x 2) every Tue, Thu; 8 mg (5 mg x 1 and 1 mg x 3) all other days 10/7/24   Anu Palmer PA-C        Objective / Physical Exam     Vital signs:  Temp: 97.7 °F (36.5 °C)  BP: 135/77  Heart Rate: 83  Resp: 28  SpO2: 96 %  Weight: 119 kg (263 lb 0.1 oz)    Admission Weight: Weight: 124 kg (274 lb 7.6 oz)    Physical Exam  Constitutional:       Appearance: He is obese.      Comments: Sitting up in bed with BIPAP on in NAD   HENT:      Head: Normocephalic and atraumatic.      Right Ear: External ear normal.      Left Ear: External ear normal.      Mouth/Throat:      Mouth: Mucous membranes are dry.   Eyes:      Conjunctiva/sclera: Conjunctivae normal.   Cardiovascular:      Rate and Rhythm: Normal rate and regular rhythm.      Pulses: Normal pulses.   Pulmonary:      Comments: Tachypnea, normal lung sounds in the bilateral apices. Decreased BS at the bases  Abdominal:      General: There is distension.      Tenderness: There is no abdominal tenderness. There is no rebound.   Genitourinary:     Penis: Normal.       Comments: Macario in place  Musculoskeletal:      Right lower leg: No edema.      Left lower leg: No edema.   Skin:     General: Skin is warm.      Capillary Refill: Capillary refill takes less than 2 seconds.   Neurological:      General: No focal deficit present.      Mental Status: He is oriented to person, place, and time.          Labs     Results from last 7 days   Lab Units 01/15/25  0604 01/14/25  0610 01/13/25  1505   WBC 10*3/mm3 18.38* 15.00* 14.49*   HEMATOCRIT % 46.7 40.3 44.5   PLATELETS 10*3/mm3 259 309 405      Results from last 7 days   Lab Units 01/15/25  1511 01/15/25  0604 01/14/25  1238 01/14/25  0610 01/13/25  1505   SODIUM mmol/L 141 139 137 140 136   POTASSIUM mmol/L 3.7 4.1 4.6 4.7 5.7*   CHLORIDE mmol/L 97* 99 98 102 95*   CO2 mmol/L 27.2  22.2 20.2* 18.1* 17.2*   BUN mg/dL 61* 58* 49* 45* 38*   CREATININE mg/dL 1.60* 1.58* 1.38* 1.50* 1.77*        Imaging     XR Chest 1 View    Result Date: 1/15/2025  Impression: 1.Cardiomegaly with pulmonary vascular congestion. 2.No interval change in diffuse bilateral reticular airspace disease could represent pulm edema or pneumonia. Electronically Signed: Dwayne Curtis MD  1/15/2025 8:11 AM EST  Workstation ID: [id number]    US Renal Bilateral    Result Date: 1/14/2025  1. Limited study without evidence of hydronephrosis 2. Bladder not visualized Electronically Signed: Alberto Alan MD  1/14/2025 3:54 PM EST  Workstation ID: [id number]    US Abdomen Limited    Result Date: 1/14/2025  Impression: No sonographic evidence of free fluid in the abdomen. Electronically Signed: Cesar Issa MD  1/14/2025 3:52 PM EST  Workstation ID: [id number]    XR Chest 1 View    Result Date: 1/14/2025  Impression: 1.Cardiomegaly. 2.Mixed interstitial/airspace disease which could be due to multifocal pneumonia or pulmonary edema. Trace pleural effusions are also suspected. Electronically Signed: Justus Goldsmith MD  1/14/2025 10:50 AM EST  Workstation ID: [id number]    CT Angiogram Chest Pulmonary Embolism    Result Date: 1/13/2025  Impression: 1.No evidence of acute pulmonary embolism. 2.Extensive patchy opacities and groundglass opacities noted throughout the lungs bilaterally, left greater than right. There is relative sparing of the right upper lobe. There is superimposed reticulations, interstitial thickening and traction bronchiectasis noted within the peripheral aspect of the lungs. These findings are highly concerning for an infectious/inflammatory process superimposed upon chronic interstitial lung disease. Please consider follow-up with chest CT in 3 months after resolution of symptoms to exclude underlying pulmonary lesions 3.Multiple enlarged mediastinal and perihilar lymph nodes, likely reactive. 4.Mild enlargement of the  central pulmonary arteries, a finding which can be seen in the setting of pulmonary hypertension. Electronically Signed: Malcolm Moss,   1/13/2025 5:01 PM EST  Workstation ID: [id number]      Chest X ray: My independent assessment showed worsening bilateral airspace opacities c/f multifocal pneumonia    EKG: My independent evaluation showed sinus rhythm at 83, flat t waves in lateral leads, no st segment elevation or significant depression    Current Medications     Scheduled Meds:  amLODIPine, 5 mg, Oral, Daily  atenolol, 50 mg, Oral, Daily  atorvastatin, 80 mg, Oral, Nightly  budesonide, 0.5 mg, Nebulization, BID - RT  cefepime, 2,000 mg, Intravenous, Q12H  clopidogrel, 75 mg, Oral, Daily  empagliflozin, 25 mg, Oral, Daily  fenofibrate, 48 mg, Oral, Daily  ferrous sulfate, 325 mg, Oral, BID  gabapentin, 600 mg, Oral, Q8H  hydrocortisone sodium succinate, 50 mg, Intravenous, Q6H  ipratropium-albuterol, 3 mL, Nebulization, Q4H - RT  [START ON 1/16/2025] isosorbide mononitrate, 60 mg, Oral, Daily  lactated ringers, 1,000 mL, Intravenous, Once  levothyroxine, 175 mcg, Oral, Q AM  pantoprazole, 40 mg, Oral, Q AM  sodium chloride, 10 mL, Intravenous, Q12H  sodium chloride, 10 mL, Intravenous, Q12H  vitamin B-12, 250 mcg, Oral, Daily  [Held by provider] warfarin, 7 mg, Oral, Once per day on Tuesday Thursday  [Held by provider] warfarin, 8 mg, Oral, Once per day on Sunday Monday Wednesday Friday Saturday         Continuous Infusions:  insulin, 0-100 Units/hr  Pharmacy to dose vancomycin,   Pharmacy to dose warfarin,          Roberto Arriaga MD   Critical Care  01/15/25   16:16 EST      emergently and I personally spent this critical care time directly and personally managing the patient. This critical care time included obtaining a history; examining the patient; pulse oximetry; ordering and review of studies; arranging urgent treatment with development of a management plan; evaluation of patient's response to treatment; frequent reassessment; and, discussions with other providers.    This critical care time was performed to assess and manage the high probability of imminent, life-threatening deterioration that could result in multi-organ failure. It was exclusive of separately billable procedures and treating other patients and teaching time.  This critical care time was completed alongside above-mentioned attending physician.    Joo Sloan  Critical care

## 2025-01-15 NOTE — PROGRESS NOTES
CARDIOLOGY FOLLOW-UP PROGRESS NOTE      Reason for follow-up:    SOA  Elevated troponin     Attending: Roberto Arriaga MD      Subjective .     C/o SOA, denies chest pain     Review of Systems   Constitutional: Positive for malaise/fatigue. Negative for chills and fever.   HENT:  Negative for ear discharge and nosebleeds.    Eyes:  Negative for discharge and redness.   Cardiovascular:  Positive for chest pain. Negative for orthopnea, palpitations, paroxysmal nocturnal dyspnea and syncope.   Respiratory:  Positive for shortness of breath. Negative for cough and wheezing.    Endocrine: Negative for heat intolerance.   Skin:  Negative for rash.   Musculoskeletal:  Negative for arthritis and myalgias.   Gastrointestinal:  Negative for abdominal pain, melena, nausea and vomiting.   Genitourinary:  Negative for dysuria and hematuria.   Neurological:  Negative for dizziness, light-headedness, numbness and tremors.   Psychiatric/Behavioral:  Negative for depression. The patient is not nervous/anxious.      Pertinent items are noted in HPI, all other systems reviewed and negative  Allergies: Patient has no known allergies.    Scheduled Meds:amLODIPine, 5 mg, Oral, Daily  atenolol, 50 mg, Oral, Daily  atorvastatin, 80 mg, Oral, Nightly  budesonide, 0.5 mg, Nebulization, BID - RT  cefepime, 2,000 mg, Intravenous, Q12H  clopidogrel, 75 mg, Oral, Daily  [Held by provider] empagliflozin, 25 mg, Oral, Daily  fenofibrate, 48 mg, Oral, Daily  ferrous sulfate, 325 mg, Oral, BID  gabapentin, 600 mg, Oral, Q8H  hydrocortisone sodium succinate, 50 mg, Intravenous, Q6H  ipratropium-albuterol, 3 mL, Nebulization, Q4H - RT  isosorbide mononitrate, 60 mg, Oral, Daily  levothyroxine, 175 mcg, Oral, Q AM  [Held by provider] losartan, 100 mg, Oral, Q24H  pantoprazole, 40 mg, Oral, Q AM  potassium chloride, 10 mEq, Intravenous, Q1H  sodium chloride, 10 mL, Intravenous, Q12H  sodium chloride, 10 mL, Intravenous, Q12H  vancomycin, 1,500 mg,  Intravenous, Q24H  vitamin B-12, 250 mcg, Oral, Daily  vitamin K1, 2.5 mg, Oral, Once  [Held by provider] warfarin, 7 mg, Oral, Once per day on Tuesday Thursday  [Held by provider] warfarin, 8 mg, Oral, Once per day on Sunday Monday Wednesday Friday Saturday        Continuous Infusions:amiodarone, 1 mg/min, Last Rate: 1 mg/min (01/16/25 0650)   Followed by  amiodarone, 0.5 mg/min  insulin, 0-100 Units/hr, Last Rate: 0.5 Units/hr (01/16/25 7822)  Pharmacy to dose vancomycin,         PRN Meds:.  aluminum-magnesium hydroxide-simethicone    senna-docusate sodium **AND** polyethylene glycol **AND** bisacodyl **AND** bisacodyl    Calcium Replacement - Follow Nurse / BPA Driven Protocol    dextrose    dextrose    glucagon (human recombinant)    Magnesium Standard Dose Replacement - Follow Nurse / BPA Driven Protocol    melatonin    ondansetron ODT **OR** ondansetron    Pharmacy to dose vancomycin    Phosphorus Replacement - Follow Nurse / BPA Driven Protocol    Potassium Replacement - Follow Nurse / BPA Driven Protocol    sodium chloride    sodium chloride    sodium chloride    sodium chloride    Objective     VITAL SIGNS  Patient Vitals for the past 24 hrs:   BP Temp Temp src Pulse Resp SpO2 Weight   01/16/25 0734 164/85 -- -- (!) 122 (!) 31 91 % --   01/16/25 0730 -- -- -- 120 (!) 34 92 % --   01/16/25 0729 -- -- -- (!) 127 (!) 29 92 % --   01/16/25 0725 -- -- -- (!) 122 (!) 31 90 % --   01/16/25 0700 142/89 -- -- 118 -- 96 % --   01/16/25 0645 158/72 -- -- 114 -- 96 % --   01/16/25 0630 -- -- -- (!) 123 -- 95 % --   01/16/25 0615 -- -- -- 114 -- 93 % --   01/16/25 0600 143/84 -- -- (!) 122 -- 93 % --   01/16/25 0545 -- -- -- (!) 131 -- 94 % --   01/16/25 0530 -- -- -- (!) 123 -- 94 % --   01/16/25 0515 -- -- -- 114 -- 92 % --   01/16/25 0500 108/74 97.4 °F (36.3 °C) Axillary 112 -- 91 % --   01/16/25 0445 -- -- -- 120 -- 93 % --   01/16/25 0430 -- -- -- 75 -- 94 % --   01/16/25 0415 -- -- -- 78 -- 93 % --   01/16/25  "0400 146/75 -- -- 76 -- 93 % --   01/16/25 0359 -- -- -- 76 (!) 29 93 % --   01/16/25 0356 -- -- -- 76 (!) 29 93 % --   01/16/25 0345 -- -- -- 76 -- 93 % --   01/16/25 0330 -- -- -- 76 -- 93 % --   01/16/25 0315 -- -- -- 76 -- 94 % --   01/16/25 0300 154/75 -- -- 79 -- 92 % --   01/16/25 0200 151/78 -- -- 74 -- 93 % --   01/16/25 0100 127/69 -- -- 70 -- 94 % --   01/16/25 0056 -- 96 °F (35.6 °C) Axillary 72 -- 93 % --   01/16/25 0000 153/74 -- -- 76 -- 95 % --   01/15/25 2320 -- -- -- 70 26 96 % --   01/15/25 2317 -- -- -- 73 26 96 % --   01/15/25 2300 149/70 -- -- 74 -- 98 % --   01/15/25 2200 163/75 -- -- 72 -- 99 % --   01/15/25 2100 134/64 -- -- 73 -- 95 % --   01/15/25 2030 -- 95.5 °F (35.3 °C) Axillary -- -- -- --   01/15/25 2000 152/71 -- -- 78 -- 97 % --   01/15/25 1937 -- -- -- 75 25 97 % --   01/15/25 1934 -- -- -- 73 25 96 % --   01/15/25 1933 -- -- -- 74 25 96 % --   01/15/25 1930 140/77 -- -- 74 25 98 % --   01/15/25 1900 149/82 -- -- 75 -- 96 % --   01/15/25 1800 148/80 -- -- 79 -- 99 % --   01/15/25 1700 118/68 -- -- 83 -- 95 % --   01/15/25 1600 118/78 -- -- 85 -- 96 % --   01/15/25 1549 -- -- -- 83 -- 96 % --   01/15/25 1545 135/77 -- -- 84 28 95 % --   01/15/25 1539 121/66 97.7 °F (36.5 °C) Axillary 85 -- 95 % 119 kg (263 lb 0.1 oz)   01/15/25 1525 -- -- -- 85 (!) 30 96 % --   01/15/25 1119 -- -- -- 73 (!) 30 92 % --   01/15/25 1115 132/78 97.3 °F (36.3 °C) Axillary 72 (!) 30 93 % --   01/15/25 0857 -- -- -- 70 26 96 % --   01/15/25 0854 -- -- -- 70 26 95 % --   01/15/25 0853 -- -- -- 70 26 92 % --   01/15/25 0849 -- -- -- 68 26 95 % --        Flowsheet Rows      Flowsheet Row First Filed Value   Admission Height 177.8 cm (70\") Documented at 01/13/2025 1444   Admission Weight 124 kg (274 lb 7.6 oz) Documented at 01/13/2025 1441            Body mass index is 38.84 kg/m².      Intake/Output Summary (Last 24 hours) at 1/16/2025 0824  Last data filed at 1/16/2025 0600  Gross per 24 hour   Intake 540 ml "   Output 4350 ml   Net -3810 ml        TELEMETRY:     ST    Physical Exam:  Constitutional:       Appearance: Well-developed.   Eyes:      General: No scleral icterus.        Right eye: No discharge.   HENT:      Head: Normocephalic and atraumatic.   Neck:      Thyroid: No thyromegaly.      Lymphadenopathy: No cervical adenopathy.   Pulmonary:      Effort: Pulmonary effort is normal. No respiratory distress.      Breath sounds: Normal breath sounds. No wheezing. No rales.   Cardiovascular:      Normal rate. Regular rhythm.      No gallop.    Edema:     Peripheral edema absent.   Abdominal:      Tenderness: There is no abdominal tenderness.   Skin:     Findings: No erythema or rash.   Neurological:      Mental Status: Alert and oriented to person, place, and time.            Results Review:   I reviewed the patient's new clinical results.    CBC    Results from last 7 days   Lab Units 01/16/25  0450 01/15/25  0604 01/14/25  0610 01/13/25  1505   WBC 10*3/mm3 17.93* 18.38* 15.00* 14.49*   HEMOGLOBIN g/dL 14.2 14.5 13.1 14.4   PLATELETS 10*3/mm3 188 259 309 405     BMP   Results from last 7 days   Lab Units 01/16/25  0450 01/15/25  1756 01/15/25  1511 01/15/25  0604 01/14/25  1238 01/14/25  0610 01/13/25  1505   SODIUM mmol/L 145 141 141 139 137 140 136   POTASSIUM mmol/L 3.4* 4.2 3.7 4.1 4.6 4.7 5.7*   CHLORIDE mmol/L 104 97* 97* 99 98 102 95*   CO2 mmol/L 28.3 28.1 27.2 22.2 20.2* 18.1* 17.2*   BUN mg/dL 56* 59* 61* 58* 49* 45* 38*   CREATININE mg/dL 1.37* 1.55* 1.60* 1.58* 1.38* 1.50* 1.77*   GLUCOSE mg/dL 124* 240* 293* 284* 373* 305* 325*   MAGNESIUM mg/dL  --   --   --  2.9*  --   --   --    PHOSPHORUS mg/dL  --   --   --  4.4  --   --   --      Cr Clearance Estimated Creatinine Clearance: 52.9 mL/min (A) (by C-G formula based on SCr of 1.37 mg/dL (H)).  Coag   Results from last 7 days   Lab Units 01/16/25  0450 01/15/25  0846 01/14/25  0610 01/13/25  1505   INR  2.80 3.53* 3.44* 2.68     HbA1C   Lab Results    Component Value Date    HGBA1C 7.30 (H) 01/13/2025    HGBA1C 7.36 (H) 12/04/2024    HGBA1C 7.85 (H) 10/01/2024     Blood Glucose   Glucose   Date/Time Value Ref Range Status   01/16/2025 0723 154 (H) 70 - 105 mg/dL Final     Comment:     Serial Number: [id number]:  [id number]   01/16/2025 0611 119 (H) 70 - 105 mg/dL Final     Comment:     Serial Number: [id number]:  [id number]   01/16/2025 0453 115 (H) 70 - 105 mg/dL Final     Comment:     Serial Number: [id number]:  [id number]   01/16/2025 0349 114 (H) 70 - 105 mg/dL Final     Comment:     Serial Number: [id number]:  [id number]   01/16/2025 0250 122 (H) 70 - 105 mg/dL Final     Comment:     Serial Number: [id number]:  [id number]   01/16/2025 0143 161 (H) 70 - 105 mg/dL Final     Comment:     Serial Number: [id number]:  [id number]   01/16/2025 0038 125 (H) 70 - 105 mg/dL Final     Comment:     Serial Number: [id number]:  [id number]   01/15/2025 2331 126 (H) 70 - 105 mg/dL Final     Comment:     Serial Number: [id number]:  [id number]     Infection   Results from last 7 days   Lab Units 01/15/25  1511 01/13/25  1727 01/13/25  1521 01/13/25  1505   BLOODCX   --   --  No growth at 2 days No growth at 2 days   PROCALCITONIN ng/mL 0.47* 0.39*  --   --      CMP   Results from last 7 days   Lab Units 01/16/25  0450 01/15/25  1756 01/15/25  1511 01/15/25  0604 01/14/25  1238 01/14/25  0610 01/13/25  1505   SODIUM mmol/L 145 141 141 139 137 140 136   POTASSIUM mmol/L 3.4* 4.2 3.7 4.1 4.6 4.7 5.7*   CHLORIDE mmol/L 104 97* 97* 99 98 102 95*   CO2 mmol/L 28.3 28.1 27.2 22.2 20.2* 18.1* 17.2*   BUN mg/dL 56* 59* 61* 58* 49* 45* 38*   CREATININE mg/dL 1.37* 1.55* 1.60* 1.58* 1.38* 1.50* 1.77*   GLUCOSE mg/dL 124* 240* 293* 284* 373* 305* 325*   ALBUMIN g/dL 3.6 3.8 3.8 3.6  --   --  3.9   BILIRUBIN mg/dL 0.6 0.5 0.5 0.5  --   --  0.7   ALK PHOS U/L 111 117 123* 105  --   --  80   AST (SGOT) U/L 32 42* 39 34  --   --  34   ALT  "(SGPT) U/L 23 28 26 24  --   --  22     ABG    Results from last 7 days   Lab Units 01/15/25  1514 01/15/25  0757 01/13/25  2227 01/13/25  1845 01/13/25  1509   PH, ARTERIAL pH units 7.461* 7.388 7.410 7.396 7.408   PCO2, ARTERIAL mm Hg 40.6 42.4 26.6* 32.9* 28.4*   PO2 ART mm Hg 91.7 93.0 56.4* 41.3* 117.5*   O2 SATURATION ART % 97.5 97.1 89.9* 77.1* 98.7*   BASE EXCESS ART mmol/L 4.6* 0.3 -6.2* -3.7* -5.2*     UA    Results from last 7 days   Lab Units 01/14/25  0842   NITRITE UA  Negative     DIANA  No results found for: \"POCMETH\", \"POCAMPHET\", \"POCBARBITUR\", \"POCBENZO\", \"POCCOCAINE\", \"POCOPIATES\", \"POCOXYCODO\", \"POCPHENCYC\", \"POCPROPOXY\", \"POCTHC\", \"POCTRICYC\"  Lysis Labs   Results from last 7 days   Lab Units 01/16/25  0450 01/15/25  1756 01/15/25  1511 01/15/25  0846 01/15/25  0604 01/14/25  1238 01/14/25  0610 01/13/25  1505   INR  2.80  --   --  3.53*  --   --  3.44* 2.68   HEMOGLOBIN g/dL 14.2  --   --   --  14.5  --  13.1 14.4   PLATELETS 10*3/mm3 188  --   --   --  259  --  309 405   CREATININE mg/dL 1.37* 1.55* 1.60*  --  1.58* 1.38* 1.50* 1.77*     Radiology(recent) XR Chest 1 View    Result Date: 1/16/2025  Impression: Persistent multifocal airspace disease throughout the lungs concerning for pneumonia superimposed on background of chronic interstitial lung disease. Electronically Signed: Jordan Monet MD  1/16/2025 7:12 AM EST  Workstation ID: [id number]    XR Chest 1 View    Result Date: 1/15/2025  Impression: 1.Cardiomegaly with pulmonary vascular congestion. 2.No interval change in diffuse bilateral reticular airspace disease could represent pulm edema or pneumonia. Electronically Signed: Dwayne Curtis MD  1/15/2025 8:11 AM EST  Workstation ID: [id number]    US Renal Bilateral    Result Date: 1/14/2025  1. Limited study without evidence of hydronephrosis 2. Bladder not visualized Electronically Signed: Alberto Alan MD  1/14/2025 3:54 PM EST  Workstation ID: [id number]    US Abdomen Limited    Result " Date: 1/14/2025  Impression: No sonographic evidence of free fluid in the abdomen. Electronically Signed: Cesar Issa MD  1/14/2025 3:52 PM EST  Workstation ID: [id number]    XR Chest 1 View    Result Date: 1/14/2025  Impression: 1.Cardiomegaly. 2.Mixed interstitial/airspace disease which could be due to multifocal pneumonia or pulmonary edema. Trace pleural effusions are also suspected. Electronically Signed: Justus Goldsmith MD  1/14/2025 10:50 AM EST  Workstation ID: [id number]     Imaging Results (Last 24 Hours)       Procedure Component Value Units Date/Time    XR Chest 1 View [[id number]] Collected: 01/16/25 0710     Updated: 01/16/25 0714    Narrative:      XR CHEST 1 VW    Date of Exam: 1/16/2025 1:53 AM EST    Indication: hypoxic respiratory failure    Comparison: 1/15/2025    Findings:  Stable cardiomegaly. Persistent multifocal airspace disease throughout the lungs concerning for pneumonia superimposed on background of chronic interstitial lung disease. No pneumothorax. No significant effusion. Osseous structures are grossly intact.      Impression:      Impression:  Persistent multifocal airspace disease throughout the lungs concerning for pneumonia superimposed on background of chronic interstitial lung disease.          Electronically Signed: Jordan Monet MD    1/16/2025 7:12 AM EST    Workstation ID: [id number]            Results from last 7 days   Lab Units 01/15/25  1511   HSTROP T ng/L 54*       EKG                I personally viewed and interpreted the patient's EKG/Telemetry data:        ECHOCARDIOGRAM:     Results for orders placed during the hospital encounter of 01/13/25    Adult Transthoracic Echo Complete W/ Cont if Necessary Per Protocol    Interpretation Summary    Left ventricular systolic function is normal. Left ventricular ejection fraction appears to be 61 - 65%.    Left ventricular diastolic function is consistent with (grade I) impaired relaxation.    There is calcification of the aortic  valve.    Estimated right ventricular systolic pressure from tricuspid regurgitation is markedly elevated (>55 mmHg).        STRESS MYOVIEW:      CARDIAC CATHETERIZATION:      OTHER:         Assessment & Plan            Multifocal pneumonia        ASSESSMENT:    Acute hypoxic respiratory failure / Shortness of breath     Multifocal pneumonia, on antibiotics, pulmonary consulted     3.   CAD s/p prior PCI  Elevated troponin, likely demand ischemia in setting of hypoxia and CASIE  No chest pain at present time     4. CASIE on CKD     5. Obesity     6. HTN     7. HLD     8. PVD     9. H/o DVT , thrombophilia on coumadin    PLAN:    Requiring high oxygen concentration via Airvo  Nephrology consulted  Bumex gtt started to augment diuresis  Eventual ischemic evaluation when overall condition stabilizes  Warfarin on hold  INR 3.53  Continue current Rx and supportive care.         Additional recommendations per Dr. Ackerman     Patient is seen and examined and findings are verified.  All data is reviewed by me personally.  Assessment and plan formulated by APC was done after discussion with attending.  I spent more than 50% of time in taking care of the patient.    Patient developed respiratory distress.  Patient was on AVAPS.  Patient had chest pain at the time.  EKG was unremarkable.    Patient has sinus tachycardia.    Normal S1 and S2.  Decreased breath sound expiratory wheezing noted.  Leg edema present    I would recommend that patient be started on diuretics.  I would give vitamin K to normalize PT/INR.  Once the patient is stabilized he will need cardiac catheterization.  Patient had elevated troponin because of non-ST elevation myocardial infarction.  It is most likely type II due to increased demand.    Electronically signed by Edwar Ackerman MD, 01/16/25, 8:24 AM EST.      I discussed the patient's findings and my recommendations with patient and RN                  Electronically signed by LUCAS Gudino,  01/15/25, 3:52 PM EST.          Edwar Ackerman MD  01/16/25  08:24 EST

## 2025-01-15 NOTE — PROGRESS NOTES
Daily Progress Note        Multifocal pneumonia       Assessment:     Acute hypoxic respiratory insufficiency      Extensive multiple groundglass opacities   Respiratory panel negative  No PE     sleep apnea,ResMed  air curve 10 V auto compliance 100%, average use 6 hours 44 minutes, residual AHI 0.3  patient is compliant and benefiting from therapy     2D echo no evidence of pulmonary hypertension  Essential hypertension (Primary)  Mixed hyperlipidemia  Diastolic dysfunction with chronic heart failure (HCC)  Coronary artery disease involving native coronary artery of native heart without angina pectoris  Peripheral vascular disease ([facility])  Thrombophilia ([facility])  Acquired hypothyroidism  Type 2 diabetes mellitus with diabetic neuropathy, with long-term current use of insulin ([facility])     Recommendations:       Noninvasive ventilation utilizing AVAP   Alternating with high-flow Airvo     Steroids currently on IV Solu-Medrol 40 mg Q 8     Bronchoscopy will be beneficial however patient currently on 60 L Oxygen titration currently  on AVAPS as well as elevated INR      Check WATSON and ANCA     Antibiotics on broad-spectrum cefepime       Bronchodilators   Diuresis per renal     On warfarin and Plavix                LOS: 2 days     Subjective         Objective     Vital signs for last 24 hours:  Vitals:    01/15/25 0736 01/15/25 0752 01/15/25 0800 01/15/25 0805   BP:  126/55     BP Location:       Patient Position:       Pulse: 73 70 68 70   Resp:       Temp:       TempSrc:       SpO2: (!) 88% 90% 93% 94%   Weight:       Height:           Intake/Output last 3 shifts:  I/O last 3 completed shifts:  In: 1010 [P.O.:1010]  Out: 4750 [Urine:4750]  Intake/Output this shift:  No intake/output data recorded.      Radiology  Imaging Results (Last 24 Hours)       Procedure Component Value Units Date/Time    XR Chest 1 View [[id number]] Collected: 01/15/25 0809     Updated: 01/15/25 0813    Narrative:      XR CHEST 1 VW    Date of Exam:  1/15/2025 7:55 AM EST    Indication: hypoxia    Comparison: 1/14/2025    Findings:  The trachea is midline. There is cardiomegaly and stable pulmonary vascular congestion. No interval change in few bilateral reticular airspace disease. There are no significant pleural effusions. The trachea is midline      Impression:      Impression:  1.Cardiomegaly with pulmonary vascular congestion.  2.No interval change in diffuse bilateral reticular airspace disease could represent pulm edema or pneumonia.        Electronically Signed: Dwayne Curtis MD    1/15/2025 8:11 AM EST    Workstation ID: [id number]    US Renal Bilateral [[id number]] Collected: 01/14/25 1551     Updated: 01/14/25 1556    Narrative:      US RENAL BILATERAL    Date of Exam: 1/14/2025 3:22 PM EST    Indication: CASIE.    Comparison: No comparisons available.    Technique: Grayscale and color Doppler ultrasound evaluation of the kidneys and urinary bladder was performed.      Findings:  The right kidney measures 12.7 x 6.3 x 5.4 cm in length and the left kidney measures 12.3 x 6.3 x 4.8 cm in length    Evaluation of the kidneys limited secondary to body habitus and bowel gas. No hydronephrosis is identified on limited imaging. No obvious focal lesion identified. Renal echogenicity is indeterminate secondary to suspected hepatic steatosis    Evaluation of the bladder is indeterminate, nonvisualized.      Impression:        1. Limited study without evidence of hydronephrosis    2. Bladder not visualized    Electronically Signed: Alberto Alan MD    1/14/2025 3:54 PM EST    Workstation ID: [id number]    US Abdomen Limited [[id number]] Collected: 01/14/25 1550     Updated: 01/14/25 1554    Narrative:      US ABDOMEN LIMITED    Date of Exam: 1/14/2025 2:51 PM EST    Indication: eval for ascites.    Comparison: Abdominal ultrasound performed on January 14, 2025    Technique: Grayscale and color Doppler ultrasound evaluation of the abdomen was  performed.      Findings:  No free fluid is visualized in the abdomen. Visualized kidneys demonstrate no evidence of hydronephrosis.      Impression:      Impression:  No sonographic evidence of free fluid in the abdomen.        Electronically Signed: Cesar Issa MD    1/14/2025 3:52 PM EST    Workstation ID: [id number]    XR Chest 1 View [[id number]] Collected: 01/14/25 1043     Updated: 01/14/25 1052    Narrative:      XR CHEST 1 VW    Date of Exam: 1/14/2025 10:12 AM EST    Indication: Shortness of breath.    Comparison: 7/1/2021.    Findings:  The heart is enlarged. There is a mixed interstitial/airspace disease which could be due to multifocal pneumonia or pulmonary edema. Trace pleural effusions are also suspected. There is no pneumothorax. There are chronic age-related changes involving the   bony thorax and thoracic aorta.      Impression:      Impression:  1.Cardiomegaly.  2.Mixed interstitial/airspace disease which could be due to multifocal pneumonia or pulmonary edema. Trace pleural effusions are also suspected.        Electronically Signed: Justus Goldsmith MD    1/14/2025 10:50 AM EST    Workstation ID: [id number]            Labs:  Results from last 7 days   Lab Units 01/15/25  0604   WBC 10*3/mm3 18.38*   HEMOGLOBIN g/dL 14.5   HEMATOCRIT % 46.7   PLATELETS 10*3/mm3 259     Results from last 7 days   Lab Units 01/15/25  0604   SODIUM mmol/L 139   POTASSIUM mmol/L 4.1   CHLORIDE mmol/L 99   CO2 mmol/L 22.2   BUN mg/dL 58*   CREATININE mg/dL 1.58*   CALCIUM mg/dL 9.5   BILIRUBIN mg/dL 0.5   ALK PHOS U/L 105   ALT (SGPT) U/L 24   AST (SGOT) U/L 34   GLUCOSE mg/dL 284*     Results from last 7 days   Lab Units 01/15/25  0757   PH, ARTERIAL pH units 7.388   PO2 ART mm Hg 93.0   PCO2, ARTERIAL mm Hg 42.4   HCO3 ART mmol/L 25.6     Results from last 7 days   Lab Units 01/15/25  0604 01/13/25  1505   ALBUMIN g/dL 3.6 3.9     Results from last 7 days   Lab Units 01/14/25  0058 01/13/25  1727 01/13/25  1535   HSTROP T  ng/L 128* 101* 50*         Results from last 7 days   Lab Units 01/15/25  0604   MAGNESIUM mg/dL 2.9*     Results from last 7 days   Lab Units 01/14/25  0610 01/13/25  1505   INR  3.44* 2.68               Meds:   SCHEDULE  amLODIPine, 5 mg, Oral, Daily  atenolol, 50 mg, Oral, Daily  atorvastatin, 80 mg, Oral, Nightly  budesonide, 0.5 mg, Nebulization, BID - RT  bumetanide, 1 mg, Intravenous, Q6H  cefepime, 2,000 mg, Intravenous, Q12H  clopidogrel, 75 mg, Oral, Daily  empagliflozin, 25 mg, Oral, Daily  fenofibrate, 48 mg, Oral, Daily  ferrous sulfate, 325 mg, Oral, BID  gabapentin, 600 mg, Oral, Q8H  insulin lispro, 3-14 Units, Subcutaneous, Q6H  Insulin Regular Human (Conc), 60 Units, Subcutaneous, TID AC  insulin regular, 4 Units, Subcutaneous, Q8H  ipratropium-albuterol, 3 mL, Nebulization, Q4H - RT  isosorbide mononitrate, 30 mg, Oral, Daily  levothyroxine, 175 mcg, Oral, Q AM  methylPREDNISolone sodium succinate, 40 mg, Intravenous, Q8H  pantoprazole, 40 mg, Oral, Q AM  sodium chloride, 10 mL, Intravenous, Q12H  vitamin B-12, 250 mcg, Oral, Daily  [Held by provider] warfarin, 7 mg, Oral, Once per day on Tuesday Thursday  [Held by provider] warfarin, 8 mg, Oral, Once per day on Sunday Monday Wednesday Friday Saturday      Infusions  Pharmacy Consult - Steroid Insulin Protocol,   Pharmacy to dose warfarin,       PRNs    aluminum-magnesium hydroxide-simethicone    senna-docusate sodium **AND** polyethylene glycol **AND** bisacodyl **AND** bisacodyl    Calcium Replacement - Follow Nurse / BPA Driven Protocol    dextrose    dextrose    glucagon (human recombinant)    Magnesium Standard Dose Replacement - Follow Nurse / BPA Driven Protocol    melatonin    ondansetron ODT **OR** ondansetron    Pharmacy Consult - Steroid Insulin Protocol    Pharmacy to dose warfarin    Phosphorus Replacement - Follow Nurse / BPA Driven Protocol    Potassium Replacement - Follow Nurse / BPA Driven Protocol    sodium chloride    sodium  chloride    Physical Exam:  Physical Exam  Cardiovascular:      Heart sounds: Murmur heard.      No gallop.   Pulmonary:      Effort: No respiratory distress.      Breath sounds: No stridor. Rhonchi and rales present. No wheezing.   Chest:      Chest wall: No tenderness.         ROS  Review of Systems   Respiratory:  Positive for cough and shortness of breath. Negative for wheezing and stridor.    Cardiovascular:  Positive for palpitations and leg swelling. Negative for chest pain.             Total time spent with patient greater than: 45 Minutes

## 2025-01-15 NOTE — PROGRESS NOTES
[facility] MEDICINE SERVICE  DAILY PROGRESS NOTE    NAME: Dalton Dallas Sr.  : 1940  MRN: [id number]      LOS: 2 days     PROVIDER OF SERVICE: Laron Elena MD    Chief Complaint: Multifocal pneumonia    Subjective:     Interval History:  History taken from: patient    Rapid response called this AM due to worsening resp distress after eating breakfast. Patient complains of SOB, no cp, no fever,chills. Placed on AVAPS now 100% sats mid 90's.  CXR shows pulm edema.         Review of Systems:   Review of Systems   Constitutional:  Negative for chills and fever.   Respiratory:  Positive for shortness of breath. Negative for wheezing.    Cardiovascular:  Negative for chest pain and palpitations.   Musculoskeletal:  Negative for back pain.   Neurological:  Negative for dizziness and light-headedness.     .    Objective:     Vital Signs  Temp:  [97.5 °F (36.4 °C)-98.2 °F (36.8 °C)] 97.5 °F (36.4 °C)  Heart Rate:  [65-80] 70  Resp:  [14-25] 25  BP: (123-133)/(55-66) 126/55  Flow (L/min) (Oxygen Therapy):  [60] 60   Body mass index is 40.17 kg/m².    Physical Exam  Physical Exam  Constitutional:       General: He is in acute distress.   HENT:      Head: Normocephalic and atraumatic.   Eyes:      Extraocular Movements: Extraocular movements intact.   Cardiovascular:      Rate and Rhythm: Normal rate and regular rhythm.   Pulmonary:      Effort: Respiratory distress present.      Breath sounds: Rales present.   Abdominal:      General: Bowel sounds are normal.      Palpations: Abdomen is soft.      Tenderness: There is no abdominal tenderness.   Musculoskeletal:         General: No swelling. Normal range of motion.      Cervical back: Normal range of motion and neck supple.   Skin:     General: Skin is warm and dry.   Neurological:      General: No focal deficit present.      Mental Status: He is alert and oriented to person, place, and time.            Diagnostic Data    Results from last 7 days    Lab Units 01/15/25  0604   WBC 10*3/mm3 18.38*   HEMOGLOBIN g/dL 14.5   HEMATOCRIT % 46.7   PLATELETS 10*3/mm3 259   GLUCOSE mg/dL 284*   CREATININE mg/dL 1.58*   BUN mg/dL 58*   SODIUM mmol/L 139   POTASSIUM mmol/L 4.1   AST (SGOT) U/L 34   ALT (SGPT) U/L 24   ALK PHOS U/L 105   BILIRUBIN mg/dL 0.5   ANION GAP mmol/L 17.8*       XR Chest 1 View    Result Date: 1/15/2025  Impression: 1.Cardiomegaly with pulmonary vascular congestion. 2.No interval change in diffuse bilateral reticular airspace disease could represent pulm edema or pneumonia. Electronically Signed: Dwayne Curtis MD  1/15/2025 8:11 AM EST  Workstation ID: [id number]    US Renal Bilateral    Result Date: 1/14/2025  1. Limited study without evidence of hydronephrosis 2. Bladder not visualized Electronically Signed: Alberto Alan MD  1/14/2025 3:54 PM EST  Workstation ID: [id number]    US Abdomen Limited    Result Date: 1/14/2025  Impression: No sonographic evidence of free fluid in the abdomen. Electronically Signed: Cesar Issa MD  1/14/2025 3:52 PM EST  Workstation ID: [id number]    XR Chest 1 View    Result Date: 1/14/2025  Impression: 1.Cardiomegaly. 2.Mixed interstitial/airspace disease which could be due to multifocal pneumonia or pulmonary edema. Trace pleural effusions are also suspected. Electronically Signed: Justus Goldsmith MD  1/14/2025 10:50 AM EST  Workstation ID: [id number]    CT Angiogram Chest Pulmonary Embolism    Result Date: 1/13/2025  Impression: 1.No evidence of acute pulmonary embolism. 2.Extensive patchy opacities and groundglass opacities noted throughout the lungs bilaterally, left greater than right. There is relative sparing of the right upper lobe. There is superimposed reticulations, interstitial thickening and traction bronchiectasis noted within the peripheral aspect of the lungs. These findings are highly concerning for an infectious/inflammatory process superimposed upon chronic interstitial lung disease. Please consider  follow-up with chest CT in 3 months after resolution of symptoms to exclude underlying pulmonary lesions 3.Multiple enlarged mediastinal and perihilar lymph nodes, likely reactive. 4.Mild enlargement of the central pulmonary arteries, a finding which can be seen in the setting of pulmonary hypertension. Electronically Signed: Malcolm Moss DO  1/13/2025 5:01 PM EST  Workstation ID: [id number]       I reviewed the patient's new clinical results.    Assessment/Plan:     Active and Resolved Problems  Active Hospital Problems    Diagnosis  POA    **Multifocal pneumonia [J18.9]  Yes      Resolved Hospital Problems   No resolved problems to display.       Respiratory failure  -Secondary to Pulmonary edema due to CHF exacerbation- Cont AVAPS, given Bumex this morning in addition to regular dosing of bumex  -Multifocal PNA-cont iv abx-Cefepime, Vanc held off due to CASIE                           -MRSA screen pos, negative blood cultures  -Follow up pulm and cardiology recs ?bumex gtt given CASIE    2. CASIE- Cr increased today 1.58 - monitor I/O closely on diuresis for CHF exacerbation    3. DM II -uncontrolled-steroid induced- continue Lantus, meal time Insulin scheduled, SSI-defer to endocrine for adjustments    4. HTN-controlled    5.Hx DVT-on warfarin    VTE Prophylaxis:  Pharmacologic VTE prophylaxis orders are present.             Disposition Planning:     Barriers to Discharge:resp failure  Anticipated Date of Discharge: >2 days  Place of Discharge: pending clinical course      Time: 35 minutes     Code Status and Medical Interventions: CPR (Attempt to Resuscitate); Full Support   Ordered at: 01/13/25 4483     Level Of Support Discussed With:    Patient     Code Status (Patient has no pulse and is not breathing):    CPR (Attempt to Resuscitate)     Medical Interventions (Patient has pulse or is breathing):    Full Support       Signature: Electronically signed by Laron Elena MD, 01/15/25, 08:28 EST.  [facility]

## 2025-01-15 NOTE — NURSING NOTE
At approximately 1500, pt became tachycardic and hypotensive with increased o2 requirements while on AVAPs from 75% Fio2 to 100% Fio2 to maintain sats >90%. Pt also with complaints of chest pressure/pain treated with x1 dose of sublingual nitro which did improve patients pain. Pt with little oxygen reserve and desats rapidly to the low 80s even while on AVAPs while performing even small movements including eating. Pt on fluid restriction. Pt was initially started to bumex gtt but now off. Pt eventually transferred to ICU for further management of his condition. Spoke with Corine Mitchell with cardiology. Notified her of patient complaints.

## 2025-01-15 NOTE — PLAN OF CARE
Goal Outcome Evaluation:    New admission to ICU d/t increased O2 needs. Patient currently on Bipap, desats very quickly when off mask. Insulin gtt started. No c/o of SOB or chest pain on arrival to ICU.

## 2025-01-15 NOTE — CODE DOCUMENTATION
Rapid response called related to hypoxia and increased O2 demand. Patient was maxed on airvo and NRB at 15L/min upon arrival of rapid response team. ABG and XR ordered. LUCAS Montgomery at bedside to evaluate. Patient states that he does not feel SOA or in distress. RR 30. Lungs sounds are evident crackles. AVAPS placed on pateint. Spo2 improved to 93%. Dr. Elena called for update and new orders. Received call back with order for IV push 1mg bumex. Dr. Elena to evaluate at bedside. Primary RN aware of plan. Patient to remain on unit at this time.

## 2025-01-16 NOTE — PROGRESS NOTES
CARDIOLOGY FOLLOW-UP PROGRESS NOTE      Reason for follow-up:    SOA  Elevated troponin     Attending: Roberto Arriaga MD      Subjective .     C/o SOA, denies chest pain recurrence overnight  Went in to AF RVR  Now on IV Amiodarone     Review of Systems   Constitutional: Positive for malaise/fatigue. Negative for chills and fever.   HENT:  Negative for ear discharge and nosebleeds.    Eyes:  Negative for discharge and redness.   Cardiovascular:  Positive for chest pain. Negative for orthopnea, palpitations, paroxysmal nocturnal dyspnea and syncope.   Respiratory:  Positive for shortness of breath. Negative for cough and wheezing.    Endocrine: Negative for heat intolerance.   Skin:  Negative for rash.   Musculoskeletal:  Negative for arthritis and myalgias.   Gastrointestinal:  Negative for abdominal pain, melena, nausea and vomiting.   Genitourinary:  Negative for dysuria and hematuria.   Neurological:  Negative for dizziness, light-headedness, numbness and tremors.   Psychiatric/Behavioral:  Negative for depression. The patient is not nervous/anxious.      Pertinent items are noted in HPI, all other systems reviewed and negative  Allergies: Patient has no known allergies.    Scheduled Meds:amLODIPine, 5 mg, Oral, Daily  atorvastatin, 80 mg, Oral, Nightly  budesonide, 0.5 mg, Nebulization, BID - RT  cefepime, 2,000 mg, Intravenous, Q12H  clopidogrel, 75 mg, Oral, Daily  [Held by provider] empagliflozin, 25 mg, Oral, Daily  fenofibrate, 48 mg, Oral, Daily  ferrous sulfate, 325 mg, Oral, BID  gabapentin, 600 mg, Oral, Q8H  hydrocortisone sodium succinate, 50 mg, Intravenous, Q6H  insulin lispro, 3-14 Units, Subcutaneous, 4x Daily AC & at Bedtime  Insulin Regular Human (Conc), 60 Units, Subcutaneous, TID AC  insulin regular, 8 Units, Subcutaneous, Q8H  ipratropium-albuterol, 3 mL, Nebulization, Q4H - RT  isosorbide mononitrate, 60 mg, Oral, Daily  levothyroxine, 175 mcg, Oral, Q AM  [Held by provider] losartan, 100  mg, Oral, Q24H  metoprolol tartrate, 25 mg, Oral, Q8H  pantoprazole, 40 mg, Oral, Q AM  sodium chloride, 10 mL, Intravenous, Q12H  sodium chloride, 10 mL, Intravenous, Q12H  vancomycin, 1,500 mg, Intravenous, Q24H  vitamin B-12, 250 mcg, Oral, Daily  [Held by provider] warfarin, 7 mg, Oral, Once per day on Tuesday Thursday  [Held by provider] warfarin, 8 mg, Oral, Once per day on Sunday Monday Wednesday Friday Saturday        Continuous Infusions:amiodarone, 1 mg/min, Last Rate: 1 mg/min (01/16/25 1425)   Followed by  amiodarone, 0.5 mg/min  Pharmacy to dose vancomycin,         PRN Meds:.  aluminum-magnesium hydroxide-simethicone    senna-docusate sodium **AND** polyethylene glycol **AND** bisacodyl **AND** bisacodyl    Calcium Replacement - Follow Nurse / BPA Driven Protocol    Magnesium Standard Dose Replacement - Follow Nurse / BPA Driven Protocol    melatonin    metoprolol tartrate    ondansetron ODT **OR** ondansetron    Pharmacy to dose vancomycin    Phosphorus Replacement - Follow Nurse / BPA Driven Protocol    Potassium Replacement - Follow Nurse / BPA Driven Protocol    sodium chloride    sodium chloride    sodium chloride    sodium chloride    Objective     VITAL SIGNS  Patient Vitals for the past 24 hrs:   BP Temp Temp src Pulse Resp SpO2   01/16/25 1610 -- -- -- 104 -- 93 %   01/16/25 1600 109/55 97.5 °F (36.4 °C) Axillary 103 19 94 %   01/16/25 1500 107/68 -- -- 102 -- 94 %   01/16/25 1414 -- -- -- 102 27 92 %   01/16/25 1410 -- -- -- 101 27 92 %   01/16/25 1400 -- -- -- 92 -- 91 %   01/16/25 1300 105/59 -- -- 103 -- 91 %   01/16/25 1201 103/53 98.9 °F (37.2 °C) Oral 110 16 (!) 82 %   01/16/25 1200 103/53 -- -- 98 -- (!) 81 %   01/16/25 1135 100/56 -- -- 97 -- --   01/16/25 1124 -- -- -- 107 24 90 %   01/16/25 1120 -- -- -- 100 24 90 %   01/16/25 1100 (!) 88/53 -- -- 102 -- (!) 89 %   01/16/25 1025 -- -- -- 108 -- 92 %   01/16/25 1000 112/64 -- -- 109 -- 93 %   01/16/25 0900 -- -- -- (!) 131 -- (!) 86 %  "  01/16/25 0800 133/64 98.1 °F (36.7 °C) Axillary (!) 126 (!) 30 (!) 87 %   01/16/25 0734 164/85 -- -- (!) 122 (!) 31 91 %   01/16/25 0730 -- -- -- 120 (!) 34 92 %   01/16/25 0729 -- -- -- (!) 127 (!) 29 92 %   01/16/25 0725 -- -- -- (!) 122 (!) 31 90 %   01/16/25 0700 142/89 -- -- 118 -- 96 %   01/16/25 0645 158/72 -- -- 114 -- 96 %   01/16/25 0630 -- -- -- (!) 123 -- 95 %   01/16/25 0615 -- -- -- 114 -- 93 %   01/16/25 0600 143/84 -- -- (!) 122 -- 93 %   01/16/25 0545 -- -- -- (!) 131 -- 94 %   01/16/25 0530 -- -- -- (!) 123 -- 94 %   01/16/25 0515 -- -- -- 114 -- 92 %   01/16/25 0500 108/74 97.4 °F (36.3 °C) Axillary 112 -- 91 %   01/16/25 0445 -- -- -- 120 -- 93 %   01/16/25 0430 -- -- -- 75 -- 94 %   01/16/25 0415 -- -- -- 78 -- 93 %   01/16/25 0400 146/75 -- -- 76 -- 93 %   01/16/25 0359 -- -- -- 76 (!) 29 93 %   01/16/25 0356 -- -- -- 76 (!) 29 93 %   01/16/25 0345 -- -- -- 76 -- 93 %   01/16/25 0330 -- -- -- 76 -- 93 %   01/16/25 0315 -- -- -- 76 -- 94 %   01/16/25 0300 154/75 -- -- 79 -- 92 %   01/16/25 0200 151/78 -- -- 74 -- 93 %   01/16/25 0100 127/69 -- -- 70 -- 94 %   01/16/25 0056 -- 96 °F (35.6 °C) Axillary 72 -- 93 %   01/16/25 0000 153/74 -- -- 76 -- 95 %   01/15/25 2320 -- -- -- 70 26 96 %   01/15/25 2317 -- -- -- 73 26 96 %   01/15/25 2300 149/70 -- -- 74 -- 98 %   01/15/25 2200 163/75 -- -- 72 -- 99 %   01/15/25 2100 134/64 -- -- 73 -- 95 %   01/15/25 2030 -- 95.5 °F (35.3 °C) Axillary -- -- --   01/15/25 2000 152/71 -- -- 78 -- 97 %   01/15/25 1937 -- -- -- 75 25 97 %   01/15/25 1934 -- -- -- 73 25 96 %   01/15/25 1933 -- -- -- 74 25 96 %   01/15/25 1930 140/77 -- -- 74 25 98 %   01/15/25 1900 149/82 -- -- 75 -- 96 %   01/15/25 1800 148/80 -- -- 79 -- 99 %        Flowsheet Rows      Flowsheet Row First Filed Value   Admission Height 177.8 cm (70\") Documented at 01/13/2025 1444   Admission Weight 124 kg (274 lb 7.6 oz) Documented at 01/13/2025 1441            Body mass index is 38.84 " kg/m².      Intake/Output Summary (Last 24 hours) at 1/16/2025 1734  Last data filed at 1/16/2025 1600  Gross per 24 hour   Intake 549 ml   Output 2140 ml   Net -1591 ml        TELEMETRY:     ST    Physical Exam:  Constitutional:       Appearance: Well-developed.   Eyes:      General: No scleral icterus.        Right eye: No discharge.   HENT:      Head: Normocephalic and atraumatic.   Neck:      Thyroid: No thyromegaly.      Lymphadenopathy: No cervical adenopathy.   Pulmonary:      Effort: Pulmonary effort is normal. No respiratory distress.      Breath sounds: Normal breath sounds. No wheezing. No rales.   Cardiovascular:      Tachycardia present. Irregularly irregular rhythm.      No gallop.    Edema:     Peripheral edema absent.   Abdominal:      Tenderness: There is no abdominal tenderness.   Skin:     Findings: No erythema or rash.   Neurological:      Mental Status: Alert and oriented to person, place, and time.            Results Review:   I reviewed the patient's new clinical results.    CBC    Results from last 7 days   Lab Units 01/16/25  0450 01/15/25  0604 01/14/25  0610 01/13/25  1505   WBC 10*3/mm3 17.93* 18.38* 15.00* 14.49*   HEMOGLOBIN g/dL 14.2 14.5 13.1 14.4   PLATELETS 10*3/mm3 188 259 309 405     BMP   Results from last 7 days   Lab Units 01/16/25  1621 01/16/25  0450 01/15/25  1756 01/15/25  1511 01/15/25  0604 01/14/25  1238 01/14/25  0610 01/13/25  1505   SODIUM mmol/L  --  145 141 141 139 137 140 136   POTASSIUM mmol/L 5.0 3.4* 4.2 3.7 4.1 4.6 4.7 5.7*   CHLORIDE mmol/L  --  104 97* 97* 99 98 102 95*   CO2 mmol/L  --  28.3 28.1 27.2 22.2 20.2* 18.1* 17.2*   BUN mg/dL  --  56* 59* 61* 58* 49* 45* 38*   CREATININE mg/dL  --  1.37* 1.55* 1.60* 1.58* 1.38* 1.50* 1.77*   GLUCOSE mg/dL  --  124* 240* 293* 284* 373* 305* 325*   MAGNESIUM mg/dL  --  2.6*  --   --  2.9*  --   --   --    PHOSPHORUS mg/dL  --   --   --   --  4.4  --   --   --      Cr Clearance Estimated Creatinine Clearance: 52.9  mL/min (A) (by C-G formula based on SCr of 1.37 mg/dL (H)).  Coag   Results from last 7 days   Lab Units 01/16/25  0450 01/15/25  0846 01/14/25  0610 01/13/25  1505   INR  2.80 3.53* 3.44* 2.68     HbA1C   Lab Results   Component Value Date    HGBA1C 7.30 (H) 01/13/2025    HGBA1C 7.36 (H) 12/04/2024    HGBA1C 7.85 (H) 10/01/2024     Blood Glucose   Glucose   Date/Time Value Ref Range Status   01/16/2025 1715 288 (H) 70 - 105 mg/dL Final     Comment:     Serial Number: [id number]:  [id number]   01/16/2025 1342 201 (H) 70 - 105 mg/dL Final     Comment:     Serial Number: [id number]:  [id number]   01/16/2025 1130 212 (H) 70 - 105 mg/dL Final     Comment:     Serial Number: [id number]:  [id number]   01/16/2025 1020 227 (H) 70 - 105 mg/dL Final     Comment:     Serial Number: [id number]:  [id number]   01/16/2025 0849 165 (H) 70 - 105 mg/dL Final     Comment:     Serial Number: [id number]:  [id number]   01/16/2025 0723 154 (H) 70 - 105 mg/dL Final     Comment:     Serial Number: [id number]:  [id number]   01/16/2025 0611 119 (H) 70 - 105 mg/dL Final     Comment:     Serial Number: [id number]:  [id number]   01/16/2025 0453 115 (H) 70 - 105 mg/dL Final     Comment:     Serial Number: [id number]:  [id number]     Infection   Results from last 7 days   Lab Units 01/15/25  1511 01/13/25  1727 01/13/25  1521 01/13/25  1505   BLOODCX   --   --  No growth at 3 days No growth at 3 days   PROCALCITONIN ng/mL 0.47* 0.39*  --   --      CMP   Results from last 7 days   Lab Units 01/16/25  1621 01/16/25  0450 01/15/25  1756 01/15/25  1511 01/15/25  0604 01/14/25  1238 01/14/25  0610 01/13/25  1505   SODIUM mmol/L  --  145 141 141 139 137 140 136   POTASSIUM mmol/L 5.0 3.4* 4.2 3.7 4.1 4.6 4.7 5.7*   CHLORIDE mmol/L  --  104 97* 97* 99 98 102 95*   CO2 mmol/L  --  28.3 28.1 27.2 22.2 20.2* 18.1* 17.2*   BUN mg/dL  --  56* 59* 61* 58* 49* 45* 38*   CREATININE mg/dL  --  1.37* 1.55* 1.60*  "1.58* 1.38* 1.50* 1.77*   GLUCOSE mg/dL  --  124* 240* 293* 284* 373* 305* 325*   ALBUMIN g/dL  --  3.6 3.8 3.8 3.6  --   --  3.9   BILIRUBIN mg/dL  --  0.6 0.5 0.5 0.5  --   --  0.7   ALK PHOS U/L  --  111 117 123* 105  --   --  80   AST (SGOT) U/L  --  32 42* 39 34  --   --  34   ALT (SGPT) U/L  --  23 28 26 24  --   --  22     ABG    Results from last 7 days   Lab Units 01/16/25  1412 01/15/25  1514 01/15/25  0757 01/13/25  2227 01/13/25  1845 01/13/25  1509   PH, ARTERIAL pH units 7.455* 7.461* 7.388 7.410 7.396 7.408   PCO2, ARTERIAL mm Hg 41.4 40.6 42.4 26.6* 32.9* 28.4*   PO2 ART mm Hg 67.6* 91.7 93.0 56.4* 41.3* 117.5*   O2 SATURATION ART % 94.0 97.5 97.1 89.9* 77.1* 98.7*   BASE EXCESS ART mmol/L 4.7* 4.6* 0.3 -6.2* -3.7* -5.2*     UA    Results from last 7 days   Lab Units 01/14/25  0842   NITRITE UA  Negative     DIANA  No results found for: \"POCMETH\", \"POCAMPHET\", \"POCBARBITUR\", \"POCBENZO\", \"POCCOCAINE\", \"POCOPIATES\", \"POCOXYCODO\", \"POCPHENCYC\", \"POCPROPOXY\", \"POCTHC\", \"POCTRICYC\"  Lysis Labs   Results from last 7 days   Lab Units 01/16/25  0450 01/15/25  1756 01/15/25  1511 01/15/25  0846 01/15/25  0604 01/14/25  1238 01/14/25  0610 01/13/25  1505   INR  2.80  --   --  3.53*  --   --  3.44* 2.68   HEMOGLOBIN g/dL 14.2  --   --   --  14.5  --  13.1 14.4   PLATELETS 10*3/mm3 188  --   --   --  259  --  309 405   CREATININE mg/dL 1.37* 1.55* 1.60*  --  1.58* 1.38* 1.50* 1.77*     Radiology(recent) XR Chest 1 View    Result Date: 1/16/2025  Impression: Persistent multifocal airspace disease throughout the lungs concerning for pneumonia superimposed on background of chronic interstitial lung disease. Electronically Signed: Jordan Monet MD  1/16/2025 7:12 AM EST  Workstation ID: [id number]    XR Chest 1 View    Result Date: 1/15/2025  Impression: 1.Cardiomegaly with pulmonary vascular congestion. 2.No interval change in diffuse bilateral reticular airspace disease could represent pulm edema or pneumonia. " Electronically Signed: Dwayne Curtis MD  1/15/2025 8:11 AM EST  Workstation ID: [id number]     Imaging Results (Last 24 Hours)       Procedure Component Value Units Date/Time    XR Chest 1 View [[id number]] Collected: 01/16/25 0710     Updated: 01/16/25 0714    Narrative:      XR CHEST 1 VW    Date of Exam: 1/16/2025 1:53 AM EST    Indication: hypoxic respiratory failure    Comparison: 1/15/2025    Findings:  Stable cardiomegaly. Persistent multifocal airspace disease throughout the lungs concerning for pneumonia superimposed on background of chronic interstitial lung disease. No pneumothorax. No significant effusion. Osseous structures are grossly intact.      Impression:      Impression:  Persistent multifocal airspace disease throughout the lungs concerning for pneumonia superimposed on background of chronic interstitial lung disease.          Electronically Signed: Jordan Monet MD    1/16/2025 7:12 AM EST    Workstation ID: [id number]            Results from last 7 days   Lab Units 01/15/25  1511   HSTROP T ng/L 54*       EKG                I personally viewed and interpreted the patient's EKG/Telemetry data:        ECHOCARDIOGRAM:     Results for orders placed during the hospital encounter of 01/13/25    Adult Transthoracic Echo Complete W/ Cont if Necessary Per Protocol    Interpretation Summary    Left ventricular systolic function is normal. Left ventricular ejection fraction appears to be 61 - 65%.    Left ventricular diastolic function is consistent with (grade I) impaired relaxation.    There is calcification of the aortic valve.    Estimated right ventricular systolic pressure from tricuspid regurgitation is markedly elevated (>55 mmHg).        STRESS MYOVIEW:      CARDIAC CATHETERIZATION:      OTHER:         Assessment & Plan            Multifocal pneumonia        ASSESSMENT:    Acute hypoxic respiratory failure / Shortness of breath     Multifocal pneumonia, on antibiotics, pulmonary consulted    3.   CAD  s/p prior PCI  Elevated troponin, likely demand ischemia in setting of hypoxia and CASIE  No chest pain at present time     4. CASIE on CKD     5. Obesity     6. HTN     7. HLD     8. PVD     9. H/o DVT , thrombophilia on coumadin    10.  Afib RVR    PLAN:    Requiring high oxygen concentration via Airvo/Bipap  Nephrology following  Eventual ischemic evaluation when overall condition stabilizes  Warfarin on hold  INR 2.8; received 2.5 mg Vit K , will give additional 2.5 mg today  Continue IV amiodarone  Will give IV digoxin x 1 due to RVR  Will change atenolol and metoprolol  Continue current Rx and supportive care.         Additional recommendations per Dr. Ackerman       Patient is seen and examined and findings are verified.  All data is reviewed by me personally.  Assessment and plan formulated by APC was done after discussion with attending.  I spent more than 50% of time in taking care of the patient.    Patient is laying in the bed.  Patient is on AVAPS.    Patient is in atrial fibrillation blood pressure is stable    Normal S1 and S2.  Heart rate is irregular.  Decreased breath sound.  Expiratory rhonchi occasionally.  No significant leg edema abdomen is soft.    At this stage I will continue to hold Coumadin.  I would give vitamin K unfortunately pharmacist did not give the dose which was recommended by me.  Instead of giving vitamin K 5 mg they gave only 2.5 yesterday.  INR still elevated.  I would give vitamin K today we expect INR to be normalized and patient would be started on Lovenox or heparin if needed because of atrial fibrillation.  Patient would need cardiac catheterization and thus why INR has to be normalized in future.    Electronically signed by Edwar Ackerman MD, 01/16/25, 5:34 PM EST.                         Electronically signed by LUCAS Gudino, 01/15/25, 3:52 PM EST.          Edwar Ackerman MD  01/16/25  17:34 EST

## 2025-01-16 NOTE — CASE MANAGEMENT/SOCIAL WORK
Continued Stay Note   Shawn     Patient Name: Dalton Dallas Sr.  MRN: [id number]  Today's Date: 1/16/2025    Admit Date: 1/13/2025    Plan: Return home with family. Home O2 @ [facility]   Discharge Plan       Row Name 01/16/25 0938       Plan    Plan Return home with family. Home O2 @ [facility]    Plan Comments DC Barriers: BIPAP, IV Abx/steroids, renal fuction monitoring, FC- coude, Insulin/Amio gtt, GI/Nephro/Endo/Cardio/Pulm/DM educator following.                 Expected Discharge Date and Time       Expected Discharge Date Expected Discharge Time    Jan 20, 2025               SREE Paredes RN  ICU/CVU   O: [phone]  C: [phone]  [email or web address]

## 2025-01-16 NOTE — PROGRESS NOTES
Patient Name: Dalton Dallas Sr.  : 1940  MRN: [id number]  Primary Care Physician: Joan Johns APRN  Date of admission: 2025    Patient Care Team:  Joan Johns APRN as PCP - General (Nurse Practitioner)  Jelani Vieira MD as Consulting Physician (Pulmonary Disease)  Edwar Ackerman MD as Consulting Physician (Pulmonary Disease)  Virginia Shaver MD as Consulting Physician (Endocrinology)  Triny Cee APRN as Nurse Practitioner (Orthopedic Surgery)  CHRISSY Holt DPM as Consulting Physician (Podiatry)  Bill Don MD as Consulting Physician (Ophthalmology)  Anu Palmer PA-C as Physician Assistant (Physician Assistant)        Subjective   Subjective:     Patient still very short of breath was on high flow oxygen supplement but now on BiPAP  Review of systems:  All other review of system unremarkable      Allergies:  No Known Allergies    Objective   Exam:     Vital Signs  Temp:  [95.5 °F (35.3 °C)-98.9 °F (37.2 °C)] 98.9 °F (37.2 °C)  Heart Rate:  [] 102  Resp:  [16-34] 27  BP: ()/(53-89) 105/59  SpO2:  [81 %-99 %] 94 %  on  Flow (L/min) (Oxygen Therapy):  [60] 60;   Device (Oxygen Therapy): NPPV/NIV  Body mass index is 38.84 kg/m².    General: Elderly white male in n very short of breath  Head:      Normocephalic and atraumatic.    Eyes:      PERRL/EOM intact, conjunctivae and sclerae clear without nystagmus.    Neck:      No masses, thyromegaly,  trachea central with normal respiratory effort   Lungs:    Bilateral crackles.    Heart:      Irregular rate and rhythm, no murmur no gallop  Abd:        Soft, nontender, not distended, bowel sounds positive, no shifting dullness   Pulses:   Pulses palpable  Extr:        No cyanosis or clubbing--mild edema.    Neuro:    No focal deficits.   Open I  Skin:       Intact without lesions or rashes.    Psych:    Very drowsy.      Results Review:  I have personally reviewed most recent Data :  CBC     Results from last 7 days   Lab Units 01/16/25  0450 01/15/25  0604 01/14/25  0610 01/13/25  1505   WBC 10*3/mm3 17.93* 18.38* 15.00* 14.49*   HEMOGLOBIN g/dL 14.2 14.5 13.1 14.4   PLATELETS 10*3/mm3 188 259 309 405     CMP   Results from last 7 days   Lab Units 01/16/25  0450 01/15/25  1756 01/15/25  1511 01/15/25  0604 01/14/25  1238 01/14/25  0610 01/13/25  1505   SODIUM mmol/L 145 141 141 139 137 140 136   POTASSIUM mmol/L 3.4* 4.2 3.7 4.1 4.6 4.7 5.7*   CHLORIDE mmol/L 104 97* 97* 99 98 102 95*   CO2 mmol/L 28.3 28.1 27.2 22.2 20.2* 18.1* 17.2*   BUN mg/dL 56* 59* 61* 58* 49* 45* 38*   CREATININE mg/dL 1.37* 1.55* 1.60* 1.58* 1.38* 1.50* 1.77*   GLUCOSE mg/dL 124* 240* 293* 284* 373* 305* 325*   ALBUMIN g/dL 3.6 3.8 3.8 3.6  --   --  3.9   BILIRUBIN mg/dL 0.6 0.5 0.5 0.5  --   --  0.7   ALK PHOS U/L 111 117 123* 105  --   --  80   AST (SGOT) U/L 32 42* 39 34  --   --  34   ALT (SGPT) U/L 23 28 26 24  --   --  22     ABG    Results from last 7 days   Lab Units 01/16/25  1412 01/15/25  1514 01/15/25  0757 01/13/25  2227 01/13/25  1845 01/13/25  1509   PH, ARTERIAL pH units 7.455* 7.461* 7.388 7.410 7.396 7.408   PCO2, ARTERIAL mm Hg 41.4 40.6 42.4 26.6* 32.9* 28.4*   PO2 ART mm Hg 67.6* 91.7 93.0 56.4* 41.3* 117.5*   O2 SATURATION ART % 94.0 97.5 97.1 89.9* 77.1* 98.7*   BASE EXCESS ART mmol/L 4.7* 4.6* 0.3 -6.2* -3.7* -5.2*     XR Chest 1 View    Result Date: 1/16/2025  Impression: Persistent multifocal airspace disease throughout the lungs concerning for pneumonia superimposed on background of chronic interstitial lung disease. Electronically Signed: Jordan Monet MD  1/16/2025 7:12 AM EST  Workstation ID: [id number]    XR Chest 1 View    Result Date: 1/15/2025  Impression: 1.Cardiomegaly with pulmonary vascular congestion. 2.No interval change in diffuse bilateral reticular airspace disease could represent pulm edema or pneumonia. Electronically Signed: Dwayne Curtis MD  1/15/2025 8:11 AM EST  Workstation ID:  [id number]    US Renal Bilateral    Result Date: 1/14/2025  1. Limited study without evidence of hydronephrosis 2. Bladder not visualized Electronically Signed: Alberto Alan MD  1/14/2025 3:54 PM EST  Workstation ID: [id number]    US Abdomen Limited    Result Date: 1/14/2025  Impression: No sonographic evidence of free fluid in the abdomen. Electronically Signed: Cesar Issa MD  1/14/2025 3:52 PM EST  Workstation ID: [id number]     Results for orders placed during the hospital encounter of 01/13/25    Adult Transthoracic Echo Complete W/ Cont if Necessary Per Protocol    Interpretation Summary    Left ventricular systolic function is normal. Left ventricular ejection fraction appears to be 61 - 65%.    Left ventricular diastolic function is consistent with (grade I) impaired relaxation.    There is calcification of the aortic valve.    Estimated right ventricular systolic pressure from tricuspid regurgitation is markedly elevated (>55 mmHg).    Scheduled Meds:amLODIPine, 5 mg, Oral, Daily  atorvastatin, 80 mg, Oral, Nightly  budesonide, 0.5 mg, Nebulization, BID - RT  cefepime, 2,000 mg, Intravenous, Q12H  clopidogrel, 75 mg, Oral, Daily  [Held by provider] empagliflozin, 25 mg, Oral, Daily  fenofibrate, 48 mg, Oral, Daily  ferrous sulfate, 325 mg, Oral, BID  gabapentin, 600 mg, Oral, Q8H  hydrocortisone sodium succinate, 50 mg, Intravenous, Q6H  insulin lispro, 3-14 Units, Subcutaneous, 4x Daily AC & at Bedtime  Insulin Regular Human (Conc), 60 Units, Subcutaneous, TID AC  insulin regular, 8 Units, Subcutaneous, Q8H  ipratropium-albuterol, 3 mL, Nebulization, Q4H - RT  isosorbide mononitrate, 60 mg, Oral, Daily  levothyroxine, 175 mcg, Oral, Q AM  [Held by provider] losartan, 100 mg, Oral, Q24H  metoprolol tartrate, 25 mg, Oral, Q8H  pantoprazole, 40 mg, Oral, Q AM  sodium chloride, 10 mL, Intravenous, Q12H  sodium chloride, 10 mL, Intravenous, Q12H  vancomycin, 1,500 mg, Intravenous, Q24H  vitamin B-12, 250 mcg,  Oral, Daily  [Held by provider] warfarin, 7 mg, Oral, Once per day on Tuesday Thursday  [Held by provider] warfarin, 8 mg, Oral, Once per day on Sunday Monday Wednesday Friday Saturday      Continuous Infusions:amiodarone, 1 mg/min, Last Rate: 1 mg/min (01/16/25 1425)   Followed by  amiodarone, 0.5 mg/min  insulin, 0-100 Units/hr, Last Rate: 1.8 Units/hr (01/16/25 1342)  Pharmacy to dose vancomycin,       PRN Meds:  aluminum-magnesium hydroxide-simethicone    senna-docusate sodium **AND** polyethylene glycol **AND** bisacodyl **AND** bisacodyl    Calcium Replacement - Follow Nurse / BPA Driven Protocol    Magnesium Standard Dose Replacement - Follow Nurse / BPA Driven Protocol    melatonin    metoprolol tartrate    ondansetron ODT **OR** ondansetron    Pharmacy to dose vancomycin    Phosphorus Replacement - Follow Nurse / BPA Driven Protocol    Potassium Replacement - Follow Nurse / BPA Driven Protocol    sodium chloride    sodium chloride    sodium chloride    sodium chloride    Assessment & Plan   Assessment and Plan:         Multifocal pneumonia    ASSESSMENT:  CASIE  CKD with baseline around 1.2-1.4 mg/dL likely secondary to HTN, DM, generalized atherosclerotic changes   Acute respiratory failure with hypoxia, multifocal pneumonia and pulmonary edema  CHF with most recent EF 60-65%  CAD s/p PCI  HTN  DM  Congestive heart failure with grade 1 diastolic dysfunction  Elevated Troponin               PLAN :      Worsening respiratory condition worsening chest x-ray with some improvement in the urine output  Still volume status doing better patient has no significant edema  Respiratory condition same as yesterday still on BiPAP  Low-dose loop diuretics will be started in 1 to 2 days because of presence of underlying congestive heart failure  Bilateral lung infiltrates secondary to pneumonitis  UA was negative for protein or hematuria, urine sodium 31.Check UPCR and Renal US   Repeat echo result appreciated grade 1  diastolic dysfunction  Continue to hold ARB, blood pressure stable will consider starting angiotensin receptor blocker in 1 to 2 days   Electrolytes stable slightly low potassium that will be replaced  Pulm input noted, recommend bronch, also ordered WATSON/ANCA. Continue broad spectrum IV abx   Hgb stable   Daily labs   Avoid nephrotoxins, monitor I/O   Thank you for this consultation, we will gladly follow           Electronically signed by Miguel Gonzalez MD,   [facility] kidney consultant  [phone]  1/16/2025  15:04 EST

## 2025-01-16 NOTE — PROGRESS NOTES
Critical Care Progress Note     Dalton Dallas Sr. : 1940 MRN:[id number] LOS:3  Rm: 2308/1     Principal Problem: Multifocal pneumonia     Reason for follow up: All the medical problems listed below    Summary     Dalton Dallas Sr. is a 84 y.o. male with PMH of coronary artery disease, heart failure preserved EF, hypertension, hyperlipidemia, atrial fibrillation, peripheral vascular disease, type 2 diabetes, hypothyroidism, chronic pain, sleep apnea who presented to the hospital on  for weakness for the past week.  Patient also was short of breath with a worsening cough and chills.     In the ED the patient was hypoxemic and was placed on high flow nasal cannula/BiPAP with improvement of his oxygenation.  He had a CT PE which showed no PE but had patchy extensive groundglass opacities in his bilateral lungs..  Patient was treated with cefepime and admitted to the hospitalist service.     On 1/15 the patient had worsening hypoxic respiratory failure requiring AVAPS maxed at 100%.  He had a repeat chest x-ray which showed bilateral haziness which could represent pulmonary edema or worsening multifocal pneumonia.  The patient was started on a Bumex drip however patient became hypotensive after the drip and so was discontinued.  Critical care was consulted for further management of the patient's hypoxemic respiratory failure.    Patient is on Hospital Day: 4.    Significant Events / Subjective     25 : overnight the patient remained on BiPAP.  He reports feeling well.  Patient went to A-fib with RVR last night and was loaded and started on amnio drip.  Cardiology recommended giving the patient a dose of digoxin.  In looking at the patient's history he is pending A-fib in the past and is on atenolol at home so we will give IV metoprolol to help improve his rates but stop the amiodarone.  Goal today is to continue to treat his pneumonia with antibiotics as well as wean his BiPAP off to high flow  nasal cannula that he can take his medications.    Assessment / Plan   Acute hypoxic respiratory failure  Multifocal pneumonia  -On AVAPS now but will wean to HFNC if able, bipap at night  -ABG from this afternoon was largely benign   -Duonebs q4  -solumedrol 40 q8- changed to hydrocort 50 q6 for CAPE COD PNA dosing  -CT PE done with no PE but diffuse GGO  -Strep/legionella negative  -MRSA nares positive, on vancomycin  -Respiratory PCR negative  -On cefepime  -procal elevated at .47   -ANCA and WATSON panel in process     CASIE  History of CKD  -remove crystal this am  -Cr improving after crystalloid infusion yesterday.  -Would like to keep even today  -Nephrology following        Lab 01/16/25  0450 01/15/25  1756 01/15/25  1511 01/15/25  0604 01/14/25  1238 01/14/25  0610 01/13/25  1505   WBC 17.93*  --   --  18.38*  --  15.00* 14.49*   HEMOGLOBIN 14.2  --   --  14.5  --  13.1 14.4   HEMATOCRIT 44.1  --   --  46.7  --  40.3 44.5   PLATELETS 188  --   --  259  --  309 405   SODIUM 145 141 141 139 137 140 136   POTASSIUM 3.4* 4.2 3.7 4.1 4.6 4.7 5.7*   CHLORIDE 104 97* 97* 99 98 102 95*   CO2 28.3 28.1 27.2 22.2 20.2* 18.1* 17.2*   BUN 56* 59* 61* 58* 49* 45* 38*   CREATININE 1.37* 1.55* 1.60* 1.58* 1.38* 1.50* 1.77*   GLUCOSE 124* 240* 293* 284* 373* 305* 325*   CALCIUM 9.2 9.6 9.7 9.5 9.2 9.3 9.6   PHOSPHORUS  --   --   --  4.4  --   --   --    TOTAL PROTEIN 7.1 7.5 7.8 7.7  --   --  7.2   ALBUMIN 3.6 3.8 3.8 3.6  --   --  3.9   GLOBULIN 3.5 3.7 4.0 4.1  --   --  3.3         Intake/Output Summary (Last 24 hours) at 1/16/2025 1102  Last data filed at 1/16/2025 0903  Gross per 24 hour   Intake 849 ml   Output 3740 ml   Net -2891 ml         Atrial fibrillation  -Went into RVR overnight   -Loaded and started on amio  -Has history of AF, will continue amio and treat with BB  -Patient given dig load by cardiology  -No hemodynamic compromise     Heart failure with preserved EF  Chest pain  -Echo from 1/15 showing EF 61-65% with  mild diastolic dysfunction  -Troponin 53 which is downtrending from before  -ECG without ST segment elevation or significant depression, likely NSTEMI if anything     DM2  Hyperglycemia  -insulin 60 TID with SSI- HOLD  -remains on insulin gtt with much better blood sugars.  -Will defer to endocrine for transitioning off insulin gtt given his high insulin requirements  -AG improved to 12  -BHB mildly elevated yesterday  -On jardiance        Hypothyroidism  -home levothyroxine 175 daily     CAD  -on plavix  -atorvustatin     HTN  -home atenolol 50  -home amlodipine 5  -imdur 60     DVT  Thrombophilia  -on warfarin  -INR 2.8 today, would avoid further vit K as he is in his appropriate range  -dosed by pharmacy    Disposition:  remain in ICU as airway is tenuous and has high potential for respiratory compromise.     Code status:   Level Of Support Discussed With: Patient  Code Status (Patient has no pulse and is not breathing): CPR (Attempt to Resuscitate)  Medical Interventions (Patient has pulse or is breathing): Full Support       Nutrition:   Diet: Cardiac, Diabetic, Renal, Fluid Restriction (240 mL/tray); Low Sodium (2g); Consistent Carbohydrate; Low Phosphorus; 1000 mL/day; Texture: Regular (IDDSI 7); Fluid Consistency: Thin (IDDSI 0)   Patient isn't on Tube Feeding     VTE Prophylaxis:  Pharmacologic VTE prophylaxis orders are present.           Objective / Physical Exam     Vital signs:  Temp: 98.1 °F (36.7 °C)  BP: 112/64  Heart Rate: 108  Resp: (!) 30  SpO2: 92 %  Weight: 119 kg (263 lb 0.1 oz)    Admission Weight: Weight: 124 kg (274 lb 7.6 oz)  Current Weight: Weight: 119 kg (263 lb 0.1 oz)    Input/Output in last 24 hours:    Intake/Output Summary (Last 24 hours) at 1/16/2025 1101  Last data filed at 1/16/2025 0903  Gross per 24 hour   Intake 849 ml   Output 3740 ml   Net -2891 ml      Net IO Since Admission: -6,201 mL [01/16/25 1101]     Physical Exam  Vitals and nursing note reviewed.   Constitutional:        Appearance: Normal appearance. He is obese.      Comments: Sitting up in bed on BIPAP with tachypnea   HENT:      Head: Normocephalic and atraumatic.      Right Ear: External ear normal.      Left Ear: External ear normal.      Nose: Nose normal.      Mouth/Throat:      Mouth: Mucous membranes are dry.   Eyes:      Conjunctiva/sclera: Conjunctivae normal.      Pupils: Pupils are equal, round, and reactive to light.   Cardiovascular:      Rate and Rhythm: Tachycardia present. Rhythm irregular.   Pulmonary:      Comments: Tachypnea, Coarse BS at the bases  Abdominal:      General: There is distension.      Tenderness: There is no abdominal tenderness. There is no guarding.   Musculoskeletal:      Right lower leg: No edema.      Left lower leg: No edema.   Skin:     General: Skin is warm.      Capillary Refill: Capillary refill takes less than 2 seconds.   Neurological:      General: No focal deficit present.      Mental Status: He is alert.   Psychiatric:         Mood and Affect: Mood normal.          Radiology and Labs     Results from last 7 days   Lab Units 01/16/25  0450 01/15/25  0604 01/14/25  0610 01/13/25  1505   WBC 10*3/mm3 17.93* 18.38* 15.00* 14.49*   HEMOGLOBIN g/dL 14.2 14.5 13.1 14.4   HEMATOCRIT % 44.1 46.7 40.3 44.5   PLATELETS 10*3/mm3 188 259 309 405      Results from last 7 days   Lab Units 01/16/25  0450 01/15/25  0846 01/14/25  0610 01/13/25  1505   PROTIME Seconds 29.6* 35.4* 34.6* 28.5   INR  2.80 3.53* 3.44* 2.68      Results from last 7 days   Lab Units 01/16/25  0450 01/15/25  1756 01/15/25  1511 01/15/25  0604 01/14/25  1238   SODIUM mmol/L 145 141 141 139 137   POTASSIUM mmol/L 3.4* 4.2 3.7 4.1 4.6   CHLORIDE mmol/L 104 97* 97* 99 98   CO2 mmol/L 28.3 28.1 27.2 22.2 20.2*   BUN mg/dL 56* 59* 61* 58* 49*   CREATININE mg/dL 1.37* 1.55* 1.60* 1.58* 1.38*   GLUCOSE mg/dL 124* 240* 293* 284* 373*   MAGNESIUM mg/dL  --   --   --  2.9*  --    PHOSPHORUS mg/dL  --   --   --  4.4  --        Results from last 7 days   Lab Units 01/16/25  0450 01/15/25  1756 01/15/25  1511 01/15/25  0604 01/13/25  1505   ALK PHOS U/L 111 117 123* 105 80   AST (SGOT) U/L 32 42* 39 34 34   ALT (SGPT) U/L 23 28 26 24 22     Results from last 7 days   Lab Units 01/15/25  1514 01/15/25  0757 01/14/25  0604 01/13/25  2227 01/13/25  1845 01/13/25  1509   PH, ARTERIAL pH units 7.461* 7.388  --  7.410 7.396 7.408   PCO2, ARTERIAL mm Hg 40.6 42.4  --  26.6* 32.9* 28.4*   PO2 ART mm Hg 91.7 93.0  --  56.4* 41.3* 117.5*   O2 SATURATION ART % 97.5 97.1  --  89.9* 77.1* 98.7*   FIO2 % 90 100 76 77  --  100   HCO3 ART mmol/L 28.9* 25.6  --  16.9* 20.2* 18.0*   BASE EXCESS ART mmol/L 4.6* 0.3  --  -6.2* -3.7* -5.2*       XR Chest 1 View    Result Date: 1/16/2025  Impression: Persistent multifocal airspace disease throughout the lungs concerning for pneumonia superimposed on background of chronic interstitial lung disease. Electronically Signed: Jordan Monet MD  1/16/2025 7:12 AM EST  Workstation ID: [id number]    XR Chest 1 View    Result Date: 1/15/2025  Impression: 1.Cardiomegaly with pulmonary vascular congestion. 2.No interval change in diffuse bilateral reticular airspace disease could represent pulm edema or pneumonia. Electronically Signed: Dwayne Curtis MD  1/15/2025 8:11 AM EST  Workstation ID: [id number]    US Renal Bilateral    Result Date: 1/14/2025  1. Limited study without evidence of hydronephrosis 2. Bladder not visualized Electronically Signed: Alberto Alan MD  1/14/2025 3:54 PM EST  Workstation ID: [id number]    US Abdomen Limited    Result Date: 1/14/2025  Impression: No sonographic evidence of free fluid in the abdomen. Electronically Signed: Cesar Issa MD  1/14/2025 3:52 PM EST  Workstation ID: [id number]     Current medications     Scheduled Meds:   amLODIPine, 5 mg, Oral, Daily  atenolol, 50 mg, Oral, Daily  atorvastatin, 80 mg, Oral, Nightly  budesonide, 0.5 mg, Nebulization, BID - RT  cefepime, 2,000 mg,  Intravenous, Q12H  clopidogrel, 75 mg, Oral, Daily  digoxin, 250 mcg, Intravenous, Once  [Held by provider] empagliflozin, 25 mg, Oral, Daily  fenofibrate, 48 mg, Oral, Daily  ferrous sulfate, 325 mg, Oral, BID  gabapentin, 600 mg, Oral, Q8H  hydrocortisone sodium succinate, 50 mg, Intravenous, Q6H  ipratropium-albuterol, 3 mL, Nebulization, Q4H - RT  isosorbide mononitrate, 60 mg, Oral, Daily  levothyroxine, 175 mcg, Oral, Q AM  [Held by provider] losartan, 100 mg, Oral, Q24H  pantoprazole, 40 mg, Oral, Q AM  potassium chloride, 10 mEq, Intravenous, Q1H  sodium chloride, 10 mL, Intravenous, Q12H  sodium chloride, 10 mL, Intravenous, Q12H  vancomycin, 1,500 mg, Intravenous, Q24H  vitamin B-12, 250 mcg, Oral, Daily  [Held by provider] warfarin, 7 mg, Oral, Once per day on Tuesday Thursday  [Held by provider] warfarin, 8 mg, Oral, Once per day on Sunday Monday Wednesday Friday Saturday        Continuous Infusions:   insulin, 0-100 Units/hr, Last Rate: 0.7 Units/hr (01/16/25 0903)  Pharmacy to dose vancomycin,           Plan discussed with RN. Reviewed all other data in the last 24 hours, including but not limited to vitals, labs, microbiology, imaging and pertinent notes from other providers.  Plan also discussed with patient at the bedside.      Roberto Arriaga MD   Critical Care  01/16/25   11:01 EST

## 2025-01-16 NOTE — PROGRESS NOTES
"Subjective   Dalton Dallas . is a 84 y.o. male.   Seen for diabetes f/u.  Transferred to ICU due to severe hypoxia.  On BiPAP & insulin drip.   Feeling better.        Objective     /77   Pulse 75   Temp 95.5 °F (35.3 °C) (Axillary) Comment: rn notified  Resp 25   Ht 175.3 cm (69\")   Wt 119 kg (263 lb 0.1 oz)   SpO2 97%   BMI 38.84 kg/m²   Blood sugar  284 this am,  279 @ lunch, 279 @ 5p    ASSESSMENT  Diabetes type 2, with hyperglycemia  Patient is Declining    PLAN    Continue insulin drip overnight.  Resume U500 insulin once ready to transition off insulin drip.         Virginia Shaver MD  1/15/2025  22:01 EST    "

## 2025-01-16 NOTE — SIGNIFICANT NOTE
01/16/25 1434   OTHER   Discipline occupational therapist   Rehab Time/Intention   Session Not Performed other (see comments)  (RN requests hold as pt is on continuous bipap)   Recommendation   PT - Next Appointment 01/17/25   Recommendation   OT - Next Appointment 01/17/25

## 2025-01-16 NOTE — PLAN OF CARE
Goal Outcome Evaluation:      Pt rested well throughout the night. VSS. Insulin gtt at 0.6. Around 0445, pt went into afib -140s. Amio gtt started with bolus. Currently infusing. Denies pain/discomfort at this time.

## 2025-01-16 NOTE — PLAN OF CARE
Goal Outcome Evaluation:    On AVAPS at 90%. Attempted AirVo - saturations sustained around 82-83%.   Pt now back on AVAPS.     Amio gtt on. Pt in Afib with rates 80s-90s.     Adequate UOP.     VSS at this time. Patient and family updated at bedside.

## 2025-01-17 PROBLEM — J96.01 ACUTE RESPIRATORY FAILURE WITH HYPOXIA: Status: ACTIVE | Noted: 2025-01-01

## 2025-01-17 NOTE — SIGNIFICANT NOTE
01/17/25 1149   OTHER   Discipline occupational therapist   Rehab Time/Intention   Session Not Performed other (see comments)  (Hold per nursing, on AVAPS with diminished respiratory status)

## 2025-01-17 NOTE — CONSULTS
Nutrition Services    Patient Name: Dalton Dallas Sr.  YOB: 1940  MRN: [id number]  Admission date: 1/13/2025    Comment:  -- RD to monitor for need for TF.  No access at this time.       CLINICAL NUTRITION ASSESSMENT      Reason for Assessment 1/17: Consult for tube feeding     H&P      Past Medical History:   Diagnosis Date    ACE-inhibitor cough 06/2005    Coronary artery disease     Diabetes mellitus, type 2 1995    ED (erectile dysfunction)     Hx of blood clots 2014    Blood clot left leg     Hyperlipidemia 08/2000    Hypertension     Hypogonadism, male 1998    Hypothyroidism 06/2001    Obesity     Peripheral neuropathy     Pulmonary embolism 02/2014    Rectal fistula     Sleep apnea 12/10/2013    Ulcer of left foot 11/2022    Vitamin D deficiency        Past Surgical History:   Procedure Laterality Date    CARDIAC CATHETERIZATION  2008    PTCA: 2008; PTCA: 4/21/2015 LCX stent     CARDIAC CATHETERIZATION Left 7/3/2021    Procedure: Cardiac Catheterization/Vascular Study;  Surgeon: Edwar Ackerman MD;  Location: [facility] CATH INVASIVE LOCATION;  Service: Cardiovascular;  Laterality: Left;    CARDIOVASCULAR STRESS TEST  2020    CATARACT EXTRACTION  01/11/2016 1-4-16 & 1-11-16     COLONOSCOPY N/A 11/23/2019    Procedure: COLONOSCOPY WITH ENDOSCOPIC CLIPPING X1 OF POST POLYPECTOMY BLEED SITE;  Surgeon: Jhonny Orellana MD;  Location: [facility] ENDOSCOPY;  Service: Gastroenterology    CORONARY STENT PLACEMENT      INGUINAL HERNIA REPAIR Left     UMBILICAL HERNIA REPAIR          Current Problems   Acute respiratory failure with hypoxia  Multifocal pneumonia  - GI following  - pulmonology following  - possible intubation      Elevated proBNP  Elevated troponin     CASIE on CKD  - nephrology following     DM type II w/ hyperglycemia, neuropathy  - diabetes educator saw 1/14  - endocrinology following    CAD s/p PCI  HFpEF  Hypertension  Hyperlipidemia  PVD  - cardiology following    H/o  "DVT  Thrombophilia     Hypothyroidism     Chronic pain of bilateral knees     HAN       Encounter Information        Trending Narrative     1/17: Admitted for weakness, SOB, cough and chills and diagnosed with multifocal PNA.  Noted with rapid response 1/15 with transfer to ICU level of care.  RD consulted for TF.  Family deciding on intubation/bronch.  RD unable to see patient in person on this date.         Anthropometrics        Current Height, Weight Height: 175.3 cm (69\")  Weight: 123 kg (272 lb 0.8 oz) (01/17/25 0529)       Usual Body Weight (UBW) Unable to obtain from patient        Trending Weight Hx     This admission: 1/17: 272#             PTA: 6.2% weight loss x 1 year     Wt Readings from Last 30 Encounters:   01/17/25 0529 123 kg (272 lb 0.8 oz)   01/15/25 1539 119 kg (263 lb 0.1 oz)   01/14/25 1947 123 kg (272 lb)   01/14/25 0100 123 kg (272 lb 0.8 oz)   01/13/25 1441 124 kg (274 lb 7.6 oz)   12/31/24 1259 130 kg (287 lb)   12/11/24 1232 130 kg (287 lb)   12/09/24 1044 132 kg (291 lb)   11/21/24 1051 124 kg (273 lb)   10/03/24 1025 124 kg (273 lb)   09/27/24 1047 124 kg (273 lb)   09/10/24 1228 124 kg (273 lb)   09/10/24 1116 124 kg (273 lb)   08/27/24 1056 124 kg (273 lb)   08/13/24 1152 126 kg (277 lb)   07/30/24 1105 124 kg (274 lb)   05/31/24 1040 125 kg (274 lb 12.8 oz)   04/30/24 1228 132 kg (290 lb)   04/11/24 1016 132 kg (290 lb)   03/18/24 1051 126 kg (278 lb 12.8 oz)   03/12/24 1159 128 kg (282 lb)   02/06/24 1208 132 kg (290 lb)   01/30/24 1103 132 kg (290 lb)   12/19/23 1106 132 kg (290 lb 12.8 oz)   11/30/23 1014 134 kg (296 lb)   08/09/23 1105 126 kg (278 lb)   08/01/23 1212 132 kg (292 lb)   07/31/23 1208 133 kg (292 lb 3.2 oz)   06/15/23 1042 132 kg (291 lb)   05/26/23 1033 129 kg (285 lb)   05/24/23 1039 129 kg (285 lb)   05/09/23 1515 132 kg (292 lb)   04/21/23 1233 132 kg (292 lb)   04/11/23 1052 132 kg (292 lb)      BMI kg/m2 Body mass index is 40.17 kg/m².       Labs        " Pertinent Labs    Results from last 7 days   Lab Units 01/17/25  0501 01/16/25  1621 01/16/25  0450 01/15/25  1756   SODIUM mmol/L 139  --  145 141   POTASSIUM mmol/L 4.0 5.0 3.4* 4.2   CHLORIDE mmol/L 103  --  104 97*   CO2 mmol/L 24.5  --  28.3 28.1   BUN mg/dL 55*  --  56* 59*   CREATININE mg/dL 1.29*  --  1.37* 1.55*   CALCIUM mg/dL 8.3*  --  9.2 9.6   BILIRUBIN mg/dL 0.7  --  0.6 0.5   ALK PHOS U/L 103  --  111 117   ALT (SGPT) U/L 23  --  23 28   AST (SGOT) U/L 39  --  32 42*   GLUCOSE mg/dL 154*  --  124* 240*     Results from last 7 days   Lab Units 01/17/25  0501 01/16/25  0450 01/15/25  0604   MAGNESIUM mg/dL  --  2.6* 2.9*   PHOSPHORUS mg/dL  --   --  4.4   HEMOGLOBIN g/dL 13.3 14.2 14.5   HEMATOCRIT % 40.2 44.1 46.7     Lab Results   Component Value Date    HGBA1C 7.30 (H) 01/13/2025        Medications    Scheduled Medications [Held by provider] amLODIPine, 5 mg, Oral, Daily  [Held by provider] atorvastatin, 80 mg, Oral, Nightly  budesonide, 0.5 mg, Nebulization, BID - RT  cefepime, 2,000 mg, Intravenous, Q12H  chlorhexidine, 15 mL, Mouth/Throat, Q12H  clopidogrel, 75 mg, Oral, Daily  [Held by provider] empagliflozin, 25 mg, Oral, Daily  enoxaparin, 1 mg/kg, Subcutaneous, Q12H  [Held by provider] fenofibrate, 48 mg, Oral, Daily  ferrous sulfate, 325 mg, Oral, BID  [Held by provider] gabapentin, 600 mg, Oral, Q8H  hydrocortisone sodium succinate, 50 mg, Intravenous, Q6H  insulin lispro, 3-14 Units, Subcutaneous, 4x Daily AC & at Bedtime  Insulin Regular Human (Conc), 60 Units, Subcutaneous, TID AC  insulin regular, 8 Units, Subcutaneous, Q8H  ipratropium-albuterol, 3 mL, Nebulization, Q4H - RT  [Held by provider] isosorbide mononitrate, 60 mg, Oral, Daily  [Held by provider] levothyroxine, 175 mcg, Oral, Q AM  [Held by provider] losartan, 100 mg, Oral, Q24H  [Held by provider] metoprolol tartrate, 25 mg, Oral, Q8H  [Held by provider] midodrine, 10 mg, Oral, Q8H  [START ON 1/18/2025] pantoprazole, 40 mg,  Intravenous, Q AM  sodium chloride, 10 mL, Intravenous, Q12H  vancomycin, 1,500 mg, Intravenous, Q24H  vitamin B-12, 250 mcg, Oral, Daily  [Held by provider] warfarin, 7 mg, Oral, Once per day on Tuesday Thursday  [Held by provider] warfarin, 8 mg, Oral, Once per day on Sunday Monday Wednesday Friday Saturday        Infusions amiodarone, 0.5 mg/min, Last Rate: 0.5 mg/min (01/17/25 0647)  dexmedetomidine, 0.2-1.5 mcg/kg/hr, Last Rate: Stopped (01/17/25 1250)  Pharmacy to Dose enoxaparin (LOVENOX),   Pharmacy to dose vancomycin,         PRN Medications   aluminum-magnesium hydroxide-simethicone    senna-docusate sodium **AND** polyethylene glycol **AND** bisacodyl **AND** bisacodyl    Calcium Replacement - Follow Nurse / BPA Driven Protocol    Magnesium Standard Dose Replacement - Follow Nurse / BPA Driven Protocol    [Held by provider] melatonin    metoprolol tartrate    ondansetron ODT **OR** ondansetron    Pharmacy to Dose enoxaparin (LOVENOX)    Pharmacy to dose vancomycin    Phosphorus Replacement - Follow Nurse / BPA Driven Protocol    Potassium Replacement - Follow Nurse / BPA Driven Protocol    sodium chloride    sodium chloride     Physical Findings        Trending Physical   Appearance, NFPE 1/17: ITA   --  Edema  2+ dependent, ankles   1+ feet      Bowel Function No BM since admission (x 4 days ago)     Tubes None at this time, plans for NG if intubated      Chewing/Swallowing Unknown baseline      Skin Intact        Estimated/Assessed Needs    Estimated 1/17/25   Energy Requirements    EST Needs, Method, Wt used 9722-4450 kcal/day (25-30 kcal/kg of IBW 72.7 kg)       Protein Requirements    EST Needs, Method, Wt used  g/day (1.2-2.0 g/kg of IBW 72.7 kg)       Fluid Requirements     Estimated Needs (mL/day) 1 mL/kcal, will monitor hydration status      Fluid Deficit    Current Na Level (mEq/L)    Desired Na Level (mEq/L)    Estimated Fluid Deficit (L)       Current Nutrition Orders & Evaluation of  Intake       Oral Nutrition     Food Allergies NKFA   Current PO Diet NPO Diet NPO Type: Strict NPO   Supplement None ordered    PO Evaluation     Trending % PO Intake 1/17: NPO     Enteral Nutrition    Enteral Route    Order, Modulars, Flushes    Tolerance    TF Observation         Parenteral Nutrition     TPN Route    Total # Days on TPN    TPN Order, Lipid Details    MVI & Trace Element Freq    TPN Observation       Nutritional Risk Screening        NRS-2002 Score          Nutrition Diagnosis         Nutrition Dx Problem 1 Inadequate energy intake related to clinical course as evidenced by NPO.       Nutrition Dx Problem 2        Intervention Goal         Intervention Goal(s) Begin and tolerate EN if aligned with family GOC  Stable weight      Nutrition Intervention        RD Action Order EN     Nutrition Prescription          Diet Prescription NPO   Supplement Prescription      Enteral Prescription Initial Goal:  *initial goal conservative d/t risk of RFS     Diabetisource AC at 30 mL/hr + 10 mL/hr water flush      End Goal:    Diabetisource AC at 80 mL/hr + water flush per clinical picture      Calories  2112 kcals (in range)    Protein  106 g (in range)    Free water  1443 mL   Flushes       The above end goal rate is for 22 hrs/day to assume interruptions for ADLs. Water flushes adjusted based on clinical picture + Rx flushes/IV fluids     Specialized formula chosen r/t DM         TPN Prescription      Monitor/Evaluation        Monitor Per protocol, I&O, Pertinent labs, EN delivery/tolerance, Weight, Hemodynamic stability       Electronically signed by:  Kait Vazquez RD  01/17/25 12:54 EST

## 2025-01-17 NOTE — PROGRESS NOTES
Patient Name: Dalton Dallas Sr.  : 1940  MRN: [id number]  Primary Care Physician: Joan Johns APRN  Date of admission: 2025    Patient Care Team:  Joan Johns APRN as PCP - General (Nurse Practitioner)  Jelani Vieira MD as Consulting Physician (Pulmonary Disease)  Edwar Ackerman MD as Consulting Physician (Pulmonary Disease)  Virginia Shaver MD as Consulting Physician (Endocrinology)  Triny Cee APRN as Nurse Practitioner (Orthopedic Surgery)  CHRISSY Holt DPM as Consulting Physician (Podiatry)  Bill Don MD as Consulting Physician (Ophthalmology)  Anu Palmer PA-C as Physician Assistant (Physician Assistant)        Subjective   Subjective:     Patient still very short of breath was on high flow oxygen supplement but now on BiPAP  Review of systems:  All other review of system unremarkable      Allergies:  No Known Allergies    Objective   Exam:     Vital Signs  Temp:  [98.4 °F (36.9 °C)-99.4 °F (37.4 °C)] 99.4 °F (37.4 °C)  Heart Rate:  [] 100  Resp:  [18-33] 22  BP: ()/(47-87) 118/53  SpO2:  [81 %-95 %] 94 %  on  Flow (L/min) (Oxygen Therapy):  [60] 60;   Device (Oxygen Therapy): NPPV/NIV  Body mass index is 40.17 kg/m².    General: Elderly white male in n very short of breath  Head:      Normocephalic and atraumatic.    Eyes:      PERRL/EOM intact, conjunctivae and sclerae clear without nystagmus.    Neck:      No masses, thyromegaly,  trachea central with normal respiratory effort   Lungs:    Bilateral crackles.    Heart:      Irregular rate and rhythm, no murmur no gallop  Abd:        Soft, nontender, not distended, bowel sounds positive, no shifting dullness   Pulses:   Pulses palpable  Extr:        No cyanosis or clubbing--mild edema.    Neuro:    No focal deficits.   Open I  Skin:       Intact without lesions or rashes.    Psych:    Very drowsy.      Results Review:  I have personally reviewed most recent Data :  CBC     Results from last 7 days   Lab Units 01/17/25  0501 01/16/25  0450 01/15/25  0604 01/14/25  0610 01/13/25  1505   WBC 10*3/mm3 14.58* 17.93* 18.38* 15.00* 14.49*   HEMOGLOBIN g/dL 13.3 14.2 14.5 13.1 14.4   PLATELETS 10*3/mm3 175 188 259 309 405     CMP   Results from last 7 days   Lab Units 01/17/25  0501 01/16/25  1621 01/16/25  0450 01/15/25  1756 01/15/25  1511 01/15/25  0604 01/14/25  1238 01/14/25  0610 01/13/25  1505   SODIUM mmol/L 139  --  145 141 141 139 137 140 136   POTASSIUM mmol/L 4.0 5.0 3.4* 4.2 3.7 4.1 4.6 4.7 5.7*   CHLORIDE mmol/L 103  --  104 97* 97* 99 98 102 95*   CO2 mmol/L 24.5  --  28.3 28.1 27.2 22.2 20.2* 18.1* 17.2*   BUN mg/dL 55*  --  56* 59* 61* 58* 49* 45* 38*   CREATININE mg/dL 1.29*  --  1.37* 1.55* 1.60* 1.58* 1.38* 1.50* 1.77*   GLUCOSE mg/dL 154*  --  124* 240* 293* 284* 373* 305* 325*   ALBUMIN g/dL 2.9*  --  3.6 3.8 3.8 3.6  --   --  3.9   BILIRUBIN mg/dL 0.7  --  0.6 0.5 0.5 0.5  --   --  0.7   ALK PHOS U/L 103  --  111 117 123* 105  --   --  80   AST (SGOT) U/L 39  --  32 42* 39 34  --   --  34   ALT (SGPT) U/L 23  --  23 28 26 24  --   --  22     ABG    Results from last 7 days   Lab Units 01/17/25  0734 01/16/25  1412 01/15/25  1514 01/15/25  0757 01/13/25  2227 01/13/25  1845 01/13/25  1509   PH, ARTERIAL pH units 7.499* 7.455* 7.461* 7.388 7.410 7.396 7.408   PCO2, ARTERIAL mm Hg 29.3* 41.4 40.6 42.4 26.6* 32.9* 28.4*   PO2 ART mm Hg 57.8* 67.6* 91.7 93.0 56.4* 41.3* 117.5*   O2 SATURATION ART % 92.4* 94.0 97.5 97.1 89.9* 77.1* 98.7*   BASE EXCESS ART mmol/L 0.6 4.7* 4.6* 0.3 -6.2* -3.7* -5.2*     XR Chest 1 View    Result Date: 1/17/2025  Impression: 1. Diffuse multifocal airspace opacities which are slightly increased in the comparison again noted. Findings are compatible with acute pneumonia and/or edema superimposed upon chronic interstitial changes. Electronically Signed: Alberto Alan MD  1/17/2025 8:14 AM EST  Workstation ID: [id number]    XR Chest 1  View    Result Date: 1/16/2025  Impression: Persistent multifocal airspace disease throughout the lungs concerning for pneumonia superimposed on background of chronic interstitial lung disease. Electronically Signed: Jordan Monet MD  1/16/2025 7:12 AM EST  Workstation ID: [id number]     Results for orders placed during the hospital encounter of 01/13/25    Adult Transthoracic Echo Complete W/ Cont if Necessary Per Protocol    Interpretation Summary    Left ventricular systolic function is normal. Left ventricular ejection fraction appears to be 61 - 65%.    Left ventricular diastolic function is consistent with (grade I) impaired relaxation.    There is calcification of the aortic valve.    Estimated right ventricular systolic pressure from tricuspid regurgitation is markedly elevated (>55 mmHg).    Scheduled Meds:[Held by provider] amLODIPine, 5 mg, Oral, Daily  [Held by provider] atorvastatin, 80 mg, Oral, Nightly  budesonide, 0.5 mg, Nebulization, BID - RT  cefepime, 2,000 mg, Intravenous, Q12H  clopidogrel, 75 mg, Oral, Daily  [Held by provider] empagliflozin, 25 mg, Oral, Daily  enoxaparin, 1 mg/kg, Subcutaneous, Q12H  [Held by provider] fenofibrate, 48 mg, Oral, Daily  ferrous sulfate, 325 mg, Oral, BID  [Held by provider] gabapentin, 600 mg, Oral, Q8H  hydrocortisone sodium succinate, 50 mg, Intravenous, Q6H  insulin lispro, 3-14 Units, Subcutaneous, 4x Daily AC & at Bedtime  Insulin Regular Human (Conc), 60 Units, Subcutaneous, TID AC  ipratropium-albuterol, 3 mL, Nebulization, Q4H - RT  [Held by provider] isosorbide mononitrate, 60 mg, Oral, Daily  [Held by provider] levothyroxine, 175 mcg, Oral, Q AM  [Held by provider] losartan, 100 mg, Oral, Q24H  [Held by provider] metoprolol tartrate, 25 mg, Oral, Q8H  [Held by provider] midodrine, 10 mg, Oral, Q8H  [START ON 1/18/2025] pantoprazole, 40 mg, Intravenous, Q AM  sodium chloride, 10 mL, Intravenous, Q12H  vancomycin, 1,500 mg, Intravenous, Q24H  vitamin  B-12, 250 mcg, Oral, Daily  [Held by provider] warfarin, 7 mg, Oral, Once per day on Tuesday Thursday  [Held by provider] warfarin, 8 mg, Oral, Once per day on Sunday Monday Wednesday Friday Saturday      Continuous Infusions:amiodarone, 0.5 mg/min, Last Rate: 0.5 mg/min (01/17/25 1527)  Pharmacy to Dose enoxaparin (LOVENOX),   Pharmacy to dose vancomycin,       PRN Meds:  aluminum-magnesium hydroxide-simethicone    senna-docusate sodium **AND** polyethylene glycol **AND** bisacodyl **AND** bisacodyl    Calcium Replacement - Follow Nurse / BPA Driven Protocol    Magnesium Standard Dose Replacement - Follow Nurse / BPA Driven Protocol    [Held by provider] melatonin    metoprolol tartrate    ondansetron ODT **OR** ondansetron    Pharmacy to Dose enoxaparin (LOVENOX)    Pharmacy to dose vancomycin    Phosphorus Replacement - Follow Nurse / BPA Driven Protocol    Potassium Replacement - Follow Nurse / BPA Driven Protocol    sodium chloride    sodium chloride    Assessment & Plan   Assessment and Plan:         Multifocal pneumonia    Acute respiratory failure with hypoxia    ASSESSMENT:  CASIE  CKD with baseline around 1.2-1.4 mg/dL likely secondary to HTN, DM, generalized atherosclerotic changes   Acute respiratory failure with hypoxia, multifocal pneumonia and pulmonary edema  CHF with most recent EF 60-65%  CAD s/p PCI  HTN  DM  Congestive heart failure with grade 1 diastolic dysfunction  Elevated Troponin               PLAN :      Worsening respiratory condition worsening chest x-ray with some improvement in the urine output  Still volume status doing better patient has no significant edema  Respiratory condition same as yesterday still on BiPAP  Low-dose loop diuretics will be started in 1 to 2 days because of presence of underlying congestive heart failure  Bilateral lung infiltrates secondary to pneumonitis bilateral lung infiltrates secondary  Patient still on BiPAP very short of breath but no significant  peripheral edema  UA was negative for protein or hematuria, urine sodium 31.Check UPCR and Renal US   Repeat echo result appreciated grade 1 diastolic dysfunction  Continue to hold ARB, blood pressure stable will consider starting angiotensin receptor blocker in 1 to 2 days   Electrolytes stable slightly low potassium that will be replaced  Pulm input noted, recommend bronch, also ordered WATSON/ANCA. Continue broad spectrum IV abx   Hgb stable   Daily labs   Avoid nephrotoxins, monitor I/O   Thank you for this consultation, we will gladly follow           Electronically signed by Miguel Gonzalez MD,   [facility] kidney consultant  [phone]  1/17/2025  16:15 EST

## 2025-01-17 NOTE — ACP (ADVANCE CARE PLANNING)
Social Work Assessment  [facility]     Patient Name: Dalton Dallas Sr.  MRN: [id number]  Today's Date: 2025    Admit Date: 2025     Demographic Summary       Row Name 25 1401       General Information    Reason for Consult health care directive    General Information Comments SW reviewed pt's ACP doc on file in which pt appointed his spouse Joleen Zimmer and dtr Jonathon as co-HCR; however, pt's spouse is not a listed contact. LUCINA met with pt and dtr at bedside for clarification. Dtr reports pt's spouse is , and therefore Jonathon is the sole HCR at this time.             NELY Rosario, W  Medical Social Worker  Ph [phone]  Fax [phone]  [email or web address]

## 2025-01-17 NOTE — PLAN OF CARE
Goal Outcome Evaluation:           Progress: no change  Outcome Evaluation: Amio gtt continues per orders. Pt's MAP fell below 65, N.O. received for pt to start midodrine for BP support. Pt continues on 1000 mL F.R./day, Pt on AVAPS while sleeping. AVAPS at 100%. F/C remains intact and patent with hugh urine observed. Pt has remained in AFib throughout the noc. Plan of care remains ongoing.

## 2025-01-17 NOTE — PROGRESS NOTES
Critical Care Progress Note     Dalton Dallas Sr. : 1940 MRN:[id number] LOS:4  Rm: 2308/1     Principal Problem: Multifocal pneumonia     Reason for follow up: All the medical problems listed below    Summary     Dalton Dallas Sr. is a 84 y.o. male with PMH of coronary artery disease, heart failure preserved EF, hypertension, hyperlipidemia, atrial fibrillation, peripheral vascular disease, type 2 diabetes, hypothyroidism, chronic pain, sleep apnea who presented to the hospital on  for weakness for the past week.  Patient also was short of breath with a worsening cough and chills.     In the ED the patient was hypoxemic and was placed on high flow nasal cannula/BiPAP with improvement of his oxygenation.  He had a CT PE which showed no PE but had patchy extensive groundglass opacities in his bilateral lungs..  Patient was treated with cefepime and admitted to the hospitalist service.     On 1/15 the patient had worsening hypoxic respiratory failure requiring AVAPS maxed at 100%.  He had a repeat chest x-ray which showed bilateral haziness which could represent pulmonary edema or worsening multifocal pneumonia.  The patient was started on a Bumex drip however patient became hypotensive after the drip and so was discontinued.  Critical care was consulted for further management of the patient's hypoxemic respiratory failure.    Patient is on Hospital Day: 5.    Significant Events / Subjective     25 : Patient was briefly trialed on high flow nasal cannula however he desaturated to the high 70s.  Patient remains on AVAPS at 100 send FiO2.  Discussions with family as to whether or not they want to proceed with intubation and bronchoscopy however family is reluctant so will defer decision to them.  In the meantime we will continue supportive care and antibiotics.    Assessment / Plan   Acute hypoxic respiratory failure  Multifocal pneumonia  -On AVAPS now at 100% FiO2  -ABG this AM showing pO2  of 57.8 and a respiratory alkalosis  -Duonebs q4  -Hydrocortisone 50 q6  -CT PE done with no PE but diffuse GGO  -Strep/legionella negative  -MRSA nares positive, on vancomycin  -Respiratory PCR negative  -On cefepime  -procal elevated at .47   -ANCA and WATSON panel are both negative  -Awaiting families decision about intubation and bronchoscopy or making the patient DNR/DNI     CASIE  History of CKD  Macario for urinary retention  -Cr improving   -Would like to keep even today  -Nephrology following        Lab 01/17/25  0501 01/16/25  1621 01/16/25  0450 01/15/25  1756 01/15/25  1511 01/15/25  0604 01/14/25  1238 01/14/25  0610 01/13/25  1505   WBC 14.58*  --  17.93*  --   --  18.38*  --  15.00* 14.49*   HEMOGLOBIN 13.3  --  14.2  --   --  14.5  --  13.1 14.4   HEMATOCRIT 40.2  --  44.1  --   --  46.7  --  40.3 44.5   PLATELETS 175  --  188  --   --  259  --  309 405   SODIUM 139  --  145 141 141 139 137 140 136   POTASSIUM 4.0 5.0 3.4* 4.2 3.7 4.1 4.6 4.7 5.7*   CHLORIDE 103  --  104 97* 97* 99 98 102 95*   CO2 24.5  --  28.3 28.1 27.2 22.2 20.2* 18.1* 17.2*   BUN 55*  --  56* 59* 61* 58* 49* 45* 38*   CREATININE 1.29*  --  1.37* 1.55* 1.60* 1.58* 1.38* 1.50* 1.77*   GLUCOSE 154*  --  124* 240* 293* 284* 373* 305* 325*   CALCIUM 8.3*  --  9.2 9.6 9.7 9.5 9.2 9.3 9.6   PHOSPHORUS  --   --   --   --   --  4.4  --   --   --    TOTAL PROTEIN 6.1  --  7.1 7.5 7.8 7.7  --   --  7.2   ALBUMIN 2.9*  --  3.6 3.8 3.8 3.6  --   --  3.9   GLOBULIN 3.2  --  3.5 3.7 4.0 4.1  --   --  3.3         Intake/Output Summary (Last 24 hours) at 1/17/2025 1238  Last data filed at 1/17/2025 0900  Gross per 24 hour   Intake 1590.79 ml   Output 1650 ml   Net -59.21 ml         Atrial fibrillation  -Loaded and started on amio  -Has history of AF, will continue amio and treat with BB  -Patient given dig load by cardiology  -No hemodynamic compromise     Heart failure with preserved EF  Chest pain  -Echo from 1/15 showing EF 61-65% with mild  diastolic dysfunction  -Troponin 53 which is downtrending from before  -ECG without ST segment elevation or significant depression, likely NSTEMI if anything     DM2  Hyperglycemia  -insulin 60 TID with SSI restarted  -AG improved to 12  -BHB mildly elevated yesterday  -On jardiance        Hypothyroidism  -home levothyroxine 175 daily-hold as NPO     CAD  -on plavix-Hold as NPO  -atorvustatin-hold as NPO     HTN  -home atenolol 50-hold as NPO  -home amlodipine 5-hold as npo  -imdur 60-hold as NPO     DVT  Thrombophilia  -on warfarin  -INR 1.87 today, would avoid further vit K as he is in his appropriate range  -given his NPO status will start therapeutic lovenox for now    Disposition:  remain in ICU as airway is tenuous and has high potential for respiratory compromise.     Code status:   Level Of Support Discussed With: Patient  Code Status (Patient has no pulse and is not breathing): CPR (Attempt to Resuscitate)  Medical Interventions (Patient has pulse or is breathing): Full Support       Nutrition:   NPO Diet NPO Type: Strict NPO   Patient isn't on Tube Feeding     VTE Prophylaxis:  Pharmacologic VTE prophylaxis orders are present.           Objective / Physical Exam     Vital signs:  Temp: 99.4 °F (37.4 °C)  BP: 126/67  Heart Rate: 92  Resp: 26  SpO2: 94 %  Weight: 123 kg (272 lb 0.8 oz)    Admission Weight: Weight: 124 kg (274 lb 7.6 oz)  Current Weight: Weight: 123 kg (272 lb 0.8 oz)    Input/Output in last 24 hours:    Intake/Output Summary (Last 24 hours) at 1/17/2025 1238  Last data filed at 1/17/2025 0900  Gross per 24 hour   Intake 1590.79 ml   Output 1650 ml   Net -59.21 ml      Net IO Since Admission: -6,410.21 mL [01/17/25 1238]     Physical Exam  Vitals and nursing note reviewed.   Constitutional:       Appearance: Normal appearance. He is obese.      Comments: Sitting up in bed on BIPAP   HENT:      Head: Normocephalic and atraumatic.      Right Ear: External ear normal.      Left Ear: External ear  normal.      Nose: Nose normal.      Mouth/Throat:      Mouth: Mucous membranes are dry.   Eyes:      Conjunctiva/sclera: Conjunctivae normal.      Pupils: Pupils are equal, round, and reactive to light.   Cardiovascular:      Rate and Rhythm: Normal rate and regular rhythm.   Pulmonary:      Comments: Tachypnea, Coarse BS at the bases  Abdominal:      General: There is distension.      Tenderness: There is no abdominal tenderness. There is no guarding.   Musculoskeletal:      Right lower leg: No edema.      Left lower leg: No edema.   Skin:     General: Skin is warm.      Capillary Refill: Capillary refill takes less than 2 seconds.   Neurological:      Mental Status: He is alert. He is disoriented.   Psychiatric:         Mood and Affect: Mood normal.          Radiology and Labs     Results from last 7 days   Lab Units 01/17/25  0501 01/16/25  0450 01/15/25  0604 01/14/25  0610 01/13/25  1505   WBC 10*3/mm3 14.58* 17.93* 18.38* 15.00* 14.49*   HEMOGLOBIN g/dL 13.3 14.2 14.5 13.1 14.4   HEMATOCRIT % 40.2 44.1 46.7 40.3 44.5   PLATELETS 10*3/mm3 175 188 259 309 405      Results from last 7 days   Lab Units 01/17/25  0501 01/16/25  0450 01/15/25  0846 01/14/25  0610 01/13/25  1505   PROTIME Seconds 21.6 29.6* 35.4* 34.6* 28.5   INR  1.87* 2.80 3.53* 3.44* 2.68      Results from last 7 days   Lab Units 01/17/25  0501 01/16/25  1621 01/16/25  0450 01/15/25  1756 01/15/25  1511 01/15/25  0604   SODIUM mmol/L 139  --  145 141 141 139   POTASSIUM mmol/L 4.0 5.0 3.4* 4.2 3.7 4.1   CHLORIDE mmol/L 103  --  104 97* 97* 99   CO2 mmol/L 24.5  --  28.3 28.1 27.2 22.2   BUN mg/dL 55*  --  56* 59* 61* 58*   CREATININE mg/dL 1.29*  --  1.37* 1.55* 1.60* 1.58*   GLUCOSE mg/dL 154*  --  124* 240* 293* 284*   MAGNESIUM mg/dL  --   --  2.6*  --   --  2.9*   PHOSPHORUS mg/dL  --   --   --   --   --  4.4      Results from last 7 days   Lab Units 01/17/25  0501 01/16/25  0450 01/15/25  1756 01/15/25  1511 01/15/25  0604   ALK PHOS U/L 103  111 117 123* 105   AST (SGOT) U/L 39 32 42* 39 34   ALT (SGPT) U/L 23 23 28 26 24     Results from last 7 days   Lab Units 01/17/25  0734 01/16/25  1412 01/15/25  1514 01/15/25  0757 01/14/25  0604 01/13/25  2227   PH, ARTERIAL pH units 7.499* 7.455* 7.461* 7.388  --  7.410   PCO2, ARTERIAL mm Hg 29.3* 41.4 40.6 42.4  --  26.6*   PO2 ART mm Hg 57.8* 67.6* 91.7 93.0  --  56.4*   O2 SATURATION ART % 92.4* 94.0 97.5 97.1  --  89.9*   FIO2 % 100 90 90 100 76 77   HCO3 ART mmol/L 22.8 29.1* 28.9* 25.6  --  16.9*   BASE EXCESS ART mmol/L 0.6 4.7* 4.6* 0.3  --  -6.2*       XR Chest 1 View    Result Date: 1/17/2025  Impression: 1. Diffuse multifocal airspace opacities which are slightly increased in the comparison again noted. Findings are compatible with acute pneumonia and/or edema superimposed upon chronic interstitial changes. Electronically Signed: Alberto Alan MD  1/17/2025 8:14 AM EST  Workstation ID: [id number]    XR Chest 1 View    Result Date: 1/16/2025  Impression: Persistent multifocal airspace disease throughout the lungs concerning for pneumonia superimposed on background of chronic interstitial lung disease. Electronically Signed: Jordan Monet MD  1/16/2025 7:12 AM EST  Workstation ID: [id number]     Current medications     Scheduled Meds:   [Held by provider] amLODIPine, 5 mg, Oral, Daily  [Held by provider] atorvastatin, 80 mg, Oral, Nightly  budesonide, 0.5 mg, Nebulization, BID - RT  cefepime, 2,000 mg, Intravenous, Q12H  chlorhexidine, 15 mL, Mouth/Throat, Q12H  clopidogrel, 75 mg, Oral, Daily  [Held by provider] empagliflozin, 25 mg, Oral, Daily  [Held by provider] fenofibrate, 48 mg, Oral, Daily  ferrous sulfate, 325 mg, Oral, BID  [Held by provider] gabapentin, 600 mg, Oral, Q8H  hydrocortisone sodium succinate, 50 mg, Intravenous, Q6H  insulin lispro, 3-14 Units, Subcutaneous, 4x Daily AC & at Bedtime  Insulin Regular Human (Conc), 60 Units, Subcutaneous, TID AC  insulin regular, 8 Units,  Subcutaneous, Q8H  ipratropium-albuterol, 3 mL, Nebulization, Q4H - RT  [Held by provider] isosorbide mononitrate, 60 mg, Oral, Daily  [Held by provider] levothyroxine, 175 mcg, Oral, Q AM  [Held by provider] losartan, 100 mg, Oral, Q24H  [Held by provider] metoprolol tartrate, 25 mg, Oral, Q8H  [Held by provider] midodrine, 10 mg, Oral, Q8H  [START ON 1/18/2025] pantoprazole, 40 mg, Intravenous, Q AM  sodium chloride, 10 mL, Intravenous, Q12H  vancomycin, 1,500 mg, Intravenous, Q24H  vitamin B-12, 250 mcg, Oral, Daily  [Held by provider] warfarin, 7 mg, Oral, Once per day on Tuesday Thursday  [Held by provider] warfarin, 8 mg, Oral, Once per day on Sunday Monday Wednesday Friday Saturday        Continuous Infusions:   amiodarone, 0.5 mg/min, Last Rate: 0.5 mg/min (01/17/25 0647)  dexmedetomidine, 0.2-1.5 mcg/kg/hr  Pharmacy to Dose enoxaparin (LOVENOX),   Pharmacy to dose vancomycin,           Plan discussed with RN. Reviewed all other data in the last 24 hours, including but not limited to vitals, labs, microbiology, imaging and pertinent notes from other providers.  Plan also discussed with patient at the bedside.      Roberto Arriaga MD   Critical Care  01/17/25   12:38 EST

## 2025-01-17 NOTE — SIGNIFICANT NOTE
01/17/25 0924   OTHER   Discipline physical therapist   Rehab Time/Intention   Session Not Performed other (see comments)  (Hold per nursing, patient currently on AVAPS)   Therapy Assessment/Plan (PT)   Criteria for Skilled Interventions Met (PT) yes;meets criteria   Recommendation   PT - Next Appointment 01/18/25   Recommendation   OT - Next Appointment 01/18/25

## 2025-01-17 NOTE — PROGRESS NOTES
"Subjective   Dalton Dallas . is a 84 y.o. male.   Seen for diabetes f/u.  Insulin drip discontinued @ 3:30p.  U500 insulin sq dose given @ 5:30p        Objective     BP 93/53 (BP Location: Right arm, Patient Position: Lying)   Pulse 89 Comment: MAP 59  Temp 98.5 °F (36.9 °C) (Axillary)   Resp 27   Ht 175.3 cm (69\")   Wt 119 kg (263 lb 0.1 oz)   SpO2 92%   BMI 38.84 kg/m²   Blood sugar  154 this am,  212 @ lunch, 288 @ supper    ASSESSMENT  Diabetes type 2, with hyperglycemia  Patient is Improving    PLAN    Continue U500 insulin. Likely will need to increase dose.  Will give extra dose if MN bl sugar is still >300.         Virginia Shaver MD  1/16/2025  21:23 EST    "

## 2025-01-17 NOTE — PROGRESS NOTES
"Pharmacy Antimicrobial Dosing Service    Subjective:  Dalton Dallas Sr. is a 84 y.o.male admitted with SOB. Pharmacy has been consulted to dose Vancomycin for possible pneumonia.    PMH: CAD, HFpEF, HTN, HLD, PVD, DM type II, thrombophilia, hypothyroidism, chronic pain of both knees, HAN      Assessment/Plan    1. Day #3 Vancomycin: Goal -600 mcg*h/mL.   - patient has been receiving vancomycin 1500mg IV q24h  - trough returned at 9.4 mcg/mL which corresponds to a therapeutic AUC of 409 mg/L*hr.   - low end of the therapeutic range, so will increase dose to vancomycin 1750mg IV q12h which corresponds to a therapeutic AUC of 477 mg/L*hr  - check another level on 1/19 at 1400 prior to third dose of new regimen    2. Day #5 Cefepime: 2000 mg IV q12h for estCrCl 30-59 mL/min.    Will continue to monitor drug levels, renal function, culture and sensitivities, and patient clinical status.       Objective:  Relevant clinical data and objective history reviewed:  175.3 cm (69\")   123 kg (272 lb 0.8 oz)   Ideal body weight: 70.7 kg (155 lb 13.8 oz)  Adjusted ideal body weight: 91.8 kg (202 lb 5.4 oz)  Body mass index is 40.17 kg/m².    Results from last 7 days   Lab Units 01/17/25  1705 01/16/25  2030   VANCOMYCIN PK mcg/mL  --  26.00   VANCOMYCIN TR mcg/mL 9.40  --      Results from last 7 days   Lab Units 01/17/25  0501 01/16/25  0450 01/15/25  1756   CREATININE mg/dL 1.29* 1.37* 1.55*     Estimated Creatinine Clearance: 57.2 mL/min (A) (by C-G formula based on SCr of 1.29 mg/dL (H)).  I/O last 3 completed shifts:  In: 2139.8 [P.O.:440; I.V.:1699.8]  Out: 3045 [Urine:3045]    Results from last 7 days   Lab Units 01/17/25  0501 01/16/25  0450 01/15/25  0604   WBC 10*3/mm3 14.58* 17.93* 18.38*     Temperature    01/16/25 2000 01/17/25 0000 01/17/25 0430   Temp: 98.5 °F (36.9 °C) 98.4 °F (36.9 °C) 99.4 °F (37.4 °C)     Baseline culture/source/susceptibility:  Microbiology Results (last 10 days)       Procedure " Component Value - Date/Time    Legionella Antigen, Urine - Urine, Urine, Clean Catch [[id number]]  (Normal) Collected: 01/14/25 0842    Lab Status: Final result Specimen: Urine, Clean Catch Updated: 01/14/25 0926     LEGIONELLA ANTIGEN, URINE Negative    S. Pneumo Ag Urine or CSF - Urine, Urine, Clean Catch [[id number]]  (Normal) Collected: 01/14/25 0842    Lab Status: Final result Specimen: Urine, Clean Catch Updated: 01/14/25 0927     Strep Pneumo Ag Negative    MRSA Screen, PCR (Inpatient) - Swab, Nares [[id number]]  (Abnormal) Collected: 01/14/25 0613    Lab Status: Final result Specimen: Swab from Nares Updated: 01/14/25 0719     MRSA PCR MRSA Detected    Narrative:      The negative predictive value of this diagnostic test is high and should only be used to consider de-escalating anti-MRSA therapy. A positive result may indicate colonization with MRSA and must be correlated clinically.    Blood Culture - Blood, Arm, Right [[id number]]  (Normal) Collected: 01/13/25 1521    Lab Status: Preliminary result Specimen: Blood from Arm, Right Updated: 01/17/25 1531     Blood Culture No growth at 4 days    Narrative:      Less than seven (7) mL's of blood was collected.  Insufficient quantity may yield false negative results.    Respiratory Panel PCR w/COVID-19(SARS-CoV-2) SARAHY/ALTHEA/CLARY/PAD/COR/ISHAN In-House, NP Swab in UTM/VTM, 2 HR TAT - Swab, Nasopharynx [[id number]]  (Normal) Collected: 01/13/25 1505    Lab Status: Final result Specimen: Swab from Nasopharynx Updated: 01/13/25 1601     ADENOVIRUS, PCR Not Detected     Coronavirus 229E Not Detected     Coronavirus HKU1 Not Detected     Coronavirus NL63 Not Detected     Coronavirus OC43 Not Detected     COVID19 Not Detected     Human Metapneumovirus Not Detected     Human Rhinovirus/Enterovirus Not Detected     Influenza A PCR Not Detected     Influenza B PCR Not Detected     Parainfluenza Virus 1 Not Detected     Parainfluenza Virus 2 Not Detected     Parainfluenza Virus 3  Not Detected     Parainfluenza Virus 4 Not Detected     RSV, PCR Not Detected     Bordetella pertussis pcr Not Detected     Bordetella parapertussis PCR Not Detected     Chlamydophila pneumoniae PCR Not Detected     Mycoplasma pneumo by PCR Not Detected    Narrative:      In the setting of a positive respiratory panel with a viral infection PLUS a negative procalcitonin without other underlying concern for bacterial infection, consider observing off antibiotics or discontinuation of antibiotics and continue supportive care. If the respiratory panel is positive for atypical bacterial infection (Bordetella pertussis, Chlamydophila pneumoniae, or Mycoplasma pneumoniae), consider antibiotic de-escalation to target atypical bacterial infection.    Blood Culture - Blood, Arm, Left [[id number]]  (Normal) Collected: 01/13/25 1505    Lab Status: Preliminary result Specimen: Blood from Arm, Left Updated: 01/17/25 151     Blood Culture No growth at 4 days            Julia Mcelroy RPH  01/17/25 18:00 EST

## 2025-01-17 NOTE — PROGRESS NOTES
CARDIOLOGY FOLLOW-UP PROGRESS NOTE      Reason for follow-up:    SOA  Elevated troponin  Afib RVR     Attending: Roberto Arriaga MD      Subjective .     Currently on Bipap  Desats off Bipap  D/W intensivist team  Considering intubation/mechanical ventilation     Review of Systems   Unable to perform ROS: Acuity of condition     Pertinent items are noted in HPI, all other systems reviewed and negative  Allergies: Patient has no known allergies.    Scheduled Meds:amLODIPine, 5 mg, Oral, Daily  atorvastatin, 80 mg, Oral, Nightly  budesonide, 0.5 mg, Nebulization, BID - RT  cefepime, 2,000 mg, Intravenous, Q12H  chlorhexidine, 15 mL, Mouth/Throat, Q12H  clopidogrel, 75 mg, Oral, Daily  [Held by provider] empagliflozin, 25 mg, Oral, Daily  etomidate, 30 mg, Intravenous, Once   And  midazolam, 4 mg, Intravenous, Once   And  fentaNYL citrate (PF), 100 mcg, Intravenous, Once  fenofibrate, 48 mg, Oral, Daily  ferrous sulfate, 325 mg, Oral, BID  gabapentin, 600 mg, Oral, Q8H  hydrocortisone sodium succinate, 50 mg, Intravenous, Q6H  insulin lispro, 3-14 Units, Subcutaneous, 4x Daily AC & at Bedtime  Insulin Regular Human (Conc), 60 Units, Subcutaneous, TID AC  insulin regular, 8 Units, Subcutaneous, Q8H  ipratropium-albuterol, 3 mL, Nebulization, Q4H - RT  isosorbide mononitrate, 60 mg, Oral, Daily  levothyroxine, 175 mcg, Oral, Q AM  [Held by provider] losartan, 100 mg, Oral, Q24H  metoprolol tartrate, 25 mg, Oral, Q8H  midodrine, 10 mg, Oral, Q8H  pantoprazole, 40 mg, Oral, Q AM  sodium chloride, 10 mL, Intravenous, Q12H  vancomycin, 1,500 mg, Intravenous, Q24H  vitamin B-12, 250 mcg, Oral, Daily  [Held by provider] warfarin, 7 mg, Oral, Once per day on Tuesday Thursday  [Held by provider] warfarin, 8 mg, Oral, Once per day on Sunday Monday Wednesday Friday Saturday        Continuous Infusions:amiodarone, 0.5 mg/min, Last Rate: 0.5 mg/min (01/17/25 0647)  dexmedetomidine, 0.2-1.5 mcg/kg/hr  fentanyl 10 mcg/mL,   mcg/hr  Pharmacy to dose vancomycin,         PRN Meds:.  aluminum-magnesium hydroxide-simethicone    senna-docusate sodium **AND** polyethylene glycol **AND** bisacodyl **AND** bisacodyl    Calcium Replacement - Follow Nurse / BPA Driven Protocol    fentaNYL    Magnesium Standard Dose Replacement - Follow Nurse / BPA Driven Protocol    melatonin    metoprolol tartrate    ondansetron ODT **OR** ondansetron    Pharmacy to dose vancomycin    Phosphorus Replacement - Follow Nurse / BPA Driven Protocol    Potassium Replacement - Follow Nurse / BPA Driven Protocol    sodium chloride    sodium chloride    Objective     VITAL SIGNS  Patient Vitals for the past 24 hrs:   BP Temp Temp src Pulse Resp SpO2 Weight   01/17/25 0900 126/67 -- -- 94 -- 92 % --   01/17/25 0850 -- -- -- 87 -- 92 % --   01/17/25 0840 -- -- -- 94 -- 92 % --   01/17/25 0830 114/56 -- -- 90 -- 92 % --   01/17/25 0820 -- -- -- 84 -- 93 % --   01/17/25 0810 -- -- -- 92 -- 93 % --   01/17/25 0800 123/62 -- -- 91 -- 91 % --   01/17/25 0750 -- -- -- 92 -- 94 % --   01/17/25 0740 -- -- -- 89 -- 92 % --   01/17/25 0737 -- -- -- 93 27 92 % --   01/17/25 0730 110/60 -- -- 99 -- 92 % --   01/17/25 0720 -- -- -- 88 -- (!) 89 % --   01/17/25 0710 -- -- -- 96 -- (!) 87 % --   01/17/25 0700 -- -- -- 93 -- (!) 88 % --   01/17/25 0647 -- -- -- 91 27 92 % --   01/17/25 0643 -- -- -- 89 25 90 % --   01/17/25 0642 -- -- -- 91 (!) 31 90 % --   01/17/25 0639 -- -- -- 89 28 90 % --   01/17/25 0600 108/58 -- -- 90 -- 90 % --   01/17/25 0531 127/69 -- -- 86 -- (!) 87 % --   01/17/25 0529 -- -- -- -- -- -- 123 kg (272 lb 0.8 oz)   01/17/25 0504 123/71 -- -- 101 -- -- --   01/17/25 0430 112/56 99.4 °F (37.4 °C) Axillary 90 28 90 % --   01/17/25 0400 124/71 -- -- 102 -- (!) 88 % --   01/17/25 0330 118/65 -- -- 93 -- (!) 89 % --   01/17/25 0310 -- -- -- 96 24 92 % --   01/17/25 0305 -- -- -- 91 24 90 % --   01/17/25 0300 123/67 -- -- 101 -- (!) 88 % --   01/17/25 0230 120/64 -- --  "97 -- 90 % --   01/17/25 0200 117/72 -- -- 86 -- 92 % --   01/17/25 0130 107/61 -- -- 83 -- 92 % --   01/17/25 0100 97/62 -- -- 91 -- 92 % --   01/17/25 0030 109/57 -- -- 88 -- 90 % --   01/17/25 0000 118/71 98.4 °F (36.9 °C) Axillary 86 -- 94 % --   01/16/25 2330 124/66 -- -- 83 -- 94 % --   01/16/25 2303 -- -- -- 92 26 94 % --   01/16/25 2300 123/54 -- -- 96 26 94 % --   01/16/25 2230 113/60 -- -- 90 -- (!) 89 % --   01/16/25 2200 109/61 -- -- 92 (!) 33 91 % --   01/16/25 2138 107/55 -- -- 89 -- -- --   01/16/25 2130 107/55 -- -- 92 -- 93 % --   01/16/25 2100 108/54 -- -- 88 -- 95 % --   01/16/25 2033 93/53 -- -- 89 -- 92 % --   01/16/25 2030 93/53 -- -- 91 -- 92 % --   01/16/25 2006 -- -- -- 90 -- (!) 89 % --   01/16/25 2000 (!) 87/49 98.5 °F (36.9 °C) Axillary 85 27 90 % --   01/16/25 1930 111/57 -- -- 97 -- (!) 85 % --   01/16/25 1900 102/54 -- -- 98 18 (!) 89 % --   01/16/25 1857 -- -- -- 88 18 (!) 87 % --   01/16/25 1856 -- -- -- 91 18 (!) 88 % --   01/16/25 1853 -- -- -- 97 18 (!) 89 % --   01/16/25 1800 107/87 -- -- 99 -- 90 % --   01/16/25 1700 101/47 -- -- 93 -- 92 % --   01/16/25 1610 -- -- -- 104 -- 93 % --   01/16/25 1600 109/55 97.5 °F (36.4 °C) Axillary 103 19 94 % --   01/16/25 1500 107/68 -- -- 102 -- 94 % --   01/16/25 1414 -- -- -- 102 27 92 % --   01/16/25 1410 -- -- -- 101 27 92 % --   01/16/25 1400 -- -- -- 92 -- 91 % --   01/16/25 1300 105/59 -- -- 103 -- 91 % --   01/16/25 1201 103/53 98.9 °F (37.2 °C) Oral 110 16 (!) 82 % --   01/16/25 1200 103/53 -- -- 98 -- (!) 81 % --   01/16/25 1135 100/56 -- -- 97 -- -- --   01/16/25 1124 -- -- -- 107 24 90 % --        Flowsheet Rows      Flowsheet Row First Filed Value   Admission Height 177.8 cm (70\") Documented at 01/13/2025 1444   Admission Weight 124 kg (274 lb 7.6 oz) Documented at 01/13/2025 1441            Body mass index is 40.17 kg/m².      Intake/Output Summary (Last 24 hours) at 1/17/2025 1120  Last data filed at 1/17/2025 0900  Gross per " 24 hour   Intake 1590.79 ml   Output 1725 ml   Net -134.21 ml        TELEMETRY:     AF 90s    Physical Exam:  Constitutional:       Appearance: Well-developed.   Eyes:      General: No scleral icterus.        Right eye: No discharge.   HENT:      Head: Normocephalic and atraumatic.   Neck:      Thyroid: No thyromegaly.      Lymphadenopathy: No cervical adenopathy.   Pulmonary:      Effort: Pulmonary effort is normal. No respiratory distress.      Breath sounds: Decreased breath sounds present. No wheezing. No rales.   Cardiovascular:      Tachycardia present. Irregularly irregular rhythm.      No gallop.    Edema:     Peripheral edema absent.   Abdominal:      Tenderness: There is no abdominal tenderness.   Skin:     Findings: No erythema or rash.   Neurological:      Mental Status: Alert and oriented to person, place, and time.            Results Review:   I reviewed the patient's new clinical results.    CBC    Results from last 7 days   Lab Units 01/17/25  0501 01/16/25  0450 01/15/25  0604 01/14/25  0610 01/13/25  1505   WBC 10*3/mm3 14.58* 17.93* 18.38* 15.00* 14.49*   HEMOGLOBIN g/dL 13.3 14.2 14.5 13.1 14.4   PLATELETS 10*3/mm3 175 188 259 309 405     BMP   Results from last 7 days   Lab Units 01/17/25  0501 01/16/25  1621 01/16/25  0450 01/15/25  1756 01/15/25  1511 01/15/25  0604 01/14/25  1238 01/14/25  0610   SODIUM mmol/L 139  --  145 141 141 139 137 140   POTASSIUM mmol/L 4.0 5.0 3.4* 4.2 3.7 4.1 4.6 4.7   CHLORIDE mmol/L 103  --  104 97* 97* 99 98 102   CO2 mmol/L 24.5  --  28.3 28.1 27.2 22.2 20.2* 18.1*   BUN mg/dL 55*  --  56* 59* 61* 58* 49* 45*   CREATININE mg/dL 1.29*  --  1.37* 1.55* 1.60* 1.58* 1.38* 1.50*   GLUCOSE mg/dL 154*  --  124* 240* 293* 284* 373* 305*   MAGNESIUM mg/dL  --   --  2.6*  --   --  2.9*  --   --    PHOSPHORUS mg/dL  --   --   --   --   --  4.4  --   --      Cr Clearance Estimated Creatinine Clearance: 57.2 mL/min (A) (by C-G formula based on SCr of 1.29 mg/dL (H)).  Coag    Results from last 7 days   Lab Units 01/17/25  0501 01/16/25  0450 01/15/25  0846 01/14/25  0610 01/13/25  1505   INR  1.87* 2.80 3.53* 3.44* 2.68     HbA1C   Lab Results   Component Value Date    HGBA1C 7.30 (H) 01/13/2025    HGBA1C 7.36 (H) 12/04/2024    HGBA1C 7.85 (H) 10/01/2024     Blood Glucose   Glucose   Date/Time Value Ref Range Status   01/17/2025 0905 164 (H) 70 - 105 mg/dL Final     Comment:     Serial Number: [id number]:  [id number]   01/17/2025 0545 157 (H) 70 - 105 mg/dL Final     Comment:     Serial Number: [id number]:  [id number]   01/16/2025 2359 191 (H) 70 - 105 mg/dL Final     Comment:     Serial Number: [id number]:  [id number]   01/16/2025 2018 331 (H) 70 - 105 mg/dL Final     Comment:     Serial Number: [id number]:  [id number]   01/16/2025 1715 288 (H) 70 - 105 mg/dL Final     Comment:     Serial Number: [id number]:  [id number]   01/16/2025 1342 201 (H) 70 - 105 mg/dL Final     Comment:     Serial Number: [id number]:  [id number]   01/16/2025 1130 212 (H) 70 - 105 mg/dL Final     Comment:     Serial Number: [id number]:  [id number]   01/16/2025 1020 227 (H) 70 - 105 mg/dL Final     Comment:     Serial Number: [id number]:  [id number]     Infection   Results from last 7 days   Lab Units 01/15/25  1511 01/13/25  1727 01/13/25  1521 01/13/25  1505   BLOODCX   --   --  No growth at 3 days No growth at 3 days   PROCALCITONIN ng/mL 0.47* 0.39*  --   --      CMP   Results from last 7 days   Lab Units 01/17/25  0501 01/16/25  1621 01/16/25  0450 01/15/25  1756 01/15/25  1511 01/15/25  0604 01/14/25  1238 01/14/25  0610 01/13/25  1505   SODIUM mmol/L 139  --  145 141 141 139 137 140 136   POTASSIUM mmol/L 4.0 5.0 3.4* 4.2 3.7 4.1 4.6 4.7 5.7*   CHLORIDE mmol/L 103  --  104 97* 97* 99 98 102 95*   CO2 mmol/L 24.5  --  28.3 28.1 27.2 22.2 20.2* 18.1* 17.2*   BUN mg/dL 55*  --  56* 59* 61* 58* 49* 45* 38*   CREATININE mg/dL 1.29*  --  1.37* 1.55* 1.60* 1.58*  "1.38* 1.50* 1.77*   GLUCOSE mg/dL 154*  --  124* 240* 293* 284* 373* 305* 325*   ALBUMIN g/dL 2.9*  --  3.6 3.8 3.8 3.6  --   --  3.9   BILIRUBIN mg/dL 0.7  --  0.6 0.5 0.5 0.5  --   --  0.7   ALK PHOS U/L 103  --  111 117 123* 105  --   --  80   AST (SGOT) U/L 39  --  32 42* 39 34  --   --  34   ALT (SGPT) U/L 23  --  23 28 26 24  --   --  22     ABG    Results from last 7 days   Lab Units 01/17/25  0734 01/16/25  1412 01/15/25  1514 01/15/25  0757 01/13/25  2227 01/13/25  1845 01/13/25  1509   PH, ARTERIAL pH units 7.499* 7.455* 7.461* 7.388 7.410 7.396 7.408   PCO2, ARTERIAL mm Hg 29.3* 41.4 40.6 42.4 26.6* 32.9* 28.4*   PO2 ART mm Hg 57.8* 67.6* 91.7 93.0 56.4* 41.3* 117.5*   O2 SATURATION ART % 92.4* 94.0 97.5 97.1 89.9* 77.1* 98.7*   BASE EXCESS ART mmol/L 0.6 4.7* 4.6* 0.3 -6.2* -3.7* -5.2*     UA    Results from last 7 days   Lab Units 01/14/25  0842   NITRITE UA  Negative     DIANA  No results found for: \"POCMETH\", \"POCAMPHET\", \"POCBARBITUR\", \"POCBENZO\", \"POCCOCAINE\", \"POCOPIATES\", \"POCOXYCODO\", \"POCPHENCYC\", \"POCPROPOXY\", \"POCTHC\", \"POCTRICYC\"  Lysis Labs   Results from last 7 days   Lab Units 01/17/25  0501 01/16/25  0450 01/15/25  1756 01/15/25  1511 01/15/25  0846 01/15/25  0604 01/14/25  1238 01/14/25  0610 01/13/25  1505   INR  1.87* 2.80  --   --  3.53*  --   --  3.44* 2.68   HEMOGLOBIN g/dL 13.3 14.2  --   --   --  14.5  --  13.1 14.4   PLATELETS 10*3/mm3 175 188  --   --   --  259  --  309 405   CREATININE mg/dL 1.29* 1.37* 1.55* 1.60*  --  1.58* 1.38* 1.50* 1.77*     Radiology(recent) XR Chest 1 View    Result Date: 1/17/2025  Impression: 1. Diffuse multifocal airspace opacities which are slightly increased in the comparison again noted. Findings are compatible with acute pneumonia and/or edema superimposed upon chronic interstitial changes. Electronically Signed: Alberto Alan MD  1/17/2025 8:14 AM EST  Workstation ID: [id number]    XR Chest 1 View    Result Date: 1/16/2025  Impression: Persistent " multifocal airspace disease throughout the lungs concerning for pneumonia superimposed on background of chronic interstitial lung disease. Electronically Signed: Jordan Monet MD  1/16/2025 7:12 AM EST  Workstation ID: [id number]     Imaging Results (Last 24 Hours)       Procedure Component Value Units Date/Time    XR Chest 1 View [[id number]] Collected: 01/17/25 0812     Updated: 01/17/25 0816    Narrative:      XR CHEST 1 VW    Date of Exam: 1/17/2025 8:04 AM EST    Indication: dyspnea, bipap, multifocal pna    Comparison: 1/16/2024 and prior    Findings:  Study is limited by overlying support and monitoring apparatus. Lung volumes are low. Heart size is enlarged and pulm vascularity is congested and indistinct. Diffuse increased patchy groundglass and interstitial opacities are noted bilaterally. Findings   are increased from the comparison are accentuated by somewhat lower lung volumes. Osseous structures are unremarkable      Impression:      Impression:    1. Diffuse multifocal airspace opacities which are slightly increased in the comparison again noted. Findings are compatible with acute pneumonia and/or edema superimposed upon chronic interstitial changes.      Electronically Signed: Alberto Alan MD    1/17/2025 8:14 AM EST    Workstation ID: [id number]            Results from last 7 days   Lab Units 01/15/25  1511   HSTROP T ng/L 54*       EKG                I personally viewed and interpreted the patient's EKG/Telemetry data:        ECHOCARDIOGRAM:     Results for orders placed during the hospital encounter of 01/13/25    Adult Transthoracic Echo Complete W/ Cont if Necessary Per Protocol    Interpretation Summary    Left ventricular systolic function is normal. Left ventricular ejection fraction appears to be 61 - 65%.    Left ventricular diastolic function is consistent with (grade I) impaired relaxation.    There is calcification of the aortic valve.    Estimated right ventricular systolic pressure from  tricuspid regurgitation is markedly elevated (>55 mmHg).        STRESS MYOVIEW:      CARDIAC CATHETERIZATION:      OTHER:         Assessment & Plan            Multifocal pneumonia    Acute respiratory failure with hypoxia        ASSESSMENT:    Acute hypoxic respiratory failure / Shortness of breath     Multifocal pneumonia, on antibiotics, pulmonary consulted    3.   CAD s/p prior PCI  Elevated troponin, likely demand ischemia in setting of hypoxia and CASIE  No chest pain at present time     4. CASIE on CKD     5. Obesity     6. HTN     7. HLD     8. PVD     9. H/o DVT , thrombophilia on coumadin    10.  Afib RVR    PLAN:    Requiring high oxygen concentration via Airvo/Bipap  Nephrology following  Possible intubation today per intensivist team  Eventual ischemic evaluation when overall condition stabilizes  Warfarin on hold  INR 1.87 after 5 mg vit k  Continue IV amiodarone  Continue metoprolol  Continue current Rx and supportive care.   Initiate a/c with lovenox due to Afib if no immediate procedures planned, will defer to primary team      Additional recommendations per Dr. Ackerman     Patient is seen and examined and findings are verified.  All data is reviewed by me personally.  Assessment and plan formulated by APC was done after discussion with attending.  I spent more than 50% of time in taking care of the patient.    Patient was on AVAPS.  He was still hypoxic.  He reports that he is exhausted.  Family did not want him to be intubated.  They wanted DNR/DNI.    Patient continues to be in atrial fibrillation.    Normal S1 and S2.  Heart rate is irregular.  Abdomen is soft nontender trace leg edema noted.    I discussed in detail with his wife.  And she understand the gravity of his condition.  Patient condition is guarded.  He is requiring high flow oxygen.  Patient did have non-ST elevation myocardial infarction and would need invasive cardiac workup with cardiac catheterization once stabilized.  At present I  would continue gentle diuresis.  I would continue beta-blocker.  I discussed with intensivist as well as nurse practitioner.    Overall prognosis of the patient is poor.                                           Corine Mitchell, LUCAS  01/17/25  11:20 EST

## 2025-01-17 NOTE — CASE MANAGEMENT/SOCIAL WORK
Continued Stay Note   Shawn     Patient Name: Dalton Dallas Sr.  MRN: [id number]  Today's Date: 1/17/2025    Admit Date: 1/13/2025    Plan: Return home with family, pending PT/OT eval.. Home O2 2L @ [facility]   Discharge Plan       Row Name 01/17/25 1018       Plan    Plan Return home with family, pending PT/OT eval.. Home O2 2L @ [facility]    Plan Comments DC Barriers: BIPAP, IV Abx/steroids, renal fuction monitoring, FC- coude, Amio gtt, GI/Nephro/Endo/Cardio/Pulm/DM educator following.                 Expected Discharge Date and Time       Expected Discharge Date Expected Discharge Time    Jan 20, 2025               SREE Paredes RN  ICU/CVU   O: [phone]  C: [phone]  [email or web address]

## 2025-01-17 NOTE — CONSULTS
"    [facility] MEDICINE SERVICE  TRANSFER OF CARE/ACCEPTANCE NOTE    PATIENT NAME: Dalton Dallas Sr.  : 1940  MRN: [id number]     Active Hospital Problems    Diagnosis  POA    **Multifocal pneumonia [J18.9]  Yes    Acute respiratory failure with hypoxia [J96.01]  Unknown      Resolved Hospital Problems   No resolved problems to display.       Per HPI on 25 by Arlet Vela APRN:   \" Dalton Dallas Sr. is a 84 y.o. male with PMH of CAD, HFpEF, HTN, HLD, PVD, DM type II, thrombophilia, hypothyroidism, chronic pain of both knees, HAN who presented to [facility] ED 2025 with complaints of weakness for about 1 week, shortness of breath, nonproductive cough, and chills. He stated he he uses a bipap with 2L O2 at night.      In the ED the patient was hypoxic in the 70s per ER report and placed on a nonrebreather. He improved and was placed to a high flow nasal cannula then bipap. He could not tolerate bipap and was switched to 60% high flow O2 with improvement. He had WBC 14.49, lactate 4.8 with repeat 3.3 and another repeat 2.2. BUN 38, creatinine 1.77, K 5.7, proBNP 2644, HS troponin 50 with repeat 101, INR therapeutic 2.68. CT PE protocol showed no PE, extensive patchy opacities and groundglass opacities noted throughout the lungs bilaterally, left greater than right. There is relative sparing of the right upper lobe. There is superimposed reticulations, interstitial thickening and traction bronchiectasis noted within the peripheral aspect of the lungs. These findings are highly concerning for an infectious/inflammatory process superimposed upon chronic interstitial lung disease. Please consider follow-up with chest CT in 3 months after   resolution of symptoms to exclude underlying pulmonary lesions. Multiple enlarged mediastinal and perihilar lymph nodes, likely reactive. Mild enlargement of the central pulmonary arteries, a finding which can be seen in the setting of pulmonary " "hypertension. He was given duonebs, IV solumedrol, duonebs. He was only given 1L IVFs for sepsis due to underlying CHF and elevated BNP. He was admitted for further treatment of acute respiratory failure with hypoxia, multifocal pneumonia, pulmonary fibrosis, mediastinal lymphadenopathy.\"       Patient was transferred to ICU on 1/15/2025 due to worsening hypoxic respiratory failure requiring AVAPS maxed at 100%.  While in the ICU, patient was persistently hypotensive and Bumex drip was discontinued.  Patient had an episode of A-fib with RVR and was started on Amio drip.  Cardiology was consulted and patient was given digoxin.  Patient continues to be on amiodarone drip. Multiple attempts to wean patient off of BiPAP were made with resulting hypoxia with oxygen saturations in the 70%.  Discussion was had with family and patient CODE STATUS now DNR/DNI with plans for possible hospice intervention in the near future.  Patient continues to be on vancomycin and cefepime for multifocal pneumonia. Cardiology, endocrinology and nephrology continue to follow.  Patient is stable for downgrade to PCU.    I have noted the following changes since admission.     I have reviewed the H&P, diagnostic data and plan of care for Dalton ISAC Dallas Sr..  Hospitalist service will be taking over care of this patient during the current hospitalization.        Signature: Electronically signed by Chris Vazquez PA-C, 01/17/25, 17:46 EST.  [facility] Hospitalist Team       **This is a non-billable note**    "

## 2025-01-17 NOTE — PLAN OF CARE
Goal Outcome Evaluation:   Pt on 100% AVAPs. Code status changed to DNR/DNI. Pt can come off AVAPs and be placed on 100% Airvo for sips of water/meds. MD Arriaga discussed risks of this at bedside with patient and family and they stated that they were aware of the risk of removing AVAPs for sips of water. Pt declining NGT at this time. Pt remains in Afib and on and Amio drip.     Pt has only received 6 units of sliding scale insulin. MD Shaver notified.    Pt is now a PCU overflow.

## 2025-01-18 NOTE — PROGRESS NOTES
"Subjective   Dalton Dallas Sr. is a 84 y.o. male.   Seen for diabetes f/u.  NPO w sips of water.  On 100% AVAP's.  Does not want NGT.  Code status changed to DNR.  Last dose of U 500 insulin @5p yesterday.  Denies any pain. Family @ bedside.    Objective     /63   Pulse 109   Temp 99.4 °F (37.4 °C) (Axillary)   Resp 26   Ht 175.3 cm (69\")   Wt 123 kg (272 lb 0.8 oz)   SpO2 93%   BMI 40.17 kg/m²   Blood sugar  157 @ 6 am,  164 @ 9 am, 165 @ 2 p, 150 @ 5p    ASSESSMENT  Diabetes type 2 with hyperglycemia  Patient is Declining    PLAN    Continue lispro sliding scale.  Discontinued U 500 insulin.         Virginia Shaver MD  1/17/2025  20:18 EST    "

## 2025-01-18 NOTE — DISCHARGE SUMMARY
"             [facility] Medicine Services  Discharge Summary    Date of Service: 2025  Patient Name: Dalton Dallas Sr.  : 1940  MRN: [id number]    Date of Admission: 2025  Discharge Diagnosis: Multifocal pneumonia  Date of Discharge: 2025  Primary Care Physician: Joan Johns, APRN      Presenting Problem:   Hypoxemia [R09.02]  Pulmonary fibrosis [J84.10]  Respiratory distress [R06.03]  Mediastinal lymphadenopathy [R59.0]  Multifocal pneumonia [J18.9]    Active and Resolved Hospital Problems:  Active Hospital Problems    Diagnosis POA    **Multifocal pneumonia [J18.9] Yes    Acute respiratory failure with hypoxia [J96.01] Unknown      Resolved Hospital Problems   No resolved problems to display.         Hospital Course     HPI:    \"Per HPI on 25 by Arlet Vela APRN:   \" Dalton Dallas Sr. is a 84 y.o. male with PMH of CAD, HFpEF, HTN, HLD, PVD, DM type II, thrombophilia, hypothyroidism, chronic pain of both knees, HAN who presented to [facility] ED 2025 with complaints of weakness for about 1 week, shortness of breath, nonproductive cough, and chills. He stated he he uses a bipap with 2L O2 at night.      In the ED the patient was hypoxic in the 70s per ER report and placed on a nonrebreather. He improved and was placed to a high flow nasal cannula then bipap. He could not tolerate bipap and was switched to 60% high flow O2 with improvement. He had WBC 14.49, lactate 4.8 with repeat 3.3 and another repeat 2.2. BUN 38, creatinine 1.77, K 5.7, proBNP 2644, HS troponin 50 with repeat 101, INR therapeutic 2.68. CT PE protocol showed no PE, extensive patchy opacities and groundglass opacities noted throughout the lungs bilaterally, left greater than right. There is relative sparing of the right upper lobe. There is superimposed reticulations, interstitial thickening and traction bronchiectasis noted within the peripheral aspect of the lungs. These findings are highly " "concerning for an infectious/inflammatory process superimposed upon chronic interstitial lung disease. Please consider follow-up with chest CT in 3 months after   resolution of symptoms to exclude underlying pulmonary lesions. Multiple enlarged mediastinal and perihilar lymph nodes, likely reactive. Mild enlargement of the central pulmonary arteries, a finding which can be seen in the setting of pulmonary hypertension. He was given duonebs, IV solumedrol, duonebs. He was only given 1L IVFs for sepsis due to underlying CHF and elevated BNP. He was admitted for further treatment of acute respiratory failure with hypoxia, multifocal pneumonia, pulmonary fibrosis, mediastinal lymphadenopathy.\" \"    Hospital Course:          Patient was transferred to ICU on 1/15/2025 due to worsening hypoxic respiratory failure requiring AVAPS maxed at 100%.  While in the ICU, patient was persistently hypotensive and Bumex drip was discontinued.  Patient had an episode of A-fib with RVR and was started on Amio drip.  Cardiology was consulted and patient was given digoxin.  Patient continues to be on amiodarone drip. Multiple attempts to wean patient off of BiPAP were made with resulting hypoxia with oxygen saturations in the 70%.  Discussion was had with family and patient CODE STATUS now DNR/DNI with plans for possible hospice intervention in the near future.  Patient continues to be on vancomycin and cefepime for multifocal pneumonia. Cardiology, endocrinology and nephrology continue to follow during hospital course.  Patient was downgraded to PCU yesterday however his oxygen requirements were continuing to drop overnight and this morning he was saturating in the 60s and 70s and so nursing reached out to the POA again and had discussions regarding CODE STATUS and she said she would still want him to be DNI/DNR.  Patient  at 826        DISCHARGE Follow Up Recommendations for labs and diagnostics: Patient         Day of " Discharge     Vital Signs:  Temp:  [99.5 °F (37.5 °C)-99.6 °F (37.6 °C)] 99.5 °F (37.5 °C)  Heart Rate:  [] 65  Resp:  [22-34] 34  BP: ()/(38-78) 71/38    Physical Exam: patient   Physical Exam        Pertinent  and/or Most Recent Results     LAB RESULTS:      Lab 25  0502 25  0501 25  0450 01/15/25  1639 01/15/25  1511 01/15/25  0846 01/15/25  0604 25  1106 25  0610 25  0058 25  2306 25  1837 25  1727   WBC 23.86* 14.58* 17.93*  --   --   --  18.38*  --  15.00*  --   --   --   --    HEMOGLOBIN 14.2 13.3 14.2  --   --   --  14.5  --  13.1  --   --   --   --    HEMATOCRIT 44.9 40.2 44.1  --   --   --  46.7  --  40.3  --   --   --   --    PLATELETS 198 175 188  --   --   --  259  --  309  --   --   --   --    NEUTROS ABS 20.78* 12.26* 15.71*  --   --   --  16.18*  --  13.10*  --   --   --   --    IMMATURE GRANS (ABS) 0.29* 0.10* 0.14*  --   --   --  0.14*  --  0.10*  --   --   --   --    LYMPHS ABS 1.57 1.62 1.11  --   --   --  1.37  --  1.12  --   --   --   --    MONOS ABS 1.15* 0.55 0.95*  --   --   --  0.67  --  0.66  --   --   --   --    EOS ABS 0.01 0.04 0.00  --   --   --  0.00  --  0.00  --   --   --   --    MCV 93.5 90.1 91.7  --   --   --  95.1  --  91.2  --   --   --   --    PROCALCITONIN  --   --   --   --  0.47*  --   --   --   --   --   --   --  0.39*   LACTATE  --   --   --  2.4*  --   --   --  2.6* 2.2* 2.9* 3.0*   < >  --    PROTIME 20.4 21.6 29.6*  --   --  35.4*  --   --  34.6*  --   --   --   --     < > = values in this interval not displayed.         Lab 25  0502 25  0501 25  1621 25  0450 01/15/25  1756 01/15/25  1511 01/15/25  0604 25  0610 25  1505   SODIUM 141 139  --  145 141 141 139   < > 136   POTASSIUM 4.5 4.0 5.0 3.4* 4.2 3.7 4.1   < > 5.7*   CHLORIDE 99 103  --  104 97* 97* 99   < > 95*   CO2 17.4* 24.5  --  28.3 28.1 27.2 22.2   < > 17.2*   ANION GAP 24.6* 11.5  --  12.7 15.9*  16.8* 17.8*   < > 23.8*   BUN 58* 55*  --  56* 59* 61* 58*   < > 38*   CREATININE 1.34* 1.29*  --  1.37* 1.55* 1.60* 1.58*   < > 1.77*   EGFR 52.9* 55.4*  --  51.5* 44.4* 42.8* 43.4*   < > 37.9*   GLUCOSE 221* 154*  --  124* 240* 293* 284*   < > 325*   CALCIUM 8.9 8.3*  --  9.2 9.6 9.7 9.5   < > 9.6   MAGNESIUM  --   --   --  2.6*  --   --  2.9*  --   --    PHOSPHORUS  --   --   --   --   --   --  4.4  --   --    HEMOGLOBIN A1C  --   --   --   --   --   --   --   --  7.30*    < > = values in this interval not displayed.         Lab 01/18/25  0502 01/17/25  0501 01/16/25  0450 01/15/25  1756 01/15/25  1511   TOTAL PROTEIN 6.6 6.1 7.1 7.5 7.8   ALBUMIN 3.2* 2.9* 3.6 3.8 3.8   GLOBULIN 3.4 3.2 3.5 3.7 4.0   ALT (SGPT) 25 23 23 28 26   AST (SGOT) 42* 39 32 42* 39   BILIRUBIN 0.9 0.7 0.6 0.5 0.5   ALK PHOS 144* 103 111 117 123*         Lab 01/18/25  0502 01/17/25  0501 01/16/25  0450 01/15/25  1511 01/15/25  0846 01/14/25  0610 01/14/25  0058 01/13/25  1727 01/13/25  1535 01/13/25  1505   PROBNP  --   --   --   --   --  4,115.0*  --   --   --  2,644.0*   HSTROP T  --   --   --  54*  --   --  128* 101* 50*  --    PROTIME 20.4 21.6 29.6*  --  35.4* 34.6*  --   --   --  28.5   INR 1.73* 1.87* 2.80  --  3.53* 3.44*  --   --   --  2.68                 Lab 01/17/25  0734 01/16/25  1412 01/15/25  1514   PH, ARTERIAL 7.499* 7.455* 7.461*   PCO2, ARTERIAL 29.3* 41.4 40.6   PO2 ART 57.8* 67.6* 91.7   O2 SATURATION ART 92.4* 94.0 97.5   FIO2 100 90 90   HCO3 ART 22.8 29.1* 28.9*   BASE EXCESS ART 0.6 4.7* 4.6*     Brief Urine Lab Results  (Last result in the past 365 days)        Color   Clarity   Blood   Leuk Est   Nitrite   Protein   CREAT   Urine HCG        01/14/25 1658             25.6               Microbiology Results (last 10 days)       Procedure Component Value - Date/Time    Legionella Antigen, Urine - Urine, Urine, Clean Catch [[id number]]  (Normal) Collected: 01/14/25 0842    Lab Status: Final result Specimen: Urine,  Clean Catch Updated: 01/14/25 0926     LEGIONELLA ANTIGEN, URINE Negative    S. Pneumo Ag Urine or CSF - Urine, Urine, Clean Catch [[id number]]  (Normal) Collected: 01/14/25 0842    Lab Status: Final result Specimen: Urine, Clean Catch Updated: 01/14/25 0927     Strep Pneumo Ag Negative    MRSA Screen, PCR (Inpatient) - Swab, Nares [[id number]]  (Abnormal) Collected: 01/14/25 0613    Lab Status: Final result Specimen: Swab from Nares Updated: 01/14/25 0719     MRSA PCR MRSA Detected    Narrative:      The negative predictive value of this diagnostic test is high and should only be used to consider de-escalating anti-MRSA therapy. A positive result may indicate colonization with MRSA and must be correlated clinically.    Blood Culture - Blood, Arm, Right [[id number]]  (Normal) Collected: 01/13/25 1521    Lab Status: Preliminary result Specimen: Blood from Arm, Right Updated: 01/17/25 1531     Blood Culture No growth at 4 days    Narrative:      Less than seven (7) mL's of blood was collected.  Insufficient quantity may yield false negative results.    Respiratory Panel PCR w/COVID-19(SARS-CoV-2) SARAHY/ALTHEA/[facility] In-House, NP Swab in UTM/VTM, 2 HR TAT - Swab, Nasopharynx [[id number]]  (Normal) Collected: 01/13/25 1505    Lab Status: Final result Specimen: Swab from Nasopharynx Updated: 01/13/25 1601     ADENOVIRUS, PCR Not Detected     Coronavirus 229E Not Detected     Coronavirus HKU1 Not Detected     Coronavirus NL63 Not Detected     Coronavirus OC43 Not Detected     COVID19 Not Detected     Human Metapneumovirus Not Detected     Human Rhinovirus/Enterovirus Not Detected     Influenza A PCR Not Detected     Influenza B PCR Not Detected     Parainfluenza Virus 1 Not Detected     Parainfluenza Virus 2 Not Detected     Parainfluenza Virus 3 Not Detected     Parainfluenza Virus 4 Not Detected     RSV, PCR Not Detected     Bordetella pertussis pcr Not Detected     Bordetella parapertussis PCR Not Detected      Chlamydophila pneumoniae PCR Not Detected     Mycoplasma pneumo by PCR Not Detected    Narrative:      In the setting of a positive respiratory panel with a viral infection PLUS a negative procalcitonin without other underlying concern for bacterial infection, consider observing off antibiotics or discontinuation of antibiotics and continue supportive care. If the respiratory panel is positive for atypical bacterial infection (Bordetella pertussis, Chlamydophila pneumoniae, or Mycoplasma pneumoniae), consider antibiotic de-escalation to target atypical bacterial infection.    Blood Culture - Blood, Arm, Left [[id number]]  (Normal) Collected: 01/13/25 1505    Lab Status: Preliminary result Specimen: Blood from Arm, Left Updated: 01/17/25 1515     Blood Culture No growth at 4 days            XR Chest 1 View    Result Date: 1/17/2025  Impression: Impression: 1. Diffuse multifocal airspace opacities which are slightly increased in the comparison again noted. Findings are compatible with acute pneumonia and/or edema superimposed upon chronic interstitial changes. Electronically Signed: Alberto Alan MD  1/17/2025 8:14 AM EST  Workstation ID: [id number]    XR Chest 1 View    Result Date: 1/16/2025  Impression: Impression: Persistent multifocal airspace disease throughout the lungs concerning for pneumonia superimposed on background of chronic interstitial lung disease. Electronically Signed: Jordan Monet MD  1/16/2025 7:12 AM EST  Workstation ID: [id number]    XR Chest 1 View    Result Date: 1/15/2025  Impression: Impression: 1.Cardiomegaly with pulmonary vascular congestion. 2.No interval change in diffuse bilateral reticular airspace disease could represent pulm edema or pneumonia. Electronically Signed: Dwayne Curtis MD  1/15/2025 8:11 AM EST  Workstation ID: [id number]    US Renal Bilateral    Result Date: 1/14/2025  Impression: 1. Limited study without evidence of hydronephrosis 2. Bladder not visualized Electronically  Signed: Alberto Alan MD  1/14/2025 3:54 PM EST  Workstation ID: [id number]    US Abdomen Limited    Result Date: 1/14/2025  Impression: Impression: No sonographic evidence of free fluid in the abdomen. Electronically Signed: Cesar Issa MD  1/14/2025 3:52 PM EST  Workstation ID: [id number]    XR Chest 1 View    Result Date: 1/14/2025  Impression: Impression: 1.Cardiomegaly. 2.Mixed interstitial/airspace disease which could be due to multifocal pneumonia or pulmonary edema. Trace pleural effusions are also suspected. Electronically Signed: Justus Goldsmith MD  1/14/2025 10:50 AM EST  Workstation ID: [id number]    CT Angiogram Chest Pulmonary Embolism    Result Date: 1/13/2025  Impression: Impression: 1.No evidence of acute pulmonary embolism. 2.Extensive patchy opacities and groundglass opacities noted throughout the lungs bilaterally, left greater than right. There is relative sparing of the right upper lobe. There is superimposed reticulations, interstitial thickening and traction bronchiectasis noted within the peripheral aspect of the lungs. These findings are highly concerning for an infectious/inflammatory process superimposed upon chronic interstitial lung disease. Please consider follow-up with chest CT in 3 months after resolution of symptoms to exclude underlying pulmonary lesions 3.Multiple enlarged mediastinal and perihilar lymph nodes, likely reactive. 4.Mild enlargement of the central pulmonary arteries, a finding which can be seen in the setting of pulmonary hypertension. Electronically Signed: Malcolm Moss DO  1/13/2025 5:01 PM EST  Workstation ID: [id number]     Results for orders placed during the hospital encounter of 04/04/24    Doppler ankle brachial index single level CAR    Interpretation Summary    Right Conclusion: The right CHIOMA is normal. Mild digital ischemia.    Left Conclusion: The left CHIOMA is normal. Moderate digital ischemia.      Results for orders placed during the hospital encounter  "of 04/04/24    Doppler ankle brachial index single level CAR    Interpretation Summary    Right Conclusion: The right CHIOMA is normal. Mild digital ischemia.    Left Conclusion: The left CHIOMA is normal. Moderate digital ischemia.      Results for orders placed during the hospital encounter of 01/13/25    Adult Transthoracic Echo Complete W/ Cont if Necessary Per Protocol    Interpretation Summary    Left ventricular systolic function is normal. Left ventricular ejection fraction appears to be 61 - 65%.    Left ventricular diastolic function is consistent with (grade I) impaired relaxation.    There is calcification of the aortic valve.    Estimated right ventricular systolic pressure from tricuspid regurgitation is markedly elevated (>55 mmHg).      Labs Pending at Discharge:  Pending Results       Procedure [Order ID] Specimen - Date/Time    Respiratory Culture - Sputum, Cough [[id number]]     Specimen: Sputum from Cough             Procedures Performed  Procedure(s):  BRONCHOSCOPY AT BEDSIDE         Consults:   Consults       Date and Time Order Name Status Description    1/17/2025  5:23 PM Inpatient Hospitalist Consult Completed     1/14/2025 12:08 PM Inpatient Gastroenterology Consult Completed     1/14/2025 11:45 AM Inpatient Nephrology Consult Completed     1/14/2025  9:27 AM Inpatient Nephrology Consult Completed     1/14/2025  7:56 AM Inpatient Endocrinology Consult Completed     1/14/2025  1:59 AM Inpatient Cardiology Consult Completed     1/13/2025 10:58 PM Inpatient Pulmonology Consult Completed               Discharge Details        Discharge Medications        Continue These Medications        Instructions Start Date   Accu-Chek Irene Plus w/Device kit   1 each, Not Applicable, 4 Times Daily      BD ULTRA-FINE PEN NEEDLES 29G X 12.7MM misc  Generic drug: Insulin Pen Needle   Use to inject insulin twice daily. DX E11.65      Insulin Syringe-Needle U-100 31G X 5/16\" 0.5 ML misc  Commonly known as: BD Insulin " "Syringe U/F   1 each, Other, 2 Times Daily      Insulin Syringe-Needle U-100 30G X 1/2\" 0.5 ML misc   1 each by Other route 2 (Two) Times a Day.             ASK your doctor about these medications        Instructions Start Date   Accu-Chek Irene Plus test strip  Generic drug: glucose blood   USE TO CHECK BLOOD SUGAR BEFORE MEALS AND AT BEDTIME      amLODIPine 5 MG tablet  Commonly known as: NORVASC   5 mg, Oral, Daily      atenolol 50 MG tablet  Commonly known as: TENORMIN   50 mg, Oral, Daily      atorvastatin 80 MG tablet  Commonly known as: LIPITOR   TAKE 1 TABLET BY MOUTH AT NIGHT  FOR HIGH AMOUNT OF FATS IN THE  BLOOD      B12-ACTIVE PO   Take  by mouth.      clopidogrel 75 MG tablet  Commonly known as: PLAVIX   75 mg, Oral, Daily      fenofibrate 54 MG tablet  Commonly known as: TRICOR   TAKE 1 TABLET BY MOUTH DAILY FOR HIGH AMOUNT OF TRIGLYCERIDES IN  THE BLOOD      ferrous sulfate 325 (65 FE) MG tablet   325 mg, 2 Times Daily      fish oil 1000 MG capsule capsule   1,000 mg, 2 Times Daily      gabapentin 600 MG tablet  Commonly known as: NEURONTIN   600 mg, Oral, 3 Times Daily      HUMULIN R 500 UNIT/ML CONCENTRATED injection  Generic drug: insulin regular   DRAW TO 30 UNIT REI ON U-100 SYRINGE AND INJECT IN THE MORNING AND 40 UNIT REI IN THE EVENING Indications: Type 2 Diabetes      hydroCHLOROthiazide 25 MG tablet   25 mg, Oral, Daily      Ibuprofen 3 %, Gabapentin 10 %, Baclofen 2 %, lidocaine 4 %   1-2 g, Topical, 3 to 4 Times Daily      irbesartan 300 MG tablet  Commonly known as: AVAPRO   TAKE 1 TABLET BY MOUTH DAILY      isosorbide mononitrate 30 MG 24 hr tablet  Commonly known as: IMDUR   30 mg, Oral, Daily      Jardiance 25 MG tablet tablet  Generic drug: empagliflozin   25 mg, Oral, Daily      levothyroxine 175 MCG tablet  Commonly known as: SYNTHROID, LEVOTHROID   TAKE 1 TABLET BY MOUTH DAILY FOR UNDERACTIVE THYROID      metFORMIN  MG 24 hr tablet  Commonly known as: GLUCOPHAGE-XR   TAKE 4 " TABLETS BY MOUTH AT  SUPPER FOR TYPE 2 DIABETES      O2  Commonly known as: OXYGEN   1 L/min, Nightly      SymlinPen 120 2700 MCG/2.7ML solution pen-injector  Generic drug: Pramlintide Acetate   INJECT 120 MCG SUBCUTANEOUSLY  INTO THE APPROPRIATE AREA AS  DIRECTED 3 TIMES DAILY      urea 40 % cream  Commonly known as: CARMOL   1 application , Topical, 2 Times Daily, Apply twice daily to calluses. Add vitamin D 5,000 units      vitamin B-12 100 MCG tablet  Commonly known as: CYANOCOBALAMIN   1 tablet, Daily      vitamin D 1.25 MG (56137 UT) capsule capsule  Commonly known as: ERGOCALCIFEROL   TAKE 1 CAPSULE BY MOUTH ONCE  WEEKLY FOR VITAMIN D DEFICIENCY      warfarin 2 MG tablet  Commonly known as: COUMADIN  Ask about: Which instructions should I use?   TAKE 1 TABLET BY MOUTH DAILY OR  AS DIRECTED BASED ON INR      warfarin 5 MG tablet  Commonly known as: COUMADIN  Ask about: Which instructions should I use?   7 mg (5 mg x 1 and 1 mg x 2) every Tue, Thu; 8 mg (5 mg x 1 and 1 mg x 3) all other days               No Known Allergies      Discharge Disposition: Patient       Diet:  Hospital:  Diet Order   Procedures    NPO Diet NPO Type: Sips with Meds, Ice Chips         Discharge Activity:         CODE STATUS:  Code Status and Medical Interventions: No CPR (Do Not Attempt to Resuscitate); Limited Support; No intubation (DNI)   Ordered at: 25 1347     Medical Intervention Limits:    No intubation (DNI)     Code Status (Patient has no pulse and is not breathing):    No CPR (Do Not Attempt to Resuscitate)     Medical Interventions (Patient has pulse or is breathing):    Limited Support         Future Appointments   Date Time Provider Department Center   2025 11:20 AM Jelani Vieira MD [facility] None   3/4/2025 11:15 AM LAB [facility] ONC LAB NA  [facility]   3/11/2025 11:40 AM [facility] ONLY ONC LAB NA [facility]   3/11/2025 11:45 AM Nate Hart MD [facility] ONC NA [facility]   3/12/2025 11:30 AM Edwar Ackerman  T, MD [facility] CAR NA P [facility] NA   4/10/2025 12:00 PM [facility] VASC MACHINE 4 [facility] CARDI [facility]   4/10/2025 12:45 PM Zara Osei APRN [facility] VS [facility]   6/5/2025 10:45 AM Joan Johns APRN [facility] PC STATE [facility]   7/1/2025 12:30 PM Triny Cee APRN [facility] POD NA [facility]   10/1/2025 12:30 PM Virginia Shaver MD [facility] END NA [facility]   4/8/2026 12:30 PM Virginia Shaver MD [facility] END NA [facility]           Time spent on Discharge including face to face service:  35 minutes    Signature: Electronically signed by Arnaldo Mccall MD, 01/18/25, 11:20 EST.  [facility] Hospitalist Team

## 2025-01-18 NOTE — PLAN OF CARE
Goal Outcome Evaluation:                         Pt  at 0829. Pts family was notified of change in status due to lowing O2 saturation into the 70's that were withstanding for the last hour up to shift change. Family agreed to come in and visit pt to talk about next step for pt care based off the pts wishes. Attending was notified and  agreed palliative care was the next step. Pts family arrived at 0755, at that point the pt was becoming non responsive. Family agreed comfort care was the next best steps. Attending was then notified. Pt was rapidly declining at that point. Pt then shortly passed away.

## 2025-01-18 NOTE — PROGRESS NOTES
Daily Progress Note        Multifocal pneumonia    Acute respiratory failure with hypoxia    Assessment:     Acute hypoxic respiratory insufficiency      Extensive multiple groundglass opacities   Respiratory panel negative  Urine strep antigen negative, MRSA screen positive    No PE     Negative WATSON and ANCA    sleep apnea,ResMed  air curve 10 V auto compliance 100%, average use 6 hours 44 minutes, residual AHI 0.3  patient is compliant and benefiting from therapy     2D echo no evidence of pulmonary hypertension  Essential hypertension (Primary)  Mixed hyperlipidemia  Diastolic dysfunction with chronic heart failure (HCC)  Coronary artery disease involving native coronary artery of native heart without angina pectoris  Peripheral vascular disease (HCC)  Thrombophilia (HCC)  Acquired hypothyroidism  Type 2 diabetes mellitus with diabetic neuropathy, with long-term current use of insulin (HCC)     Recommendations:     I went to see the patient and discuss with him bronchoscopy and other immunosuppressive therapy, I found that he passed, discussed with nursing staff details about his hospital course            Chest x-ray 1/17/2025          LOS: 5 days     Subjective         Objective     Vital signs for last 24 hours:  Vitals:    01/18/25 0500 01/18/25 0600 01/18/25 0800 01/18/25 0814   BP:  174/75 98/42 (!) 71/38   Pulse: 118 108 80 65   Resp:       Temp:       TempSrc:       SpO2: (!) 80% (!) 80% (!) 74% (!) 69%   Weight:       Height:           Intake/Output last 3 shifts:  I/O last 3 completed shifts:  In: 2404.8 [P.O.:720; I.V.:1684.8]  Out: 2705 [Urine:2705]  Intake/Output this shift:  No intake/output data recorded.      Radiology  Imaging Results (Last 24 Hours)       ** No results found for the last 24 hours. **            Labs:  Results from last 7 days   Lab Units 01/18/25  0502   WBC 10*3/mm3 23.86*   HEMOGLOBIN g/dL 14.2   HEMATOCRIT % 44.9   PLATELETS 10*3/mm3 198     Results from last 7 days   Lab  Units 01/18/25  0502   SODIUM mmol/L 141   POTASSIUM mmol/L 4.5   CHLORIDE mmol/L 99   CO2 mmol/L 17.4*   BUN mg/dL 58*   CREATININE mg/dL 1.34*   CALCIUM mg/dL 8.9   BILIRUBIN mg/dL 0.9   ALK PHOS U/L 144*   ALT (SGPT) U/L 25   AST (SGOT) U/L 42*   GLUCOSE mg/dL 221*     Results from last 7 days   Lab Units 01/17/25  0734   PH, ARTERIAL pH units 7.499*   PO2 ART mm Hg 57.8*   PCO2, ARTERIAL mm Hg 29.3*   HCO3 ART mmol/L 22.8     Results from last 7 days   Lab Units 01/18/25  0502 01/17/25  0501 01/16/25  0450   ALBUMIN g/dL 3.2* 2.9* 3.6     Results from last 7 days   Lab Units 01/15/25  1511 01/14/25  0058 01/13/25  1727   HSTROP T ng/L 54* 128* 101*     Results from last 7 days   Lab Units 01/14/25  1238   WATSON  Negative     Results from last 7 days   Lab Units 01/16/25  0450   MAGNESIUM mg/dL 2.6*     Results from last 7 days   Lab Units 01/18/25  0502 01/17/25  0501 01/16/25  0450   INR  1.73* 1.87* 2.80               Meds:   SCHEDULE  [Held by provider] amLODIPine, 5 mg, Oral, Daily  atorvastatin, 80 mg, Oral, Nightly  budesonide, 0.5 mg, Nebulization, BID - RT  cefepime, 2,000 mg, Intravenous, Q12H  clopidogrel, 75 mg, Oral, Daily  [Held by provider] empagliflozin, 25 mg, Oral, Daily  enoxaparin, 1 mg/kg, Subcutaneous, Q12H  fenofibrate, 48 mg, Oral, Daily  ferrous sulfate, 325 mg, Oral, BID  gabapentin, 600 mg, Oral, Q8H  hydrocortisone sodium succinate, 50 mg, Intravenous, Q6H  insulin lispro, 3-14 Units, Subcutaneous, 4x Daily AC & at Bedtime  ipratropium-albuterol, 3 mL, Nebulization, Q4H - RT  [Held by provider] isosorbide mononitrate, 60 mg, Oral, Daily  levothyroxine, 175 mcg, Oral, Q AM  [Held by provider] losartan, 100 mg, Oral, Q24H  metoprolol tartrate, 25 mg, Oral, Q8H  [Held by provider] midodrine, 10 mg, Oral, Q8H  Morphine, 1 mg, Intravenous, Once  pantoprazole, 40 mg, Intravenous, Q AM  sodium chloride, 10 mL, Intravenous, Q12H  vancomycin, 1,750 mg, Intravenous, Q24H  vitamin B-12, 250 mcg,  Oral, Daily  [Held by provider] warfarin, 7 mg, Oral, Once per day on Tuesday Thursday  [Held by provider] warfarin, 8 mg, Oral, Once per day on Sunday Monday Wednesday Friday Saturday      Infusions  amiodarone, 0.5 mg/min, Last Rate: 0.5 mg/min (01/1940)  Pharmacy to Dose enoxaparin (LOVENOX),   Pharmacy to dose vancomycin,       PRNs    aluminum-magnesium hydroxide-simethicone    senna-docusate sodium **AND** polyethylene glycol **AND** bisacodyl **AND** bisacodyl    Calcium Replacement - Follow Nurse / BPA Driven Protocol    Magnesium Standard Dose Replacement - Follow Nurse / BPA Driven Protocol    [Held by provider] melatonin    metoprolol tartrate    ondansetron ODT **OR** ondansetron    Pharmacy to Dose enoxaparin (LOVENOX)    Pharmacy to dose vancomycin    Phosphorus Replacement - Follow Nurse / BPA Driven Protocol    Potassium Replacement - Follow Nurse / BPA Driven Protocol    sodium chloride    sodium chloride    Physical Exam:  Physical Exam  Cardiovascular:      Heart sounds: Murmur heard.      No gallop.   Pulmonary:      Effort: No respiratory distress.      Breath sounds: No stridor. Rhonchi and rales present. No wheezing.   Chest:      Chest wall: No tenderness.         ROS  Review of Systems   Respiratory:  Positive for cough and shortness of breath. Negative for wheezing and stridor.    Cardiovascular:  Positive for palpitations and leg swelling. Negative for chest pain.             Total time spent with patient greater than: 45 Minutes

## 2025-01-19 RX ORDER — CLOPIDOGREL BISULFATE 75 MG/1
75 TABLET ORAL DAILY
Qty: 90 TABLET | Refills: 3 | OUTPATIENT
Start: 2025-01-19

## 2025-01-19 NOTE — CASE MANAGEMENT/SOCIAL WORK
Case Management Discharge Note      Final Note: Pt .         Selected Continued Care - Discharged on 2025 Admission date: 2025 - Discharge disposition:           Final Discharge Disposition Code: 20 -

## 2025-01-24 NOTE — PROGRESS NOTES
"Enter Query Response Below      Query Response: Acute hypoxic respiratory failure due to pneumonia              If applicable, please update the problem list.   Patient: Dalton Dallas Sr. \"Francisco\"        : 1940  Account: [id number]           Admit Date: 2025        How to Respond to this query:       a. Click New Note     b. Answer query within the yellow box.                c. Update the Problem List, if applicable.      If you have any questions about this query contact me at: [email or web address]  [phone]      Dr. Lutzid:    84 y F, \"He was only given 1L IVFs for sepsis due to underlying CHF and elevated BNP. He was admitted for further treatment of acute respiratory failure with hypoxia, multifocal pneumonia, pulmonary fibrosis, mediastinal lymphadenopathy.\" (Discharge summary - HPI). Hospital course included, \" Patient was transferred to ICU on 1/15/2025 due to worsening hypoxic respiratory failure requiring AVAPS maxed at 100%...  Patient continues to be on vancomycin and cefepime for multifocal pneumonia.\"     Progress note () included, \"NSTEMI type II and CASIE on CKD 3 due to cardiorenal syndrome plus diabetic glumerelo nephropathy, hypertensive nephrosclerosis plus ATN caused by hypoxia.\" Nephrology consult documentation included, \"CASIE.\" Cardiology consult documentation included, \"Elevated troponin, likely demand ischemia in setting of hypoxia and CASIE.\"    Please clarify if patient treated/monitored for the following:     Severe sepsis causing acute organ failure, acute respiratory failure with hypoxia, CASIE/ATN  Acute hypoxic respiratory failure due to pneumonia   CASIE/ATN due to cardiorenal syndrome   Other (please specify) ___________  Unable to determine    By submitting this query, we are merely seeking further clarification of documentation to accurately reflect all conditions that you are monitoring, evaluating, treating or that extend the hospitalization or utilize " additional resources of care. Please utilize your independent clinical judgment when addressing the question(s) above.     This query and your response, once completed, will be entered into the legal medical record.    Sincerely,    GUILLE Bal, RN, [facility]  Clinical Documentation Integrity Program

## 2025-01-24 NOTE — PROGRESS NOTES
"Enter Query Response Below      Query Response: Bacterial pneumonia unspecified              If applicable, please update the problem list.   Patient: Dalton Dallas Sr. \"Francisco\"        : 1940  Account: [id number]           Admit Date: 2025        How to Respond to this query:       a. Click New Note     b. Answer query within the yellow box.                c. Update the Problem List, if applicable.      If you have any questions about this query contact me at: [email or web address]  [phone]      Dr. Mccall:    Risk Factors: 84 y M with \"PMH HFpEF, DM type II, HAN\" admitted with \"sepsis, acute respiratory failure with hypoxia, multifocal pneumonia, pulmonary fibrosis,\" per discharge summary (). \"CKD 3,\" noted on H&P ().     Clinical Indicators and Treatments:  (ED note ): \"increasing respiratory distress x3 days, fever, chills, cough, productive of green sputum.\"  Labs: () MRSA detected   H&P included, \"CT PE protocol: \"...extensive patchy opacities and groundglass opacities noted throughout the lungs bilaterally, left greater than right. There is relative sparing of the right upper lobe. There is superimposed reticulations, interstitial thickening and traction bronchiectasis noted within the peripheral aspect of the lungs.\"  MAR: IV Cefepime (-), IV Vancomycin (01/15-)    Please clarify the type of pneumonia the patient was treated/monitored for:    Gram negative pneumonia (excluding Haemophilus influenzae)  MRSA pneumonia  Bacterial pneumonia unspecified  Other- specify ______    By submitting this query, we are merely seeking further clarification of documentation to accurately reflect all conditions that you are monitoring, evaluating, treating or that extend the hospitalization or utilize additional resources of care. Please utilize your independent clinical judgment when addressing the question(s) above.     This query and your response, once completed, " will be entered into the legal medical record.    Sincerely,    GUILLE Bal, RN, CCDS  Clinical Documentation Integrity Program

## 2025-02-03 RX ORDER — CLOPIDOGREL BISULFATE 75 MG/1
75 TABLET ORAL DAILY
Qty: 90 TABLET | Refills: 3 | OUTPATIENT
Start: 2025-02-03

## 2025-02-07 DIAGNOSIS — Z79.01 LONG TERM (CURRENT) USE OF ANTICOAGULANTS: ICD-10-CM

## 2025-02-07 DIAGNOSIS — I82.409 DEEP VEIN THROMBOSIS (DVT) OF LOWER EXTREMITY, UNSPECIFIED CHRONICITY, UNSPECIFIED LATERALITY, UNSPECIFIED VEIN: ICD-10-CM

## 2025-02-07 RX ORDER — WARFARIN SODIUM 3 MG/1
TABLET ORAL
Qty: 90 TABLET | Refills: 3 | OUTPATIENT
Start: 2025-02-07

## 2025-05-23 NOTE — PROGRESS NOTES
[facility] INPATIENT ENCOUNTER  PHYSICAL MEDICINE AND REHABILITATION  DAILY PROGRESS NOTE    ADMISSION DATE:  5/19/2025  DATE:  5/23/2025  CURRENT HOSPITAL DAY:  Hospital Day: 5  ATTENDING PHYSICIAN:  Luna Cooper MD  CODE STATUS:  Selective Treatment/DNR    CHIEF COMPLAINT:  <principal problem not specified>    HISTORY:  Jonathon Sherman is a 72 year old male patient admitted with Debility [R53.81]       INTERVAL HISTORY:      05/23/25 : Patient seen and examined in room.  Mild nausea last night responded to Zofran did not vomit.  Tolerating feeds today no nausea.  Has not had a bowel movement.  Amenable to MiraLAX and on discussion later with patient's wife informed that patient has been on Linzess at home due to failure of other medications in the setting of Parkinson's.  Will add back Linzess which was started in acute hospitalization but stopped due to diarrhea.  Did have shower this a.m., no lightheadedness.    Taken off of oxygen this a.m. and no shortness of breath.  Is concerned about urination given he had retention in the past with midodrine, PVRs are being checked and are greater than 150 cc.  Also urine slightly red-tinged.  Prolonged discussion with wife in the afternoon regarding plan for constipation, nausea including scheduling Zofran, improving sleep with external catheter, plan for taping of phosphorus supplementation, plan for checking urine given red tinge, and that patient overall has been feeling down/depressed would like to defer medication for now but are interested in being regularly seen by neuropsychology.    Functionally patient today with no complaints of lightheadedness with therapy but did have orthostatic hypotension.  Was able to tolerate therapy without supplemental oxygen.  Ambulated 30 feet with min assist and rolling walker.  BP down to 62/38 standing after ambulation with abdominal binder.  Did have nausea afterward and was assisted into bed.    MEDICATIONS:    Current  The ABCs of the Annual Wellness Visit  Subsequent Medicare Wellness Visit    Chief Complaint   Patient presents with   • Medicare Wellness-subsequent   • Diabetes     3 month followup   • Hypothyroidism       Subjective   History of Present Illness:  Dalton Dallas Sr. is a 80 y.o. male who presents for a Subsequent Medicare Wellness Visit.    HEALTH RISK ASSESSMENT    Recent Hospitalizations:  No hospitalization(s) within the last year.    Current Medical Providers:  Patient Care Team:  Gabino Sibley Jr., MD as PCP - General (Family Medicine)    Smoking Status:  Social History     Tobacco Use   Smoking Status Former Smoker   Smokeless Tobacco Never Used   Tobacco Comment    quit 1977       Alcohol Consumption:  Social History     Substance and Sexual Activity   Alcohol Use No   • Frequency: Never       Depression Screen:   PHQ-2/PHQ-9 Depression Screening 1/27/2021   Little interest or pleasure in doing things 0   Feeling down, depressed, or hopeless 0   Trouble falling or staying asleep, or sleeping too much -   Feeling tired or having little energy -   Poor appetite or overeating -   Feeling bad about yourself - or that you are a failure or have let yourself or your family down -   Trouble concentrating on things, such as reading the newspaper or watching television -   Moving or speaking so slowly that other people could have noticed. Or the opposite - being so fidgety or restless that you have been moving around a lot more than usual -   Thoughts that you would be better off dead, or of hurting yourself in some way -   Total Score 0       Fall Risk Screen:  STEADI Fall Risk Assessment has not been completed.    Health Habits and Functional and Cognitive Screening:  Functional & Cognitive Status 1/27/2021   Do you have difficulty preparing food and eating? No   Do you have difficulty bathing yourself, getting dressed or grooming yourself? No   Do you have difficulty using the toilet? No   Do you have  Facility-Administered Medications   Medication Dose Route Frequency Provider Last Rate Last Admin    fludrocortisone (FLORINEF) tablet 0.05 mg  0.05 mg Oral Daily Titi Harper DO   0.05 mg at 05/23/25 0816    midodrine (PROAMATINE) tablet 10 mg  10 mg Per G Tube TID AC Luna Cooper MD   10 mg at 05/23/25 0623    [Held by provider] finasteride (PROSCAR) tablet 5 mg  5 mg Oral Daily Kenna Stock MD        amantadine (SYMMETREL) 50 MG/5ML solution 100 mg  100 mg Per G Tube 2 times per day Kenna Stock MD   100 mg at 05/23/25 0816    AMIODarone (PACERONE) tablet 200 mg  200 mg Per G Tube Daily Kenna Stock MD   200 mg at 05/23/25 0816    carbidopa-levodopa (SINEMET)  MG per tablet 1.5 tablet  1.5 tablet Per G Tube TID Kenna Stock MD   1.5 tablet at 05/23/25 0816    enoxaparin (LOVENOX) injection 40 mg  40 mg Subcutaneous Nightly Kenna Stock MD   40 mg at 05/22/25 2254    levothyroxine (SYNTHROID, LEVOTHROID) tablet 100 mcg  100 mcg Per G Tube QAM AC Kenna Stock MD   100 mcg at 05/23/25 0623    pantoprazole (PROTONIX) 40 MG/20ML (compounded) suspension 40 mg  40 mg Per G Tube QAM AC Kenna Stokc MD   40 mg at 05/23/25 0623    potassium phosphate/sodium phosphate (K PHOS NEUTRAL) tablet 500 mg  2 tablet Per G Tube BID Kenna Stock MD   500 mg at 05/23/25 0816    sodium chloride 0.9 % injection 2 mL  2 mL Intracatheter 2 times per day Kenna Stock MD   2 mL at 05/23/25 0817           HISTORIES:     Allergies, Medications, Medical History, Surgical History, Social History and Family History were reviewed and updated.  ALLERGIES:  No Known Allergies      REVIEW OF SYSTEMS:  See Interval History Above      OBJECTIVE:    VITAL SIGNS:     Vitals:    05/23/25 0601 05/23/25 0815 05/23/25 0817 05/23/25 0912   BP: 106/62 135/75     Pulse:  difficulty moving around from place to place? No   Do you have trouble with steps or getting out of a bed or a chair? No   Current Diet Well Balanced Diet   Dental Exam Up to date   Eye Exam Up to date   Exercise (times per week) 0 times per week   Current Exercise Activities Include None   Do you need help using the phone?  No   Are you deaf or do you have serious difficulty hearing?  No   Do you need help with transportation? No   Do you need help shopping? No   Do you need help preparing meals?  No   Do you need help with housework?  No   Do you need help with laundry? No   Do you need help taking your medications? No   Do you need help managing money? No   Do you ever drive or ride in a car without wearing a seat belt? No   Have you felt unusual stress, anger or loneliness in the last month? No   Who do you live with? Spouse   If you need help, do you have trouble finding someone available to you? No   Have you been bothered in the last four weeks by sexual problems? No   Do you have difficulty concentrating, remembering or making decisions? No         Does the patient have evidence of cognitive impairment? No    Asprin use counseling:Does not need ASA (and currently is not on it)    Age-appropriate Screening Schedule:  Refer to the list below for future screening recommendations based on patient's age, sex and/or medical conditions. Orders for these recommended tests are listed in the plan section. The patient has been provided with a written plan.    Health Maintenance   Topic Date Due   • ZOSTER VACCINE (2 of 3) 04/22/2021 (Originally 12/20/2016)   • DIABETIC EYE EXAM  02/25/2021   • HEMOGLOBIN A1C  04/28/2021   • LIPID PANEL  05/06/2021   • URINE MICROALBUMIN  05/06/2021   • COLONOSCOPY  11/23/2029   • INFLUENZA VACCINE  Completed   • TDAP/TD VACCINES  Discontinued          The following portions of the patient's history were reviewed and updated as appropriate: allergies, current medications, past family  "history, past medical history, past social history, past surgical history and problem list.    Outpatient Medications Prior to Visit   Medication Sig Dispense Refill   • Accu-Chek Irene Plus test strip USE TO CHECK BLOOD SUGAR BEFORE MEALS AND AT BEDTIME 400 each 2   • amLODIPine-atorvastatin (CADUET) 5-20 MG per tablet Take 1 tablet by mouth Daily. 90 tablet 2   • atenolol (TENORMIN) 50 MG tablet TAKE 1 TABLET DAILY 90 tablet 4   • clopidogrel (PLAVIX) 75 MG tablet TAKE 1 TABLET DAILY 90 tablet 3   • Empagliflozin (Jardiance) 10 MG tablet Take 10 mg by mouth Daily. 90 tablet 3   • fenofibrate (TRICOR) 54 MG tablet TAKE 1 TABLET DAILY 90 tablet 4   • ferrous sulfate 325 (65 FE) MG tablet Take 325 mg by mouth 2 (Two) Times a Day.     • Fluzone High-Dose Quadrivalent 0.7 ML suspension prefilled syringe ADM 0.7ML IM UTD     • gabapentin (NEURONTIN) 100 MG capsule Take 1 capsule by mouth 3 (Three) Times a Day. 270 capsule 1   • hydroCHLOROthiazide (HYDRODIURIL) 25 MG tablet TAKE 1 TABLET DAILY 90 tablet 4   • Insulin Pen Needle (BD ULTRA-FINE PEN NEEDLES) 29G X 12.7MM misc Use with insulin injections three times daily 300 each 3   • insulin regular (HUMULIN R) 500 UNIT/ML CONCENTRATED injection Inject 24 units in the AM and 38 units in the PM 10 mL 0   • Insulin Syringe 30G X 1/2\" 0.5 ML misc Use to inject insulin 2 times daily DX E11.65 200 each 3   • Insulin Syringe-Needle U-100 (BD Insulin Syringe Ultrafine) 31G X 5/16\" 0.5 ML misc Use 1 new syringe with each dose of insulin twice a day. 200 200 each 3   • irbesartan (AVAPRO) 300 MG tablet TAKE 1 TABLET DAILY 90 tablet 3   • levothyroxine (SYNTHROID, LEVOTHROID) 175 MCG tablet TAKE 1 TABLET DAILY 90 tablet 3   • metFORMIN ER (GLUCOPHAGE-XR) 500 MG 24 hr tablet TAKE 4 TABLETS AT SUPPER 360 tablet 4   • Omega-3 Fatty Acids (FISH OIL) 1000 MG capsule capsule Take 1,000 mg by mouth 2 (Two) Times a Day.     • Pramlintide Acetate 2700 MCG/2.7ML solution pen-injector Inject " 67 68 70 77   Resp: 16      Temp: 97.7 °F (36.5 °C)      TempSrc: Oral      SpO2: 100% 96% 97% 94%   Weight:       Height:             Bowel: Stool Amount: Unable to assess (05/22/25 0635)  Unmeasured Stool Occurrence: 1 (incont in diaper) (05/22/25 0635)     PVR: Bladder Scan  Reason for Scan: Post void evaluation (05/23/25 0353)  Bladder Scanned Volume (mL): 163 mL (05/23/25 0353)    PHYSICAL EXAMINATION:    On room air today lungs are clear  Heart rate regular  Abdomen soft positive bowel sounds  Mild tremor intermittently at baseline  Flat affect      LABORATORY DATA:    Recent Labs   Lab 05/22/25  0623 05/20/25  0600 05/19/25  0700 05/18/25  0805   WBC 8.7 6.6 8.6 7.8   RBC 3.13* 3.10* 3.13* 2.94*   HGB 9.9* 9.8* 9.9* 9.4*   HCT 30.4* 30.4* 31.1* 29.6*   MCV 97.1 98.1 99.4 100.7*   MCH 31.6 31.6 31.6 32.0   MCHC 32.6 32.2 31.8* 31.8*   RDWCV 14.9 15.5* 15.6* 16.0*    449 427 346   TLYMPH  --  18 13 13       Recent Labs   Lab 05/23/25  0540 05/22/25  0623 05/20/25  0600   SODIUM 135 135 137   CHLORIDE 98 99 99   BUN 17 18 23*   POTASSIUM 3.9 3.7 4.1   GLUCOSE 104* 110* 98   CREATININE 0.82 0.80 0.88   CALCIUM 8.9 8.9 9.1       Recent Labs   Lab 05/22/25  1157 05/22/25  0615 05/22/25  0014 05/21/25  1802 05/21/25  1147 05/21/25  0608 05/21/25  0006 05/20/25  1838   GLUB 115* 116* 115* 85 113* 120* 108* 100*       No results found    IMAGING STUDIES:   No orders to display         ASSESSMENT/PLAN:     Debility  Status post shock-resolved  Off of vasopressors  - PT/OT     Acute hypoxic respiratory failure  Aspiration pneumonia requiring intubation   Pulmonary edema  - s/p zosyn x 7 days   - Wean off oxygen to maintain saturation greater than 92  - IS  - 5/20: on 2LNC, improving, continue to wean as able. High risk for aspiration   5/21: Is on 2 L nasal cannula overnight and given desats with activity also maintain 2 L nasal cannula during the day  5/22: Seen by pulmonary, continue to wean oxygen, continue to  120 mcg under the skin into the appropriate area as directed 3 (Three) Times a Day. 32.4 mL 4   • vitamin B-12 (CYANOCOBALAMIN) 100 MCG tablet Take 100 mcg by mouth Daily.     • vitamin D (ERGOCALCIFEROL) 1.25 MG (16308 UT) capsule capsule TAKE 1 CAPSULE ONCE A WEEK 12 capsule 3   • warfarin (COUMADIN) 1 MG tablet Take 1 tablet by mouth at 1700 or as directed 30 tablet 1   • warfarin (COUMADIN) 3 MG tablet TAKE 1 TABLET DAILY 90 tablet 3   • warfarin (COUMADIN) 5 MG tablet Take 1 tablet by mouth Take As Directed. 90 tablet 3     No facility-administered medications prior to visit.        Patient Active Problem List   Diagnosis   • Coronary artery disease involving native coronary artery of native heart without angina pectoris   • Essential hypertension   • Mixed hyperlipidemia   • Deep vein thrombosis (DVT) (CMS/[facility])   • Eczema   • Hypothyroidism   • Body mass index (BMI) of 45.0-49.9 in adult (CMS/HCC)   • Sleep apnea   • Type 2 diabetes mellitus with diabetic neuropathy (CMS/HCC)   • Vitamin D deficiency   • Peripheral vascular disease (CMS/HCC)   • Chronic pulmonary embolism without acute cor pulmonale (CMS/HCC)   • Diastolic dysfunction with chronic heart failure (CMS/HCC)   • Pulmonary hypertension (CMS/HCC)   • Lumbar radiculopathy   • Thrombophilia (CMS/[facility])       Advanced Care Planning:  ACP discussion was held with the patient during this visit. Patient has an advance directive in EMR which is still valid.     Review of Systems    Compared to one year ago, the patient feels his physical health is the same.  Compared to one year ago, the patient feels his mental health is the same.    Reviewed chart for potential of high risk medication in the elderly: yes  Reviewed chart for potential of harmful drug interactions in the elderly:yes    Objective         Vitals:    01/27/21 1322   BP: 144/77   BP Location: Left arm   Patient Position: Sitting   Cuff Size: Large Adult   Pulse: 70   Resp: 18   Temp: 96.9 °F  follow chest x-ray.  Discussed with RN attempted to wean to 1 L nasal cannula, patient did not feel short of breath but saturation did drop, maintain 2 L nasal cannula for now  5/23: Off of O2 this morning pending activity and O2 monitoring.  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 3 times daily  Flomax was held and patient was restarted on finasteride on acute floor which is on hold  - 5/19: received IVF on acute floor   - 5/20: BP low 65/38 overnight supine, improved to 114/64 this AM but dropped to 64/45 with OT when standing to transfer today, dizziness resolved with sitting. Has jalens and binder - reached out to IM - will repeat 500 cc NS bolus and increase midodrine to 10 mg TID   5/21: Improving blood pressures per OT, will continue to monitor on increased dose of midodrine 10.  Did have positive orthostasis this a.m. during orthostatic check down to 84/56.  Discussed with internal medicine to give small bolus today,   5/22: Patient continues to have orthostatic hypotension despite fluid bolus and midodrine, per internal medicine start fludrocortisone 0.05 mg daily and monitor potassium, today 3.7.  5/23: Still orthostatic yesterday afternoon PT down to 84/61.  On Florinef.  Also remains orthostatic today.  Florinef increased after discussion with internal medicine to 0.1 mg daily, monitor.  Monitoring potassium, today within normal at 3.9.  Continue to monitor.  BP  Min: 84/61  Max: 141/87       New onset atrial fibrillation  There were concerns for falls and bleeding risk   On oral amiodarone  Tubulation on hold given history orthostatic hypotension with recurrent falls and increased risk  "(36.1 °C)   TempSrc: Infrared   SpO2: 97%   Weight: 128 kg (283 lb)   Height: 177.8 cm (70\")       Body mass index is 40.61 kg/m².  Discussed the patient's BMI with him. The BMI is above average; BMI management plan is completed.    Physical Exam  Vitals signs and nursing note reviewed.   Constitutional:       Appearance: He is well-developed. He is obese.   HENT:      Head: Normocephalic and atraumatic.      Nose: Nose normal.      Mouth/Throat:      Mouth: Mucous membranes are moist.      Pharynx: Oropharynx is clear.   Eyes:      Extraocular Movements: Extraocular movements intact.      Conjunctiva/sclera: Conjunctivae normal.      Pupils: Pupils are equal, round, and reactive to light.   Neck:      Musculoskeletal: Normal range of motion and neck supple.   Cardiovascular:      Rate and Rhythm: Normal rate and regular rhythm.      Pulses: Normal pulses.      Heart sounds: Normal heart sounds.   Pulmonary:      Effort: Pulmonary effort is normal.      Breath sounds: Normal breath sounds.   Abdominal:      General: Bowel sounds are normal.      Palpations: Abdomen is soft.   Musculoskeletal: Normal range of motion.   Skin:     General: Skin is warm and dry.   Neurological:      Mental Status: He is alert and oriented to person, place, and time.   Psychiatric:         Behavior: Behavior normal.         Thought Content: Thought content normal.         Judgment: Judgment normal.               Assessment/Plan   Medicare Risks and Personalized Health Plan  CMS Preventative Services Quick Reference  Advance Directive Discussion  Immunizations Discussed/Encouraged (specific immunizations; Td, Influenza, Pneumococcal 23 and Shingrix )    The above risks/problems have been discussed with the patient.  Pertinent information has been shared with the patient in the After Visit Summary.  Follow up plans and orders are seen below in the Assessment/Plan Section.    Diagnoses and all orders for this visit:    1. Encounter for " of bleeding  Status post loop recording on 5/13/2025 patient did follow-up with EP clinic in the outpatient setting  Plan for follow up visit in EP clinic with Dr. Macias in 8 weeks.   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 There was deep laryngeal penetration that cleared with mildly thick liquids ingested via straw sips. SILENT aspiration was observed with thin liquids via teaspoon amounts. A chin tuck was attempted but was not fully effective in eliminating aspiration/penetration. Also noted, decreased opening of the UES which resulted in intermittent backflow of the bolus into the pharynx and resulted in penetration of residue x1. While aspiration of the back flow was not observed during this study, this does place the patient at increased risk for aspiration of reflux/backflow overtime.   - SLP recommends NPO, therapeutic trials of pureed solids and mildly thickened liquids with SLP only   - 5/20: currently TF running at 40 cc/hr, pt reports he became nauseous with increased rates, will d/w RD   5/21: discussed with team possibility of home with tube feeds, would likely attempt transition to bolus feeds for ease.  Today with sudden bout of nausea and vomiting after returning for exam, discussed with dietitian to decrease feed back from 55 cc/h to 40 cc/h to hold during therapies.   Consider GI consult if patient continues to be unable to tolerate consider J-tube  5/22: Continue 40 cc/hr, hold during therapies, also hold for low MAP if below 60-65.  Also constipation may be contributor but patient did have a BM this early a.m., will follow  5/23: Last night mild nausea responded to Zofran.  No vomiting.  Tube feeds still at 40 cc/h and on hold during therapy sessions.  Still pending true BM, yesterday's \"BM\" was smear.  Give suppository today if no BM.  Schedule Zofran at 6 AM and 12 PM with additional doses that can be given as needed.  Avoid Reglan given patient with Parkinson's.    Of note patient on kphos supplementation-5/23: Phosphorus checked today at 3.3, decrease supplementation from 500 to 250 mg twice daily.  Next check on Monday if phosphorus within normal limit DC supplementation and recheck on Thursday.     Diarrhea-resolved, now constipated  Resolved  GI pathogen negative, C. difficile negative  On Imodium as needed  - no BM yet on 6W  5/21: Started MiraLAX and Senokot as needed given bouts of nausea and vomiting today and no BM reported yet  5/22: Patient did have BM this a.m., continue as needed MiraLAX/Senokot  5/23: As per above BM was a loose smear yesterday, pending BM today, scheduled MiraLAX daily now and suppository if no BM this evening.  Also restarted patient's home Linzess 145 mcg daily in early a.m.     Bladder, urinary retention  Red-tinged urine  - had retained on other floor, midodrine was held but eventually resumed  - check PVRs here -- thus far 195 cc, 112 cc   - 5/20: increasing midodrine so monitor bladder closely  5/21: PVRs within normal.  Proscar on hold due to hypotension  5/23: Still retaining approximately 170 to 180 cc per void, discussed with IM if retaining greater than 250cc consider add back Proscar but continue to hold for now given hypotension.  Urine is red-tinged, after discussion with internal medicine will send urine sample to  annual wellness exam in Medicare patient (Primary)      Follow Up:  Return in about 6 months (around 7/27/2021) for Recheck.     An After Visit Summary and PPPS were given to the patient.              assess.  Bladder Scan  Reason for Scan: Post void evaluation (05/23/25 0353)  Bladder Scanned Volume (mL): 163 mL (05/23/25 0353)    Insomnia: Possibly due to frequency of urination in the evening and nighttime.  Maintain external catheter at night.    Depressed mood: Patient and wife declined medication but would like to follow with neuropsychology, last seen yesterday, will follow-up next week.    Impaired mobility and ADLs  - PT/OT     DVT prophylaxis: Lovenox prophylactic dosing (dose adjusted as per renal function)        Active Problems:    Impaired mobility and activities of daily living    Parkinson's disease without dyskinesia or fluctuating manifestations  (CMD)    Neurogenic orthostatic hypotension  (CMD)    Oropharyngeal dysphagia    Acute hypoxic respiratory failure  (CMD)    Paroxysmal atrial fibrillation  (CMD)      INTERDISCIPLINARY TEAM CONFERENCE 05/21/25    RN: Tube feedings at goal, orthostatics daily, CPAP at night but refuses still uses 2 L nasal cannula, PVR every shift, skin left chest is glued, no pain  SLP: Slums on eval 16/30 memory, problem-solving mod assist, social interaction expression attention min  N.p.o. trialing purées and mildly thick with SLP only  OT Limited by pressure, improving, mod to dependent for lower body/toileting, min for upper body  PT: Bed mobility min, transfers min/contact-guard, limited by pressure dropping to systolic 70s/80s with standing  CM: Lives with wife, was receiving intermittent assistance from her for ADLs at home  TDD: 6/4 to home  Question of whether or not tube feeds will be needed at home pending  Will plan for family instruction likely next week      05/23/25: Total time spent= 60 minutes.  Attending PM&R MD time includes Face to Face and discussion with patient, patient care, precharting/charting time, and chart review.  Chart review includes review of recent Hospitalist/consultant notes, recent therapy performance--PT, OT and SLP, discussion and  correspondence with nursing/therapy staff.  Recent labs reviewed.  Recent vital signs reviewed.      Jesus Manuel Steele MD

## (undated) DEVICE — PK ENDO GI 50

## (undated) DEVICE — PINNACLE INTRODUCER SHEATH: Brand: PINNACLE

## (undated) DEVICE — GW PTFE EMERALD HEPCOAT FC J TIP STD .035 3MM 150CM

## (undated) DEVICE — RADIFOCUS OBTURATOR: Brand: RADIFOCUS

## (undated) DEVICE — PK TRY HEART CATH 50

## (undated) DEVICE — PAPR PRNT PK SONY W RIBN UPC55

## (undated) DEVICE — MYNXGRIP 6F/7F: Brand: MYNXGRIP

## (undated) DEVICE — CATH DIAG IMPULSE FR4 6F 100CM

## (undated) DEVICE — CATH DIAG IMPULSE FL4 6F 100CM

## (undated) DEVICE — CATH DIAG IMPULSE PIG .056 6F 110CM